# Patient Record
Sex: MALE | Race: WHITE | NOT HISPANIC OR LATINO | Employment: OTHER | URBAN - METROPOLITAN AREA
[De-identification: names, ages, dates, MRNs, and addresses within clinical notes are randomized per-mention and may not be internally consistent; named-entity substitution may affect disease eponyms.]

---

## 2017-04-11 ENCOUNTER — OFFICE VISIT (OUTPATIENT)
Dept: OTOLARYNGOLOGY | Facility: CLINIC | Age: 67
End: 2017-04-11

## 2017-04-11 VITALS — HEIGHT: 66 IN | WEIGHT: 200 LBS | BODY MASS INDEX: 32.14 KG/M2

## 2017-04-11 DIAGNOSIS — C09.9 MALIGNANT NEOPLASM OF TONSIL (H): ICD-10-CM

## 2017-04-11 DIAGNOSIS — K11.7 DISTURBANCE OF SALIVARY SECRETION: Primary | ICD-10-CM

## 2017-04-11 RX ORDER — PILOCARPINE HYDROCHLORIDE 7.5 MG/1
7.5 TABLET, FILM COATED ORAL 2 TIMES DAILY
Qty: 180 TABLET | Refills: 3 | Status: SHIPPED | OUTPATIENT
Start: 2017-04-11 | End: 2018-04-11

## 2017-04-11 ASSESSMENT — PAIN SCALES - GENERAL: PAINLEVEL: NO PAIN (0)

## 2017-04-11 NOTE — MR AVS SNAPSHOT
After Visit Summary   4/11/2017    Reese Oakley    MRN: [id number]           Patient Information     Date Of Birth          1950        Visit Information        Provider Department      4/11/2017 10:30 AM Jose Antonio Mckenna MD [facility]ose [facility] Th[facility]        Today's Diagnoses     Disturbance of salivary secretion    -  1    Malignant neoplasm of tonsil (H)           Follow-ups after your visit        Follow-up notes from your care team     Return in about 6 months (around 10/11/2017).      Your next 10 appointments already scheduled     Apr 19, 2017  1:20 PM CDT   (Arrive by 1:05 PM)   CT SOFT TISSUE NECK W/O & W CONTRAST with UCCT1   [facility] [place] [facility] ([facility])    [address]  1st Floor  [facility] [address] [phone]           Please bring any scans or X-rays taken at other hospitals, if similar tests were done. Also bring a list of your medicines, including vitamins, minerals and over-the-counter drugs. It is safest to leave personal items at home.  Be sure to tell your doctor:   If you have any allergies.   If there s any chance you are pregnant.   If you are breastfeeding.   If you have any special needs.  You will have contrast for this exam. To prepare:   Do not eat or drink for 2 hours before your exam. If you need to take medicine, you may take it with small sips of water. (We may ask you to take liquid medicine as well.)   The day before your exam, drink extra fluids at least six 8-ounce glasses (unless your doctor tells you to restrict your fluids).  Patients over 70 or patients with diabetes or kidney problems:   If you haven t had a blood test (creatinine test) within the last 30 days, go to your clinic or Diagnostic Imaging Department for this test.  If you have diabetes:   If your kidney function is normal, continue taking your metformin (Avandamet, Glucophage, Glucovance, Metaglip) on the day of your exam.   If  your kidney function is abnormal, wait 48 hours before restarting this medicine.  Please wear loose clothing, such as a sweat suit or jogging clothes. Avoid snaps, zippers and other metal. We may ask you to undress and put on a hospital gown.  If you have any questions, please call the Imaging Department where you will have your exam.            Oct 17, 2017 10:45 AM CDT   (Arrive by 10:30 AM)   RETURN TUMOR VISIT with Jose Antonio Mckenna MD   [facility] ([facility] [place])    [address] [place] [address] [phone]              Future tests that were ordered for you today     Open Future Orders        Priority Expected Expires Ordered    CT Soft tissue neck w & w/o contrast Routine  2018            Who to contact     Please call your clinic at [phone] to:    Ask questions about your health    Make or cancel appointments    Discuss your medicines    Learn about your test results    Speak to your doctor   If you have compliments or concerns about an experience at your clinic, or if you wish to file a complaint, please contact [facility] Patient Relations at [phone] or email us at [email or web address]         Additional Information About Your Visit        [employer] Information     Attributor is an electronic gateway that provides easy, online access to your medical records. With Attributor, you can request a clinic appointment, read your test results, renew a prescription or communicate with your care team.     To sign up for Attributor visit the website at [email or web address]   You will be asked to enter the access code listed below, as well as some personal information. Please follow the directions to create your username and password.     Your access code is: [email or web address]  Expires: 2017  6:30 AM     Your access code will  in 90 days. If you need help or a new code, please  "contact your [facility] or call [phone] for assistance.        Care EveryWhere ID     This is your Care EveryWhere ID. This could be used by other organizations to access your [facility] medical records  [id number]        Your Vitals Were     Height BMI (Body Mass Index)                1.68 m (5' 6.14\") 32.14 kg/m2           Blood Pressure from Last 3 Encounters:   10/06/14 135/76    Weight from Last 3 Encounters:   04/11/17 90.7 kg (200 lb)   09/13/16 88 kg (194 lb)   04/12/16 88.9 kg (196 lb)                 Today's Medication Changes          These changes are accurate as of: 4/11/17 11:15 AM.  If you have any questions, ask your nurse or doctor.               These medicines have changed or have updated prescriptions.        Dose/Directions    * PILOCARPINE HCL PO   This may have changed:  Another medication with the same name was added. Make sure you understand how and when to take each.   Changed by:  Jose Antonio Mckenna MD        Refills:  0       * pilocarpine 5 MG tablet   Commonly known as:  SALAGEN   This may have changed:  Another medication with the same name was added. Make sure you understand how and when to take each.   Used for:  Disturbance of salivary secretion   Changed by:  Jose Antonio Mckenna MD        Dose:  5 mg   Take 1 tablet (5 mg) by mouth 2 times daily   Quantity:  60 tablet   Refills:  3       * pilocarpine 7.5 MG tablet   Commonly known as:  SALAGEN   This may have changed:  You were already taking a medication with the same name, and this prescription was added. Make sure you understand how and when to take each.   Used for:  Disturbance of salivary secretion   Changed by:  Jose Antonio Mckenna MD        Dose:  7.5 mg   Take 1 tablet (7.5 mg) by mouth 2 times daily   Quantity:  180 tablet   Refills:  3       * Notice:  This list has 3 medication(s) that are the same as other medications prescribed for you. Read the directions carefully, and ask " your doctor or other care provider to review them with you.         Where to get your medicines      Some of these will need a paper prescription and others can be bought over the counter.  Ask your nurse if you have questions.     Bring a paper prescription for each of these medications     pilocarpine 7.5 MG tablet                Primary Care Provider Office Phone # Fax #    Chilango Carrillo [phone]       [address] [address]        Thank you!     Thank you for choosing [facility]  for your care. Our goal is always to provide you with excellent care. Hearing back from our patients is one way we can continue to improve our services. Please take a few minutes to complete the written survey that you may receive in the mail after your visit with us. Thank you!             Your Updated Medication List - Protect others around you: Learn how to safely use, store and throw away your medicines at www.disposemymeds.org.          This list is accurate as of: 4/11/17 11:15 AM.  Always use your most recent med list.                   Brand Name Dispense Instructions for use    ASPIR-81 PO          glipiZIDE 10 MG tablet    GLUCOTROL     Take 10 mg by mouth 2 times daily (before meals) 12.5 & 2.5       GLUCOPHAGE 1000 MG tablet   Generic drug:  metFORMIN      Take 1,000 mg by mouth 2 times daily (with meals)       insulin glargine 100 UNIT/ML injection    LANTUS     Inject 4 Units Subcutaneous At Bedtime       lisinopril 10 MG tablet    PRINIVIL/ZESTRIL     Take 10 mg by mouth daily       omeprazole 10 MG CR capsule    priLOSEC     Take by mouth daily       * PILOCARPINE HCL PO          * pilocarpine 5 MG tablet    SALAGEN    60 tablet    Take 1 tablet (5 mg) by mouth 2 times daily       * pilocarpine 7.5 MG tablet    SALAGEN    180 tablet    Take 1 tablet (7.5 mg) by mouth 2 times daily       simvastatin 40 MG tablet    ZOCOR     Take by mouth At  Bedtime       zinc sulfate 220 MG capsule    ZINCATE     Take 220 mg by mouth daily       * Notice:  This list has 3 medication(s) that are the same as other medications prescribed for you. Read the directions carefully, and ask your doctor or other care provider to review them with you.

## 2017-04-11 NOTE — LETTER
4/11/2017       RE: Reese Oakley  [address] [address]     Dear Colleague,    Thank you for referring your patient, Reese Oakley, to the [facility] EAR NOSE [facility] TH[facility]AT at [facility]. Please see a copy of my visit note below.    HISTORY OF PRESENT ILLNESS:  Reese Oakley is 67 years of age.  He is here for follow-up today.  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  He has no dysphagia, but food does not taste right to him.  He says there are only about 10 go to foods that taste normal to him, unfortunately, and he is already 2-1/2 years out from his treatment.   No other current complaints at the present time today.      PHYSICAL EXAMINATION:  The patient is alert, oriented x3 and pleasant.  Skin of the face, lips, and neck on him is quite normal.  No masses or lesions are palpable in his oral cavity or the posterior pharynx, etc.  Ear canals, tympanic membranes are normal as well.  He had some cerumen in his right ear that I removed with a cerumen spoon.  It took a couple of minutes today to get the cerumen out of the ear under magnification with a cerumen spoon.  Neck exam shows no masses, adenopathy or thyromegaly.      PROCEDURE:  For tumor surveillance today, I did a flexible direct scope exam on him after topical anesthesia.  Nasal cavity, nasopharynx, oral cavity, oropharynx, hypopharynx are all clear throughout.  No masses or lesions or ulcers.  There is a little bit of blunting of the palatoglossal fold but this is occurring on both sides.  It is a radiation side effect only.      ASSESSMENT AND PLAN:  Patient with history of oropharyngeal carcinoma, doing well.  I will see him again in about 6 months.  He will see Dr. Carrillo in about 3 months.  He will get imaging when he comes back to see us again with a CAT scan of the neck with contrast.         Jose Antonio Mckenna MD         cc: Chilango Carrillo MD     [address]   [address]

## 2017-04-11 NOTE — PROGRESS NOTES
HISTORY OF PRESENT ILLNESS:  Reese Oakley is 67 years of age.  He is here for follow-up today.  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  He has no dysphagia, but food does not taste right to him.  He says there are only about 10 go to foods that taste normal to him, unfortunately, and he is already 2-1/2 years out from his treatment.   No other current complaints at the present time today.      PHYSICAL EXAMINATION:  The patient is alert, oriented x3 and pleasant.  Skin of the face, lips, and neck on him is quite normal.  No masses or lesions are palpable in his oral cavity or the posterior pharynx, etc.  Ear canals, tympanic membranes are normal as well.  He had some cerumen in his right ear that I removed with a cerumen spoon.  It took a couple of minutes today to get the cerumen out of the ear under magnification with a cerumen spoon.  Neck exam shows no masses, adenopathy or thyromegaly.      PROCEDURE:  For tumor surveillance today, I did a flexible direct scope exam on him after topical anesthesia.  Nasal cavity, nasopharynx, oral cavity, oropharynx, hypopharynx are all clear throughout.  No masses or lesions or ulcers.  There is a little bit of blunting of the palatoglossal fold but this is occurring on both sides.  It is a radiation side effect only.      ASSESSMENT AND PLAN:  Patient with history of oropharyngeal carcinoma, doing well.  I will see him again in about 6 months.  He will see Dr. Carrillo in about 3 months.  He will get imaging when he comes back to see us again with a CAT scan of the neck with contrast.      cc: Chilango Carrillo MD    [address]   [address]

## 2017-10-17 ENCOUNTER — OFFICE VISIT (OUTPATIENT)
Dept: OTOLARYNGOLOGY | Facility: CLINIC | Age: 67
End: 2017-10-17

## 2017-10-17 VITALS — BODY MASS INDEX: 23.3 KG/M2 | WEIGHT: 145 LBS

## 2017-10-17 DIAGNOSIS — B37.0 THRUSH: Primary | ICD-10-CM

## 2017-10-17 RX ORDER — CLOTRIMAZOLE 10 MG/1
LOZENGE ORAL
Qty: 30 TROCHE | Refills: 0 | Status: SHIPPED | OUTPATIENT
Start: 2017-10-17 | End: 2017-10-31

## 2017-10-17 ASSESSMENT — PAIN SCALES - GENERAL: PAINLEVEL: NO PAIN (0)

## 2017-10-17 NOTE — LETTER
10/17/2017       RE: Reese Oakley  [address] [facility] [address]     Dear Colleague,    Thank you for referring your patient, Reese Oakley, to the [facility] EAR NOSE [facility] TH[facility]AT at [facility]. Please see a copy of my visit note below.    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 Here for f/u today no new compalints.     Last 2 Scores for Patient-Answered VHI Questionnaire  No flowsheet data found.    Last 2 Scores for Patient-Answered CSI Questionnaire  No flowsheet data found.      Last 2 Scores for Patient-Answered EAT Questionnaire  No flowsheet data found.        PAST MEDICAL HISTORY:   Past Medical History:   Diagnosis Date     Adenocarcinoma (H)     large instestine      Cancer of appendix (H)      Diabetes (H)      Gastro-oesophageal reflux disease      History of radiation therapy      Hoarseness      Hypertension        PAST SURGICAL HISTORY:   Past Surgical History:   Procedure Laterality Date     APPENDECTOMY       COLECTOMY      Right     LASER CO2 LARYNGOSCOPY N/A 10/6/2014    Procedure: LASER CO2 LARYNGOSCOPY;  Surgeon: Jose Antonio Mckenna MD;  Location:  OR       FAMILY HISTORY:   Family History   Problem Relation Age of Onset     HEART DISEASE Father      DIABETES Mother      DIABETES Brother        SOCIAL HISTORY:   Social History   Substance Use Topics     Smoking status: Never Smoker     Smokeless tobacco: Never Used     Alcohol use No       REVIEW OF SYSTEMS: Ten point review of systems was performed and is negative except for:    ENT ROS 10/17/2017   Ears, Nose, Throat -   Endocrine Thirst        ALLERGIES: Review of patient's allergies indicates no known allergies.    MEDICATIONS:   Current Outpatient Prescriptions   Medication Sig Dispense Refill     clotrimazole 10 MG marce Allow 1 marce to dissolve slowly in mouth 3  times daily for 10 days 30 Umer 0     pilocarpine (SALAGEN) 7.5 MG tablet Take 1 tablet (7.5 mg) by mouth 2 times daily 180 tablet 3     pilocarpine (SALAGEN) 5 MG tablet Take 1 tablet (5 mg) by mouth 2 times daily 60 tablet 3     PILOCARPINE HCL PO        zinc sulfate (ZINCATE) 220 MG capsule Take 220 mg by mouth daily       Aspirin (ASPIR-81 PO)        glipiZIDE (GLUCOTROL) 10 MG tablet Take 10 mg by mouth 2 times daily (before meals) 12.5 & 2.5       metFORMIN (GLUCOPHAGE) 1000 MG tablet Take 1,000 mg by mouth 2 times daily (with meals)       insulin glargine (LANTUS VIAL) 100 UNIT/ML soln Inject 4 Units Subcutaneous At Bedtime        lisinopril (PRINIVIL,ZESTRIL) 10 MG tablet Take 10 mg by mouth daily       omeprazole (PRILOSEC) 10 MG capsule Take by mouth daily       simvastatin (ZOCOR) 40 MG tablet Take by mouth At Bedtime           PHYSICAL EXAMINATION:  He  is awake, alert and in no apparent distress.    His tympanic membranes are clear and intact bilaterally. External auditory canals are clear.  Nasal exam shows a mild septal deviation without obstruction.  Examination of the oral cavity shows no suspicious lesions.  There is symmetric movement of the tongue and soft palate.    The oropharynx is clear.  His neck is supple without significant adenopathy.  Pulse is regular.  Upper airway is clear.  Cranial nerves II-XII are grossly intact.       PROCEDURE: A flexible laryngoscopy  was performed.  Informed consent was obtained and a time out was performed. 3% lidocaine and 0.25% phenylephrine was sprayed into the nasal cavity and allowed 3 to 5 minutes for effect. The scope was passed through the right sided nostril. Examination showed the vocal folds to be mobile and meet in the midline.  No nodules, polyps or ulcerations are seen.  There is mild inflammation or erythema of the supraglottic or glottic larynx.  The hypopharynx is otherwise clear as is the subglottis.     IMPRESSION:  S/p chemorads for  orop[ahrunx cancr doing well will folow up in 2m months.     Jose Antonio Mckenna      PLAN:

## 2017-10-17 NOTE — PROGRESS NOTES
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 Here for f/u today no new compalints.     Last 2 Scores for Patient-Answered VHI Questionnaire  No flowsheet data found.    Last 2 Scores for Patient-Answered CSI Questionnaire  No flowsheet data found.      Last 2 Scores for Patient-Answered EAT Questionnaire  No flowsheet data found.        PAST MEDICAL HISTORY:   Past Medical History:   Diagnosis Date     Adenocarcinoma (H)     large instestine      Cancer of appendix (H)      Diabetes (H)      Gastro-oesophageal reflux disease      History of radiation therapy      Hoarseness      Hypertension        PAST SURGICAL HISTORY:   Past Surgical History:   Procedure Laterality Date     APPENDECTOMY       COLECTOMY      Right     LASER CO2 LARYNGOSCOPY N/A 10/6/2014    Procedure: LASER CO2 LARYNGOSCOPY;  Surgeon: Jose Antonio Mckenna MD;  Location:  OR       FAMILY HISTORY:   Family History   Problem Relation Age of Onset     HEART DISEASE Father      DIABETES Mother      DIABETES Brother        SOCIAL HISTORY:   Social History   Substance Use Topics     Smoking status: Never Smoker     Smokeless tobacco: Never Used     Alcohol use No       REVIEW OF SYSTEMS: Ten point review of systems was performed and is negative except for:   [facility] ENT ROS 10/17/2017   Ears, Nose, Throat -   Endocrine Thirst        ALLERGIES: Review of patient's allergies indicates no known allergies.    MEDICATIONS:   Current Outpatient Prescriptions   Medication Sig Dispense Refill     clotrimazole 10 MG umer Allow 1 umer to dissolve slowly in mouth 3 times daily for 10 days 30 Umer 0     pilocarpine (SALAGEN) 7.5 MG tablet Take 1 tablet (7.5 mg) by mouth 2 times daily 180 tablet 3     pilocarpine (SALAGEN) 5 MG tablet Take 1 tablet (5 mg) by mouth 2 times daily 60 tablet 3     PILOCARPINE HCL PO        zinc sulfate (ZINCATE) 220 MG capsule Take 220  mg by mouth daily       Aspirin (ASPIR-81 PO)        glipiZIDE (GLUCOTROL) 10 MG tablet Take 10 mg by mouth 2 times daily (before meals) 12.5 & 2.5       metFORMIN (GLUCOPHAGE) 1000 MG tablet Take 1,000 mg by mouth 2 times daily (with meals)       insulin glargine (LANTUS VIAL) 100 UNIT/ML soln Inject 4 Units Subcutaneous At Bedtime        lisinopril (PRINIVIL,ZESTRIL) 10 MG tablet Take 10 mg by mouth daily       omeprazole (PRILOSEC) 10 MG capsule Take by mouth daily       simvastatin (ZOCOR) 40 MG tablet Take by mouth At Bedtime           PHYSICAL EXAMINATION:  He  is awake, alert and in no apparent distress.    His tympanic membranes are clear and intact bilaterally. External auditory canals are clear.  Nasal exam shows a mild septal deviation without obstruction.  Examination of the oral cavity shows no suspicious lesions.  There is symmetric movement of the tongue and soft palate.    The oropharynx is clear.  His neck is supple without significant adenopathy.  Pulse is regular.  Upper airway is clear.  Cranial nerves II-XII are grossly intact.       PROCEDURE: A flexible laryngoscopy  was performed.  Informed consent was obtained and a time out was performed. 3% lidocaine and 0.25% phenylephrine was sprayed into the nasal cavity and allowed 3 to 5 minutes for effect. The scope was passed through the right sided nostril. Examination showed the vocal folds to be mobile and meet in the midline.  No nodules, polyps or ulcerations are seen.  There is mild inflammation or erythema of the supraglottic or glottic larynx.  The hypopharynx is otherwise clear as is the subglottis.     IMPRESSION:  S/p chemorads for orop[ahrunx cancr doing well will folow up in 2m months.     Jose Antonio Mckenna      PLAN:

## 2017-10-17 NOTE — MR AVS SNAPSHOT
After Visit Summary   10/17/2017    Reese Oakley    MRN: [id number]           Patient Information     Date Of Birth          1950        Visit Information        Provider Department      10/17/2017 10:45 AM Jose Antonio Mckenna MD [facility] Ear Nose and Throat        Today's Diagnoses     Thrush    -  1       Follow-ups after your visit        Follow-up notes from your care team     Return in about 7 months (around 2018).      Who to contact     Please call your clinic at [phone] to:    Ask questions about your health    Make or cancel appointments    Discuss your medicines    Learn about your test results    Speak to your doctor   If you have compliments or concerns about an experience at your clinic, or if you wish to file a complaint, please contact [facility] Patient Relations at [phone] or email us at [email or web address]         Additional Information About Your Visit        MyChart Information     [email or web address] is an electronic gateway that provides easy, online access to your medical records. With Money Forward, you can request a clinic appointment, read your test results, renew a prescription or communicate with your care team.     To sign up for [email or web address] visit the website at [email or web address]   You will be asked to enter the access code listed below, as well as some personal information. Please follow the directions to create your username and password.     Your access code is: [email or web address]  Expires: 2018  6:31 AM     Your access code will  in 90 days. If you need help or a new code, please contact your [facility] or call [phone] for assistance.        Care EveryWhere ID     This is your Care EveryWhere ID. This could be used by other organizations to access your [place] medical records  [id number]        Your Vitals Were     BMI (Body Mass Index)                   23.3 kg/m2             Blood Pressure from Last 3 Encounters:   10/06/14 135/76    Weight from Last 3 Encounters:   10/17/17 65.8 kg (145 lb)   04/11/17 90.7 kg (200 lb)   09/13/16 88 kg (194 lb)              Today, you had the following     No orders found for display         Today's Medication Changes          These changes are accurate as of: 10/17/17 11:21 AM.  If you have any questions, ask your nurse or doctor.               Start taking these medicines.        Dose/Directions    clotrimazole 10 MG marce   Used for:  Thrush   Started by:  Jose Antonio Mckenna MD        Allow 1 marce to dissolve slowly in mouth 3 times daily for 10 days   Quantity:  30 Marce   Refills:  0            Where to get your medicines      These medications were sent to [place] [address] [place] [address] [address]     Phone:  [phone]     clotrimazole 10 MG marce                Primary Care Provider Office Phone # Fax #    Chilango Charissa Carrillo [phone]       [facility] [address] [facility] [address]        Equal Access to Services     [facility] AH: Hadii aad ku hadasho Soomaali, waaxda luqadaha, qaybta kaalmada adeegyada, louie allen hayvikasn akil costello. So [facility] [phone].    ATENCIÓN: Si habla español, tiene a bauer disposición servicios gratuitos de asistencia lingüística. Llame al [phone].    We comply with applicable federal civil rights laws and [place] laws. We do not discriminate on the basis of race, color, national origin, age, disability, sex, sexual orientation, or gender identity.            Thank you!     Thank you for choosing [facility] EAR NOSE AND THROAT  for your care. Our goal is always to provide you with excellent care. Hearing back from our patients is one way we can continue to improve our services. Please take a few minutes to complete the written survey that you may receive in the mail after your visit with us.  Thank you!             Your Updated Medication List - Protect others around you: Learn how to safely use, store and throw away your medicines at www.disposemymeds.org.          This list is accurate as of: 10/17/17 11:21 AM.  Always use your most recent med list.                   Brand Name Dispense Instructions for use Diagnosis    ASPIR-81 PO           clotrimazole 10 MG umer     30 Umer    Allow 1 umer to dissolve slowly in mouth 3 times daily for 10 days    Thrush       glipiZIDE 10 MG tablet    GLUCOTROL     Take 10 mg by mouth 2 times daily (before meals) 12.5 & 2.5        GLUCOPHAGE 1000 MG tablet   Generic drug:  metFORMIN      Take 1,000 mg by mouth 2 times daily (with meals)        insulin glargine 100 UNIT/ML injection    LANTUS     Inject 4 Units Subcutaneous At Bedtime        lisinopril 10 MG tablet    PRINIVIL/ZESTRIL     Take 10 mg by mouth daily        omeprazole 10 MG CR capsule    priLOSEC     Take by mouth daily        * PILOCARPINE HCL PO           * pilocarpine 5 MG tablet    SALAGEN    60 tablet    Take 1 tablet (5 mg) by mouth 2 times daily    Disturbance of salivary secretion       * pilocarpine 7.5 MG tablet    SALAGEN    180 tablet    Take 1 tablet (7.5 mg) by mouth 2 times daily    Disturbance of salivary secretion       simvastatin 40 MG tablet    ZOCOR     Take by mouth At Bedtime        zinc sulfate 220 (50 ZN) MG capsule    ZINCATE     Take 220 mg by mouth daily        * Notice:  This list has 3 medication(s) that are the same as other medications prescribed for you. Read the directions carefully, and ask your doctor or other care provider to review them with you.

## 2017-10-17 NOTE — NURSING NOTE
Chief Complaint   Patient presents with     RECHECK     6 month follow up     Clayton Nicholson LPN

## 2017-10-17 NOTE — PATIENT INSTRUCTIONS
1.  You were seen in the ENT Clinic today by Dr. Mckenna.  If you have questions or concerns after your appointment please call, [phone].  Press option #1 for scheduling related needs.  Press option #3 for Nurse advice.  2.  Plan is to return to clinic in 7 months for another assessment.  3. RN Care Coordinator is Noah, [phone]

## 2018-04-11 ENCOUNTER — TELEPHONE (OUTPATIENT)
Dept: OTOLARYNGOLOGY | Facility: CLINIC | Age: 68
End: 2018-04-11

## 2018-04-11 DIAGNOSIS — K11.7 DISTURBANCE OF SALIVARY SECRETION: ICD-10-CM

## 2018-04-11 RX ORDER — PILOCARPINE HYDROCHLORIDE 7.5 MG/1
7.5 TABLET, FILM COATED ORAL 2 TIMES DAILY
Qty: 180 TABLET | Refills: 3 | Status: SHIPPED | OUTPATIENT
Start: 2018-04-11 | End: 2019-04-13

## 2018-04-11 NOTE — TELEPHONE ENCOUNTER
[facility] Call Center    Phone Message    May a detailed message be left on voicemail: yes    Reason for Call: Medication Refill Request    Has the patient contacted the pharmacy for the refill? Yes   Name of medication being requested: Philocarpine (Salagen) 7.5 MG Tab 2 Times Daily  Provider who prescribed the medication: Dr. Jose Antonio Mckenna  Pharmacy: N/A  Date medication is needed: Before 04/13/2018      Action Taken: Message routed to:  [facility]

## 2018-05-22 ENCOUNTER — OFFICE VISIT (OUTPATIENT)
Dept: OTOLARYNGOLOGY | Facility: CLINIC | Age: 68
End: 2018-05-22
Payer: COMMERCIAL

## 2018-05-22 VITALS
SYSTOLIC BLOOD PRESSURE: 153 MMHG | WEIGHT: 195.6 LBS | DIASTOLIC BLOOD PRESSURE: 77 MMHG | HEIGHT: 67 IN | BODY MASS INDEX: 30.7 KG/M2

## 2018-05-22 DIAGNOSIS — C09.9 TONSIL CANCER (H): Primary | ICD-10-CM

## 2018-05-22 ASSESSMENT — PAIN SCALES - GENERAL: PAINLEVEL: NO PAIN (0)

## 2018-05-22 NOTE — PROGRESS NOTES
HISTORY OF PRESENT ILLNESS:  Kenrick is 68 years of age.  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  He had to undergo actually chemotherapy, radiation therapy afterwards.  He has no new complaints at the present time today.  He is eating, drinking, breathing and swallowing quite well.  In fact, his swallowing is a little bit better than the last time I saw him.  He is almost four years out now.  He has no other current complaints.  He is otherwise getting by quite well.  He is on pilocarpine additionally.      PHYSICAL EXAMINATION:  The patient is alert, oriented x3 and is very pleasant to interact with, as always.  Skin of the face, lips, and neck on him is quite normal throughout.  His oral cavity and oropharynx is clear.  His floor of mouth and base of tongue are soft.  Ear canals and tympanic membranes are normal.  His neck exam shows no masses, adenopathy or thyromegaly.      PROCEDURE:  Flexible direct laryngoscopy was done on him today for tumor surveillance.  After topical anesthesia through the right naris, we examined the nasal cavity, nasopharynx, oral cavity, oropharynx, hypopharynx, and everything looks excellent with good true vocal mobility throughout.  No masses or lesions are seen.      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 He is going to see Dr. Carrillo in four months and me in eight months.        cc: Chilango Carrillo MD    [facility]    [address] [place]  [address]      FO/ms

## 2018-05-22 NOTE — LETTER
5/22/2018       RE: Reese Oakley  [address] [facility] [address]     Dear Colleague,    Thank you for referring your patient, Reese Oakley, to the [facility] EAR NOSE AND THROAT at [facility]. Please see a copy of my visit note below.    HISTORY OF PRESENT ILLNESS:  Kenrick is 68 years of age.  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  He had to undergo actually chemotherapy, radiation therapy afterwards.  He has no new complaints at the present time today.  He is eating, drinking, breathing and swallowing quite well.  In fact, his swallowing is a little bit better than the last time I saw him.  He is almost four years out now.  He has no other current complaints.  He is otherwise getting by quite well.  He is on pilocarpine additionally.      PHYSICAL EXAMINATION:  The patient is alert, oriented x3 and is very pleasant to interact with, as always.  Skin of the face, lips, and neck on him is quite normal throughout.  His oral cavity and oropharynx is clear.  His floor of mouth and base of tongue are soft.  Ear canals and tympanic membranes are normal.  His neck exam shows no masses, adenopathy or thyromegaly.      PROCEDURE:  Flexible direct laryngoscopy was done on him today for tumor surveillance.  After topical anesthesia through the right naris, we examined the nasal cavity, nasopharynx, oral cavity, oropharynx, hypopharynx, and everything looks excellent with good true vocal mobility throughout.  No masses or lesions are seen.      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 He is going to see Dr. Carrillo in four months and me in eight months.          FO/ms     Again, thank you for allowing me to participate in the care of your patient.      Sincerely,    Jose Antonio Mckenna MD    cc: Chilango Carrillo MD    [facility]    [address] [place]  [address]

## 2018-05-22 NOTE — MR AVS SNAPSHOT
After Visit Summary   2018    Reese Oakley    MRN: [id number]           Patient Information     Date Of Birth          1950        Visit Information        Provider Department      2018 10:30 AM Jose Antonio Mckenna MD [facility] Ear Nose and Throat        Care Instructions    Please follow up to see Dr Mckenna in about 8 months.  Jass Leonard ,RN  [phone]              Follow-ups after your visit        Follow-up notes from your care team     Return in about 8 months (around 2019).      Your next 10 appointments already scheduled     2019 10:15 AM CST   (Arrive by 10:00 AM)   RETURN TUMOR VISIT with MD LEENA Lopez [facility] Nose [facility] Throat ([facility])    [address] [place] [address] [phone]              Who to contact     Please call your clinic at [phone] to:    Ask questions about your health    Make or cancel appointments    Discuss your medicines    Learn about your test results    Speak to your doctor            Additional Information About Your Visit        MyChart Information     [employer] is an electronic gateway that provides easy, online access to your medical records. With [employer] you can request a clinic appointment, read your test results, renew a prescription or communicate with your care team.     To sign up for Fit Stepst visit the website at [email or web address]   You will be asked to enter the access code listed below, as well as some personal information. Please follow the directions to create your username and password.     Your access code is: [email or web address]  Expires: 2018  6:30 AM     Your access code will  in 90 days. If you need help or a new code, please contact your [facility] or call [phone] for assistance.        Care EveryWhere ID     This is your Care EveryWhere ID. This could be used by other  "organizations to access your [place] medical records  [id number]        Your Vitals Were     Height BMI (Body Mass Index)                1.702 m (5' 7\") 30.64 kg/m2           Blood Pressure from Last 3 Encounters:   05/22/18 153/77   10/06/14 135/76    Weight from Last 3 Encounters:   05/22/18 88.7 kg (195 lb 9.6 oz)   10/17/17 65.8 kg (145 lb)   04/11/17 90.7 kg (200 lb)              Today, you had the following     No orders found for display       Primary Care Provider Office Phone # Fax #    Chilango Carrillo [phone]       [facility] [address]  [facility] [address]        Equal Access to Services     TIRSO NOYOLA : Hadii orquidea salmono Soomaali, waaxda luqadaha, qaybta kaalmada adeegyada, louie tapia . So [facility] [phone].    ATENCIÓN: Si habla español, tiene a bauer disposición servicios gratuitos de asistencia lingüística. Ejame al [phone].    We comply with applicable federal civil rights laws and [place] laws. We do not discriminate on the basis of race, color, national origin, age, disability, sex, sexual orientation, or gender identity.            Thank you!     Thank you for choosing [facility] EAR NOSE AND THROAT  for your care. Our goal is always to provide you with excellent care. Hearing back from our patients is one way we can continue to improve our services. Please take a few minutes to complete the written survey that you may receive in the mail after your visit with us. Thank you!             Your Updated Medication List - Protect others around you: Learn how to safely use, store and throw away your medicines at www.disposemymeds.org.          This list is accurate as of 5/22/18 10:55 AM.  Always use your most recent med list.                   Brand Name Dispense Instructions for use Diagnosis    ASPIR-81 PO           glipiZIDE 10 MG tablet    GLUCOTROL     Take 10 mg by mouth 2 times daily (before meals) 12.5 & 2.5        " GLUCOPHAGE 1000 MG tablet   Generic drug:  metFORMIN      Take 1,000 mg by mouth 2 times daily (with meals)        insulin glargine 100 UNIT/ML injection    LANTUS     Inject 4 Units Subcutaneous At Bedtime        lisinopril 10 MG tablet    PRINIVIL/ZESTRIL     Take 10 mg by mouth daily        omeprazole 10 MG CR capsule    priLOSEC     Take by mouth daily        * PILOCARPINE HCL PO           * pilocarpine 5 MG tablet    SALAGEN    60 tablet    Take 1 tablet (5 mg) by mouth 2 times daily    Disturbance of salivary secretion       * pilocarpine 7.5 MG tablet    SALAGEN    180 tablet    Take 1 tablet (7.5 mg) by mouth 2 times daily    Disturbance of salivary secretion       simvastatin 40 MG tablet    ZOCOR     Take by mouth At Bedtime        zinc sulfate 220 (50 Zn) MG capsule    ZINCATE     Take 220 mg by mouth daily        * Notice:  This list has 3 medication(s) that are the same as other medications prescribed for you. Read the directions carefully, and ask your doctor or other care provider to review them with you.

## 2019-01-09 ENCOUNTER — PATIENT OUTREACH (OUTPATIENT)
Dept: CARE COORDINATION | Facility: CLINIC | Age: 69
End: 2019-01-09

## 2019-01-22 ENCOUNTER — OFFICE VISIT (OUTPATIENT)
Dept: OTOLARYNGOLOGY | Facility: CLINIC | Age: 69
End: 2019-01-22
Payer: COMMERCIAL

## 2019-01-22 VITALS — WEIGHT: 195 LBS | BODY MASS INDEX: 30.61 KG/M2 | HEIGHT: 67 IN

## 2019-01-22 DIAGNOSIS — C09.9 TONSIL CANCER (H): Primary | ICD-10-CM

## 2019-01-22 ASSESSMENT — MIFFLIN-ST. JEOR: SCORE: 1608.14

## 2019-01-22 ASSESSMENT — PAIN SCALES - GENERAL: PAINLEVEL: NO PAIN (0)

## 2019-01-22 NOTE — PATIENT INSTRUCTIONS
Thank you for choosing  Physicians.  Please follow up with Dr. Mckenna in approximately 1 year.    [phone] appointment scheduling option 1 and nurse advice option 3.

## 2019-01-22 NOTE — LETTER
1/22/2019     RE: Reese Oakley  [address] [facility] [address]     Dear Colleague,    Thank you for referring your patient, Reese Oakley, to the [facility] EAR NOSE AND THROAT at [facility]. Please see a copy of my visit note below.    HISTORY OF PRESENT ILLNESS:  Kenrick is 68 years of age.  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  He had to undergo actually chemotherapy, radiation therapy afterwards.  He has no new complaints at the present time today.  He is eating, drinking, breathing and swallowing quite well.  In fact, his swallowing is a little bit better than the last time I saw him.  He is almost four years out now.  He has no other current complaints.  He is otherwise getting by quite well.  He is on pilocarpine additionally.      PHYSICAL EXAMINATION:  The patient is alert, oriented x3 and is very pleasant to interact with, as always.  Skin of the face, lips, and neck on him is quite normal throughout.  His oral cavity and oropharynx is clear.  His floor of mouth and base of tongue are soft.  Ear canals and tympanic membranes are normal.  His neck exam shows no masses, adenopathy or thyromegaly.      PROCEDURE:  Flexible direct laryngoscopy was done on him today for tumor surveillance.  After topical anesthesia through the right naris, we examined the nasal cavity, nasopharynx, oral cavity, oropharynx, hypopharynx, and everything looks excellent with good true vocal mobility throughout.  No masses or lesions are seen.      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 He is going to see Dr. Carrillo in six months and me in twelve months.       Jose Antonio Mckenna MD    cc: Chilango Carrillo MD    ENT [facility]    [address] [place]  [address]

## 2019-01-22 NOTE — PROGRESS NOTES
HISTORY OF PRESENT ILLNESS:  Kenrick is 68 years of age.  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  He had to undergo actually chemotherapy, radiation therapy afterwards.  He has no new complaints at the present time today.  He is eating, drinking, breathing and swallowing quite well.  In fact, his swallowing is a little bit better than the last time I saw him.  He is almost four years out now.  He has no other current complaints.  He is otherwise getting by quite well.  He is on pilocarpine additionally.      PHYSICAL EXAMINATION:  The patient is alert, oriented x3 and is very pleasant to interact with, as always.  Skin of the face, lips, and neck on him is quite normal throughout.  His oral cavity and oropharynx is clear.  His floor of mouth and base of tongue are soft.  Ear canals and tympanic membranes are normal.  His neck exam shows no masses, adenopathy or thyromegaly.      PROCEDURE:  Flexible direct laryngoscopy was done on him today for tumor surveillance.  After topical anesthesia through the right naris, we examined the nasal cavity, nasopharynx, oral cavity, oropharynx, hypopharynx, and everything looks excellent with good true vocal mobility throughout.  No masses or lesions are seen.      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 He is going to see Dr. Carrillo in six months and me in twelve months.        cc: Chilango Carrillo MD    ENT [facility]    [address] [place]  [address]      FO/ms

## 2019-04-12 ENCOUNTER — TELEPHONE (OUTPATIENT)
Dept: OTOLARYNGOLOGY | Facility: CLINIC | Age: 69
End: 2019-04-12

## 2019-04-12 DIAGNOSIS — K11.7 DISTURBANCE OF SALIVARY SECRETION: ICD-10-CM

## 2019-04-12 NOTE — TELEPHONE ENCOUNTER
[facility] Call Center    Phone Message    May a detailed message be left on voicemail: yes    Reason for Call: Medication Refill Request    Has the patient contacted the pharmacy for the refill? Yes   Name of medication being requested: pilocarpine (SALAGEN) 7.5 MG tablet  Provider who prescribed the medication: Bala  [facility] [place] Phone: [phone]  Date medication is needed: Has one day left needs 90 day RX with 3 refills         Action Taken: Message routed to:  [facility] ([facility] ENT

## 2019-04-13 RX ORDER — PILOCARPINE HYDROCHLORIDE 7.5 MG/1
7.5 TABLET, FILM COATED ORAL 2 TIMES DAILY
Qty: 180 TABLET | Refills: 3 | Status: SHIPPED | OUTPATIENT
Start: 2019-04-13 | End: 2020-05-19

## 2019-12-09 ENCOUNTER — DOCUMENTATION ONLY (OUTPATIENT)
Dept: CARE COORDINATION | Facility: CLINIC | Age: 69
End: 2019-12-09

## 2020-01-01 ENCOUNTER — OFFICE VISIT (OUTPATIENT)
Dept: OTOLARYNGOLOGY | Facility: CLINIC | Age: 70
End: 2020-01-01
Payer: MEDICARE

## 2020-01-01 ENCOUNTER — APPOINTMENT (OUTPATIENT)
Dept: OCCUPATIONAL THERAPY | Facility: CLINIC | Age: 70
DRG: 822 | End: 2020-01-01
Attending: OTOLARYNGOLOGY
Payer: MEDICARE

## 2020-01-01 ENCOUNTER — PATIENT OUTREACH (OUTPATIENT)
Dept: CARE COORDINATION | Facility: CLINIC | Age: 70
End: 2020-01-01

## 2020-01-01 ENCOUNTER — PATIENT OUTREACH (OUTPATIENT)
Dept: OTOLARYNGOLOGY | Facility: CLINIC | Age: 70
End: 2020-01-01

## 2020-01-01 ENCOUNTER — TELEPHONE (OUTPATIENT)
Dept: OTOLARYNGOLOGY | Facility: CLINIC | Age: 70
End: 2020-01-01

## 2020-01-01 ENCOUNTER — TUMOR CONFERENCE (OUTPATIENT)
Dept: ONCOLOGY | Facility: CLINIC | Age: 70
End: 2020-01-01
Payer: MEDICARE

## 2020-01-01 ENCOUNTER — PREP FOR PROCEDURE (OUTPATIENT)
Dept: OTOLARYNGOLOGY | Facility: CLINIC | Age: 70
End: 2020-01-01

## 2020-01-01 ENCOUNTER — ANESTHESIA (OUTPATIENT)
Dept: SURGERY | Facility: CLINIC | Age: 70
DRG: 822 | End: 2020-01-01
Payer: MEDICARE

## 2020-01-01 ENCOUNTER — HOSPITAL ENCOUNTER (OUTPATIENT)
Dept: PET IMAGING | Facility: CLINIC | Age: 70
Discharge: HOME | End: 2020-11-24
Attending: OTOLARYNGOLOGY | Admitting: OTOLARYNGOLOGY
Payer: MEDICARE

## 2020-01-01 ENCOUNTER — HOSPITAL ENCOUNTER (INPATIENT)
Facility: CLINIC | Age: 70
LOS: 3 days | Discharge: HOME | DRG: 822 | End: 2020-12-12
Attending: OTOLARYNGOLOGY | Admitting: OTOLARYNGOLOGY
Payer: MEDICARE

## 2020-01-01 ENCOUNTER — TELEPHONE (OUTPATIENT)
Dept: ONCOLOGY | Facility: CLINIC | Age: 70
End: 2020-01-01

## 2020-01-01 ENCOUNTER — ANESTHESIA EVENT (OUTPATIENT)
Dept: SURGERY | Facility: CLINIC | Age: 70
DRG: 822 | End: 2020-01-01
Payer: MEDICARE

## 2020-01-01 VITALS
TEMPERATURE: 98 F | HEIGHT: 67 IN | HEART RATE: 56 BPM | BODY MASS INDEX: 29.19 KG/M2 | OXYGEN SATURATION: 98 % | WEIGHT: 186 LBS

## 2020-01-01 VITALS
BODY MASS INDEX: 29.03 KG/M2 | HEART RATE: 57 BPM | WEIGHT: 185 LBS | OXYGEN SATURATION: 99 % | TEMPERATURE: 96.9 F | HEIGHT: 67 IN

## 2020-01-01 VITALS
BODY MASS INDEX: 29.98 KG/M2 | TEMPERATURE: 96 F | HEART RATE: 66 BPM | WEIGHT: 191 LBS | OXYGEN SATURATION: 99 % | HEIGHT: 67 IN

## 2020-01-01 VITALS
OXYGEN SATURATION: 100 % | WEIGHT: 185.3 LBS | RESPIRATION RATE: 16 BRPM | DIASTOLIC BLOOD PRESSURE: 58 MMHG | HEART RATE: 55 BPM | BODY MASS INDEX: 29.08 KG/M2 | HEIGHT: 67 IN | SYSTOLIC BLOOD PRESSURE: 146 MMHG | TEMPERATURE: 96.3 F

## 2020-01-01 DIAGNOSIS — C02.1 SQUAMOUS CELL CARCINOMA OF LATERAL TONGUE (H): Primary | ICD-10-CM

## 2020-01-01 DIAGNOSIS — Z11.59 ENCOUNTER FOR SCREENING FOR OTHER VIRAL DISEASES: ICD-10-CM

## 2020-01-01 DIAGNOSIS — C02.1 SQUAMOUS CELL CARCINOMA OF LATERAL TONGUE (H): ICD-10-CM

## 2020-01-01 DIAGNOSIS — C44.42 SQUAMOUS CELL CARCINOMA OF NECK: Primary | ICD-10-CM

## 2020-01-01 DIAGNOSIS — C44.42 SQUAMOUS CELL CARCINOMA OF NECK: ICD-10-CM

## 2020-01-01 DIAGNOSIS — Z11.59 ENCOUNTER FOR SCREENING FOR OTHER VIRAL DISEASES: Primary | ICD-10-CM

## 2020-01-01 DIAGNOSIS — G89.18 ACUTE POST-OPERATIVE PAIN: Primary | ICD-10-CM

## 2020-01-01 LAB
ANION GAP SERPL CALCULATED.3IONS-SCNC: 7 MMOL/L (ref 3–14)
BUN SERPL-MCNC: 19 MG/DL (ref 7–30)
CALCIUM SERPL-MCNC: 8.8 MG/DL (ref 8.5–10.1)
CHLORIDE SERPL-SCNC: 106 MMOL/L (ref 94–109)
CO2 SERPL-SCNC: 23 MMOL/L (ref 20–32)
COPATH REPORT: NORMAL
CREAT BLD-MCNC: 1.6 MG/DL (ref 0.66–1.25)
CREAT SERPL-MCNC: 1.29 MG/DL (ref 0.66–1.25)
CREAT SERPL-MCNC: 1.3 MG/DL (ref 0.66–1.25)
GFR SERPL CREATININE-BSD FRML MDRD: 43 ML/MIN/{1.73_M2}
GFR SERPL CREATININE-BSD FRML MDRD: 55 ML/MIN/{1.73_M2}
GFR SERPL CREATININE-BSD FRML MDRD: 55 ML/MIN/{1.73_M2}
GLUCOSE BLDC GLUCOMTR-MCNC: 114 MG/DL (ref 70–99)
GLUCOSE BLDC GLUCOMTR-MCNC: 139 MG/DL (ref 70–99)
GLUCOSE BLDC GLUCOMTR-MCNC: 157 MG/DL (ref 70–99)
GLUCOSE BLDC GLUCOMTR-MCNC: 167 MG/DL (ref 70–99)
GLUCOSE BLDC GLUCOMTR-MCNC: 224 MG/DL (ref 70–99)
GLUCOSE BLDC GLUCOMTR-MCNC: 234 MG/DL (ref 70–99)
GLUCOSE BLDC GLUCOMTR-MCNC: 244 MG/DL (ref 70–99)
GLUCOSE BLDC GLUCOMTR-MCNC: 249 MG/DL (ref 70–99)
GLUCOSE BLDC GLUCOMTR-MCNC: 256 MG/DL (ref 70–99)
GLUCOSE BLDC GLUCOMTR-MCNC: 269 MG/DL (ref 70–99)
GLUCOSE BLDC GLUCOMTR-MCNC: 284 MG/DL (ref 70–99)
GLUCOSE BLDC GLUCOMTR-MCNC: 285 MG/DL (ref 70–99)
GLUCOSE BLDC GLUCOMTR-MCNC: 294 MG/DL (ref 70–99)
GLUCOSE BLDC GLUCOMTR-MCNC: 302 MG/DL (ref 70–99)
GLUCOSE BLDC GLUCOMTR-MCNC: 304 MG/DL (ref 70–99)
GLUCOSE BLDC GLUCOMTR-MCNC: 307 MG/DL (ref 70–99)
GLUCOSE BLDC GLUCOMTR-MCNC: 310 MG/DL (ref 70–99)
GLUCOSE BLDC GLUCOMTR-MCNC: 318 MG/DL (ref 70–99)
GLUCOSE BLDC GLUCOMTR-MCNC: 347 MG/DL (ref 70–99)
GLUCOSE BLDC GLUCOMTR-MCNC: 369 MG/DL (ref 70–99)
GLUCOSE BLDC GLUCOMTR-MCNC: 373 MG/DL (ref 70–99)
GLUCOSE BLDC GLUCOMTR-MCNC: 84 MG/DL (ref 70–99)
GLUCOSE BLDC GLUCOMTR-MCNC: 88 MG/DL (ref 70–99)
GLUCOSE BLDC GLUCOMTR-MCNC: 92 MG/DL (ref 70–99)
GLUCOSE SERPL-MCNC: 152 MG/DL (ref 70–99)
HBA1C MFR BLD: 7.9 % (ref 0–5.6)
HGB BLD-MCNC: 13.5 G/DL (ref 13.3–17.7)
PLATELET # BLD AUTO: 261 10E9/L (ref 150–450)
POTASSIUM SERPL-SCNC: 4.2 MMOL/L (ref 3.4–5.3)
POTASSIUM SERPL-SCNC: 4.3 MMOL/L (ref 3.4–5.3)
SARS-COV-2 RNA SPEC QL NAA+PROBE: NOT DETECTED
SODIUM SERPL-SCNC: 136 MMOL/L (ref 133–144)
SPECIMEN SOURCE: NORMAL

## 2020-01-01 PROCEDURE — 250N000013 HC RX MED GY IP 250 OP 250 PS 637: Performed by: ANESTHESIOLOGY

## 2020-01-01 PROCEDURE — 120N000002 HC R&B MED SURG/OB UMMC

## 2020-01-01 PROCEDURE — 88305 TISSUE EXAM BY PATHOLOGIST: CPT | Mod: TC | Performed by: OTOLARYNGOLOGY

## 2020-01-01 PROCEDURE — 85018 HEMOGLOBIN: CPT | Performed by: ANESTHESIOLOGY

## 2020-01-01 PROCEDURE — 88342 IMHCHEM/IMCYTCHM 1ST ANTB: CPT | Mod: 26 | Performed by: PATHOLOGY

## 2020-01-01 PROCEDURE — 250N000011 HC RX IP 250 OP 636: Performed by: STUDENT IN AN ORGANIZED HEALTH CARE EDUCATION/TRAINING PROGRAM

## 2020-01-01 PROCEDURE — 250N000012 HC RX MED GY IP 250 OP 636 PS 637: Performed by: STUDENT IN AN ORGANIZED HEALTH CARE EDUCATION/TRAINING PROGRAM

## 2020-01-01 PROCEDURE — 250N000011 HC RX IP 250 OP 636: Performed by: OTOLARYNGOLOGY

## 2020-01-01 PROCEDURE — 999N000139 HC STATISTIC PRE-PROCEDURE ASSESSMENT II: Performed by: OTOLARYNGOLOGY

## 2020-01-01 PROCEDURE — 07T10ZZ RESECTION OF RIGHT NECK LYMPHATIC, OPEN APPROACH: ICD-10-PCS | Performed by: OTOLARYNGOLOGY

## 2020-01-01 PROCEDURE — 250N000003 HC SEVOFLURANE, EA 15 MIN: Performed by: OTOLARYNGOLOGY

## 2020-01-01 PROCEDURE — 258N000003 HC RX IP 258 OP 636: Performed by: ANESTHESIOLOGY

## 2020-01-01 PROCEDURE — 272N000001 HC OR GENERAL SUPPLY STERILE: Performed by: OTOLARYNGOLOGY

## 2020-01-01 PROCEDURE — 360N000029 HC SURGERY LEVEL 4 EA 15 ADDTL MIN - UMMC: Performed by: OTOLARYNGOLOGY

## 2020-01-01 PROCEDURE — 761N000003 HC RECOVERY PHASE 1 LEVEL 2 FIRST HR: Performed by: OTOLARYNGOLOGY

## 2020-01-01 PROCEDURE — 97110 THERAPEUTIC EXERCISES: CPT | Mod: GO

## 2020-01-01 PROCEDURE — 360N000028 HC SURGERY LEVEL 4 1ST 30 MIN - UMMC: Performed by: OTOLARYNGOLOGY

## 2020-01-01 PROCEDURE — A9552 F18 FDG: HCPCS | Performed by: OTOLARYNGOLOGY

## 2020-01-01 PROCEDURE — 78813 PET IMAGE FULL BODY: CPT | Mod: 26

## 2020-01-01 PROCEDURE — 761N000004 HC RECOVERY PHASE 1 LEVEL 2 EA ADDTL HR: Performed by: OTOLARYNGOLOGY

## 2020-01-01 PROCEDURE — 999N001017 HC STATISTIC GLUCOSE BY METER IP

## 2020-01-01 PROCEDURE — 99213 OFFICE O/P EST LOW 20 MIN: CPT | Performed by: OTOLARYNGOLOGY

## 2020-01-01 PROCEDURE — 250N000013 HC RX MED GY IP 250 OP 250 PS 637: Performed by: STUDENT IN AN ORGANIZED HEALTH CARE EDUCATION/TRAINING PROGRAM

## 2020-01-01 PROCEDURE — 88341 IMHCHEM/IMCYTCHM EA ADD ANTB: CPT | Mod: TC | Performed by: OTOLARYNGOLOGY

## 2020-01-01 PROCEDURE — 343N000001 HC RX 343: Performed by: OTOLARYNGOLOGY

## 2020-01-01 PROCEDURE — 71260 CT THORAX DX C+: CPT | Mod: 26

## 2020-01-01 PROCEDURE — 82565 ASSAY OF CREATININE: CPT | Performed by: ANESTHESIOLOGY

## 2020-01-01 PROCEDURE — 38724 REMOVAL OF LYMPH NODES NECK: CPT | Mod: RT | Performed by: OTOLARYNGOLOGY

## 2020-01-01 PROCEDURE — 74177 CT ABD & PELVIS W/CONTRAST: CPT | Mod: 26

## 2020-01-01 PROCEDURE — 97165 OT EVAL LOW COMPLEX 30 MIN: CPT | Mod: GO

## 2020-01-01 PROCEDURE — 250N000009 HC RX 250: Performed by: NURSE PRACTITIONER

## 2020-01-01 PROCEDURE — 88360 TUMOR IMMUNOHISTOCHEM/MANUAL: CPT | Mod: 26 | Performed by: PATHOLOGY

## 2020-01-01 PROCEDURE — 88360 TUMOR IMMUNOHISTOCHEM/MANUAL: CPT | Mod: TC | Performed by: OTOLARYNGOLOGY

## 2020-01-01 PROCEDURE — 250N000011 HC RX IP 250 OP 636: Performed by: NURSE ANESTHETIST, CERTIFIED REGISTERED

## 2020-01-01 PROCEDURE — 83036 HEMOGLOBIN GLYCOSYLATED A1C: CPT | Performed by: STUDENT IN AN ORGANIZED HEALTH CARE EDUCATION/TRAINING PROGRAM

## 2020-01-01 PROCEDURE — 97535 SELF CARE MNGMENT TRAINING: CPT | Mod: GO

## 2020-01-01 PROCEDURE — 82565 ASSAY OF CREATININE: CPT | Performed by: OTOLARYNGOLOGY

## 2020-01-01 PROCEDURE — 36415 COLL VENOUS BLD VENIPUNCTURE: CPT | Performed by: OTOLARYNGOLOGY

## 2020-01-01 PROCEDURE — 84132 ASSAY OF SERUM POTASSIUM: CPT | Performed by: ANESTHESIOLOGY

## 2020-01-01 PROCEDURE — 88342 IMHCHEM/IMCYTCHM 1ST ANTB: CPT | Mod: TC | Performed by: OTOLARYNGOLOGY

## 2020-01-01 PROCEDURE — 99223 1ST HOSP IP/OBS HIGH 75: CPT | Performed by: NURSE PRACTITIONER

## 2020-01-01 PROCEDURE — 250N000009 HC RX 250: Performed by: NURSE ANESTHETIST, CERTIFIED REGISTERED

## 2020-01-01 PROCEDURE — 250N000011 HC RX IP 250 OP 636: Performed by: ANESTHESIOLOGY

## 2020-01-01 PROCEDURE — 88341 IMHCHEM/IMCYTCHM EA ADD ANTB: CPT | Mod: 26 | Performed by: PATHOLOGY

## 2020-01-01 PROCEDURE — 85049 AUTOMATED PLATELET COUNT: CPT | Performed by: OTOLARYNGOLOGY

## 2020-01-01 PROCEDURE — 99231 SBSQ HOSP IP/OBS SF/LOW 25: CPT | Mod: 95 | Performed by: INTERNAL MEDICINE

## 2020-01-01 PROCEDURE — 258N000003 HC RX IP 258 OP 636: Performed by: NURSE ANESTHETIST, CERTIFIED REGISTERED

## 2020-01-01 PROCEDURE — 88307 TISSUE EXAM BY PATHOLOGIST: CPT | Mod: 26 | Performed by: PATHOLOGY

## 2020-01-01 PROCEDURE — 250N000009 HC RX 250: Performed by: STUDENT IN AN ORGANIZED HEALTH CARE EDUCATION/TRAINING PROGRAM

## 2020-01-01 PROCEDURE — 74177 CT ABD & PELVIS W/CONTRAST: CPT

## 2020-01-01 PROCEDURE — 250N000009 HC RX 250: Performed by: OTOLARYNGOLOGY

## 2020-01-01 PROCEDURE — 80048 BASIC METABOLIC PNL TOTAL CA: CPT | Performed by: OTOLARYNGOLOGY

## 2020-01-01 PROCEDURE — 370N000002 HC ANESTHESIA TECHNICAL FEE, EACH ADDTL 15 MIN: Performed by: OTOLARYNGOLOGY

## 2020-01-01 PROCEDURE — 88305 TISSUE EXAM BY PATHOLOGIST: CPT | Mod: 26 | Performed by: PATHOLOGY

## 2020-01-01 PROCEDURE — 88307 TISSUE EXAM BY PATHOLOGIST: CPT | Mod: TC | Performed by: OTOLARYNGOLOGY

## 2020-01-01 PROCEDURE — 370N000001 HC ANESTHESIA TECHNICAL FEE, 1ST 30 MIN: Performed by: OTOLARYNGOLOGY

## 2020-01-01 PROCEDURE — 99024 POSTOP FOLLOW-UP VISIT: CPT | Performed by: OTOLARYNGOLOGY

## 2020-01-01 PROCEDURE — 258N000003 HC RX IP 258 OP 636: Performed by: NURSE PRACTITIONER

## 2020-01-01 PROCEDURE — U0003 INFECTIOUS AGENT DETECTION BY NUCLEIC ACID (DNA OR RNA); SEVERE ACUTE RESPIRATORY SYNDROME CORONAVIRUS 2 (SARS-COV-2) (CORONAVIRUS DISEASE [COVID-19]), AMPLIFIED PROBE TECHNIQUE, MAKING USE OF HIGH THROUGHPUT TECHNOLOGIES AS DESCRIBED BY CMS-2020-01-R: HCPCS | Performed by: OTOLARYNGOLOGY

## 2020-01-01 RX ORDER — MINERAL OIL/HYDROPHIL PETROLAT
OINTMENT (GRAM) TOPICAL EVERY 8 HOURS
Status: DISCONTINUED | OUTPATIENT
Start: 2020-01-01 | End: 2020-01-01 | Stop reason: HOSPADM

## 2020-01-01 RX ORDER — ONDANSETRON 4 MG/1
4 TABLET, ORALLY DISINTEGRATING ORAL EVERY 6 HOURS PRN
Status: DISCONTINUED | OUTPATIENT
Start: 2020-01-01 | End: 2020-01-01 | Stop reason: HOSPADM

## 2020-01-01 RX ORDER — HYDROMORPHONE HYDROCHLORIDE 2 MG/1
2 TABLET ORAL EVERY 4 HOURS PRN
Status: DISCONTINUED | OUTPATIENT
Start: 2020-01-01 | End: 2020-01-01

## 2020-01-01 RX ORDER — SODIUM CHLORIDE, SODIUM LACTATE, POTASSIUM CHLORIDE, CALCIUM CHLORIDE 600; 310; 30; 20 MG/100ML; MG/100ML; MG/100ML; MG/100ML
INJECTION, SOLUTION INTRAVENOUS CONTINUOUS PRN
Status: DISCONTINUED | OUTPATIENT
Start: 2020-01-01 | End: 2020-01-01

## 2020-01-01 RX ORDER — ONDANSETRON 4 MG/1
4 TABLET, ORALLY DISINTEGRATING ORAL EVERY 30 MIN PRN
Status: DISCONTINUED | OUTPATIENT
Start: 2020-01-01 | End: 2020-01-01 | Stop reason: HOSPADM

## 2020-01-01 RX ORDER — CEFAZOLIN SODIUM 1 G/3ML
1 INJECTION, POWDER, FOR SOLUTION INTRAMUSCULAR; INTRAVENOUS SEE ADMIN INSTRUCTIONS
Status: DISCONTINUED | OUTPATIENT
Start: 2020-01-01 | End: 2020-01-01 | Stop reason: HOSPADM

## 2020-01-01 RX ORDER — ASPIRIN 81 MG/1
1 TABLET, CHEWABLE ORAL EVERY 24 HOURS
COMMUNITY
End: 2021-01-01

## 2020-01-01 RX ORDER — POLYETHYLENE GLYCOL 3350 17 G/17G
17 POWDER, FOR SOLUTION ORAL DAILY
Status: DISCONTINUED | OUTPATIENT
Start: 2020-01-01 | End: 2020-01-01 | Stop reason: HOSPADM

## 2020-01-01 RX ORDER — LIDOCAINE HYDROCHLORIDE AND EPINEPHRINE 10; 10 MG/ML; UG/ML
INJECTION, SOLUTION INFILTRATION; PERINEURAL PRN
Status: DISCONTINUED | OUTPATIENT
Start: 2020-01-01 | End: 2020-01-01 | Stop reason: HOSPADM

## 2020-01-01 RX ORDER — ONDANSETRON 2 MG/ML
4 INJECTION INTRAMUSCULAR; INTRAVENOUS EVERY 6 HOURS PRN
Status: DISCONTINUED | OUTPATIENT
Start: 2020-01-01 | End: 2020-01-01 | Stop reason: HOSPADM

## 2020-01-01 RX ORDER — HEPARIN SODIUM,PORCINE 10 UNIT/ML
2-5 VIAL (ML) INTRAVENOUS
Status: DISCONTINUED | OUTPATIENT
Start: 2020-01-01 | End: 2020-01-01 | Stop reason: HOSPADM

## 2020-01-01 RX ORDER — CEFAZOLIN SODIUM 2 G/50ML
2 SOLUTION INTRAVENOUS
Status: CANCELLED | OUTPATIENT
Start: 2020-01-01

## 2020-01-01 RX ORDER — FENTANYL CITRATE 50 UG/ML
25-50 INJECTION, SOLUTION INTRAMUSCULAR; INTRAVENOUS EVERY 5 MIN PRN
Status: DISCONTINUED | OUTPATIENT
Start: 2020-01-01 | End: 2020-01-01 | Stop reason: HOSPADM

## 2020-01-01 RX ORDER — ACETAMINOPHEN 325 MG/1
650 TABLET ORAL EVERY 4 HOURS PRN
Status: DISCONTINUED | OUTPATIENT
Start: 2020-01-01 | End: 2020-01-01 | Stop reason: HOSPADM

## 2020-01-01 RX ORDER — OXYCODONE HYDROCHLORIDE 5 MG/1
5-10 TABLET ORAL EVERY 4 HOURS PRN
Status: DISCONTINUED | OUTPATIENT
Start: 2020-01-01 | End: 2020-01-01

## 2020-01-01 RX ORDER — NALOXONE HYDROCHLORIDE 0.4 MG/ML
0.4 INJECTION, SOLUTION INTRAMUSCULAR; INTRAVENOUS; SUBCUTANEOUS
Status: DISCONTINUED | OUTPATIENT
Start: 2020-01-01 | End: 2020-01-01 | Stop reason: HOSPADM

## 2020-01-01 RX ORDER — BISACODYL 10 MG
10 SUPPOSITORY, RECTAL RECTAL DAILY PRN
Status: DISCONTINUED | OUTPATIENT
Start: 2020-01-01 | End: 2020-01-01 | Stop reason: HOSPADM

## 2020-01-01 RX ORDER — KETAMINE HYDROCHLORIDE 10 MG/ML
INJECTION INTRAMUSCULAR; INTRAVENOUS PRN
Status: DISCONTINUED | OUTPATIENT
Start: 2020-01-01 | End: 2020-01-01

## 2020-01-01 RX ORDER — NICOTINE POLACRILEX 4 MG
15-30 LOZENGE BUCCAL
Status: DISCONTINUED | OUTPATIENT
Start: 2020-01-01 | End: 2020-01-01 | Stop reason: HOSPADM

## 2020-01-01 RX ORDER — DEXAMETHASONE SODIUM PHOSPHATE 4 MG/ML
10 INJECTION, SOLUTION INTRA-ARTICULAR; INTRALESIONAL; INTRAMUSCULAR; INTRAVENOUS; SOFT TISSUE ONCE
Status: CANCELLED | OUTPATIENT
Start: 2020-01-01 | End: 2020-01-01

## 2020-01-01 RX ORDER — CEFAZOLIN SODIUM 2 G/100ML
2 INJECTION, SOLUTION INTRAVENOUS
Status: COMPLETED | OUTPATIENT
Start: 2020-01-01 | End: 2020-01-01

## 2020-01-01 RX ORDER — GINSENG 100 MG
CAPSULE ORAL EVERY 8 HOURS
Status: COMPLETED | OUTPATIENT
Start: 2020-01-01 | End: 2020-01-01

## 2020-01-01 RX ORDER — FENTANYL CITRATE 50 UG/ML
INJECTION, SOLUTION INTRAMUSCULAR; INTRAVENOUS PRN
Status: DISCONTINUED | OUTPATIENT
Start: 2020-01-01 | End: 2020-01-01

## 2020-01-01 RX ORDER — AMOXICILLIN 250 MG
1 CAPSULE ORAL 2 TIMES DAILY
Status: DISCONTINUED | OUTPATIENT
Start: 2020-01-01 | End: 2020-01-01 | Stop reason: HOSPADM

## 2020-01-01 RX ORDER — NALOXONE HYDROCHLORIDE 0.4 MG/ML
0.2 INJECTION, SOLUTION INTRAMUSCULAR; INTRAVENOUS; SUBCUTANEOUS
Status: DISCONTINUED | OUTPATIENT
Start: 2020-01-01 | End: 2020-01-01 | Stop reason: HOSPADM

## 2020-01-01 RX ORDER — AMOXICILLIN 250 MG
1 CAPSULE ORAL 2 TIMES DAILY PRN
Qty: 20 TABLET | Refills: 0 | Status: ON HOLD | OUTPATIENT
Start: 2020-01-01 | End: 2021-01-01

## 2020-01-01 RX ORDER — SODIUM CHLORIDE 9 MG/ML
INJECTION, SOLUTION INTRAVENOUS CONTINUOUS
Status: DISCONTINUED | OUTPATIENT
Start: 2020-01-01 | End: 2020-01-01 | Stop reason: HOSPADM

## 2020-01-01 RX ORDER — CEFAZOLIN SODIUM 1 G/50ML
1 INJECTION, SOLUTION INTRAVENOUS SEE ADMIN INSTRUCTIONS
Status: CANCELLED | OUTPATIENT
Start: 2020-01-01

## 2020-01-01 RX ORDER — PROCHLORPERAZINE MALEATE 5 MG
5 TABLET ORAL EVERY 6 HOURS PRN
Status: DISCONTINUED | OUTPATIENT
Start: 2020-01-01 | End: 2020-01-01 | Stop reason: HOSPADM

## 2020-01-01 RX ORDER — POLYETHYLENE GLYCOL 3350 17 G/17G
17 POWDER, FOR SOLUTION ORAL DAILY
Qty: 510 G | Refills: 0 | Status: SHIPPED | OUTPATIENT
Start: 2020-01-01 | End: 2021-01-01

## 2020-01-01 RX ORDER — IOPAMIDOL 755 MG/ML
5-140 INJECTION, SOLUTION INTRAVASCULAR ONCE
Status: COMPLETED | OUTPATIENT
Start: 2020-01-01 | End: 2020-01-01

## 2020-01-01 RX ORDER — DEXTROSE MONOHYDRATE 25 G/50ML
25-50 INJECTION, SOLUTION INTRAVENOUS
Status: DISCONTINUED | OUTPATIENT
Start: 2020-01-01 | End: 2020-01-01 | Stop reason: HOSPADM

## 2020-01-01 RX ORDER — PROPOFOL 10 MG/ML
INJECTION, EMULSION INTRAVENOUS PRN
Status: DISCONTINUED | OUTPATIENT
Start: 2020-01-01 | End: 2020-01-01

## 2020-01-01 RX ORDER — SIMVASTATIN 40 MG
40 TABLET ORAL AT BEDTIME
Status: DISCONTINUED | OUTPATIENT
Start: 2020-01-01 | End: 2020-01-01 | Stop reason: HOSPADM

## 2020-01-01 RX ORDER — AMOXICILLIN 250 MG
2 CAPSULE ORAL 2 TIMES DAILY
Status: DISCONTINUED | OUTPATIENT
Start: 2020-01-01 | End: 2020-01-01 | Stop reason: HOSPADM

## 2020-01-01 RX ORDER — EPHEDRINE SULFATE 50 MG/ML
INJECTION, SOLUTION INTRAMUSCULAR; INTRAVENOUS; SUBCUTANEOUS PRN
Status: DISCONTINUED | OUTPATIENT
Start: 2020-01-01 | End: 2020-01-01

## 2020-01-01 RX ORDER — PROPOFOL 10 MG/ML
INJECTION, EMULSION INTRAVENOUS CONTINUOUS PRN
Status: DISCONTINUED | OUTPATIENT
Start: 2020-01-01 | End: 2020-01-01

## 2020-01-01 RX ORDER — DEXAMETHASONE SODIUM PHOSPHATE 10 MG/ML
10 INJECTION, SOLUTION INTRAMUSCULAR; INTRAVENOUS ONCE
Status: COMPLETED | OUTPATIENT
Start: 2020-01-01 | End: 2020-01-01

## 2020-01-01 RX ORDER — MEPERIDINE HYDROCHLORIDE 25 MG/ML
12.5 INJECTION INTRAMUSCULAR; INTRAVENOUS; SUBCUTANEOUS
Status: DISCONTINUED | OUTPATIENT
Start: 2020-01-01 | End: 2020-01-01 | Stop reason: HOSPADM

## 2020-01-01 RX ORDER — ONDANSETRON 2 MG/ML
INJECTION INTRAMUSCULAR; INTRAVENOUS PRN
Status: DISCONTINUED | OUTPATIENT
Start: 2020-01-01 | End: 2020-01-01

## 2020-01-01 RX ORDER — ACETAMINOPHEN 325 MG/1
975 TABLET ORAL EVERY 8 HOURS
Status: DISCONTINUED | OUTPATIENT
Start: 2020-01-01 | End: 2020-01-01 | Stop reason: HOSPADM

## 2020-01-01 RX ORDER — CEPHALEXIN 500 MG/1
500 CAPSULE ORAL 3 TIMES DAILY
Qty: 42 CAPSULE | Refills: 0 | Status: SHIPPED | OUTPATIENT
Start: 2020-01-01 | End: 2020-01-01

## 2020-01-01 RX ORDER — PILOCARPINE HYDROCHLORIDE 7.5 MG/1
7.5 TABLET, FILM COATED ORAL 2 TIMES DAILY
Status: DISCONTINUED | OUTPATIENT
Start: 2020-01-01 | End: 2020-01-01 | Stop reason: HOSPADM

## 2020-01-01 RX ORDER — METOCLOPRAMIDE HYDROCHLORIDE 5 MG/ML
5 INJECTION INTRAMUSCULAR; INTRAVENOUS EVERY 6 HOURS PRN
Status: DISCONTINUED | OUTPATIENT
Start: 2020-01-01 | End: 2020-01-01 | Stop reason: HOSPADM

## 2020-01-01 RX ORDER — LIDOCAINE HYDROCHLORIDE 20 MG/ML
INJECTION, SOLUTION INFILTRATION; PERINEURAL PRN
Status: DISCONTINUED | OUTPATIENT
Start: 2020-01-01 | End: 2020-01-01

## 2020-01-01 RX ORDER — DEXAMETHASONE SODIUM PHOSPHATE 4 MG/ML
INJECTION, SOLUTION INTRA-ARTICULAR; INTRALESIONAL; INTRAMUSCULAR; INTRAVENOUS; SOFT TISSUE PRN
Status: DISCONTINUED | OUTPATIENT
Start: 2020-01-01 | End: 2020-01-01

## 2020-01-01 RX ORDER — HYDROMORPHONE HYDROCHLORIDE 1 MG/ML
.3-.5 INJECTION, SOLUTION INTRAMUSCULAR; INTRAVENOUS; SUBCUTANEOUS
Status: DISCONTINUED | OUTPATIENT
Start: 2020-01-01 | End: 2020-01-01

## 2020-01-01 RX ORDER — ONDANSETRON 2 MG/ML
4 INJECTION INTRAMUSCULAR; INTRAVENOUS EVERY 30 MIN PRN
Status: DISCONTINUED | OUTPATIENT
Start: 2020-01-01 | End: 2020-01-01 | Stop reason: HOSPADM

## 2020-01-01 RX ORDER — SODIUM CHLORIDE, SODIUM LACTATE, POTASSIUM CHLORIDE, CALCIUM CHLORIDE 600; 310; 30; 20 MG/100ML; MG/100ML; MG/100ML; MG/100ML
INJECTION, SOLUTION INTRAVENOUS CONTINUOUS
Status: DISCONTINUED | OUTPATIENT
Start: 2020-01-01 | End: 2020-01-01 | Stop reason: HOSPADM

## 2020-01-01 RX ORDER — HYDROMORPHONE HYDROCHLORIDE 2 MG/1
2 TABLET ORAL EVERY 4 HOURS PRN
Qty: 30 TABLET | Refills: 0 | Status: SHIPPED | OUTPATIENT
Start: 2020-01-01 | End: 2021-01-01

## 2020-01-01 RX ORDER — MINERAL OIL/HYDROPHIL PETROLAT
OINTMENT (GRAM) TOPICAL EVERY 8 HOURS
Qty: 420 G | Refills: 0 | Status: SHIPPED | OUTPATIENT
Start: 2020-01-01 | End: 2021-01-01

## 2020-01-01 RX ORDER — HYDROXYZINE HYDROCHLORIDE 10 MG/1
10 TABLET, FILM COATED ORAL EVERY 6 HOURS PRN
Status: DISCONTINUED | OUTPATIENT
Start: 2020-01-01 | End: 2020-01-01 | Stop reason: HOSPADM

## 2020-01-01 RX ORDER — LIDOCAINE 40 MG/G
CREAM TOPICAL
Status: DISCONTINUED | OUTPATIENT
Start: 2020-01-01 | End: 2020-01-01

## 2020-01-01 RX ORDER — ACETAMINOPHEN 325 MG/1
650 TABLET ORAL EVERY 4 HOURS PRN
Qty: 60 TABLET | Refills: 0 | Status: ON HOLD | OUTPATIENT
Start: 2020-01-01 | End: 2021-01-01

## 2020-01-01 RX ORDER — LISINOPRIL 10 MG/1
10 TABLET ORAL DAILY
Status: DISCONTINUED | OUTPATIENT
Start: 2020-01-01 | End: 2020-01-01 | Stop reason: HOSPADM

## 2020-01-01 RX ORDER — LIDOCAINE 40 MG/G
CREAM TOPICAL
Status: DISCONTINUED | OUTPATIENT
Start: 2020-01-01 | End: 2020-01-01 | Stop reason: HOSPADM

## 2020-01-01 RX ORDER — OMEPRAZOLE 10 MG/1
10 CAPSULE, DELAYED RELEASE ORAL DAILY
Status: DISCONTINUED | OUTPATIENT
Start: 2020-01-01 | End: 2020-01-01 | Stop reason: HOSPADM

## 2020-01-01 RX ORDER — HYDROMORPHONE HYDROCHLORIDE 1 MG/ML
.3-.5 INJECTION, SOLUTION INTRAMUSCULAR; INTRAVENOUS; SUBCUTANEOUS EVERY 10 MIN PRN
Status: DISCONTINUED | OUTPATIENT
Start: 2020-01-01 | End: 2020-01-01 | Stop reason: HOSPADM

## 2020-01-01 RX ORDER — ONDANSETRON 4 MG/1
4 TABLET, ORALLY DISINTEGRATING ORAL EVERY 6 HOURS PRN
Qty: 20 TABLET | Refills: 0 | Status: SHIPPED | OUTPATIENT
Start: 2020-01-01 | End: 2021-01-01

## 2020-01-01 RX ADMIN — WHITE PETROLATUM: 1.75 OINTMENT TOPICAL at 22:29

## 2020-01-01 RX ADMIN — SIMVASTATIN 40 MG: 40 TABLET, FILM COATED ORAL at 21:51

## 2020-01-01 RX ADMIN — FENTANYL CITRATE 50 MCG: 50 INJECTION, SOLUTION INTRAMUSCULAR; INTRAVENOUS at 08:21

## 2020-01-01 RX ADMIN — PROCHLORPERAZINE EDISYLATE 5 MG: 5 INJECTION INTRAMUSCULAR; INTRAVENOUS at 13:09

## 2020-01-01 RX ADMIN — PILOCARPINE HYDROCHLORIDE 7.5 MG: 7.5 TABLET, FILM COATED ORAL at 09:22

## 2020-01-01 RX ADMIN — DOCUSATE SODIUM AND SENNOSIDES 2 TABLET: 8.6; 5 TABLET ORAL at 08:59

## 2020-01-01 RX ADMIN — PHENYLEPHRINE HYDROCHLORIDE 100 MCG: 10 INJECTION INTRAVENOUS at 12:25

## 2020-01-01 RX ADMIN — WHITE PETROLATUM: 1.75 OINTMENT TOPICAL at 04:26

## 2020-01-01 RX ADMIN — Medication 30 MG: at 07:47

## 2020-01-01 RX ADMIN — FENTANYL CITRATE 50 MCG: 50 INJECTION, SOLUTION INTRAMUSCULAR; INTRAVENOUS at 07:58

## 2020-01-01 RX ADMIN — Medication 2 G: at 08:00

## 2020-01-01 RX ADMIN — DOCUSATE SODIUM 50 MG AND SENNOSIDES 8.6 MG 1 TABLET: 8.6; 5 TABLET, FILM COATED ORAL at 19:54

## 2020-01-01 RX ADMIN — FENTANYL CITRATE 50 MCG: 50 INJECTION, SOLUTION INTRAMUSCULAR; INTRAVENOUS at 08:33

## 2020-01-01 RX ADMIN — ACETAMINOPHEN 975 MG: 325 TABLET, FILM COATED ORAL at 07:44

## 2020-01-01 RX ADMIN — INSULIN GLARGINE 7 UNITS: 100 INJECTION, SOLUTION SUBCUTANEOUS at 09:26

## 2020-01-01 RX ADMIN — PHENYLEPHRINE HYDROCHLORIDE 100 MCG: 10 INJECTION INTRAVENOUS at 12:16

## 2020-01-01 RX ADMIN — PILOCARPINE HYDROCHLORIDE 7.5 MG: 7.5 TABLET, FILM COATED ORAL at 19:20

## 2020-01-01 RX ADMIN — WHITE PETROLATUM: 1.75 OINTMENT TOPICAL at 05:34

## 2020-01-01 RX ADMIN — INSULIN ASPART 3 UNITS: 100 INJECTION, SOLUTION INTRAVENOUS; SUBCUTANEOUS at 16:27

## 2020-01-01 RX ADMIN — IOPAMIDOL 114 ML: 755 INJECTION, SOLUTION INTRAVENOUS at 08:48

## 2020-01-01 RX ADMIN — PROPOFOL 20 MG: 10 INJECTION, EMULSION INTRAVENOUS at 07:56

## 2020-01-01 RX ADMIN — ACETAMINOPHEN 975 MG: 325 TABLET, FILM COATED ORAL at 19:20

## 2020-01-01 RX ADMIN — CEFAZOLIN 1 G: 1 INJECTION, POWDER, FOR SOLUTION INTRAMUSCULAR; INTRAVENOUS at 10:00

## 2020-01-01 RX ADMIN — SODIUM CHLORIDE, POTASSIUM CHLORIDE, SODIUM LACTATE AND CALCIUM CHLORIDE: 600; 310; 30; 20 INJECTION, SOLUTION INTRAVENOUS at 13:45

## 2020-01-01 RX ADMIN — Medication 10 MG: at 10:50

## 2020-01-01 RX ADMIN — SODIUM CHLORIDE: 9 INJECTION, SOLUTION INTRAVENOUS at 17:00

## 2020-01-01 RX ADMIN — LISINOPRIL 10 MG: 10 TABLET ORAL at 09:00

## 2020-01-01 RX ADMIN — SIMVASTATIN 40 MG: 40 TABLET, FILM COATED ORAL at 21:18

## 2020-01-01 RX ADMIN — LISINOPRIL 10 MG: 10 TABLET ORAL at 09:23

## 2020-01-01 RX ADMIN — LIDOCAINE HYDROCHLORIDE 100 MG: 20 INJECTION, SOLUTION INFILTRATION; PERINEURAL at 07:45

## 2020-01-01 RX ADMIN — WHITE PETROLATUM: 1.75 OINTMENT TOPICAL at 13:11

## 2020-01-01 RX ADMIN — Medication 10 MG: at 10:09

## 2020-01-01 RX ADMIN — ACETAMINOPHEN 650 MG: 325 TABLET, FILM COATED ORAL at 04:25

## 2020-01-01 RX ADMIN — INSULIN ASPART 5 UNITS: 100 INJECTION, SOLUTION INTRAVENOUS; SUBCUTANEOUS at 12:04

## 2020-01-01 RX ADMIN — HYDROMORPHONE HYDROCHLORIDE 2 MG: 2 TABLET ORAL at 05:22

## 2020-01-01 RX ADMIN — FENTANYL CITRATE 50 MCG: 50 INJECTION, SOLUTION INTRAMUSCULAR; INTRAVENOUS at 11:58

## 2020-01-01 RX ADMIN — INSULIN ASPART 4 UNITS: 100 INJECTION, SOLUTION INTRAVENOUS; SUBCUTANEOUS at 12:13

## 2020-01-01 RX ADMIN — INSULIN ASPART 4 UNITS: 100 INJECTION, SOLUTION INTRAVENOUS; SUBCUTANEOUS at 17:16

## 2020-01-01 RX ADMIN — SODIUM CHLORIDE, POTASSIUM CHLORIDE, SODIUM LACTATE AND CALCIUM CHLORIDE: 600; 310; 30; 20 INJECTION, SOLUTION INTRAVENOUS at 08:00

## 2020-01-01 RX ADMIN — ONDANSETRON 4 MG: 2 INJECTION INTRAMUSCULAR; INTRAVENOUS at 08:15

## 2020-01-01 RX ADMIN — HYDROMORPHONE HYDROCHLORIDE 2 MG: 2 TABLET ORAL at 21:51

## 2020-01-01 RX ADMIN — SIMVASTATIN 40 MG: 40 TABLET, FILM COATED ORAL at 19:38

## 2020-01-01 RX ADMIN — INSULIN ASPART 4 UNITS: 100 INJECTION, SOLUTION INTRAVENOUS; SUBCUTANEOUS at 07:50

## 2020-01-01 RX ADMIN — HYDROMORPHONE HYDROCHLORIDE 0.3 MG: 1 INJECTION, SOLUTION INTRAMUSCULAR; INTRAVENOUS; SUBCUTANEOUS at 14:01

## 2020-01-01 RX ADMIN — HUMAN INSULIN 10 UNITS: 100 INJECTION, SOLUTION SUBCUTANEOUS at 13:44

## 2020-01-01 RX ADMIN — BACITRACIN ZINC: 500 OINTMENT TOPICAL at 16:27

## 2020-01-01 RX ADMIN — WHITE PETROLATUM: 1.75 OINTMENT TOPICAL at 13:50

## 2020-01-01 RX ADMIN — PILOCARPINE HYDROCHLORIDE 7.5 MG: 7.5 TABLET, FILM COATED ORAL at 19:38

## 2020-01-01 RX ADMIN — CEFAZOLIN 1 G: 1 INJECTION, POWDER, FOR SOLUTION INTRAMUSCULAR; INTRAVENOUS at 11:59

## 2020-01-01 RX ADMIN — ACETAMINOPHEN 975 MG: 325 TABLET, FILM COATED ORAL at 23:50

## 2020-01-01 RX ADMIN — ONDANSETRON 4 MG: 2 INJECTION INTRAMUSCULAR; INTRAVENOUS at 16:33

## 2020-01-01 RX ADMIN — METFORMIN HYDROCHLORIDE 500 MG: 500 TABLET ORAL at 09:46

## 2020-01-01 RX ADMIN — DOCUSATE SODIUM AND SENNOSIDES 2 TABLET: 8.6; 5 TABLET ORAL at 19:38

## 2020-01-01 RX ADMIN — ONDANSETRON 4 MG: 2 INJECTION INTRAMUSCULAR; INTRAVENOUS at 12:14

## 2020-01-01 RX ADMIN — PHENYLEPHRINE HYDROCHLORIDE 100 MCG: 10 INJECTION INTRAVENOUS at 09:37

## 2020-01-01 RX ADMIN — ENOXAPARIN SODIUM 40 MG: 40 INJECTION SUBCUTANEOUS at 19:38

## 2020-01-01 RX ADMIN — BACITRACIN ZINC: 500 OINTMENT TOPICAL at 07:49

## 2020-01-01 RX ADMIN — PROPOFOL 20 MG: 10 INJECTION, EMULSION INTRAVENOUS at 10:08

## 2020-01-01 RX ADMIN — INSULIN ASPART 5 UNITS: 100 INJECTION, SOLUTION INTRAVENOUS; SUBCUTANEOUS at 09:23

## 2020-01-01 RX ADMIN — INSULIN GLARGINE 7 UNITS: 100 INJECTION, SOLUTION SUBCUTANEOUS at 07:49

## 2020-01-01 RX ADMIN — SUGAMMADEX 100 MG: 100 INJECTION, SOLUTION INTRAVENOUS at 12:40

## 2020-01-01 RX ADMIN — OMEPRAZOLE 10 MG: 10 CAPSULE, DELAYED RELEASE ORAL at 08:59

## 2020-01-01 RX ADMIN — BACITRACIN ZINC: 500 OINTMENT TOPICAL at 16:31

## 2020-01-01 RX ADMIN — PILOCARPINE HYDROCHLORIDE 7.5 MG: 7.5 TABLET, FILM COATED ORAL at 19:54

## 2020-01-01 RX ADMIN — PILOCARPINE HYDROCHLORIDE 7.5 MG: 7.5 TABLET, FILM COATED ORAL at 07:49

## 2020-01-01 RX ADMIN — ACETAMINOPHEN 975 MG: 325 TABLET, FILM COATED ORAL at 04:24

## 2020-01-01 RX ADMIN — POLYETHYLENE GLYCOL 3350 17 G: 17 POWDER, FOR SOLUTION ORAL at 09:01

## 2020-01-01 RX ADMIN — POLYETHYLENE GLYCOL 3350 17 G: 17 POWDER, FOR SOLUTION ORAL at 09:20

## 2020-01-01 RX ADMIN — DOCUSATE SODIUM AND SENNOSIDES 2 TABLET: 8.6; 5 TABLET ORAL at 07:44

## 2020-01-01 RX ADMIN — DEXAMETHASONE SODIUM PHOSPHATE 10 MG: 10 INJECTION, SOLUTION INTRAMUSCULAR; INTRAVENOUS at 08:18

## 2020-01-01 RX ADMIN — ACETAMINOPHEN 975 MG: 325 TABLET, FILM COATED ORAL at 16:27

## 2020-01-01 RX ADMIN — LISINOPRIL 10 MG: 10 TABLET ORAL at 07:45

## 2020-01-01 RX ADMIN — HUMAN INSULIN 3 UNITS/HR: 100 INJECTION, SOLUTION SUBCUTANEOUS at 16:58

## 2020-01-01 RX ADMIN — MIDAZOLAM 1 MG: 1 INJECTION INTRAMUSCULAR; INTRAVENOUS at 07:45

## 2020-01-01 RX ADMIN — OMEPRAZOLE 10 MG: 10 CAPSULE, DELAYED RELEASE ORAL at 07:49

## 2020-01-01 RX ADMIN — WHITE PETROLATUM: 1.75 OINTMENT TOPICAL at 21:18

## 2020-01-01 RX ADMIN — PROPOFOL 80 MG: 10 INJECTION, EMULSION INTRAVENOUS at 07:45

## 2020-01-01 RX ADMIN — DOCUSATE SODIUM AND SENNOSIDES 2 TABLET: 8.6; 5 TABLET ORAL at 09:22

## 2020-01-01 RX ADMIN — OMEPRAZOLE 10 MG: 10 CAPSULE, DELAYED RELEASE ORAL at 09:21

## 2020-01-01 RX ADMIN — PHENYLEPHRINE HYDROCHLORIDE 100 MCG: 10 INJECTION INTRAVENOUS at 09:25

## 2020-01-01 RX ADMIN — PROPOFOL 30 MCG/KG/MIN: 10 INJECTION, EMULSION INTRAVENOUS at 08:23

## 2020-01-01 RX ADMIN — SODIUM CHLORIDE, POTASSIUM CHLORIDE, SODIUM LACTATE AND CALCIUM CHLORIDE: 600; 310; 30; 20 INJECTION, SOLUTION INTRAVENOUS at 07:33

## 2020-01-01 RX ADMIN — FLUDEOXYGLUCOSE F-18 11.01 MCI.: 500 INJECTION, SOLUTION INTRAVENOUS at 07:26

## 2020-01-01 RX ADMIN — FENTANYL CITRATE 50 MCG: 50 INJECTION, SOLUTION INTRAMUSCULAR; INTRAVENOUS at 10:42

## 2020-01-01 RX ADMIN — ROCURONIUM BROMIDE 30 MG: 10 INJECTION INTRAVENOUS at 07:45

## 2020-01-01 RX ADMIN — ENOXAPARIN SODIUM 40 MG: 40 INJECTION SUBCUTANEOUS at 19:20

## 2020-01-01 RX ADMIN — PHENYLEPHRINE HYDROCHLORIDE 100 MCG: 10 INJECTION INTRAVENOUS at 09:10

## 2020-01-01 RX ADMIN — Medication 5 MG: at 09:00

## 2020-01-01 RX ADMIN — BACITRACIN ZINC: 500 OINTMENT TOPICAL at 23:50

## 2020-01-01 RX ADMIN — PILOCARPINE HYDROCHLORIDE 7.5 MG: 7.5 TABLET, FILM COATED ORAL at 09:00

## 2020-01-01 RX ADMIN — ACETAMINOPHEN 650 MG: 325 TABLET, FILM COATED ORAL at 23:08

## 2020-01-01 ASSESSMENT — MIFFLIN-ST. JEOR
SCORE: 1592.75
SCORE: 1552.78
SCORE: 1580
SCORE: 1554.27
SCORE: 1557.32
SCORE: 1571.05

## 2020-01-01 ASSESSMENT — ACTIVITIES OF DAILY LIVING (ADL)
ADLS_ACUITY_SCORE: 17
ADLS_ACUITY_SCORE: 15
ADLS_ACUITY_SCORE: 17
ADLS_ACUITY_SCORE: 15
ADLS_ACUITY_SCORE: 17
ADLS_ACUITY_SCORE: 15
ADLS_ACUITY_SCORE: 17
ADLS_ACUITY_SCORE: 15

## 2020-01-01 ASSESSMENT — PAIN SCALES - GENERAL
PAINLEVEL: NO PAIN (0)

## 2020-01-07 ENCOUNTER — OFFICE VISIT (OUTPATIENT)
Dept: OTOLARYNGOLOGY | Facility: CLINIC | Age: 70
End: 2020-01-07
Payer: MEDICARE

## 2020-01-07 VITALS — BODY MASS INDEX: 30.22 KG/M2 | WEIGHT: 192.96 LBS

## 2020-01-07 DIAGNOSIS — C09.9 TONSIL CANCER (H): ICD-10-CM

## 2020-01-07 DIAGNOSIS — K13.70 ORAL LESION: Primary | ICD-10-CM

## 2020-01-07 RX ORDER — FLUCONAZOLE 100 MG/1
100 TABLET ORAL DAILY
Qty: 7 TABLET | Refills: 0 | Status: SHIPPED | OUTPATIENT
Start: 2020-01-07 | End: 2020-02-18

## 2020-01-07 ASSESSMENT — PAIN SCALES - GENERAL: PAINLEVEL: NO PAIN (0)

## 2020-01-07 NOTE — NURSING NOTE
Chief Complaint   Patient presents with     Follow Up     surveallance tumor 5 years out     Wt 87.5 kg (192 lb 15.4 oz)   BMI 30.22 kg/m      Julianna Sutton LPN

## 2020-01-07 NOTE — PATIENT INSTRUCTIONS
1. You were seen in the ENT Clinic today by Dr. Mckenna.  If you have any questions or concerns after your appointment, please call   - Option 1: ENT Clinic: [phone]  - Option 2: Cassie (Dr. Mckenna's Nurse): [phone]    2.   Plan to return to clinic in Feb. 18th at 10am     3. 2 prescriptions have been sent to your local pharmacy; please take as prescribed     Natice Schwab, RN  [facility] Ot[facility]  [phone]

## 2020-01-07 NOTE — LETTER
2020       RE: Reese Oakley  [address] [facility] [facility] [address] [address]     Dear Colleague,    Thank you for referring your patient, Reese Oakley, to the [facility] EAR NOSE AND THROAT at [facility]. Please see a copy of my visit note below.      Otolaryngology Clinic      Name: Reese Oakley  MRN: [id number]  Age: 69 year old  : 2020      Chief Complaint:   Follow Up       History of Present Illness:   Reese Oakley is a 69 year old male with a history of pharyngeal malignancy s/p resection, chemotherapy and radiation therapy who presents for follow up. He was doing well during our last visit on 19 with no complaints. Today he continues to do well. He notes a canker sore on his right posterior tongue that is painful when he eats and has been present for two months. He has tried to medications on it with no improvement. He is using omeprazole to help his swallowing.      Review of Systems:   Pertinent items are noted in HPI or as in patient entered ROS below, remainder of complete ROS is negative.    ENT ROS 2019   Ears, Nose, Throat Trouble swallowing   Endocrine Thirst        Physical Exam:   Wt 87.5 kg (192 lb 15.4 oz)   BMI 30.22 kg/m        PHYSICAL EXAMINATION:    Constitutional:  The patient was unaccompanied, well-groomed, and in no acute distress.    Skin:  Warm and pink.    Neurologic:  Alert and oriented x 3.  CN's III-XII within normal limits.  Voice normal.   Psychiatric:  The patient's affect was calm, cooperative, and appropriate.    Respiratory:  Breathing comfortably without stridor or exertion of accessory muscles.    Eyes: Extraocular movement intact.    Head:  Normocephalic and atraumatic.  No lesions or scars.    Ears:  Pinnae and tragus non-tender.  EAC's and TM's were clear.     Nose:  Sinuses were non-tender.  Anterior rhinoscopy revealed midline septum and absence of purulence or polyps.     OC/OP:  A small ulcer in the area of previous ulcer. 9mm in size and tender. Does not bleed, small amount of induration. Otherwise normal tongue, floor of mouth, buccal mucosa, and palate.  No abnormal lymph tissue in the oropharynx.  The pterygoid region is non-tender.    HP/LX:  Mirror exam of the larynx reveals a sharp epiglottis and mobile arytenoids.  No pooling seen.  The cords themselves appear clear and mobile.   Neck:  Supple with normal laryngeal and tracheal landmarks.  The parotid beds were without masses.  No palpable thyroid.  Lymphatic:  There is no palpable lymphadenopathy in the neck.     Fiberoptic Endoscopy:  Consent for fiberoptic laryngoscopy was obtained, and we confirmed correctness of procedure and identity of patient.  Fiberoptic laryngoscopy was indicated due to malignancy reevaluation.  The nose was topically decongested and anesthetized.  The fiberoptic laryngoscope was passed under endoscopic vision.  The turbinates were normal.  The inferior and middle meati were clear bilaterally without purulence, masses, or polyps.  The nasopharynx was clear.  The Eustachian tubes were clear.  The soft palate appeared normal with good mobility.  The epiglottis was sharp and the visualized portion of the vallecula was clear.  The larynx was clear with mobile cords.  The arytenoids were clear and there was no pooling in the hypopharynx.           Assessment and Plan:  Oral lesion  - fluconazole (DIFLUCAN) 100 MG tablet  Dispense: 7 tablet; Refill: 0  - gentian violet 1 % external solution  Dispense: 30 mL; Refill: 1     Patient with a history of pharyngeal malignancy s/p resection, chemo and radiation. The mass on his tongue appears to be a ulcer versus fungal infection. I applied Gentian violet to his painful tongue lesion and recommended he use it at home 3x a day. I will also prescribe Diflucan to use. Plan will be for reevaluation of this area in February when he is back home. A biopsy may be  needed at that time.       Scribe Disclosure:  I, Anival Cope, am serving as a scribe to document services personally performed by Jose Antonio Mckenna MD at this visit, based upon the provider's statements to me. All documentation has been reviewed by the aforementioned provider prior to being entered into the official medical record.         Again, thank you for allowing me to participate in the care of your patient.      Sincerely,    Jose Antonio Mckenna MD

## 2020-01-07 NOTE — PROGRESS NOTES
Otolaryngology Clinic      Name: Reese Oakley  MRN: [id number]  Age: 69 year old  : 2020      Chief Complaint:   Follow Up       History of Present Illness:   Reese Oakley is a 69 year old male with a history of pharyngeal malignancy s/p resection, chemotherapy and radiation therapy who presents for follow up. He was doing well during our last visit on 19 with no complaints. Today he continues to do well. He notes a canker sore on his right posterior tongue that is painful when he eats and has been present for two months. He has tried to medications on it with no improvement. He is using omeprazole to help his swallowing.      Review of Systems:   Pertinent items are noted in HPI or as in patient entered ROS below, remainder of complete ROS is negative.    ENT ROS 2019   Ears, Nose, Throat Trouble swallowing   Endocrine Thirst        Physical Exam:   Wt 87.5 kg (192 lb 15.4 oz)   BMI 30.22 kg/m       PHYSICAL EXAMINATION:    Constitutional:  The patient was unaccompanied, well-groomed, and in no acute distress.    Skin:  Warm and pink.    Neurologic:  Alert and oriented x 3.  CN's III-XII within normal limits.  Voice normal.   Psychiatric:  The patient's affect was calm, cooperative, and appropriate.    Respiratory:  Breathing comfortably without stridor or exertion of accessory muscles.    Eyes: Extraocular movement intact.    Head:  Normocephalic and atraumatic.  No lesions or scars.    Ears:  Pinnae and tragus non-tender.  EAC's and TM's were clear.     Nose:  Sinuses were non-tender.  Anterior rhinoscopy revealed midline septum and absence of purulence or polyps.    OC/OP:  A small ulcer in the area of previous ulcer. 9mm in size and tender. Does not bleed, small amount of induration. Otherwise normal tongue, floor of mouth, buccal mucosa, and palate.  No abnormal lymph tissue in the oropharynx.  The pterygoid region is non-tender.    HP/LX:  Mirror exam of the  larynx reveals a sharp epiglottis and mobile arytenoids.  No pooling seen.  The cords themselves appear clear and mobile.   Neck:  Supple with normal laryngeal and tracheal landmarks.  The parotid beds were without masses.  No palpable thyroid.  Lymphatic:  There is no palpable lymphadenopathy in the neck.     Fiberoptic Endoscopy:  Consent for fiberoptic laryngoscopy was obtained, and we confirmed correctness of procedure and identity of patient.  Fiberoptic laryngoscopy was indicated due to malignancy reevaluation.  The nose was topically decongested and anesthetized.  The fiberoptic laryngoscope was passed under endoscopic vision.  The turbinates were normal.  The inferior and middle meati were clear bilaterally without purulence, masses, or polyps.  The nasopharynx was clear.  The Eustachian tubes were clear.  The soft palate appeared normal with good mobility.  The epiglottis was sharp and the visualized portion of the vallecula was clear.  The larynx was clear with mobile cords.  The arytenoids were clear and there was no pooling in the hypopharynx.           Assessment and Plan:  Oral lesion  - fluconazole (DIFLUCAN) 100 MG tablet  Dispense: 7 tablet; Refill: 0  - gentian violet 1 % external solution  Dispense: 30 mL; Refill: 1     Patient with a history of pharyngeal malignancy s/p resection, chemo and radiation. The mass on his tongue appears to be a ulcer versus fungal infection. I applied Gentian violet to his painful tongue lesion and recommended he use it at home 3x a day. I will also prescribe Diflucan to use. Plan will be for reevaluation of this area in February when he is back home. A biopsy may be needed at that time.       Scribe Disclosure:  I, Anival Cope, am serving as a scribe to document services personally performed by Jose Antonio Mckenna MD at this visit, based upon the provider's statements to me. All documentation has been reviewed by the aforementioned provider prior to being entered  into the official medical record.

## 2020-01-21 ENCOUNTER — TELEPHONE (OUTPATIENT)
Dept: OTOLARYNGOLOGY | Facility: CLINIC | Age: 70
End: 2020-01-21

## 2020-01-21 ENCOUNTER — OFFICE VISIT (OUTPATIENT)
Dept: OTOLARYNGOLOGY | Facility: CLINIC | Age: 70
End: 2020-01-21
Payer: MEDICARE

## 2020-01-21 VITALS
DIASTOLIC BLOOD PRESSURE: 84 MMHG | HEART RATE: 72 BPM | BODY MASS INDEX: 30.49 KG/M2 | SYSTOLIC BLOOD PRESSURE: 183 MMHG | WEIGHT: 194.7 LBS

## 2020-01-21 DIAGNOSIS — K14.0 TONGUE ULCER: ICD-10-CM

## 2020-01-21 DIAGNOSIS — K13.70 ORAL LESION: Primary | ICD-10-CM

## 2020-01-21 PROCEDURE — 88305 TISSUE EXAM BY PATHOLOGIST: CPT | Performed by: OTOLARYNGOLOGY

## 2020-01-21 ASSESSMENT — PAIN SCALES - GENERAL: PAINLEVEL: NO PAIN (0)

## 2020-01-21 NOTE — LETTER
2020       RE: Reese Oakley  [address] [facility] [address]     Dear Colleague,    Thank you for referring your patient, Reese Oakley, to the [facility] EAR NOSE AND THROAT at [facility]. Please see a copy of my visit note below.      Otolaryngology Clinic      Name: Reese Oakley  MRN: [id number]  Age: 69 year old  : 2020      Chief Complaint:   Procedure       History of Present Illness:   Reese Oakley is a 69 year old male with a history of pharyngeal malignancy s/p resection, chemotherapy and radation who presents for follow up. I last saw the patient on 19 at which time I prescribed Gentian violet and diflucan for him to use on his tongue lesion. However, this has not been helping and he continues to have pain.      Review of Systems:   Pertinent items are noted in HPI or as in patient entered ROS below, remainder of complete ROS is negative.    ENT ROS 2019   Ears, Nose, Throat Trouble swallowing   Endocrine Thirst        Physical Exam:   BP (!) 183/84   Pulse 72   Wt 88.3 kg (194 lb 11.2 oz)   BMI 30.49 kg/m        PHYSICAL EXAMINATION:    Constitutional:  The patient was unaccompanied, well-groomed, and in no acute distress.    Skin:  Warm and pink.    Neurologic:  Alert and oriented x 3.  CN's III-XII within normal limits.  Voice normal.   Psychiatric:  The patient's affect was calm, cooperative, and appropriate.    Respiratory:  Breathing comfortably without stridor or exertion of accessory muscles.    Eyes: Extraocular movement intact.    Head:  Normocephalic and atraumatic.  No lesions or scars.    Ears:  Pinnae and tragus non-tender.  EAC's and TM's were clear.     Nose:  Sinuses were non-tender.  Anterior rhinoscopy revealed midline septum and absence of purulence or polyps.    OC/OP:  12x5mm chronic appearing ulcer. Minimal bleeding with biopsy. Otherwise normal tongue, floor of mouth,  buccal mucosa, and palate.  No lesions or masses on inspection or palpation.  No abnormal lymph tissue in the oropharynx.  The pterygoid region is non-tender.    Neck:  Supple with normal laryngeal and tracheal landmarks.  The parotid beds were without masses.  No palpable thyroid.  Lymphatic:  There is no palpable lymphadenopathy in the neck.     Tongue lesion Biopsy:  The lesion was injected with 1% lidocaine with epinephrine. Vivian forceps used to remove tissue from right posterior tongue.      Assessment and Plan:   The patient presents today with a continued oral lesion. We discussed that this could be due to his history of radiation. We completed a biopsy of this today, see details above. We will contact him with the results of this and the next steps depending on the results. He will otherwise follow up with his current appointment 2/18/20. He can continue to use his Gentian violet.     Scribe Disclosure:  I, Anival Cope, am serving as a scribe to document services personally performed by Jose Antonio Mckenna MD at this visit, based upon the provider's statements to me. All documentation has been reviewed by the aforementioned provider prior to being entered into the official medical record.         Again, thank you for allowing me to participate in the care of your patient.      Sincerely,    Jose Antonio Mckenna MD

## 2020-01-21 NOTE — NURSING NOTE
Chief Complaint   Patient presents with     Procedure     biopsy of tongue     BP (!) 183/84   Pulse 72   Wt 88.3 kg (194 lb 11.2 oz)   BMI 30.49 kg/m    Julianna Sutton LPN

## 2020-01-21 NOTE — PROGRESS NOTES
Otolaryngology Clinic      Name: Reese Oakley  MRN: [id number]  Age: 69 year old  : 2021      Chief Complaint:   Procedure       History of Present Illness:   Reese Oakley is a 69 year old male with a history of pharyngeal malignancy s/p resection, chemotherapy and radation who presents for follow up. I last saw the patient on 19 at which time I prescribed Gentian violet and diflucan for him to use on his tongue lesion. However, this has not been helping and he continues to have pain.      Review of Systems:   Pertinent items are noted in HPI or as in patient entered ROS below, remainder of complete ROS is negative.    ENT ROS 2019   Ears, Nose, Throat Trouble swallowing   Endocrine Thirst        Physical Exam:   BP (!) 183/84   Pulse 72   Wt 88.3 kg (194 lb 11.2 oz)   BMI 30.49 kg/m       PHYSICAL EXAMINATION:    Constitutional:  The patient was unaccompanied, well-groomed, and in no acute distress.    Skin:  Warm and pink.    Neurologic:  Alert and oriented x 3.  CN's III-XII within normal limits.  Voice normal.   Psychiatric:  The patient's affect was calm, cooperative, and appropriate.    Respiratory:  Breathing comfortably without stridor or exertion of accessory muscles.    Eyes: Extraocular movement intact.    Head:  Normocephalic and atraumatic.  No lesions or scars.    Ears:  Pinnae and tragus non-tender.  EAC's and TM's were clear.     Nose:  Sinuses were non-tender.  Anterior rhinoscopy revealed midline septum and absence of purulence or polyps.    OC/OP:  12x5mm chronic appearing ulcer. Minimal bleeding with biopsy. Otherwise normal tongue, floor of mouth, buccal mucosa, and palate.  No lesions or masses on inspection or palpation.  No abnormal lymph tissue in the oropharynx.  The pterygoid region is non-tender.    Neck:  Supple with normal laryngeal and tracheal landmarks.  The parotid beds were without masses.  No palpable thyroid.  Lymphatic:  There is no  palpable lymphadenopathy in the neck.     Tongue lesion Biopsy:  The lesion was injected with 1% lidocaine with epinephrine. Vivian forceps used to remove tissue from right posterior tongue.      Assessment and Plan:   The patient presents today with a continued oral lesion. We discussed that this could be due to his history of radiation. We completed a biopsy of this today, see details above. We will contact him with the results of this and the next steps depending on the results. He will otherwise follow up with his current appointment 2/18/20. He can continue to use his Gentian violet.     Scribe Disclosure:  I, Anival Cope, am serving as a scribe to document services personally performed by Jose Antonio Mckenna MD at this visit, based upon the provider's statements to me. All documentation has been reviewed by the aforementioned provider prior to being entered into the official medical record.

## 2020-01-21 NOTE — TELEPHONE ENCOUNTER
Wife Sharon called regarding Reese's tongue lesion. Explains that it has not improved with prescribed medications.     After consulting with Dr. Mckenna, I have schedule Reese for an appointment today at 1pm with Dr. Mckenna for a tongue biopsy. Appointment date/time/location provided to wife Meghan    Direct line given for additional questions/concerns.     Natice Schwab, RN BSN

## 2020-01-22 LAB — COPATH REPORT: NORMAL

## 2020-01-23 ENCOUNTER — TELEPHONE (OUTPATIENT)
Dept: OTOLARYNGOLOGY | Facility: CLINIC | Age: 70
End: 2020-01-23

## 2020-01-23 DIAGNOSIS — C02.1 SQUAMOUS CELL CARCINOMA OF LATERAL TONGUE (H): Primary | ICD-10-CM

## 2020-01-23 NOTE — TELEPHONE ENCOUNTER
Pt called with recent biopsy results     FINAL DIAGNOSIS:   RIGHT TONGUE BIOPSY:   - Invasion, moderately differentiated, keratinizing squamous cell   carcinoma   - Invasion into the skeletal muscle identified   - The depth invasion cannot be assessed     Explained that the specimen came back positive for cancer. The next step is going to be to get a PET scan so that we can assure that the cancer found in his tongue has not spread elsewhere in his body.     This has been scheduled 1/27 at 1pm at the [facility]. All prep reviewed.     Once the PET scan is obtained, we will review and schedule surgery as necessary. Pt and wife Meghan are in agreement with this plan.     Direct line given for additional questions/concerns.     Natice Schwab, RN BSN

## 2020-01-27 ENCOUNTER — HOSPITAL ENCOUNTER (OUTPATIENT)
Dept: PET IMAGING | Facility: CLINIC | Age: 70
Discharge: HOME | End: 2020-01-27
Attending: OTOLARYNGOLOGY | Admitting: OTOLARYNGOLOGY
Payer: MEDICARE

## 2020-01-27 ENCOUNTER — HOSPITAL ENCOUNTER (OUTPATIENT)
Dept: PET IMAGING | Facility: CLINIC | Age: 70
End: 2020-01-27
Attending: OTOLARYNGOLOGY
Payer: MEDICARE

## 2020-01-27 DIAGNOSIS — C02.1 SQUAMOUS CELL CARCINOMA OF LATERAL TONGUE (H): ICD-10-CM

## 2020-01-27 LAB
CREAT BLD-MCNC: 1.3 MG/DL (ref 0.66–1.25)
GFR SERPL CREATININE-BSD FRML MDRD: 55 ML/MIN/{1.73_M2}

## 2020-01-27 PROCEDURE — 82565 ASSAY OF CREATININE: CPT

## 2020-01-27 PROCEDURE — 25000128 H RX IP 250 OP 636: Performed by: OTOLARYNGOLOGY

## 2020-01-27 PROCEDURE — 78816 PET IMAGE W/CT FULL BODY: CPT | Mod: PS

## 2020-01-27 PROCEDURE — A9552 F18 FDG: HCPCS | Performed by: OTOLARYNGOLOGY

## 2020-01-27 PROCEDURE — 34300033 ZZH RX 343: Performed by: OTOLARYNGOLOGY

## 2020-01-27 PROCEDURE — 70491 CT SOFT TISSUE NECK W/DYE: CPT

## 2020-01-27 RX ORDER — IOPAMIDOL 755 MG/ML
20-135 INJECTION, SOLUTION INTRAVASCULAR ONCE
Status: COMPLETED | OUTPATIENT
Start: 2020-01-27 | End: 2020-01-27

## 2020-01-27 RX ADMIN — IOPAMIDOL 115 ML: 755 INJECTION, SOLUTION INTRAVENOUS at 12:54

## 2020-01-27 RX ADMIN — FLUDEOXYGLUCOSE F-18 13.06 MCI.: 500 INJECTION, SOLUTION INTRAVENOUS at 13:28

## 2020-01-28 ENCOUNTER — TELEPHONE (OUTPATIENT)
Dept: OTOLARYNGOLOGY | Facility: CLINIC | Age: 70
End: 2020-01-28

## 2020-01-28 NOTE — TELEPHONE ENCOUNTER
Pt called to review PET scan results. No answer as pt is on a flight to [place]. Direct line given for call-back to discuss upon landing.     Natice Schwab, RN BSN

## 2020-01-29 ENCOUNTER — TELEPHONE (OUTPATIENT)
Dept: OTOLARYNGOLOGY | Facility: CLINIC | Age: 70
End: 2020-01-29

## 2020-01-29 NOTE — TELEPHONE ENCOUNTER
Pt called with PET scan results    PET  IMPRESSION:  1. Tiny lymph node in the region of the left parotid gland does  demonstrate some focal activity with a maximum SUV of 2.1. Given its  small size this activity is at least somewhat suspicious for local  irvin metastasis.  2. Bilateral indeterminate pulmonary nodules  3. Low-level abnormal activity in bilateral axillary lymph nodes which  are nonspecific. Question right hilar lymph node of similar activity  and of uncertain clinical significance.   4. Fractures of the posterolateral left eighth through 10th ribs.    CT NECK  IMPRESSION:     1. No discrete mass is identified within the oral cavity or  oropharynx.  2. No enlarged or necrotic-appearing cervical lymph nodes are seen.  3. No change in the small, 1 cm nodule in the anterior aspect of the  right lobe of the thyroid.    Small area of concern noted in the left partoid gland with bilateral pulmonary nodules. I explained that we are going to review his imaging at our tumor conference on Friday and contact him that afternoon with official recommendations. Pt in agreement with this plan.     Direct line given for additional questions/concerns.     Natice Schwab, RN BSN

## 2020-01-30 NOTE — PROGRESS NOTES
Head & Neck Tumor Conference Note   Tumor Conference:  ENT  Specialties Present:  Medical oncology, Pathology, Radiology, Radiation oncology, Surgery, Speech pathology  Patient Status:  A new patient  Pathology:  Discussed (see comment)  Treatment to Date:  None  Clinical Trial Eligibility:  Not discussed  Recommended Plan:  Surgery  Did the review exceed 30 minutes?:  did not         Status: New  Staff: Dr. Mckenna    Tumor Site: Right oral tongue; Right oropharynx  Tumor Pathology: SCCa; SCCa  Tumor Stage: cT1 N0; pT1 N0 Mx  Tumor Treatment: None to date; CO2 laser excision (10/2014); chemoradiation    Reason for Review: Review imaging, pathology, and POC    Brief History: Reese Oakley is a 69 year old male with a history of pharyngeal malignancy s/p resection, chemotherapy and radiation therapy. He was last seen in clinic on 1/7/2020, a which time he reported a painful, ulcerative lesion on the right posterior tongue. This was measured at approximately 9mm with a small amount of induration.    Pertinent PMH:   Past Medical History:   Diagnosis Date     Adenocarcinoma (H)     large instestine      Cancer of appendix (H)      Diabetes (H)      Gastro-oesophageal reflux disease      History of radiation therapy      Hoarseness      Hypertension       Smoking Hx:   Social History     Tobacco Use     Smoking status: Never Smoker     Smokeless tobacco: Never Used   Substance Use Topics     Alcohol use: No     Alcohol/week: 0.0 standard drinks     Drug use: No     Imaging: PET-CT (1/27/2020)-  No discrete mass is identified within the oral cavity or oropharynx. No enlarged or necrotic-appearing cervical lymph nodes are seen. No change in the small, 1 cm nodule in the anterior aspect of the right lobe of the thyroid.    Bilateral indeterminate pulmonary nodules. Low-level abnormal activity in bilateral axillary lymph nodes which are nonspecific. Question right hilar lymph node of similar activity and of uncertain  clinical significance. Fractures of the posterolateral left eighth through 10th ribs.    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 Imaging was reviewed- there is superficial right posterolateral tongue FDG uptake with subtle mucosal enhancement on CT. The lesion measures ~1.7mm. The left parotid gland is read as normal today.    Plan: Partial glossectomy.       Hank Reyes MD  Otolaryngology- Head and Neck Surgery  Please contact ENT with questions by dialing [phone] and entering job code [phone] when prompted.      Documentation / Disclaimer Cancer Tumor Board Note  Cancer tumor board recommendations do not override what is determined to be reasonable care and treatment, which is dependent on the circumstances of a patient's case; the patient's medical, social, and personal concerns; and the clinical judgment of the oncologist [physician].

## 2020-01-31 ENCOUNTER — PREP FOR PROCEDURE (OUTPATIENT)
Dept: OTOLARYNGOLOGY | Facility: CLINIC | Age: 70
End: 2020-01-31

## 2020-01-31 ENCOUNTER — TUMOR CONFERENCE (OUTPATIENT)
Dept: ONCOLOGY | Facility: CLINIC | Age: 70
End: 2020-01-31
Payer: MEDICARE

## 2020-01-31 DIAGNOSIS — C02.9 SQUAMOUS CELL CANCER OF TONGUE (H): Primary | ICD-10-CM

## 2020-01-31 DIAGNOSIS — R91.8 PULMONARY NODULES: Primary | ICD-10-CM

## 2020-01-31 NOTE — PROGRESS NOTES
Kenrick and Sharon called with Tumor Board Recommendations.     After further review of recent scans and pathology, the board is recommending proceeding with a wide local excision of left tongue lesion.     Surgery has been scheduled 2/19. Kenrick states he would like to have his pre-op physical done with his PCP; surgery packet emailed to provided email.     Kenrick does not think it is necessary for him to meet with Dr. Mckenna prior to the procedure. He will be given his post-op appointment at discharge.     I also relayed lung nodule conference's recommendations; bilateral pulmonary nodules of which we are recommending a repeat CT scan in 3 months. This will be scheduled at his post-op visit with SILVESTRE Shay on 2/26.     Direct line given for additional questions/concerns.     Natice Schwab, RN BSN

## 2020-02-12 ENCOUNTER — TELEPHONE (OUTPATIENT)
Dept: OTOLARYNGOLOGY | Facility: CLINIC | Age: 70
End: 2020-02-12

## 2020-02-12 NOTE — TELEPHONE ENCOUNTER
Wife Sharon called regarding surgical questions.     Pre-op instructions reviewed. Date/time of procedure reviewed. F/o appointment reviewed.     Direct line given for additional questions/concerns.     Natice Schwab, RN BSN

## 2020-02-17 NOTE — TELEPHONE ENCOUNTER
Patient had questions regarding prep for surgery and post op appointment.   Information passed along from RAISA Moscoso regarding prep. Patient should shower the night before and the morning of to prevent infection even though it is early in the morning. Ok to shave with an electric razor per RAISA Moscoso. Meghan understood and will plan.     Post op appointment was rescheduled to later time, as they are unable to make it that early in the morning.

## 2020-02-18 ENCOUNTER — ANESTHESIA EVENT (OUTPATIENT)
Dept: SURGERY | Facility: CLINIC | Age: 70
End: 2020-02-18
Payer: MEDICARE

## 2020-02-19 ENCOUNTER — HOSPITAL ENCOUNTER (OUTPATIENT)
Facility: CLINIC | Age: 70
Discharge: HOME | End: 2020-02-19
Attending: OTOLARYNGOLOGY | Admitting: OTOLARYNGOLOGY
Payer: MEDICARE

## 2020-02-19 ENCOUNTER — ANESTHESIA (OUTPATIENT)
Dept: SURGERY | Facility: CLINIC | Age: 70
End: 2020-02-19
Payer: MEDICARE

## 2020-02-19 VITALS
HEIGHT: 67 IN | HEART RATE: 58 BPM | SYSTOLIC BLOOD PRESSURE: 141 MMHG | DIASTOLIC BLOOD PRESSURE: 73 MMHG | WEIGHT: 190.26 LBS | TEMPERATURE: 98.2 F | OXYGEN SATURATION: 94 % | BODY MASS INDEX: 29.86 KG/M2 | RESPIRATION RATE: 16 BRPM

## 2020-02-19 DIAGNOSIS — C02.9 SQUAMOUS CELL CANCER OF TONGUE (H): ICD-10-CM

## 2020-02-19 LAB
BASE EXCESS BLDV CALC-SCNC: 1.4 MMOL/L
GLUCOSE BLDC GLUCOMTR-MCNC: 106 MG/DL (ref 70–99)
GLUCOSE BLDC GLUCOMTR-MCNC: 126 MG/DL (ref 70–99)
HCO3 BLDV-SCNC: 26 MMOL/L (ref 21–28)
O2/TOTAL GAS SETTING VFR VENT: 21 %
PCO2 BLDV: 42 MM HG (ref 40–50)
PH BLDV: 7.41 PH (ref 7.32–7.43)
PO2 BLDV: 54 MM HG (ref 25–47)
POTASSIUM BLD-SCNC: 3.8 MMOL/L (ref 3.4–5.3)

## 2020-02-19 PROCEDURE — 88309 TISSUE EXAM BY PATHOLOGIST: CPT | Performed by: OTOLARYNGOLOGY

## 2020-02-19 PROCEDURE — 25000125 ZZHC RX 250: Performed by: NURSE ANESTHETIST, CERTIFIED REGISTERED

## 2020-02-19 PROCEDURE — 40000170 ZZH STATISTIC PRE-PROCEDURE ASSESSMENT II: Performed by: OTOLARYNGOLOGY

## 2020-02-19 PROCEDURE — 84132 ASSAY OF SERUM POTASSIUM: CPT | Performed by: ANESTHESIOLOGY

## 2020-02-19 PROCEDURE — 36000062 ZZH SURGERY LEVEL 4 1ST 30 MIN - UMMC: Performed by: OTOLARYNGOLOGY

## 2020-02-19 PROCEDURE — 27210794 ZZH OR GENERAL SUPPLY STERILE: Performed by: OTOLARYNGOLOGY

## 2020-02-19 PROCEDURE — 71000027 ZZH RECOVERY PHASE 2 EACH 15 MINS: Performed by: OTOLARYNGOLOGY

## 2020-02-19 PROCEDURE — 71000014 ZZH RECOVERY PHASE 1 LEVEL 2 FIRST HR: Performed by: OTOLARYNGOLOGY

## 2020-02-19 PROCEDURE — 25000128 H RX IP 250 OP 636: Performed by: NURSE ANESTHETIST, CERTIFIED REGISTERED

## 2020-02-19 PROCEDURE — 88305 TISSUE EXAM BY PATHOLOGIST: CPT | Performed by: OTOLARYNGOLOGY

## 2020-02-19 PROCEDURE — 25000125 ZZHC RX 250: Performed by: OTOLARYNGOLOGY

## 2020-02-19 PROCEDURE — 88342 IMHCHEM/IMCYTCHM 1ST ANTB: CPT | Performed by: OTOLARYNGOLOGY

## 2020-02-19 PROCEDURE — 25000565 ZZH ISOFLURANE, EA 15 MIN: Performed by: OTOLARYNGOLOGY

## 2020-02-19 PROCEDURE — 88341 IMHCHEM/IMCYTCHM EA ADD ANTB: CPT | Performed by: OTOLARYNGOLOGY

## 2020-02-19 PROCEDURE — 37000009 ZZH ANESTHESIA TECHNICAL FEE, EACH ADDTL 15 MIN: Performed by: OTOLARYNGOLOGY

## 2020-02-19 PROCEDURE — 37000008 ZZH ANESTHESIA TECHNICAL FEE, 1ST 30 MIN: Performed by: OTOLARYNGOLOGY

## 2020-02-19 PROCEDURE — 82962 GLUCOSE BLOOD TEST: CPT

## 2020-02-19 PROCEDURE — 25800030 ZZH RX IP 258 OP 636: Performed by: NURSE ANESTHETIST, CERTIFIED REGISTERED

## 2020-02-19 PROCEDURE — 36000064 ZZH SURGERY LEVEL 4 EA 15 ADDTL MIN - UMMC: Performed by: OTOLARYNGOLOGY

## 2020-02-19 PROCEDURE — 82803 BLOOD GASES ANY COMBINATION: CPT | Performed by: ANESTHESIOLOGY

## 2020-02-19 RX ORDER — ONDANSETRON 4 MG/1
4 TABLET, ORALLY DISINTEGRATING ORAL EVERY 30 MIN PRN
Status: DISCONTINUED | OUTPATIENT
Start: 2020-02-19 | End: 2020-02-19 | Stop reason: HOSPADM

## 2020-02-19 RX ORDER — DEXAMETHASONE SODIUM PHOSPHATE 4 MG/ML
INJECTION, SOLUTION INTRA-ARTICULAR; INTRALESIONAL; INTRAMUSCULAR; INTRAVENOUS; SOFT TISSUE PRN
Status: DISCONTINUED | OUTPATIENT
Start: 2020-02-19 | End: 2020-02-19

## 2020-02-19 RX ORDER — OXYCODONE HYDROCHLORIDE 5 MG/1
5 TABLET ORAL EVERY 6 HOURS PRN
Qty: 30 TABLET | Refills: 0 | Status: SHIPPED | OUTPATIENT
Start: 2020-02-19 | End: 2020-02-19

## 2020-02-19 RX ORDER — ONDANSETRON 2 MG/ML
4 INJECTION INTRAMUSCULAR; INTRAVENOUS EVERY 30 MIN PRN
Status: DISCONTINUED | OUTPATIENT
Start: 2020-02-19 | End: 2020-02-19 | Stop reason: HOSPADM

## 2020-02-19 RX ORDER — CEFAZOLIN SODIUM 1 G/3ML
1 INJECTION, POWDER, FOR SOLUTION INTRAMUSCULAR; INTRAVENOUS SEE ADMIN INSTRUCTIONS
Status: DISCONTINUED | OUTPATIENT
Start: 2020-02-19 | End: 2020-02-19 | Stop reason: HOSPADM

## 2020-02-19 RX ORDER — CEFAZOLIN SODIUM 2 G/100ML
INJECTION, SOLUTION INTRAVENOUS PRN
Status: DISCONTINUED | OUTPATIENT
Start: 2020-02-19 | End: 2020-02-19

## 2020-02-19 RX ORDER — SENNA AND DOCUSATE SODIUM 50; 8.6 MG/1; MG/1
1 TABLET, FILM COATED ORAL DAILY
Qty: 14 TABLET | Refills: 0 | Status: SHIPPED | OUTPATIENT
Start: 2020-02-19 | End: 2020-05-19

## 2020-02-19 RX ORDER — ONDANSETRON 2 MG/ML
INJECTION INTRAMUSCULAR; INTRAVENOUS PRN
Status: DISCONTINUED | OUTPATIENT
Start: 2020-02-19 | End: 2020-02-19

## 2020-02-19 RX ORDER — ESMOLOL HYDROCHLORIDE 10 MG/ML
INJECTION INTRAVENOUS PRN
Status: DISCONTINUED | OUTPATIENT
Start: 2020-02-19 | End: 2020-02-19

## 2020-02-19 RX ORDER — LIDOCAINE HYDROCHLORIDE AND EPINEPHRINE 10; 10 MG/ML; UG/ML
INJECTION, SOLUTION INFILTRATION; PERINEURAL PRN
Status: DISCONTINUED | OUTPATIENT
Start: 2020-02-19 | End: 2020-02-19 | Stop reason: HOSPADM

## 2020-02-19 RX ORDER — ACETAMINOPHEN 500 MG
1000 TABLET ORAL EVERY 8 HOURS
Qty: 30 TABLET | Refills: 0 | Status: SHIPPED | OUTPATIENT
Start: 2020-02-19 | End: 2020-05-19

## 2020-02-19 RX ORDER — CHLORHEXIDINE GLUCONATE ORAL RINSE 1.2 MG/ML
15 SOLUTION DENTAL 2 TIMES DAILY
Qty: 300 ML | Refills: 0 | Status: SHIPPED | OUTPATIENT
Start: 2020-02-19 | End: 2020-05-19

## 2020-02-19 RX ORDER — HYDROMORPHONE HYDROCHLORIDE 4 MG/1
2 TABLET ORAL EVERY 6 HOURS PRN
Qty: 30 TABLET | Refills: 0 | Status: SHIPPED | OUTPATIENT
Start: 2020-02-19 | End: 2020-05-19

## 2020-02-19 RX ORDER — HYDROMORPHONE HYDROCHLORIDE 1 MG/ML
.3-.5 INJECTION, SOLUTION INTRAMUSCULAR; INTRAVENOUS; SUBCUTANEOUS EVERY 5 MIN PRN
Status: DISCONTINUED | OUTPATIENT
Start: 2020-02-19 | End: 2020-02-19 | Stop reason: HOSPADM

## 2020-02-19 RX ORDER — FENTANYL CITRATE 50 UG/ML
INJECTION, SOLUTION INTRAMUSCULAR; INTRAVENOUS PRN
Status: DISCONTINUED | OUTPATIENT
Start: 2020-02-19 | End: 2020-02-19

## 2020-02-19 RX ORDER — PROPOFOL 10 MG/ML
INJECTION, EMULSION INTRAVENOUS PRN
Status: DISCONTINUED | OUTPATIENT
Start: 2020-02-19 | End: 2020-02-19

## 2020-02-19 RX ORDER — SODIUM CHLORIDE, SODIUM LACTATE, POTASSIUM CHLORIDE, CALCIUM CHLORIDE 600; 310; 30; 20 MG/100ML; MG/100ML; MG/100ML; MG/100ML
INJECTION, SOLUTION INTRAVENOUS CONTINUOUS PRN
Status: DISCONTINUED | OUTPATIENT
Start: 2020-02-19 | End: 2020-02-19

## 2020-02-19 RX ORDER — LIDOCAINE HYDROCHLORIDE 20 MG/ML
INJECTION, SOLUTION INFILTRATION; PERINEURAL PRN
Status: DISCONTINUED | OUTPATIENT
Start: 2020-02-19 | End: 2020-02-19

## 2020-02-19 RX ORDER — NALOXONE HYDROCHLORIDE 0.4 MG/ML
.1-.4 INJECTION, SOLUTION INTRAMUSCULAR; INTRAVENOUS; SUBCUTANEOUS
Status: CANCELLED | OUTPATIENT
Start: 2020-02-19 | End: 2020-02-20

## 2020-02-19 RX ORDER — FENTANYL CITRATE 50 UG/ML
25-50 INJECTION, SOLUTION INTRAMUSCULAR; INTRAVENOUS
Status: DISCONTINUED | OUTPATIENT
Start: 2020-02-19 | End: 2020-02-19 | Stop reason: HOSPADM

## 2020-02-19 RX ORDER — CEFAZOLIN SODIUM 2 G/100ML
2 INJECTION, SOLUTION INTRAVENOUS
Status: DISCONTINUED | OUTPATIENT
Start: 2020-02-19 | End: 2020-02-19 | Stop reason: HOSPADM

## 2020-02-19 RX ADMIN — FENTANYL CITRATE 50 MCG: 50 INJECTION, SOLUTION INTRAMUSCULAR; INTRAVENOUS at 08:28

## 2020-02-19 RX ADMIN — PROPOFOL 200 MG: 10 INJECTION, EMULSION INTRAVENOUS at 08:10

## 2020-02-19 RX ADMIN — ONDANSETRON 4 MG: 2 INJECTION INTRAMUSCULAR; INTRAVENOUS at 09:03

## 2020-02-19 RX ADMIN — SODIUM CHLORIDE, POTASSIUM CHLORIDE, SODIUM LACTATE AND CALCIUM CHLORIDE: 600; 310; 30; 20 INJECTION, SOLUTION INTRAVENOUS at 07:58

## 2020-02-19 RX ADMIN — ESMOLOL HYDROCHLORIDE 10 MG: 10 INJECTION, SOLUTION INTRAVENOUS at 08:34

## 2020-02-19 RX ADMIN — CEFAZOLIN 2 G: 10 INJECTION, POWDER, FOR SOLUTION INTRAVENOUS at 08:18

## 2020-02-19 RX ADMIN — DEXAMETHASONE SODIUM PHOSPHATE 8 MG: 4 INJECTION, SOLUTION INTRA-ARTICULAR; INTRALESIONAL; INTRAMUSCULAR; INTRAVENOUS; SOFT TISSUE at 08:22

## 2020-02-19 RX ADMIN — FENTANYL CITRATE 50 MCG: 50 INJECTION, SOLUTION INTRAMUSCULAR; INTRAVENOUS at 08:52

## 2020-02-19 RX ADMIN — MIDAZOLAM 1 MG: 1 INJECTION INTRAMUSCULAR; INTRAVENOUS at 07:58

## 2020-02-19 RX ADMIN — FENTANYL CITRATE 50 MCG: 50 INJECTION, SOLUTION INTRAMUSCULAR; INTRAVENOUS at 08:26

## 2020-02-19 RX ADMIN — PROPOFOL 30 MG: 10 INJECTION, EMULSION INTRAVENOUS at 08:52

## 2020-02-19 RX ADMIN — SUGAMMADEX 200 MG: 100 INJECTION, SOLUTION INTRAVENOUS at 09:08

## 2020-02-19 RX ADMIN — ROCURONIUM BROMIDE 30 MG: 10 INJECTION INTRAVENOUS at 08:10

## 2020-02-19 RX ADMIN — LIDOCAINE HYDROCHLORIDE 100 MG: 20 INJECTION, SOLUTION INFILTRATION; PERINEURAL at 08:10

## 2020-02-19 ASSESSMENT — MIFFLIN-ST. JEOR: SCORE: 1581.63

## 2020-02-19 NOTE — OP NOTE
Procedure Date: 02/19/2020      PREOPERATIVE DIAGNOSIS:  Right-sided tongue cancer.      POSTOPERATIVE DIAGNOSIS:  Right-sided tongue cancer.      PROCEDURE:  Right partial glossectomy        excision of right sublingual gland, reduced services      SURGEONS:   STAFF SURGEON:  Jose Antonio Mckenna MD   RESIDENT SURGEON:  Hank Reyes MD      BLOOD LOSS:  10 mL.      COMPLICATIONS:  None.      OPERATIVE FINDINGS:  A tumorous area of approximately 7-10 mm in the right tongue with an ulcer, not exophytic.      INDICATIONS FOR SURGERY:  Mr. 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  He has a small lesion on his right tongue that we felt that we should go ahead and do a biopsy on in clinic.  He was found to have some moderately differentiated cancer and he comes now for partial glossectomy.      DESCRIPTION OF PROCEDURE:  After informed consent was obtained from the patient, he was brought to the operating room and general endotracheal anesthesia was established.  Peridex was used inside the oral cavity.  We found this area that was about 8-9 mm.  There was a little bit of surrounding denudation of the mucosa, so we outlined this area of approximately 2.5 x 2 cm, whereby the lesion was in the exact center of this.  We first went ahead and we performed a partial glossectomy.  We did a through-and-through resection anteriorly of his tongue and worked our way anteriorly to posteriorly, making sure we were amply deep to where this lesion was inside the oral cavity.  We worked our way and found some of the sublingual gland on the right side, and this was resected with the specimen as well.  We worked in an anterior-to-posterior fashion with needle tip cautery at about 20 shepherd.  We worked our way posteriorly and removed this portion of the tongue in its entirety, leaving approximately a 4 cm x 2.5 cm defect.  We irrigated out this area.  We did indeed find that there was a small branch of the lingual artery in  this, so we went ahead and stick tied that with 3-0 Vicryl.  We then went ahead and irrigated out the mouth again.  There was some bleeding along the resected edge of the sublingual gland, so this was taken care of with bipolar cautery.  We then used a series of horizontal mattress sutures in an anterior-to-posterior fashion to close the area of the tongue.  There was no additional bleeding with Valsalva maneuver.  The blood pressure was about 160 or so.  There was no other bleeding from this.  Also from the patient, inferior to superior margins were taken in their entirety from the whole lesion and the deep margin was taken as well that included muscle.  It was approximately 1 x 2 cm below the actual lesion itself and it was a muscle thickness of about 4-5 mm of thickness that we took out with this.  This was sent for routine pathology as well for permanent section.         PAPO JONES MD             D: 2020   T: 2020   MT: LJ      Name:     KAREN CHAN   MRN:      [id number]        Account:        [id number]   :      1950           Procedure Date: 2020      Document: [id number]

## 2020-02-19 NOTE — ANESTHESIA CARE TRANSFER NOTE
Patient: Reees Oakley    Procedure(s):  Wide Local Excision of Right Tongue Lesion    Diagnosis: Squamous cell cancer of tongue (H) [C02.9]  Diagnosis Additional Information: No value filed.    Anesthesia Type:   No value filed.     Note:  Airway :Face Mask  Patient transferred to:PACU  Comments: Awake, alert, responding appropriately. Stable, report to RN. Handoff Report: Identifed the Patient, Identified the Reponsible Provider, Reviewed the pertinent medical history, Discussed the surgical course, Reviewed Intra-OP anesthesia mangement and issues during anesthesia, Set expectations for post-procedure period and Allowed opportunity for questions and acknowledgement of understanding      Vitals: (Last set prior to Anesthesia Care Transfer)    CRNA VITALS  2/19/2020 0846 - 2/19/2020 0923      2/19/2020             Resp Rate (set):  10                Electronically Signed By: KARINA Alaniz CRNA  February 19, 2020  9:23 AM

## 2020-02-19 NOTE — DISCHARGE INSTRUCTIONS
[facility]   Adult Discharge Orders & Instructions     For 24 hours after surgery    1. Get plenty of rest.  A responsible adult must stay with you for at least 24 hours after you leave the hospital.   2. Do not drive or use heavy equipment.  If you have weakness or tingling, don't drive or use heavy equipment until this feeling goes away.  3. Do not drink alcohol.  4. Avoid strenuous or risky activities.  Ask for help when climbing stairs.   5. You may feel lightheaded.  IF so, sit for a few minutes before standing.  Have someone help you get up.   6. If you have nausea (feel sick to your stomach): Drink only clear liquids such as apple juice, ginger ale, broth or 7-Up.  Rest may also help.  Be sure to drink enough fluids.  Move to a regular diet as you feel able.  7. You may have a slight fever. Call the doctor if your fever is over 100 F (37.7 C) (taken under the tongue) or lasts longer than 24 hours.  8. You may have a dry mouth, a sore throat, muscle aches or trouble sleeping.  These should go away after 24 hours.  9. Do not make important or legal decisions.   Call your doctor for any of the followin.  Signs of infection (fever, growing tenderness at the surgery site, a large amount of drainage or bleeding, severe pain, foul-smelling drainage, redness, swelling).    2. It has been over 8 to 10 hours since surgery and you are still not able to urinate (pass water).    3.  Headache for over 24 hours.    4.  Numbness, tingling or weakness the day after surgery (if you had spinal anesthesia).  To contact a doctor, call Dr Mckenna's office at [phone]

## 2020-02-19 NOTE — BRIEF OP NOTE
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]    Procedure: Procedure(s):  Wide Local Excision of Right Tongue Lesion  Surgeon: Surgeon(s) and Role:     * Jose Antonio Mckenna MD - Primary     * Hank Reyes MD - Resident - Assisting  Anesthesia: General   Estimated blood loss: ~10cc  Drains: None  Specimens:   ID Type Source Tests Collected by Time Destination   A : deep margin Tissue Tongue SURGICAL PATHOLOGY EXAM Jose Antonio Mckenna MD 2/19/2020  8:43 AM    B : superior margin Tissue Tongue SURGICAL PATHOLOGY EXAM Jose Antonio Mckenna MD 2/19/2020  8:50 AM    C : inferior margin Tissue Tongue SURGICAL PATHOLOGY EXAM Jose Antonio Mckenna MD 2/19/2020  8:51 AM    D : Right partial glossectomy Tissue Tongue SURGICAL PATHOLOGY EXAM Jose Antonio Mckenna MD 2/19/2020  9:03 AM      Findings:   Right oral tongue lesion, ~1cm in greatest dimension. Margins grossly free of tumor involvement.  Complications: None.  Implants: * No implants in log *

## 2020-02-19 NOTE — OR NURSING
1020 patient arrived to phase 2 via cart with NST patient settled into chair , VSS assessment completed  , no c/o pain  Numbness to tongue  present , will continue to monitor. VARUN

## 2020-02-19 NOTE — ANESTHESIA PREPROCEDURE EVALUATION
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]   Procedure(s):  Wide Local Excision of Right Tongue Lesion     LABS:  CBC:   Lab Results   Component Value Date    HGB 12.1 (L) 10/06/2014     BMP:   Lab Results   Component Value Date    POTASSIUM 3.8 2020    POTASSIUM 4.4 10/06/2014    CR 1.18 10/06/2014     (H) 10/06/2014     COAGS: No results found for: PTT, INR, FIBR  POC:   Lab Results   Component Value Date     (H) 2020     OTHER: No results found for: PH, LACT, A1C, ELEUTERIO, PHOS, MAG, ALBUMIN, PROTTOTAL, ALT, AST, GGT, ALKPHOS, BILITOTAL, BILIDIRECT, LIPASE, AMYLASE, ZOEY, TSH, T4, T3, CRP, SED     Preop Vitals    BP Readings from Last 3 Encounters:   20 (!) 157/82   20 (!) 183/84   18 153/77    Pulse Readings from Last 3 Encounters:   20 61   20 72      Resp Readings from Last 3 Encounters:   20 15   10/06/14 20    SpO2 Readings from Last 3 Encounters:   20 97%   10/06/14 98%      Temp Readings from Last 1 Encounters:   20 36.5  C (97.7  F) (Oral)    Ht Readings from Last 1 Encounters:   20 1.702 m (5' 7\")      Wt Readings from Last 1 Encounters:   20 86.3 kg (190 lb 4.1 oz)    Estimated body mass index is 29.8 kg/m  as calculated from the following:    Height as of this encounter: 1.702 m (5' 7\").    Weight as of this encounter: 86.3 kg (190 lb 4.1 oz).     LDA:  Peripheral IV 20 Left (Active)   Number of days: 0       ETT 7.5 (Active)   Number of days: 0        Past Medical History:   Diagnosis Date     Adenocarcinoma (H)     large instestine      Cancer of appendix (H)      Diabetes (H)      Gastro-oesophageal reflux disease      History of radiation therapy      Hoarseness      Hypertension       Past Surgical History:   Procedure Laterality Date "     APPENDECTOMY       COLECTOMY      Right     LASER CO2 LARYNGOSCOPY N/A 10/6/2014    Procedure: LASER CO2 LARYNGOSCOPY;  Surgeon: Jose Antonio Mckenna MD;  Location: [facility] OR      No Known Allergies     Anesthesia Evaluation     . Pt has had prior anesthetic.            ROS/MED HX    ENT/Pulmonary: Comment: Vocal cord mass, hoarseness      Neurologic:  - neg neurologic ROS     Cardiovascular:     (+) Dyslipidemia, hypertension----. : . . . :. .       METS/Exercise Tolerance:     Hematologic:  - neg hematologic  ROS       Musculoskeletal:  - neg musculoskeletal ROS       GI/Hepatic:     (+) GERD       Renal/Genitourinary:  - ROS Renal section negative       Endo:     (+) type II DM .      Psychiatric:  - neg psychiatric ROS       Infectious Disease:         Malignancy:   (+) Malignancy History of GI          Other:                         PHYSICAL EXAM:   Mental Status/Neuro: A/A/O   Airway: Facies: Feasible  Mallampati: II  Mouth/Opening: Full  TM distance: > 6 cm  Neck ROM: Full   Respiratory:    CV:    Comments:                      Assessment:   ASA SCORE: 2    H&P: History and physical reviewed and following examination; no interval change.   Smoking Status:  Non-Smoker/Unknown   NPO Status: NPO Appropriate     Plan:   Anes. Type:  General   Pre-Medication: None   Induction:  IV (Standard)   Airway: ETT; Oral   Access/Monitoring: PIV   Maintenance: Balanced     Postop Plan:   Postop Pain: Opioids  Postop Sedation/Airway: Not planned     PONV Management:   Adult Risk Factors:, Non-Smoker, Postop Opioids   Prevention: Ondansetron, Dexamethasone     CONSENT: Direct conversation   Plan and risks discussed with: Patient   Blood Products: Consent Deferred (Minimal Blood Loss)                   Helena Edwards MD

## 2020-02-19 NOTE — ANESTHESIA POSTPROCEDURE EVALUATION
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     Anesthesia Type: No value filed.       Disposition: Outpatient   Postop Pain Control: Uneventful            Sign Out: Well controlled pain   PONV: No   Neuro/Psych: Uneventful            Sign Out: Acceptable/Baseline neuro status   Airway/Respiratory: Uneventful            Sign Out: Acceptable/Baseline resp. status   CV/Hemodynamics: Uneventful            Sign Out: Acceptable CV status   Other NRE: NONE   DID A NON-ROUTINE EVENT OCCUR? No         Last Anesthesia Record Vitals:  CRNA VITALS  2020 0846 - 2020 0942      2020             Resp Rate (set):  10          Last PACU Vitals:  Vitals Value Taken Time   /83 2020  9:30 AM   Temp     Pulse 62 2020  9:30 AM   Resp     SpO2 94 % 2020  9:41 AM   Temp src     NIBP     Pulse     SpO2     Resp     Temp     Ht Rate     Temp 2     Vitals shown include unvalidated device data.      Electronically Signed By: Ryan Mann MD, 2020, 9:42 AM

## 2020-02-25 LAB — COPATH REPORT: NORMAL

## 2020-02-26 ENCOUNTER — THERAPY VISIT (OUTPATIENT)
Dept: SPEECH THERAPY | Facility: CLINIC | Age: 70
End: 2020-02-26
Payer: MEDICARE

## 2020-02-26 ENCOUNTER — OFFICE VISIT (OUTPATIENT)
Dept: OTOLARYNGOLOGY | Facility: CLINIC | Age: 70
End: 2020-02-26
Payer: MEDICARE

## 2020-02-26 VITALS
HEIGHT: 67 IN | WEIGHT: 180 LBS | SYSTOLIC BLOOD PRESSURE: 122 MMHG | DIASTOLIC BLOOD PRESSURE: 60 MMHG | HEART RATE: 66 BPM | BODY MASS INDEX: 28.25 KG/M2

## 2020-02-26 DIAGNOSIS — R13.11 ORAL PHASE DYSPHAGIA: ICD-10-CM

## 2020-02-26 DIAGNOSIS — C02.9 SQUAMOUS CELL CANCER OF TONGUE (H): Primary | ICD-10-CM

## 2020-02-26 DIAGNOSIS — R91.8 PULMONARY NODULES: ICD-10-CM

## 2020-02-26 DIAGNOSIS — K13.79 ACUTE PAIN OF MOUTH: ICD-10-CM

## 2020-02-26 RX ORDER — LIDOCAINE HYDROCHLORIDE 20 MG/ML
15 SOLUTION OROPHARYNGEAL EVERY 4 HOURS PRN
Qty: 400 ML | Refills: 0 | Status: SHIPPED | OUTPATIENT
Start: 2020-02-26 | End: 2020-05-19

## 2020-02-26 ASSESSMENT — MIFFLIN-ST. JEOR: SCORE: 1535.1

## 2020-02-26 ASSESSMENT — PAIN SCALES - GENERAL: PAINLEVEL: NO PAIN (1)

## 2020-02-26 NOTE — PATIENT INSTRUCTIONS
Reese Oakley,    It was a pleasure to see you today.    1. You were seen in the ENT Clinic today by Donna Gallagher PA-C.  If you have any questions or concerns after your appointment, please call   - Option 1: ENT Clinic: [phone]  - Option 2: Cassie (Dr. Mckenna's Nurse): [phone]    2. We will be reviewing your final pathology at our Tumor Board on Friday, 2/28/2020.      Cassie will call you after the Tumor Board with any further recommendations.    3. For follow up, please schedule with Dr. Mckenna in mid-April, with CT chest prior.    4. One hour before meals, take your pain medication (either the hydromorphone or Tylenol).      5. About 5 minutes before a meal, use the lidocaine 15 mL in the mouth, over the surgical site.    6. Continue the Chlorhexidine twice a day and then gargle with salt/soda gargles 3-4 times a day.    Use 1/4 tsp of table salt and 1/4 tsp of baking soda in 8 ounces of water.        Thank you,  Donna Gallagher PA-C  Otolaryngology  Head & Neck Surgery  [phone]

## 2020-02-26 NOTE — NURSING NOTE
"Chief Complaint   Patient presents with     RECHECK     1 week follow up     Blood pressure 122/60, pulse 66, height 1.702 m (5' 7\"), weight 81.6 kg (180 lb).    Pam Vigil, EMT  "

## 2020-02-26 NOTE — PROGRESS NOTES
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  Previously treated with a DL with Omni CO2 laser excision by Dr. Mckenna on 10/6/2014.  This was followed by Radiation therapy at [facility].  Patient has also been followed by Dr. Chilango Carrillo for this.    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 Mckenna on 2/19/2020    HPI:  Mr. Oakley is here today for 1 week postop visit after he had a right sided partial glossectomy by Dr. Mckenna on 2/19/2019.    Kenrick reports that his biggest complaint right now is that he is having pain.  He states he took the hydromorphone 3 times a day for the first 2 days and now is only taking at bedtime.  He mainly has pain with eating.  He states that he has been eating soft food foods and only is eating eggs, pancakes and Yi toast.  The Yi toast was very difficult to eat because it was hard.  It was very painful.  He is not taking any additional Tylenol or taking pain meds before eating.  He is not taking the meds because he does not sure they are really helpful.  He wants to know what he should do about the pain because he wants to eat.  He is tired of drinking the supplements instead of eating the 1 meal per day.  He only eats the 1 meal a day because he states that is about all he can handle.    He is continuing to do the chlorhexidine rinses twice a day.  He has not had any swelling of his neck or lumps in his neck.  No fevers or chills.  No poor taste or drainage in his mouth.  He is not coughing or choking when he eats food.  No issues with drinking liquids either.  He states that when he does chew food, it tends to slip to the right side of his tongue and pockets.  He is hoping  "there are some techniques we can help him with in regards to that.        Physical Exam:  /60 (BP Location: Right arm, Patient Position: Chair, Cuff Size: Adult Regular)   Pulse 66   Ht 1.702 m (5' 7\")   Wt 81.6 kg (180 lb)   BMI 28.19 kg/m      Constitutional: The patient is unaccompanied, well-groomed, and in no acute distress.    Head: Normocephalic and atraumatic.    Oral Cavity: Healing incision on the right lateral mobile tongue with small area (approximately 2 mm) of dehiscence in the middle of the incision as well as a 1 mm area of dehiscence in the very posterior aspect of the incision, no drainage or evidence of infection, no underlying mass, area is tender to palpation which is consistent with being 1 week postop.  normal floor of mouth, buccal mucosa, and palate.  No lesions or masses on inspection or palpation.    Oropharynx: Normal mucosa, palate symmetric with normal elevation. No abnormal lymph tissue in the oropharynx.  Pterygoid region non-tender.   Neck: Supple with normal laryngeal and tracheal landmarks.  The parotid beds were without masses.  No palpable thyroid.  Normal range of motion  Lymphatic: There is no palpable lymphadenopathy in the neck.   Respiratory: Breathing comfortably without stridor or exertion of accessory muscles.   Skin: Normal:  warm and pink without rash  Neurologic: Alert and oriented x 3.  CN's III-XII within normal limits.  Voice normal.   Psychiatric: The patient's affect was calm, cooperative, and appropriate.          IMPRESSION AND PLAN:  1.  Squamous cell carcinoma of the right lateral tongue.  Status post partial glossectomy on 2/19/2020.  Wound is healing well but there is slight dehiscence in the middle and posterior aspect of the wound.  No need for further suturing at this point.  Pain secondary to the healing wound.    In regards to the wound dehiscence, we will have the patient continue the chlorhexidine rinses twice a day and then I did ask him to " rinse with salt and soda gargles at least 3-4 times a day to prevent any food from getting stuck in the dehiscence area and wound infection.    For the pain, I did recommend he take the Dilaudid 1 hour prior to eating.  If he does not want to do this, he could take a dose of Tylenol.  I will also provide him with Magic mouthwash that he can use up to 6 times a day.  I did recommend he use it at least 5 minutes before each meal.  He should continue to use the soft foods and supplements for nutrition and advance as tolerated.    For follow-up, I would like him to follow-up in 3 to 4 weeks with Dr. Mckenna, however he is leaving to go to [place] for the next 4 weeks and will not return until April 3.  He will follow-up when he gets home at that time.  He will call us with any questions or concerns.    2.  Acute mouth pain.  Please see #1 above.    3.  Pulmonary nodule seen on CT.  Will get a follow-up CT at his appointment with Dr. Nielsen in mid April.  We are monitoring his pulmonary nodules to make sure he is not developed metastatic disease from his current or previous cancers.    4.  Oropharyngeal dysphagia.  Secondary to his recent surgery.  I had Tamiko Leonard MS, CCC-SLP, coming with the patient and give him some techniques on swallowing exercises as well as strengthening exercises to improve his swallow.  We will follow-up on this when he returns.       Donna Gallagher PA-C  Otolaryngology  Head & Neck Surgery  [phone]       CC:  Jose Antonio Mckenna MD  [facility] [address]

## 2020-02-26 NOTE — LETTER
2/26/2020       RE: Reese Oakley  [address] [address]     Dear Colleague,    Thank you for referring your patient, Reese Oakley, to the [facility] EAR [facility]. Please see a copy of my visit note below.    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  Previously treated with a DL with Omni CO2 laser excision by Dr. Mckenna on 10/6/2014.  This was followed by Radiation therapy at [facility].  Patient has also been followed by Dr. Chilango Carrillo for this.    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 Mckenna on 2/19/2020    HPI:  Mr. Oakley is here today for 1 week postop visit after he had a right sided partial glossectomy by Dr. Mckenna on 2/19/2019.    Kenrikc reports that his biggest complaint right now is that he is having pain.  He states he took the hydromorphone 3 times a day for the first 2 days and now is only taking at bedtime.  He mainly has pain with eating.  He states that he has been eating soft food foods and only is eating eggs, pancakes and Serbian toast.  The Serbian toast was very difficult to eat because it was hard.  It was very painful.  He is not taking any additional Tylenol or taking pain meds before eating.  He is not taking the meds because he does not sure they are really helpful.  He wants to know what he should do about the pain because he wants to eat.  He is tired of drinking the supplements instead of eating the 1 meal per day.  He only eats the 1 meal a day because he states that is about all he can handle.    He is continuing to do the chlorhexidine rinses twice a day.  He has not had any swelling of  "his neck or lumps in his neck.  No fevers or chills.  No poor taste or drainage in his mouth.  He is not coughing or choking when he eats food.  No issues with drinking liquids either.  He states that when he does chew food, it tends to slip to the right side of his tongue and pockets.  He is hoping there are some techniques we can help him with in regards to that.        Physical Exam:  /60 (BP Location: Right arm, Patient Position: Chair, Cuff Size: Adult Regular)   Pulse 66   Ht 1.702 m (5' 7\")   Wt 81.6 kg (180 lb)   BMI 28.19 kg/m       Constitutional: The patient is unaccompanied, well-groomed, and in no acute distress.    Head: Normocephalic and atraumatic.    Oral Cavity: Healing incision on the right lateral mobile tongue with small area (approximately 2 mm) of dehiscence in the middle of the incision as well as a 1 mm area of dehiscence in the very posterior aspect of the incision, no drainage or evidence of infection, no underlying mass, area is tender to palpation which is consistent with being 1 week postop.  normal floor of mouth, buccal mucosa, and palate.  No lesions or masses on inspection or palpation.    Oropharynx: Normal mucosa, palate symmetric with normal elevation. No abnormal lymph tissue in the oropharynx.  Pterygoid region non-tender.   Neck: Supple with normal laryngeal and tracheal landmarks.  The parotid beds were without masses.  No palpable thyroid.  Normal range of motion  Lymphatic: There is no palpable lymphadenopathy in the neck.   Respiratory: Breathing comfortably without stridor or exertion of accessory muscles.   Skin: Normal:  warm and pink without rash  Neurologic: Alert and oriented x 3.  CN's III-XII within normal limits.  Voice normal.   Psychiatric: The patient's affect was calm, cooperative, and appropriate.          IMPRESSION AND PLAN:  1.  Squamous cell carcinoma of the right lateral tongue.  Status post partial glossectomy on 2/19/2020.  Wound is healing " well but there is slight dehiscence in the middle and posterior aspect of the wound.  No need for further suturing at this point.  Pain secondary to the healing wound.    In regards to the wound dehiscence, we will have the patient continue the chlorhexidine rinses twice a day and then I did ask him to rinse with salt and soda gargles at least 3-4 times a day to prevent any food from getting stuck in the dehiscence area and wound infection.    For the pain, I did recommend he take the Dilaudid 1 hour prior to eating.  If he does not want to do this, he could take a dose of Tylenol.  I will also provide him with Magic mouthwash that he can use up to 6 times a day.  I did recommend he use it at least 5 minutes before each meal.  He should continue to use the soft foods and supplements for nutrition and advance as tolerated.    For follow-up, I would like him to follow-up in 3 to 4 weeks with Dr. Mckenna, however he is leaving to go to [place] for the next 4 weeks and will not return until April 3.  He will follow-up when he gets home at that time.  He will call us with any questions or concerns.    2.  Acute mouth pain.  Please see #1 above.    3.  Pulmonary nodule seen on CT.  Will get a follow-up CT at his appointment with Dr. Nielsen in mid April.  We are monitoring his pulmonary nodules to make sure he is not developed metastatic disease from his current or previous cancers.    4.  Oropharyngeal dysphagia.  Secondary to his recent surgery.  I had Tamiko Leonard MS, CCC-SLP, coming with the patient and give him some techniques on swallowing exercises as well as strengthening exercises to improve his swallow.  We will follow-up on this when he returns.       Donna Gallagher PA-C  Otolaryngology  Head & Neck Surgery  [phone]       CC:  Jose Antonio Mckenna MD  [facility] 737

## 2020-02-27 NOTE — PROGRESS NOTES
Head & Neck Tumor Conference Note          Status: Return  Staff: Dr. 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  Previously treated with a DL with Omni CO2 laser excision by Dr. Mckenna on 10/6/2014.  This was followed by Radiation therapy at [facility].  Patient has also been followed by Dr. Chilango Carrillo for this.     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 Mckenna on 2/19/2020. We return to tumor conference to review new path.     Pertinent PMH:   Past Medical History:   Diagnosis Date     Adenocarcinoma (H)     large instestine      Cancer of appendix (H)      Diabetes (H)      Gastro-oesophageal reflux disease      History of radiation therapy      Hoarseness      Hypertension         Smoking Hx:   Social History     Tobacco Use     Smoking status: Never Smoker     Smokeless tobacco: Never Used   Substance Use Topics     Alcohol use: Not Currently     Alcohol/week: 0.0 standard drinks     Comment: none for 20 years     Drug use: No         Imaging:   No new imaging    Pathology:   Surgical Pathology 2/19/2020:  FINAL DIAGNOSIS:   A. Deep margin:   - No malignancy identified     B. Superior margin:   - No dysplasia or malignancy identified     C. Inferior margin:   - No dysplasia or malignancy identified     D. Right partial glossectomy:   - Invasive, moderately differentiated, keratinizing squamous cell   carcinoma   - Tumor size: 1.5 cm   - The depth of invasion: 0.7 cm   - Perineural invasion: Identified, multifocal   -  Angiolymphatic invasion: Not identified   - Resection margins free of tumor     Tumor Board Recommendation:   Discussion: Mr. Oakley is a 69 y.o. male with a history of oropharynx and oral cavity SCC s/p the above named treatments. Pathology from recent partial glossectomy shows DOI of 7mm indicating ipsilateral neck dissection.    Plan: Will schedule for neck dissection. Referral to Rad/Onc to discuss post-op RT given PNI in primary specimen.       Melissa Zarate MD  Otolaryngology- Head and Neck Surgery PGY5  Please contact ENT with questions by dialing [phone] and entering job code [phone] when prompted.      Documentation / Disclaimer Cancer Tumor Board Note  Cancer tumor board recommendations do not override what is determined to be reasonable care and treatment, which is dependent on the circumstances of a patient's case; the patient's medical, social, and personal concerns; and the clinical judgment of the oncologist [physician].

## 2020-02-28 ENCOUNTER — TUMOR CONFERENCE (OUTPATIENT)
Dept: ONCOLOGY | Facility: CLINIC | Age: 70
End: 2020-02-28
Payer: MEDICARE

## 2020-02-28 ENCOUNTER — DOCUMENTATION ONLY (OUTPATIENT)
Dept: OTHER | Facility: CLINIC | Age: 70
End: 2020-02-28

## 2020-02-28 DIAGNOSIS — C02.9 SQUAMOUS CELL CANCER OF TONGUE (H): Primary | ICD-10-CM

## 2020-02-28 NOTE — PROGRESS NOTES
Pt and wife called regarding Tumor Board Recommendations and recent pathology     FINAL DIAGNOSIS:   A. Deep margin:   - No malignancy identified     B. Superior margin:   - No dysplasia or malignancy identified     C. Inferior margin:   - No dysplasia or malignancy identified     D. Right partial glossectomy:   - Invasive, moderately differentiated, keratinizing squamous cell   carcinoma   - Tumor size: 1.5 cm   - The depth of invasion: 0.7 cm   - Perineural invasion: Identified, multifocal   - Angiolymphatic invasion: Not identified   - Resection margins free of tumor     I explained that d/t the depth of invasion and perineural invasion, the board is recommending a right sided neck dissection with potential radiation therapy to follow.    Meghan explains that they are in [place] until April 3rd and is wondering if they need to come back asap for this procedure. After consulting with Dr. Mckenna, it has been decided that they will come back for f/o with Dr. Mckenna the first week of April with a neck ultrasound prior to their visit, at which time we will discuss a neck dissection.     Message sent to schedulers to f/o with Joshua next week.     Direct line given for additional questions/concerns.     Natice Schwab, RN BSN

## 2020-03-06 ENCOUNTER — TELEPHONE (OUTPATIENT)
Dept: OTOLARYNGOLOGY | Facility: CLINIC | Age: 70
End: 2020-03-06

## 2020-03-06 NOTE — TELEPHONE ENCOUNTER
Voicemail received from wife Meghan requesting a phone call back to discuss some questions. Phone call returned. No answer. Voicemail left with direct line for call-back.     Natice Schwab, RN BSN

## 2020-03-10 NOTE — PROGRESS NOTES
02/26/20 1130   General Information   Type Of Visit Initial   Start Of Care Date 02/26/20   Referring Physician Donna Gallagher PA-C   Orders Evaluate And Treat   Orders Comment Clinical Swallow Eval   Medical Diagnosis SCCa rigth lingual tongue, Oral dysphagia   Onset Of Illness/injury Or Date Of Surgery 02/26/20   Hearing Adequate in 1:1 in quiet setting   Pertinent History of Current Problem/OT: Additional Occupational Profile Info Kenrick Oakley is seen in conjunction with ENT clinic visit today per Donna Gallagher PA-C. He is having some difficulty with his tongue movement post his most recent partial glossectomy on the right with Dr. Mckenna. He reports having seen SLP for swallowing while he was inpatient. He was given some exercises and has only occasionally been doing them. Clinical swallow eval completed today.    Respiratory Status Room air   Prior Level Of Function Swallowing   Abuse Screen (yes response referral indicated)   Feels Unsafe at Home or Work/School no   Feels Threatened by Someone no   Does Anyone Try to Keep You From Having Contact with Others or Doing Things Outside Your Home? no   Physical Signs of Abuse Present no   Clinical Swallow Evaluation   Oral Musculature generally intact   Structural Abnormalities none present   Dentition present and adequate   Mucosal Quality sticky   Mandibular Strength and Mobility impaired   Oral Labial Strength and Mobility WFL   Lingual Strength and Mobility impaired protrusion;impaired right lateral movement   Velar Elevation intact   Buccal Strength and Mobility intact   Laryngeal Function Cough;Throat clear;Voicing initiated   Clinical Swallow Eval: Thin Liquid Texture Trial   Mode of Presentation, Thin Liquids cup;self-fed   Volume of Liquid or Food Presented 6 oz water   Oral Phase of Swallow WFL   Pharyngeal Phase of Swallow intact   Diagnostic Statement No overt clinical s/sx of aspiration/penetration noted on thin liquid trials.    Clinical  "Swallow Eval: Solid Food Texture Trial   Mode of Presentation, Solid self-fed   Volume of Solid Food Presented Katey Merrill cookies x 4   Oral Phase, Solid WFL   Pharyngeal Phase, Solid intact   Diagnostic Statement No overt clinical s/sx of aspiration/penetration noted on cookie consistency. Pt requires sips of liquid to aid in swallowing \"dry\" foods like cookies. Pt also required cues to not talk while chewing on cookies.    Swallow Compensations   Swallow Compensations Alternate viscosity of consistencies;Reduce amounts  (No talking during po intake.)   Educational Assessment   Barriers to Learning No barriers   Esophageal Phase of Swallow   Patient reports or presents with symptoms of esophageal dysphagia No   Swallow Eval: Clinical Impressions   Skilled Criteria for Therapy Intervention Skilled criteria met.  Treatment indicated.   Dysphagia Outcome Severity Scale (JASMIN) Level 5 - JASMIN   Treatment Diagnosis Mild oropharyngeal dysphagia   Diet texture recommendations Regular diet;Thin liquids   Recommended Feeding/Eating Techniques alternate between small bites and sips of food/liquid;hard swallow w/ each bite or sip;maintain upright posture during/after eating for 30 mins;small sips/bites   Rehab Potential good, to achieve stated therapy goals   Therapy Frequency other (see comments)   Predicted Duration of Therapy Intervention (days/wks) Follow up during ENT clinic visits   Risks and Benefits of Treatment have been explained. Yes   Patient, family and/or staff in agreement with Plan of Care Yes   Clinical Impression Comments Kenrick Oakley demonstrates mild oropharyngeal dysphagia as characterized by trismus and decreased ROM of his tongue toward the right. He demonstrated no overt clinical s/sx of aspiration/penetration on thin liquid trials. He is agreeable to initiate the 7-7-7 protocol to increase his jaw ROM. Recommend he continue with regular solids and thin liquids. Sit upright for all po intake. " Encouraged small bites/sips and slow rate. Pt would benefit from ongoing swallowing therapy to maxmize swallow function and minimize trismus.    Swallow Goal 1   Goal Identifier Eat   Goal Description 1. Pt will tolerate regular solids and thin liquids without overt clinical s/sx of aspiration/penetration noted on 100% of the time independently.   Target Date 05/26/20   Swallow Goal 2   Goal Identifier Exercises   Goal Description 2. Pt will improve ROM and strength of pharynx, BOT, and lingual tongue by completing 10 repetitions of 6/6 exercises 3 times daily with minimal written or verbal cues.    Target Date 05/26/20   Swallow Goal 3   Goal Identifier Jaw ROM   Goal Description 3. Pt will improve jaw ROM to 40mm by completing 7-7-7 protocol.    Target Date 05/26/20   Total Session Time   SLP Eval: oral/pharyngeal swallow function, clinical minutes (86252) 18   Total Evaluation Time 18     Thank you for the referral of Reese Oakley. If you have any questions about this report, please contact me using the information below.      Tamiko Leonard MS, CCC-SLP  Speech-Language Pathology  [facility]  Departement of Otolaryngology/D&T - 4th floor  Pager: [phone]  Phone: [phone]  Email: [email or web address]

## 2020-04-07 ENCOUNTER — OFFICE VISIT (OUTPATIENT)
Dept: OTOLARYNGOLOGY | Facility: CLINIC | Age: 70
End: 2020-04-07
Payer: MEDICARE

## 2020-04-07 ENCOUNTER — ANCILLARY PROCEDURE (OUTPATIENT)
Dept: ULTRASOUND IMAGING | Facility: CLINIC | Age: 70
End: 2020-04-07
Attending: OTOLARYNGOLOGY
Payer: MEDICARE

## 2020-04-07 ENCOUNTER — THERAPY VISIT (OUTPATIENT)
Dept: SPEECH THERAPY | Facility: CLINIC | Age: 70
End: 2020-04-07
Payer: MEDICARE

## 2020-04-07 VITALS
HEART RATE: 67 BPM | BODY MASS INDEX: 29.35 KG/M2 | SYSTOLIC BLOOD PRESSURE: 172 MMHG | WEIGHT: 187.4 LBS | DIASTOLIC BLOOD PRESSURE: 84 MMHG

## 2020-04-07 DIAGNOSIS — C02.9 SQUAMOUS CELL CANCER OF TONGUE (H): ICD-10-CM

## 2020-04-07 DIAGNOSIS — C02.1 SQUAMOUS CELL CARCINOMA OF LATERAL TONGUE (H): Primary | ICD-10-CM

## 2020-04-07 DIAGNOSIS — C02.9 SQUAMOUS CELL CANCER OF TONGUE (H): Primary | ICD-10-CM

## 2020-04-07 DIAGNOSIS — R13.11 ORAL PHASE DYSPHAGIA: ICD-10-CM

## 2020-04-07 ASSESSMENT — PAIN SCALES - GENERAL: PAINLEVEL: NO PAIN (0)

## 2020-04-07 NOTE — PATIENT INSTRUCTIONS
1. You were seen in the ENT Clinic today by Dr. Mckenna.  If you have any questions or concerns after your appointment, please call   - Option 1: ENT Clinic: [phone]  - Option 2: Cassie (Dr. Mckenna's Nurse): [phone]    2.   Plan to return to clinic to see Dr. Mckenna in Mid-May with PET prior to your visit     Natice Schwab, RN   [facility]- Otolaryngology  [phone]

## 2020-04-07 NOTE — NURSING NOTE
Chief Complaint   Patient presents with     Follow Up     Ultra sound     Blood pressure (!) 172/84, pulse 67, weight 85 kg (187 lb 6.4 oz).    Ghazala Cruz RN

## 2020-04-07 NOTE — LETTER
2020       RE: Reese Oakley  [address] [facility] [address]     Dear Colleague,    Thank you for referring your patient, Reese Oakley, to the [facility] EAR NOSE AND THROAT at [facility]. Please see a copy of my visit note below.      Otolaryngology Clinic      Name: Reese Oakley  MRN: [id number]  Age: 69 year old  : 2020      Chief Complaint:   Follow Up       History of Present Illness:   Reese Oakley is a 69 year old male with a history of pharyngeal malignancy s/p resection, chemotherapy and radation who presents for follow up. 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 The pathology showed a 7mm DOI for a 1.5 CM total tumor size. The neck is radiated on that side. I got a (pending) ultrasound on the right neck for post op surveillance he recently came back from [place]. He is feeling fine now, no new complaints.  .      Review of Systems:   Pertinent items are noted in HPI or as in patient entered ROS below, remainder of complete ROS is negative.    ENT ROS 2019   Ears, Nose, Throat Trouble swallowing   Endocrine Thirst        Physical Exam:   BP (!) 172/84 (BP Location: Right leg, Patient Position: Sitting, Cuff Size: Adult Regular)   Pulse 67   Wt 85 kg (187 lb 6.4 oz)   BMI 29.35 kg/m       PHYSICAL EXAMINATION:    Constitutional:  The patient was unaccompanied, well-groomed, and in no acute distress.    Skin:  Warm and pink.    Neurologic:  Alert and oriented x 3.  CN's III-XII within normal limits.  Voice normal.   Psychiatric:  The patient's affect was calm, cooperative, and appropriate.    Respiratory:  Breathing comfortably without stridor or exertion of accessory muscles.    Eyes: Extraocular movement intact.    Head:  Normocephalic and atraumatic.  No lesions or scars.    Ears:  Pinnae and tragus non-tender.  EAC's and TM's were clear.     Nose:        OC/OP:  Small scar without tongue tie. Minimally tender, small palpable scar. Seems really healed.  Otherwise normal tongue, floor of mouth, buccal mucosa, and palate.  No lesions or masses on inspection or palpation.  No abnormal lymph tissue in the oropharynx.  The pterygoid region is non-tender.    Neck:  Supple with normal laryngeal and tracheal landmarks.  The parotid beds were without masses.  No palpable thyroid.  Lymphatic:  There is no palpable lymphadenopathy in the neck.       Assessment and Plan:   The patient presents today for post operative f/U. 7 mm doi, but treatet neck with XRT.     1) Check the U/S today.  2) Use any abnormalities on the U/S or a PET in about 6 weeks to govern follow up for a ND or not.   3) Knows the lesion   with a continued oral lesion. We discussed that this could be due to his history of radiation. We completed a biopsy of this today, see details above. We will contact him with the results of this  Scan and see in about 4 weeks about eventuualioty of a ND guided by imaging. All physical exam is negative today, but concern about the DOI,.        Jose Antonio Mckenna MD

## 2020-04-07 NOTE — PROGRESS NOTES
Otolaryngology Clinic      Name: Reese Oakley  MRN: [id number]  Age: 69 year old  : 2020      Chief Complaint:   Follow Up       History of Present Illness:   Reese Oakley is a 69 year old male with a history of pharyngeal malignancy s/p resection, chemotherapy and radation who presents for follow up. 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 The pathology showed a 7mm DOI for a 1.5 CM total tumor size. The neck is radiated on that side. I got a (pending) ultrasound on the right neck for post op surveillance he recently came back from [place]. He is feeling fine now, no new complaints.  .      Review of Systems:   Pertinent items are noted in HPI or as in patient entered ROS below, remainder of complete ROS is negative.    ENT ROS 2019   Ears, Nose, Throat Trouble swallowing   Endocrine Thirst        Physical Exam:   BP (!) 172/84 (BP Location: Right leg, Patient Position: Sitting, Cuff Size: Adult Regular)   Pulse 67   Wt 85 kg (187 lb 6.4 oz)   BMI 29.35 kg/m       PHYSICAL EXAMINATION:    Constitutional:  The patient was unaccompanied, well-groomed, and in no acute distress.    Skin:  Warm and pink.    Neurologic:  Alert and oriented x 3.  CN's III-XII within normal limits.  Voice normal.   Psychiatric:  The patient's affect was calm, cooperative, and appropriate.    Respiratory:  Breathing comfortably without stridor or exertion of accessory muscles.    Eyes: Extraocular movement intact.    Head:  Normocephalic and atraumatic.  No lesions or scars.    Ears:  Pinnae and tragus non-tender.  EAC's and TM's were clear.     Nose:       OC/OP:  Small scar without tongue tie. Minimally tender, small palpable scar. Seems really healed.  Otherwise normal tongue, floor of mouth, buccal mucosa, and palate.  No lesions or masses on inspection or palpation.  No abnormal lymph tissue in the oropharynx.  The pterygoid region is non-tender.     Neck:  Supple with normal laryngeal and tracheal landmarks.  The parotid beds were without masses.  No palpable thyroid.  Lymphatic:  There is no palpable lymphadenopathy in the neck.       Assessment and Plan:   The patient presents today for post operative f/U. 7 mm doi, but treatet neck with XRT.     1) Check the U/S today.  2) Use any abnormalities on the U/S or a PET in about 6 weeks to govern follow up for a ND or not.   3) Knows the lesion   with a continued oral lesion. We discussed that this could be due to his history of radiation. We completed a biopsy of this today, see details above. We will contact him with the results of this  Scan and see in about 4 weeks about eventuualioty of a ND guided by imaging. All physical exam is negative today, but concern about the DOI,.        Jose Antonio Mckenna MD

## 2020-04-08 ENCOUNTER — TELEPHONE (OUTPATIENT)
Dept: OTOLARYNGOLOGY | Facility: CLINIC | Age: 70
End: 2020-04-08

## 2020-04-08 NOTE — TELEPHONE ENCOUNTER
Pt called with results of recent ultrasound.     IMPRESSION:  1.  Multiple prominent lymph nodes as detailed above including mildly  enlarged nodes in levels 3 and 4 on the right and in level 3 on the  left.  2.  Diffusely heterogeneous thyroid gland. Initially noted right lobe  nodule not assessed on this exam.     Results reviewed with Dr. Mckenna. The plan will be to proceed with PET scan 10-12 weeks following surgery. This has been scheduled 5/19 with a clinic visit with Dr. Mckenna to follow; pt in agreement with this plan of care.     Direct line given for additional questions/concerns.     Natice Schwab, RN BSN

## 2020-04-08 NOTE — PROGRESS NOTES
Outpatient Speech Language Pathology Discharge Note     Patient: Reese Oakley  : 1950    Beginning/End Dates of Reporting Period:  2020 to 2020    Referring Provider: Dr. 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 He was having trismus during his last clinic follow up due to surgery. He reports feeling very good about jaw ROM and swallowing.     Goals:  Goal Identifier Eat   Goal Description 1. Pt will tolerate regular solids and thin liquids without overt clinical s/sx of aspiration/penetration noted on 100% of the time independently.   Target Date 20   Date Met      Progress:     Goal Identifier Exercises   Goal Description 2. Pt will improve ROM and strength of pharynx, BOT, and lingual tongue by completing 10 repetitions of 6/6 exercises 3 times daily with minimal written or verbal cues.    Target Date 20   Date Met   2020   Progress:     Goal Identifier Jaw ROM   Goal Description 3. Pt will improve jaw ROM to 40mm by completing 7-7-7 protocol.    Target Date 20   Date Met   2020   Progress:       Progress Toward Goals:   Progress this reporting period: Pt demonstrates good progress toward goals and return to functional jaw ROM as well as functional eating/drinking.      Plan:  Discharge from therapy.    Discharge:    Reason for Discharge: Patient has met all goals.    Discharge Plan: Patient to continue home program. Pt will notify Dr. Mckenna if there are any changes in his swallowing function or in his jaw ROM.

## 2020-04-14 ENCOUNTER — TELEPHONE (OUTPATIENT)
Dept: OTOLARYNGOLOGY | Facility: CLINIC | Age: 70
End: 2020-04-14

## 2020-04-14 NOTE — TELEPHONE ENCOUNTER
Voicemail received from wife Meghan asking for a call-back to discuss questions.     Phone call returned. Meghan explains that Kenrick's [employer] benefits needs all the records from Dr. Mckenna since this new tongue cancer develops (1/7/202).     HIM direct line and fax number provided.     Encouraged to call direct line with additional questions/concerns.     Natice Schwab, RN BSN

## 2020-05-19 ENCOUNTER — HOSPITAL ENCOUNTER (OUTPATIENT)
Dept: PET IMAGING | Facility: CLINIC | Age: 70
End: 2020-05-19
Attending: OTOLARYNGOLOGY
Payer: MEDICARE

## 2020-05-19 ENCOUNTER — OFFICE VISIT (OUTPATIENT)
Dept: OTOLARYNGOLOGY | Facility: CLINIC | Age: 70
End: 2020-05-19
Payer: MEDICARE

## 2020-05-19 ENCOUNTER — TELEPHONE (OUTPATIENT)
Dept: OTOLARYNGOLOGY | Facility: CLINIC | Age: 70
End: 2020-05-19

## 2020-05-19 VITALS
RESPIRATION RATE: 19 BRPM | HEIGHT: 67 IN | DIASTOLIC BLOOD PRESSURE: 63 MMHG | SYSTOLIC BLOOD PRESSURE: 136 MMHG | BODY MASS INDEX: 30.13 KG/M2 | TEMPERATURE: 97.4 F | HEART RATE: 65 BPM | WEIGHT: 192 LBS

## 2020-05-19 DIAGNOSIS — C02.1 SQUAMOUS CELL CARCINOMA OF LATERAL TONGUE (H): ICD-10-CM

## 2020-05-19 DIAGNOSIS — C02.1 SQUAMOUS CELL CARCINOMA OF LATERAL TONGUE (H): Primary | ICD-10-CM

## 2020-05-19 DIAGNOSIS — R59.1 LYMPHADENOPATHY: ICD-10-CM

## 2020-05-19 LAB
CREAT BLD-MCNC: 1.4 MG/DL (ref 0.66–1.25)
GFR SERPL CREATININE-BSD FRML MDRD: 50 ML/MIN/{1.73_M2}

## 2020-05-19 PROCEDURE — 82565 ASSAY OF CREATININE: CPT

## 2020-05-19 PROCEDURE — 74177 CT ABD & PELVIS W/CONTRAST: CPT

## 2020-05-19 PROCEDURE — 70491 CT SOFT TISSUE NECK W/DYE: CPT

## 2020-05-19 PROCEDURE — 34300033 ZZH RX 343: Performed by: OTOLARYNGOLOGY

## 2020-05-19 PROCEDURE — 25000128 H RX IP 250 OP 636: Performed by: OTOLARYNGOLOGY

## 2020-05-19 PROCEDURE — A9552 F18 FDG: HCPCS | Performed by: OTOLARYNGOLOGY

## 2020-05-19 RX ORDER — IOPAMIDOL 755 MG/ML
115 INJECTION, SOLUTION INTRAVASCULAR ONCE
Status: COMPLETED | OUTPATIENT
Start: 2020-05-19 | End: 2020-05-19

## 2020-05-19 RX ADMIN — FLUDEOXYGLUCOSE F-18 11.31 MCI.: 500 INJECTION, SOLUTION INTRAVENOUS at 09:15

## 2020-05-19 RX ADMIN — IOPAMIDOL 115 ML: 755 INJECTION, SOLUTION INTRAVENOUS at 09:59

## 2020-05-19 ASSESSMENT — PAIN SCALES - GENERAL: PAINLEVEL: MILD PAIN (2)

## 2020-05-19 ASSESSMENT — MIFFLIN-ST. JEOR: SCORE: 1584.54

## 2020-05-19 NOTE — LETTER
5/19/2020       RE: Reese Oakley  [address] [address]     Dear Colleague,    Thank you for referring your patient, Reese Oakley, to the [facility] EAR NOSE AND THROAT at [facility]. Please see a copy of my visit note below.    HISTORY OF PRESENT ILLNESS:  Mr. Oakley is a pleasant 70-year-old male.  He had an oropharyngeal malignancy that was treated many years ago that was almost to the hypopharynx and then he had a second malignancy that was in his posterior tongue on the right side.  It is a 7 mm depth of invasion, so under most circumstances, one would proceed with a neck dissection; however, because his lymphatics were already irradiated on that side,  it is a little difficult to predict where a possible lymph node might show up when.  We have tried to keep him under very close surveillance and that we have gotten both the PET scan beforehand, and ultrasound afterwards and now a second PET scan after the resection.  We got it a little bit early on him too just because. He has no other current complaints presently.  He has healed up.  He is otherwise getting by well.      IMAGING:  The PET scan on my view today looks actually pretty good.  I do not see that there seems to be any positivity in it, but we have to await the final reading of this.        PHYSICAL EXAMINATION:  The patient is alert and oriented x3 and pleasant.  Skin of the face, lips, and neck on him is otherwise normal with some radiation changes to his neck.  His floor of mouth and base of tongue are soft and the entire oral cavity is soft including the area of the scar where I did the resection on him. Neck exam shows no masses, adenopathy or thyromegaly throughout and ear canals are otherwise clear additionally.  Nasal cavity is clear as well.  Nasal pyramid is midline.      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        PLAN:  We have  to keep him under extremely close surveillance because there is a higher likelihood that he will get metastatic disease from this and because of its 7 mm thickness.  However, we want to make sure that we would be doing the right kind of neck dissection on him based on the metastatic spread so we will keep him under very close surveillance exactly for that reason and he is aware of this as well.      FO/ms         Again, thank you for allowing me to participate in the care of your patient.      Sincerely,    Jose Antonio Mckenna MD

## 2020-05-19 NOTE — TELEPHONE ENCOUNTER
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 No residual uptake to suggest recurrent or residual tumor.   2. No change in 5-6 cm right and left level 4 FDG avid nodes. Given  stability these are likely benign reactive nodes.   3. Please refer to the whole body PET CT performed as a separate  report, for the findings of the remainder of the body.     PET  IMPRESSION: In this patient with history of squamous cell carcinoma of the right lateral tongue:  1. No suspicious FDG uptake in the chest, abdomen or pelvis to suggest  metastatic disease.  2. Several sub-4 mm solid nodules in both lungs are stable in size  compared to 1/27/2020. No new or enlarging pulmonary nodules.  3. Fractures of the posterolateral left 8th [id number] ribs, no  underlying bone lesions or associated FDG metabolism.  4. Please see the dedicated neuroradiology report for results high  resolution PET/CT of the neck.    No change in the 5-6cm right and left lvl 4 nodes; these are likely to be benign reactive nodes. Dr. Mckenna would like to follow with another ultrasound in 6 wks prior to his scheduled f/o appointment. Pt in agreement with this plan.     Direct line given for additional questions/concerns.     Natice Schwab, RN BSN

## 2020-05-19 NOTE — NURSING NOTE
"Chief Complaint   Patient presents with     RECHECK     follow up      Blood pressure 136/63, pulse 65, temperature 97.4  F (36.3  C), resp. rate 19, height 1.702 m (5' 7\"), weight 87.1 kg (192 lb).    Clayton Nicholson LPN    "

## 2020-05-19 NOTE — PATIENT INSTRUCTIONS
1. You were seen in the ENT Clinic today by Dr. Mckenna.  If you have any questions or concerns after your appointment, please call   - Option 1: ENT Clinic: [phone]  - Option 2: Cassie (Dr. Mckenna's Nurse): [phone]    2.   Plan to return to clinic in 6 weeks; 6/30 at 1045    3. Cassie will call you once your PET scan results have been finalized     Natice Schwab, RN  [facility] Otolaryngology  [phone]

## 2020-05-19 NOTE — PROGRESS NOTES
HISTORY OF PRESENT ILLNESS:  Mr. Oakley is a pleasant 70-year-old male.  He had an oropharyngeal malignancy that was treated many years ago that was almost to the hypopharynx and then he had a second malignancy that was in his posterior tongue on the right side.  It is a 7 mm depth of invasion, so under most circumstances, one would proceed with a neck dissection; however, because his lymphatics were already irradiated on that side,  it is a little difficult to predict where a possible lymph node might show up when.  We have tried to keep him under very close surveillance and that we have gotten both the PET scan beforehand, and ultrasound afterwards and now a second PET scan after the resection.  We got it a little bit early on him too just because. He has no other current complaints presently.  He has healed up.  He is otherwise getting by well.      IMAGING:  The PET scan on my view today looks actually pretty good.  I do not see that there seems to be any positivity in it, but we have to await the final reading of this.        PHYSICAL EXAMINATION:  The patient is alert and oriented x3 and pleasant.  Skin of the face, lips, and neck on him is otherwise normal with some radiation changes to his neck.  His floor of mouth and base of tongue are soft and the entire oral cavity is soft including the area of the scar where I did the resection on him. Neck exam shows no masses, adenopathy or thyromegaly throughout and ear canals are otherwise clear additionally.  Nasal cavity is clear as well.  Nasal pyramid is midline.      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        PLAN:  We have to keep him under extremely close surveillance because there is a higher likelihood that he will get metastatic disease from this and because of its 7 mm thickness.  However, we want to make sure that we would be doing the right kind of neck dissection on him based on the metastatic spread so we  will keep him under very close surveillance exactly for that reason and he is aware of this as well.      FO/ms

## 2020-06-30 ENCOUNTER — OFFICE VISIT (OUTPATIENT)
Dept: OTOLARYNGOLOGY | Facility: CLINIC | Age: 70
End: 2020-06-30
Payer: MEDICARE

## 2020-06-30 ENCOUNTER — ANCILLARY PROCEDURE (OUTPATIENT)
Dept: ULTRASOUND IMAGING | Facility: CLINIC | Age: 70
End: 2020-06-30
Attending: OTOLARYNGOLOGY
Payer: MEDICARE

## 2020-06-30 DIAGNOSIS — R59.1 LYMPHADENOPATHY: ICD-10-CM

## 2020-06-30 DIAGNOSIS — R68.2 DRY MOUTH: ICD-10-CM

## 2020-06-30 DIAGNOSIS — C02.1 SQUAMOUS CELL CARCINOMA OF LATERAL TONGUE (H): ICD-10-CM

## 2020-06-30 DIAGNOSIS — C02.1 SQUAMOUS CELL CARCINOMA OF LATERAL TONGUE (H): Primary | ICD-10-CM

## 2020-06-30 RX ORDER — PILOCARPINE HYDROCHLORIDE 5 MG/1
5 TABLET, FILM COATED ORAL 3 TIMES DAILY
Qty: 90 TABLET | Refills: 0 | Status: SHIPPED | OUTPATIENT
Start: 2020-06-30 | End: 2020-07-28

## 2020-06-30 RX ORDER — PILOCARPINE HYDROCHLORIDE 5 MG/1
5 TABLET, FILM COATED ORAL 3 TIMES DAILY
Qty: 90 TABLET | Refills: 0 | Status: SHIPPED | OUTPATIENT
Start: 2020-06-30 | End: 2020-06-30

## 2020-06-30 ASSESSMENT — PAIN SCALES - GENERAL: PAINLEVEL: NO PAIN (0)

## 2020-06-30 NOTE — LETTER
6/30/2020       RE: Reese Oakley  [address] [address]     Dear Colleague,    Thank you for referring your patient, Reese Oakley, to the [facility] EAR NOSE AND THROAT at [facility]. Please see a copy of my visit note below.    HISTORY OF PRESENT ILLNESS:  Reese Oakley is 70 years of age.  He had a second cancer.  He had a tongue cancer that was noted and removed.  It had 7 mm depth of invasion.  He has a negative PET scan on this for the most part.  There were 5 mm lymph node that had some enhancement to them about 10 weeks out after his treatment.  He has no other new complaints at the present time today.  Eating, drinking, breathing and swallowing on him is quite fine.  No other masses or lesions are noted on him whatsoever that he notes in his oral cavity.  He is getting out and golfing about every day, at least three to four times a week.      PHYSICAL EXAMINATION:  The patient is alert and oriented x3 and pleasant.  Skin of the face, lips, and neck on him are quite normal at the present time.  There are some radiation changes.  Ear canals, tympanic membranes are normal.  Floor of mouth and base of tongue on both sides is clear.  There is no tenderness in his mouth and the mucosal surfaces are normal in appearance.  Neck exam shows no masses, adenopathy or thyromegaly.      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       PLAN:   I am monitoring him right now for a follow up with an ultrasound.  We will see what the lymph nodes in his neck show.   We will see how he is doing in about two months or so.         Again, thank you for allowing me to participate in the care of your patient.      Sincerely,    Jose Antonio Mckenna MD

## 2020-06-30 NOTE — PATIENT INSTRUCTIONS
1. You were seen in the ENT Clinic today by Dr. Mckenna.  If you have any questions or concerns after your appointment, please call   -  ENT Clinic: [phone]      2.   Please take pilocarpine as directed    3. Please return to clinic in 6-8 weeks    Tashia Jennings LPN  [facility] Otolaryngology  [phone]

## 2020-06-30 NOTE — PROGRESS NOTES
HISTORY OF PRESENT ILLNESS:  Reese Oakley is 70 years of age.  He had a second cancer.  He had a tongue cancer that was noted and removed.  It had 7 mm depth of invasion.  He has a negative PET scan on this for the most part.  There were 5 mm lymph node that had some enhancement to them about 10 weeks out after his treatment.  He has no other new complaints at the present time today.  Eating, drinking, breathing and swallowing on him is quite fine.  No other masses or lesions are noted on him whatsoever that he notes in his oral cavity.  He is getting out and golfing about every day, at least three to four times a week.      PHYSICAL EXAMINATION:  The patient is alert and oriented x3 and pleasant.  Skin of the face, lips, and neck on him are quite normal at the present time.  There are some radiation changes.  Ear canals, tympanic membranes are normal.  Floor of mouth and base of tongue on both sides is clear.  There is no tenderness in his mouth and the mucosal surfaces are normal in appearance.  Neck exam shows no masses, adenopathy or thyromegaly.      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       PLAN:   I am monitoring him right now for a follow up with an ultrasound.  We will see what the lymph nodes in his neck show.   We will see how he is doing in about two months or so.

## 2020-07-02 ENCOUNTER — TELEPHONE (OUTPATIENT)
Dept: OTOLARYNGOLOGY | Facility: CLINIC | Age: 70
End: 2020-07-02

## 2020-07-02 NOTE — TELEPHONE ENCOUNTER
Pt called with recent ultrasound results    IMPRESSION:  1.  No enlarged or highly suspicious cervical lymph nodes.   2.  Stable heterogeneous thyroid parenchyma, which may represent sequelae of previous thyroiditis.    Ultrasound looks good; no abnormality noted. I would like Dr. Mckenna to recommend further imaging and timeline for that imaging. Unfortunately he is out of town. This will be reviewed with him on Tuesday and pt will be contacted with further recommendations.     Direct line given for additional questions/concerns.     Natice Schwab, RN BSN

## 2020-07-07 ENCOUNTER — TELEPHONE (OUTPATIENT)
Dept: OTOLARYNGOLOGY | Facility: CLINIC | Age: 70
End: 2020-07-07

## 2020-07-07 NOTE — TELEPHONE ENCOUNTER
-Patient called to discuss results of U/S head and neck performed on 6-30-20.    -Dr. Mckenna reviewed these results and they are listed as follows:    IMPRESSION:  1.  No enlarged or highly suspicious cervical lymph nodes.   2.  Stable heterogeneous thyroid parenchyma, which may represent  sequelae of previous thyroiditis.     -Patient's next appointment is scheduled for 8-11-20 at 10:30 am.    -Patient voiced an understanding and will proceed as directed.    Nelli Lora RN  7/7/2020 10:08 AM

## 2020-07-23 DIAGNOSIS — C02.1 SQUAMOUS CELL CARCINOMA OF LATERAL TONGUE (H): ICD-10-CM

## 2020-07-28 RX ORDER — PILOCARPINE HYDROCHLORIDE 5 MG/1
5 TABLET, FILM COATED ORAL 3 TIMES DAILY
Qty: 90 TABLET | Refills: 3 | Status: SHIPPED | OUTPATIENT
Start: 2020-07-28 | End: 2020-08-11

## 2020-07-28 NOTE — TELEPHONE ENCOUNTER
pilocarpine (SALAGEN) 5 MG tablet      Last Written Prescription Date:  6/30/2020  Last Fill Quantity: 90,   # refills: 0  Last Office Visit : 6/30/2020  Future Office visit:  8/11/2020    Routing refill request to provider for review/approval because:  Medication not on the ENT refill protocol.

## 2020-08-11 ENCOUNTER — OFFICE VISIT (OUTPATIENT)
Dept: OTOLARYNGOLOGY | Facility: CLINIC | Age: 70
End: 2020-08-11
Payer: MEDICARE

## 2020-08-11 VITALS
OXYGEN SATURATION: 100 % | DIASTOLIC BLOOD PRESSURE: 76 MMHG | TEMPERATURE: 97.4 F | SYSTOLIC BLOOD PRESSURE: 136 MMHG | HEART RATE: 57 BPM | BODY MASS INDEX: 29.51 KG/M2 | HEIGHT: 67 IN | WEIGHT: 188 LBS

## 2020-08-11 DIAGNOSIS — C02.1 SQUAMOUS CELL CARCINOMA OF LATERAL TONGUE (H): ICD-10-CM

## 2020-08-11 RX ORDER — PILOCARPINE HYDROCHLORIDE 7.5 MG/1
7.5 TABLET, FILM COATED ORAL 2 TIMES DAILY
Qty: 90 TABLET | Refills: 3 | Status: SHIPPED | OUTPATIENT
Start: 2020-08-11 | End: 2021-01-01

## 2020-08-11 ASSESSMENT — PAIN SCALES - GENERAL: PAINLEVEL: NO PAIN (0)

## 2020-08-11 ASSESSMENT — MIFFLIN-ST. JEOR: SCORE: 1566.39

## 2020-08-11 NOTE — PATIENT INSTRUCTIONS
1. You were seen in the ENT Clinic today by Dr. Mckenna.  If you have any questions or concerns after your appointment, please call   -  ENT Clinic: [phone]      2.   Plan to return to clinic in 6 weeks    Tashia Jennings LPN  [facility] Otolaryngology  [phone]

## 2020-08-11 NOTE — NURSING NOTE
"Chief Complaint   Patient presents with     RECHECK     follow up      Blood pressure 136/76, pulse 57, temperature 97.4  F (36.3  C), height 1.702 m (5' 7\"), weight 85.3 kg (188 lb), SpO2 100 %.  lCayton Nicholson LPN      "

## 2020-08-11 NOTE — PROGRESS NOTES
HISTORY OF PRESENT ILLNESS:  Reese Oakley is 70 years of age.  He had a second cancer.  He had a tongue cancer that was noted and removed.  It had 7 mm depth of invasion.  He has a negative PET scan on this for the most part.  There were 5 mm lymph node that had some enhancement to them about 10 weeks out after his treatment.  He has no other new complaints at the present time today.  Eating, drinking, breathing and swallowing on him is quite fine.  No other masses or lesions are noted on him whatsoever that he notes in his oral cavity.  He is getting out and golfing about every day, at least three to four times a week.      PHYSICAL EXAMINATION:  The patient is alert and oriented x3 and pleasant.  Skin of the face, lips, and neck on him are quite normal at the present time.  There are some radiation changes.  Ear canals, tympanic membranes are normal.  Floor of mouth and base of tongue on both sides is clear.  There is no tenderness in his mouth and the mucosal surfaces are normal in appearance.  Neck exam shows no masses, adenopathy or thyromegaly. Less right oral scar.      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       PLAN:   I am monitoring him right now for a follow up with an ultrasound.  We will see what the lymph nodes in his neck show.   We will see how he is doing in about two months or so.     Jose Antonio Mckenna MD

## 2020-08-11 NOTE — LETTER
8/11/2020       RE: Reese Oakley  [address] [address]     Dear Colleague,    Thank you for referring your patient, Reese Oakley, to the [facility] EAR NOSE AND THROAT at [facility]. Please see a copy of my visit note below.    HISTORY OF PRESENT ILLNESS:  Reese Oakley is 70 years of age.  He had a second cancer.  He had a tongue cancer that was noted and removed.  It had 7 mm depth of invasion.  He has a negative PET scan on this for the most part.  There were 5 mm lymph node that had some enhancement to them about 10 weeks out after his treatment.  He has no other new complaints at the present time today.  Eating, drinking, breathing and swallowing on him is quite fine.  No other masses or lesions are noted on him whatsoever that he notes in his oral cavity.  He is getting out and golfing about every day, at least three to four times a week.      PHYSICAL EXAMINATION:  The patient is alert and oriented x3 and pleasant.  Skin of the face, lips, and neck on him are quite normal at the present time.  There are some radiation changes.  Ear canals, tympanic membranes are normal.  Floor of mouth and base of tongue on both sides is clear.  There is no tenderness in his mouth and the mucosal surfaces are normal in appearance.  Neck exam shows no masses, adenopathy or thyromegaly. Less right oral scar.      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       PLAN:   I am monitoring him right now for a follow up with an ultrasound.  We will see what the lymph nodes in his neck show.   We will see how he is doing in about two months or so.     Jose Antonio Mckenna MD

## 2020-09-29 ENCOUNTER — OFFICE VISIT (OUTPATIENT)
Dept: OTOLARYNGOLOGY | Facility: CLINIC | Age: 70
End: 2020-09-29
Payer: MEDICARE

## 2020-09-29 VITALS
HEART RATE: 55 BPM | WEIGHT: 180 LBS | OXYGEN SATURATION: 100 % | BODY MASS INDEX: 28.25 KG/M2 | TEMPERATURE: 96.9 F | HEIGHT: 67 IN

## 2020-09-29 DIAGNOSIS — C02.1 SQUAMOUS CELL CARCINOMA OF LATERAL TONGUE (H): Primary | ICD-10-CM

## 2020-09-29 ASSESSMENT — MIFFLIN-ST. JEOR: SCORE: 1530.1

## 2020-09-29 ASSESSMENT — PAIN SCALES - GENERAL: PAINLEVEL: NO PAIN (0)

## 2020-09-29 NOTE — PROGRESS NOTES
HISTORY OF PRESENT ILLNESS:  Reese Oakley is 70 years of age.  He had a second cancer.  He had a tongue cancer that was noted and removed.  It had 7 mm depth of invasion.  He has a negative PET scan on this for the most part.  There were 5 mm lymph node that had some enhancement to them about 10 weeks out after his treatment.  He has no other new complaints at the present time today.  Eating, drinking, breathing and swallowing on him is quite fine.  No other masses or lesions are noted on him whatsoever that he notes in his oral cavity.  He is getting out and golfing about every day, at least three to four times a week.      PHYSICAL EXAMINATION:  The patient is alert and oriented x3 and pleasant.  Skin of the face, lips, and neck on him are quite normal at the present time.  There are some radiation changes.  Ear canals, tympanic membranes are normal.  Floor of mouth and base of tongue on both sides is clear.  There is no tenderness in his mouth and the mucosal surfaces are normal in appearance.  Neck exam shows no masses, adenopathy or thyromegaly. Less right oral scar.      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       PLAN:    Doing well presently .   We will see how he is doing in about two months or so.     Jose Antonio Mckenna MD

## 2020-09-29 NOTE — LETTER
9/29/2020       RE: Reese Oakley  [address] [address]     Dear Colleague,    Thank you for referring your patient, Reese Oakley, to the [facility] EAR NOSE AND THROAT at [facility]. Please see a copy of my visit note below.    HISTORY OF PRESENT ILLNESS:  Reese Oakley is 70 years of age.  He had a second cancer.  He had a tongue cancer that was noted and removed.  It had 7 mm depth of invasion.  He has a negative PET scan on this for the most part.  There were 5 mm lymph node that had some enhancement to them about 10 weeks out after his treatment.  He has no other new complaints at the present time today.  Eating, drinking, breathing and swallowing on him is quite fine.  No other masses or lesions are noted on him whatsoever that he notes in his oral cavity.  He is getting out and golfing about every day, at least three to four times a week.      PHYSICAL EXAMINATION:  The patient is alert and oriented x3 and pleasant.  Skin of the face, lips, and neck on him are quite normal at the present time.  There are some radiation changes.  Ear canals, tympanic membranes are normal.  Floor of mouth and base of tongue on both sides is clear.  There is no tenderness in his mouth and the mucosal surfaces are normal in appearance.  Neck exam shows no masses, adenopathy or thyromegaly. Less right oral scar.      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       PLAN:    Doing well presently .   We will see how he is doing in about two months or so.     Jose Antonio Mckenna MD

## 2020-09-29 NOTE — PATIENT INSTRUCTIONS
1. You were seen in the ENT Clinic today by Dr. Mckenna.  If you have any questions or concerns after your appointment, please call   -  ENT Clinic: [phone]      2.   Plan to return to clinic 6-7weeks    Tashia Jennings LPN  [facility]- Otolaryngology  [phone]    Or    Louise Bedoya RN  [facility]- Otolaryngology   [phone]

## 2020-11-02 NOTE — TELEPHONE ENCOUNTER
Patients wife called writer in regards to change in condition. She states that patient has new onset lump on chin to right side of neck. He states he does not have pain to this area. He also states that he noticed this about 3 days ago. He denies redness and tenderness to area.       Writer scheduled patient for 11/3/2020 at 0910 with Dr. Mckenna.     Patient and his wife agreed to this time.    Tashia Jennings LPN

## 2020-11-03 NOTE — PROGRESS NOTES
HISTORY OF PRESENT ILLNESS:  Reese Oakley is 70 years of age.  He had a second cancer.  He had a tongue cancer that was noted and removed.  It had 7 mm depth of invasion.  He has a negative PET scan on this for the most part.  There were 5 mm lymph node that had some enhancement to them about 10 weeks out after his treatment.  He has no other new complaints at the present time today.  Eating, drinking, breathing and swallowing on him is quite fine.  He note fullnes on the right side and endorses submandibular gland sort of infections.    PHYSICAL EXAMINATION:  The patient is alert and oriented x3 and pleasant.  Skin of the face, lips, and neck on him are quite normal at the present time.  There are some radiation changes.  Ear canals, tympanic membranes are normal.  Floor of mouth and base of tongue on both sides is clear.  There is no tenderness in his mouth and the mucosal surfaces are normal in appearance.  Neck exam shows  Right fullness at submax gland area. , adenopathy or thyromegaly. Less right oral scar.      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       PLAN:     kefles and follow up imaging in 3 weeks.     MD Jose Antonio Lopez MD

## 2020-11-03 NOTE — TELEPHONE ENCOUNTER
Writer called patient in regards to scheduling PET/CT. Both patient and wife agreed with times for 11/24/2020 at 0730 and in person visit with Dr. Mckenna.      Tashia Jeninngs LPN

## 2020-11-03 NOTE — LETTER
11/3/2020       RE: Reese Oakley  [address] [facility] [address]     Dear Colleague,    Thank you for referring your patient, Reese Oakley, to the [facility] EAR NOSE AND THROAT CLINIC [place] at [facility]. Please see a copy of my visit note below.    HISTORY OF PRESENT ILLNESS:  Reese Oakley is 70 years of age.  He had a second cancer.  He had a tongue cancer that was noted and removed.  It had 7 mm depth of invasion.  He has a negative PET scan on this for the most part.  There were 5 mm lymph node that had some enhancement to them about 10 weeks out after his treatment.  He has no other new complaints at the present time today.  Eating, drinking, breathing and swallowing on him is quite fine.  He note fullnes on the right side and endorses submandibular gland sort of infections.    PHYSICAL EXAMINATION:  The patient is alert and oriented x3 and pleasant.  Skin of the face, lips, and neck on him are quite normal at the present time.  There are some radiation changes.  Ear canals, tympanic membranes are normal.  Floor of mouth and base of tongue on both sides is clear.  There is no tenderness in his mouth and the mucosal surfaces are normal in appearance.  Neck exam shows  Right fullness at submax gland area. , adenopathy or thyromegaly. Less right oral scar.      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       PLAN:     kefles and follow up imaging in 3 weeks.     Jose Antonio Mckenna MD

## 2020-11-03 NOTE — NURSING NOTE
"Chief Complaint   Patient presents with     RECHECK     lemp to right side of chin       Pulse 57, temperature 96.9  F (36.1  C), temperature source Temporal, height 1.702 m (5' 7\"), weight 83.9 kg (185 lb), SpO2 99 %.    Mary Pascal, EMT    "
"Chief Complaint   Patient presents with     RECHECK     lump to right side of chin       Pulse 57, temperature 96.9  F (36.1  C), temperature source Temporal, height 1.702 m (5' 7\"), weight 83.9 kg (185 lb), SpO2 99 %.    Mary Pascal, EMT    "
English

## 2020-11-03 NOTE — PATIENT INSTRUCTIONS
1. You were seen in the ENT Clinic today by Dr. Mcknena.  If you have any questions or concerns after your appointment, please call   -  ENT Clinic: [phone]      2.   Plan to return to clinic in 3 weeks. We will call you to schedule your PET scan. Plan to have PET and follow up appointment same day.    Tashia Jennings LPN  [facility]- Otolaryngology  [phone]    Or    Louise Bedoya RN  [facility]- Otolaryngology   [phone]

## 2020-11-24 NOTE — PROGRESS NOTES
HISTORY OF PRESENT ILLNESS:  Reese Oakley is 70 years of age.  He is here for followup today.  He has a recent PET scan that shows some PET positivity in the right zone I lymph node in his neck.  He is a nonsmoker and nondrinker.  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  He had negative PET scan afterwards.  We did not know exactly where he might have metastatic sites if he were to get it because of his previous radiation.  Therefore, we will follow him with watchful waiting and the PET scan here today shows some fullness in his neck in addition to fullness in the neck it shows something else to be potentially a carcinomatous metastatic lymph node on the right side.      PHYSICAL EXAMINATION:  The patient is alert, oriented x3 and pleasant.  Skin of the face, lips, and neck on him is otherwise without significant change.  Oral cavity and oropharynx is clear.  Floor of mouth and base of tongue is soft and the right side of the neck. When palpated bimanually, it does reveal this mass on the right side that correlates to a PET positivity on the scan.  There are no other abnormalities otherwise noted on him.      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        PLAN:  We plan to perform a right-sided neck dissection on him.  We will do that over the next few weeks or so.  He will have to stay in the hospital for a couple of days.  He understands the benefits, risks and agrees.

## 2020-11-24 NOTE — PATIENT INSTRUCTIONS
1. You were seen in the ENT Clinic today by Dr. Mckenna.  If you have any questions or concerns after your appointment, please call   -  ENT Clinic: [phone]      2.   Plan for surgery    Tashia Jennings LPN  [facility]- Otolaryngology  [phone]    Or    Louise Bedoya RN  [facility]- Otolaryngology   [phone]

## 2020-11-24 NOTE — LETTER
11/24/2020       RE: Reese Oakley  [address] [facility] [address] [facility] [address]     Dear Colleague,    Thank you for referring your patient, Reese Oakley, to the [facility] EAR NOSE AND THROAT CLINIC [place] at [facility]. Please see a copy of my visit note below.    HISTORY OF PRESENT ILLNESS:  Reese Oakley is 70 years of age.  He is here for followup today.  He has a recent PET scan that shows some PET positivity in the right zone I lymph node in his neck.  He is a nonsmoker and nondrinker.  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  He had negative PET scan afterwards.  We did not know exactly where he might have metastatic sites if he were to get it because of his previous radiation.  Therefore, we will follow him with watchful waiting and the PET scan here today shows some fullness in his neck in addition to fullness in the neck it shows something else to be potentially a carcinomatous metastatic lymph node on the right side.      PHYSICAL EXAMINATION:  The patient is alert, oriented x3 and pleasant.  Skin of the face, lips, and neck on him is otherwise without significant change.  Oral cavity and oropharynx is clear.  Floor of mouth and base of tongue is soft and the right side of the neck. When palpated bimanually, it does reveal this mass on the right side that correlates to a PET positivity on the scan.  There are no other abnormalities otherwise noted on him.      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        PLAN:  We plan to perform a right-sided neck dissection on him.  We will do that over the next few weeks or so.  He will have to stay in the hospital for a couple of days.  He understands the benefits, risks and agrees.       Sincerely,    Jose Antonio Mckenna MD

## 2020-11-30 NOTE — TELEPHONE ENCOUNTER
Patients wife called writer in regards to scheduling surgery. Writer stated that orders need to be signed by Dr. Mckenna in order to proceed with scheduling. Patients wife also wanting to know results of recent PET CT. Writer stated that Dr. Mckenna will review results and they would get a call. Writer reassured them that Dr. Mckenna will be in clinic tomorrow and he would be able to call. They where agreeable to plan.      Tashia Jennings LPN

## 2020-12-07 NOTE — TELEPHONE ENCOUNTER
Called patient to schedule surgery with Dr. Mckenna    Date of Surgery: 12/9/2020    Location: [facility] OR     [facility] or PCP:  HOANG Zarate. Meghan will make appointment. Fax number provided to patients wife for PAN    Post op: 1 week     Imaging: No     Surgery packet given: faxed hotel list to home fax number     Sent to Prior Authorization Team: 12/4/2020    Additional comments:   Discussed visitor restrictions, scheduled patient for covid-19 testing.   No further questions or concerns.       Yamilka Bravo   Perioperative Coordinator   Department of Otolaryngology  P: [phone]

## 2020-12-09 NOTE — BRIEF OP NOTE
[facility]     Brief Operative Note    Pre-operative diagnosis: Squamous cell carcinoma of lateral tongue (H) [C02.1]  Squamous cell carcinoma of neck [C44.42]  Post-operative diagnosis Same as pre-operative diagnosis    Procedure: Procedure(s):  Modified neck dissection  Surgeon: Surgeon(s) and Role:     * Jose Antonio Mckenna MD - Primary     * Jennifer Freitas MD - Resident - Assisting  Anesthesia: General   Estimated blood loss: 25 mL  Drains:  JOEL drains x2  Specimens:   ID Type Source Tests Collected by Time Destination   A : Right level 1A, 1B, 2 Tissue Other SURGICAL PATHOLOGY EXAM Jose Antonio Mckenna MD 12/9/2020 11:22 AM    B : Level 1A Tissue Other SURGICAL PATHOLOGY EXAM Jose Antonio Mckenna MD 12/9/2020 11:32 AM    C : 2A and 3  Tissue Other SURGICAL PATHOLOGY EXAM Jose Antonio Mckenna MD 12/9/2020 11:57 AM    D : Right level 3 Tissue Other SURGICAL PATHOLOGY EXAM Jose Antonio Mckenna MD 12/9/2020 11:59 AM    E : Right level 2A Tissue Other SURGICAL PATHOLOGY EXAM Jose Antonio Mckenna MD 12/9/2020 12:00 PM      Findings: Large right level 1B node.  Complications: None.  Implants: None.

## 2020-12-09 NOTE — ANESTHESIA PROCEDURE NOTES
Airway   Date/Time: 12/9/2020 7:52 AM   Patient location during procedure: OR    Staff -   Anesthesiologist:  Luba Pendleton MD  CRNA: Mckayla Rodriguez APRN CRNA  Performed By: CRNA    Consent for Airway   Urgency: elective    Indications and Patient Condition  Indications for airway management: chuck-procedural  Induction type:intravenousMask difficulty assessment: 1 - vent by mask    Final Airway Details  Final airway type: endotracheal airway  Successful airway:ETT - single  Endotracheal Airway Details   ETT size (mm): 7.5  Cuffed: yes  Successful intubation technique: video laryngoscopy  Grade View of Cords: 1  Adjucts: stylet  Measured from: lips  Secured at (cm): 23  Secured with: pink tape  Bite block used: None    Post intubation assessment   Placement verified by: capnometry, equal breath sounds and chest rise   Number of attempts at approach: 1  Secured with:pink tape  Ease of procedure: easy  Dentition: Intact and Unchanged

## 2020-12-09 NOTE — ANESTHESIA CARE TRANSFER NOTE
Patient: Reese Oakley    Procedure(s):  Modified neck dissection    Diagnosis: Squamous cell carcinoma of lateral tongue (H) [C02.1]  Squamous cell carcinoma of neck [C44.42]  Diagnosis Additional Information: No value filed.    Anesthesia Type:   General     Note:  Airway :Face Mask  Patient transferred to:PACU  Comments:   -on 6L O2 via FM, Pt Spont. breathing, awake & alert, monitors placed, VSS, RN at bedside.       Vitals: (Last set prior to Anesthesia Care Transfer)    CRNA VITALS  12/9/2020 1216 - 12/9/2020 1255      12/9/2020             Resp Rate (observed):  (!) 0                Electronically Signed By: KARINA Hurd CRNA  December 9, 2020  12:55 PM

## 2020-12-09 NOTE — PROVIDER NOTIFICATION
Temp:  [98.4  F (36.9  C)-98.7  F (37.1  C)] 98.4  F (36.9  C)  Pulse:  [67-71] 67  Resp:  [15-16] 15  BP: (131-171)/(65-84) 131/68  SpO2:  [97 %-99 %] 98 %    Time of notification: 1335  Provider notified: Dr. Penldeton with anesthesia  Patient status:   Orders received: 10u regular insulin, and recheck BG in 30min.    Will continue to monitor.      Recheck at BG at 1415 was 269, Dr. Keerthi carmen with transfer to 6A. There is a sliding scale insulin order in place for use on 6A.

## 2020-12-09 NOTE — ANESTHESIA POSTPROCEDURE EVALUATION
Anesthesia POST Procedure Evaluation    Patient: Reese Oakley   MRN:     [id number] Gender:   male   Age:    70 year old :      1950        Preoperative Diagnosis: Squamous cell carcinoma of lateral tongue (H) [C02.1]  Squamous cell carcinoma of neck [C44.42]   Procedure(s):  Modified neck dissection   Postop Comments: No value filed.     Anesthesia Type: General       Disposition: Admission   Postop Pain Control: Uneventful            Sign Out: Well controlled pain   PONV: No   Neuro/Psych: Uneventful            Sign Out: Acceptable/Baseline neuro status   Airway/Respiratory: Uneventful            Sign Out: Acceptable/Baseline resp. status   CV/Hemodynamics: Uneventful            Sign Out: Acceptable CV status   Other NRE: NONE   DID A NON-ROUTINE EVENT OCCUR? No         Last Anesthesia Record Vitals:  CRNA VITALS  2020 1216 - 2020 1308      2020             Resp Rate (observed):  (!) 0          Last PACU Vitals:  Vitals Value Taken Time   /65 20 1300   Temp 37  C (98.6  F) 20 1307   Pulse 80 20 1307   Resp     SpO2 98 % 20 1307   Temp src     NIBP     Pulse     SpO2     Resp     Temp     Ht Rate     Temp 2     Vitals shown include unvalidated device data.      Electronically Signed By: Luba Pendleton MD, 2020, 1:08 PM

## 2020-12-09 NOTE — ANESTHESIA PREPROCEDURE EVALUATION
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]   Procedure(s):  Wide Local Excision of Right Tongue Lesion     LABS:  CBC:   Lab Results   Component Value Date    HGB 12.1 (L) 10/06/2014     BMP:   Lab Results   Component Value Date    POTASSIUM 3.8 2020    POTASSIUM 4.4 10/06/2014    CR 1.18 10/06/2014     (H) 10/06/2014     COAGS: No results found for: PTT, INR, FIBR  POC:   Lab Results   Component Value Date     (H) 2020     OTHER: No results found for: PH, LACT, A1C, ELEUTERIO, PHOS, MAG, ALBUMIN, PROTTOTAL, ALT, AST, GGT, ALKPHOS, BILITOTAL, BILIDIRECT, LIPASE, AMYLASE, ZOEY, TSH, T4, T3, CRP, SED     Preop Vitals    BP Readings from Last 3 Encounters:   20 (!) 171/84   20 136/76   20 136/63    Pulse Readings from Last 3 Encounters:   20 66   20 57   20 55      Resp Readings from Last 3 Encounters:   20 19   20 16   10/06/14 20    SpO2 Readings from Last 3 Encounters:   20 99%   20 99%   20 99%      Temp Readings from Last 1 Encounters:   20 37.1  C (98.7  F) (Oral)    Ht Readings from Last 1 Encounters:   20 1.702 m (5' 7\")      Wt Readings from Last 1 Encounters:   20 86.6 kg (191 lb)    Estimated body mass index is 29.91 kg/m  as calculated from the following:    Height as of 20: 1.702 m (5' 7\").    Weight as of 20: 86.6 kg (191 lb).     LDA:  Peripheral IV 20 Left (Active)   Number of days: 204        Past Medical History:   Diagnosis Date     Adenocarcinoma (H)     large instestine      Cancer of appendix (H)      Diabetes (H)      Gastro-oesophageal reflux disease      History of radiation therapy      Hoarseness      Hypertension       Past Surgical History:   Procedure Laterality Date     APPENDECTOMY       " COLECTOMY      Right     EXCISE LESION INTRAORAL Right 2/19/2020    Procedure: Wide Local Excision of Right Tongue Lesion;  Surgeon: Jose Antonio Mckenna MD;  Location: [facility] OR     LASER CO2 LARYNGOSCOPY N/A 10/6/2014    Procedure: LASER CO2 LARYNGOSCOPY;  Surgeon: Jose Antonio Mckenna MD;  Location: [facility] OR      Allergies   Allergen Reactions     Percocet [Oxycodone-Acetaminophen] Nausea and Vomiting        Anesthesia Evaluation     . Pt has had prior anesthetic.            ROS/MED HX    ENT/Pulmonary: Comment: Vocal cord mass, hoarseness      Neurologic:  - neg neurologic ROS     Cardiovascular:     (+) Dyslipidemia, hypertension----. : . . . :. .       METS/Exercise Tolerance:     Hematologic:  - neg hematologic  ROS       Musculoskeletal:  - neg musculoskeletal ROS       GI/Hepatic:     (+) GERD       Renal/Genitourinary:  - ROS Renal section negative       Endo:     (+) type II DM .   (-) Type I DM   Psychiatric:  - neg psychiatric ROS       Infectious Disease:         Malignancy:   (+) Malignancy History of GI          Other:                         PHYSICAL EXAM:   Mental Status/Neuro: A/A/O   Airway: Facies: Feasible  Mallampati: II  Mouth/Opening: Full  TM distance: > 6 cm  Neck ROM: Full   Respiratory: Auscultation: CTAB     Resp. Rate: Normal     Resp. Effort: Normal      CV: Rhythm: Regular  Rate: Age appropriate  Heart: Normal Sounds  Edema: None   Comments:                      Assessment:   ASA SCORE: 3    H&P: History and physical reviewed and following examination; no interval change.   Smoking Status:  Non-Smoker/Unknown   NPO Status: NPO Appropriate     Plan:   Anes. Type:  General   Pre-Medication: None   Induction:  IV (Standard)   Airway: ETT; Oral; CMAC/VL   Access/Monitoring: PIV; 2nd PIV; A-Line   Maintenance: Balanced     Postop Plan:   Postop Pain: Opioids  Postop Sedation/Airway: Not planned  Disposition: Inpatient/Admit     PONV Management:   Adult Risk Factors:, Non-Smoker, Postop Opioids    Prevention: Ondansetron, Dexamethasone     CONSENT: Direct conversation   Plan and risks discussed with: Patient   Blood Products: Consented (ALL Blood Products)                       Luba Pendleton MD

## 2020-12-10 NOTE — PROGRESS NOTES
"   12/10/20 0859   Quick Adds   Type of Visit Initial Occupational Therapy Evaluation       Present no   Living Environment   People in home spouse   Current Living Arrangements house   Home Accessibility stairs to enter home   Number of Stairs, Main Entrance 2   Stair Railings, Main Entrance railings on both sides of stairs   Transportation Anticipated family or friend will provide;car, drives self   Living Environment Comments Patient lives with his wife in a house. 2 stairs to enter with all needs met on main floor.    Self-Care   Usual Activity Tolerance excellent   Current Activity Tolerance good   Regular Exercise Yes   Activity/Exercise Type other (see comments)  (Walking, Golfing)   Equipment Currently Used at Home none   Activity/Exercise/Self-Care Comment Patient tries to stay active at baseline. Enjoys walking/golfing and walks the golf course (4 miles). Used to be a marathon runner.    Disability/Function   Hearing Difficulty or Deaf no   Wear Glasses or Blind yes   Vision Management Reading glasses   Concentrating, Remembering or Making Decisions Difficulty no   Difficulty Communicating no   Walking or Climbing Stairs Difficulty no   Dressing/Bathing Difficulty no   Toileting issues no   Doing Errands Independently Difficulty (such as shopping) no   Fall history within last six months no   Change in Functional Status Since Onset of Current Illness/Injury no   General Information   Onset of Illness/Injury or Date of Surgery 12/09/20   Referring Physician Jennifer Freitas MD   Patient/Family Therapy Goal Statement (OT) To return home.    Additional Occupational Profile Info/Pertinent History of Current Problem Per chart: \"POD #0 s/p R neck dissection for removal of squamous cell carcinoma.\"   Performance Patterns (Routines, Roles, Habits) Patient enjoys staying active and golfing. Retired from the [employer] and [employer].    Existing Precautions/Restrictions other (see " comments)  (Post-op neck dissection)   Left Upper Extremity (Weight-bearing Status) other (see comments)  (Post-op lifting precautions)   Right Upper Extremity (Weight-bearing Status) other (see comments)  (Post-op lifting precautions)   Left Lower Extremity (Weight-bearing Status) full weight-bearing (FWB)   Right Lower Extremity (Weight-bearing Status) full weight-bearing (FWB)   General Observations and Info Activity orders: Ambulate with assist.    Cognitive Status Examination   Orientation Status orientation to person, place and time   Cognitive Status Comments Patient's cognition appears intact.    Visual Perception   Impact of Vision Impairment on Function (Vision) Reading glasses. Denies acute changes.    Sensory   Sensory Comments Denies numbness/tingling.    Pain Assessment   Patient Currently in Pain Yes, see Vital Sign flowsheet  (Incisional pain)   Integumentary/Edema   Integumentary/Edema Comments Neck incision with 2 JOEL drains   Range of Motion Comprehensive   Comment, General Range of Motion UE WFL. Neck rotation limited to the L d/t pain at incision site.    Strength Comprehensive (MMT)   Comment, General Manual Muscle Testing (MMT) Assessment Did not formally test d/t post-op precautions.    Transfers   Transfer Comments Independent   Balance   Balance Comments Patient steady on feet with balance challenges. Independent with ambulation.    Instrumental Activities of Daily Living (IADL)   IADL Comments Patient previously independent with IADL. Patient's wife will be able to assist post-op.   Clinical Impression   Criteria for Skilled Therapeutic Interventions Met (OT) yes   OT Diagnosis New post-op neck dissection/precautions impacting ADL/IADL ease.    OT Problem List-Impairments impacting ADL problems related to;range of motion (ROM);post-surgical precautions   Assessment of Occupational Performance 1-3 Performance Deficits   Identified Performance Deficits ROM for driving, UB dressing, Home  Management/Leisure   Planned Therapy Interventions (OT) ROM;ADL retraining;progressive activity/exercise;home program guidelines   Clinical Decision Making Complexity (OT) low complexity   Therapy Frequency (OT) 1x eval and treat   Predicted Duration of Therapy 1 day   Anticipated Equipment Needs Upon Discharge (OT) other (see comments)  (None)   Risks and Benefits of Treatment have been explained. Yes   Patient, Family & other staff in agreement with plan of care Yes   Comment-Clinical Impression Patient would benefit from 1x OT eval and treat to educate on post-op neck dissection precautions and adaptations for return to daily activities, and instruct in neck/shoulder HEP to reduce scar tissue formation/risk of impaired ROM for return to ADL/IADL.    OT Discharge Planning    OT Discharge Recommendation (DC Rec) Home with assist   OT Rationale for DC Rec Patient independent with basic ADL tasks and mobility. Will have his wife to assist with heavy home management tasks/driving given decreased neck ROM/lifting restrictions. Daily neck/shoulder HEP to promote ROM for return to ADL/IADL and reduce risk of scar tissue formation.    OT Brief overview of current status  Ambulating independently; steady on feet. Issued neck/shoulder ROM HEP.   Total Evaluation Time (Minutes)   Total Evaluation Time (Minutes) 5

## 2020-12-10 NOTE — PROGRESS NOTES
Arrived from:  PACU around 1500  Belongings/meds:  Cell phone, clothing, shaving supplies, personal items, shoes, wallet, jacket  2 RN Skin Assessment Completed by:  RAISA Merchant and RAISA Milton  Non-intact findings documented (yes/no/NA): R neck incision and JOEL x2, otherwise intact, preventative mepilex on coccyx

## 2020-12-10 NOTE — PROGRESS NOTES
Status: Neck dissection for removal of squamous cell carcinoma, pt on 6A POD #1  Vitals: VSS on RA  Neuros: A&O x 4. Right droop upon smiling (not new noticed last night). Denies N/T. Strengths 5/5.  IV: PIV SL x2  Resp/trach: LS clear anterior and posterior. Denies SOB.   Diet: Regular, experienced some nausea after morning medications. Emesis x2 between 0800 and 0900. Administered Zofran. Pt reports nausea subsiding.   Bowel status: Last bowel movement 12/8 before surgery. Stool softeners given. Denies discomfort.  : Voids spontaneously w/o difficulty.  Skin: R. Neck incision LILO with staples. JOEL x2 to R neck with bulb suction.   Pain: Denies pain, taking scheduled tylenol as ordered  Activity: SBA w/ gait belt, steady  Plan: Continue to monitor and follow plan of care.    Eleanor Mcconnell  Nursing Student    I agree with the student's assessment and charting.     Aida Mcdermott, RN

## 2020-12-10 NOTE — PROGRESS NOTES
"Otolaryngology Progress Note  12/10/20      SUBJECTIVE: No acute events overnight. Initially had brief intermittent desaturations when sleeping which improved. Now weaned to room air. Afebrile and vitally stable. Patient reports pain is well controlled. Had one emesis yesterday after walking, denies any nausea since.     OBJECTIVE:   /57 (BP Location: Left arm)   Pulse 63   Temp 99.3  F (37.4  C) (Oral)   Resp 17   Ht 1.702 m (5' 7.01\")   Wt 87.9 kg (193 lb 12.6 oz)   SpO2 95%   BMI 30.34 kg/m                   Gen: No acute distress, resting comfortably in bed.                 HEENT: Right neck hemiapron incision is clean/dry/intact, closed with annabelle. JOEL drains x2 in place with appropriate serosanguinous output. No fluctuance or induration. Right lower lip weakness in marginal mandibular nerve distribution.Tongue fully mobile.                 Resp: Non-labored breathing on room air, no stridor or stertor.     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     Neuro:  - Pain control: scheduled Tylenol, PRN Tylenol, PRN oral Dilaudid  - PRN Atarax     HEENT:  - Clean incisions with 0.9% sodium chloride and apply bacitracin Q8H x 24h, then transition to Aquaphor  - Monitor and record JOEL drain output Qshift, if output is zero, please chart \"0\". Please strip JOEL drains q3-4hours     Respiratory:  - Supplemental O2 as needed to keep SpO2 >92%     CV/Heme:  - Hemodynamically stable  - PTA " lisinopril, simvastatin     FEN/GI:  - Regular diet  - Bowel regimen: BID senna-docusate  - PTA omeprazole, pilocarpine  - PRA Zofran, compazine     :  - Voiding spontaneously     Endo:  - Hx T2DM  - Medium dose sliding scale insulin, will add insulin glargine 7 units QAM     ID:  - No antibiotics indicated, periop Ancef completed     PPx:  - SCDs  - Lovenox to start today     Consults:  - PT/OT     Dispo: Pending adequate PO intake, removal of drains.     -- Patient and above plan discussed with Dr. Mckenna.     Jennifer Freitas MD PGY-3  Otolaryngology-Head & Neck Surgery  Please contact ENT by dialing * [phone] and entering job code [phone].

## 2020-12-11 NOTE — CONSULTS
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 Mckenna.    Information was obtained from review of medical records and interview with patient.   Mr. Oakley reports being dx with type 2 diabetes 1989. Family hisotry of diabetes with mother and brother. [facility]. PTA medications as listed below. He tests blood sugars twice daily, fasting and will alternate between before meals and at bedtime. Fasting blood sugars 130's, before meals 140's to 150's  and before bed < 180.  On (12/8) took morning lantus 7 units, metformin 500 mg and morning glipizide. Was advised to stop evening metformin and glipizide.  On day of surgery took lantus 3 units.    On presentation, glucose 224. Was given dexamethasone 10 mg during surgery, glucose continues to be in the 200's to 300's on sliding scale insulin.  On (12/10) PTA lantus of 7 units was resumed, am glucose continued to be > 300.   Glucose of 347 this am was tested after eating, prandial had not been ordered in time to receive.  glucose continue to be > 350    Currently denies fever, chills, chest pain, shortness of breath, abdominal pain, nausea and or vomiting, bowel or bladder issues. On a regular diet        Recent Labs   Lab 12/11/20  0918 12/10/20  2156 12/10/20  1706 12/10/20  1204 12/10/20  0638 12/10/20  0215   * 369* 310* 318* 302* 304*         Diabetes Type: type 2  Diabetes Duration: dx 1989  Usual Diabetes Regimen:   Metformin 500 mg bid  lantus 7 units am  Glipizide 10 mg am and 12 mg " pm    Ability to [place] Prescribed Regimen: yes  Diabetes Control:   Lab Results   Component Value Date    A1C 7.9 12/09/2020     Diabetes Complications: patients denies, per chart review polyneuropathy    Able to Detect Hypoglycemia: has not experienced in a long time, but  Has had a glcuose of 47 and was unaware  Usual Diabetes Care Provider: [facility] for [facility]  Factors Impacting Glucose Control: surgery, sterodis      Review of Systems  10 point ROS completed with pertinent positives and negatives noted in the HPI    Past medical, family and social histories are reviewed and updated.    Past Medical History  Past Medical History:   Diagnosis Date     Adenocarcinoma (H)     large instestine      Cancer of appendix (H)      Diabetes (H)      Gastro-oesophageal reflux disease      History of radiation therapy      Hoarseness      Hypertension        Family History  Family History   Problem Relation Age of Onset     Heart Disease Father      Diabetes Mother      Diabetes Brother        Social History  Social History     Socioeconomic History     Marital status:      Spouse name: None     Number of children: None     Years of education: None     Highest education level: None   Occupational History     None   Social Needs     Financial resource strain: None     Food insecurity     Worry: None     Inability: None     Transportation needs     Medical: None     Non-medical: None   Tobacco Use     Smoking status: Never Smoker     Smokeless tobacco: Never Used   Substance and Sexual Activity     Alcohol use: Not Currently     Alcohol/week: 0.0 standard drinks     Comment: none for 20 years     Drug use: No     Sexual activity: Never   Lifestyle     Physical activity     Days per week: None     Minutes per session: None     Stress: None   Relationships     Social connections     Talks on phone: None     Gets together: None     Attends Zoroastrianism service: None     Active member of club or organization: None      "Attends meetings of clubs or organizations: None     Relationship status: None     Intimate partner violence     Fear of current or ex partner: None     Emotionally abused: None     Physically abused: None     Forced sexual activity: None   Other Topics Concern     None   Social History Narrative     None         Physical Exam  /60 (BP Location: Left arm)   Pulse 57   Temp 97.6  F (36.4  C) (Oral)   Resp 18   Ht 1.702 m (5' 7.01\")   Wt 84.1 kg (185 lb 4.8 oz)   SpO2 95%   BMI 29.02 kg/m      General:  pleasant  resting in bed, in no distress.   HEENT: NC/AT, PER and anicteric, non-injected, oral mucous membranes moist. 2 drains on right side of neck  Lungs:  Non-labored  ABD: soft  Skin: warm and dry   MSK:  moving all extremities, has restless legs  Mental status:  alert, oriented x3, communicating clearly  Psych:  calm, even mood    Laboratory  Recent Labs   Lab Test 12/09/20  0720 02/19/20  0710 10/06/14  0942   POTASSIUM 4.2 3.8 4.4   GLC  --   --  165*   CR 1.29*  --  1.18     CBC RESULTS:   Recent Labs   Lab Test 12/10/20  1152 12/09/20  0720   HGB  --  13.5     --        Liver Function Studies - No results for input(s): PROTTOTAL, ALBUMIN, BILITOTAL, ALKPHOS, AST, ALT, BILIDIRECT in the last 48157 hours.    Active Medications  Current Facility-Administered Medications   Medication     [START ON 12/12/2020] acetaminophen (TYLENOL) tablet 650 mg     acetaminophen (TYLENOL) tablet 975 mg     bisacodyl (DULCOLAX) Suppository 10 mg     glucose gel 15-30 g    Or     dextrose 50 % injection 25-50 mL    Or     glucagon injection 1 mg     enoxaparin ANTICOAGULANT (LOVENOX) injection 40 mg     HYDROmorphone (DILAUDID) tablet 2 mg     hydrOXYzine (ATARAX) tablet 10 mg     insulin aspart (NovoLOG) injection (RAPID ACTING)     insulin aspart (NovoLOG) injection (RAPID ACTING)     insulin glargine (LANTUS PEN) injection 7 Units     lidocaine (LMX4) cream     lidocaine 1 % 0.1-1 mL     lisinopril " "(ZESTRIL) tablet 10 mg     metoclopramide (REGLAN) injection 5 mg     mineral oil-hydrophilic petrolatum (AQUAPHOR)     naloxone (NARCAN) injection 0.2 mg    Or     naloxone (NARCAN) injection 0.4 mg    Or     naloxone (NARCAN) injection 0.2 mg    Or     naloxone (NARCAN) injection 0.4 mg     omeprazole (priLOSEC) CR capsule 10 mg     ondansetron (ZOFRAN-ODT) ODT tab 4 mg    Or     ondansetron (ZOFRAN) injection 4 mg     pilocarpine (SALAGEN) tablet 7.5 mg     polyethylene glycol (MIRALAX) Packet 17 g     prochlorperazine (COMPAZINE) injection 5 mg    Or     prochlorperazine (COMPAZINE) tablet 5 mg     senna-docusate (SENOKOT-S/PERICOLACE) 8.6-50 MG per tablet 1 tablet    Or     senna-docusate (SENOKOT-S/PERICOLACE) 8.6-50 MG per tablet 2 tablet     simvastatin (ZOCOR) tablet 40 mg     sodium chloride (PF) 0.9% PF flush 3 mL     sodium chloride (PF) 0.9% PF flush 3 mL     Current Outpatient Medications   Medication Sig Dispense Refill     HYDROmorphone (DILAUDID) 2 MG tablet Take 1 tablet (2 mg) by mouth every 4 hours as needed for moderate to severe pain 30 tablet 0       Current Diet  Orders Placed This Encounter      Regular Diet Adult        Assessment:  Reese \"Kenrick\" Adin is a 70 year old male with history of  Type 2 diabetes, hypertension, CKD, hyperlipidemia, history of 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 Mckenna.    Type 2 diabetic: PTA on lantus 7 units am, metformin 500 mg twice daily, glipizide bid  -blood sugars are in the 300's  And is not coming down.  -most likely glucose toxic at this time, therefore discussed with Kinjal RIVERS from ENT, will start IV insulin to bring down his blood sugars   - will need to be tranitioned off IV insulin in the morning ( discharging on Saturday)  - asked to recheck basic metabolic to see GFR  -[facility] " and [facility]  - dexamethasone 10 mg from 12/9, should be wearing off  Plan  - IV insulin ordered Stat for glucose of 373  - increase lantus to 20 units daily am, discontinued  -add meal insulin 1 unit per 10 grams of CHO  -novolog medium intensity sliding scale, changed to high correciton ( discontinued once IV insulin started)  - hypoglycemia protocol  -will continue to follow      To contact Endocrine Diabetes service:   From 8AM-4PM: page inpatient diabetes provider that is following the patient  For questions or updates from 4PM-8AM: page the diabetes job code for on call fellow: [phone]    I spent a total of 80 minutes bedside and on the inpatient unit managing the glycemic care of Reese Oakley. Over 50% of my time on the unit was spent counseling the patient  and/or coordinating care regarding .  See note for details.  Lois Choudhury -[phone]  Diabetes Management job code [phone]

## 2020-12-11 NOTE — PROGRESS NOTES
"Otolaryngology Progress Note  12/11/20      SUBJECTIVE: No acute events overnight. Intermittent nausea yesterday with multiple episodes of emesis in the morning, minimal relief with zofran and metoclopramide. Afebrile, Tmax 98.2, -140s, saturating well on room air.     OBJECTIVE:   BP (!) 146/55 (BP Location: Left arm)   Pulse 65   Temp 98.2  F (36.8  C) (Oral)   Resp 18   Ht 1.702 m (5' 7.01\")   Wt 85.7 kg (189 lb)   SpO2 93%   BMI 29.59 kg/m                   Gen: No acute distress, resting comfortably in bed.                 HEENT: Right neck hemiapron incision is clean/dry/intact, closed with annabelle. JOEL drains x2 in place with appropriate serosanguinous output. No fluctuance or induration. Right lower lip weakness in marginal mandibular nerve distribution. Tongue fully mobile.                 Resp: Non-labored breathing on room air, no stridor or stertor.     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     Neuro:  - Pain control: scheduled Tylenol, PRN Tylenol, PRN oral Dilaudid  - PRN Atarax     HEENT:  - Clean incisions with 0.9% sodium chloride and apply Aquaphor Q8H  - Monitor and record JOEL drain output Qshift, if output is zero, please chart \"0\". Please strip JOEL drains q3-4hours  - RN: please work on JOEL and incision cares with patient.     Respiratory:  - Supplemental O2 as needed to keep SpO2 >92%     CV/Heme:  - Hemodynamically stable  - PTA lisinopril, simvastatin     FEN/GI:  - Regular diet  - Bowel regimen: BID senna-docusate, added daily Miralax and PRN suppository  - PTA omeprazole, " pilocarpine  - PRA Zofran, compazine for nausea     :  - Voiding spontaneously     Endo:  - Hx T2DM, last A1C 7.9 (12/9/20)  - Medium dose sliding scale insulin, insulin glargine 7 units QAM  - Endocrine consult: appreciate assistance in blood-glucose management     ID:  - No antibiotics indicated, periop Ancef completed     PPx:  - SCDs  - Lovenox     Consults:  - PT/OT - cleared for home     Dispo: Pending adequate PO intake, improvement of nausea, adequate blood glucose control     -- Patient and above plan discussed with Dr. Mckenna.     Tad Hartley MD   Otolaryngology-Head & Neck Surgery, PGY-1  Please contact ENT by dialing * [phone] and entering job code [phone].

## 2020-12-12 NOTE — PROGRESS NOTES
"Otolaryngology Progress Note  12/12/20      SUBJECTIVE: No acute events overnight. Insulin drip discontinued at 2230. Last BM yesterday. Afebrile, vitally stable on room air.     OBJECTIVE:   /63 (BP Location: Left arm)   Pulse 64   Temp 96.7  F (35.9  C) (Oral)   Resp 16   Ht 1.702 m (5' 7.01\")   Wt 84.1 kg (185 lb 4.8 oz)   SpO2 98%   BMI 29.02 kg/m                   Gen: No acute distress, resting comfortably in bed.                 HEENT: Right neck hemiapron incision is clean/dry/intact, closed with annabelle. JOEL drains x2 in place with appropriate serosanguinous output. No fluctuance or induration. Right lower lip weakness in marginal mandibular nerve distribution. Tongue fully mobile.                 Resp: Non-labored breathing on room air, no stridor or stertor.     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     Neuro:  - Pain control: scheduled Tylenol, PRN Tylenol, PRN oral Dilaudid; will discontinue PRN oral Dilaudid as patient is not requiring any  - PRN Atarax     HEENT:  - Clean incisions with 0.9% sodium chloride and apply Aquaphor Q8H  - Monitor and record JOEL drain output Qshift, if output is zero, please chart \"0\". Please strip JOEL drains q3-4hours  - RN: please work on JOEL and incision cares with patient.     Respiratory:  - Supplemental O2 as needed to keep SpO2 >92%     CV/Heme:  - Hemodynamically stable  - PTA lisinopril, simvastatin     FEN/GI:  - Regular diet  - Bowel regimen: BID senna-docusate, daily Miralax and PRN suppository  - PTA omeprazole, pilocarpine  - PRA Zofran, " compazine for nausea     :  - Voiding spontaneously     Endo:  - Hx T2DM, last A1C 7.9 (12/9/20)  - Medium dose sliding scale insulin, insulin glargine 7 units QAM  - Endocrine consult: insulin drip ordered and now discontinued, on high dose correction sliding scale insulin, meal insulin 1 unit per 10 g CHO, Lantus increased to 20 units QAM     ID:  - No antibiotics indicated, periop Ancef completed     PPx:  - SCDs  - Lovenox     Consults:  - PT/OT - cleared for home     Dispo: Home today.     -- Patient and above plan discussed with Dr. Mckenna.     Tad Hartley MD   Otolaryngology-Head & Neck Surgery, PGY-1  Please contact ENT by dialing * [phone] and entering job code [phone].

## 2020-12-12 NOTE — DISCHARGE SUMMARY
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  It was recommended that he undergo operative intervention and the patient consented to the above procedure after detailed explanation of the risks and benefits of said procedure.    Hospital Course: The patient was admitted to the hospital and underwent the above mentioned procedure. He tolerated the procedure without any intra- or chuck-operative complications. Please see the operative report for full details of the procedure. The patient was admitted for post-operative monitoring. He had some increased nausea prompting emesis on POD1, received zofran and compazine with decreased nausea that resolved throughout the day POD2. His postoperative course was complicated by elevated blood glucose levels in the 300s-400s attributed to perioperative steroids. Endocrinology was consulted and placed patient on an insulin drip with updated recommendations for his discharge regimen. Patient typically has very good blood glucose control at home and Mr Oakley emphasizes he is very diligent with his cares and is sure it is strictly due to his steroids, we are in agreement. One JOEL was removed prior to discharge due to decreased output. Patient felt comfortable discharging home with one JOEL and felt comfortable taking care of his drain and incision. He already has a follow-up appointment scheduled with Dr. Mckenna on 12/15.  At discharge, the patient's pain was well controlled, the patient  was voiding on his own, and was ambulating and tolerating a regular diet.     Discharge Exam:  Vitals:    12/11/20 0900 12/11/20 1512 12/11/20 2253 12/12/20 0825   BP:  128/61 133/63 (!) 146/58   BP Location:  Left arm Left arm Left arm   Pulse:  71 64 55   Resp:  16 16 16   Temp:  98.1  F (36.7  C) 96.7  F (35.9  C) 96.3  F (35.7  C)   TempSrc:  Oral Oral Oral   SpO2:  98% 98% 100%   Weight: 84.1 kg (185 lb 4.8 oz)      Height:             Gen: No acute distress, resting comfortably in bed.                 HEENT: Right neck hemiapron incision is clean/dry/intact, closed with annabelle. JOEL drains x1 in place with appropriate serosanguinous output. No fluctuance or induration. Right lower lip weakness in marginal mandibular nerve distribution. Tongue fully mobile.                 Resp: Non-labored breathing on room air, no stridor or stertor    Discharge Medications:   Kenrick Oakley   Home Medication Instructions JERRY:[id number]    Printed on:12/12/20 0855   Medication Information                      acetaminophen (TYLENOL) 325 MG tablet  Take 2 tablets (650 mg) by mouth every 4 hours as needed for other (multimodal surgical pain management along with NSAIDS and opioid medication as indicated based on pain control and physical function.)             aspirin (ASA) 81 MG chewable tablet  Take 1 tablet by mouth every 24 hours             blood glucose (NO BRAND SPECIFIED) test strip  USE 1 STRIP FOR TESTING AS DIRECTED TWICE A DAY TO CHECK BLOOD SUGARS             glipiZIDE (GLUCOTROL) 10 MG tablet  Take 10 mg by mouth 2 times daily (before meals) 10mg in am and 12.5mg at supper             HYDROmorphone (DILAUDID) 2 MG tablet  Take 1 tablet (2 mg) by mouth every 4 hours as needed for moderate to severe pain             insulin glargine (LANTUS PEN) 100 UNIT/ML pen  Inject 7 Units Subcutaneous every morning              lisinopril (PRINIVIL,ZESTRIL) 10 MG tablet  Take 10 mg by mouth daily             metFORMIN  (GLUCOPHAGE) 1000 MG tablet  Take 500 mg by mouth 2 times daily (with meals)              mineral oil-hydrophilic petrolatum (AQUAPHOR) external ointment  Apply topically every 8 hours             omeprazole (PRILOSEC) 10 MG capsule  Take 10 mg by mouth daily              ondansetron (ZOFRAN-ODT) 4 MG ODT tab  Take 1 tablet (4 mg) by mouth every 6 hours as needed for nausea or vomiting             pilocarpine (SALAGEN) 7.5 MG tablet  Take 1 tablet (7.5 mg) by mouth 2 times daily             polyethylene glycol (MIRALAX) 17 GM/Dose powder  Take 17 g by mouth daily             senna-docusate (SENOKOT-S/PERICOLACE) 8.6-50 MG tablet  Take 1 tablet by mouth 2 times daily as needed for constipation             simvastatin (ZOCOR) 40 MG tablet  Take by mouth At Bedtime                 Discharge Procedure Orders   Reason for your hospital stay   Order Comments: Postop recovery     Activity   Order Comments: No heavy lifting greater than 10 lbs and no strenuous exercise for 2 weeks or until follow up appointment. No driving while taking narcotic pain medications.     Order Specific Question Answer Comments   Is discharge order? Yes      Monitor and record   Order Comments: Follow these instructions to care for your tube:   1. Strip the drain 3 times per day   2. After stripping the drain, take the bulb off suction and dump the contents into the measuring cup. Write this down.   3. Put the bulb back on suction (squeeze it and close the top).   Once the drainage is less than 30mL in 24 hours, call our ENT clinic for drain removal. Call [phone] to schedule an appointment to have drain removed.     When to contact your care team   Order Comments: Please notify your doctor if you experience wound breakdown, sustained bleeding from the wound site, or increasing redness, swelling, and/or purulent malorodorous discharge from the wound site which may indicate infection. If you feel it is acute, or experience sudden changes in  "breathing, chest pain, or excessive sleepiness/somnolence please return to the emergency department or call 911. If you have questions or concerns during the day please call ENT clinic and [phone]. If at night you can call [facility] at [phone] and ask for the \"ENT resident on call\".     Wound care and dressings   Order Comments: Keep incisions clean and dry. Apply Aquaphor ointment to incisions three times daily to keep moist. You may shower, do not soak, scrub, or submerge incisions under water. If you have a surgical drain, do not get the drain site wet until 24 hours after the drain has been removed.     Adult [facility] Follow-up and recommended labs and tests   Order Comments: Follow up in ENT clinic with Dr. Mckenna on 12/15 at 2:15p. Please call the clinic with questions/concerns: [phone].    Otolaryngology/ENT Clinic:  [facility]  [address] [place] [address]    You saw our inpatient Endocrine team while you were here. You had elevated blood glucose likely due to both the surgery and the steroids you received. Please ensure that you are checking your blood sugar at least three times a day with meals and prior to bedtime. If you have any concerns please call your endocrinologist or primary care physician and schedule a follow-up visit. The information recommended from our team here is included for you below:  - Lantus 7u daily with breakfast  - Metformin 500mg po twice a day  - Glipizide 10mg for am and 12.5mg  Pm, please make sure to follow-up with your [facility] for newer oral DM medication rather than glipizide     Call our clinic for any urgent questions  [facility]  Address: [address] [place] [address], [place] [address]  Phone: [phone]     Diet   Order Comments: Resume pre-procedure diet     Order Specific Question Answer Comments   Is discharge order? Yes        Dispo: " To home in good condition. All of the patient's questions/concerns have been addressed at this time. They are comfortable and independent in all cares.     Tad Hartley MD  Otolaryngology-Head & Neck Surgery, PGY-1  Please contact ENT by dialing * [phone] and entering job code [phone].

## 2020-12-12 NOTE — PROGRESS NOTES
"Diabetes Consult Daily Progress Note/Phone visit  Visit/Communication Style   PHONE communication was used to talk with Reese due to the COVID pandemic.  I did not personally see this patient.  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 Mckenna.     Type 2 diabetes:   Steroid on Dec 10 2020 - 10mg X 1 dose    PTA - A1c 7.9%     BG is uncontrolled yesterday and started on IV insulin drip for ~5 hours and then stopped. BG  is controlled since then and target is less than 180mg during hospitalization.    Plan:   - Insulin drip is discontinued last night, received 18u with the drip and lantus 20 unit(s) yesterday  - Will resume PTA dose of Lantus 7u daily, first dose from today (I ordered it)  - Resume PTA Metformin 500mg po BID (I ordered it, GFR 55ml, Crt 1.3 today)    Discharge recs:    - PTA Lantus 7u daily with breakfast  - PTA Metformin 500mg po bid  - PTA Glipizide 10mg for am and 12.5mg  (I asked him to talk to [facility] for newer oral DM medication rather than glipizide, he said he will discuss that)    Call our clinic for any urgent questions  [facility]    Address: [address] [place] [address] [address] [facility], [place] [address]  Phone: [phone]    If this is an emergency overnight or on weekends, please call [phone]. This will get you the Personal Medicine . Please ask for adult endocrinology \"on call\" and they will connect you to one of my colleagues who will always be available.      Discussed with Dr.Chow Guru Sarah Beth MD  PGY 5 - Endocrinology Fellow  Pager: " "[phone]      I have seen and talk with the patient and agree with the fellow's plan of care as noted.  December 12, 2020    Dr. Christine Orozco [phone]          Interval History:   CC:  s/p left SND levels I-III (12/9/2020)    Subjective:    Insulin drip stopped last night ~11.30pm. Denies N/V/D/abdominal pain, fever, chills. Denies low sugar Sx.   Eating well, appetite is good. Ordered breakfast and planning to go home early this am.    Current Diabetes Medications:  IV insulin drip stopped last night ~11.30pm    Other Pertinent Medications: None      Orders Placed This Encounter      Regular Diet Adult      Diet    PTA on lantus 7 units am, metformin 500 mg twice daily, glipizide bid     ROS:    Review of pertinent systems was negative except as noted above.          Exam:      Blood pressure 133/63, pulse 64, temperature 96.7  F (35.9  C), temperature source Oral, resp. rate 16, height 1.702 m (5' 7.01\"), weight 84.1 kg (185 lb 4.8 oz), SpO2 98 %.    General and Neuro: Alert and oriented, communicating clearly.          Data:     Lab Results   Component Value Date    A1C 7.9 12/09/2020       Recent Labs   Lab 12/12/20  0620 12/12/20  0431 12/12/20  0029 12/11/20  2230 12/11/20  2158 12/11/20  2112 12/11/20 2011   *  --   --   --   --   --   --    BGM  --  139* 114* 88 92 84 157*     Results for TEOFILO CHAN (MRN [id number]) as of 12/12/2020 08:30   Ref. Range 12/12/2020 06:20   Sodium Latest Ref Range: 133 - 144 mmol/L 136   Potassium Latest Ref Range: 3.4 - 5.3 mmol/L 4.3   Chloride Latest Ref Range: 94 - 109 mmol/L 106   Carbon Dioxide Latest Ref Range: 20 - 32 mmol/L 23   Urea Nitrogen Latest Ref Range: 7 - 30 mg/dL 19   Creatinine Latest Ref Range: 0.66 - 1.25 mg/dL 1.30 (H)   GFR Estimate Latest Ref Range: >60 mL/min/1.73_m2 55 (L)   GFR Estimate If Black Latest Ref Range: >60 mL/min/1.73_m2 63   Calcium Latest Ref Range: 8.5 - 10.1 mg/dL 8.8   Anion Gap Latest Ref Range: 3 - 14 mmol/L 7 "   Glucose Latest Ref Range: 70 - 99 mg/dL 152 (H)

## 2020-12-15 NOTE — PROGRESS NOTES
Attempted to contact the patient x3 for post-hospital call without answer. Will close encounter at this time.    Yoli Salinas CMA, [facility]  Post Hospital Discharge Team       no

## 2020-12-15 NOTE — PROGRESS NOTES
I had a 15-minute visit with Reese Oakley today.  He is 70 years of age.  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  We took out zone IA and 1B and up to zone III of his neck last week.  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  For this, he probably needs to have postoperative radiation, but there may be a role for chemotherapy as well because of the extracapsular nature of the disease.  His tumor should not have been in contact with anything that we resected in the past.  It was a tumor over the past seven years.      SOCIAL HISTORY:  He is a nonsmoker, nondrinker.        ASSESSMENT AND PLAN:  We will let him heal up.  We will see how he is doing again over the course of the next two or two weeks in clinic.  We will try to get a radiation referral for him as well once we discuss him at Tumor Conference.

## 2020-12-15 NOTE — PATIENT INSTRUCTIONS
1. You were seen in the ENT Clinic today by Dr. Mckenna.  If you have any questions or concerns after your appointment, please call   -  ENT Clinic: [phone]      2.   Plan to return to clinic, you may remove staples next week locally    3. We will call you to schedule follow up with Dr. Mckenna when we have your radiation consult scheduled.     Tashia Jennings LPN  [facility]- Otolaryngology  [phone]    Or    Louise Bedoya RN  [facility] Otolaryngology   [phone]

## 2020-12-15 NOTE — LETTER
12/15/2020       RE: Reese Oakley  [address] [id number]     Dear Colleague,    Thank you for referring your patient, Reese Oakley, to the [facility] [place] [facility]. Please see a copy of my visit note below.    I had a 15-minute visit with Reese Oakley today.  He is 70 years of age.  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  We took out zone IA and 1B and up to zone III of his neck last week.  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  For this, he probably needs to have postoperative radiation, but there may be a role for chemotherapy as well because of the extracapsular nature of the disease.  His tumor should not have been in contact with anything that we resected in the past.  It was a tumor over the past seven years.      SOCIAL HISTORY:  He is a nonsmoker, nondrinker.        ASSESSMENT AND PLAN:  We will let him heal up.  We will see how he is doing again over the course of the next two or two weeks in clinic.  We will try to get a radiation referral for him as well once we discuss him at Tumor Conference.           Again, thank you for allowing me to participate in the care of your patient.      Sincerely,    Jose Antonio Mckenna MD

## 2020-12-18 NOTE — TUMOR CONFERENCE
Head & Neck Tumor Conference Note      Status: Established (last presented 2/28/2020)  Staff: Dr. 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 He has previously been treated with a DL with Omni CO2 laser excision by Dr. Mckenna on 10/6/2014. This was followed by radiation therapy at [facility]. 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 Mckenna on 2/19/2020. He was subsequently found to have recurrence near the right submandibular gland and is now status post right supraomohyoid neck dissection on 12/9/2020.    Pertinent PMH:   Past Medical History:   Diagnosis Date     Adenocarcinoma (H)     large instestine      Cancer of appendix (H)      Diabetes (H)      Gastro-oesophageal reflux disease      History of radiation therapy      Hoarseness      Hypertension       Smoking Hx:   Social History     Tobacco Use     Smoking status: Never Smoker     Smokeless tobacco: Never Used   Substance Use Topics     Alcohol use: Not Currently     Alcohol/week: 0.0 standard drinks     Comment: none for 20 years     Drug use: No     Imaging:   PET/CT (11/24/2020): In this patient with history of squamous cell carcinoma of the right oropharynx and right lateral tongue:  1. There is a new heterogeneously enhancing, irregular, hypermetabolic  mass in the right submandibular region. This is considered recurrent  disease until proven otherwise.   2. Solitary focus of mild FDG avidity within the mid  transverse colon,  this could represent normal GI excretion versus polyp versus colitis.  Recommend direct visualization.  3. No suspicious FDG uptake within the chest, abdomen, or pelvis to  suggest distant metastatic disease.  4. Several stable sub-6 mm solid pulmonary nodules in both lungs which  are stable in size since at least 1/27/2020. There are no new or  enlarging pulmonary nodules.    Pathology:   FINAL DIAGNOSIS (12/9/2020):   A. Right level 1A, 1B and 2:   - Invasive moderately to poorly differentiated squamous cell carcinoma involving fibroadipose tissue and salivary gland tissue   - Maximal tumor dimension: 2.5 cm   - Surgical resection margins free of tumor   - No malignancy identified in three lymph nodes (0/3)     B. Level 1A:   - Metastatic squamous cell carcinoma in one of one lymph node (1/1)   - Tumor size: 0.5 cm   - Extranodal extension: Not identified     C. 2A and 3:   - No malignancy identified in eight lymph nodes (0/8)     D. Right level 3:   - No malignancy identified in three lymph nodes (0/3)     E. 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 Mckenna on 10/6/2014. This was followed by radiation therapy at [facility]. 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 Mckenna on 2/19/2020. He was subsequently found to have recurrence near the right submandibular gland and is now status post right supraomohyoid neck dissection on 12/9/2020. Final pathology is discussed, with concern for extranodal extension of the node anterior to submandibular gland.    Plan: Consultations to be placed for Medical Oncology and Radiation Oncology for discussion of adjuvant radiotherapy or  systemic therapy.    Jennifer Freitas MD PGY-3  Otolaryngology- Head and Neck Surgery  Please contact ENT with questions by dialing [phone] and entering job code [phone] when prompted.    Documentation / Disclaimer Cancer Tumor Board Note  Cancer tumor board recommendations do not override what is determined to be reasonable care and treatment, which is dependent on the circumstances of a patient's case; the patient's medical, social, and personal concerns; and the clinical judgment of the oncologist [physician].

## 2020-12-20 NOTE — OP NOTE
DATE OF SURGERY: 12/9/2020      ATTENDING SURGEON:  Jose Antonio Mckenna MD      RESIDENT SURGEON:  Jennifer Freitas MD     PREOPERATIVE DIAGNOSIS:  Metastatic squamous cell carcinoma.      POSTOPERATIVE DIAGNOSIS:  Metastatic squamous cell carcinoma.      PROCEDURES PERFORMED:  Right modified neck dissection, levels I-III.     ANESTHESIA:  General.      ESTIMATED BLOOD LOSS:  25 mL.     DRAINS:  JOEL drains x2.      SPECIMENS:  Right levels IA, IB, II, and III.     FINDINGS:  Large right level IB node which was adherent to the surrounding soft tissues.     COMPLICATIONS:  None.      INDICATIONS:  Reese Oakley is a 70-year-old gentleman with a history of T1 right oropharyngeal squamous cell carcinoma status post excision /XRT  and T2 right oral tongue squamous cell carcinoma status post partial glossectomy who was found to have a right neck   level IB on surveillance imaging. Right  neck dissection was discussed with the patient, and after a discussion of the risks, benefits, and alternatives he wished to proceed with surgery.     DESCRIPTION OF PROCEDURE:  The patient was met in the preoperative area and informed consent was verified.  The patient was then brought back to the operating room and placed supine on the operating room table.  General anesthesia was induced and the patient was orotracheally intubated without difficulty.  A hemiapron incision was marked on the right neck and injected with 1:100,000 epinephrine.  The patient was then prepped and draped in the usual sterile fashion.  An institutional timeout was performed and everyone was in agreement about the patient identification, the procedure to be performed, and the site.     Incision was made through the skin and subcutaneous tissues.  The platysma was identified, and subplatysmal flaps were elevated superiorly to the level of the mandible and inferiorly to the level of the clavicle.  Starting over the level IB mass, the fascia was divided over the  submandibular gland.  The facial vein was identified and ligated.  This mass was attached to the submandibular gland posteriorly and appeared extracapsular,. Therefore, we performed a wide excision of at least 1-2 cm around this mass to isolate it from the surrounding soft tissues. We continued excising about this mass and identified the submandibular gland, nerve 12, the lingual nerve, and the digastric muscle in dissecting this region from the surrounding soft tissues.  This allowed raising of the fascia away from the tumor and protection of the marginal mandibular nerve.  The fascia overlying the sternocleidomastoid muscle was then divided along its length.  The fascia was elevated along off the anterior border of the SCM down to the floor of the neck.  The omohyoid muscle was identified and traced superomedially towards the hyoid bone.  This muscle marked the inferior extent of our dissection.  Once the hyoid was dissected from the overlying fibrofatty tissue, the anterior belly of the digastric and the mylohyoid muscle were identified.  A level IA dissection was completed with bipolar cautery and skeletonization of the mylohyoid muscle and bilateral digastric muscles.  Moving posteriorly, the harmonic scalpel was used to dissect the fibrofatty tissue in a medial to lateral direction.  The hypoglossal nerve was identified and preserved.  This dissection continued along the posterior belly of the digastric until meeting the partially raised fibrofatty packet posteriorly.    The spinal accessory nerve was then identified and skeletonized from the surrounding soft tissue, tracing it up to the level of the digastric.  The nerve was identified passing deep to the internal jugular vein.  The floor of the dissection in the deep neck was identified, and the fascia and fat posterior to the internal jugular vein were divided.  The specimen was retracted medially and anteriorly.  The specimen was divided off the internal  jugular vein after dissecting the superior limit of the dissection free from the 11th cranial nerve superiorly.  The specimen was rolled medially, dissected off of the jugular vein, and sent for permanent pathology.  Our attention was then turned to level 2B.  Cranial nerve XI was retracted superiorly, and the fibrofatty contents in level 2B were dissected off the floor with bipolar cautery.    The wound bed was irrigated and thorough hemostasis was achieved.  Two JOEL drains were placed, one in the gutter and one extending to the medial and superior aspects of the dissection.  The platysma and deep dermis were reapproximated with Vicryl sutures.  The skin was closed with staples.  Drain stitches were applied, and both drains were holding suction.  This concluded the procedure.  The patient was then turned over to the Anesthesia service for wake-up and extubation.  Dr. Mckenna was present and scrubbed for the entirety of the procedure.     Jennifer Freitas MD PGY-3  Otolaryngology - Head & Neck Surgery

## 2020-12-21 NOTE — TELEPHONE ENCOUNTER
ONCOLOGY INTAKE: Records Information      APPT INFORMATION:  Referring provider:  Jose Antonio Mckenna MD  Referring provider s clinic:  [facility]  Reason for visit/diagnosis: Squamous cell carcinoma of neck  Has patient been notified of appointment date and time?: Yes    RECORDS INFORMATION:  Were the records received with the referral (via [employer])? No    Has patient been seen for any external appt for this diagnosis? No    If yes, where? N/a    Has patient had any imaging or procedures outside of [place] for this condition? No      If Yes, where? N/a    ADDITIONAL INFORMATION:  None

## 2020-12-21 NOTE — PROGRESS NOTES
Informed patient that his case was discussed at tumor conference. Writer informed patient that radiation oncology consult and medical oncology consult is recommended. Patient is scheduled for radiation oncology on 1/5. Writer has placed referral for medical oncology consult. Patient is not yet scheduled. Writer further discussed need for dental clearance prior radiation should this be the recommendation. Patient verbalized understanding and will make an appointment with his local dentist. Patient will also be scheduled on 1/5 to see Dr. Mckenna-pending medical oncology appointment as patient does travel from a distance. Patient is in agreement with this plan and denies any further questions or concerns at this time.     Louise Bedoya RN

## 2020-12-22 NOTE — TELEPHONE ENCOUNTER
RECORDS STATUS - ALL OTHER DIAGNOSIS      RECORDS RECEIVED FROM: [facility]   DATE RECEIVED: 1/5/2021   NOTES STATUS DETAILS   OFFICE NOTE from referring provider Complete Internal - ENT  Jose Antonio Mckenna MD   OFFICE NOTE from medical oncologist Complete [facility]-     Tumor Conference 12/18/2020,     Tumor Conference 2/28/2020 Squamous Cell Carcinoma of the tongue    Tumor Conference- Pulmonary Nodules 1/31/2020   DISCHARGE SUMMARY from hospital Complete [facility] 12/9/2020 Acute Post-Op Pain  Squamous Cell Carcinoma of the neck     2/19/2020 Squamous Cell Carcinoma of the tongue     DISCHARGE REPORT from the ER     OPERATIVE REPORT Complete See Biopsy Report Below 1/21/2020 12/2/2020 Case Request- Modified Neck Dissection    MEDICATION LIST Complete [facility]   CLINICAL TRIAL TREATMENTS TO DATE     LABS     PATHOLOGY REPORTS Complete- Internal  Internal Biopsy 1/21/2020   RIGHT TONGUE BIOPSY:   - Invasion, moderately differentiated, keratinizing squamous cell   carcinoma   - Invasion into the skeletal muscle identified   - The depth invasion cannot be assessed    ANYTHING RELATED TO DIAGNOSIS Complete Labs last updated on 12/12/2020    GENONOMIC TESTING     TYPE:     IMAGING (NEED IMAGES & REPORT)     CT SCANS Complete CT Soft Tissue Neck 5/19/2020, 1/27/2020, 4/19/2017   MRI     MAMMO     ULTRASOUND Complete US Head Neck Soft Tissue 6/30/2020    PET Complete PET Oncology Whole Body 11/24/2020, 5/19/2020 , 1/27/2020

## 2021-01-01 ENCOUNTER — INFUSION THERAPY VISIT (OUTPATIENT)
Dept: ONCOLOGY | Facility: CLINIC | Age: 71
End: 2021-01-01
Attending: INTERNAL MEDICINE
Payer: MEDICARE

## 2021-01-01 ENCOUNTER — PATIENT OUTREACH (OUTPATIENT)
Dept: CARE COORDINATION | Facility: CLINIC | Age: 71
End: 2021-01-01

## 2021-01-01 ENCOUNTER — APPOINTMENT (OUTPATIENT)
Dept: LAB | Facility: CLINIC | Age: 71
End: 2021-01-01
Attending: INTERNAL MEDICINE
Payer: MEDICARE

## 2021-01-01 ENCOUNTER — THERAPY VISIT (OUTPATIENT)
Dept: SPEECH THERAPY | Facility: CLINIC | Age: 71
End: 2021-01-01
Payer: MEDICARE

## 2021-01-01 ENCOUNTER — ALLIED HEALTH/NURSE VISIT (OUTPATIENT)
Dept: RADIATION ONCOLOGY | Facility: CLINIC | Age: 71
End: 2021-01-01
Attending: RADIOLOGY
Payer: MEDICARE

## 2021-01-01 ENCOUNTER — TELEPHONE (OUTPATIENT)
Dept: OTOLARYNGOLOGY | Facility: CLINIC | Age: 71
End: 2021-01-01

## 2021-01-01 ENCOUNTER — HOSPITAL ENCOUNTER (OUTPATIENT)
Facility: CLINIC | Age: 71
Discharge: HOME | End: 2021-09-15
Attending: INTERNAL MEDICINE | Admitting: INTERNAL MEDICINE
Payer: MEDICARE

## 2021-01-01 ENCOUNTER — ANESTHESIA (OUTPATIENT)
Dept: SURGERY | Facility: AMBULATORY SURGERY CENTER | Age: 71
End: 2021-01-01

## 2021-01-01 ENCOUNTER — HOME INFUSION (PRE-WILLOW HOME INFUSION) (OUTPATIENT)
Dept: PHARMACY | Facility: CLINIC | Age: 71
End: 2021-01-01

## 2021-01-01 ENCOUNTER — APPOINTMENT (OUTPATIENT)
Dept: GENERAL RADIOLOGY | Facility: CLINIC | Age: 71
DRG: 180 | End: 2021-01-01
Attending: EMERGENCY MEDICINE
Payer: MEDICARE

## 2021-01-01 ENCOUNTER — ONCOLOGY VISIT (OUTPATIENT)
Dept: RADIATION ONCOLOGY | Facility: CLINIC | Age: 71
End: 2021-01-01

## 2021-01-01 ENCOUNTER — OFFICE VISIT (OUTPATIENT)
Dept: OTOLARYNGOLOGY | Facility: CLINIC | Age: 71
End: 2021-01-01
Payer: MEDICARE

## 2021-01-01 ENCOUNTER — TELEPHONE (OUTPATIENT)
Dept: PULMONOLOGY | Facility: CLINIC | Age: 71
End: 2021-01-01

## 2021-01-01 ENCOUNTER — TELEPHONE (OUTPATIENT)
Dept: ENDOCRINOLOGY | Facility: CLINIC | Age: 71
End: 2021-01-01

## 2021-01-01 ENCOUNTER — HOSPITAL ENCOUNTER (OUTPATIENT)
Facility: CLINIC | Age: 71
Discharge: HOME | End: 2021-07-05
Attending: OTOLARYNGOLOGY | Admitting: OTOLARYNGOLOGY
Payer: MEDICARE

## 2021-01-01 ENCOUNTER — ANESTHESIA EVENT (OUTPATIENT)
Dept: SURGERY | Facility: CLINIC | Age: 71
End: 2021-01-01
Payer: MEDICARE

## 2021-01-01 ENCOUNTER — PREP FOR PROCEDURE (OUTPATIENT)
Dept: PULMONOLOGY | Facility: CLINIC | Age: 71
End: 2021-01-01

## 2021-01-01 ENCOUNTER — APPOINTMENT (OUTPATIENT)
Dept: GENERAL RADIOLOGY | Facility: CLINIC | Age: 71
DRG: 180 | End: 2021-01-01
Attending: HOSPITALIST
Payer: MEDICARE

## 2021-01-01 ENCOUNTER — VIRTUAL VISIT (OUTPATIENT)
Dept: ONCOLOGY | Facility: CLINIC | Age: 71
End: 2021-01-01
Attending: PHYSICIAN ASSISTANT
Payer: MEDICARE

## 2021-01-01 ENCOUNTER — HOSPITAL ENCOUNTER (INPATIENT)
Facility: CLINIC | Age: 71
LOS: 4 days | Discharge: HOME | DRG: 189 | End: 2021-10-15
Attending: INTERNAL MEDICINE | Admitting: INTERNAL MEDICINE
Payer: MEDICARE

## 2021-01-01 ENCOUNTER — TELEPHONE (OUTPATIENT)
Dept: SURGERY | Facility: CLINIC | Age: 71
End: 2021-01-01

## 2021-01-01 ENCOUNTER — PATIENT OUTREACH (OUTPATIENT)
Dept: OTOLARYNGOLOGY | Facility: CLINIC | Age: 71
End: 2021-01-01

## 2021-01-01 ENCOUNTER — HEALTH MAINTENANCE LETTER (OUTPATIENT)
Age: 71
End: 2021-01-01

## 2021-01-01 ENCOUNTER — PATIENT OUTREACH (OUTPATIENT)
Dept: ONCOLOGY | Facility: CLINIC | Age: 71
End: 2021-01-01

## 2021-01-01 ENCOUNTER — HOSPITAL ENCOUNTER (INPATIENT)
Facility: CLINIC | Age: 71
LOS: 1 days | Discharge: HOME | DRG: 180 | End: 2021-09-25
Attending: EMERGENCY MEDICINE | Admitting: STUDENT IN AN ORGANIZED HEALTH CARE EDUCATION/TRAINING PROGRAM
Payer: MEDICARE

## 2021-01-01 ENCOUNTER — OFFICE VISIT (OUTPATIENT)
Dept: RADIATION ONCOLOGY | Facility: CLINIC | Age: 71
End: 2021-01-01
Attending: OTOLARYNGOLOGY
Payer: MEDICARE

## 2021-01-01 ENCOUNTER — VIRTUAL VISIT (OUTPATIENT)
Dept: OTOLARYNGOLOGY | Facility: CLINIC | Age: 71
End: 2021-01-01
Payer: MEDICARE

## 2021-01-01 ENCOUNTER — ANCILLARY PROCEDURE (OUTPATIENT)
Dept: GENERAL RADIOLOGY | Facility: CLINIC | Age: 71
End: 2021-01-01
Attending: NURSE PRACTITIONER
Payer: MEDICARE

## 2021-01-01 ENCOUNTER — ONCOLOGY VISIT (OUTPATIENT)
Dept: ONCOLOGY | Facility: CLINIC | Age: 71
End: 2021-01-01
Attending: NURSE PRACTITIONER
Payer: MEDICARE

## 2021-01-01 ENCOUNTER — HOSPITAL ENCOUNTER (OUTPATIENT)
Facility: AMBULATORY SURGERY CENTER | Age: 71
Discharge: HOME | End: 2021-01-15
Attending: RADIOLOGY | Admitting: RADIOLOGY
Payer: MEDICARE

## 2021-01-01 ENCOUNTER — LAB (OUTPATIENT)
Dept: LAB | Facility: CLINIC | Age: 71
DRG: 189 | End: 2021-01-01
Attending: INTERNAL MEDICINE
Payer: MEDICARE

## 2021-01-01 ENCOUNTER — VIRTUAL VISIT (OUTPATIENT)
Dept: SURGERY | Facility: CLINIC | Age: 71
End: 2021-01-01
Attending: CLINICAL NURSE SPECIALIST
Payer: MEDICARE

## 2021-01-01 ENCOUNTER — ONCOLOGY VISIT (OUTPATIENT)
Dept: ONCOLOGY | Facility: CLINIC | Age: 71
DRG: 189 | End: 2021-01-01
Attending: NURSE PRACTITIONER
Payer: MEDICARE

## 2021-01-01 ENCOUNTER — ANESTHESIA EVENT (OUTPATIENT)
Dept: SURGERY | Facility: CLINIC | Age: 71
End: 2021-01-01

## 2021-01-01 ENCOUNTER — VIRTUAL VISIT (OUTPATIENT)
Dept: SURGERY | Facility: CLINIC | Age: 71
End: 2021-01-01
Payer: MEDICARE

## 2021-01-01 ENCOUNTER — DOCUMENTATION ONLY (OUTPATIENT)
Dept: CARE COORDINATION | Facility: CLINIC | Age: 71
End: 2021-01-01

## 2021-01-01 ENCOUNTER — HOSPITAL ENCOUNTER (OUTPATIENT)
Dept: PET IMAGING | Facility: CLINIC | Age: 71
Discharge: HOME | End: 2021-06-21
Attending: INTERNAL MEDICINE | Admitting: INTERNAL MEDICINE
Payer: MEDICARE

## 2021-01-01 ENCOUNTER — ANCILLARY PROCEDURE (OUTPATIENT)
Dept: RADIOLOGY | Facility: AMBULATORY SURGERY CENTER | Age: 71
End: 2021-01-01
Attending: INTERNAL MEDICINE
Payer: MEDICARE

## 2021-01-01 ENCOUNTER — ANESTHESIA (OUTPATIENT)
Dept: SURGERY | Facility: CLINIC | Age: 71
End: 2021-01-01
Payer: MEDICARE

## 2021-01-01 ENCOUNTER — HOSPITAL ENCOUNTER (EMERGENCY)
Facility: CLINIC | Age: 71
Discharge: HOME | End: 2021-06-27
Attending: EMERGENCY MEDICINE | Admitting: EMERGENCY MEDICINE
Payer: MEDICARE

## 2021-01-01 ENCOUNTER — APPOINTMENT (OUTPATIENT)
Dept: CT IMAGING | Facility: CLINIC | Age: 71
DRG: 180 | End: 2021-01-01
Payer: MEDICARE

## 2021-01-01 ENCOUNTER — INFUSION THERAPY VISIT (OUTPATIENT)
Dept: ONCOLOGY | Facility: CLINIC | Age: 71
DRG: 189 | End: 2021-01-01
Attending: INTERNAL MEDICINE
Payer: MEDICARE

## 2021-01-01 ENCOUNTER — TELEPHONE (OUTPATIENT)
Dept: ONCOLOGY | Facility: CLINIC | Age: 71
End: 2021-01-01

## 2021-01-01 ENCOUNTER — APPOINTMENT (OUTPATIENT)
Dept: GENERAL RADIOLOGY | Facility: CLINIC | Age: 71
End: 2021-01-01
Attending: INTERNAL MEDICINE
Payer: MEDICARE

## 2021-01-01 ENCOUNTER — PRE VISIT (OUTPATIENT)
Dept: ONCOLOGY | Facility: CLINIC | Age: 71
End: 2021-01-01

## 2021-01-01 ENCOUNTER — ANESTHESIA EVENT (OUTPATIENT)
Dept: SURGERY | Facility: AMBULATORY SURGERY CENTER | Age: 71
End: 2021-01-01

## 2021-01-01 ENCOUNTER — ONCOLOGY VISIT (OUTPATIENT)
Dept: ONCOLOGY | Facility: CLINIC | Age: 71
End: 2021-01-01
Attending: OTOLARYNGOLOGY
Payer: MEDICARE

## 2021-01-01 ENCOUNTER — VIRTUAL VISIT (OUTPATIENT)
Dept: ENDOCRINOLOGY | Facility: CLINIC | Age: 71
End: 2021-01-01
Attending: PHYSICIAN ASSISTANT
Payer: MEDICARE

## 2021-01-01 ENCOUNTER — PRE VISIT (OUTPATIENT)
Dept: ENDOCRINOLOGY | Facility: CLINIC | Age: 71
End: 2021-01-01

## 2021-01-01 ENCOUNTER — ANESTHESIA (OUTPATIENT)
Dept: SURGERY | Facility: CLINIC | Age: 71
End: 2021-01-01

## 2021-01-01 ENCOUNTER — APPOINTMENT (OUTPATIENT)
Dept: GENERAL RADIOLOGY | Facility: CLINIC | Age: 71
DRG: 189 | End: 2021-01-01
Attending: HOSPITALIST
Payer: MEDICARE

## 2021-01-01 ENCOUNTER — ANCILLARY PROCEDURE (OUTPATIENT)
Dept: GENERAL RADIOLOGY | Facility: CLINIC | Age: 71
End: 2021-01-01
Attending: OTOLARYNGOLOGY
Payer: MEDICARE

## 2021-01-01 ENCOUNTER — ALLIED HEALTH/NURSE VISIT (OUTPATIENT)
Dept: SPEECH THERAPY | Facility: CLINIC | Age: 71
End: 2021-01-01

## 2021-01-01 ENCOUNTER — HOSPITAL ENCOUNTER (OUTPATIENT)
Facility: CLINIC | Age: 71
Discharge: HOME | End: 2021-05-24
Attending: OTOLARYNGOLOGY | Admitting: OTOLARYNGOLOGY
Payer: MEDICARE

## 2021-01-01 ENCOUNTER — APPOINTMENT (OUTPATIENT)
Dept: CT IMAGING | Facility: CLINIC | Age: 71
DRG: 180 | End: 2021-01-01
Attending: EMERGENCY MEDICINE
Payer: MEDICARE

## 2021-01-01 ENCOUNTER — HOSPITAL ENCOUNTER (OUTPATIENT)
Facility: CLINIC | Age: 71
End: 2021-01-01
Attending: INTERNAL MEDICINE | Admitting: INTERNAL MEDICINE
Payer: MEDICARE

## 2021-01-01 ENCOUNTER — PREP FOR PROCEDURE (OUTPATIENT)
Dept: OTOLARYNGOLOGY | Facility: CLINIC | Age: 71
End: 2021-01-01

## 2021-01-01 ENCOUNTER — ANCILLARY PROCEDURE (OUTPATIENT)
Dept: ULTRASOUND IMAGING | Facility: CLINIC | Age: 71
End: 2021-01-01
Payer: MEDICARE

## 2021-01-01 ENCOUNTER — APPOINTMENT (OUTPATIENT)
Dept: SPEECH THERAPY | Facility: CLINIC | Age: 71
End: 2021-01-01
Attending: INTERNAL MEDICINE
Payer: MEDICARE

## 2021-01-01 ENCOUNTER — VIRTUAL VISIT (OUTPATIENT)
Dept: RADIATION ONCOLOGY | Facility: CLINIC | Age: 71
End: 2021-01-01
Attending: NURSE PRACTITIONER
Payer: MEDICARE

## 2021-01-01 VITALS
HEIGHT: 67 IN | WEIGHT: 158.73 LBS | SYSTOLIC BLOOD PRESSURE: 137 MMHG | DIASTOLIC BLOOD PRESSURE: 56 MMHG | OXYGEN SATURATION: 96 % | TEMPERATURE: 98.5 F | HEART RATE: 64 BPM | RESPIRATION RATE: 17 BRPM | BODY MASS INDEX: 24.91 KG/M2

## 2021-01-01 VITALS
RESPIRATION RATE: 18 BRPM | OXYGEN SATURATION: 98 % | TEMPERATURE: 98.2 F | HEIGHT: 67 IN | SYSTOLIC BLOOD PRESSURE: 129 MMHG | BODY MASS INDEX: 24.33 KG/M2 | HEART RATE: 59 BPM | DIASTOLIC BLOOD PRESSURE: 69 MMHG | WEIGHT: 155 LBS

## 2021-01-01 VITALS
HEIGHT: 67 IN | RESPIRATION RATE: 20 BRPM | TEMPERATURE: 98.2 F | BODY MASS INDEX: 24.71 KG/M2 | OXYGEN SATURATION: 92 % | WEIGHT: 157.41 LBS | HEART RATE: 56 BPM | DIASTOLIC BLOOD PRESSURE: 70 MMHG | SYSTOLIC BLOOD PRESSURE: 133 MMHG

## 2021-01-01 VITALS
DIASTOLIC BLOOD PRESSURE: 48 MMHG | BODY MASS INDEX: 26.15 KG/M2 | WEIGHT: 179 LBS | WEIGHT: 162.7 LBS | TEMPERATURE: 98.1 F | OXYGEN SATURATION: 98 % | HEIGHT: 66 IN | OXYGEN SATURATION: 92 % | HEIGHT: 67 IN | TEMPERATURE: 97.1 F | SYSTOLIC BLOOD PRESSURE: 92 MMHG | HEART RATE: 59 BPM | RESPIRATION RATE: 16 BRPM | RESPIRATION RATE: 16 BRPM | SYSTOLIC BLOOD PRESSURE: 135 MMHG | HEART RATE: 77 BPM | BODY MASS INDEX: 28.09 KG/M2 | DIASTOLIC BLOOD PRESSURE: 59 MMHG

## 2021-01-01 VITALS — BODY MASS INDEX: 28.35 KG/M2 | WEIGHT: 181 LBS

## 2021-01-01 VITALS
WEIGHT: 157.19 LBS | TEMPERATURE: 96.8 F | HEIGHT: 67 IN | HEART RATE: 70 BPM | BODY MASS INDEX: 24.67 KG/M2 | OXYGEN SATURATION: 99 %

## 2021-01-01 VITALS
TEMPERATURE: 96.9 F | BODY MASS INDEX: 29.6 KG/M2 | RESPIRATION RATE: 16 BRPM | OXYGEN SATURATION: 99 % | SYSTOLIC BLOOD PRESSURE: 175 MMHG | HEIGHT: 67 IN | BODY MASS INDEX: 24.01 KG/M2 | WEIGHT: 153 LBS | DIASTOLIC BLOOD PRESSURE: 84 MMHG | HEART RATE: 61 BPM | WEIGHT: 189 LBS

## 2021-01-01 VITALS
BODY MASS INDEX: 25.11 KG/M2 | WEIGHT: 160 LBS | HEART RATE: 69 BPM | HEIGHT: 67 IN | TEMPERATURE: 96.9 F | OXYGEN SATURATION: 98 %

## 2021-01-01 VITALS
SYSTOLIC BLOOD PRESSURE: 139 MMHG | RESPIRATION RATE: 16 BRPM | DIASTOLIC BLOOD PRESSURE: 52 MMHG | WEIGHT: 167.1 LBS | OXYGEN SATURATION: 96 % | TEMPERATURE: 95.9 F | HEIGHT: 67 IN | HEART RATE: 70 BPM | BODY MASS INDEX: 26.23 KG/M2

## 2021-01-01 VITALS
BODY MASS INDEX: 26.71 KG/M2 | TEMPERATURE: 97.3 F | OXYGEN SATURATION: 99 % | WEIGHT: 170.19 LBS | HEART RATE: 89 BPM | HEIGHT: 67 IN

## 2021-01-01 VITALS — HEART RATE: 66 BPM | WEIGHT: 187 LBS | OXYGEN SATURATION: 98 % | BODY MASS INDEX: 29.29 KG/M2 | TEMPERATURE: 97.8 F

## 2021-01-01 VITALS — DIASTOLIC BLOOD PRESSURE: 64 MMHG | SYSTOLIC BLOOD PRESSURE: 111 MMHG | HEART RATE: 88 BPM | OXYGEN SATURATION: 98 %

## 2021-01-01 VITALS — DIASTOLIC BLOOD PRESSURE: 72 MMHG | BODY MASS INDEX: 28.82 KG/M2 | WEIGHT: 184 LBS | SYSTOLIC BLOOD PRESSURE: 187 MMHG

## 2021-01-01 VITALS
BODY MASS INDEX: 24.12 KG/M2 | DIASTOLIC BLOOD PRESSURE: 67 MMHG | HEIGHT: 67 IN | HEART RATE: 53 BPM | TEMPERATURE: 98 F | SYSTOLIC BLOOD PRESSURE: 132 MMHG | WEIGHT: 153.66 LBS | RESPIRATION RATE: 16 BRPM | OXYGEN SATURATION: 98 %

## 2021-01-01 VITALS
TEMPERATURE: 99.1 F | DIASTOLIC BLOOD PRESSURE: 69 MMHG | OXYGEN SATURATION: 99 % | HEART RATE: 89 BPM | RESPIRATION RATE: 18 BRPM | SYSTOLIC BLOOD PRESSURE: 120 MMHG

## 2021-01-01 VITALS — HEIGHT: 67 IN | WEIGHT: 165 LBS | BODY MASS INDEX: 25.9 KG/M2

## 2021-01-01 VITALS
SYSTOLIC BLOOD PRESSURE: 136 MMHG | BODY MASS INDEX: 29.29 KG/M2 | WEIGHT: 187 LBS | DIASTOLIC BLOOD PRESSURE: 78 MMHG | HEART RATE: 94 BPM

## 2021-01-01 VITALS — SYSTOLIC BLOOD PRESSURE: 158 MMHG | DIASTOLIC BLOOD PRESSURE: 80 MMHG

## 2021-01-01 VITALS
TEMPERATURE: 97.5 F | TEMPERATURE: 98.5 F | SYSTOLIC BLOOD PRESSURE: 125 MMHG | SYSTOLIC BLOOD PRESSURE: 128 MMHG | DIASTOLIC BLOOD PRESSURE: 62 MMHG | OXYGEN SATURATION: 100 % | RESPIRATION RATE: 12 BRPM | OXYGEN SATURATION: 100 % | BODY MASS INDEX: 28.44 KG/M2 | HEART RATE: 78 BPM | DIASTOLIC BLOOD PRESSURE: 67 MMHG | RESPIRATION RATE: 16 BRPM | WEIGHT: 181.6 LBS | HEART RATE: 78 BPM

## 2021-01-01 VITALS — BODY MASS INDEX: 28.19 KG/M2 | WEIGHT: 180 LBS

## 2021-01-01 VITALS
HEART RATE: 59 BPM | RESPIRATION RATE: 16 BRPM | SYSTOLIC BLOOD PRESSURE: 166 MMHG | DIASTOLIC BLOOD PRESSURE: 66 MMHG | WEIGHT: 167.7 LBS | BODY MASS INDEX: 26.27 KG/M2 | TEMPERATURE: 97.4 F

## 2021-01-01 VITALS
TEMPERATURE: 97 F | HEART RATE: 56 BPM | WEIGHT: 184 LBS | HEIGHT: 67 IN | SYSTOLIC BLOOD PRESSURE: 122 MMHG | RESPIRATION RATE: 18 BRPM | DIASTOLIC BLOOD PRESSURE: 69 MMHG | OXYGEN SATURATION: 96 % | BODY MASS INDEX: 28.88 KG/M2

## 2021-01-01 VITALS — WEIGHT: 160.94 LBS | BODY MASS INDEX: 25.26 KG/M2 | HEIGHT: 67 IN

## 2021-01-01 VITALS — WEIGHT: 181 LBS | BODY MASS INDEX: 28.35 KG/M2

## 2021-01-01 VITALS
HEART RATE: 71 BPM | BODY MASS INDEX: 25.58 KG/M2 | OXYGEN SATURATION: 97 % | HEIGHT: 67 IN | TEMPERATURE: 97.5 F | WEIGHT: 163 LBS

## 2021-01-01 VITALS
BODY MASS INDEX: 24.91 KG/M2 | OXYGEN SATURATION: 99 % | HEART RATE: 84 BPM | BODY MASS INDEX: 29.91 KG/M2 | TEMPERATURE: 97.3 F | SYSTOLIC BLOOD PRESSURE: 148 MMHG | WEIGHT: 191 LBS | WEIGHT: 158.73 LBS | HEIGHT: 67 IN | HEART RATE: 69 BPM | RESPIRATION RATE: 18 BRPM | DIASTOLIC BLOOD PRESSURE: 82 MMHG

## 2021-01-01 VITALS
OXYGEN SATURATION: 98 % | TEMPERATURE: 98.1 F | WEIGHT: 187.1 LBS | RESPIRATION RATE: 16 BRPM | HEART RATE: 54 BPM | DIASTOLIC BLOOD PRESSURE: 69 MMHG | SYSTOLIC BLOOD PRESSURE: 155 MMHG | BODY MASS INDEX: 29.3 KG/M2

## 2021-01-01 VITALS — WEIGHT: 157 LBS | HEART RATE: 62 BPM | BODY MASS INDEX: 24.59 KG/M2 | TEMPERATURE: 97.1 F | OXYGEN SATURATION: 98 %

## 2021-01-01 VITALS — HEART RATE: 76 BPM | BODY MASS INDEX: 28.31 KG/M2 | TEMPERATURE: 96.1 F | OXYGEN SATURATION: 96 % | WEIGHT: 180.78 LBS

## 2021-01-01 VITALS
HEART RATE: 66 BPM | RESPIRATION RATE: 16 BRPM | TEMPERATURE: 97.9 F | SYSTOLIC BLOOD PRESSURE: 147 MMHG | DIASTOLIC BLOOD PRESSURE: 73 MMHG | OXYGEN SATURATION: 99 % | WEIGHT: 169.7 LBS | BODY MASS INDEX: 26.63 KG/M2 | HEIGHT: 67 IN

## 2021-01-01 VITALS — BODY MASS INDEX: 24.01 KG/M2 | WEIGHT: 153 LBS | HEIGHT: 67 IN

## 2021-01-01 VITALS — SYSTOLIC BLOOD PRESSURE: 142 MMHG | WEIGHT: 165 LBS | DIASTOLIC BLOOD PRESSURE: 76 MMHG | BODY MASS INDEX: 25.84 KG/M2

## 2021-01-01 VITALS
WEIGHT: 180.34 LBS | SYSTOLIC BLOOD PRESSURE: 136 MMHG | DIASTOLIC BLOOD PRESSURE: 72 MMHG | HEART RATE: 66 BPM | BODY MASS INDEX: 28.24 KG/M2 | OXYGEN SATURATION: 97 % | RESPIRATION RATE: 16 BRPM | TEMPERATURE: 98.2 F

## 2021-01-01 VITALS — BODY MASS INDEX: 28.03 KG/M2 | WEIGHT: 179 LBS

## 2021-01-01 VITALS — OXYGEN SATURATION: 93 %

## 2021-01-01 DIAGNOSIS — K13.79 ORAL PAIN: ICD-10-CM

## 2021-01-01 DIAGNOSIS — A42.9 ACTINOMYCOSIS: ICD-10-CM

## 2021-01-01 DIAGNOSIS — K14.0 TONGUE ULCER: ICD-10-CM

## 2021-01-01 DIAGNOSIS — C44.42 SQUAMOUS CELL CARCINOMA OF NECK: ICD-10-CM

## 2021-01-01 DIAGNOSIS — K13.79 ORAL PAIN: Primary | ICD-10-CM

## 2021-01-01 DIAGNOSIS — C09.9 TONSIL CANCER (H): ICD-10-CM

## 2021-01-01 DIAGNOSIS — Z11.59 ENCOUNTER FOR SCREENING FOR OTHER VIRAL DISEASES: ICD-10-CM

## 2021-01-01 DIAGNOSIS — R13.12 OROPHARYNGEAL DYSPHAGIA: ICD-10-CM

## 2021-01-01 DIAGNOSIS — R91.8 HILAR MASS: Primary | ICD-10-CM

## 2021-01-01 DIAGNOSIS — M87.30 OSTEORADIONECROSIS (H): ICD-10-CM

## 2021-01-01 DIAGNOSIS — Z71.89 OTHER SPECIFIED COUNSELING: ICD-10-CM

## 2021-01-01 DIAGNOSIS — J90 RECURRENT LEFT PLEURAL EFFUSION: Primary | ICD-10-CM

## 2021-01-01 DIAGNOSIS — C09.9 TONSIL CANCER (H): Primary | ICD-10-CM

## 2021-01-01 DIAGNOSIS — C44.42 SQUAMOUS CELL CARCINOMA OF NECK: Primary | ICD-10-CM

## 2021-01-01 DIAGNOSIS — J90 PLEURAL EFFUSION: Primary | ICD-10-CM

## 2021-01-01 DIAGNOSIS — C02.9 SQUAMOUS CELL CANCER OF TONGUE (H): Primary | ICD-10-CM

## 2021-01-01 DIAGNOSIS — R13.10 DYSPHAGIA: Primary | ICD-10-CM

## 2021-01-01 DIAGNOSIS — E11.65 TYPE 2 DIABETES MELLITUS WITH HYPERGLYCEMIA, WITHOUT LONG-TERM CURRENT USE OF INSULIN (H): ICD-10-CM

## 2021-01-01 DIAGNOSIS — R91.8 HILAR MASS: ICD-10-CM

## 2021-01-01 DIAGNOSIS — G89.3 CANCER RELATED PAIN: ICD-10-CM

## 2021-01-01 DIAGNOSIS — R13.12 OROPHARYNGEAL DYSPHAGIA: Primary | ICD-10-CM

## 2021-01-01 DIAGNOSIS — C02.9 SQUAMOUS CELL CANCER OF TONGUE (H): ICD-10-CM

## 2021-01-01 DIAGNOSIS — C02.1 SQUAMOUS CELL CARCINOMA OF LATERAL TONGUE (H): ICD-10-CM

## 2021-01-01 DIAGNOSIS — G89.3 CANCER RELATED PAIN: Primary | ICD-10-CM

## 2021-01-01 DIAGNOSIS — A42.9 ACTINOMYCOSIS: Primary | ICD-10-CM

## 2021-01-01 DIAGNOSIS — E03.2 HYPOTHYROIDISM DUE TO MEDICATION: ICD-10-CM

## 2021-01-01 DIAGNOSIS — Z53.9 ERRONEOUS ENCOUNTER--DISREGARD: ICD-10-CM

## 2021-01-01 DIAGNOSIS — C02.1 SQUAMOUS CELL CARCINOMA OF LATERAL TONGUE (H): Primary | ICD-10-CM

## 2021-01-01 DIAGNOSIS — K14.0 TONGUE ULCER: Primary | ICD-10-CM

## 2021-01-01 DIAGNOSIS — E83.52 HYPERCALCEMIA: ICD-10-CM

## 2021-01-01 DIAGNOSIS — R13.10 DYSPHAGIA: ICD-10-CM

## 2021-01-01 DIAGNOSIS — E11.65 TYPE 2 DIABETES MELLITUS WITH HYPERGLYCEMIA, WITHOUT LONG-TERM CURRENT USE OF INSULIN (H): Primary | ICD-10-CM

## 2021-01-01 DIAGNOSIS — R13.10 DYSPHAGIA, UNSPECIFIED TYPE: ICD-10-CM

## 2021-01-01 DIAGNOSIS — R13.10 DYSPHAGIA, UNSPECIFIED TYPE: Primary | ICD-10-CM

## 2021-01-01 DIAGNOSIS — Z01.818 PRE-OP EXAMINATION: Primary | ICD-10-CM

## 2021-01-01 DIAGNOSIS — J15.9 COMMUNITY ACQUIRED BACTERIAL PNEUMONIA: Primary | ICD-10-CM

## 2021-01-01 DIAGNOSIS — J91.0 PLEURAL EFFUSION, MALIGNANT (H): ICD-10-CM

## 2021-01-01 DIAGNOSIS — N17.9 ACUTE KIDNEY INJURY (H): ICD-10-CM

## 2021-01-01 DIAGNOSIS — Z20.822 COVID-19 RULED OUT BY LABORATORY TESTING: ICD-10-CM

## 2021-01-01 DIAGNOSIS — K13.79 ACUTE PAIN OF MOUTH: Primary | ICD-10-CM

## 2021-01-01 DIAGNOSIS — C79.89: Primary | ICD-10-CM

## 2021-01-01 DIAGNOSIS — R06.00 DYSPNEA, UNSPECIFIED TYPE: ICD-10-CM
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
## 2021-01-01 DIAGNOSIS — K13.79 ACUTE PAIN OF MOUTH: ICD-10-CM

## 2021-01-01 DIAGNOSIS — C76.0 MALIGNANT NEOPLASM OF HEAD, FACE AND NECK (H): ICD-10-CM

## 2021-01-01 DIAGNOSIS — G89.18 ACUTE POST-OPERATIVE PAIN: ICD-10-CM
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
## 2021-01-01 DIAGNOSIS — C80.1: Primary | ICD-10-CM

## 2021-01-01 DIAGNOSIS — M54.2 NECK PAIN: ICD-10-CM

## 2021-01-01 DIAGNOSIS — N28.9 RENAL INSUFFICIENCY: ICD-10-CM

## 2021-01-01 DIAGNOSIS — K13.70 ORAL LESION: Primary | ICD-10-CM

## 2021-01-01 DIAGNOSIS — Z11.59 ENCOUNTER FOR SCREENING FOR OTHER VIRAL DISEASES: Primary | ICD-10-CM

## 2021-01-01 DIAGNOSIS — L30.9 DERMATITIS: Primary | ICD-10-CM

## 2021-01-01 DIAGNOSIS — M87.30 OSTEORADIONECROSIS (H): Primary | ICD-10-CM

## 2021-01-01 DIAGNOSIS — N17.9 ACUTE RENAL FAILURE, UNSPECIFIED ACUTE RENAL FAILURE TYPE (H): ICD-10-CM

## 2021-01-01 DIAGNOSIS — C76.0 MALIGNANT NEOPLASM OF HEAD, FACE AND NECK (H): Primary | ICD-10-CM

## 2021-01-01 DIAGNOSIS — R09.02 HYPOXIA: ICD-10-CM

## 2021-01-01 DIAGNOSIS — I26.99 THROMBUS OF PULMONARY VEIN (H): ICD-10-CM

## 2021-01-01 DIAGNOSIS — C79.51 BONE METASTASIS: ICD-10-CM

## 2021-01-01 DIAGNOSIS — E11.9 TYPE 2 DIABETES MELLITUS WITHOUT COMPLICATION, WITHOUT LONG-TERM CURRENT USE OF INSULIN (H): ICD-10-CM

## 2021-01-01 LAB
ALBUMIN SERPL-MCNC: 1.9 G/DL (ref 3.4–5)
ALBUMIN SERPL-MCNC: 2 G/DL (ref 3.4–5)
ALBUMIN SERPL-MCNC: 2.1 G/DL (ref 3.4–5)
ALBUMIN SERPL-MCNC: 2.2 G/DL (ref 3.4–5)
ALBUMIN SERPL-MCNC: 2.4 G/DL (ref 3.4–5)
ALBUMIN SERPL-MCNC: 3.2 G/DL (ref 3.4–5)
ALBUMIN SERPL-MCNC: 3.3 G/DL (ref 3.4–5)
ALBUMIN SERPL-MCNC: 3.4 G/DL (ref 3.4–5)
ALBUMIN SERPL-MCNC: 3.6 G/DL (ref 3.4–5)
ALBUMIN SERPL-MCNC: 3.6 G/DL (ref 3.4–5)
ALBUMIN SERPL-MCNC: 3.8 G/DL (ref 3.4–5)
ALBUMIN UR-MCNC: NEGATIVE MG/DL
ALP SERPL-CCNC: 142 U/L (ref 40–150)
ALP SERPL-CCNC: 166 U/L (ref 40–150)
ALP SERPL-CCNC: 175 U/L (ref 40–150)
ALP SERPL-CCNC: 182 U/L (ref 40–150)
ALP SERPL-CCNC: 193 U/L (ref 40–150)
ALP SERPL-CCNC: 202 U/L (ref 40–150)
ALP SERPL-CCNC: 246 U/L (ref 40–150)
ALP SERPL-CCNC: 80 U/L (ref 40–150)
ALP SERPL-CCNC: 85 U/L (ref 40–150)
ALP SERPL-CCNC: 86 U/L (ref 40–150)
ALP SERPL-CCNC: 89 U/L (ref 40–150)
ALP SERPL-CCNC: 90 U/L (ref 40–150)
ALP SERPL-CCNC: 95 U/L (ref 40–150)
ALT SERPL W P-5'-P-CCNC: 17 U/L (ref 0–70)
ALT SERPL W P-5'-P-CCNC: 20 U/L (ref 0–70)
ALT SERPL W P-5'-P-CCNC: 20 U/L (ref 0–70)
ALT SERPL W P-5'-P-CCNC: 23 U/L (ref 0–70)
ALT SERPL W P-5'-P-CCNC: 24 U/L (ref 0–70)
ALT SERPL W P-5'-P-CCNC: 24 U/L (ref 0–70)
ALT SERPL W P-5'-P-CCNC: 26 U/L (ref 0–70)
ALT SERPL W P-5'-P-CCNC: 28 U/L (ref 0–70)
ALT SERPL W P-5'-P-CCNC: 29 U/L (ref 0–70)
ALT SERPL W P-5'-P-CCNC: 29 U/L (ref 0–70)
ALT SERPL W P-5'-P-CCNC: 39 U/L (ref 0–70)
ANION GAP SERPL CALCULATED.3IONS-SCNC: 2 MMOL/L (ref 3–14)
ANION GAP SERPL CALCULATED.3IONS-SCNC: 3 MMOL/L (ref 3–14)
ANION GAP SERPL CALCULATED.3IONS-SCNC: 4 MMOL/L (ref 3–14)
ANION GAP SERPL CALCULATED.3IONS-SCNC: 5 MMOL/L (ref 3–14)
ANION GAP SERPL CALCULATED.3IONS-SCNC: 6 MMOL/L (ref 3–14)
ANION GAP SERPL CALCULATED.3IONS-SCNC: 7 MMOL/L (ref 3–14)
ANION GAP SERPL CALCULATED.3IONS-SCNC: 8 MMOL/L (ref 3–14)
ANION GAP SERPL CALCULATED.3IONS-SCNC: 9 MMOL/L (ref 3–14)
ANION GAP SERPL CALCULATED.3IONS-SCNC: <1 MMOL/L (ref 3–14)
APPEARANCE FLD: ABNORMAL
APPEARANCE FLD: ABNORMAL
APPEARANCE UR: CLEAR
AST SERPL W P-5'-P-CCNC: 13 U/L (ref 0–45)
AST SERPL W P-5'-P-CCNC: 14 U/L (ref 0–45)
AST SERPL W P-5'-P-CCNC: 14 U/L (ref 0–45)
AST SERPL W P-5'-P-CCNC: 16 U/L (ref 0–45)
AST SERPL W P-5'-P-CCNC: 18 U/L (ref 0–45)
AST SERPL W P-5'-P-CCNC: 18 U/L (ref 0–45)
AST SERPL W P-5'-P-CCNC: 19 U/L (ref 0–45)
AST SERPL W P-5'-P-CCNC: 19 U/L (ref 0–45)
AST SERPL W P-5'-P-CCNC: 22 U/L (ref 0–45)
AST SERPL W P-5'-P-CCNC: 22 U/L (ref 0–45)
AST SERPL W P-5'-P-CCNC: 24 U/L (ref 0–45)
AST SERPL W P-5'-P-CCNC: 26 U/L (ref 0–45)
AST SERPL W P-5'-P-CCNC: 32 U/L (ref 0–45)
ATRIAL RATE - MUSE: 74 BPM
BACTERIA BRONCH: NO GROWTH
BACTERIA BRONCH: NO GROWTH
BACTERIA FLD CULT: NO GROWTH
BACTERIA PLR CULT: NO GROWTH
BACTERIA PLR CULT: NO GROWTH
BACTERIA PLR CULT: NORMAL
BACTERIA TISS BX CULT: NO GROWTH
BACTERIA TISS BX CULT: NORMAL
BASOPHILS # BLD AUTO: 0 10E3/UL (ref 0–0.2)
BASOPHILS # BLD AUTO: 0 10E9/L (ref 0–0.2)
BASOPHILS # BLD AUTO: 0.1 10E3/UL (ref 0–0.2)
BASOPHILS # BLD AUTO: 0.1 10E9/L (ref 0–0.2)
BASOPHILS # BLD AUTO: 0.1 10E9/L (ref 0–0.2)
BASOPHILS NFR BLD AUTO: 0 %
BASOPHILS NFR BLD AUTO: 0.2 %
BASOPHILS NFR BLD AUTO: 0.3 %
BASOPHILS NFR BLD AUTO: 0.5 %
BASOPHILS NFR BLD AUTO: 0.5 %
BASOPHILS NFR BLD AUTO: 0.6 %
BILIRUB SERPL-MCNC: 0.2 MG/DL (ref 0.2–1.3)
BILIRUB SERPL-MCNC: 0.3 MG/DL (ref 0.2–1.3)
BILIRUB SERPL-MCNC: 0.3 MG/DL (ref 0.2–1.3)
BILIRUB SERPL-MCNC: 0.4 MG/DL (ref 0.2–1.3)
BILIRUB SERPL-MCNC: 0.5 MG/DL (ref 0.2–1.3)
BILIRUB UR QL STRIP: NEGATIVE
BUN SERPL-MCNC: 14 MG/DL (ref 7–30)
BUN SERPL-MCNC: 15 MG/DL (ref 7–30)
BUN SERPL-MCNC: 18 MG/DL (ref 7–30)
BUN SERPL-MCNC: 18 MG/DL (ref 7–30)
BUN SERPL-MCNC: 19 MG/DL (ref 7–30)
BUN SERPL-MCNC: 22 MG/DL (ref 7–30)
BUN SERPL-MCNC: 23 MG/DL (ref 7–30)
BUN SERPL-MCNC: 24 MG/DL (ref 7–30)
BUN SERPL-MCNC: 26 MG/DL (ref 7–30)
BUN SERPL-MCNC: 30 MG/DL (ref 7–30)
BUN SERPL-MCNC: 40 MG/DL (ref 7–30)
BUN SERPL-MCNC: 44 MG/DL (ref 7–30)
BUN SERPL-MCNC: 52 MG/DL (ref 7–30)
BUN SERPL-MCNC: 52 MG/DL (ref 7–30)
BUN SERPL-MCNC: 55 MG/DL (ref 7–30)
CA-I BLD-MCNC: 5.6 MG/DL (ref 4.4–5.2)
CA-I BLD-MCNC: 5.7 MG/DL (ref 4.4–5.2)
CALCIUM SERPL-MCNC: 10.7 MG/DL (ref 8.5–10.1)
CALCIUM SERPL-MCNC: 11 MG/DL (ref 8.5–10.1)
CALCIUM SERPL-MCNC: 11.4 MG/DL (ref 8.5–10.1)
CALCIUM SERPL-MCNC: 13 MG/DL (ref 8.5–10.1)
CALCIUM SERPL-MCNC: 14 MG/DL (ref 8.5–10.1)
CALCIUM SERPL-MCNC: 7.6 MG/DL (ref 8.5–10.1)
CALCIUM SERPL-MCNC: 8.9 MG/DL (ref 8.5–10.1)
CALCIUM SERPL-MCNC: 9 MG/DL (ref 8.5–10.1)
CALCIUM SERPL-MCNC: 9.1 MG/DL (ref 8.5–10.1)
CALCIUM SERPL-MCNC: 9.1 MG/DL (ref 8.5–10.1)
CALCIUM SERPL-MCNC: 9.2 MG/DL (ref 8.5–10.1)
CALCIUM SERPL-MCNC: 9.4 MG/DL (ref 8.5–10.1)
CALCIUM SERPL-MCNC: 9.4 MG/DL (ref 8.5–10.1)
CALCIUM SERPL-MCNC: 9.5 MG/DL (ref 8.5–10.1)
CALCIUM SERPL-MCNC: 9.5 MG/DL (ref 8.5–10.1)
CALCIUM SERPL-MCNC: 9.6 MG/DL (ref 8.5–10.1)
CHLORIDE BLD-SCNC: 102 MMOL/L (ref 94–109)
CHLORIDE BLD-SCNC: 108 MMOL/L (ref 94–109)
CHLORIDE BLD-SCNC: 110 MMOL/L (ref 94–109)
CHLORIDE BLD-SCNC: 112 MMOL/L (ref 94–109)
CHLORIDE BLD-SCNC: 93 MMOL/L (ref 94–109)
CHLORIDE BLD-SCNC: 96 MMOL/L (ref 94–109)
CHLORIDE BLD-SCNC: 97 MMOL/L (ref 94–109)
CHLORIDE BLD-SCNC: 98 MMOL/L (ref 94–109)
CHLORIDE SERPL-SCNC: 100 MMOL/L (ref 94–109)
CHLORIDE SERPL-SCNC: 102 MMOL/L (ref 94–109)
CHLORIDE SERPL-SCNC: 103 MMOL/L (ref 94–109)
CHLORIDE SERPL-SCNC: 104 MMOL/L (ref 94–109)
CHLORIDE SERPL-SCNC: 104 MMOL/L (ref 94–109)
CHLORIDE SERPL-SCNC: 105 MMOL/L (ref 94–109)
CHLORIDE SERPL-SCNC: 108 MMOL/L (ref 94–109)
CHLORIDE SERPL-SCNC: 99 MMOL/L (ref 94–109)
CHLORIDE SERPL-SCNC: 99 MMOL/L (ref 94–109)
CO2 SERPL-SCNC: 24 MMOL/L (ref 20–32)
CO2 SERPL-SCNC: 27 MMOL/L (ref 20–32)
CO2 SERPL-SCNC: 27 MMOL/L (ref 20–32)
CO2 SERPL-SCNC: 28 MMOL/L (ref 20–32)
CO2 SERPL-SCNC: 29 MMOL/L (ref 20–32)
CO2 SERPL-SCNC: 30 MMOL/L (ref 20–32)
CO2 SERPL-SCNC: 31 MMOL/L (ref 20–32)
CO2 SERPL-SCNC: 32 MMOL/L (ref 20–32)
CO2 SERPL-SCNC: 32 MMOL/L (ref 20–32)
COLOR FLD: COLORLESS
COLOR FLD: YELLOW
COLOR UR AUTO: ABNORMAL
COPATH REPORT: NORMAL
CREAT BLD-MCNC: 1.2 MG/DL (ref 0.66–1.25)
CREAT SERPL-MCNC: 0.86 MG/DL (ref 0.66–1.25)
CREAT SERPL-MCNC: 0.9 MG/DL (ref 0.66–1.25)
CREAT SERPL-MCNC: 0.94 MG/DL (ref 0.66–1.25)
CREAT SERPL-MCNC: 1.03 MG/DL (ref 0.66–1.25)
CREAT SERPL-MCNC: 1.07 MG/DL (ref 0.66–1.25)
CREAT SERPL-MCNC: 1.16 MG/DL (ref 0.66–1.25)
CREAT SERPL-MCNC: 1.21 MG/DL (ref 0.66–1.25)
CREAT SERPL-MCNC: 1.23 MG/DL (ref 0.66–1.25)
CREAT SERPL-MCNC: 1.24 MG/DL (ref 0.66–1.25)
CREAT SERPL-MCNC: 1.27 MG/DL (ref 0.66–1.25)
CREAT SERPL-MCNC: 1.34 MG/DL (ref 0.66–1.25)
CREAT SERPL-MCNC: 1.35 MG/DL (ref 0.66–1.25)
CREAT SERPL-MCNC: 1.39 MG/DL (ref 0.66–1.25)
CREAT SERPL-MCNC: 1.41 MG/DL (ref 0.66–1.25)
CREAT SERPL-MCNC: 1.41 MG/DL (ref 0.66–1.25)
CREAT SERPL-MCNC: 1.47 MG/DL (ref 0.66–1.25)
CREAT SERPL-MCNC: 1.5 MG/DL (ref 0.66–1.25)
CREAT SERPL-MCNC: 1.68 MG/DL (ref 0.66–1.25)
CREAT SERPL-MCNC: 1.82 MG/DL (ref 0.66–1.25)
CREAT SERPL-MCNC: 1.91 MG/DL (ref 0.66–1.25)
CREAT SERPL-MCNC: 2.03 MG/DL (ref 0.66–1.25)
DIASTOLIC BLOOD PRESSURE - MUSE: NORMAL MMHG
DIFFERENTIAL METHOD BLD: ABNORMAL
DIFFERENTIAL METHOD BLD: NORMAL
EOSINOPHIL # BLD AUTO: 0 10E3/UL (ref 0–0.7)
EOSINOPHIL # BLD AUTO: 0 10E9/L (ref 0–0.7)
EOSINOPHIL # BLD AUTO: 0.1 10E3/UL (ref 0–0.7)
EOSINOPHIL # BLD AUTO: 0.1 10E9/L (ref 0–0.7)
EOSINOPHIL # BLD AUTO: 0.1 10E9/L (ref 0–0.7)
EOSINOPHIL # BLD AUTO: 0.2 10E9/L (ref 0–0.7)
EOSINOPHIL # BLD AUTO: 0.4 10E9/L (ref 0–0.7)
EOSINOPHIL NFR BLD AUTO: 0 %
EOSINOPHIL NFR BLD AUTO: 0.2 %
EOSINOPHIL NFR BLD AUTO: 0.3 %
EOSINOPHIL NFR BLD AUTO: 0.8 %
EOSINOPHIL NFR BLD AUTO: 1 %
EOSINOPHIL NFR BLD AUTO: 1 %
EOSINOPHIL NFR BLD AUTO: 1.2 %
EOSINOPHIL NFR BLD AUTO: 2.5 %
EOSINOPHIL NFR BLD AUTO: 4.7 %
ERYTHROCYTE [DISTWIDTH] IN BLOOD BY AUTOMATED COUNT: 11.9 % (ref 10–15)
ERYTHROCYTE [DISTWIDTH] IN BLOOD BY AUTOMATED COUNT: 12 % (ref 10–15)
ERYTHROCYTE [DISTWIDTH] IN BLOOD BY AUTOMATED COUNT: 12.1 % (ref 10–15)
ERYTHROCYTE [DISTWIDTH] IN BLOOD BY AUTOMATED COUNT: 12.2 % (ref 10–15)
ERYTHROCYTE [DISTWIDTH] IN BLOOD BY AUTOMATED COUNT: 12.2 % (ref 10–15)
ERYTHROCYTE [DISTWIDTH] IN BLOOD BY AUTOMATED COUNT: 12.8 % (ref 10–15)
ERYTHROCYTE [DISTWIDTH] IN BLOOD BY AUTOMATED COUNT: 12.9 % (ref 10–15)
ERYTHROCYTE [DISTWIDTH] IN BLOOD BY AUTOMATED COUNT: 13.1 % (ref 10–15)
ERYTHROCYTE [DISTWIDTH] IN BLOOD BY AUTOMATED COUNT: 13.2 % (ref 10–15)
ERYTHROCYTE [DISTWIDTH] IN BLOOD BY AUTOMATED COUNT: 13.2 % (ref 10–15)
ERYTHROCYTE [DISTWIDTH] IN BLOOD BY AUTOMATED COUNT: 13.3 % (ref 10–15)
ERYTHROCYTE [DISTWIDTH] IN BLOOD BY AUTOMATED COUNT: 13.5 % (ref 10–15)
GFR SERPL CREATININE-BSD FRML MDRD: 32 ML/MIN/1.73M2
GFR SERPL CREATININE-BSD FRML MDRD: 34 ML/MIN/1.73M2
GFR SERPL CREATININE-BSD FRML MDRD: 36 ML/MIN/{1.73_M2}
GFR SERPL CREATININE-BSD FRML MDRD: 40 ML/MIN/1.73M2
GFR SERPL CREATININE-BSD FRML MDRD: 46 ML/MIN/{1.73_M2}
GFR SERPL CREATININE-BSD FRML MDRD: 47 ML/MIN/{1.73_M2}
GFR SERPL CREATININE-BSD FRML MDRD: 49 ML/MIN/1.73M2
GFR SERPL CREATININE-BSD FRML MDRD: 49 ML/MIN/{1.73_M2}
GFR SERPL CREATININE-BSD FRML MDRD: 50 ML/MIN/{1.73_M2}
GFR SERPL CREATININE-BSD FRML MDRD: 52 ML/MIN/1.73M2
GFR SERPL CREATININE-BSD FRML MDRD: 53 ML/MIN/{1.73_M2}
GFR SERPL CREATININE-BSD FRML MDRD: 56 ML/MIN/{1.73_M2}
GFR SERPL CREATININE-BSD FRML MDRD: 58 ML/MIN/{1.73_M2}
GFR SERPL CREATININE-BSD FRML MDRD: 58 ML/MIN/{1.73_M2}
GFR SERPL CREATININE-BSD FRML MDRD: 60 ML/MIN/{1.73_M2}
GFR SERPL CREATININE-BSD FRML MDRD: 60 ML/MIN/{1.73_M2}
GFR SERPL CREATININE-BSD FRML MDRD: 62 ML/MIN/{1.73_M2}
GFR SERPL CREATININE-BSD FRML MDRD: 69 ML/MIN/1.73M2
GFR SERPL CREATININE-BSD FRML MDRD: 72 ML/MIN/1.73M2
GFR SERPL CREATININE-BSD FRML MDRD: 81 ML/MIN/1.73M2
GFR SERPL CREATININE-BSD FRML MDRD: 85 ML/MIN/1.73M2
GFR SERPL CREATININE-BSD FRML MDRD: 87 ML/MIN/1.73M2
GLUCOSE BLD-MCNC: 116 MG/DL (ref 70–99)
GLUCOSE BLD-MCNC: 124 MG/DL (ref 70–99)
GLUCOSE BLD-MCNC: 194 MG/DL (ref 70–99)
GLUCOSE BLD-MCNC: 240 MG/DL (ref 70–99)
GLUCOSE BLD-MCNC: 243 MG/DL (ref 70–99)
GLUCOSE BLD-MCNC: 340 MG/DL (ref 70–99)
GLUCOSE BLD-MCNC: 369 MG/DL (ref 70–99)
GLUCOSE BLD-MCNC: 68 MG/DL (ref 70–99)
GLUCOSE BLDC GLUCOMTR-MCNC: 105 MG/DL (ref 70–99)
GLUCOSE BLDC GLUCOMTR-MCNC: 115 MG/DL (ref 70–99)
GLUCOSE BLDC GLUCOMTR-MCNC: 136 MG/DL (ref 70–99)
GLUCOSE BLDC GLUCOMTR-MCNC: 141 MG/DL (ref 70–99)
GLUCOSE BLDC GLUCOMTR-MCNC: 146 MG/DL (ref 70–99)
GLUCOSE BLDC GLUCOMTR-MCNC: 151 MG/DL (ref 70–99)
GLUCOSE BLDC GLUCOMTR-MCNC: 169 MG/DL (ref 70–99)
GLUCOSE BLDC GLUCOMTR-MCNC: 176 MG/DL (ref 70–99)
GLUCOSE BLDC GLUCOMTR-MCNC: 178 MG/DL (ref 70–99)
GLUCOSE BLDC GLUCOMTR-MCNC: 183 MG/DL (ref 70–99)
GLUCOSE BLDC GLUCOMTR-MCNC: 183 MG/DL (ref 70–99)
GLUCOSE BLDC GLUCOMTR-MCNC: 184 MG/DL (ref 70–99)
GLUCOSE BLDC GLUCOMTR-MCNC: 205 MG/DL (ref 70–99)
GLUCOSE BLDC GLUCOMTR-MCNC: 205 MG/DL (ref 70–99)
GLUCOSE BLDC GLUCOMTR-MCNC: 206 MG/DL (ref 70–99)
GLUCOSE BLDC GLUCOMTR-MCNC: 207 MG/DL (ref 70–99)
GLUCOSE BLDC GLUCOMTR-MCNC: 207 MG/DL (ref 70–99)
GLUCOSE BLDC GLUCOMTR-MCNC: 209 MG/DL (ref 70–99)
GLUCOSE BLDC GLUCOMTR-MCNC: 213 MG/DL (ref 70–99)
GLUCOSE BLDC GLUCOMTR-MCNC: 232 MG/DL (ref 70–99)
GLUCOSE BLDC GLUCOMTR-MCNC: 245 MG/DL (ref 70–99)
GLUCOSE BLDC GLUCOMTR-MCNC: 261 MG/DL (ref 70–99)
GLUCOSE BLDC GLUCOMTR-MCNC: 281 MG/DL (ref 70–99)
GLUCOSE BLDC GLUCOMTR-MCNC: 281 MG/DL (ref 70–99)
GLUCOSE BLDC GLUCOMTR-MCNC: 285 MG/DL (ref 70–99)
GLUCOSE BLDC GLUCOMTR-MCNC: 288 MG/DL (ref 70–99)
GLUCOSE BLDC GLUCOMTR-MCNC: 292 MG/DL (ref 70–99)
GLUCOSE BLDC GLUCOMTR-MCNC: 297 MG/DL (ref 70–99)
GLUCOSE BLDC GLUCOMTR-MCNC: 306 MG/DL (ref 70–99)
GLUCOSE BLDC GLUCOMTR-MCNC: 360 MG/DL (ref 70–99)
GLUCOSE BLDC GLUCOMTR-MCNC: 397 MG/DL (ref 70–99)
GLUCOSE BLDC GLUCOMTR-MCNC: 54 MG/DL (ref 70–99)
GLUCOSE BLDC GLUCOMTR-MCNC: 67 MG/DL (ref 70–99)
GLUCOSE BLDC GLUCOMTR-MCNC: 68 MG/DL (ref 70–99)
GLUCOSE BLDC GLUCOMTR-MCNC: 71 MG/DL (ref 70–99)
GLUCOSE BLDC GLUCOMTR-MCNC: 72 MG/DL (ref 70–99)
GLUCOSE BLDC GLUCOMTR-MCNC: 75 MG/DL (ref 70–99)
GLUCOSE BLDC GLUCOMTR-MCNC: 81 MG/DL (ref 70–99)
GLUCOSE BLDC GLUCOMTR-MCNC: 86 MG/DL (ref 70–99)
GLUCOSE BLDC GLUCOMTR-MCNC: 86 MG/DL (ref 70–99)
GLUCOSE BLDC GLUCOMTR-MCNC: 92 MG/DL (ref 70–99)
GLUCOSE BLDC GLUCOMTR-MCNC: 99 MG/DL (ref 70–99)
GLUCOSE SERPL-MCNC: 102 MG/DL (ref 70–99)
GLUCOSE SERPL-MCNC: 158 MG/DL (ref 70–99)
GLUCOSE SERPL-MCNC: 163 MG/DL (ref 70–99)
GLUCOSE SERPL-MCNC: 166 MG/DL (ref 70–99)
GLUCOSE SERPL-MCNC: 168 MG/DL (ref 70–99)
GLUCOSE SERPL-MCNC: 185 MG/DL (ref 70–99)
GLUCOSE SERPL-MCNC: 208 MG/DL (ref 70–99)
GLUCOSE SERPL-MCNC: 240 MG/DL (ref 70–99)
GLUCOSE SERPL-MCNC: 252 MG/DL (ref 70–99)
GLUCOSE SERPL-MCNC: 80 MG/DL (ref 70–99)
GLUCOSE UR STRIP-MCNC: 1000 MG/DL
GRAM STAIN RESULT: NORMAL
HBA1C MFR BLD: 7.8 % (ref 0–5.6)
HCT VFR BLD AUTO: 30.9 % (ref 40–53)
HCT VFR BLD AUTO: 31.1 % (ref 40–53)
HCT VFR BLD AUTO: 32.3 % (ref 40–53)
HCT VFR BLD AUTO: 32.8 % (ref 40–53)
HCT VFR BLD AUTO: 33.5 % (ref 40–53)
HCT VFR BLD AUTO: 34.2 % (ref 40–53)
HCT VFR BLD AUTO: 34.3 % (ref 40–53)
HCT VFR BLD AUTO: 34.3 % (ref 40–53)
HCT VFR BLD AUTO: 35.4 % (ref 40–53)
HCT VFR BLD AUTO: 35.5 % (ref 40–53)
HCT VFR BLD AUTO: 35.8 % (ref 40–53)
HCT VFR BLD AUTO: 36.6 % (ref 40–53)
HCT VFR BLD AUTO: 36.8 % (ref 40–53)
HCT VFR BLD AUTO: 37.8 % (ref 40–53)
HCT VFR BLD AUTO: 38.9 % (ref 40–53)
HCT VFR BLD AUTO: 39.9 % (ref 40–53)
HCT VFR BLD AUTO: 39.9 % (ref 40–53)
HCT VFR BLD AUTO: 41.5 % (ref 40–53)
HGB BLD-MCNC: 10 G/DL (ref 13.3–17.7)
HGB BLD-MCNC: 10.1 G/DL (ref 13.3–17.7)
HGB BLD-MCNC: 10.2 G/DL (ref 13.3–17.7)
HGB BLD-MCNC: 10.8 G/DL (ref 13.3–17.7)
HGB BLD-MCNC: 11 G/DL (ref 13.3–17.7)
HGB BLD-MCNC: 11.1 G/DL (ref 13.3–17.7)
HGB BLD-MCNC: 11.2 G/DL (ref 13.3–17.7)
HGB BLD-MCNC: 11.3 G/DL (ref 13.3–17.7)
HGB BLD-MCNC: 11.4 G/DL (ref 13.3–17.7)
HGB BLD-MCNC: 11.5 G/DL (ref 13.3–17.7)
HGB BLD-MCNC: 11.9 G/DL (ref 13.3–17.7)
HGB BLD-MCNC: 11.9 G/DL (ref 13.3–17.7)
HGB BLD-MCNC: 12.2 G/DL (ref 13.3–17.7)
HGB BLD-MCNC: 12.6 G/DL (ref 13.3–17.7)
HGB BLD-MCNC: 12.9 G/DL (ref 13.3–17.7)
HGB BLD-MCNC: 13.3 G/DL (ref 13.3–17.7)
HGB BLD-MCNC: 13.5 G/DL (ref 13.3–17.7)
HGB BLD-MCNC: 13.9 G/DL (ref 13.3–17.7)
HGB BLD-MCNC: 9.4 G/DL (ref 13.3–17.7)
HGB UR QL STRIP: NEGATIVE
HOLD SPECIMEN: NORMAL
HOLD SPECIMEN: NORMAL
IMM GRANULOCYTES # BLD: 0 10E9/L (ref 0–0.4)
IMM GRANULOCYTES # BLD: 0.1 10E3/UL
IMM GRANULOCYTES # BLD: 0.1 10E3/UL
IMM GRANULOCYTES # BLD: 0.1 10E9/L (ref 0–0.4)
IMM GRANULOCYTES # BLD: 0.1 10E9/L (ref 0–0.4)
IMM GRANULOCYTES # BLD: 0.2 10E3/UL
IMM GRANULOCYTES # BLD: 0.2 10E3/UL
IMM GRANULOCYTES NFR BLD: 0 %
IMM GRANULOCYTES NFR BLD: 0.2 %
IMM GRANULOCYTES NFR BLD: 0.2 %
IMM GRANULOCYTES NFR BLD: 0.3 %
IMM GRANULOCYTES NFR BLD: 0.5 %
IMM GRANULOCYTES NFR BLD: 0.5 %
IMM GRANULOCYTES NFR BLD: 0.6 %
IMM GRANULOCYTES NFR BLD: 0.7 %
IMM GRANULOCYTES NFR BLD: 1 %
INR PPP: 1.08 (ref 0.86–1.14)
INR PPP: 1.48 (ref 0.85–1.15)
INTERPRETATION ECG - MUSE: NORMAL
INTERPRETATION ECG - MUSE: NORMAL
KETONES UR STRIP-MCNC: NEGATIVE MG/DL
KOH PREPARATION: ABNORMAL
KOH PREPARATION: ABNORMAL
KOH PREPARATION: NORMAL
KOH PREPARATION: NORMAL
LABORATORY COMMENT REPORT: NORMAL
LACTATE SERPL-SCNC: 1.9 MMOL/L (ref 0.7–2)
LDH FLD L TO P-CCNC: 97 U/L
LDH SERPL L TO P-CCNC: 119 U/L (ref 85–227)
LEUKOCYTE ESTERASE UR QL STRIP: NEGATIVE
LYMPHOCYTES # BLD AUTO: 0.8 10E3/UL (ref 0.8–5.3)
LYMPHOCYTES # BLD AUTO: 0.8 10E3/UL (ref 0.8–5.3)
LYMPHOCYTES # BLD AUTO: 0.9 10E9/L (ref 0.8–5.3)
LYMPHOCYTES # BLD AUTO: 1 10E9/L (ref 0.8–5.3)
LYMPHOCYTES # BLD AUTO: 1.3 10E3/UL (ref 0.8–5.3)
LYMPHOCYTES # BLD AUTO: 1.3 10E9/L (ref 0.8–5.3)
LYMPHOCYTES # BLD AUTO: 1.4 10E9/L (ref 0.8–5.3)
LYMPHOCYTES # BLD AUTO: 1.4 10E9/L (ref 0.8–5.3)
LYMPHOCYTES # BLD AUTO: 1.5 10E3/UL (ref 0.8–5.3)
LYMPHOCYTES # BLD AUTO: 2.1 10E9/L (ref 0.8–5.3)
LYMPHOCYTES # BLD AUTO: 2.8 10E9/L (ref 0.8–5.3)
LYMPHOCYTES NFR BLD AUTO: 10 %
LYMPHOCYTES NFR BLD AUTO: 11 %
LYMPHOCYTES NFR BLD AUTO: 23 %
LYMPHOCYTES NFR BLD AUTO: 25.3 %
LYMPHOCYTES NFR BLD AUTO: 25.3 %
LYMPHOCYTES NFR BLD AUTO: 26.3 %
LYMPHOCYTES NFR BLD AUTO: 34.8 %
LYMPHOCYTES NFR BLD AUTO: 35.4 %
LYMPHOCYTES NFR BLD AUTO: 4 %
LYMPHOCYTES NFR BLD AUTO: 5 %
LYMPHOCYTES NFR BLD AUTO: 9.7 %
LYMPHOCYTES NFR FLD MANUAL: 10 %
LYMPHOCYTES NFR FLD MANUAL: 33 %
MAGNESIUM SERPL-MCNC: 1.6 MG/DL (ref 1.6–2.3)
MAGNESIUM SERPL-MCNC: 1.7 MG/DL (ref 1.6–2.3)
MAGNESIUM SERPL-MCNC: 1.8 MG/DL (ref 1.6–2.3)
MAGNESIUM SERPL-MCNC: 2.1 MG/DL (ref 1.6–2.3)
MAGNESIUM SERPL-MCNC: 2.1 MG/DL (ref 1.6–2.3)
MCH RBC QN AUTO: 28.7 PG (ref 26.5–33)
MCH RBC QN AUTO: 28.7 PG (ref 26.5–33)
MCH RBC QN AUTO: 29.2 PG (ref 26.5–33)
MCH RBC QN AUTO: 29.5 PG (ref 26.5–33)
MCH RBC QN AUTO: 29.7 PG (ref 26.5–33)
MCH RBC QN AUTO: 29.9 PG (ref 26.5–33)
MCH RBC QN AUTO: 30.1 PG (ref 26.5–33)
MCH RBC QN AUTO: 30.3 PG (ref 26.5–33)
MCH RBC QN AUTO: 30.3 PG (ref 26.5–33)
MCH RBC QN AUTO: 30.4 PG (ref 26.5–33)
MCH RBC QN AUTO: 30.4 PG (ref 26.5–33)
MCH RBC QN AUTO: 30.5 PG (ref 26.5–33)
MCH RBC QN AUTO: 30.5 PG (ref 26.5–33)
MCH RBC QN AUTO: 30.7 PG (ref 26.5–33)
MCHC RBC AUTO-ENTMCNC: 30.4 G/DL (ref 31.5–36.5)
MCHC RBC AUTO-ENTMCNC: 30.4 G/DL (ref 31.5–36.5)
MCHC RBC AUTO-ENTMCNC: 30.8 G/DL (ref 31.5–36.5)
MCHC RBC AUTO-ENTMCNC: 31.5 G/DL (ref 31.5–36.5)
MCHC RBC AUTO-ENTMCNC: 32.1 G/DL (ref 31.5–36.5)
MCHC RBC AUTO-ENTMCNC: 32.2 G/DL (ref 31.5–36.5)
MCHC RBC AUTO-ENTMCNC: 32.3 G/DL (ref 31.5–36.5)
MCHC RBC AUTO-ENTMCNC: 32.3 G/DL (ref 31.5–36.5)
MCHC RBC AUTO-ENTMCNC: 32.7 G/DL (ref 31.5–36.5)
MCHC RBC AUTO-ENTMCNC: 33.2 G/DL (ref 31.5–36.5)
MCHC RBC AUTO-ENTMCNC: 33.3 G/DL (ref 31.5–36.5)
MCHC RBC AUTO-ENTMCNC: 33.5 G/DL (ref 31.5–36.5)
MCHC RBC AUTO-ENTMCNC: 33.6 G/DL (ref 31.5–36.5)
MCHC RBC AUTO-ENTMCNC: 33.8 G/DL (ref 31.5–36.5)
MCHC RBC AUTO-ENTMCNC: 34.4 G/DL (ref 31.5–36.5)
MCHC RBC AUTO-ENTMCNC: 34.4 G/DL (ref 31.5–36.5)
MCV RBC AUTO: 89 FL (ref 78–100)
MCV RBC AUTO: 89 FL (ref 78–100)
MCV RBC AUTO: 90 FL (ref 78–100)
MCV RBC AUTO: 90 FL (ref 78–100)
MCV RBC AUTO: 91 FL (ref 78–100)
MCV RBC AUTO: 92 FL (ref 78–100)
MCV RBC AUTO: 93 FL (ref 78–100)
MCV RBC AUTO: 94 FL (ref 78–100)
MCV RBC AUTO: 94 FL (ref 78–100)
MCV RBC AUTO: 95 FL (ref 78–100)
MCV RBC AUTO: 95 FL (ref 78–100)
MCV RBC AUTO: 97 FL (ref 78–100)
MCV RBC AUTO: 98 FL (ref 78–100)
MONOCYTES # BLD AUTO: 0.3 10E9/L (ref 0–1.3)
MONOCYTES # BLD AUTO: 0.4 10E9/L (ref 0–1.3)
MONOCYTES # BLD AUTO: 0.4 10E9/L (ref 0–1.3)
MONOCYTES # BLD AUTO: 0.5 10E9/L (ref 0–1.3)
MONOCYTES # BLD AUTO: 0.6 10E9/L (ref 0–1.3)
MONOCYTES # BLD AUTO: 0.8 10E9/L (ref 0–1.3)
MONOCYTES # BLD AUTO: 1 10E3/UL (ref 0–1.3)
MONOCYTES # BLD AUTO: 1 10E9/L (ref 0–1.3)
MONOCYTES # BLD AUTO: 1.4 10E3/UL (ref 0–1.3)
MONOCYTES # BLD AUTO: 1.7 10E3/UL (ref 0–1.3)
MONOCYTES # BLD AUTO: 2 10E3/UL (ref 0–1.3)
MONOCYTES NFR BLD AUTO: 10.2 %
MONOCYTES NFR BLD AUTO: 10.3 %
MONOCYTES NFR BLD AUTO: 11 %
MONOCYTES NFR BLD AUTO: 11 %
MONOCYTES NFR BLD AUTO: 11.2 %
MONOCYTES NFR BLD AUTO: 12 %
MONOCYTES NFR BLD AUTO: 12 %
MONOCYTES NFR BLD AUTO: 13.7 %
MONOCYTES NFR BLD AUTO: 2.5 %
MONOCYTES NFR BLD AUTO: 5 %
MONOCYTES NFR BLD AUTO: 8.7 %
MONOS+MACROS NFR FLD MANUAL: NORMAL %
MONOS+MACROS NFR FLD MANUAL: NORMAL %
MRSA DNA SPEC QL NAA+PROBE: NEGATIVE
MUCOUS THREADS #/AREA URNS LPF: PRESENT /LPF
NEUTROPHILS # BLD AUTO: 11.4 10E9/L (ref 1.6–8.3)
NEUTROPHILS # BLD AUTO: 12 10E3/UL (ref 1.6–8.3)
NEUTROPHILS # BLD AUTO: 14.8 10E3/UL (ref 1.6–8.3)
NEUTROPHILS # BLD AUTO: 19.4 10E3/UL (ref 1.6–8.3)
NEUTROPHILS # BLD AUTO: 2 10E9/L (ref 1.6–8.3)
NEUTROPHILS # BLD AUTO: 2.1 10E9/L (ref 1.6–8.3)
NEUTROPHILS # BLD AUTO: 2.6 10E9/L (ref 1.6–8.3)
NEUTROPHILS # BLD AUTO: 4 10E9/L (ref 1.6–8.3)
NEUTROPHILS # BLD AUTO: 4.1 10E9/L (ref 1.6–8.3)
NEUTROPHILS # BLD AUTO: 4.6 10E9/L (ref 1.6–8.3)
NEUTROPHILS # BLD AUTO: 8.7 10E3/UL (ref 1.6–8.3)
NEUTROPHILS NFR BLD AUTO: 49 %
NEUTROPHILS NFR BLD AUTO: 49.5 %
NEUTROPHILS NFR BLD AUTO: 58 %
NEUTROPHILS NFR BLD AUTO: 62.3 %
NEUTROPHILS NFR BLD AUTO: 63.4 %
NEUTROPHILS NFR BLD AUTO: 66.5 %
NEUTROPHILS NFR BLD AUTO: 76 %
NEUTROPHILS NFR BLD AUTO: 77 %
NEUTROPHILS NFR BLD AUTO: 83 %
NEUTROPHILS NFR BLD AUTO: 86.9 %
NEUTROPHILS NFR BLD AUTO: 90 %
NEUTS BAND NFR FLD MANUAL: 15 %
NEUTS BAND NFR FLD MANUAL: 15 %
NITRATE UR QL: NEGATIVE
NRBC # BLD AUTO: 0 10*3/UL
NRBC # BLD AUTO: 0 10E3/UL
NRBC BLD AUTO-RTO: 0 /100
OTHER CELLS FLD MANUAL: 52 %
OTHER CELLS FLD MANUAL: 75 %
P AXIS - MUSE: 65 DEGREES
PATH REPORT.ADDENDUM SPEC: ABNORMAL
PATH REPORT.COMMENTS IMP SPEC: ABNORMAL
PATH REPORT.COMMENTS IMP SPEC: NORMAL
PATH REPORT.COMMENTS IMP SPEC: YES
PATH REPORT.FINAL DX SPEC: ABNORMAL
PATH REPORT.FINAL DX SPEC: ABNORMAL
PATH REPORT.FINAL DX SPEC: NORMAL
PATH REPORT.GROSS SPEC: ABNORMAL
PATH REPORT.GROSS SPEC: ABNORMAL
PATH REPORT.GROSS SPEC: NORMAL
PATH REPORT.MICROSCOPIC SPEC OTHER STN: NORMAL
PATH REPORT.MICROSCOPIC SPEC OTHER STN: NORMAL
PATH REPORT.RELEVANT HX SPEC: ABNORMAL
PATH REPORT.RELEVANT HX SPEC: ABNORMAL
PATH REPORT.RELEVANT HX SPEC: NORMAL
PH FLD: 7.5 PH
PH UR STRIP: 6.5 [PH] (ref 5–7)
PHOSPHATE SERPL-MCNC: 1.8 MG/DL (ref 2.5–4.5)
PHOSPHATE SERPL-MCNC: 2.8 MG/DL (ref 2.5–4.5)
PHOSPHATE SERPL-MCNC: 4.2 MG/DL (ref 2.5–4.5)
PLATELET # BLD AUTO: 168 10E9/L (ref 150–450)
PLATELET # BLD AUTO: 189 10E9/L (ref 150–450)
PLATELET # BLD AUTO: 243 10E9/L (ref 150–450)
PLATELET # BLD AUTO: 267 10E9/L (ref 150–450)
PLATELET # BLD AUTO: 280 10E9/L (ref 150–450)
PLATELET # BLD AUTO: 286 10E9/L (ref 150–450)
PLATELET # BLD AUTO: 286 10E9/L (ref 150–450)
PLATELET # BLD AUTO: 298 10E3/UL (ref 150–450)
PLATELET # BLD AUTO: 320 10E3/UL (ref 150–450)
PLATELET # BLD AUTO: 326 10E3/UL (ref 150–450)
PLATELET # BLD AUTO: 344 10E3/UL (ref 150–450)
PLATELET # BLD AUTO: 356 10E3/UL (ref 150–450)
PLATELET # BLD AUTO: 357 10E3/UL (ref 150–450)
PLATELET # BLD AUTO: 379 10E9/L (ref 150–450)
PLATELET # BLD AUTO: 389 10E9/L (ref 150–450)
PLATELET # BLD AUTO: 391 10E3/UL (ref 150–450)
PLATELET # BLD AUTO: 394 10E3/UL (ref 150–450)
PLATELET # BLD AUTO: 421 10E3/UL (ref 150–450)
POTASSIUM BLD-SCNC: 3.9 MMOL/L (ref 3.4–5.3)
POTASSIUM BLD-SCNC: 4 MMOL/L (ref 3.4–5.3)
POTASSIUM BLD-SCNC: 4.1 MMOL/L (ref 3.4–5.3)
POTASSIUM BLD-SCNC: 4.5 MMOL/L (ref 3.4–5.3)
POTASSIUM BLD-SCNC: 4.5 MMOL/L (ref 3.4–5.3)
POTASSIUM BLD-SCNC: 4.7 MMOL/L (ref 3.4–5.3)
POTASSIUM BLD-SCNC: 4.9 MMOL/L (ref 3.4–5.3)
POTASSIUM BLD-SCNC: 5 MMOL/L (ref 3.4–5.3)
POTASSIUM BLD-SCNC: 5 MMOL/L (ref 3.4–5.3)
POTASSIUM SERPL-SCNC: 4 MMOL/L (ref 3.4–5.3)
POTASSIUM SERPL-SCNC: 4.2 MMOL/L (ref 3.4–5.3)
POTASSIUM SERPL-SCNC: 4.2 MMOL/L (ref 3.4–5.3)
POTASSIUM SERPL-SCNC: 4.3 MMOL/L (ref 3.4–5.3)
POTASSIUM SERPL-SCNC: 4.4 MMOL/L (ref 3.4–5.3)
POTASSIUM SERPL-SCNC: 4.5 MMOL/L (ref 3.4–5.3)
POTASSIUM SERPL-SCNC: 4.5 MMOL/L (ref 3.4–5.3)
POTASSIUM SERPL-SCNC: 4.7 MMOL/L (ref 3.4–5.3)
POTASSIUM SERPL-SCNC: 4.8 MMOL/L (ref 3.4–5.3)
POTASSIUM SERPL-SCNC: 4.9 MMOL/L (ref 3.4–5.3)
PR INTERVAL - MUSE: 154 MS
PROCALCITONIN SERPL-MCNC: 0.12 NG/ML
PROCALCITONIN SERPL-MCNC: 0.23 NG/ML
PROT FLD-MCNC: 5.2 G/DL
PROT SERPL-MCNC: 6.7 G/DL (ref 6.8–8.8)
PROT SERPL-MCNC: 6.7 G/DL (ref 6.8–8.8)
PROT SERPL-MCNC: 6.8 G/DL (ref 6.8–8.8)
PROT SERPL-MCNC: 6.8 G/DL (ref 6.8–8.8)
PROT SERPL-MCNC: 6.9 G/DL (ref 6.8–8.8)
PROT SERPL-MCNC: 7 G/DL (ref 6.8–8.8)
PROT SERPL-MCNC: 7.3 G/DL (ref 6.8–8.8)
PROT SERPL-MCNC: 7.3 G/DL (ref 6.8–8.8)
PROT SERPL-MCNC: 7.4 G/DL (ref 6.8–8.8)
PROT SERPL-MCNC: 7.4 G/DL (ref 6.8–8.8)
PROT SERPL-MCNC: 7.5 G/DL (ref 6.8–8.8)
PROT SERPL-MCNC: 7.7 G/DL (ref 6.8–8.8)
PTH RELATED PROT SERPL-SCNC: 16.2 PMOL/L
PTH-INTACT SERPL-MCNC: 12 PG/ML (ref 18–80)
QRS DURATION - MUSE: 84 MS
QT - MUSE: 362 MS
QTC - MUSE: 401 MS
R AXIS - MUSE: 44 DEGREES
RBC # BLD AUTO: 3.27 10E6/UL (ref 4.4–5.9)
RBC # BLD AUTO: 3.4 10E6/UL (ref 4.4–5.9)
RBC # BLD AUTO: 3.43 10E6/UL (ref 4.4–5.9)
RBC # BLD AUTO: 3.48 10E6/UL (ref 4.4–5.9)
RBC # BLD AUTO: 3.61 10E12/L (ref 4.4–5.9)
RBC # BLD AUTO: 3.64 10E6/UL (ref 4.4–5.9)
RBC # BLD AUTO: 3.72 10E6/UL (ref 4.4–5.9)
RBC # BLD AUTO: 3.77 10E6/UL (ref 4.4–5.9)
RBC # BLD AUTO: 3.8 10E6/UL (ref 4.4–5.9)
RBC # BLD AUTO: 3.86 10E12/L (ref 4.4–5.9)
RBC # BLD AUTO: 3.88 10E6/UL (ref 4.4–5.9)
RBC # BLD AUTO: 3.91 10E12/L (ref 4.4–5.9)
RBC # BLD AUTO: 4.08 10E12/L (ref 4.4–5.9)
RBC # BLD AUTO: 4.11 10E12/L (ref 4.4–5.9)
RBC # BLD AUTO: 4.24 10E12/L (ref 4.4–5.9)
RBC # BLD AUTO: 4.39 10E12/L (ref 4.4–5.9)
RBC # BLD AUTO: 4.42 10E12/L (ref 4.4–5.9)
RBC # BLD AUTO: 4.56 10E12/L (ref 4.4–5.9)
RBC URINE: 1 /HPF
SA TARGET DNA: NEGATIVE
SARS-COV-2 RNA RESP QL NAA+PROBE: NEGATIVE
SARS-COV-2 RNA RESP QL NAA+PROBE: NORMAL
SARS-COV-2 RNA RESP QL NAA+PROBE: NORMAL
SODIUM SERPL-SCNC: 131 MMOL/L (ref 133–144)
SODIUM SERPL-SCNC: 131 MMOL/L (ref 133–144)
SODIUM SERPL-SCNC: 132 MMOL/L (ref 133–144)
SODIUM SERPL-SCNC: 132 MMOL/L (ref 133–144)
SODIUM SERPL-SCNC: 133 MMOL/L (ref 133–144)
SODIUM SERPL-SCNC: 134 MMOL/L (ref 133–144)
SODIUM SERPL-SCNC: 134 MMOL/L (ref 133–144)
SODIUM SERPL-SCNC: 136 MMOL/L (ref 133–144)
SODIUM SERPL-SCNC: 137 MMOL/L (ref 133–144)
SODIUM SERPL-SCNC: 138 MMOL/L (ref 133–144)
SODIUM SERPL-SCNC: 139 MMOL/L (ref 133–144)
SODIUM SERPL-SCNC: 142 MMOL/L (ref 133–144)
SODIUM SERPL-SCNC: 144 MMOL/L (ref 133–144)
SP GR UR STRIP: 1.01 (ref 1–1.03)
SPECIMEN SOURCE: NORMAL
SYSTOLIC BLOOD PRESSURE - MUSE: NORMAL MMHG
T AXIS - MUSE: 4 DEGREES
T4 FREE SERPL-MCNC: 0.63 NG/DL (ref 0.76–1.46)
T4 FREE SERPL-MCNC: 0.64 NG/DL (ref 0.76–1.46)
TSH SERPL DL<=0.005 MIU/L-ACNC: 21.72 MU/L (ref 0.4–4)
TSH SERPL DL<=0.005 MIU/L-ACNC: 23.34 MU/L (ref 0.4–4)
UFH PPP CHRO-ACNC: 0.33 IU/ML
UFH PPP CHRO-ACNC: 0.56 IU/ML
UFH PPP CHRO-ACNC: >1.1 IU/ML
UROBILINOGEN UR STRIP-MCNC: NORMAL MG/DL
VENTRICULAR RATE- MUSE: 74 BPM
WBC # BLD AUTO: 11.6 10E3/UL (ref 4–11)
WBC # BLD AUTO: 12.9 10E3/UL (ref 4–11)
WBC # BLD AUTO: 12.9 10E9/L (ref 4–11)
WBC # BLD AUTO: 13.1 10E9/L (ref 4–11)
WBC # BLD AUTO: 14.2 10E3/UL (ref 4–11)
WBC # BLD AUTO: 15.5 10E3/UL (ref 4–11)
WBC # BLD AUTO: 17.9 10E3/UL (ref 4–11)
WBC # BLD AUTO: 17.9 10E3/UL (ref 4–11)
WBC # BLD AUTO: 21.4 10E3/UL (ref 4–11)
WBC # BLD AUTO: 23 10E3/UL (ref 4–11)
WBC # BLD AUTO: 28.5 10E3/UL (ref 4–11)
WBC # BLD AUTO: 3.4 10E9/L (ref 4–11)
WBC # BLD AUTO: 4 10E9/L (ref 4–11)
WBC # BLD AUTO: 4.1 10E9/L (ref 4–11)
WBC # BLD AUTO: 6.1 10E9/L (ref 4–11)
WBC # BLD AUTO: 8 10E9/L (ref 4–11)
WBC # FLD AUTO: 17 /UL
WBC # FLD AUTO: 718 /UL
WBC URINE: 3 /HPF

## 2021-01-01 PROCEDURE — 250N000011 HC RX IP 250 OP 636: Performed by: INTERNAL MEDICINE

## 2021-01-01 PROCEDURE — 250N000012 HC RX MED GY IP 250 OP 636 PS 637: Performed by: NURSE PRACTITIONER

## 2021-01-01 PROCEDURE — 96413 CHEMO IV INFUSION 1 HR: CPT

## 2021-01-01 PROCEDURE — 250N000013 HC RX MED GY IP 250 OP 250 PS 637: Mod: GY | Performed by: INTERNAL MEDICINE

## 2021-01-01 PROCEDURE — 99215 OFFICE O/P EST HI 40 MIN: CPT | Performed by: NURSE PRACTITIONER

## 2021-01-01 PROCEDURE — 36591 DRAW BLOOD OFF VENOUS DEVICE: CPT | Performed by: HOSPITALIST

## 2021-01-01 PROCEDURE — 77386 HC IMRT TREATMENT DELIVERY, COMPLEX: CPT | Performed by: RADIOLOGY

## 2021-01-01 PROCEDURE — C9803 HOPD COVID-19 SPEC COLLECT: HCPCS | Performed by: EMERGENCY MEDICINE

## 2021-01-01 PROCEDURE — 85025 COMPLETE CBC W/AUTO DIFF WBC: CPT | Performed by: INTERNAL MEDICINE

## 2021-01-01 PROCEDURE — 999N000054 HC STATISTIC EKG NON-CHARGEABLE

## 2021-01-01 PROCEDURE — 99213 OFFICE O/P EST LOW 20 MIN: CPT | Performed by: OTOLARYNGOLOGY

## 2021-01-01 PROCEDURE — 250N000011 HC RX IP 250 OP 636: Performed by: NURSE ANESTHETIST, CERTIFIED REGISTERED

## 2021-01-01 PROCEDURE — 96417 CHEMO IV INFUS EACH ADDL SEQ: CPT

## 2021-01-01 PROCEDURE — 82565 ASSAY OF CREATININE: CPT | Mod: 91 | Performed by: INTERNAL MEDICINE

## 2021-01-01 PROCEDURE — 99441 PR PHYSICIAN TELEPHONE EVALUATION 5-10 MIN: CPT | Mod: 95 | Performed by: PHYSICIAN ASSISTANT

## 2021-01-01 PROCEDURE — 77014 PR CT GUIDE FOR PLACEMENT RADIATION THERAPY FIELDS: CPT | Mod: 26 | Performed by: RADIOLOGY

## 2021-01-01 PROCEDURE — 76937 US GUIDE VASCULAR ACCESS: CPT | Mod: 26 | Performed by: RADIOLOGY

## 2021-01-01 PROCEDURE — 96376 TX/PRO/DX INJ SAME DRUG ADON: CPT | Performed by: EMERGENCY MEDICINE

## 2021-01-01 PROCEDURE — 999N001017 HC STATISTIC GLUCOSE BY METER IP

## 2021-01-01 PROCEDURE — 85027 COMPLETE CBC AUTOMATED: CPT | Performed by: HOSPITALIST

## 2021-01-01 PROCEDURE — 85520 HEPARIN ASSAY: CPT | Performed by: STUDENT IN AN ORGANIZED HEALTH CARE EDUCATION/TRAINING PROGRAM

## 2021-01-01 PROCEDURE — 258N000003 HC RX IP 258 OP 636: Performed by: HOSPITALIST

## 2021-01-01 PROCEDURE — 88173 CYTOPATH EVAL FNA REPORT: CPT | Mod: 26 | Performed by: PATHOLOGY

## 2021-01-01 PROCEDURE — 99214 OFFICE O/P EST MOD 30 MIN: CPT | Mod: 95 | Performed by: INTERNAL MEDICINE

## 2021-01-01 PROCEDURE — 258N000003 HC RX IP 258 OP 636: Performed by: INTERNAL MEDICINE

## 2021-01-01 PROCEDURE — 250N000011 HC RX IP 250 OP 636: Performed by: HOSPITALIST

## 2021-01-01 PROCEDURE — 36593 DECLOT VASCULAR DEVICE: CPT

## 2021-01-01 PROCEDURE — 88312 SPECIAL STAINS GROUP 1: CPT | Mod: 26 | Performed by: PATHOLOGY

## 2021-01-01 PROCEDURE — 250N000011 HC RX IP 250 OP 636: Performed by: STUDENT IN AN ORGANIZED HEALTH CARE EDUCATION/TRAINING PROGRAM

## 2021-01-01 PROCEDURE — 31652 BRONCH EBUS SAMPLNG 1/2 NODE: CPT | Performed by: THORACIC SURGERY (CARDIOTHORACIC VASCULAR SURGERY)

## 2021-01-01 PROCEDURE — 272N000001 HC OR GENERAL SUPPLY STERILE: Performed by: INTERNAL MEDICINE

## 2021-01-01 PROCEDURE — 71260 CT THORAX DX C+: CPT

## 2021-01-01 PROCEDURE — 88342 IMHCHEM/IMCYTCHM 1ST ANTB: CPT | Mod: 26 | Performed by: PATHOLOGY

## 2021-01-01 PROCEDURE — 82565 ASSAY OF CREATININE: CPT | Performed by: STUDENT IN AN ORGANIZED HEALTH CARE EDUCATION/TRAINING PROGRAM

## 2021-01-01 PROCEDURE — 83605 ASSAY OF LACTIC ACID: CPT | Performed by: STUDENT IN AN ORGANIZED HEALTH CARE EDUCATION/TRAINING PROGRAM

## 2021-01-01 PROCEDURE — U0005 INFEC AGEN DETEC AMPLI PROBE: HCPCS | Performed by: OTOLARYNGOLOGY

## 2021-01-01 PROCEDURE — 31535 LARYNGOSCOPY W/BIOPSY: CPT | Mod: 58 | Performed by: OTOLARYNGOLOGY

## 2021-01-01 PROCEDURE — 96375 TX/PRO/DX INJ NEW DRUG ADDON: CPT

## 2021-01-01 PROCEDURE — 250N000013 HC RX MED GY IP 250 OP 250 PS 637: Performed by: HOSPITALIST

## 2021-01-01 PROCEDURE — 250N000012 HC RX MED GY IP 250 OP 636 PS 637: Performed by: STUDENT IN AN ORGANIZED HEALTH CARE EDUCATION/TRAINING PROGRAM

## 2021-01-01 PROCEDURE — 92611 MOTION FLUOROSCOPY/SWALLOW: CPT | Mod: GN | Performed by: SPEECH-LANGUAGE PATHOLOGIST

## 2021-01-01 PROCEDURE — 250N000009 HC RX 250: Performed by: NURSE ANESTHETIST, CERTIFIED REGISTERED

## 2021-01-01 PROCEDURE — 99233 SBSQ HOSP IP/OBS HIGH 50: CPT | Mod: GC | Performed by: INTERNAL MEDICINE

## 2021-01-01 PROCEDURE — 250N000013 HC RX MED GY IP 250 OP 250 PS 637: Performed by: ANESTHESIOLOGY

## 2021-01-01 PROCEDURE — 74230 X-RAY XM SWLNG FUNCJ C+: CPT | Mod: GC | Performed by: RADIOLOGY

## 2021-01-01 PROCEDURE — 250N000011 HC RX IP 250 OP 636: Performed by: PHYSICIAN ASSISTANT

## 2021-01-01 PROCEDURE — G1004 CDSM NDSC: HCPCS

## 2021-01-01 PROCEDURE — 31575 DIAGNOSTIC LARYNGOSCOPY: CPT | Mod: 58 | Performed by: OTOLARYNGOLOGY

## 2021-01-01 PROCEDURE — 343N000001 HC RX 343: Performed by: INTERNAL MEDICINE

## 2021-01-01 PROCEDURE — 999N000141 HC STATISTIC PRE-PROCEDURE NURSING ASSESSMENT: Performed by: OTOLARYNGOLOGY

## 2021-01-01 PROCEDURE — 85018 HEMOGLOBIN: CPT | Performed by: STUDENT IN AN ORGANIZED HEALTH CARE EDUCATION/TRAINING PROGRAM

## 2021-01-01 PROCEDURE — 250N000009 HC RX 250: Performed by: NURSE PRACTITIONER

## 2021-01-01 PROCEDURE — 71045 X-RAY EXAM CHEST 1 VIEW: CPT | Mod: 26 | Performed by: RADIOLOGY

## 2021-01-01 PROCEDURE — 0W9B3ZZ DRAINAGE OF LEFT PLEURAL CAVITY, PERCUTANEOUS APPROACH: ICD-10-PCS | Performed by: INTERNAL MEDICINE

## 2021-01-01 PROCEDURE — 82040 ASSAY OF SERUM ALBUMIN: CPT | Performed by: NURSE PRACTITIONER

## 2021-01-01 PROCEDURE — 71046 X-RAY EXAM CHEST 2 VIEWS: CPT

## 2021-01-01 PROCEDURE — 80053 COMPREHEN METABOLIC PANEL: CPT | Performed by: PHYSICIAN ASSISTANT

## 2021-01-01 PROCEDURE — 360N000083 HC SURGERY LEVEL 3 W/ FLUORO, PER MIN: Performed by: INTERNAL MEDICINE

## 2021-01-01 PROCEDURE — 258N000003 HC RX IP 258 OP 636: Performed by: NURSE PRACTITIONER

## 2021-01-01 PROCEDURE — 99233 SBSQ HOSP IP/OBS HIGH 50: CPT | Performed by: NURSE PRACTITIONER

## 2021-01-01 PROCEDURE — 85025 COMPLETE CBC W/AUTO DIFF WBC: CPT | Performed by: EMERGENCY MEDICINE

## 2021-01-01 PROCEDURE — 96367 TX/PROPH/DG ADDL SEQ IV INF: CPT

## 2021-01-01 PROCEDURE — G1004 CDSM NDSC: HCPCS | Mod: GC | Performed by: RADIOLOGY

## 2021-01-01 PROCEDURE — 999N000141 HC STATISTIC PRE-PROCEDURE NURSING ASSESSMENT: Performed by: INTERNAL MEDICINE

## 2021-01-01 PROCEDURE — 83735 ASSAY OF MAGNESIUM: CPT | Performed by: PHYSICIAN ASSISTANT

## 2021-01-01 PROCEDURE — 84157 ASSAY OF PROTEIN OTHER: CPT | Performed by: STUDENT IN AN ORGANIZED HEALTH CARE EDUCATION/TRAINING PROGRAM

## 2021-01-01 PROCEDURE — 99417 PROLNG OP E/M EACH 15 MIN: CPT | Performed by: NURSE PRACTITIONER

## 2021-01-01 PROCEDURE — 99223 1ST HOSP IP/OBS HIGH 75: CPT | Mod: GC | Performed by: INTERNAL MEDICINE

## 2021-01-01 PROCEDURE — 87205 SMEAR GRAM STAIN: CPT | Performed by: INTERNAL MEDICINE

## 2021-01-01 PROCEDURE — 99233 SBSQ HOSP IP/OBS HIGH 50: CPT | Performed by: HOSPITALIST

## 2021-01-01 PROCEDURE — 88312 SPECIAL STAINS GROUP 1: CPT | Mod: TC | Performed by: OTOLARYNGOLOGY

## 2021-01-01 PROCEDURE — 82565 ASSAY OF CREATININE: CPT

## 2021-01-01 PROCEDURE — 99232 SBSQ HOSP IP/OBS MODERATE 35: CPT | Performed by: HOSPITALIST

## 2021-01-01 PROCEDURE — G0463 HOSPITAL OUTPT CLINIC VISIT: HCPCS

## 2021-01-01 PROCEDURE — 36591 DRAW BLOOD OFF VENOUS DEVICE: CPT | Performed by: STUDENT IN AN ORGANIZED HEALTH CARE EDUCATION/TRAINING PROGRAM

## 2021-01-01 PROCEDURE — 31624 DX BRONCHOSCOPE/LAVAGE: CPT | Performed by: INTERNAL MEDICINE

## 2021-01-01 PROCEDURE — U0005 INFEC AGEN DETEC AMPLI PROBE: HCPCS | Performed by: HOSPITALIST

## 2021-01-01 PROCEDURE — 36415 COLL VENOUS BLD VENIPUNCTURE: CPT | Performed by: HOSPITALIST

## 2021-01-01 PROCEDURE — 84132 ASSAY OF SERUM POTASSIUM: CPT | Performed by: STUDENT IN AN ORGANIZED HEALTH CARE EDUCATION/TRAINING PROGRAM

## 2021-01-01 PROCEDURE — 36415 COLL VENOUS BLD VENIPUNCTURE: CPT | Performed by: ANESTHESIOLOGY

## 2021-01-01 PROCEDURE — 80048 BASIC METABOLIC PNL TOTAL CA: CPT | Performed by: EMERGENCY MEDICINE

## 2021-01-01 PROCEDURE — 88185 FLOWCYTOMETRY/TC ADD-ON: CPT | Performed by: INTERNAL MEDICINE

## 2021-01-01 PROCEDURE — 36591 DRAW BLOOD OFF VENOUS DEVICE: CPT | Performed by: NURSE PRACTITIONER

## 2021-01-01 PROCEDURE — 250N000013 HC RX MED GY IP 250 OP 250 PS 637: Performed by: NURSE PRACTITIONER

## 2021-01-01 PROCEDURE — 96360 HYDRATION IV INFUSION INIT: CPT

## 2021-01-01 PROCEDURE — 82962 GLUCOSE BLOOD TEST: CPT

## 2021-01-01 PROCEDURE — 92526 ORAL FUNCTION THERAPY: CPT | Mod: GN | Performed by: SPEECH-LANGUAGE PATHOLOGIST

## 2021-01-01 PROCEDURE — 88305 TISSUE EXAM BY PATHOLOGIST: CPT | Mod: TC | Performed by: INTERNAL MEDICINE

## 2021-01-01 PROCEDURE — 250N000011 HC RX IP 250 OP 636: Performed by: ANESTHESIOLOGY

## 2021-01-01 PROCEDURE — 80048 BASIC METABOLIC PNL TOTAL CA: CPT | Performed by: INTERNAL MEDICINE

## 2021-01-01 PROCEDURE — 99214 OFFICE O/P EST MOD 30 MIN: CPT | Performed by: PHYSICIAN ASSISTANT

## 2021-01-01 PROCEDURE — 360N000077 HC SURGERY LEVEL 4, PER MIN: Performed by: OTOLARYNGOLOGY

## 2021-01-01 PROCEDURE — 710N000010 HC RECOVERY PHASE 1, LEVEL 2, PER MIN: Performed by: OTOLARYNGOLOGY

## 2021-01-01 PROCEDURE — 89051 BODY FLUID CELL COUNT: CPT | Performed by: STUDENT IN AN ORGANIZED HEALTH CARE EDUCATION/TRAINING PROGRAM

## 2021-01-01 PROCEDURE — 77427 RADIATION TX MANAGEMENT X5: CPT | Performed by: RADIOLOGY

## 2021-01-01 PROCEDURE — 250N000011 HC RX IP 250 OP 636: Performed by: NURSE PRACTITIONER

## 2021-01-01 PROCEDURE — 250N000009 HC RX 250: Performed by: INTERNAL MEDICINE

## 2021-01-01 PROCEDURE — 96372 THER/PROPH/DIAG INJ SC/IM: CPT | Performed by: INTERNAL MEDICINE

## 2021-01-01 PROCEDURE — G1004 CDSM NDSC: HCPCS | Performed by: RADIOLOGY

## 2021-01-01 PROCEDURE — 77332 RADIATION TREATMENT AID(S): CPT | Mod: XU | Performed by: RADIOLOGY

## 2021-01-01 PROCEDURE — 77334 RADIATION TREATMENT AID(S): CPT | Performed by: RADIOLOGY

## 2021-01-01 PROCEDURE — 250N000013 HC RX MED GY IP 250 OP 250 PS 637: Performed by: INTERNAL MEDICINE

## 2021-01-01 PROCEDURE — 99222 1ST HOSP IP/OBS MODERATE 55: CPT | Mod: AI | Performed by: INTERNAL MEDICINE

## 2021-01-01 PROCEDURE — 71045 X-RAY EXAM CHEST 1 VIEW: CPT

## 2021-01-01 PROCEDURE — 250N000009 HC RX 250: Performed by: HOSPITALIST

## 2021-01-01 PROCEDURE — 36415 COLL VENOUS BLD VENIPUNCTURE: CPT | Performed by: INTERNAL MEDICINE

## 2021-01-01 PROCEDURE — 36592 COLLECT BLOOD FROM PICC: CPT | Performed by: STUDENT IN AN ORGANIZED HEALTH CARE EDUCATION/TRAINING PROGRAM

## 2021-01-01 PROCEDURE — 88305 TISSUE EXAM BY PATHOLOGIST: CPT | Mod: 26 | Performed by: PATHOLOGY

## 2021-01-01 PROCEDURE — 87075 CULTR BACTERIA EXCEPT BLOOD: CPT | Performed by: INTERNAL MEDICINE

## 2021-01-01 PROCEDURE — 96360 HYDRATION IV INFUSION INIT: CPT | Performed by: EMERGENCY MEDICINE

## 2021-01-01 PROCEDURE — 258N000003 HC RX IP 258 OP 636: Performed by: PHYSICIAN ASSISTANT

## 2021-01-01 PROCEDURE — 88173 CYTOPATH EVAL FNA REPORT: CPT | Mod: TC | Performed by: THORACIC SURGERY (CARDIOTHORACIC VASCULAR SURGERY)

## 2021-01-01 PROCEDURE — 99443 PR PHYSICIAN TELEPHONE EVALUATION 21-30 MIN: CPT | Mod: 95 | Performed by: CLINICAL NURSE SPECIALIST

## 2021-01-01 PROCEDURE — 82542 COL CHROMOTOGRAPHY QUAL/QUAN: CPT | Performed by: STUDENT IN AN ORGANIZED HEALTH CARE EDUCATION/TRAINING PROGRAM

## 2021-01-01 PROCEDURE — 99024 POSTOP FOLLOW-UP VISIT: CPT | Performed by: OTOLARYNGOLOGY

## 2021-01-01 PROCEDURE — 71275 CT ANGIOGRAPHY CHEST: CPT | Mod: 26 | Performed by: RADIOLOGY

## 2021-01-01 PROCEDURE — 80048 BASIC METABOLIC PNL TOTAL CA: CPT | Performed by: RADIOLOGY

## 2021-01-01 PROCEDURE — 99204 OFFICE O/P NEW MOD 45 MIN: CPT | Mod: 95 | Performed by: PHYSICIAN ASSISTANT

## 2021-01-01 PROCEDURE — 99221 1ST HOSP IP/OBS SF/LOW 40: CPT | Mod: GC | Performed by: INTERNAL MEDICINE

## 2021-01-01 PROCEDURE — 88189 FLOWCYTOMETRY/READ 16 & >: CPT | Performed by: PATHOLOGY

## 2021-01-01 PROCEDURE — 87116 MYCOBACTERIA CULTURE: CPT | Performed by: INTERNAL MEDICINE

## 2021-01-01 PROCEDURE — U0005 INFEC AGEN DETEC AMPLI PROBE: HCPCS | Performed by: RADIOLOGY

## 2021-01-01 PROCEDURE — 250N000013 HC RX MED GY IP 250 OP 250 PS 637: Performed by: STUDENT IN AN ORGANIZED HEALTH CARE EDUCATION/TRAINING PROGRAM

## 2021-01-01 PROCEDURE — 87641 MR-STAPH DNA AMP PROBE: CPT | Performed by: HOSPITALIST

## 2021-01-01 PROCEDURE — 99239 HOSP IP/OBS DSCHRG MGMT >30: CPT | Performed by: HOSPITALIST

## 2021-01-01 PROCEDURE — 272N000001 HC OR GENERAL SUPPLY STERILE: Performed by: OTOLARYNGOLOGY

## 2021-01-01 PROCEDURE — 77336 RADIATION PHYSICS CONSULT: CPT | Performed by: RADIOLOGY

## 2021-01-01 PROCEDURE — 120N000002 HC R&B MED SURG/OB UMMC

## 2021-01-01 PROCEDURE — 258N000003 HC RX IP 258 OP 636: Performed by: NURSE ANESTHETIST, CERTIFIED REGISTERED

## 2021-01-01 PROCEDURE — 93010 ELECTROCARDIOGRAM REPORT: CPT | Performed by: INTERNAL MEDICINE

## 2021-01-01 PROCEDURE — 999N001193 HC VIDEO/TELEPHONE VISIT; NO CHARGE

## 2021-01-01 PROCEDURE — 88305 TISSUE EXAM BY PATHOLOGIST: CPT | Mod: TC | Performed by: OTOLARYNGOLOGY

## 2021-01-01 PROCEDURE — 85520 HEPARIN ASSAY: CPT | Performed by: INTERNAL MEDICINE

## 2021-01-01 PROCEDURE — 250N000011 HC RX IP 250 OP 636: Performed by: EMERGENCY MEDICINE

## 2021-01-01 PROCEDURE — 99207 PR APP CREDIT; MD BILLING SHARED VISIT: CPT | Performed by: NURSE PRACTITIONER

## 2021-01-01 PROCEDURE — 87102 FUNGUS ISOLATION CULTURE: CPT | Performed by: STUDENT IN AN ORGANIZED HEALTH CARE EDUCATION/TRAINING PROGRAM

## 2021-01-01 PROCEDURE — 999N000065 XR CHEST PORT 1 VIEW

## 2021-01-01 PROCEDURE — 250N000012 HC RX MED GY IP 250 OP 636 PS 637: Performed by: HOSPITALIST

## 2021-01-01 PROCEDURE — 96366 THER/PROPH/DIAG IV INF ADDON: CPT | Performed by: EMERGENCY MEDICINE

## 2021-01-01 PROCEDURE — 99442 PR PHYSICIAN TELEPHONE EVALUATION 11-20 MIN: CPT | Mod: 95 | Performed by: OTOLARYNGOLOGY

## 2021-01-01 PROCEDURE — 99285 EMERGENCY DEPT VISIT HI MDM: CPT | Performed by: EMERGENCY MEDICINE

## 2021-01-01 PROCEDURE — 250N000012 HC RX MED GY IP 250 OP 636 PS 637: Performed by: INTERNAL MEDICINE

## 2021-01-01 PROCEDURE — 81001 URINALYSIS AUTO W/SCOPE: CPT | Performed by: NURSE PRACTITIONER

## 2021-01-01 PROCEDURE — 74177 CT ABD & PELVIS W/CONTRAST: CPT | Mod: MG

## 2021-01-01 PROCEDURE — 80053 COMPREHEN METABOLIC PANEL: CPT | Performed by: HOSPITALIST

## 2021-01-01 PROCEDURE — 85027 COMPLETE CBC AUTOMATED: CPT | Performed by: INTERNAL MEDICINE

## 2021-01-01 PROCEDURE — 88172 CYTP DX EVAL FNA 1ST EA SITE: CPT | Mod: 26 | Performed by: PATHOLOGY

## 2021-01-01 PROCEDURE — 83615 LACTATE (LD) (LDH) ENZYME: CPT | Performed by: STUDENT IN AN ORGANIZED HEALTH CARE EDUCATION/TRAINING PROGRAM

## 2021-01-01 PROCEDURE — 99213 OFFICE O/P EST LOW 20 MIN: CPT | Mod: 25 | Performed by: OTOLARYNGOLOGY

## 2021-01-01 PROCEDURE — 99442 PR PHYSICIAN TELEPHONE EVALUATION 11-20 MIN: CPT | Mod: 95 | Performed by: PHYSICIAN ASSISTANT

## 2021-01-01 PROCEDURE — 250N000011 HC RX IP 250 OP 636: Mod: GT | Performed by: PHYSICIAN ASSISTANT

## 2021-01-01 PROCEDURE — 36415 COLL VENOUS BLD VENIPUNCTURE: CPT | Performed by: EMERGENCY MEDICINE

## 2021-01-01 PROCEDURE — 250N000011 HC RX IP 250 OP 636: Performed by: OTOLARYNGOLOGY

## 2021-01-01 PROCEDURE — 77332 RADIATION TREATMENT AID(S): CPT | Mod: 26 | Performed by: RADIOLOGY

## 2021-01-01 PROCEDURE — 87102 FUNGUS ISOLATION CULTURE: CPT | Performed by: INTERNAL MEDICINE

## 2021-01-01 PROCEDURE — 32554 ASPIRATE PLEURA W/O IMAGING: CPT | Performed by: INTERNAL MEDICINE

## 2021-01-01 PROCEDURE — 84155 ASSAY OF PROTEIN SERUM: CPT | Performed by: STUDENT IN AN ORGANIZED HEALTH CARE EDUCATION/TRAINING PROGRAM

## 2021-01-01 PROCEDURE — 250N000009 HC RX 250: Performed by: EMERGENCY MEDICINE

## 2021-01-01 PROCEDURE — 99223 1ST HOSP IP/OBS HIGH 75: CPT | Mod: AI | Performed by: INTERNAL MEDICINE

## 2021-01-01 PROCEDURE — 84132 ASSAY OF SERUM POTASSIUM: CPT | Performed by: ANESTHESIOLOGY

## 2021-01-01 PROCEDURE — 99232 SBSQ HOSP IP/OBS MODERATE 35: CPT | Mod: GC | Performed by: INTERNAL MEDICINE

## 2021-01-01 PROCEDURE — 88108 CYTOPATH CONCENTRATE TECH: CPT | Mod: TC | Performed by: INTERNAL MEDICINE

## 2021-01-01 PROCEDURE — 999N001018 HC STATISTIC H-CELL BLOCK W/STAIN: Performed by: THORACIC SURGERY (CARDIOTHORACIC VASCULAR SURGERY)

## 2021-01-01 PROCEDURE — 84100 ASSAY OF PHOSPHORUS: CPT | Performed by: HOSPITALIST

## 2021-01-01 PROCEDURE — 84100 ASSAY OF PHOSPHORUS: CPT | Performed by: NURSE PRACTITIONER

## 2021-01-01 PROCEDURE — 83735 ASSAY OF MAGNESIUM: CPT | Performed by: HOSPITALIST

## 2021-01-01 PROCEDURE — 99202 OFFICE O/P NEW SF 15 MIN: CPT | Mod: 95 | Performed by: INTERNAL MEDICINE

## 2021-01-01 PROCEDURE — 999N000109 HC STATISTIC OP CR VISIT

## 2021-01-01 PROCEDURE — 99285 EMERGENCY DEPT VISIT HI MDM: CPT | Mod: 25 | Performed by: EMERGENCY MEDICINE

## 2021-01-01 PROCEDURE — 92610 EVALUATE SWALLOWING FUNCTION: CPT | Mod: GN | Performed by: SPEECH-LANGUAGE PATHOLOGIST

## 2021-01-01 PROCEDURE — 250N000024 HC ISOFLURANE, PER MIN: Performed by: OTOLARYNGOLOGY

## 2021-01-01 PROCEDURE — 31575 DIAGNOSTIC LARYNGOSCOPY: CPT | Performed by: EMERGENCY MEDICINE

## 2021-01-01 PROCEDURE — 43752 NASAL/OROGASTRIC W/TUBE PLMT: CPT | Mod: GC | Performed by: RADIOLOGY

## 2021-01-01 PROCEDURE — 32555 ASPIRATE PLEURA W/ IMAGING: CPT | Performed by: INTERNAL MEDICINE

## 2021-01-01 PROCEDURE — 87116 MYCOBACTERIA CULTURE: CPT | Performed by: STUDENT IN AN ORGANIZED HEALTH CARE EDUCATION/TRAINING PROGRAM

## 2021-01-01 PROCEDURE — 74177 CT ABD & PELVIS W/CONTRAST: CPT | Mod: 26

## 2021-01-01 PROCEDURE — 84145 PROCALCITONIN (PCT): CPT | Performed by: NURSE PRACTITIONER

## 2021-01-01 PROCEDURE — 85027 COMPLETE CBC AUTOMATED: CPT | Performed by: STUDENT IN AN ORGANIZED HEALTH CARE EDUCATION/TRAINING PROGRAM

## 2021-01-01 PROCEDURE — 96361 HYDRATE IV INFUSION ADD-ON: CPT

## 2021-01-01 PROCEDURE — 87205 SMEAR GRAM STAIN: CPT | Performed by: STUDENT IN AN ORGANIZED HEALTH CARE EDUCATION/TRAINING PROGRAM

## 2021-01-01 PROCEDURE — 82040 ASSAY OF SERUM ALBUMIN: CPT | Performed by: HOSPITALIST

## 2021-01-01 PROCEDURE — 80053 COMPREHEN METABOLIC PANEL: CPT | Performed by: INTERNAL MEDICINE

## 2021-01-01 PROCEDURE — 370N000017 HC ANESTHESIA TECHNICAL FEE, PER MIN: Performed by: INTERNAL MEDICINE

## 2021-01-01 PROCEDURE — 77470 SPECIAL RADIATION TREATMENT: CPT | Mod: 26 | Performed by: RADIOLOGY

## 2021-01-01 PROCEDURE — 93005 ELECTROCARDIOGRAM TRACING: CPT

## 2021-01-01 PROCEDURE — 83970 ASSAY OF PARATHORMONE: CPT | Performed by: STUDENT IN AN ORGANIZED HEALTH CARE EDUCATION/TRAINING PROGRAM

## 2021-01-01 PROCEDURE — 710N000012 HC RECOVERY PHASE 2, PER MINUTE: Performed by: OTOLARYNGOLOGY

## 2021-01-01 PROCEDURE — 99223 1ST HOSP IP/OBS HIGH 75: CPT | Mod: 25 | Performed by: STUDENT IN AN ORGANIZED HEALTH CARE EDUCATION/TRAINING PROGRAM

## 2021-01-01 PROCEDURE — 82962 GLUCOSE BLOOD TEST: CPT | Mod: 91

## 2021-01-01 PROCEDURE — 31653 BRONCH EBUS SAMPLNG 3/> NODE: CPT | Performed by: INTERNAL MEDICINE

## 2021-01-01 PROCEDURE — 360N000076 HC SURGERY LEVEL 3, PER MIN: Performed by: OTOLARYNGOLOGY

## 2021-01-01 PROCEDURE — 710N000010 HC RECOVERY PHASE 1, LEVEL 2, PER MIN: Performed by: INTERNAL MEDICINE

## 2021-01-01 PROCEDURE — 82947 ASSAY GLUCOSE BLOOD QUANT: CPT | Mod: 91 | Performed by: ANESTHESIOLOGY

## 2021-01-01 PROCEDURE — 88341 IMHCHEM/IMCYTCHM EA ADD ANTB: CPT | Mod: 26 | Performed by: PATHOLOGY

## 2021-01-01 PROCEDURE — 83735 ASSAY OF MAGNESIUM: CPT | Performed by: NURSE PRACTITIONER

## 2021-01-01 PROCEDURE — 99222 1ST HOSP IP/OBS MODERATE 55: CPT | Performed by: NURSE PRACTITIONER

## 2021-01-01 PROCEDURE — A9552 F18 FDG: HCPCS | Performed by: INTERNAL MEDICINE

## 2021-01-01 PROCEDURE — 370N000017 HC ANESTHESIA TECHNICAL FEE, PER MIN: Performed by: OTOLARYNGOLOGY

## 2021-01-01 PROCEDURE — 99239 HOSP IP/OBS DSCHRG MGMT >30: CPT | Performed by: INTERNAL MEDICINE

## 2021-01-01 PROCEDURE — 82040 ASSAY OF SERUM ALBUMIN: CPT | Performed by: INTERNAL MEDICINE

## 2021-01-01 PROCEDURE — 36561 INSERT TUNNELED CV CATH: CPT | Performed by: RADIOLOGY

## 2021-01-01 PROCEDURE — 250N000013 HC RX MED GY IP 250 OP 250 PS 637

## 2021-01-01 PROCEDURE — 36561 INSERT TUNNELED CV CATH: CPT | Mod: RT | Performed by: RADIOLOGY

## 2021-01-01 PROCEDURE — 80053 COMPREHEN METABOLIC PANEL: CPT | Performed by: NURSE PRACTITIONER

## 2021-01-01 PROCEDURE — 99232 SBSQ HOSP IP/OBS MODERATE 35: CPT | Performed by: NURSE PRACTITIONER

## 2021-01-01 PROCEDURE — 88172 CYTP DX EVAL FNA 1ST EA SITE: CPT | Mod: TC | Performed by: THORACIC SURGERY (CARDIOTHORACIC VASCULAR SURGERY)

## 2021-01-01 PROCEDURE — 99223 1ST HOSP IP/OBS HIGH 75: CPT | Performed by: INTERNAL MEDICINE

## 2021-01-01 PROCEDURE — 84439 ASSAY OF FREE THYROXINE: CPT | Performed by: INTERNAL MEDICINE

## 2021-01-01 PROCEDURE — 71275 CT ANGIOGRAPHY CHEST: CPT

## 2021-01-01 PROCEDURE — 88112 CYTOPATH CELL ENHANCE TECH: CPT | Mod: 26 | Performed by: PATHOLOGY

## 2021-01-01 PROCEDURE — 82330 ASSAY OF CALCIUM: CPT | Performed by: HOSPITALIST

## 2021-01-01 PROCEDURE — 96375 TX/PRO/DX INJ NEW DRUG ADDON: CPT | Performed by: EMERGENCY MEDICINE

## 2021-01-01 PROCEDURE — 83735 ASSAY OF MAGNESIUM: CPT | Performed by: INTERNAL MEDICINE

## 2021-01-01 PROCEDURE — 36415 COLL VENOUS BLD VENIPUNCTURE: CPT | Performed by: RADIOLOGY

## 2021-01-01 PROCEDURE — U0003 INFECTIOUS AGENT DETECTION BY NUCLEIC ACID (DNA OR RNA); SEVERE ACUTE RESPIRATORY SYNDROME CORONAVIRUS 2 (SARS-COV-2) (CORONAVIRUS DISEASE [COVID-19]), AMPLIFIED PROBE TECHNIQUE, MAKING USE OF HIGH THROUGHPUT TECHNOLOGIES AS DESCRIBED BY CMS-2020-01-R: HCPCS | Performed by: EMERGENCY MEDICINE

## 2021-01-01 PROCEDURE — 83986 ASSAY PH BODY FLUID NOS: CPT | Performed by: STUDENT IN AN ORGANIZED HEALTH CARE EDUCATION/TRAINING PROGRAM

## 2021-01-01 PROCEDURE — 77334 RADIATION TREATMENT AID(S): CPT | Mod: 26 | Performed by: RADIOLOGY

## 2021-01-01 PROCEDURE — 36591 DRAW BLOOD OFF VENOUS DEVICE: CPT | Performed by: INTERNAL MEDICINE

## 2021-01-01 PROCEDURE — 83036 HEMOGLOBIN GLYCOSYLATED A1C: CPT | Performed by: EMERGENCY MEDICINE

## 2021-01-01 PROCEDURE — 85610 PROTHROMBIN TIME: CPT | Performed by: HOSPITALIST

## 2021-01-01 PROCEDURE — 77470 SPECIAL RADIATION TREATMENT: CPT | Performed by: RADIOLOGY

## 2021-01-01 PROCEDURE — 99215 OFFICE O/P EST HI 40 MIN: CPT | Mod: 95 | Performed by: INTERNAL MEDICINE

## 2021-01-01 PROCEDURE — 84443 ASSAY THYROID STIM HORMONE: CPT | Performed by: NURSE PRACTITIONER

## 2021-01-01 PROCEDURE — 87070 CULTURE OTHR SPECIMN AEROBIC: CPT | Performed by: STUDENT IN AN ORGANIZED HEALTH CARE EDUCATION/TRAINING PROGRAM

## 2021-01-01 PROCEDURE — G0463 HOSPITAL OUTPT CLINIC VISIT: HCPCS | Performed by: RADIOLOGY

## 2021-01-01 PROCEDURE — 99207 PR NO BILLABLE SERVICE THIS VISIT: CPT | Performed by: OTOLARYNGOLOGY

## 2021-01-01 PROCEDURE — 88108 CYTOPATH CONCENTRATE TECH: CPT | Mod: 26 | Performed by: PATHOLOGY

## 2021-01-01 PROCEDURE — 87070 CULTURE OTHR SPECIMN AEROBIC: CPT | Performed by: INTERNAL MEDICINE

## 2021-01-01 PROCEDURE — 258N000003 HC RX IP 258 OP 636: Performed by: ANESTHESIOLOGY

## 2021-01-01 PROCEDURE — 36415 COLL VENOUS BLD VENIPUNCTURE: CPT | Performed by: NURSE PRACTITIONER

## 2021-01-01 PROCEDURE — 99443 PR PHYSICIAN TELEPHONE EVALUATION 21-30 MIN: CPT | Mod: 95 | Performed by: NURSE PRACTITIONER

## 2021-01-01 PROCEDURE — 78816 PET IMAGE W/CT FULL BODY: CPT | Mod: PS,MG

## 2021-01-01 PROCEDURE — 78816 PET IMAGE W/CT FULL BODY: CPT | Mod: 26

## 2021-01-01 PROCEDURE — 0W9B3ZZ DRAINAGE OF LEFT PLEURAL CAVITY, PERCUTANEOUS APPROACH: ICD-10-PCS | Performed by: PEDIATRICS

## 2021-01-01 PROCEDURE — 92610 EVALUATE SWALLOWING FUNCTION: CPT | Mod: GN

## 2021-01-01 PROCEDURE — 77001 FLUOROGUIDE FOR VEIN DEVICE: CPT | Mod: 26 | Performed by: RADIOLOGY

## 2021-01-01 PROCEDURE — 87210 SMEAR WET MOUNT SALINE/INK: CPT | Performed by: INTERNAL MEDICINE

## 2021-01-01 PROCEDURE — 88305 TISSUE EXAM BY PATHOLOGIST: CPT | Mod: TC | Performed by: STUDENT IN AN ORGANIZED HEALTH CARE EDUCATION/TRAINING PROGRAM

## 2021-01-01 PROCEDURE — 85025 COMPLETE CBC W/AUTO DIFF WBC: CPT | Performed by: NURSE PRACTITIONER

## 2021-01-01 PROCEDURE — 82330 ASSAY OF CALCIUM: CPT | Performed by: STUDENT IN AN ORGANIZED HEALTH CARE EDUCATION/TRAINING PROGRAM

## 2021-01-01 PROCEDURE — 84439 ASSAY OF FREE THYROXINE: CPT | Performed by: NURSE PRACTITIONER

## 2021-01-01 PROCEDURE — 250N000009 HC RX 250: Performed by: STUDENT IN AN ORGANIZED HEALTH CARE EDUCATION/TRAINING PROGRAM

## 2021-01-01 PROCEDURE — 74177 CT ABD & PELVIS W/CONTRAST: CPT | Mod: 26 | Performed by: RADIOLOGY

## 2021-01-01 PROCEDURE — 71046 X-RAY EXAM CHEST 2 VIEWS: CPT | Mod: 26 | Performed by: RADIOLOGY

## 2021-01-01 PROCEDURE — 88188 FLOWCYTOMETRY/READ 9-15: CPT | Mod: XS | Performed by: PATHOLOGY

## 2021-01-01 PROCEDURE — 88360 TUMOR IMMUNOHISTOCHEM/MANUAL: CPT | Mod: 26 | Performed by: PATHOLOGY

## 2021-01-01 PROCEDURE — U0003 INFECTIOUS AGENT DETECTION BY NUCLEIC ACID (DNA OR RNA); SEVERE ACUTE RESPIRATORY SYNDROME CORONAVIRUS 2 (SARS-COV-2) (CORONAVIRUS DISEASE [COVID-19]), AMPLIFIED PROBE TECHNIQUE, MAKING USE OF HIGH THROUGHPUT TECHNOLOGIES AS DESCRIBED BY CMS-2020-01-R: HCPCS | Performed by: OTOLARYNGOLOGY

## 2021-01-01 PROCEDURE — 43246 EGD PLACE GASTROSTOMY TUBE: CPT | Mod: 51 | Performed by: THORACIC SURGERY (CARDIOTHORACIC VASCULAR SURGERY)

## 2021-01-01 PROCEDURE — 250N000009 HC RX 250: Performed by: OTOLARYNGOLOGY

## 2021-01-01 PROCEDURE — 32555 ASPIRATE PLEURA W/ IMAGING: CPT | Mod: GC | Performed by: PEDIATRICS

## 2021-01-01 PROCEDURE — 96361 HYDRATE IV INFUSION ADD-ON: CPT | Performed by: EMERGENCY MEDICINE

## 2021-01-01 PROCEDURE — 36592 COLLECT BLOOD FROM PICC: CPT | Performed by: NURSE PRACTITIONER

## 2021-01-01 PROCEDURE — 71260 CT THORAX DX C+: CPT | Mod: 26

## 2021-01-01 PROCEDURE — 99221 1ST HOSP IP/OBS SF/LOW 40: CPT | Performed by: INTERNAL MEDICINE

## 2021-01-01 PROCEDURE — 84145 PROCALCITONIN (PCT): CPT | Performed by: STUDENT IN AN ORGANIZED HEALTH CARE EDUCATION/TRAINING PROGRAM

## 2021-01-01 PROCEDURE — 41100 BIOPSY OF TONGUE: CPT | Performed by: OTOLARYNGOLOGY

## 2021-01-01 PROCEDURE — 84443 ASSAY THYROID STIM HORMONE: CPT | Performed by: INTERNAL MEDICINE

## 2021-01-01 PROCEDURE — 71045 X-RAY EXAM CHEST 1 VIEW: CPT | Mod: 26 | Performed by: STUDENT IN AN ORGANIZED HEALTH CARE EDUCATION/TRAINING PROGRAM

## 2021-01-01 PROCEDURE — 99205 OFFICE O/P NEW HI 60 MIN: CPT | Performed by: INTERNAL MEDICINE

## 2021-01-01 PROCEDURE — U0003 INFECTIOUS AGENT DETECTION BY NUCLEIC ACID (DNA OR RNA); SEVERE ACUTE RESPIRATORY SYNDROME CORONAVIRUS 2 (SARS-COV-2) (CORONAVIRUS DISEASE [COVID-19]), AMPLIFIED PROBE TECHNIQUE, MAKING USE OF HIGH THROUGHPUT TECHNOLOGIES AS DESCRIBED BY CMS-2020-01-R: HCPCS | Performed by: RADIOLOGY

## 2021-01-01 PROCEDURE — 82310 ASSAY OF CALCIUM: CPT | Performed by: NURSE PRACTITIONER

## 2021-01-01 PROCEDURE — 99213 OFFICE O/P EST LOW 20 MIN: CPT | Performed by: REGISTERED NURSE

## 2021-01-01 PROCEDURE — U0005 INFEC AGEN DETEC AMPLI PROBE: HCPCS | Performed by: EMERGENCY MEDICINE

## 2021-01-01 PROCEDURE — 87206 SMEAR FLUORESCENT/ACID STAI: CPT | Performed by: STUDENT IN AN ORGANIZED HEALTH CARE EDUCATION/TRAINING PROGRAM

## 2021-01-01 PROCEDURE — 87075 CULTR BACTERIA EXCEPT BLOOD: CPT | Performed by: STUDENT IN AN ORGANIZED HEALTH CARE EDUCATION/TRAINING PROGRAM

## 2021-01-01 PROCEDURE — 92526 ORAL FUNCTION THERAPY: CPT | Mod: GN

## 2021-01-01 PROCEDURE — 96365 THER/PROPH/DIAG IV INF INIT: CPT | Performed by: EMERGENCY MEDICINE

## 2021-01-01 PROCEDURE — 80053 COMPREHEN METABOLIC PANEL: CPT | Performed by: STUDENT IN AN ORGANIZED HEALTH CARE EDUCATION/TRAINING PROGRAM

## 2021-01-01 PROCEDURE — 93010 ELECTROCARDIOGRAM REPORT: CPT | Mod: 59 | Performed by: INTERNAL MEDICINE

## 2021-01-01 PROCEDURE — 96372 THER/PROPH/DIAG INJ SC/IM: CPT | Performed by: HOSPITALIST

## 2021-01-01 PROCEDURE — 99213 OFFICE O/P EST LOW 20 MIN: CPT | Mod: 24 | Performed by: OTOLARYNGOLOGY

## 2021-01-01 PROCEDURE — 89051 BODY FLUID CELL COUNT: CPT | Performed by: INTERNAL MEDICINE

## 2021-01-01 PROCEDURE — 36415 COLL VENOUS BLD VENIPUNCTURE: CPT | Performed by: STUDENT IN AN ORGANIZED HEALTH CARE EDUCATION/TRAINING PROGRAM

## 2021-01-01 PROCEDURE — 89050 BODY FLUID CELL COUNT: CPT | Performed by: INTERNAL MEDICINE

## 2021-01-01 PROCEDURE — 258N000003 HC RX IP 258 OP 636: Performed by: STUDENT IN AN ORGANIZED HEALTH CARE EDUCATION/TRAINING PROGRAM

## 2021-01-01 PROCEDURE — 85025 COMPLETE CBC W/AUTO DIFF WBC: CPT | Performed by: PHYSICIAN ASSISTANT

## 2021-01-01 PROCEDURE — 70491 CT SOFT TISSUE NECK W/DYE: CPT | Mod: 26 | Performed by: RADIOLOGY

## 2021-01-01 PROCEDURE — 999N000127 HC STATISTIC PERIPHERAL IV START W US GUIDANCE

## 2021-01-01 PROCEDURE — 88305 TISSUE EXAM BY PATHOLOGIST: CPT | Mod: TC | Performed by: THORACIC SURGERY (CARDIOTHORACIC VASCULAR SURGERY)

## 2021-01-01 PROCEDURE — 87206 SMEAR FLUORESCENT/ACID STAI: CPT | Performed by: INTERNAL MEDICINE

## 2021-01-01 PROCEDURE — 710N000012 HC RECOVERY PHASE 2, PER MINUTE: Performed by: INTERNAL MEDICINE

## 2021-01-01 PROCEDURE — 71046 X-RAY EXAM CHEST 2 VIEWS: CPT | Mod: GC | Performed by: STUDENT IN AN ORGANIZED HEALTH CARE EDUCATION/TRAINING PROGRAM

## 2021-01-01 DEVICE — CATH PORT POWERPORT CLEARVUE SLIM 6FR 5616000: Type: IMPLANTABLE DEVICE | Site: CHEST | Status: FUNCTIONAL

## 2021-01-01 RX ORDER — IOPAMIDOL 755 MG/ML
96 INJECTION, SOLUTION INTRAVASCULAR ONCE
Status: COMPLETED | OUTPATIENT
Start: 2021-01-01 | End: 2021-01-01

## 2021-01-01 RX ORDER — HEPARIN SODIUM,PORCINE 10 UNIT/ML
5 VIAL (ML) INTRAVENOUS
Status: CANCELLED | OUTPATIENT
Start: 2021-01-01

## 2021-01-01 RX ORDER — INSULIN GLARGINE 100 [IU]/ML
25 INJECTION, SOLUTION SUBCUTANEOUS
COMMUNITY

## 2021-01-01 RX ORDER — OXYCODONE HCL 5 MG/5 ML
SOLUTION, ORAL ORAL
Qty: 500 ML | Refills: 0 | OUTPATIENT
Start: 2021-01-01

## 2021-01-01 RX ORDER — PROPOFOL 10 MG/ML
INJECTION, EMULSION INTRAVENOUS PRN
Status: DISCONTINUED | OUTPATIENT
Start: 2021-01-01 | End: 2021-01-01

## 2021-01-01 RX ORDER — HEPARIN SODIUM (PORCINE) LOCK FLUSH IV SOLN 100 UNIT/ML 100 UNIT/ML
5 SOLUTION INTRAVENOUS
Status: DISCONTINUED | OUTPATIENT
Start: 2021-01-01 | End: 2021-01-01 | Stop reason: HOSPADM

## 2021-01-01 RX ORDER — NALOXONE HYDROCHLORIDE 0.4 MG/ML
0.2 INJECTION, SOLUTION INTRAMUSCULAR; INTRAVENOUS; SUBCUTANEOUS
Status: DISCONTINUED | OUTPATIENT
Start: 2021-01-01 | End: 2021-01-01 | Stop reason: HOSPADM

## 2021-01-01 RX ORDER — HEPARIN SODIUM (PORCINE) LOCK FLUSH IV SOLN 100 UNIT/ML 100 UNIT/ML
5 SOLUTION INTRAVENOUS
Status: CANCELLED | OUTPATIENT
Start: 2021-11-01

## 2021-01-01 RX ORDER — OXYCODONE HCL 5 MG/5 ML
7.5 SOLUTION, ORAL ORAL EVERY 6 HOURS PRN
Status: DISCONTINUED | OUTPATIENT
Start: 2021-01-01 | End: 2021-01-01 | Stop reason: HOSPADM

## 2021-01-01 RX ORDER — OXYCODONE HCL 5 MG/5 ML
15 SOLUTION, ORAL ORAL
Status: DISCONTINUED | OUTPATIENT
Start: 2021-01-01 | End: 2021-01-01

## 2021-01-01 RX ORDER — MAGNESIUM HYDROXIDE 1200 MG/15ML
LIQUID ORAL PRN
Status: DISCONTINUED | OUTPATIENT
Start: 2021-01-01 | End: 2021-01-01 | Stop reason: HOSPADM

## 2021-01-01 RX ORDER — EPINEPHRINE 1 MG/ML
0.3 INJECTION, SOLUTION INTRAMUSCULAR; SUBCUTANEOUS EVERY 5 MIN PRN
Status: CANCELLED | OUTPATIENT
Start: 2021-01-01

## 2021-01-01 RX ORDER — ALBUTEROL SULFATE 90 UG/1
1-2 AEROSOL, METERED RESPIRATORY (INHALATION)
Status: CANCELLED
Start: 2021-01-01

## 2021-01-01 RX ORDER — NALOXONE HYDROCHLORIDE 0.4 MG/ML
.1-.4 INJECTION, SOLUTION INTRAMUSCULAR; INTRAVENOUS; SUBCUTANEOUS
Status: CANCELLED | OUTPATIENT
Start: 2021-01-01

## 2021-01-01 RX ORDER — DEXAMETHASONE 4 MG/1
8 TABLET ORAL DAILY
Qty: 6 TABLET | Refills: 5 | Status: SHIPPED | OUTPATIENT
Start: 2021-01-01

## 2021-01-01 RX ORDER — SODIUM CHLORIDE 9 MG/ML
1000 INJECTION, SOLUTION INTRAVENOUS CONTINUOUS PRN
Status: CANCELLED
Start: 2021-01-01

## 2021-01-01 RX ORDER — ONDANSETRON 8 MG/1
8 TABLET, FILM COATED ORAL EVERY 8 HOURS PRN
Qty: 10 TABLET | Refills: 1 | Status: CANCELLED | OUTPATIENT
Start: 2021-01-01

## 2021-01-01 RX ORDER — MEPERIDINE HYDROCHLORIDE 25 MG/ML
25 INJECTION INTRAMUSCULAR; INTRAVENOUS; SUBCUTANEOUS EVERY 30 MIN PRN
Status: CANCELLED | OUTPATIENT
Start: 2021-11-01

## 2021-01-01 RX ORDER — LORAZEPAM 2 MG/ML
0.5 INJECTION INTRAMUSCULAR EVERY 4 HOURS PRN
Status: CANCELLED
Start: 2021-01-01

## 2021-01-01 RX ORDER — AMPICILLIN AND SULBACTAM 2; 1 G/1; G/1
3 INJECTION, POWDER, FOR SOLUTION INTRAMUSCULAR; INTRAVENOUS ONCE
Status: COMPLETED | OUTPATIENT
Start: 2021-01-01 | End: 2021-01-01

## 2021-01-01 RX ORDER — HYDROCODONE BITARTRATE AND ACETAMINOPHEN 5; 325 MG/1; MG/1
1-2 TABLET ORAL EVERY 6 HOURS PRN
Qty: 40 TABLET | Refills: 0 | Status: SHIPPED | OUTPATIENT
Start: 2021-01-01 | End: 2021-01-01

## 2021-01-01 RX ORDER — PALONOSETRON 0.05 MG/ML
0.25 INJECTION, SOLUTION INTRAVENOUS ONCE
Status: CANCELLED
Start: 2021-01-01

## 2021-01-01 RX ORDER — PROCHLORPERAZINE MALEATE 10 MG
10 TABLET ORAL EVERY 6 HOURS PRN
Qty: 30 TABLET | Refills: 1 | Status: SHIPPED | OUTPATIENT
Start: 2021-01-01 | End: 2021-01-01

## 2021-01-01 RX ORDER — SUCRALFATE 1 G/1
1 TABLET ORAL 2 TIMES DAILY
Status: DISCONTINUED | OUTPATIENT
Start: 2021-01-01 | End: 2021-01-01 | Stop reason: HOSPADM

## 2021-01-01 RX ORDER — DEXTROSE MONOHYDRATE 100 MG/ML
INJECTION, SOLUTION INTRAVENOUS CONTINUOUS PRN
Status: DISCONTINUED | OUTPATIENT
Start: 2021-01-01 | End: 2021-01-01 | Stop reason: HOSPADM

## 2021-01-01 RX ORDER — SODIUM CHLORIDE, SODIUM LACTATE, POTASSIUM CHLORIDE, CALCIUM CHLORIDE 600; 310; 30; 20 MG/100ML; MG/100ML; MG/100ML; MG/100ML
INJECTION, SOLUTION INTRAVENOUS CONTINUOUS
Status: DISCONTINUED | OUTPATIENT
Start: 2021-01-01 | End: 2021-01-01 | Stop reason: HOSPADM

## 2021-01-01 RX ORDER — LIDOCAINE 40 MG/G
CREAM TOPICAL
Status: DISCONTINUED | OUTPATIENT
Start: 2021-01-01 | End: 2021-01-01 | Stop reason: HOSPADM

## 2021-01-01 RX ORDER — LEVOFLOXACIN 25 MG/ML
750 SOLUTION ORAL DAILY
Qty: 150 ML | Refills: 0 | Status: SHIPPED | OUTPATIENT
Start: 2021-01-01 | End: 2021-01-01

## 2021-01-01 RX ORDER — LORAZEPAM 2 MG/ML
0.5 INJECTION INTRAMUSCULAR EVERY 4 HOURS PRN
Status: CANCELLED | OUTPATIENT
Start: 2021-01-01

## 2021-01-01 RX ORDER — MEPERIDINE HYDROCHLORIDE 25 MG/ML
25 INJECTION INTRAMUSCULAR; INTRAVENOUS; SUBCUTANEOUS EVERY 30 MIN PRN
Status: CANCELLED | OUTPATIENT
Start: 2021-01-01

## 2021-01-01 RX ORDER — MULTIVIT WITH MINERALS/LUTEIN
1000 TABLET ORAL DAILY
Qty: 60 CAPSULE | Refills: 1 | Status: SHIPPED | OUTPATIENT
Start: 2021-01-01

## 2021-01-01 RX ORDER — APIXABAN 5 MG (74)
KIT ORAL
Qty: 74 EACH | Refills: 0 | Status: SHIPPED | OUTPATIENT
Start: 2021-01-01 | End: 2021-10-25

## 2021-01-01 RX ORDER — SUCRALFATE 1 G/1
1 TABLET ORAL 2 TIMES DAILY
Status: DISCONTINUED | OUTPATIENT
Start: 2021-01-01 | End: 2021-01-01

## 2021-01-01 RX ORDER — ALBUTEROL SULFATE 0.83 MG/ML
2.5 SOLUTION RESPIRATORY (INHALATION)
Status: CANCELLED | OUTPATIENT
Start: 2021-01-01

## 2021-01-01 RX ORDER — HYDROMORPHONE HYDROCHLORIDE 1 MG/ML
.3-.5 INJECTION, SOLUTION INTRAMUSCULAR; INTRAVENOUS; SUBCUTANEOUS EVERY 10 MIN PRN
Status: DISCONTINUED | OUTPATIENT
Start: 2021-01-01 | End: 2021-01-01 | Stop reason: HOSPADM

## 2021-01-01 RX ORDER — AMIODARONE HYDROCHLORIDE 200 MG/1
200 TABLET ORAL 2 TIMES DAILY
COMMUNITY

## 2021-01-01 RX ORDER — CEFEPIME HYDROCHLORIDE 1 G/1
1 INJECTION, POWDER, FOR SOLUTION INTRAMUSCULAR; INTRAVENOUS EVERY 12 HOURS
Status: DISCONTINUED | OUTPATIENT
Start: 2021-01-01 | End: 2021-01-01

## 2021-01-01 RX ORDER — HEPARIN SODIUM,PORCINE 10 UNIT/ML
5-10 VIAL (ML) INTRAVENOUS EVERY 24 HOURS
Status: DISCONTINUED | OUTPATIENT
Start: 2021-01-01 | End: 2021-01-01 | Stop reason: HOSPADM

## 2021-01-01 RX ORDER — AMOXICILLIN 250 MG
1 CAPSULE ORAL 2 TIMES DAILY PRN
Status: DISCONTINUED | OUTPATIENT
Start: 2021-01-01 | End: 2021-01-01 | Stop reason: HOSPADM

## 2021-01-01 RX ORDER — NALOXONE HYDROCHLORIDE 0.4 MG/ML
0.4 INJECTION, SOLUTION INTRAMUSCULAR; INTRAVENOUS; SUBCUTANEOUS
Status: DISCONTINUED | OUTPATIENT
Start: 2021-01-01 | End: 2021-01-01 | Stop reason: HOSPADM

## 2021-01-01 RX ORDER — HYDROMORPHONE HYDROCHLORIDE 5 MG/5ML
2 SOLUTION ORAL EVERY 4 HOURS PRN
Status: DISCONTINUED | OUTPATIENT
Start: 2021-01-01 | End: 2021-01-01

## 2021-01-01 RX ORDER — OXYCODONE HCL 5 MG/5 ML
7.5 SOLUTION, ORAL ORAL EVERY 6 HOURS PRN
Qty: 500 ML | Refills: 0 | Status: CANCELLED | OUTPATIENT
Start: 2021-01-01

## 2021-01-01 RX ORDER — ASPIRIN 81 MG/1
81 TABLET, CHEWABLE ORAL DAILY
Status: DISCONTINUED | OUTPATIENT
Start: 2021-01-01 | End: 2021-01-01 | Stop reason: HOSPADM

## 2021-01-01 RX ORDER — DIPHENHYDRAMINE HCL 25 MG
50 CAPSULE ORAL ONCE
Status: COMPLETED | OUTPATIENT
Start: 2021-01-01 | End: 2021-01-01

## 2021-01-01 RX ORDER — ACETAMINOPHEN 325 MG/10.15ML
650 LIQUID ORAL EVERY 6 HOURS PRN
Status: DISCONTINUED | OUTPATIENT
Start: 2021-01-01 | End: 2021-01-01 | Stop reason: HOSPADM

## 2021-01-01 RX ORDER — LORAZEPAM 0.5 MG/1
0.5 TABLET ORAL EVERY 4 HOURS PRN
Qty: 30 TABLET | Refills: 1 | Status: SHIPPED | OUTPATIENT
Start: 2021-01-01 | End: 2021-01-01

## 2021-01-01 RX ORDER — MULTIVIT WITH MINERALS/LUTEIN
1000 TABLET ORAL DAILY
Qty: 60 CAPSULE | Refills: 3 | Status: SHIPPED | OUTPATIENT
Start: 2021-01-01 | End: 2021-01-01

## 2021-01-01 RX ORDER — HEPARIN SODIUM (PORCINE) LOCK FLUSH IV SOLN 100 UNIT/ML 100 UNIT/ML
5 SOLUTION INTRAVENOUS
Status: CANCELLED | OUTPATIENT
Start: 2021-01-01

## 2021-01-01 RX ORDER — ONDANSETRON 2 MG/ML
8 INJECTION INTRAMUSCULAR; INTRAVENOUS ONCE
Status: COMPLETED | OUTPATIENT
Start: 2021-01-01 | End: 2021-01-01

## 2021-01-01 RX ORDER — OXYCODONE HYDROCHLORIDE 5 MG/1
5-10 TABLET ORAL EVERY 6 HOURS PRN
Qty: 60 TABLET | Refills: 0 | Status: SHIPPED | OUTPATIENT
Start: 2021-01-01 | End: 2021-01-01

## 2021-01-01 RX ORDER — ONDANSETRON 2 MG/ML
INJECTION INTRAMUSCULAR; INTRAVENOUS PRN
Status: DISCONTINUED | OUTPATIENT
Start: 2021-01-01 | End: 2021-01-01

## 2021-01-01 RX ORDER — OXYCODONE HCL 5 MG/5 ML
7.5 SOLUTION, ORAL ORAL EVERY 6 HOURS PRN
Qty: 500 ML | Refills: 0 | Status: SHIPPED | OUTPATIENT
Start: 2021-01-01 | End: 2021-01-01

## 2021-01-01 RX ORDER — FLUCONAZOLE 100 MG/1
TABLET ORAL
Qty: 15 TABLET | Refills: 0 | Status: ON HOLD | OUTPATIENT
Start: 2021-01-01 | End: 2021-01-01

## 2021-01-01 RX ORDER — CEFAZOLIN SODIUM 2 G/50ML
2 SOLUTION INTRAVENOUS SEE ADMIN INSTRUCTIONS
Status: CANCELLED | OUTPATIENT
Start: 2021-01-01

## 2021-01-01 RX ORDER — PALONOSETRON 0.05 MG/ML
0.25 INJECTION, SOLUTION INTRAVENOUS ONCE
Status: CANCELLED
Start: 2021-11-01

## 2021-01-01 RX ORDER — LIDOCAINE HYDROCHLORIDE 20 MG/ML
15 SOLUTION OROPHARYNGEAL
Qty: 100 ML | Refills: 3 | Status: SHIPPED | OUTPATIENT
Start: 2021-01-01 | End: 2021-01-01

## 2021-01-01 RX ORDER — LORAZEPAM 2 MG/ML
0.5 INJECTION INTRAMUSCULAR EVERY 4 HOURS PRN
Status: CANCELLED | OUTPATIENT
Start: 2021-11-01

## 2021-01-01 RX ORDER — OXYCODONE HYDROCHLORIDE 5 MG/1
5 CAPSULE ORAL EVERY 4 HOURS PRN
Status: ON HOLD | COMMUNITY
End: 2021-01-01

## 2021-01-01 RX ORDER — DIPHENHYDRAMINE HCL 12.5MG/5ML
25 LIQUID (ML) ORAL
Status: DISCONTINUED | OUTPATIENT
Start: 2021-01-01 | End: 2021-01-01 | Stop reason: HOSPADM

## 2021-01-01 RX ORDER — NICOTINE POLACRILEX 4 MG
15-30 LOZENGE BUCCAL
Status: DISCONTINUED | OUTPATIENT
Start: 2021-01-01 | End: 2021-01-01 | Stop reason: HOSPADM

## 2021-01-01 RX ORDER — FENTANYL CITRATE 50 UG/ML
25-50 INJECTION, SOLUTION INTRAMUSCULAR; INTRAVENOUS
Status: DISCONTINUED | OUTPATIENT
Start: 2021-01-01 | End: 2021-01-01 | Stop reason: HOSPADM

## 2021-01-01 RX ORDER — HEPARIN SODIUM (PORCINE) LOCK FLUSH IV SOLN 100 UNIT/ML 100 UNIT/ML
5 SOLUTION INTRAVENOUS ONCE
Status: COMPLETED | OUTPATIENT
Start: 2021-01-01 | End: 2021-01-01

## 2021-01-01 RX ORDER — METHYLPREDNISOLONE SODIUM SUCCINATE 125 MG/2ML
125 INJECTION, POWDER, LYOPHILIZED, FOR SOLUTION INTRAMUSCULAR; INTRAVENOUS
Status: CANCELLED
Start: 2021-01-01

## 2021-01-01 RX ORDER — DIPHENHYDRAMINE HYDROCHLORIDE 50 MG/ML
50 INJECTION INTRAMUSCULAR; INTRAVENOUS
Status: CANCELLED
Start: 2021-01-01

## 2021-01-01 RX ORDER — DOXEPIN HYDROCHLORIDE 10 MG/ML
25 SOLUTION ORAL EVERY 4 HOURS PRN
Status: DISCONTINUED | OUTPATIENT
Start: 2021-01-01 | End: 2021-01-01

## 2021-01-01 RX ORDER — DIPHENHYDRAMINE HYDROCHLORIDE AND LIDOCAINE HYDROCHLORIDE AND ALUMINUM HYDROXIDE AND MAGNESIUM HYDRO
5 KIT ONCE
Status: COMPLETED | OUTPATIENT
Start: 2021-01-01 | End: 2021-01-01

## 2021-01-01 RX ORDER — ONDANSETRON 2 MG/ML
4 INJECTION INTRAMUSCULAR; INTRAVENOUS EVERY 30 MIN PRN
Status: DISCONTINUED | OUTPATIENT
Start: 2021-01-01 | End: 2021-01-01 | Stop reason: HOSPADM

## 2021-01-01 RX ORDER — PENICILLIN V POTASSIUM 500 MG/1
500 TABLET, FILM COATED ORAL 4 TIMES DAILY
Qty: 56 TABLET | Refills: 0 | Status: SHIPPED | OUTPATIENT
Start: 2021-01-01 | End: 2021-01-01

## 2021-01-01 RX ORDER — DIPHENHYDRAMINE HYDROCHLORIDE AND LIDOCAINE HYDROCHLORIDE AND ALUMINUM HYDROXIDE AND MAGNESIUM HYDRO
10 KIT
Status: DISCONTINUED | OUTPATIENT
Start: 2021-01-01 | End: 2021-01-01 | Stop reason: HOSPADM

## 2021-01-01 RX ORDER — SODIUM CHLORIDE 9 MG/ML
INJECTION, SOLUTION INTRAVENOUS CONTINUOUS
Status: ACTIVE | OUTPATIENT
Start: 2021-01-01 | End: 2021-01-01

## 2021-01-01 RX ORDER — HEPARIN SODIUM 10000 [USP'U]/100ML
0-5000 INJECTION, SOLUTION INTRAVENOUS CONTINUOUS
Status: ACTIVE | OUTPATIENT
Start: 2021-01-01 | End: 2021-01-01

## 2021-01-01 RX ORDER — AMOXICILLIN AND CLAVULANATE POTASSIUM 400; 57 MG/5ML; MG/5ML
875 POWDER, FOR SUSPENSION ORAL 2 TIMES DAILY
Qty: 152.6 ML | Refills: 0 | Status: SHIPPED | OUTPATIENT
Start: 2021-01-01 | End: 2021-01-01

## 2021-01-01 RX ORDER — NICOTINE POLACRILEX 4 MG
15-30 LOZENGE BUCCAL
Status: DISCONTINUED | OUTPATIENT
Start: 2021-01-01 | End: 2021-01-01

## 2021-01-01 RX ORDER — LORAZEPAM 0.5 MG/1
0.5 TABLET ORAL EVERY 4 HOURS PRN
Qty: 30 TABLET | Refills: 1 | Status: CANCELLED | OUTPATIENT
Start: 2021-01-01

## 2021-01-01 RX ORDER — LIDOCAINE HYDROCHLORIDE 20 MG/ML
5 SOLUTION OROPHARYNGEAL ONCE
Status: COMPLETED | OUTPATIENT
Start: 2021-01-01 | End: 2021-01-01

## 2021-01-01 RX ORDER — HEPARIN SODIUM 10000 [USP'U]/100ML
0-5000 INJECTION, SOLUTION INTRAVENOUS CONTINUOUS
Status: DISCONTINUED | OUTPATIENT
Start: 2021-01-01 | End: 2021-01-01

## 2021-01-01 RX ORDER — LISINOPRIL 5 MG/1
5 TABLET ORAL DAILY
Status: ON HOLD | COMMUNITY
End: 2021-01-01

## 2021-01-01 RX ORDER — FENTANYL CITRATE 50 UG/ML
INJECTION, SOLUTION INTRAMUSCULAR; INTRAVENOUS PRN
Status: DISCONTINUED | OUTPATIENT
Start: 2021-01-01 | End: 2021-01-01

## 2021-01-01 RX ORDER — IOPAMIDOL 755 MG/ML
60-135 INJECTION, SOLUTION INTRAVASCULAR ONCE
Status: COMPLETED | OUTPATIENT
Start: 2021-01-01 | End: 2021-01-01

## 2021-01-01 RX ORDER — ONDANSETRON 4 MG/1
4 TABLET, ORALLY DISINTEGRATING ORAL EVERY 30 MIN PRN
Status: DISCONTINUED | OUTPATIENT
Start: 2021-01-01 | End: 2021-01-01 | Stop reason: HOSPADM

## 2021-01-01 RX ORDER — BARIUM SULFATE 400 MG/ML
50 SUSPENSION ORAL ONCE
Status: COMPLETED | OUTPATIENT
Start: 2021-01-01 | End: 2021-01-01

## 2021-01-01 RX ORDER — GABAPENTIN 250 MG/5ML
250 SOLUTION ORAL 3 TIMES DAILY
Qty: 300 ML | Refills: 1 | Status: SHIPPED | OUTPATIENT
Start: 2021-01-01 | End: 2021-01-01

## 2021-01-01 RX ORDER — CEFAZOLIN SODIUM 2 G/50ML
2 SOLUTION INTRAVENOUS
Status: CANCELLED | OUTPATIENT
Start: 2021-01-01

## 2021-01-01 RX ORDER — HEPARIN SODIUM,PORCINE 10 UNIT/ML
5-10 VIAL (ML) INTRAVENOUS
Status: DISCONTINUED | OUTPATIENT
Start: 2021-01-01 | End: 2021-01-01 | Stop reason: HOSPADM

## 2021-01-01 RX ORDER — HEPARIN SODIUM (PORCINE) LOCK FLUSH IV SOLN 100 UNIT/ML 100 UNIT/ML
5-10 SOLUTION INTRAVENOUS
Status: DISCONTINUED | OUTPATIENT
Start: 2021-01-01 | End: 2021-01-01 | Stop reason: HOSPADM

## 2021-01-01 RX ORDER — DEXTROSE MONOHYDRATE 25 G/50ML
25-50 INJECTION, SOLUTION INTRAVENOUS
Status: DISCONTINUED | OUTPATIENT
Start: 2021-01-01 | End: 2021-01-01 | Stop reason: HOSPADM

## 2021-01-01 RX ORDER — LIDOCAINE HYDROCHLORIDE 20 MG/ML
15 SOLUTION OROPHARYNGEAL
Status: DISCONTINUED | OUTPATIENT
Start: 2021-01-01 | End: 2021-01-01

## 2021-01-01 RX ORDER — LEVOFLOXACIN 25 MG/ML
500 SOLUTION ORAL DAILY
Qty: 140 ML | Refills: 0 | Status: ON HOLD | OUTPATIENT
Start: 2021-01-01 | End: 2021-01-01

## 2021-01-01 RX ORDER — CEFTRIAXONE 1 G/1
1 INJECTION, POWDER, FOR SOLUTION INTRAMUSCULAR; INTRAVENOUS EVERY 24 HOURS
Status: DISCONTINUED | OUTPATIENT
Start: 2021-01-01 | End: 2021-01-01 | Stop reason: HOSPADM

## 2021-01-01 RX ORDER — LIDOCAINE HYDROCHLORIDE 20 MG/ML
10 JELLY TOPICAL ONCE
Status: DISCONTINUED | OUTPATIENT
Start: 2021-01-01 | End: 2021-01-01 | Stop reason: CLARIF

## 2021-01-01 RX ORDER — HYDROMORPHONE HYDROCHLORIDE 1 MG/ML
0.5 INJECTION, SOLUTION INTRAMUSCULAR; INTRAVENOUS; SUBCUTANEOUS
Status: COMPLETED | OUTPATIENT
Start: 2021-01-01 | End: 2021-01-01

## 2021-01-01 RX ORDER — ASPIRIN 81 MG/1
81 TABLET, CHEWABLE ORAL DAILY
Status: ON HOLD | COMMUNITY
End: 2021-01-01

## 2021-01-01 RX ORDER — ONDANSETRON 2 MG/ML
4 INJECTION INTRAMUSCULAR; INTRAVENOUS EVERY 6 HOURS PRN
Status: DISCONTINUED | OUTPATIENT
Start: 2021-01-01 | End: 2021-01-01 | Stop reason: HOSPADM

## 2021-01-01 RX ORDER — BUPIVACAINE HYDROCHLORIDE 5 MG/ML
INJECTION, SOLUTION PERINEURAL PRN
Status: DISCONTINUED | OUTPATIENT
Start: 2021-01-01 | End: 2021-01-01 | Stop reason: HOSPADM

## 2021-01-01 RX ORDER — PILOCARPINE HYDROCHLORIDE 7.5 MG/1
7.5 TABLET, FILM COATED ORAL 2 TIMES DAILY
Status: DISCONTINUED | OUTPATIENT
Start: 2021-01-01 | End: 2021-01-01

## 2021-01-01 RX ORDER — OXYCODONE HCL 5 MG/5 ML
5 SOLUTION, ORAL ORAL EVERY 6 HOURS PRN
Qty: 500 ML | Refills: 0 | Status: SHIPPED | OUTPATIENT
Start: 2021-01-01 | End: 2021-01-01

## 2021-01-01 RX ORDER — LIDOCAINE HYDROCHLORIDE 20 MG/ML
INJECTION, SOLUTION INFILTRATION; PERINEURAL PRN
Status: DISCONTINUED | OUTPATIENT
Start: 2021-01-01 | End: 2021-01-01

## 2021-01-01 RX ORDER — LISINOPRIL 2.5 MG/1
5 TABLET ORAL DAILY
Status: DISCONTINUED | OUTPATIENT
Start: 2021-01-01 | End: 2021-01-01 | Stop reason: HOSPADM

## 2021-01-01 RX ORDER — SIMVASTATIN 40 MG
40 TABLET ORAL AT BEDTIME
Status: ON HOLD | COMMUNITY
End: 2021-01-01

## 2021-01-01 RX ORDER — CEFAZOLIN SODIUM 2 G/100ML
2 INJECTION, SOLUTION INTRAVENOUS SEE ADMIN INSTRUCTIONS
Status: DISCONTINUED | OUTPATIENT
Start: 2021-01-01 | End: 2021-01-01 | Stop reason: HOSPADM

## 2021-01-01 RX ORDER — AZITHROMYCIN 250 MG/1
250 TABLET, FILM COATED ORAL DAILY
Status: DISCONTINUED | OUTPATIENT
Start: 2021-01-01 | End: 2021-01-01 | Stop reason: HOSPADM

## 2021-01-01 RX ORDER — MULTIVIT WITH MINERALS/LUTEIN
1000 TABLET ORAL DAILY
Status: DISCONTINUED | OUTPATIENT
Start: 2021-01-01 | End: 2021-01-01 | Stop reason: HOSPADM

## 2021-01-01 RX ORDER — PILOCARPINE HYDROCHLORIDE 7.5 MG/1
7.5 TABLET, FILM COATED ORAL 2 TIMES DAILY
Status: ON HOLD | COMMUNITY
Start: 2020-01-01 | End: 2021-01-01

## 2021-01-01 RX ORDER — ACETAMINOPHEN 325 MG/1
650 TABLET ORAL EVERY 4 HOURS PRN
Status: DISCONTINUED | OUTPATIENT
Start: 2021-01-01 | End: 2021-01-01 | Stop reason: HOSPADM

## 2021-01-01 RX ORDER — HYDROCODONE BITARTRATE AND ACETAMINOPHEN 7.5; 325 MG/15ML; MG/15ML
SOLUTION ORAL
Qty: 250 ML | Refills: 0 | Status: SHIPPED | OUTPATIENT
Start: 2021-01-01 | End: 2021-01-01 | Stop reason: SINTOL

## 2021-01-01 RX ORDER — OXYCODONE HYDROCHLORIDE 5 MG/1
5 TABLET ORAL EVERY 4 HOURS PRN
Status: DISCONTINUED | OUTPATIENT
Start: 2021-01-01 | End: 2021-01-01 | Stop reason: HOSPADM

## 2021-01-01 RX ORDER — DIPHENHYDRAMINE HCL 25 MG
50 CAPSULE ORAL ONCE
Status: CANCELLED
Start: 2021-01-01

## 2021-01-01 RX ORDER — DEXTROSE MONOHYDRATE 25 G/50ML
25-50 INJECTION, SOLUTION INTRAVENOUS
Status: DISCONTINUED | OUTPATIENT
Start: 2021-01-01 | End: 2021-01-01

## 2021-01-01 RX ORDER — PROCHLORPERAZINE MALEATE 10 MG
10 TABLET ORAL EVERY 6 HOURS PRN
Qty: 30 TABLET | Refills: 1 | Status: CANCELLED | OUTPATIENT
Start: 2021-01-01

## 2021-01-01 RX ORDER — PALONOSETRON 0.05 MG/ML
0.25 INJECTION, SOLUTION INTRAVENOUS ONCE
Status: COMPLETED | OUTPATIENT
Start: 2021-01-01 | End: 2021-01-01

## 2021-01-01 RX ORDER — PENTOXIFYLLINE 400 MG/1
400 TABLET, EXTENDED RELEASE ORAL
Qty: 270 TABLET | Refills: 1 | Status: ON HOLD | OUTPATIENT
Start: 2021-01-01 | End: 2021-01-01

## 2021-01-01 RX ORDER — GABAPENTIN 250 MG/5ML
SOLUTION ORAL
Qty: 300 ML | Refills: 1 | Status: ON HOLD | OUTPATIENT
Start: 2021-01-01 | End: 2021-01-01

## 2021-01-01 RX ORDER — APIXABAN 5 MG (74)
KIT ORAL
Qty: 74 EACH | Refills: 0 | Status: SHIPPED | OUTPATIENT
Start: 2021-01-01 | End: 2021-01-01

## 2021-01-01 RX ORDER — GUAIFENESIN 600 MG/1
15 TABLET, EXTENDED RELEASE ORAL DAILY
Status: DISCONTINUED | OUTPATIENT
Start: 2021-01-01 | End: 2021-01-01 | Stop reason: HOSPADM

## 2021-01-01 RX ORDER — PROPOFOL 10 MG/ML
INJECTION, EMULSION INTRAVENOUS CONTINUOUS PRN
Status: DISCONTINUED | OUTPATIENT
Start: 2021-01-01 | End: 2021-01-01

## 2021-01-01 RX ORDER — GLYCOPYRROLATE 1 MG/1
1 TABLET ORAL 3 TIMES DAILY
Qty: 90 TABLET | Refills: 0 | Status: ON HOLD | OUTPATIENT
Start: 2021-01-01 | End: 2021-01-01

## 2021-01-01 RX ORDER — CEFAZOLIN SODIUM 1 G/3ML
INJECTION, POWDER, FOR SOLUTION INTRAMUSCULAR; INTRAVENOUS PRN
Status: DISCONTINUED | OUTPATIENT
Start: 2021-01-01 | End: 2021-01-01

## 2021-01-01 RX ORDER — HYDROMORPHONE HYDROCHLORIDE 1 MG/ML
0.5 INJECTION, SOLUTION INTRAMUSCULAR; INTRAVENOUS; SUBCUTANEOUS
Status: DISCONTINUED | OUTPATIENT
Start: 2021-01-01 | End: 2021-01-01

## 2021-01-01 RX ORDER — GABAPENTIN 250 MG/5ML
100 SOLUTION ORAL 3 TIMES DAILY
Qty: 84 ML | Refills: 0 | Status: ON HOLD | OUTPATIENT
Start: 2021-01-01 | End: 2021-01-01

## 2021-01-01 RX ORDER — PROCHLORPERAZINE MALEATE 10 MG
10 TABLET ORAL EVERY 6 HOURS PRN
Qty: 30 TABLET | Refills: 5 | Status: ON HOLD | OUTPATIENT
Start: 2021-01-01 | End: 2021-01-01

## 2021-01-01 RX ORDER — OXYCODONE HCL 5 MG/5 ML
7.5 SOLUTION, ORAL ORAL 3 TIMES DAILY PRN
Status: DISCONTINUED | OUTPATIENT
Start: 2021-01-01 | End: 2021-01-01

## 2021-01-01 RX ORDER — VITAMIN E (DL,TOCOPHERYL ACET) 22.5 MG/ML
1000 DROPS ORAL DAILY
Qty: 1200 ML | Refills: 3 | Status: SHIPPED | OUTPATIENT
Start: 2021-01-01 | End: 2021-01-01

## 2021-01-01 RX ORDER — DIPHENHYDRAMINE HCL 12.5MG/5ML
25 LIQUID (ML) ORAL
COMMUNITY

## 2021-01-01 RX ORDER — HEPARIN SODIUM,PORCINE 10 UNIT/ML
5 VIAL (ML) INTRAVENOUS
Status: DISCONTINUED | OUTPATIENT
Start: 2021-01-01 | End: 2021-01-01 | Stop reason: HOSPADM

## 2021-01-01 RX ORDER — EPINEPHRINE 1 MG/ML
0.3 INJECTION, SOLUTION INTRAMUSCULAR; SUBCUTANEOUS EVERY 5 MIN PRN
Status: CANCELLED | OUTPATIENT
Start: 2021-11-01

## 2021-01-01 RX ORDER — DEXAMETHASONE SODIUM PHOSPHATE 4 MG/ML
INJECTION, SOLUTION INTRA-ARTICULAR; INTRALESIONAL; INTRAMUSCULAR; INTRAVENOUS; SOFT TISSUE PRN
Status: DISCONTINUED | OUTPATIENT
Start: 2021-01-01 | End: 2021-01-01

## 2021-01-01 RX ORDER — OXYMETAZOLINE HYDROCHLORIDE 0.05 G/100ML
SPRAY NASAL PRN
Status: DISCONTINUED | OUTPATIENT
Start: 2021-01-01 | End: 2021-01-01 | Stop reason: HOSPADM

## 2021-01-01 RX ORDER — AMPICILLIN AND SULBACTAM 2; 1 G/1; G/1
3 INJECTION, POWDER, FOR SOLUTION INTRAMUSCULAR; INTRAVENOUS EVERY 6 HOURS
Status: DISCONTINUED | OUTPATIENT
Start: 2021-01-01 | End: 2021-01-01 | Stop reason: HOSPADM

## 2021-01-01 RX ORDER — OXYCODONE HCL 5 MG/5 ML
7.5 SOLUTION, ORAL ORAL EVERY 6 HOURS PRN
Qty: 500 ML | Refills: 0 | Status: ON HOLD | OUTPATIENT
Start: 2021-01-01 | End: 2021-01-01

## 2021-01-01 RX ORDER — DEXAMETHASONE SODIUM PHOSPHATE 10 MG/ML
8 INJECTION, SOLUTION INTRAMUSCULAR; INTRAVENOUS EVERY 8 HOURS
Status: DISCONTINUED | OUTPATIENT
Start: 2021-01-01 | End: 2021-01-01 | Stop reason: HOSPADM

## 2021-01-01 RX ORDER — CEFAZOLIN SODIUM 2 G/50ML
2 SOLUTION INTRAVENOUS
Status: COMPLETED | OUTPATIENT
Start: 2021-01-01 | End: 2021-01-01

## 2021-01-01 RX ORDER — ALBUTEROL SULFATE 90 UG/1
1-2 AEROSOL, METERED RESPIRATORY (INHALATION)
Status: CANCELLED
Start: 2021-11-01

## 2021-01-01 RX ORDER — AZITHROMYCIN 250 MG/1
250 TABLET, FILM COATED ORAL DAILY
Qty: 3 TABLET | Refills: 0 | Status: SHIPPED | OUTPATIENT
Start: 2021-01-01 | End: 2021-01-01

## 2021-01-01 RX ORDER — SILVER SULFADIAZINE 10 MG/G
CREAM TOPICAL DAILY
Qty: 20 G | Refills: 0 | Status: SHIPPED | OUTPATIENT
Start: 2021-01-01 | End: 2021-01-01

## 2021-01-01 RX ORDER — FUROSEMIDE 10 MG/ML
20 INJECTION INTRAMUSCULAR; INTRAVENOUS ONCE
Status: COMPLETED | OUTPATIENT
Start: 2021-01-01 | End: 2021-01-01

## 2021-01-01 RX ORDER — GLYCOPYRROLATE 1 MG/1
1 TABLET ORAL EVERY EVENING
Status: DISCONTINUED | OUTPATIENT
Start: 2021-01-01 | End: 2021-01-01 | Stop reason: HOSPADM

## 2021-01-01 RX ORDER — DIPHENHYDRAMINE HYDROCHLORIDE 50 MG/ML
25 INJECTION INTRAMUSCULAR; INTRAVENOUS EVERY 6 HOURS PRN
Status: DISCONTINUED | OUTPATIENT
Start: 2021-01-01 | End: 2021-01-01 | Stop reason: HOSPADM

## 2021-01-01 RX ORDER — NYSTATIN 100000/ML
SUSPENSION, ORAL (FINAL DOSE FORM) ORAL
Qty: 200 ML | Refills: 1 | Status: SHIPPED | OUTPATIENT
Start: 2021-01-01 | End: 2021-01-01

## 2021-01-01 RX ORDER — DIPHENHYDRAMINE HYDROCHLORIDE AND LIDOCAINE HYDROCHLORIDE AND ALUMINUM HYDROXIDE AND MAGNESIUM HYDRO
10 KIT EVERY 6 HOURS PRN
Status: DISCONTINUED | OUTPATIENT
Start: 2021-01-01 | End: 2021-01-01

## 2021-01-01 RX ORDER — LEVOTHYROXINE SODIUM 25 UG/1
25 TABLET ORAL DAILY
Qty: 30 TABLET | Refills: 1 | Status: SHIPPED | OUTPATIENT
Start: 2021-01-01

## 2021-01-01 RX ORDER — HYDROMORPHONE HYDROCHLORIDE 1 MG/ML
1-2 SOLUTION ORAL EVERY 4 HOURS PRN
Qty: 473 ML | Refills: 0 | Status: SHIPPED | OUTPATIENT
Start: 2021-01-01

## 2021-01-01 RX ORDER — SODIUM CHLORIDE, SODIUM LACTATE, POTASSIUM CHLORIDE, CALCIUM CHLORIDE 600; 310; 30; 20 MG/100ML; MG/100ML; MG/100ML; MG/100ML
INJECTION, SOLUTION INTRAVENOUS CONTINUOUS PRN
Status: DISCONTINUED | OUTPATIENT
Start: 2021-01-01 | End: 2021-01-01

## 2021-01-01 RX ORDER — ONDANSETRON 4 MG/1
4 TABLET, ORALLY DISINTEGRATING ORAL EVERY 6 HOURS PRN
Status: DISCONTINUED | OUTPATIENT
Start: 2021-01-01 | End: 2021-01-01 | Stop reason: HOSPADM

## 2021-01-01 RX ORDER — ZOLEDRONIC ACID 0.04 MG/ML
4 INJECTION, SOLUTION INTRAVENOUS ONCE
Status: CANCELLED
Start: 2021-01-01 | End: 2021-01-01

## 2021-01-01 RX ORDER — NALOXONE HYDROCHLORIDE 0.4 MG/ML
0.2 INJECTION, SOLUTION INTRAMUSCULAR; INTRAVENOUS; SUBCUTANEOUS
Status: CANCELLED | OUTPATIENT
Start: 2021-11-01

## 2021-01-01 RX ORDER — METOPROLOL TARTRATE 1 MG/ML
1-2 INJECTION, SOLUTION INTRAVENOUS EVERY 5 MIN PRN
Status: DISCONTINUED | OUTPATIENT
Start: 2021-01-01 | End: 2021-01-01 | Stop reason: HOSPADM

## 2021-01-01 RX ORDER — NALOXONE HYDROCHLORIDE 0.4 MG/ML
0.2 INJECTION, SOLUTION INTRAMUSCULAR; INTRAVENOUS; SUBCUTANEOUS
Status: CANCELLED | OUTPATIENT
Start: 2021-01-01

## 2021-01-01 RX ORDER — PILOCARPINE HYDROCHLORIDE 7.5 MG/1
7.5 TABLET, FILM COATED ORAL 2 TIMES DAILY
Status: DISCONTINUED | OUTPATIENT
Start: 2021-01-01 | End: 2021-01-01 | Stop reason: HOSPADM

## 2021-01-01 RX ORDER — LEVOFLOXACIN 500 MG/1
500 TABLET, FILM COATED ORAL DAILY
Qty: 7 TABLET | Refills: 0 | Status: ON HOLD | OUTPATIENT
Start: 2021-01-01 | End: 2021-01-01

## 2021-01-01 RX ORDER — AZITHROMYCIN 250 MG/1
500 TABLET, FILM COATED ORAL ONCE
Status: COMPLETED | OUTPATIENT
Start: 2021-01-01 | End: 2021-01-01

## 2021-01-01 RX ORDER — METHYLPREDNISOLONE 4 MG
TABLET, DOSE PACK ORAL
Qty: 21 TABLET | Refills: 0 | Status: ON HOLD | OUTPATIENT
Start: 2021-01-01 | End: 2021-01-01

## 2021-01-01 RX ORDER — ONDANSETRON 8 MG/1
8 TABLET, FILM COATED ORAL EVERY 8 HOURS PRN
Qty: 10 TABLET | Refills: 1 | Status: SHIPPED | OUTPATIENT
Start: 2021-01-01 | End: 2021-01-01

## 2021-01-01 RX ORDER — ASPIRIN 81 MG/1
81 TABLET, CHEWABLE ORAL DAILY
Status: DISCONTINUED | OUTPATIENT
Start: 2021-01-01 | End: 2021-01-01

## 2021-01-01 RX ORDER — IOPAMIDOL 755 MG/ML
74 INJECTION, SOLUTION INTRAVASCULAR ONCE
Status: COMPLETED | OUTPATIENT
Start: 2021-01-01 | End: 2021-01-01

## 2021-01-01 RX ORDER — ALBUTEROL SULFATE 0.83 MG/ML
2.5 SOLUTION RESPIRATORY (INHALATION)
Status: CANCELLED | OUTPATIENT
Start: 2021-11-01

## 2021-01-01 RX ORDER — SUCRALFATE 1 G/1
1 TABLET ORAL 2 TIMES DAILY
Qty: 42 TABLET | Refills: 1 | Status: ON HOLD | OUTPATIENT
Start: 2021-01-01 | End: 2021-01-01

## 2021-01-01 RX ORDER — SODIUM CHLORIDE FOR INHALATION 3 %
3 VIAL, NEBULIZER (ML) INHALATION
Status: DISCONTINUED | OUTPATIENT
Start: 2021-01-01 | End: 2021-01-01 | Stop reason: HOSPADM

## 2021-01-01 RX ORDER — GABAPENTIN 250 MG/5ML
250 SOLUTION ORAL 3 TIMES DAILY
Status: DISCONTINUED | OUTPATIENT
Start: 2021-01-01 | End: 2021-01-01

## 2021-01-01 RX ORDER — OXYCODONE HCL 5 MG/5 ML
7.5 SOLUTION, ORAL ORAL EVERY 6 HOURS PRN
Status: DISCONTINUED | OUTPATIENT
Start: 2021-01-01 | End: 2021-01-01

## 2021-01-01 RX ORDER — LISINOPRIL 5 MG/1
5 TABLET ORAL DAILY
Status: DISCONTINUED | OUTPATIENT
Start: 2021-01-01 | End: 2021-01-01 | Stop reason: HOSPADM

## 2021-01-01 RX ORDER — GLIPIZIDE 10 MG/1
10 TABLET ORAL
COMMUNITY
End: 2021-01-01

## 2021-01-01 RX ORDER — HEPARIN SODIUM,PORCINE 10 UNIT/ML
5 VIAL (ML) INTRAVENOUS
Status: CANCELLED | OUTPATIENT
Start: 2021-11-01

## 2021-01-01 RX ORDER — CALCITONIN SALMON 200 [USP'U]/ML
4 INJECTION, SOLUTION INTRAMUSCULAR; SUBCUTANEOUS EVERY 12 HOURS
Status: COMPLETED | OUTPATIENT
Start: 2021-01-01 | End: 2021-01-01

## 2021-01-01 RX ORDER — GABAPENTIN 250 MG/5ML
250 SOLUTION ORAL 3 TIMES DAILY
Qty: 300 ML | Refills: 1 | Status: ON HOLD | OUTPATIENT
Start: 2021-01-01 | End: 2021-01-01

## 2021-01-01 RX ORDER — LISINOPRIL 5 MG/1
5 TABLET ORAL DAILY
Status: DISCONTINUED | OUTPATIENT
Start: 2021-01-01 | End: 2021-01-01

## 2021-01-01 RX ORDER — LIDOCAINE 50 MG/G
1 PATCH TOPICAL EVERY 24 HOURS
Qty: 30 PATCH | Refills: 1 | Status: SHIPPED | OUTPATIENT
Start: 2021-01-01

## 2021-01-01 RX ORDER — DOXEPIN HYDROCHLORIDE 10 MG/ML
25 SOLUTION ORAL
Status: DISCONTINUED | OUTPATIENT
Start: 2021-01-01 | End: 2021-01-01 | Stop reason: HOSPADM

## 2021-01-01 RX ORDER — IOPAMIDOL 755 MG/ML
100 INJECTION, SOLUTION INTRAVASCULAR ONCE
Status: COMPLETED | OUTPATIENT
Start: 2021-01-01 | End: 2021-01-01

## 2021-01-01 RX ORDER — NYSTATIN 100000/ML
500000 SUSPENSION, ORAL (FINAL DOSE FORM) ORAL 4 TIMES DAILY
Qty: 400 ML | Refills: 0 | Status: SHIPPED | OUTPATIENT
Start: 2021-01-01 | End: 2021-01-01

## 2021-01-01 RX ORDER — GABAPENTIN 250 MG/5ML
250 SOLUTION ORAL 3 TIMES DAILY
Status: DISCONTINUED | OUTPATIENT
Start: 2021-01-01 | End: 2021-01-01 | Stop reason: HOSPADM

## 2021-01-01 RX ORDER — SIMVASTATIN 40 MG
40 TABLET ORAL AT BEDTIME
Status: DISCONTINUED | OUTPATIENT
Start: 2021-01-01 | End: 2021-01-01 | Stop reason: HOSPADM

## 2021-01-01 RX ORDER — DIPHENHYDRAMINE HYDROCHLORIDE 50 MG/ML
50 INJECTION INTRAMUSCULAR; INTRAVENOUS
Status: CANCELLED
Start: 2021-11-01

## 2021-01-01 RX ORDER — HEPARIN SODIUM (PORCINE) LOCK FLUSH IV SOLN 100 UNIT/ML 100 UNIT/ML
5 SOLUTION INTRAVENOUS
Status: CANCELLED | OUTPATIENT
Start: 2021-11-05

## 2021-01-01 RX ORDER — GABAPENTIN 250 MG/5ML
500 SOLUTION ORAL AT BEDTIME
Qty: 300 ML | Refills: 0 | Status: SHIPPED | OUTPATIENT
Start: 2021-01-01 | End: 2021-11-14

## 2021-01-01 RX ORDER — GUAIFENESIN 600 MG/1
15 TABLET, EXTENDED RELEASE ORAL DAILY
Status: DISCONTINUED | OUTPATIENT
Start: 2021-01-01 | End: 2021-01-01

## 2021-01-01 RX ORDER — ACETAMINOPHEN 160 MG/5ML
650 SUSPENSION ORAL EVERY 6 HOURS PRN
Qty: 300 ML | Refills: 3 | Status: SHIPPED | OUTPATIENT
Start: 2021-01-01

## 2021-01-01 RX ORDER — HEPARIN SODIUM,PORCINE 10 UNIT/ML
5 VIAL (ML) INTRAVENOUS
Status: CANCELLED | OUTPATIENT
Start: 2021-11-05

## 2021-01-01 RX ORDER — LIDOCAINE HYDROCHLORIDE AND EPINEPHRINE 10; 10 MG/ML; UG/ML
INJECTION, SOLUTION INFILTRATION; PERINEURAL PRN
Status: DISCONTINUED | OUTPATIENT
Start: 2021-01-01 | End: 2021-01-01 | Stop reason: HOSPADM

## 2021-01-01 RX ORDER — MEPERIDINE HYDROCHLORIDE 25 MG/ML
12.5 INJECTION INTRAMUSCULAR; INTRAVENOUS; SUBCUTANEOUS
Status: DISCONTINUED | OUTPATIENT
Start: 2021-01-01 | End: 2021-01-01 | Stop reason: HOSPADM

## 2021-01-01 RX ORDER — GABAPENTIN 250 MG/5ML
250 SOLUTION ORAL 2 TIMES DAILY
Status: DISCONTINUED | OUTPATIENT
Start: 2021-01-01 | End: 2021-01-01

## 2021-01-01 RX ORDER — ONDANSETRON 2 MG/ML
8 INJECTION INTRAMUSCULAR; INTRAVENOUS ONCE
Status: CANCELLED
Start: 2021-01-01 | End: 2021-01-01

## 2021-01-01 RX ORDER — GABAPENTIN 250 MG/5ML
500 SOLUTION ORAL AT BEDTIME
Status: DISCONTINUED | OUTPATIENT
Start: 2021-01-01 | End: 2021-01-01 | Stop reason: HOSPADM

## 2021-01-01 RX ORDER — CEFAZOLIN SODIUM 2 G/100ML
2 INJECTION, SOLUTION INTRAVENOUS
Status: COMPLETED | OUTPATIENT
Start: 2021-01-01 | End: 2021-01-01

## 2021-01-01 RX ORDER — HYDROMORPHONE HYDROCHLORIDE 5 MG/5ML
2 SOLUTION ORAL EVERY 4 HOURS
Status: DISCONTINUED | OUTPATIENT
Start: 2021-01-01 | End: 2021-01-01

## 2021-01-01 RX ORDER — LEVOFLOXACIN 750 MG/1
750 TABLET, FILM COATED ORAL DAILY
Qty: 5 TABLET | Refills: 0 | Status: SHIPPED | OUTPATIENT
Start: 2021-01-01 | End: 2021-01-01

## 2021-01-01 RX ORDER — OXYCODONE HCL 5 MG/5 ML
5 SOLUTION, ORAL ORAL EVERY 6 HOURS PRN
Qty: 473 ML | Refills: 0 | Status: SHIPPED | OUTPATIENT
Start: 2021-01-01 | End: 2021-01-01

## 2021-01-01 RX ORDER — HEPARIN SODIUM,PORCINE 10 UNIT/ML
5 VIAL (ML) INTRAVENOUS EVERY 24 HOURS
Status: DISCONTINUED | OUTPATIENT
Start: 2021-01-01 | End: 2021-01-01 | Stop reason: HOSPADM

## 2021-01-01 RX ORDER — AMIODARONE HYDROCHLORIDE 200 MG/1
200 TABLET ORAL 2 TIMES DAILY
Status: DISCONTINUED | OUTPATIENT
Start: 2021-01-01 | End: 2021-01-01 | Stop reason: ALTCHOICE

## 2021-01-01 RX ORDER — ACETAMINOPHEN 325 MG/1
975 TABLET ORAL ONCE
Status: DISCONTINUED | OUTPATIENT
Start: 2021-01-01 | End: 2021-01-01 | Stop reason: HOSPADM

## 2021-01-01 RX ORDER — DIPHENHYDRAMINE HYDROCHLORIDE AND LIDOCAINE HYDROCHLORIDE AND ALUMINUM HYDROXIDE AND MAGNESIUM HYDRO
10 KIT
Status: DISCONTINUED | OUTPATIENT
Start: 2021-01-01 | End: 2021-01-01

## 2021-01-01 RX ORDER — LISINOPRIL 10 MG/1
10 TABLET ORAL DAILY
COMMUNITY
End: 2021-01-01

## 2021-01-01 RX ORDER — ACETAMINOPHEN 160 MG/5ML
650 SUSPENSION ORAL EVERY 6 HOURS PRN
Qty: 300 ML | Refills: 3 | Status: SHIPPED | OUTPATIENT
Start: 2021-01-01 | End: 2021-01-01

## 2021-01-01 RX ORDER — OXYCODONE HCL 5 MG/5 ML
5 SOLUTION, ORAL ORAL EVERY 6 HOURS PRN
Qty: 100 ML | Refills: 0 | Status: SHIPPED | OUTPATIENT
Start: 2021-01-01 | End: 2021-01-01

## 2021-01-01 RX ORDER — GUAIFENESIN 200 MG/10ML
200 LIQUID ORAL EVERY 4 HOURS PRN
Qty: 473 ML | Refills: 5 | Status: ON HOLD | OUTPATIENT
Start: 2021-01-01 | End: 2021-01-01

## 2021-01-01 RX ORDER — HYDROMORPHONE HYDROCHLORIDE 5 MG/5ML
2 SOLUTION ORAL EVERY 4 HOURS
Status: DISCONTINUED | OUTPATIENT
Start: 2021-01-01 | End: 2021-01-01 | Stop reason: HOSPADM

## 2021-01-01 RX ORDER — OXYCODONE HCL 5 MG/5 ML
7.5 SOLUTION, ORAL ORAL ONCE
Status: COMPLETED | OUTPATIENT
Start: 2021-01-01 | End: 2021-01-01

## 2021-01-01 RX ORDER — METHYLPREDNISOLONE SODIUM SUCCINATE 125 MG/2ML
125 INJECTION, POWDER, LYOPHILIZED, FOR SOLUTION INTRAMUSCULAR; INTRAVENOUS
Status: CANCELLED
Start: 2021-11-01

## 2021-01-01 RX ORDER — LORAZEPAM 0.5 MG/1
0.5 TABLET ORAL EVERY 4 HOURS PRN
Qty: 30 TABLET | Refills: 5 | Status: SHIPPED | OUTPATIENT
Start: 2021-01-01

## 2021-01-01 RX ORDER — HEPARIN SODIUM (PORCINE) LOCK FLUSH IV SOLN 100 UNIT/ML 100 UNIT/ML
500 SOLUTION INTRAVENOUS ONCE
Status: COMPLETED | OUTPATIENT
Start: 2021-01-01 | End: 2021-01-01

## 2021-01-01 RX ORDER — SIMVASTATIN 40 MG
40 TABLET ORAL AT BEDTIME
Status: DISCONTINUED | OUTPATIENT
Start: 2021-01-01 | End: 2021-01-01

## 2021-01-01 RX ORDER — LIDOCAINE HYDROCHLORIDE 20 MG/ML
15 SOLUTION OROPHARYNGEAL
Status: DISCONTINUED | OUTPATIENT
Start: 2021-01-01 | End: 2021-01-01 | Stop reason: HOSPADM

## 2021-01-01 RX ORDER — GLYCOPYRROLATE 1 MG/1
1 TABLET ORAL 3 TIMES DAILY
Qty: 90 TABLET | Refills: 0 | Status: SHIPPED | OUTPATIENT
Start: 2021-01-01 | End: 2021-01-01

## 2021-01-01 RX ORDER — MULTIVIT WITH MINERALS/LUTEIN
1000 TABLET ORAL DAILY
Qty: 60 CAPSULE | Refills: 1 | Status: SHIPPED | OUTPATIENT
Start: 2021-01-01 | End: 2021-01-01

## 2021-01-01 RX ORDER — LIDOCAINE HYDROCHLORIDE 20 MG/ML
15 SOLUTION OROPHARYNGEAL
Qty: 100 ML | Refills: 3 | Status: SHIPPED | OUTPATIENT
Start: 2021-01-01

## 2021-01-01 RX ORDER — GLYCOPYRROLATE 1 MG/1
1 TABLET ORAL EVERY EVENING
Status: DISCONTINUED | OUTPATIENT
Start: 2021-01-01 | End: 2021-01-01

## 2021-01-01 RX ORDER — CEFTRIAXONE 1 G/1
1 INJECTION, POWDER, FOR SOLUTION INTRAMUSCULAR; INTRAVENOUS EVERY 24 HOURS
Status: CANCELLED | OUTPATIENT
Start: 2021-01-01

## 2021-01-01 RX ORDER — DIPHENHYDRAMINE HCL 25 MG
25 CAPSULE ORAL
Status: ON HOLD | COMMUNITY
End: 2021-01-01

## 2021-01-01 RX ORDER — PROCHLORPERAZINE MALEATE 10 MG
10 TABLET ORAL EVERY 6 HOURS PRN
Qty: 30 TABLET | Refills: 5 | Status: SHIPPED | OUTPATIENT
Start: 2021-01-01

## 2021-01-01 RX ADMIN — SODIUM CHLORIDE, PRESERVATIVE FREE 5 ML: 5 INJECTION INTRAVENOUS at 08:17

## 2021-01-01 RX ADMIN — Medication: at 16:47

## 2021-01-01 RX ADMIN — Medication 400 MG: at 19:05

## 2021-01-01 RX ADMIN — AMIODARONE HYDROCHLORIDE 200 MG: 200 TABLET ORAL at 21:40

## 2021-01-01 RX ADMIN — LIDOCAINE HYDROCHLORIDE 100 MG: 20 INJECTION, SOLUTION INFILTRATION; PERINEURAL at 07:56

## 2021-01-01 RX ADMIN — DIPHENHYDRAMINE HYDROCHLORIDE 50 MG: 25 CAPSULE ORAL at 10:09

## 2021-01-01 RX ADMIN — INSULIN GLARGINE 10 UNITS: 100 INJECTION, SOLUTION SUBCUTANEOUS at 09:16

## 2021-01-01 RX ADMIN — DOXEPIN HYDROCHLORIDE 25 MG: 10 SOLUTION ORAL at 05:42

## 2021-01-01 RX ADMIN — CARBOPLATIN 100 MG: 10 INJECTION, SOLUTION INTRAVENOUS at 12:52

## 2021-01-01 RX ADMIN — FAMOTIDINE 20 MG: 10 INJECTION INTRAVENOUS at 08:58

## 2021-01-01 RX ADMIN — FAMOTIDINE 20 MG: 10 INJECTION INTRAVENOUS at 10:24

## 2021-01-01 RX ADMIN — DEXAMETHASONE SODIUM PHOSPHATE 8 MG: 10 INJECTION, SOLUTION INTRAMUSCULAR; INTRAVENOUS at 20:22

## 2021-01-01 RX ADMIN — CALCITONIN SALMON 300 UNITS: 200 INJECTION, SOLUTION INTRAMUSCULAR; SUBCUTANEOUS at 22:06

## 2021-01-01 RX ADMIN — AZITHROMYCIN 500 MG: 250 TABLET, FILM COATED ORAL at 12:23

## 2021-01-01 RX ADMIN — PHENYLEPHRINE HYDROCHLORIDE 100 MCG: 10 INJECTION INTRAVENOUS at 14:35

## 2021-01-01 RX ADMIN — DEXAMETHASONE SODIUM PHOSPHATE 8 MG: 10 INJECTION, SOLUTION INTRAMUSCULAR; INTRAVENOUS at 11:56

## 2021-01-01 RX ADMIN — CEFEPIME 2 G: 2 INJECTION, POWDER, FOR SOLUTION INTRAVENOUS at 19:49

## 2021-01-01 RX ADMIN — PHENYLEPHRINE HYDROCHLORIDE 100 MCG: 10 INJECTION INTRAVENOUS at 12:00

## 2021-01-01 RX ADMIN — PALONOSETRON 0.25 MG: 0.05 INJECTION, SOLUTION INTRAVENOUS at 09:45

## 2021-01-01 RX ADMIN — LIDOCAINE HYDROCHLORIDE 15 ML: 20 SOLUTION ORAL; TOPICAL at 20:48

## 2021-01-01 RX ADMIN — CARBOPLATIN 100 MG: 10 INJECTION, SOLUTION INTRAVENOUS at 12:14

## 2021-01-01 RX ADMIN — DEXAMETHASONE SODIUM PHOSPHATE: 10 INJECTION, SOLUTION INTRAMUSCULAR; INTRAVENOUS at 09:48

## 2021-01-01 RX ADMIN — CEFEPIME 2 G: 2 INJECTION, POWDER, FOR SOLUTION INTRAVENOUS at 09:29

## 2021-01-01 RX ADMIN — DIPHENHYDRAMINE HYDROCHLORIDE 50 MG: 25 CAPSULE ORAL at 08:59

## 2021-01-01 RX ADMIN — CEFEPIME 2 G: 2 INJECTION, POWDER, FOR SOLUTION INTRAVENOUS at 07:53

## 2021-01-01 RX ADMIN — HEPARIN SODIUM 950 UNITS/HR: 10000 INJECTION, SOLUTION INTRAVENOUS at 08:32

## 2021-01-01 RX ADMIN — DIPHENHYDRAMINE HYDROCHLORIDE AND LIDOCAINE HYDROCHLORIDE AND ALUMINUM HYDROXIDE AND MAGNESIUM HYDRO 10 ML: KIT at 00:56

## 2021-01-01 RX ADMIN — SODIUM CHLORIDE 1000 ML: 9 INJECTION, SOLUTION INTRAVENOUS at 09:43

## 2021-01-01 RX ADMIN — HYDROMORPHONE HYDROCHLORIDE 2 MG: 1 SOLUTION ORAL at 14:18

## 2021-01-01 RX ADMIN — SODIUM CHLORIDE 1000 ML: 9 INJECTION, SOLUTION INTRAVENOUS at 07:23

## 2021-01-01 RX ADMIN — Medication 5 ML: at 08:14

## 2021-01-01 RX ADMIN — FENTANYL CITRATE 50 MCG: 50 INJECTION, SOLUTION INTRAMUSCULAR; INTRAVENOUS at 15:28

## 2021-01-01 RX ADMIN — DIPHENHYDRAMINE HYDROCHLORIDE 50 MG: 25 CAPSULE ORAL at 08:54

## 2021-01-01 RX ADMIN — GABAPENTIN 250 MG: 250 SUSPENSION ORAL at 14:03

## 2021-01-01 RX ADMIN — PROPOFOL 30 MG: 10 INJECTION, EMULSION INTRAVENOUS at 13:00

## 2021-01-01 RX ADMIN — ROCURONIUM BROMIDE 10 MG: 10 INJECTION INTRAVENOUS at 13:54

## 2021-01-01 RX ADMIN — CALCITONIN SALMON 300 UNITS: 200 INJECTION, SOLUTION INTRAMUSCULAR; SUBCUTANEOUS at 08:54

## 2021-01-01 RX ADMIN — ONDANSETRON 8 MG: 2 INJECTION INTRAMUSCULAR; INTRAVENOUS at 11:08

## 2021-01-01 RX ADMIN — Medication 400 MG: at 09:31

## 2021-01-01 RX ADMIN — DIPHENHYDRAMINE HYDROCHLORIDE AND LIDOCAINE HYDROCHLORIDE AND ALUMINUM HYDROXIDE AND MAGNESIUM HYDRO 10 ML: KIT at 12:17

## 2021-01-01 RX ADMIN — SODIUM CHLORIDE: 9 INJECTION, SOLUTION INTRAVENOUS at 05:53

## 2021-01-01 RX ADMIN — HYDROMORPHONE HYDROCHLORIDE 0.5 MG: 1 INJECTION, SOLUTION INTRAMUSCULAR; INTRAVENOUS; SUBCUTANEOUS at 09:16

## 2021-01-01 RX ADMIN — DIPHENHYDRAMINE HYDROCHLORIDE AND LIDOCAINE HYDROCHLORIDE AND ALUMINUM HYDROXIDE AND MAGNESIUM HYDRO 10 ML: KIT at 10:15

## 2021-01-01 RX ADMIN — SODIUM CHLORIDE, PRESERVATIVE FREE 5 ML: 5 INJECTION INTRAVENOUS at 10:40

## 2021-01-01 RX ADMIN — FLUDEOXYGLUCOSE F-18 10.18 MCI.: 500 INJECTION, SOLUTION INTRAVENOUS at 10:27

## 2021-01-01 RX ADMIN — SIMVASTATIN 40 MG: 40 TABLET, FILM COATED ORAL at 22:01

## 2021-01-01 RX ADMIN — APIXABAN 5 MG: 5 TABLET, FILM COATED ORAL at 21:40

## 2021-01-01 RX ADMIN — SODIUM CHLORIDE: 9 INJECTION, SOLUTION INTRAVENOUS at 16:27

## 2021-01-01 RX ADMIN — Medication 5 ML: at 07:23

## 2021-01-01 RX ADMIN — ONDANSETRON 4 MG: 2 INJECTION INTRAMUSCULAR; INTRAVENOUS at 12:17

## 2021-01-01 RX ADMIN — ONDANSETRON 8 MG: 2 INJECTION INTRAMUSCULAR; INTRAVENOUS at 08:55

## 2021-01-01 RX ADMIN — HYDROMORPHONE HYDROCHLORIDE 0.5 MG: 1 INJECTION, SOLUTION INTRAMUSCULAR; INTRAVENOUS; SUBCUTANEOUS at 20:22

## 2021-01-01 RX ADMIN — DIPHENHYDRAMINE HYDROCHLORIDE AND LIDOCAINE HYDROCHLORIDE AND ALUMINUM HYDROXIDE AND MAGNESIUM HYDRO 10 ML: KIT at 03:16

## 2021-01-01 RX ADMIN — FENTANYL CITRATE 50 MCG: 50 INJECTION, SOLUTION INTRAMUSCULAR; INTRAVENOUS at 13:00

## 2021-01-01 RX ADMIN — INSULIN ASPART 15 UNITS: 100 INJECTION, SOLUTION INTRAVENOUS; SUBCUTANEOUS at 09:31

## 2021-01-01 RX ADMIN — HEPARIN SODIUM (PORCINE) LOCK FLUSH IV SOLN 100 UNIT/ML 5 ML: 100 SOLUTION at 15:43

## 2021-01-01 RX ADMIN — HYDROMORPHONE HYDROCHLORIDE 2 MG: 1 SOLUTION ORAL at 14:21

## 2021-01-01 RX ADMIN — ROCURONIUM BROMIDE 50 MG: 10 INJECTION INTRAVENOUS at 11:41

## 2021-01-01 RX ADMIN — SODIUM CHLORIDE 1000 ML: 9 INJECTION, SOLUTION INTRAVENOUS at 10:50

## 2021-01-01 RX ADMIN — FAMOTIDINE 20 MG: 10 INJECTION INTRAVENOUS at 11:17

## 2021-01-01 RX ADMIN — GABAPENTIN 250 MG: 250 SUSPENSION ORAL at 13:45

## 2021-01-01 RX ADMIN — FENTANYL CITRATE 50 MCG: 50 INJECTION, SOLUTION INTRAMUSCULAR; INTRAVENOUS at 15:13

## 2021-01-01 RX ADMIN — LIDOCAINE HYDROCHLORIDE 15 ML: 20 SOLUTION OROPHARYNGEAL at 14:32

## 2021-01-01 RX ADMIN — OXYCODONE HYDROCHLORIDE 7.5 MG: 5 SOLUTION ORAL at 04:10

## 2021-01-01 RX ADMIN — SODIUM CHLORIDE, SODIUM LACTATE, POTASSIUM CHLORIDE, CALCIUM CHLORIDE: 600; 310; 30; 20 INJECTION, SOLUTION INTRAVENOUS at 08:12

## 2021-01-01 RX ADMIN — Medication 5 ML: at 12:33

## 2021-01-01 RX ADMIN — Medication 400 MG: at 09:17

## 2021-01-01 RX ADMIN — Medication 70 MG: at 12:49

## 2021-01-01 RX ADMIN — SODIUM CHLORIDE, POTASSIUM CHLORIDE, SODIUM LACTATE AND CALCIUM CHLORIDE: 600; 310; 30; 20 INJECTION, SOLUTION INTRAVENOUS at 07:35

## 2021-01-01 RX ADMIN — CARBOPLATIN 125 MG: 10 INJECTION, SOLUTION INTRAVENOUS at 12:01

## 2021-01-01 RX ADMIN — GABAPENTIN 250 MG: 250 SUSPENSION ORAL at 09:07

## 2021-01-01 RX ADMIN — IOPAMIDOL 96 ML: 755 INJECTION, SOLUTION INTRAVENOUS at 08:54

## 2021-01-01 RX ADMIN — DOXEPIN HYDROCHLORIDE 25 MG: 10 SOLUTION ORAL at 22:25

## 2021-01-01 RX ADMIN — FENTANYL CITRATE 50 MCG: 50 INJECTION, SOLUTION INTRAMUSCULAR; INTRAVENOUS at 07:54

## 2021-01-01 RX ADMIN — DIPHENHYDRAMINE HYDROCHLORIDE AND LIDOCAINE HYDROCHLORIDE AND ALUMINUM HYDROXIDE AND MAGNESIUM HYDRO 10 ML: KIT at 19:48

## 2021-01-01 RX ADMIN — AZITHROMYCIN MONOHYDRATE 250 MG: 250 TABLET ORAL at 09:07

## 2021-01-01 RX ADMIN — FENTANYL CITRATE 50 MCG: 50 INJECTION, SOLUTION INTRAMUSCULAR; INTRAVENOUS at 13:11

## 2021-01-01 RX ADMIN — OXYCODONE HYDROCHLORIDE 7.5 MG: 5 SOLUTION ORAL at 14:34

## 2021-01-01 RX ADMIN — OXYCODONE HYDROCHLORIDE 7.5 MG: 5 SOLUTION ORAL at 09:47

## 2021-01-01 RX ADMIN — Medication 400 MG: at 08:34

## 2021-01-01 RX ADMIN — AMPICILLIN SODIUM AND SULBACTAM SODIUM 3 G: 2; 1 INJECTION, POWDER, FOR SOLUTION INTRAMUSCULAR; INTRAVENOUS at 20:21

## 2021-01-01 RX ADMIN — SODIUM CHLORIDE: 9 INJECTION, SOLUTION INTRAVENOUS at 06:02

## 2021-01-01 RX ADMIN — AMPICILLIN SODIUM AND SULBACTAM SODIUM 3 G: 2; 1 INJECTION, POWDER, FOR SOLUTION INTRAMUSCULAR; INTRAVENOUS at 03:20

## 2021-01-01 RX ADMIN — VANCOMYCIN HYDROCHLORIDE 750 MG: 10 INJECTION, POWDER, LYOPHILIZED, FOR SOLUTION INTRAVENOUS at 16:54

## 2021-01-01 RX ADMIN — HYDROMORPHONE HYDROCHLORIDE 2 MG: 1 SOLUTION ORAL at 05:42

## 2021-01-01 RX ADMIN — AMPICILLIN SODIUM AND SULBACTAM SODIUM 3 G: 2; 1 INJECTION, POWDER, FOR SOLUTION INTRAMUSCULAR; INTRAVENOUS at 08:44

## 2021-01-01 RX ADMIN — Medication 5 ML: at 12:53

## 2021-01-01 RX ADMIN — PHENYLEPHRINE HYDROCHLORIDE 150 MCG: 10 INJECTION INTRAVENOUS at 11:46

## 2021-01-01 RX ADMIN — Medication 5 ML: at 10:58

## 2021-01-01 RX ADMIN — ROCURONIUM BROMIDE 50 MG: 10 INJECTION INTRAVENOUS at 12:56

## 2021-01-01 RX ADMIN — SODIUM CHLORIDE 500 ML: 9 INJECTION, SOLUTION INTRAVENOUS at 08:45

## 2021-01-01 RX ADMIN — DOXEPIN HYDROCHLORIDE 25 MG: 10 SOLUTION ORAL at 22:26

## 2021-01-01 RX ADMIN — DIPHENHYDRAMINE HYDROCHLORIDE AND LIDOCAINE HYDROCHLORIDE AND ALUMINUM HYDROXIDE AND MAGNESIUM HYDRO 10 ML: KIT at 09:17

## 2021-01-01 RX ADMIN — SUGAMMADEX 200 MG: 100 INJECTION, SOLUTION INTRAVENOUS at 14:39

## 2021-01-01 RX ADMIN — PROPOFOL 100 MG: 10 INJECTION, EMULSION INTRAVENOUS at 07:56

## 2021-01-01 RX ADMIN — HYDROMORPHONE HYDROCHLORIDE 2 MG: 1 SOLUTION ORAL at 09:17

## 2021-01-01 RX ADMIN — FENTANYL CITRATE 50 MCG: 50 INJECTION, SOLUTION INTRAMUSCULAR; INTRAVENOUS at 07:35

## 2021-01-01 RX ADMIN — CISPLATIN 80 MG: 1 INJECTION, SOLUTION INTRAVENOUS at 10:49

## 2021-01-01 RX ADMIN — Medication 40 MG: at 09:23

## 2021-01-01 RX ADMIN — SUGAMMADEX 200 MG: 100 INJECTION, SOLUTION INTRAVENOUS at 08:49

## 2021-01-01 RX ADMIN — Medication 5 ML: at 08:27

## 2021-01-01 RX ADMIN — FENTANYL CITRATE 50 MCG: 50 INJECTION, SOLUTION INTRAMUSCULAR; INTRAVENOUS at 12:49

## 2021-01-01 RX ADMIN — AMIODARONE HYDROCHLORIDE 200 MG: 200 TABLET ORAL at 09:18

## 2021-01-01 RX ADMIN — FENTANYL CITRATE 50 MCG: 50 INJECTION, SOLUTION INTRAMUSCULAR; INTRAVENOUS at 08:59

## 2021-01-01 RX ADMIN — CEFEPIME 2 G: 2 INJECTION, POWDER, FOR SOLUTION INTRAVENOUS at 19:58

## 2021-01-01 RX ADMIN — CEFAZOLIN SODIUM 2 G: 2 SOLUTION INTRAVENOUS at 08:50

## 2021-01-01 RX ADMIN — DIPHENHYDRAMINE HYDROCHLORIDE AND LIDOCAINE HYDROCHLORIDE AND ALUMINUM HYDROXIDE AND MAGNESIUM HYDRO 10 ML: KIT at 14:35

## 2021-01-01 RX ADMIN — INSULIN ASPART 15 UNITS: 100 INJECTION, SOLUTION INTRAVENOUS; SUBCUTANEOUS at 16:06

## 2021-01-01 RX ADMIN — CEFAZOLIN 2 G: 10 INJECTION, POWDER, FOR SOLUTION INTRAVENOUS at 08:10

## 2021-01-01 RX ADMIN — ASPIRIN 81 MG CHEWABLE TABLET 81 MG: 81 TABLET CHEWABLE at 09:07

## 2021-01-01 RX ADMIN — INSULIN ASPART 2 UNITS: 100 INJECTION, SOLUTION INTRAVENOUS; SUBCUTANEOUS at 12:28

## 2021-01-01 RX ADMIN — CARBOPLATIN 115 MG: 10 INJECTION, SOLUTION INTRAVENOUS at 10:31

## 2021-01-01 RX ADMIN — Medication 5 ML: at 13:44

## 2021-01-01 RX ADMIN — AMIODARONE HYDROCHLORIDE 200 MG: 200 TABLET ORAL at 09:45

## 2021-01-01 RX ADMIN — SUGAMMADEX 200 MG: 100 INJECTION, SOLUTION INTRAVENOUS at 12:27

## 2021-01-01 RX ADMIN — FENTANYL CITRATE 50 MCG: 50 INJECTION, SOLUTION INTRAMUSCULAR; INTRAVENOUS at 08:08

## 2021-01-01 RX ADMIN — HYDROMORPHONE HYDROCHLORIDE 2 MG: 1 SOLUTION ORAL at 16:50

## 2021-01-01 RX ADMIN — VANCOMYCIN HYDROCHLORIDE 1500 MG: 10 INJECTION, POWDER, LYOPHILIZED, FOR SOLUTION INTRAVENOUS at 20:54

## 2021-01-01 RX ADMIN — INSULIN ASPART 4 UNITS: 100 INJECTION, SOLUTION INTRAVENOUS; SUBCUTANEOUS at 14:04

## 2021-01-01 RX ADMIN — ONDANSETRON 8 MG: 2 INJECTION INTRAMUSCULAR; INTRAVENOUS at 09:00

## 2021-01-01 RX ADMIN — PROPOFOL 150 MCG/KG/MIN: 10 INJECTION, EMULSION INTRAVENOUS at 12:47

## 2021-01-01 RX ADMIN — SODIUM CHLORIDE 200 MG: 9 INJECTION, SOLUTION INTRAVENOUS at 11:27

## 2021-01-01 RX ADMIN — ONDANSETRON 4 MG: 2 INJECTION INTRAMUSCULAR; INTRAVENOUS at 14:35

## 2021-01-01 RX ADMIN — SODIUM CHLORIDE, PRESERVATIVE FREE 5 ML: 5 INJECTION INTRAVENOUS at 15:17

## 2021-01-01 RX ADMIN — PACLITAXEL 90 MG: 6 INJECTION, SOLUTION INTRAVENOUS at 11:47

## 2021-01-01 RX ADMIN — HYDROMORPHONE HYDROCHLORIDE 2 MG: 1 SOLUTION ORAL at 13:21

## 2021-01-01 RX ADMIN — OXYCODONE HYDROCHLORIDE 7.5 MG: 5 SOLUTION ORAL at 22:02

## 2021-01-01 RX ADMIN — PROPOFOL 50 MG: 10 INJECTION, EMULSION INTRAVENOUS at 08:19

## 2021-01-01 RX ADMIN — Medication 5 ML: at 15:00

## 2021-01-01 RX ADMIN — OXYCODONE HYDROCHLORIDE 15 MG: 5 SOLUTION ORAL at 00:22

## 2021-01-01 RX ADMIN — SODIUM CHLORIDE 250 ML: 9 INJECTION, SOLUTION INTRAVENOUS at 08:54

## 2021-01-01 RX ADMIN — CEFEPIME 2 G: 2 INJECTION, POWDER, FOR SOLUTION INTRAVENOUS at 19:05

## 2021-01-01 RX ADMIN — DEXAMETHASONE SODIUM PHOSPHATE 6 MG: 4 INJECTION, SOLUTION INTRA-ARTICULAR; INTRALESIONAL; INTRAMUSCULAR; INTRAVENOUS; SOFT TISSUE at 08:10

## 2021-01-01 RX ADMIN — DEXAMETHASONE SODIUM PHOSPHATE 10 MG: 4 INJECTION, SOLUTION INTRA-ARTICULAR; INTRALESIONAL; INTRAMUSCULAR; INTRAVENOUS; SOFT TISSUE at 12:57

## 2021-01-01 RX ADMIN — SODIUM CHLORIDE 250 ML: 9 INJECTION, SOLUTION INTRAVENOUS at 10:19

## 2021-01-01 RX ADMIN — Medication 5 ML: at 09:52

## 2021-01-01 RX ADMIN — GABAPENTIN 500 MG: 250 SOLUTION ORAL at 22:25

## 2021-01-01 RX ADMIN — INSULIN ASPART 2 UNITS: 100 INJECTION, SOLUTION INTRAVENOUS; SUBCUTANEOUS at 09:43

## 2021-01-01 RX ADMIN — DIPHENHYDRAMINE HYDROCHLORIDE AND LIDOCAINE HYDROCHLORIDE AND ALUMINUM HYDROXIDE AND MAGNESIUM HYDRO 10 ML: KIT at 00:52

## 2021-01-01 RX ADMIN — PROPOFOL 150 MCG/KG/MIN: 10 INJECTION, EMULSION INTRAVENOUS at 08:40

## 2021-01-01 RX ADMIN — DIPHENHYDRAMINE HYDROCHLORIDE AND LIDOCAINE HYDROCHLORIDE AND ALUMINUM HYDROXIDE AND MAGNESIUM HYDRO 10 ML: KIT at 09:18

## 2021-01-01 RX ADMIN — DOXEPIN HYDROCHLORIDE 25 MG: 10 SOLUTION ORAL at 18:10

## 2021-01-01 RX ADMIN — DIPHENHYDRAMINE HYDROCHLORIDE AND LIDOCAINE HYDROCHLORIDE AND ALUMINUM HYDROXIDE AND MAGNESIUM HYDRO 10 ML: KIT at 16:48

## 2021-01-01 RX ADMIN — DOXEPIN HYDROCHLORIDE 25 MG: 10 SOLUTION ORAL at 13:23

## 2021-01-01 RX ADMIN — SODIUM CHLORIDE: 9 INJECTION, SOLUTION INTRAVENOUS at 19:58

## 2021-01-01 RX ADMIN — PHENYLEPHRINE HYDROCHLORIDE 100 MCG: 10 INJECTION INTRAVENOUS at 13:21

## 2021-01-01 RX ADMIN — INSULIN ASPART 15 UNITS: 100 INJECTION, SOLUTION INTRAVENOUS; SUBCUTANEOUS at 14:35

## 2021-01-01 RX ADMIN — PALONOSETRON HYDROCHLORIDE 0.25 MG: 0.25 INJECTION, SOLUTION INTRAVENOUS at 11:17

## 2021-01-01 RX ADMIN — HYDROMORPHONE HYDROCHLORIDE 2 MG: 1 SOLUTION ORAL at 04:29

## 2021-01-01 RX ADMIN — HYDROMORPHONE HYDROCHLORIDE 2 MG: 1 SOLUTION ORAL at 01:32

## 2021-01-01 RX ADMIN — Medication 5 ML: at 12:13

## 2021-01-01 RX ADMIN — INSULIN ASPART 15 UNITS: 100 INJECTION, SOLUTION INTRAVENOUS; SUBCUTANEOUS at 20:08

## 2021-01-01 RX ADMIN — INSULIN GLARGINE 25 UNITS: 100 INJECTION, SOLUTION SUBCUTANEOUS at 08:34

## 2021-01-01 RX ADMIN — ASPIRIN 81 MG CHEWABLE TABLET 81 MG: 81 TABLET CHEWABLE at 09:23

## 2021-01-01 RX ADMIN — Medication 400 MG: at 09:45

## 2021-01-01 RX ADMIN — FAMOTIDINE 20 MG: 10 INJECTION INTRAVENOUS at 09:05

## 2021-01-01 RX ADMIN — Medication 400 MG: at 22:26

## 2021-01-01 RX ADMIN — APIXABAN 10 MG: 5 TABLET, FILM COATED ORAL at 16:51

## 2021-01-01 RX ADMIN — LIDOCAINE HYDROCHLORIDE 70 MG: 20 INJECTION, SOLUTION INFILTRATION; PERINEURAL at 12:49

## 2021-01-01 RX ADMIN — DOXEPIN HYDROCHLORIDE 25 MG: 10 SOLUTION ORAL at 14:16

## 2021-01-01 RX ADMIN — DEXAMETHASONE SODIUM PHOSPHATE: 10 INJECTION, SOLUTION INTRAMUSCULAR; INTRAVENOUS at 10:14

## 2021-01-01 RX ADMIN — DEXAMETHASONE SODIUM PHOSPHATE 6 MG: 4 INJECTION, SOLUTION INTRA-ARTICULAR; INTRALESIONAL; INTRAMUSCULAR; INTRAVENOUS; SOFT TISSUE at 11:49

## 2021-01-01 RX ADMIN — HYDROMORPHONE HYDROCHLORIDE 2 MG: 1 SOLUTION ORAL at 09:58

## 2021-01-01 RX ADMIN — DIPHENHYDRAMINE HYDROCHLORIDE AND LIDOCAINE HYDROCHLORIDE AND ALUMINUM HYDROXIDE AND MAGNESIUM HYDRO 10 ML: KIT at 02:17

## 2021-01-01 RX ADMIN — Medication 400 MG: at 23:11

## 2021-01-01 RX ADMIN — SODIUM CHLORIDE: 9 INJECTION, SOLUTION INTRAVENOUS at 06:37

## 2021-01-01 RX ADMIN — PACLITAXEL 90 MG: 6 INJECTION, SOLUTION INTRAVENOUS at 09:26

## 2021-01-01 RX ADMIN — IOPAMIDOL 100 ML: 755 INJECTION, SOLUTION INTRAVENOUS at 13:09

## 2021-01-01 RX ADMIN — PHENYLEPHRINE HYDROCHLORIDE 100 MCG: 10 INJECTION INTRAVENOUS at 14:24

## 2021-01-01 RX ADMIN — PROPOFOL: 10 INJECTION, EMULSION INTRAVENOUS at 14:08

## 2021-01-01 RX ADMIN — APIXABAN 5 MG: 5 TABLET, FILM COATED ORAL at 09:31

## 2021-01-01 RX ADMIN — PROPOFOL 100 MG: 10 INJECTION, EMULSION INTRAVENOUS at 11:40

## 2021-01-01 RX ADMIN — Medication 400 MG: at 20:59

## 2021-01-01 RX ADMIN — PROPOFOL 20 MG: 10 INJECTION, EMULSION INTRAVENOUS at 08:46

## 2021-01-01 RX ADMIN — CEFTRIAXONE SODIUM 1 G: 1 INJECTION, POWDER, FOR SOLUTION INTRAMUSCULAR; INTRAVENOUS at 14:07

## 2021-01-01 RX ADMIN — Medication 5 ML: at 12:35

## 2021-01-01 RX ADMIN — PROPOFOL 150 MCG/KG/MIN: 10 INJECTION, EMULSION INTRAVENOUS at 11:43

## 2021-01-01 RX ADMIN — SODIUM CHLORIDE, POTASSIUM CHLORIDE, SODIUM LACTATE AND CALCIUM CHLORIDE: 600; 310; 30; 20 INJECTION, SOLUTION INTRAVENOUS at 11:35

## 2021-01-01 RX ADMIN — ZOLEDRONIC ACID 3 MG: 0.8 INJECTION, SOLUTION, CONCENTRATE INTRAVENOUS at 12:12

## 2021-01-01 RX ADMIN — DOXEPIN HYDROCHLORIDE 25 MG: 10 SOLUTION ORAL at 20:10

## 2021-01-01 RX ADMIN — APIXABAN 5 MG: 5 TABLET, FILM COATED ORAL at 23:11

## 2021-01-01 RX ADMIN — SODIUM CHLORIDE, PRESERVATIVE FREE 5 ML: 5 INJECTION INTRAVENOUS at 09:48

## 2021-01-01 RX ADMIN — GABAPENTIN 250 MG: 250 SOLUTION ORAL at 08:35

## 2021-01-01 RX ADMIN — DIPHENHYDRAMINE HYDROCHLORIDE AND LIDOCAINE HYDROCHLORIDE AND ALUMINUM HYDROXIDE AND MAGNESIUM HYDRO 10 ML: KIT at 09:46

## 2021-01-01 RX ADMIN — GABAPENTIN 250 MG: 250 SOLUTION ORAL at 14:35

## 2021-01-01 RX ADMIN — PHENYLEPHRINE HYDROCHLORIDE 200 MCG: 10 INJECTION INTRAVENOUS at 12:11

## 2021-01-01 RX ADMIN — PACLITAXEL 90 MG: 6 INJECTION, SOLUTION INTRAVENOUS at 10:49

## 2021-01-01 RX ADMIN — FENTANYL CITRATE 100 MCG: 50 INJECTION, SOLUTION INTRAMUSCULAR; INTRAVENOUS at 11:40

## 2021-01-01 RX ADMIN — DIPHENHYDRAMINE HYDROCHLORIDE 25 MG: 50 INJECTION, SOLUTION INTRAMUSCULAR; INTRAVENOUS at 01:20

## 2021-01-01 RX ADMIN — DIPHENHYDRAMINE HYDROCHLORIDE AND LIDOCAINE HYDROCHLORIDE AND ALUMINUM HYDROXIDE AND MAGNESIUM HYDRO 10 ML: KIT at 04:29

## 2021-01-01 RX ADMIN — CEFTRIAXONE SODIUM 1 G: 1 INJECTION, POWDER, FOR SOLUTION INTRAMUSCULAR; INTRAVENOUS at 12:23

## 2021-01-01 RX ADMIN — INSULIN GLARGINE 25 UNITS: 100 INJECTION, SOLUTION SUBCUTANEOUS at 09:33

## 2021-01-01 RX ADMIN — INSULIN GLARGINE 10 UNITS: 100 INJECTION, SOLUTION SUBCUTANEOUS at 09:42

## 2021-01-01 RX ADMIN — ENOXAPARIN SODIUM 40 MG: 40 INJECTION SUBCUTANEOUS at 09:42

## 2021-01-01 RX ADMIN — LIDOCAINE HYDROCHLORIDE 15 ML: 20 SOLUTION ORAL; TOPICAL at 06:31

## 2021-01-01 RX ADMIN — DIPHENHYDRAMINE HYDROCHLORIDE AND LIDOCAINE HYDROCHLORIDE AND ALUMINUM HYDROXIDE AND MAGNESIUM HYDRO 10 ML: KIT at 20:09

## 2021-01-01 RX ADMIN — HYDROMORPHONE HYDROCHLORIDE 0.5 MG: 1 INJECTION, SOLUTION INTRAMUSCULAR; INTRAVENOUS; SUBCUTANEOUS at 01:11

## 2021-01-01 RX ADMIN — INSULIN ASPART 15 UNITS: 100 INJECTION, SOLUTION INTRAVENOUS; SUBCUTANEOUS at 09:33

## 2021-01-01 RX ADMIN — HYDROMORPHONE HYDROCHLORIDE 2 MG: 1 SOLUTION ORAL at 00:52

## 2021-01-01 RX ADMIN — DEXAMETHASONE SODIUM PHOSPHATE: 10 INJECTION, SOLUTION INTRAMUSCULAR; INTRAVENOUS at 10:24

## 2021-01-01 RX ADMIN — IOPAMIDOL 100 ML: 755 INJECTION, SOLUTION INTRAVENOUS at 11:26

## 2021-01-01 RX ADMIN — OMEPRAZOLE 20 MG: KIT at 09:17

## 2021-01-01 RX ADMIN — DIPHENHYDRAMINE HYDROCHLORIDE AND LIDOCAINE HYDROCHLORIDE AND ALUMINUM HYDROXIDE AND MAGNESIUM HYDRO 10 ML: KIT at 20:59

## 2021-01-01 RX ADMIN — HYDROMORPHONE HYDROCHLORIDE 2 MG: 1 SOLUTION ORAL at 18:10

## 2021-01-01 RX ADMIN — PACLITAXEL 90 MG: 6 INJECTION, SOLUTION INTRAVENOUS at 09:11

## 2021-01-01 RX ADMIN — GABAPENTIN 500 MG: 250 SOLUTION ORAL at 22:26

## 2021-01-01 RX ADMIN — INSULIN ASPART 2 UNITS: 100 INJECTION, SOLUTION INTRAVENOUS; SUBCUTANEOUS at 10:19

## 2021-01-01 RX ADMIN — OXYCODONE HYDROCHLORIDE 7.5 MG: 5 SOLUTION ORAL at 16:48

## 2021-01-01 RX ADMIN — AMIODARONE HYDROCHLORIDE 200 MG: 200 TABLET ORAL at 20:59

## 2021-01-01 RX ADMIN — DIPHENHYDRAMINE HYDROCHLORIDE 50 MG: 25 CAPSULE ORAL at 11:08

## 2021-01-01 RX ADMIN — DIPHENHYDRAMINE HYDROCHLORIDE AND LIDOCAINE HYDROCHLORIDE AND ALUMINUM HYDROXIDE AND MAGNESIUM HYDRO 5 ML: KIT at 16:48

## 2021-01-01 RX ADMIN — CEFEPIME 2 G: 2 INJECTION, POWDER, FOR SOLUTION INTRAVENOUS at 08:34

## 2021-01-01 RX ADMIN — SODIUM CHLORIDE: 9 INJECTION, SOLUTION INTRAVENOUS at 13:43

## 2021-01-01 RX ADMIN — ONDANSETRON 4 MG: 2 INJECTION INTRAMUSCULAR; INTRAVENOUS at 08:45

## 2021-01-01 RX ADMIN — Medication 500 UNITS: at 09:24

## 2021-01-01 RX ADMIN — DOXEPIN HYDROCHLORIDE 25 MG: 10 SOLUTION ORAL at 12:26

## 2021-01-01 RX ADMIN — SODIUM CHLORIDE 1000 ML: 9 INJECTION, SOLUTION INTRAVENOUS at 11:08

## 2021-01-01 RX ADMIN — APIXABAN 5 MG: 5 TABLET, FILM COATED ORAL at 08:34

## 2021-01-01 RX ADMIN — PACLITAXEL 90 MG: 6 INJECTION, SOLUTION INTRAVENOUS at 10:59

## 2021-01-01 RX ADMIN — DIPHENHYDRAMINE HYDROCHLORIDE AND LIDOCAINE HYDROCHLORIDE AND ALUMINUM HYDROXIDE AND MAGNESIUM HYDRO 10 ML: KIT at 06:43

## 2021-01-01 RX ADMIN — Medication 5 ML: at 11:55

## 2021-01-01 RX ADMIN — INSULIN GLARGINE 12 UNITS: 100 INJECTION, SOLUTION SUBCUTANEOUS at 08:49

## 2021-01-01 RX ADMIN — INSULIN ASPART 15 UNITS: 100 INJECTION, SOLUTION INTRAVENOUS; SUBCUTANEOUS at 19:46

## 2021-01-01 RX ADMIN — Medication 40 MG: at 09:08

## 2021-01-01 RX ADMIN — GLYCOPYRROLATE 1 MG: 1 TABLET ORAL at 20:45

## 2021-01-01 RX ADMIN — DOXEPIN HYDROCHLORIDE 25 MG: 10 SOLUTION ORAL at 06:31

## 2021-01-01 RX ADMIN — DIPHENHYDRAMINE HYDROCHLORIDE AND LIDOCAINE HYDROCHLORIDE AND ALUMINUM HYDROXIDE AND MAGNESIUM HYDRO 10 ML: KIT at 14:21

## 2021-01-01 RX ADMIN — PHENYLEPHRINE HYDROCHLORIDE 200 MCG: 10 INJECTION INTRAVENOUS at 12:19

## 2021-01-01 RX ADMIN — HEPARIN SODIUM 1250 UNITS/HR: 10000 INJECTION, SOLUTION INTRAVENOUS at 12:21

## 2021-01-01 RX ADMIN — CEFAZOLIN 1 G: 1 INJECTION, POWDER, FOR SOLUTION INTRAMUSCULAR; INTRAVENOUS at 12:29

## 2021-01-01 RX ADMIN — OMEPRAZOLE 20 MG: KIT at 08:34

## 2021-01-01 RX ADMIN — DEXAMETHASONE SODIUM PHOSPHATE 8 MG: 10 INJECTION, SOLUTION INTRAMUSCULAR; INTRAVENOUS at 03:20

## 2021-01-01 RX ADMIN — DIPHENHYDRAMINE HYDROCHLORIDE 25 MG: 25 SOLUTION ORAL at 02:16

## 2021-01-01 RX ADMIN — VANCOMYCIN HYDROCHLORIDE 750 MG: 10 INJECTION, POWDER, LYOPHILIZED, FOR SOLUTION INTRAVENOUS at 16:27

## 2021-01-01 RX ADMIN — LIDOCAINE HYDROCHLORIDE 40 MG: 20 INJECTION, SOLUTION INFILTRATION; PERINEURAL at 11:40

## 2021-01-01 RX ADMIN — SODIUM CHLORIDE 1000 ML: 9 INJECTION, SOLUTION INTRAVENOUS at 09:42

## 2021-01-01 RX ADMIN — Medication 5 ML: at 11:19

## 2021-01-01 RX ADMIN — SODIUM CHLORIDE, PRESERVATIVE FREE 88 ML: 5 INJECTION INTRAVENOUS at 05:33

## 2021-01-01 RX ADMIN — LIDOCAINE HYDROCHLORIDE 50 MG: 20 INJECTION, SOLUTION INFILTRATION; PERINEURAL at 08:39

## 2021-01-01 RX ADMIN — FAMOTIDINE 20 MG: 10 INJECTION INTRAVENOUS at 10:10

## 2021-01-01 RX ADMIN — SODIUM CHLORIDE 1000 ML: 9 INJECTION, SOLUTION INTRAVENOUS at 11:15

## 2021-01-01 RX ADMIN — PROPOFOL 120 MG: 10 INJECTION, EMULSION INTRAVENOUS at 12:49

## 2021-01-01 RX ADMIN — IOPAMIDOL 74 ML: 755 INJECTION, SOLUTION INTRAVENOUS at 05:32

## 2021-01-01 RX ADMIN — DEXAMETHASONE SODIUM PHOSPHATE: 10 INJECTION, SOLUTION INTRAMUSCULAR; INTRAVENOUS at 11:17

## 2021-01-01 RX ADMIN — INSULIN ASPART 3 UNITS: 100 INJECTION, SOLUTION INTRAVENOUS; SUBCUTANEOUS at 12:15

## 2021-01-01 RX ADMIN — OMEPRAZOLE 20 MG: KIT at 09:45

## 2021-01-01 RX ADMIN — DOXEPIN HYDROCHLORIDE 25 MG: 10 SOLUTION ORAL at 17:00

## 2021-01-01 RX ADMIN — AMIODARONE HYDROCHLORIDE 200 MG: 200 TABLET ORAL at 20:07

## 2021-01-01 RX ADMIN — HYDROMORPHONE HYDROCHLORIDE 2 MG: 1 SOLUTION ORAL at 22:45

## 2021-01-01 RX ADMIN — GABAPENTIN 500 MG: 250 SOLUTION ORAL at 23:11

## 2021-01-01 RX ADMIN — Medication 15 ML: at 09:07

## 2021-01-01 RX ADMIN — ALTEPLASE 2 MG: 2.2 INJECTION, POWDER, LYOPHILIZED, FOR SOLUTION INTRAVENOUS at 11:07

## 2021-01-01 RX ADMIN — BARIUM SULFATE 50 ML: 400 SUSPENSION ORAL at 13:17

## 2021-01-01 RX ADMIN — ROCURONIUM BROMIDE 50 MG: 10 INJECTION INTRAVENOUS at 07:56

## 2021-01-01 RX ADMIN — CARBOPLATIN 110 MG: 10 INJECTION, SOLUTION INTRAVENOUS at 10:22

## 2021-01-01 RX ADMIN — SODIUM CHLORIDE, POTASSIUM CHLORIDE, SODIUM LACTATE AND CALCIUM CHLORIDE: 600; 310; 30; 20 INJECTION, SOLUTION INTRAVENOUS at 12:37

## 2021-01-01 RX ADMIN — GABAPENTIN 250 MG: 250 SUSPENSION ORAL at 09:23

## 2021-01-01 RX ADMIN — AMIODARONE HYDROCHLORIDE 200 MG: 200 TABLET ORAL at 09:33

## 2021-01-01 RX ADMIN — APIXABAN 5 MG: 5 TABLET, FILM COATED ORAL at 19:05

## 2021-01-01 RX ADMIN — HYDROMORPHONE HYDROCHLORIDE 2 MG: 1 SOLUTION ORAL at 22:25

## 2021-01-01 RX ADMIN — GABAPENTIN 250 MG: 250 SUSPENSION ORAL at 20:45

## 2021-01-01 RX ADMIN — HYDROMORPHONE HYDROCHLORIDE 0.5 MG: 1 INJECTION, SOLUTION INTRAMUSCULAR; INTRAVENOUS; SUBCUTANEOUS at 08:17

## 2021-01-01 RX ADMIN — DIPHENHYDRAMINE HYDROCHLORIDE AND LIDOCAINE HYDROCHLORIDE AND ALUMINUM HYDROXIDE AND MAGNESIUM HYDRO 10 ML: KIT at 04:10

## 2021-01-01 RX ADMIN — LISINOPRIL 5 MG: 5 TABLET ORAL at 09:23

## 2021-01-01 RX ADMIN — SODIUM CHLORIDE 250 ML: 9 INJECTION, SOLUTION INTRAVENOUS at 11:01

## 2021-01-01 RX ADMIN — SODIUM CHLORIDE, POTASSIUM CHLORIDE, SODIUM LACTATE AND CALCIUM CHLORIDE 1000 ML: 600; 310; 30; 20 INJECTION, SOLUTION INTRAVENOUS at 06:26

## 2021-01-01 RX ADMIN — OXYCODONE HYDROCHLORIDE 5 MG: 5 TABLET ORAL at 06:57

## 2021-01-01 RX ADMIN — Medication 5 ML: at 16:33

## 2021-01-01 RX ADMIN — FUROSEMIDE 20 MG: 10 INJECTION, SOLUTION INTRAVENOUS at 09:21

## 2021-01-01 RX ADMIN — DIPHENHYDRAMINE HYDROCHLORIDE 50 MG: 25 CAPSULE ORAL at 10:23

## 2021-01-01 RX ADMIN — GABAPENTIN 500 MG: 250 SOLUTION ORAL at 02:16

## 2021-01-01 RX ADMIN — PROPOFOL 30 MG: 10 INJECTION, EMULSION INTRAVENOUS at 13:40

## 2021-01-01 RX ADMIN — DIPHENHYDRAMINE HYDROCHLORIDE AND LIDOCAINE HYDROCHLORIDE AND ALUMINUM HYDROXIDE AND MAGNESIUM HYDRO 10 ML: KIT at 09:57

## 2021-01-01 RX ADMIN — OMEPRAZOLE 20 MG: KIT at 09:33

## 2021-01-01 RX ADMIN — PROPOFOL 40 MG: 10 INJECTION, EMULSION INTRAVENOUS at 09:00

## 2021-01-01 RX ADMIN — HYDROMORPHONE HYDROCHLORIDE 2 MG: 1 SOLUTION ORAL at 21:40

## 2021-01-01 RX ADMIN — INSULIN ASPART 1 UNITS: 100 INJECTION, SOLUTION INTRAVENOUS; SUBCUTANEOUS at 18:21

## 2021-01-01 ASSESSMENT — ENCOUNTER SYMPTOMS
WEAKNESS: 0
NERVOUS/ANXIOUS: 0
SEIZURES: 0
SEIZURES: 0
NECK PAIN: 0
SORE THROAT: 0
MYALGIAS: 0
DYSURIA: 0
INSOMNIA: 0
DOUBLE VISION: 0
EYE REDNESS: 0
SINUS PAIN: 0
HEMOPTYSIS: 0
EYE PAIN: 0
HEADACHES: 0
STRIDOR: 0
DIZZINESS: 0
HEARTBURN: 0
NECK PAIN: 1
CHILLS: 0
BACK PAIN: 0
DIAPHORESIS: 0
POLYDIPSIA: 0
HEMATURIA: 0
TROUBLE SWALLOWING: 1
BLOOD IN STOOL: 0
FEVER: 0
ABDOMINAL PAIN: 0
FOCAL WEAKNESS: 0
NAUSEA: 0
MEMORY LOSS: 0
SHORTNESS OF BREATH: 0
BLURRED VISION: 0
VOMITING: 0
COUGH: 0
DIARRHEA: 0
DYSRHYTHMIAS: 1
SEIZURES: 0
UNEXPECTED WEIGHT CHANGE: 1
SENSORY CHANGE: 0
WEIGHT LOSS: 0
CLAUDICATION: 0
HALLUCINATIONS: 0
FLANK PAIN: 0
PALPITATIONS: 0
TREMORS: 0
CONSTIPATION: 0
DYSRHYTHMIAS: 1
PHOTOPHOBIA: 0
DEPRESSION: 0
SPUTUM PRODUCTION: 0
EYE DISCHARGE: 0
FALLS: 0
SPEECH CHANGE: 0
TINGLING: 0
FREQUENCY: 0
ORTHOPNEA: 0
BRUISES/BLEEDS EASILY: 0
WHEEZING: 0
LOSS OF CONSCIOUSNESS: 0

## 2021-01-01 ASSESSMENT — PAIN SCALES - GENERAL
PAINLEVEL: NO PAIN (0)
PAINLEVEL: MILD PAIN (3)
PAINLEVEL: SEVERE PAIN (7)
PAINLEVEL: MODERATE PAIN (4)
PAINLEVEL: SEVERE PAIN (7)
PAINLEVEL: NO PAIN (0)
PAINLEVEL: MODERATE PAIN (5)
PAINLEVEL: NO PAIN (0)
PAINLEVEL: MILD PAIN (3)
PAINLEVEL: EXTREME PAIN (8)
PAINLEVEL: MODERATE PAIN (4)
PAINLEVEL: MILD PAIN (3)
PAINLEVEL: MODERATE PAIN (5)
PAINLEVEL: MODERATE PAIN (4)
PAINLEVEL: MODERATE PAIN (5)
PAINLEVEL: MILD PAIN (3)
PAINLEVEL: WORST PAIN (10)
PAINLEVEL: NO PAIN (0)

## 2021-01-01 ASSESSMENT — MIFFLIN-ST. JEOR
SCORE: 1450.26
SCORE: 1478.5
SCORE: 1402.63
SCORE: 1480.63
SCORE: 1402.63
SCORE: 1416.24
SCORE: 1525.69
SCORE: 1421.63
SCORE: 1434.39
SCORE: 1462.07
SCORE: 1466.59
SCORE: 1430.75
SCORE: 1405.63
SCORE: 1479.74
SCORE: 1422.63
SCORE: 1428.63
SCORE: 1548.25
SCORE: 1458.88
SCORE: 1428.63
SCORE: 1447.99
SCORE: 1411.71
SCORE: 1438.63

## 2021-01-01 ASSESSMENT — ACTIVITIES OF DAILY LIVING (ADL)
ADLS_ACUITY_SCORE: 14
ADLS_ACUITY_SCORE: 16
ADLS_ACUITY_SCORE: 7
ADLS_ACUITY_SCORE: 16
ADLS_ACUITY_SCORE: 16
ADLS_ACUITY_SCORE: 14
ADLS_ACUITY_SCORE: 16
ADLS_ACUITY_SCORE: 14
ADLS_ACUITY_SCORE: 16
ADLS_ACUITY_SCORE: 6
ADLS_ACUITY_SCORE: 16
ADLS_ACUITY_SCORE: 16
ADLS_ACUITY_SCORE: 7
ADLS_ACUITY_SCORE: 6
ADLS_ACUITY_SCORE: 16
ADLS_ACUITY_SCORE: 14
ADLS_ACUITY_SCORE: 14
ADLS_ACUITY_SCORE: 16
ADLS_ACUITY_SCORE: 14
DEPENDENT_IADLS:: SHOPPING
ADLS_ACUITY_SCORE: 7
ADLS_ACUITY_SCORE: 14
ADLS_ACUITY_SCORE: 5
ADLS_ACUITY_SCORE: 14
ADLS_ACUITY_SCORE: 6

## 2021-01-01 ASSESSMENT — LIFESTYLE VARIABLES
SUBSTANCE_ABUSE: 0
TOBACCO_USE: 0
TOBACCO_USE: 0

## 2021-01-05 NOTE — LETTER
1/5/2021         RE: Reese Oakley  [address] [address]        Dear Colleague,    Thank you for referring your patient, Reese Oakley, to the [facility] RADIATION ONCOLOGY. Please see a copy of my visit note below.      HPI  INITIAL PATIENT ASSESSMENT    Diagnosis: Cancer    Prior radiation therapy: See records    Prior chemotherapy: See records    Prior hormonal therapy:No    Pain Eval:  Denies    Psychosocial  Living arrangements: Lives with wife  Fall Risk: independent   referral needs: Not needed    Advanced Directive: Yes - Location: at home  Implantable Cardiac Device? No    Nurse face-to-face time: Level 5:  over 15 min face to face time    Review of Systems   Constitutional: Negative for chills, diaphoresis, fever, malaise/fatigue and weight loss.   HENT: Negative for congestion, ear discharge, ear pain, hearing loss, nosebleeds, sinus pain, sore throat and tinnitus.    Eyes: Negative for blurred vision, double vision, photophobia, pain, discharge and redness.   Respiratory: Negative for cough, hemoptysis, sputum production, shortness of breath, wheezing and stridor.    Cardiovascular: Negative for chest pain, palpitations, orthopnea, claudication and leg swelling.   Gastrointestinal: Negative for abdominal pain, blood in stool, constipation, diarrhea, heartburn, melena, nausea and vomiting.   Genitourinary: Negative for dysuria, flank pain, frequency, hematuria and urgency.   Musculoskeletal: Negative for back pain, falls, joint pain, myalgias and neck pain.   Skin: Negative for itching and rash.   Neurological: Negative for dizziness, tingling, tremors, sensory change, speech change, focal weakness, seizures, loss of consciousness, weakness and headaches.   Endo/Heme/Allergies: Negative for environmental allergies and polydipsia. Does not bruise/bleed easily.   Psychiatric/Behavioral: Negative for depression, hallucinations, memory loss,  substance abuse and suicidal ideas. The patient is not nervous/anxious and does not have insomnia.                  RADIATION ONCOLOGY CONSULTATION  DATE OF VISIT: 1/5/2021    Reese Oakley  MRN: [id number]    Mr. Reese Oakley was seen in consultation at the request of Dr. 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    HISTORY OF PRESENT ILLNESS:  Mr. 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  He underwent direct laryngoscopy with CO2 laser excision by Dr. Mckenna followed by adjuvant radiation therapy at [facility] by Dr. Polo completed in January 2015.  He received 70 Gy to the right oropharynx and ipsilateral neck.  More recently, he was diagnosed with a pathologic T2 (clinical N0) squamous cell carcinoma of the right lateral oral tongue.  PET/CT on 1/27/2020 showed no evidence of metastatic disease.  He underwent partial glossectomy with primary closure by Dr. Mckenna on 2/19/2020.  Pathology showed a 1.5 cm invasive, moderately differentiated, keratinizing squamous cell carcinoma, depth of invasion was 0.7 cm with multifocal perineural invasion, negative angiolymphatic invasion, and negative resection margins.  Because the right neck had been previously treated with radiation, it was decided to follow the neck with close observation imaging.    Mr. Oakley returned in October 2020 with a new right submandibular mass that did not improve with antibiotics.  PET CT scan on 11/24/2020 showed a new heterogeneously enhancing, irregular mass in the right submandibular region measuring approximately 15 x 23 mm demonstrating hypermetabolism (max SUV 11.1).  The mass appeared to arise within a mostly fat replaced right submandibular gland.  No pathologically enlarged lymph nodes were evident.  Additionally, there were  scattered sub-6 mm pulmonary nodules in both lungs there were unchanged since at least 1/27/2020.  He underwent a right modified neck dissection of levels I through III on 12/9/2020.  Pathology reported invasive moderately to poorly differentiated squamous cell carcinoma involving fibroadipose tissue and salivary gland tissue measuring 2.5 cm in maximal dimension with negative resection margins.  One lymph node in level 1A had 1 focus of metastatic squamous cell carcinoma measuring 0.5 cm without extranodal extension.  The tumor cells were positive for p40 and cytokeratin consistent with squamous cell carcinoma.  The tumor cells also show high PD-L1 expression.  The patient was presented at multidisciplinary tumor board with recommendation for chemoradiation due to extranodal extension of the node anterior to the submandibular gland which extended into fibroadipose tissue.    Overall, he is doing well following surgery.  He is eating normally and denies any pain.  He also denies any unexplained weight loss, hoarseness, shortness of breath, headaches, nausea, vomiting, fever, chills.    PAST MEDICAL HISTORY:   1.  Squamous cell carcinoma of the right oropharynx, 4/28/2015, status post transoral resection followed by radiation therapy  2.  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  Squamous cell carcinoma of the right oral tongue, 1/31/2020, status post surgical excision  4.  Diabetes mellitus, type II  5.  Gastroesophageal reflux disease  6.  Hypertension    PAST SURGICAL HISTORY:   Past Surgical History:   Procedure Laterality Date     APPENDECTOMY       COLECTOMY      Right     DISSECTION RADICAL NECK MODIFIED Right 12/9/2020    Procedure: Modified neck dissection;  Surgeon: Jose Antonio Mckenna MD;  Location: [facility] OR     EXCISE LESION INTRAORAL Right 2/19/2020    Procedure: Wide Local Excision of Right Tongue Lesion;  Surgeon: Jose Antonio Mckenna  MD [place];  Location: [facility] OR     LASER CO2 LARYNGOSCOPY N/A 10/6/2014    Procedure: LASER CO2 LARYNGOSCOPY;  Surgeon: Jose Antonio Mckenna MD;  Location: [facility] OR     CHEMOTHERAPY HISTORY: FOLFOX chemotherapy for appendiceal adenocarcinoma    PAST RADIATION THERAPY HISTORY: Head and neck radiation, 7000 cGy completed January 2015    IMPLANTABLE CARDIAC DEVICE: None    MEDICATIONS:  Medication Sig     acetaminophen (TYLENOL) 325 MG tablet Take 2 tablets (650 mg) by mouth every 4 hours as needed for other (multimodal surgical pain management along with NSAIDS and opioid medication as indicated based on pain control and physical function.)     aspirin (ASA) 81 MG chewable tablet Take 81 mg by mouth daily     glipiZIDE (GLUCOTROL) 10 MG tablet Take 10 mg by mouth 2 times daily (before meals) 10 Mg in the am - 12.5MG  @@ DINNER     HYDROmorphone (DILAUDID) 2 MG tablet Take 1 tablet (2 mg) by mouth every 4 hours as needed for moderate to severe pain     insulin glargine (LANTUS VIAL) 100 UNIT/ML vial Inject 7 Units Subcutaneous daily (with breakfast)     lisinopril (ZESTRIL) 10 MG tablet Take 10 mg by mouth daily     metFORMIN (GLUCOPHAGE) 1000 MG tablet Take 500 mg by mouth 2 times daily (with meals)     mineral oil-hydrophilic petrolatum (AQUAPHOR) external ointment Apply topically every 8 hours     ondansetron (ZOFRAN-ODT) 4 MG ODT tab Take 1 tablet (4 mg) by mouth every 6 hours as needed for nausea or vomiting     pilocarpine (SALAGEN) 7.5 MG tablet Take 7.5 mg by mouth 2 times daily     polyethylene glycol (MIRALAX) 17 GM/Dose powder Take 17 g by mouth daily     senna-docusate (SENOKOT-S/PERICOLACE) 8.6-50 MG tablet Take 1 tablet by mouth 2 times daily as needed for constipation     simvastatin (ZOCOR) 40 MG tablet Take 40 mg by mouth At Bedtime        ALLERGIES:   Allergies as of 01/05/2021 - Reviewed 01/05/2021   Allergen Reaction Noted     Percocet [oxycodone-acetaminophen] Nausea and Vomiting 12/07/2020     SOCIAL  HISTORY:  Socioeconomic History     Marital status:      Spouse name: Not on file     Number of children: Not on file     Years of education: Not on file     Highest education level: Not on file   Occupational History     Not on file   Social Needs     Financial resource strain: Not on file     Food insecurity     Worry: Not on file     Inability: Not on file     Transportation needs     Medical: Not on file     Non-medical: Not on file   Tobacco Use     Smoking status: Never Smoker     Smokeless tobacco: Never Used   Substance and Sexual Activity     Alcohol use: Not Currently     Alcohol/week: 0.0 standard drinks     Comment: none for 20 years     Drug use: No     Sexual activity: Never   Lifestyle     Physical activity     Days per week: Not on file     Minutes per session: Not on file     Stress: Not on file     FAMILY HISTORY:   Family History   Problem Relation Age of Onset     Heart Disease Father      Diabetes Mother      Diabetes Brother      REVIEW OF SYMPTOMS:  A full 14-point review of systems was performed. All pertinent positives and negatives are noted in the history of present illness.    FUNCTIONAL STATUS: ECO    PHYSICAL EXAMINATION:    General: Well-developed, well-nourished, in no acute distress, mild dysarthria secondary to tongue surgery.  BP (!) 187/72   Wt 83.5 kg (184 lb)   BMI 28.82 kg/m  , pain 0/10  HEENT: Normocephalic, atraumatic, oral cavity with well-healed right lateral oral tongue excision, no mucosal lesions, tongue soft to palpation  Neck: Supple, no palpable adenopathy of the left neck, well-healed right neck surgical incision without palpable nodularity, no lymphedema  Respiratory: Breathing comfortably on room air, no wheezing  Cardiovascular: Regular rate and rhythm, no murmurs, extremities warm and well-perfused  Extremities: No deformities, no cyanosis, no edema  Neuro: Cranial nerves II through XII are grossly intact, motor strength 4/5 all 4 extremities, gait  normal  Skin: No visible rash minimal radiation fibrosis of the right neck  Lymph: No palpable cervical, supraclavicular or axillary adenopathy  Psych: Mood appropriate    LABORATORY: No new studies    IMAGING/PATH: Please refer to history of present illness for discussion of imaging and pathology.    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  Given the recurrent nature of disease, he is an appropriate candidate for chemoradiation to achieve improved local tumor control.  Mr. Oakley has previously received radiation to the right oropharynx and right neck in 2014.  Review of his previous radiation treatment fused on his current preoperative PET scan shows that areas of concern previously received approximately 50 Gy to the right neck.  Given the time interval of 5 years from completion of radiation and the current  risk of local regional relapse, he is an appropriate candidate for reirradiation to the right neck with concurrent chemotherapy for chemosensitization.  I reviewed the pathologic findings of his recent right neck surgery that are concerning for increased risk of recurrence and also discussed, in detail, the increased potential morbidity of retreatment with radiation to the right neck.  Patient asked several questions which were answered such that he verbalized understanding of the issues.  Informed consent was obtained.  Given that the planned treatment field will include the right neck only and every effort will be made to minimize the extent of mucosal radiation, I do not feel that Mr. Oakley requires a prophylactic feeding tube.  Should he begin to lose weight due to lack of oral intake, placement of the tube at that time can be considered.   He has also seen his dentist who will be providing fluoride prophylaxis.    We will plan treatment simulation for January 13 which will give him time to have dental trays completed by his dentist.  We will consider obtaining a CT with IV contrast to aid in treatment planning pending serum creatinine which was drawn during the clinic visit.    Thank you for allowing us to participate in this patient's care.  Please feel free to call with any questions or concerns. This note was dictated with voice recognition software and then edited. Please excuse any unintentional errors.  Over 60 minutes was spent with the patient during this visit with greater than 50% of times in coordination of care.    Jennifer Herrera MD  Department of Radiation Oncology  [facility]    CC  Patient Care Team:  Chilango Carrillo MD as PCP - General (Otolaryngology)  Donna Gallagher PA-C as Assigned Pediatric Specialist Provider  Jose Antonio Mckenna MD as Assigned Surgical Provider  Jose Antonio Mckenna MD as Referring Physician (Otolaryngology)  Toni Mark DO as MD (Medical Oncology)  Blanca Ferrell RN as Specialty Care Coordinator (Hematology & Oncology)      Addendum:  LABORATORY:  Lab Results   Component Value Date    CR 1.34 (H) 01/05/2021     Lab Results   Component Value Date     01/05/2021    POTASSIUM 4.2 01/05/2021    CHLORIDE 108 01/05/2021    CO2 27 01/05/2021     (H) 01/05/2021       Again, thank you for allowing me to participate in the care of your patient.        Sincerely,        Jennifer Herrera MD

## 2021-01-05 NOTE — PATIENT INSTRUCTIONS
1. You were seen in the ENT Clinic today by Dr. Mckenna.  If you have any questions or concerns after your appointment, please call   -  ENT Clinic: [phone]      2.   Plan to return to clinic in 4-5 weeks.     Tashia Jennings LPN  [facility] Otolaryngology  [phone]    Or    Louise Bedoya RN  [facility]- Otolaryngology   [phone]

## 2021-01-05 NOTE — LETTER
1/5/2021       RE: Reese Oakley  [address] [address]     Dear Colleague,    Thank you for referring your patient, Reese Oakley, to the [facility] [place] [facility]. Please see a copy of my visit note below.    January 5, 2021    Prior Oncologic History: Reese Oakley is a 70 year old male with a history of a pT1N0 SCCa of the right oropharynx in 2014 treated with a DL with CO2 laser excision by Dr. Mckenna followed by radiation therapy at [facility] by Dr. Polo completed in January 2015.    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 Mckenna 2/19/20. Depth of invasion 7 mm. Because right neck was treated with previous radiation, it was decided to follow the neck with close observation imaging.    Patient returned in Oct 2020 with new right submandibular mass that did not improve with antibiotics. PET/CT showed FDG avid node. Now s/p right neck dissection (levels I-III) by Dr. Mckenna on 12/9/2020. Pathology reported extracapsular tumor in node anterior to right submandibular gland and microscopic disease in level IA node. Patient was presented at tumor board and recommended to have consultations with both medical oncology and radiation oncology to further discuss adjuvant therapy.    Interval History:   Patient comes in today with his wife for follow up after neck dissection. He met with Dr. Herrera this morning and is planning to start radiation in the next couple weeks. He plans to meet with Dr. Mark after this appointment.     Patient states that he is doing well from a healing standpoint. His wife is concerned that the scar of his neck dissection is lumpy. He has numbness in the right neck. No difficulty with range of motion.     Denies any dysphagia, odynophagia, otalgia, bleeding from the mouth, or neck  pain.    Past Medical History:  Past Medical History:   Diagnosis Date     Adenocarcinoma (H)     large instestine      Cancer of appendix (H)      Diabetes (H)      Gastro-oesophageal reflux disease      History of radiation therapy      Hoarseness      Hypertension        Past Surgical History:  Past Surgical History:   Procedure Laterality Date     APPENDECTOMY       COLECTOMY      Right     DISSECTION RADICAL NECK MODIFIED Right 12/9/2020    Procedure: Modified neck dissection;  Surgeon: Jose Antonio Mckenna MD;  Location: [facility] OR     EXCISE LESION INTRAORAL Right 2/19/2020    Procedure: Wide Local Excision of Right Tongue Lesion;  Surgeon: Jose Antonio Mckenna MD;  Location: [facility] OR     LASER CO2 LARYNGOSCOPY N/A 10/6/2014    Procedure: LASER CO2 LARYNGOSCOPY;  Surgeon: Jose Antonio Mckenna MD;  Location: [facility] OR       Medications:  Current Outpatient Medications   Medication Sig Dispense Refill     pilocarpine (SALAGEN) 7.5 MG tablet Take 7.5 mg by mouth 2 times daily       acetaminophen (TYLENOL) 325 MG tablet Take 2 tablets (650 mg) by mouth every 4 hours as needed for other (multimodal surgical pain management along with NSAIDS and opioid medication as indicated based on pain control and physical function.) 60 tablet 0     HYDROmorphone (DILAUDID) 2 MG tablet Take 1 tablet (2 mg) by mouth every 4 hours as needed for moderate to severe pain 30 tablet 0     mineral oil-hydrophilic petrolatum (AQUAPHOR) external ointment Apply topically every 8 hours 420 g 0     ondansetron (ZOFRAN-ODT) 4 MG ODT tab Take 1 tablet (4 mg) by mouth every 6 hours as needed for nausea or vomiting 20 tablet 0     polyethylene glycol (MIRALAX) 17 GM/Dose powder Take 17 g by mouth daily 510 g 0     senna-docusate (SENOKOT-S/PERICOLACE) 8.6-50 MG tablet Take 1 tablet by mouth 2 times daily as needed for constipation 20 tablet 0       Allergies:  Allergies   Allergen Reactions     Percocet [Oxycodone-Acetaminophen] Nausea and Vomiting         Social History:  Social History     Tobacco Use     Smoking status: Never Smoker     Smokeless tobacco: Never Used   Substance Use Topics     Alcohol use: Not Currently     Alcohol/week: 0.0 standard drinks     Comment: none for 20 years     Drug use: No       ROS: 10 point ROS neg other than the symptoms noted above in the HPI.    Physical Exam:    Pulse 66   Temp 97.8  F (36.6  C) (Temporal)   Wt 84.8 kg (187 lb)   SpO2 98%   BMI 29.29 kg/m    Wt Readings from Last 3 Encounters:   01/05/21 84.8 kg (187 lb)   01/05/21 83.5 kg (184 lb)   12/15/20 84.4 kg (186 lb)        Constitutional:  The patient was accompanied by wife, well-groomed, and in no acute distress.     Skin: Normal:  warm and pink without rash    Neurologic: Alert and oriented x 3.  Grade IV weakness in right marginal nerve. Grade I of all other right facial nerve branches. No other cranial nerve deficits.    Psychiatric: The patient's affect was calm, cooperative, and appropriate.     Communication:  Normal; communicates verbally, normal voice quality.    Respiratory: Breathing comfortably without stridor or exertion of accessory muscles.    Head/Face:  Normocephalic and atraumatic.  No lesions or scars.    Oral Cavity: Normal floor of mouth, buccal mucosa, and palate.  Right lateral tongue scar present. Soft to palpation. Tenderness with palpation of right lateral base of tongue beyond palatoglossal fold. No mass palpated in this area. No bleeding elicited with palpation.    Oropharynx: Normal mucosa, palate symmetric with normal elevation. No abnormal lymph tissue in the oropharynx.    Neck: Right neck dissection scar present. Multiple scar deposits palpated along scar. Nontender to touch. No erythema.  Normal range of motion.   Lymphatic: There is no palpable lymphadenopathy in the neck. Firm lymphedema in right submental/submandibular area.     Results Reviewed:    Pathology 12/9/2020:  FINAL DIAGNOSIS:   A. Right level 1A, 1B and 2:         - Invasive moderately to poorly differentiated squamous cell   carcinoma involving fibroadipose tissue and   salivary gland tissue   - Maximal tumor dimension: 2.5 cm   - Surgical resection margins free of tumor   - No malignancy identified in three lymph nodes (0/3)     B. Level 1A:   - Metastatic squamous cell carcinoma in one of one lymph node (1/1)   - Tumor size: 0.5 cm   - Extranodal extension: Not identified     C. 2A and 3:   - No malignancy identified in eight lymph nodes (0/8)     D. Right level 3:   - No malignancy identified in three lymph nodes (0/3)     E. Right level 2A:   - No malignancy identified in one lymph node (0/1)     Assessment/Plan:  1. Squamous cell carcinoma of neck  Metastatic SCCa to the right neck s/p suprahyoid neck dissection 12/9/2020. Neck incision is well healed with scarring present along the incision. Discussed with patient that he can start to use vitamin E oil to massage into scar deposits twice daily.     Plan for adjuvant therapy given extranodal extension. Will order baseline swallow study and have patient meet with SLP to discuss swallowing therapy during radiation treatment.      Follow up with Dr. Mckenna in 5 weeks.    2. Squamous cell carcinoma of the lateral tongue  S/p partial glossectomy in Feb 2020. No evidence of recurrence at surgical site. Tenderness palpated in right base of tongue is not a new symptom per patient. No mass palpated. Upon independent review of PET/CT 11/24/2020, small focus note on right lateral tongue which seems to be more anterior to tenderness. Will consult with radiology regarding this finding.    Instructed patient to continue to monitor the area and to report any new pain, bleeding, or odynophagia.     3. Lymphedema of neck  Lymphedema of right neck. Discussed role of lymphedema therapy in the future but will hold off on ordering any therapy until completion of adjuvant treatment.    Patient will follow up in 5 weeks for continued  surveillance during treatment.    Dena Nguyen DNP, APRN, CNP  Otolaryngology  Head & Neck Surgery  [phone]        Again, thank you for allowing me to participate in the care of your patient.      Sincerely,    Jose Antonio Mckenna MD

## 2021-01-05 NOTE — LETTER
1/5/2021         RE: Reese Oakley  [address] [facility] [address]        Dear Colleague,    Thank you for referring your patient, Reese Oakley, to the [facility]. Please see a copy of my visit note below.    REASON FOR VISIT:    CANCER STAGE: Cancer Staging  No matching staging information was found for the patient.      HISTORY OF PRESENT ILLNESS:  Mr. 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  More recently he was noted on follow up scans to have enlarged R sided lymph nodes and biopsy proven recurrence in the R neck.  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  There was one additional focus of squamous cell carcinoma identified in an additional lymph node without MEL.  His case was discussed at multidisciplinary tumor board and he was recommended to have chemoradiation - due to MEL in the submandibular gland.     He is currently feeling well. He has no pain.  His surgical wound is healing nicely. He is eating and drinking well. He has not lost weight.  He is active when he can be.  He notes that last summer and even into winter that he was playing golf even up to December this year.  He has no shortness of breath and is fully active.  Ordinarily he would be bowling regularly in the winter, however, due to pandemic, he has been unable to do this.      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  He received 6 months of adjuvant FOLFOX chemotherapy at that time.  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     He has a historly of long-standing diabetes.  He  has been on insulin at some points, however, currently is taking metformin and glipizide. He notes that his PCP has decreased the metformoin dose due to increase in his creatinine recently. This has resulted in poorer glycemic control, but his creatinine has also improved. He has some neuropathy in his feet but he says this is mild.     With regards to his prior radiotherapy - he has minimal residual toxicity from this. He has no woody fibrosis in his neck. He has had dental issues, but mostly just cavities - and he regularly sees a dentist and just saw the dentist last week.  He has not had ORN.    He otherwise does not have a lot of xerostomia from prior therapy.     Past Medical History:   Diagnosis Date     Adenocarcinoma (H)     large instestine      Cancer of appendix (H)      Diabetes (H)      Gastro-oesophageal reflux disease      History of radiation therapy      Hoarseness      Hypertension      Social History     Socioeconomic History     Marital status:      Spouse name: Not on file     Number of children: Not on file     Years of education: Not on file     Highest education level: Not on file   Occupational History     Not on file   Social Needs     Financial resource strain: Not on file     Food insecurity     Worry: Not on file     Inability: Not on file     Transportation needs     Medical: Not on file     Non-medical: Not on file   Tobacco Use     Smoking status: Never Smoker     Smokeless tobacco: Never Used   Substance and Sexual Activity     Alcohol use: Not Currently     Alcohol/week: 0.0 standard drinks     Comment: none for 20 years     Drug use: No     Sexual activity: Never   Lifestyle     Physical activity     Days per week: Not on file     Minutes per session: Not on file     Stress: Not on file   Relationships     Social connections     Talks on phone: Not on file     Gets together: Not on file     Attends Mosque service: Not on file     Active member of club or organization:  Not on file     Attends meetings of clubs or organizations: Not on file     Relationship status: Not on file     Intimate partner violence     Fear of current or ex partner: Not on file     Emotionally abused: Not on file     Physically abused: Not on file     Forced sexual activity: Not on file   Other Topics Concern     Not on file   Social History Narrative     Not on file     He is  and lives with his wife (who accompanied him today).  He is a never smoker and does not drink alcohol.           Review Of Systems  10-point review of systems were negative except as noted in HPI.        EXAM:  There were no vitals taken for this visit.  GEN: alert and oriented x 3, nad  HEENT: perrla, eomi, sclera anicteric, oral mucosa moist without thrush  NECK: supple, no palpable LAD, surgical scar in R neck is well healed  HT: reg rate and rhythm, no murmurs  LUNGS: clear to auscultation bilaterally  ABD: soft, nt, nd, +bs x 4  EXT: no clubbing, cyanosis, or edema  NEURO: CN 2-12 intact, MS 5/5 b/l    Current Outpatient Medications   Medication Sig Dispense Refill     aspirin (ASA) 81 MG chewable tablet Take 81 mg by mouth daily       glipiZIDE (GLUCOTROL) 10 MG tablet Take 10 mg by mouth 2 times daily (before meals) 10 Mg in the am - 12.5MG  @@ DINNER       insulin glargine (LANTUS VIAL) 100 UNIT/ML vial Inject 7 Units Subcutaneous daily (with breakfast)       lisinopril (ZESTRIL) 10 MG tablet Take 10 mg by mouth daily       metFORMIN (GLUCOPHAGE) 1000 MG tablet Take 500 mg by mouth 2 times daily (with meals)       pilocarpine (SALAGEN) 7.5 MG tablet Take 7.5 mg by mouth 2 times daily       simvastatin (ZOCOR) 40 MG tablet Take 40 mg by mouth At Bedtime       acetaminophen (TYLENOL) 325 MG tablet Take 2 tablets (650 mg) by mouth every 4 hours as needed for other (multimodal surgical pain management along with NSAIDS and opioid medication as indicated based on pain control and physical function.) 60 tablet 0      HYDROmorphone (DILAUDID) 2 MG tablet Take 1 tablet (2 mg) by mouth every 4 hours as needed for moderate to severe pain 30 tablet 0     mineral oil-hydrophilic petrolatum (AQUAPHOR) external ointment Apply topically every 8 hours 420 g 0     ondansetron (ZOFRAN-ODT) 4 MG ODT tab Take 1 tablet (4 mg) by mouth every 6 hours as needed for nausea or vomiting 20 tablet 0     polyethylene glycol (MIRALAX) 17 GM/Dose powder Take 17 g by mouth daily 510 g 0     senna-docusate (SENOKOT-S/PERICOLACE) 8.6-50 MG tablet Take 1 tablet by mouth 2 times daily as needed for constipation 20 tablet 0           Recent Labs   Lab Test 12/10/20  1152 12/09/20  0720   HGB  --  13.5     --      @labrcent[na,potassium,chloride,co2,bun,cr@  No results for input(s): PROTTOTAL, ALBUMIN, BILITOTAL, AST, ALT, ALKPHOS in the last 31348 hours.      No results found for this or any previous visit (from the past 744 hour(s)).        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  It is somewhat unusual in a person without smoking and drinking history to have recurrence at this stage 5 years later.  We discussed the rationale for recommendation of chemoradiation.  While the risks are higher given that he has had prior radiotherapy to the neck, I have discussed with Dr. Herrera that she will plan to do a limited field radiotherapy.  We discussed the rationale for recommnedation of concurrent chemothrapy - though there is a small benefit, it is consistently better than radiotherapy alone.   I recommended to him that we try low-dose weekly cisplatin.  I do have some concerns about his ability to tolerate this - from the standpoint of his underlying renal insufficiency.  That said, it is likely our best therapy in terms of controlling his disease.  An alternative would be to use carboplatin/taxol - however, this is likely an inferior regimen from the standpoint of local control and also  has higher potential to worsen his pre-existing neuropathy.    Therefore, we discussed going forward with low-dose cisplatin with careful monitoring of his renal fxn and making a change if it proves that he cannot tolerate this.     We discussed other toxicity of cisplatin including neuropathy, ototoxicity, myelosuppresison, fatigue, and nausea.  I don't expect these to be severe given the low-dose weekly regimen, but it could be a concern.     We discussed the importance of hydration and the potential need for IVFs if we run into trouble with his kidneys or if he is unable to take oral hydration.      Nutrition - we discussed that sometimes we place a feeding tube prophylactically.  Again, I discussed with Dr. Herrera today, and since her field will be limited, we feel that he should be able to come through without a feeding tube, but we discussed that he may need an NG tube in the latter stages of radiation.  We discussed the importance omaintiaining swallow function during treatment.     Diabetes - I think it would be wise for him to come off of metformin as he is likely to have some fluctuation in his creatinine clearance during therapy and it could be dangerous.  Also, he will likely require closer monitoring of his glucose as his dietary intake is likely to flucuate considerably and may effect his blood sugars. [facility] so I have asked him to call his doctor there and I would be happy to discuss with him/her regarding diabetes management.  It may be easier for him to see one of our endocrinologists here.  It may be safer to manage him with insulin during therapy.     Port - I discussed with him that port would be optional.  He has had a port before with his prior chemotherapy and he would like one again. We will try to set this up prior to starting treatment. We discussed that if it could not be done prior to starting, that we would do the first week by peripheral IV.      Plan:  1. Start weekly cisplatin concurrent with radiation therapy tentatively for 1/18.   2. Weekly roxanna appt during radiotherapy - to start on day 1  3. PORT placement prior to 1/18 if possible.   4. Chemotherapy teaching by Blanca Ferrell, RAISA coordinator.     I spent 60 minutes with the patient.  >50% of the time was spent in counseling and coordination of care.  I spent an additional 30 minutes today in discussion with other providers and in reviewing outside records, prior therapy, and imaging.     Toni Mark   of Medicine  Division of Hematology, Oncology, and Transplantation

## 2021-01-05 NOTE — NURSING NOTE
Chief Complaint   Patient presents with     RECHECK     follow up        Pulse 66, temperature 97.8  F (36.6  C), temperature source Temporal, weight 84.8 kg (187 lb), SpO2 98 %.    Mary Pascal, EMT

## 2021-01-05 NOTE — NURSING NOTE
"Oncology Rooming Note    January 5, 2021 3:02 PM   Reese Oakley is a 70 year old male who presents for:    Chief Complaint   Patient presents with     Consult     Tonsil cancer      Initial Vitals: There were no vitals taken for this visit. Estimated body mass index is 29.29 kg/m  as calculated from the following:    Height as of 12/15/20: 1.702 m (5' 7\").    Weight as of an earlier encounter on 1/5/21: 84.8 kg (187 lb). There is no height or weight on file to calculate BSA.  Data Unavailable Comment: Data Unavailable   No LMP for male patient.  Allergies reviewed: Yes  Medications reviewed: Yes    Medications: Medication refills not needed today.  Pharmacy name entered into EPIC: [id number] [place] [address]    Clinical concerns: ERIC Ruggiero CMA              "

## 2021-01-05 NOTE — PROGRESS NOTES
HPI  INITIAL PATIENT ASSESSMENT    Diagnosis: Cancer    Prior radiation therapy: See records    Prior chemotherapy: See records    Prior hormonal therapy:No    Pain Eval:  Denies    Psychosocial  Living arrangements: Lives with wife  Fall Risk: independent   referral needs: Not needed    Advanced Directive: Yes - Location: at home  Implantable Cardiac Device? No    Nurse face-to-face time: Level 5:  over 15 min face to face time    Review of Systems   Constitutional: Negative for chills, diaphoresis, fever, malaise/fatigue and weight loss.   HENT: Negative for congestion, ear discharge, ear pain, hearing loss, nosebleeds, sinus pain, sore throat and tinnitus.    Eyes: Negative for blurred vision, double vision, photophobia, pain, discharge and redness.   Respiratory: Negative for cough, hemoptysis, sputum production, shortness of breath, wheezing and stridor.    Cardiovascular: Negative for chest pain, palpitations, orthopnea, claudication and leg swelling.   Gastrointestinal: Negative for abdominal pain, blood in stool, constipation, diarrhea, heartburn, melena, nausea and vomiting.   Genitourinary: Negative for dysuria, flank pain, frequency, hematuria and urgency.   Musculoskeletal: Negative for back pain, falls, joint pain, myalgias and neck pain.   Skin: Negative for itching and rash.   Neurological: Negative for dizziness, tingling, tremors, sensory change, speech change, focal weakness, seizures, loss of consciousness, weakness and headaches.   Endo/Heme/Allergies: Negative for environmental allergies and polydipsia. Does not bruise/bleed easily.   Psychiatric/Behavioral: Negative for depression, hallucinations, memory loss, substance abuse and suicidal ideas. The patient is not nervous/anxious and does not have insomnia.

## 2021-01-06 NOTE — PROGRESS NOTES
REASON FOR VISIT:    CANCER STAGE: Cancer Staging  No matching staging information was found for the patient.      HISTORY OF PRESENT ILLNESS:  Mr. 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  More recently he was noted on follow up scans to have enlarged R sided lymph nodes and biopsy proven recurrence in the R neck.  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  There was one additional focus of squamous cell carcinoma identified in an additional lymph node without MEL.  His case was discussed at multidisciplinary tumor board and he was recommended to have chemoradiation - due to MEL in the submandibular gland.     He is currently feeling well. He has no pain.  His surgical wound is healing nicely. He is eating and drinking well. He has not lost weight.  He is active when he can be.  He notes that last summer and even into winter that he was playing golf even up to December this year.  He has no shortness of breath and is fully active.  Ordinarily he would be bowling regularly in the winter, however, due to pandemic, he has been unable to do this.      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  He received 6 months of adjuvant FOLFOX chemotherapy at that time.  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     He has a historly of long-standing diabetes.  He has been on insulin at some points, however, currently is taking metformin and glipizide. He notes that his PCP has decreased the metformoin dose due to increase in his creatinine recently. This has resulted in poorer glycemic control, but his creatinine has also  improved. He has some neuropathy in his feet but he says this is mild.     With regards to his prior radiotherapy - he has minimal residual toxicity from this. He has no woody fibrosis in his neck. He has had dental issues, but mostly just cavities - and he regularly sees a dentist and just saw the dentist last week.  He has not had ORN.    He otherwise does not have a lot of xerostomia from prior therapy.     Past Medical History:   Diagnosis Date     Adenocarcinoma (H)     large instestine      Cancer of appendix (H)      Diabetes (H)      Gastro-oesophageal reflux disease      History of radiation therapy      Hoarseness      Hypertension      Social History     Socioeconomic History     Marital status:      Spouse name: Not on file     Number of children: Not on file     Years of education: Not on file     Highest education level: Not on file   Occupational History     Not on file   Social Needs     Financial resource strain: Not on file     Food insecurity     Worry: Not on file     Inability: Not on file     Transportation needs     Medical: Not on file     Non-medical: Not on file   Tobacco Use     Smoking status: Never Smoker     Smokeless tobacco: Never Used   Substance and Sexual Activity     Alcohol use: Not Currently     Alcohol/week: 0.0 standard drinks     Comment: none for 20 years     Drug use: No     Sexual activity: Never   Lifestyle     Physical activity     Days per week: Not on file     Minutes per session: Not on file     Stress: Not on file   Relationships     Social connections     Talks on phone: Not on file     Gets together: Not on file     Attends Muslim service: Not on file     Active member of club or organization: Not on file     Attends meetings of clubs or organizations: Not on file     Relationship status: Not on file     Intimate partner violence     Fear of current or ex partner: Not on file     Emotionally abused: Not on file     Physically abused: Not on file      Forced sexual activity: Not on file   Other Topics Concern     Not on file   Social History Narrative     Not on file     He is  and lives with his wife (who accompanied him today).  He is a never smoker and does not drink alcohol.           Review Of Systems  10-point review of systems were negative except as noted in HPI.        EXAM:  There were no vitals taken for this visit.  GEN: alert and oriented x 3, nad  HEENT: perrla, eomi, sclera anicteric, oral mucosa moist without thrush  NECK: supple, no palpable LAD, surgical scar in R neck is well healed  HT: reg rate and rhythm, no murmurs  LUNGS: clear to auscultation bilaterally  ABD: soft, nt, nd, +bs x 4  EXT: no clubbing, cyanosis, or edema  NEURO: CN 2-12 intact, MS 5/5 b/l    Current Outpatient Medications   Medication Sig Dispense Refill     aspirin (ASA) 81 MG chewable tablet Take 81 mg by mouth daily       glipiZIDE (GLUCOTROL) 10 MG tablet Take 10 mg by mouth 2 times daily (before meals) 10 Mg in the am - 12.5MG  @@ DINNER       insulin glargine (LANTUS VIAL) 100 UNIT/ML vial Inject 7 Units Subcutaneous daily (with breakfast)       lisinopril (ZESTRIL) 10 MG tablet Take 10 mg by mouth daily       metFORMIN (GLUCOPHAGE) 1000 MG tablet Take 500 mg by mouth 2 times daily (with meals)       pilocarpine (SALAGEN) 7.5 MG tablet Take 7.5 mg by mouth 2 times daily       simvastatin (ZOCOR) 40 MG tablet Take 40 mg by mouth At Bedtime       acetaminophen (TYLENOL) 325 MG tablet Take 2 tablets (650 mg) by mouth every 4 hours as needed for other (multimodal surgical pain management along with NSAIDS and opioid medication as indicated based on pain control and physical function.) 60 tablet 0     HYDROmorphone (DILAUDID) 2 MG tablet Take 1 tablet (2 mg) by mouth every 4 hours as needed for moderate to severe pain 30 tablet 0     mineral oil-hydrophilic petrolatum (AQUAPHOR) external ointment Apply topically every 8 hours 420 g 0     ondansetron (ZOFRAN-ODT) 4  MG ODT tab Take 1 tablet (4 mg) by mouth every 6 hours as needed for nausea or vomiting 20 tablet 0     polyethylene glycol (MIRALAX) 17 GM/Dose powder Take 17 g by mouth daily 510 g 0     senna-docusate (SENOKOT-S/PERICOLACE) 8.6-50 MG tablet Take 1 tablet by mouth 2 times daily as needed for constipation 20 tablet 0           Recent Labs   Lab Test 12/10/20  1152 12/09/20  0720   HGB  --  13.5     --      @labrcent[na,potassium,chloride,co2,bun,cr@  No results for input(s): PROTTOTAL, ALBUMIN, BILITOTAL, AST, ALT, ALKPHOS in the last 67647 hours.      No results found for this or any previous visit (from the past 744 hour(s)).        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  It is somewhat unusual in a person without smoking and drinking history to have recurrence at this stage 5 years later.  We discussed the rationale for recommendation of chemoradiation.  While the risks are higher given that he has had prior radiotherapy to the neck, I have discussed with Dr. Herrera that she will plan to do a limited field radiotherapy.  We discussed the rationale for recommnedation of concurrent chemothrapy - though there is a small benefit, it is consistently better than radiotherapy alone.   I recommended to him that we try low-dose weekly cisplatin.  I do have some concerns about his ability to tolerate this - from the standpoint of his underlying renal insufficiency.  That said, it is likely our best therapy in terms of controlling his disease.  An alternative would be to use carboplatin/taxol - however, this is likely an inferior regimen from the standpoint of local control and also has higher potential to worsen his pre-existing neuropathy.    Therefore, we discussed going forward with low-dose cisplatin with careful monitoring of his renal fxn and making a change if it proves that he cannot tolerate this.     We discussed other toxicity of  cisplatin including neuropathy, ototoxicity, myelosuppresison, fatigue, and nausea.  I don't expect these to be severe given the low-dose weekly regimen, but it could be a concern.     We discussed the importance of hydration and the potential need for IVFs if we run into trouble with his kidneys or if he is unable to take oral hydration.      Nutrition - we discussed that sometimes we place a feeding tube prophylactically.  Again, I discussed with Dr. Herrera today, and since her field will be limited, we feel that he should be able to come through without a feeding tube, but we discussed that he may need an NG tube in the latter stages of radiation.  We discussed the importance omaintiaining swallow function during treatment.     Diabetes - I think it would be wise for him to come off of metformin as he is likely to have some fluctuation in his creatinine clearance during therapy and it could be dangerous.  Also, he will likely require closer monitoring of his glucose as his dietary intake is likely to flucuate considerably and may effect his blood sugars. [facility] so I have asked him to call his doctor there and I would be happy to discuss with him/her regarding diabetes management.  It may be easier for him to see one of our endocrinologists here.  It may be safer to manage him with insulin during therapy.     Port - I discussed with him that port would be optional.  He has had a port before with his prior chemotherapy and he would like one again. We will try to set this up prior to starting treatment. We discussed that if it could not be done prior to starting, that we would do the first week by peripheral IV.     Plan:  1. Start weekly cisplatin concurrent with radiation therapy tentatively for 1/18.   2. Weekly roxanna appt during radiotherapy - to start on day 1  3. PORT placement prior to 1/18 if possible.   4. Chemotherapy teaching by Blanca Ferrell, RAISA coordinator.      I spent 60 minutes with the patient.  >50% of the time was spent in counseling and coordination of care.  I spent an additional 30 minutes today in discussion with other providers and in reviewing outside records, prior therapy, and imaging.     Toni Mark   of Medicine  Division of Hematology, Oncology, and Transplantation

## 2021-01-07 NOTE — PROGRESS NOTES
RADIATION ONCOLOGY CONSULTATION  DATE OF VISIT: 1/5/2021    Reese Oakley  MRN: [id number]    Mr. Reese Oakley was seen in consultation at the request of Dr. 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    HISTORY OF PRESENT ILLNESS:  Mr. 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  He underwent direct laryngoscopy with CO2 laser excision by Dr. Mckenna followed by adjuvant radiation therapy at [facility] by Dr. Polo completed in January 2015.  He received 70 Gy to the right oropharynx and ipsilateral neck.  More recently, he was diagnosed with a pathologic T2 (clinical N0) squamous cell carcinoma of the right lateral oral tongue.  PET/CT on 1/27/2020 showed no evidence of metastatic disease.  He underwent partial glossectomy with primary closure by Dr. Mckenna on 2/19/2020.  Pathology showed a 1.5 cm invasive, moderately differentiated, keratinizing squamous cell carcinoma, depth of invasion was 0.7 cm with multifocal perineural invasion, negative angiolymphatic invasion, and negative resection margins.  Because the right neck had been previously treated with radiation, it was decided to follow the neck with close observation imaging.    Mr. Oakley returned in October 2020 with a new right submandibular mass that did not improve with antibiotics.  PET CT scan on 11/24/2020 showed a new heterogeneously enhancing, irregular mass in the right submandibular region measuring approximately 15 x 23 mm demonstrating hypermetabolism (max SUV 11.1).  The mass appeared to arise within a mostly fat replaced right submandibular gland.  No pathologically enlarged lymph nodes were evident.  Additionally, there were scattered sub-6 mm pulmonary nodules in both lungs there were unchanged since at least 1/27/2020.  He underwent a  right modified neck dissection of levels I through III on 12/9/2020.  Pathology reported invasive moderately to poorly differentiated squamous cell carcinoma involving fibroadipose tissue and salivary gland tissue measuring 2.5 cm in maximal dimension with negative resection margins.  One lymph node in level 1A had 1 focus of metastatic squamous cell carcinoma measuring 0.5 cm without extranodal extension.  The tumor cells were positive for p40 and cytokeratin consistent with squamous cell carcinoma.  The tumor cells also show high PD-L1 expression.  The patient was presented at multidisciplinary tumor board with recommendation for chemoradiation due to extranodal extension of the node anterior to the submandibular gland which extended into fibroadipose tissue.    Overall, he is doing well following surgery.  He is eating normally and denies any pain.  He also denies any unexplained weight loss, hoarseness, shortness of breath, headaches, nausea, vomiting, fever, chills.    PAST MEDICAL HISTORY:   1.  Squamous cell carcinoma of the right oropharynx, 4/28/2015, status post transoral resection followed by radiation therapy  2.  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  Squamous cell carcinoma of the right oral tongue, 1/31/2020, status post surgical excision  4.  Diabetes mellitus, type II  5.  Gastroesophageal reflux disease  6.  Hypertension    PAST SURGICAL HISTORY:   Past Surgical History:   Procedure Laterality Date     APPENDECTOMY       COLECTOMY      Right     DISSECTION RADICAL NECK MODIFIED Right 12/9/2020    Procedure: Modified neck dissection;  Surgeon: Jose Antonio Mckenna MD;  Location: [facility] OR     EXCISE LESION INTRAORAL Right 2/19/2020    Procedure: Wide Local Excision of Right Tongue Lesion;  Surgeon: Jose Antonio Mckenna MD;  Location: [facility] OR     LASER CO2 LARYNGOSCOPY N/A 10/6/2014    Procedure: LASER CO2 LARYNGOSCOPY;  Surgeon:  Jose Antonio Mckenna MD;  Location: [facility] OR     CHEMOTHERAPY HISTORY: FOLFOX chemotherapy for appendiceal adenocarcinoma    PAST RADIATION THERAPY HISTORY: Head and neck radiation, 7000 cGy completed January 2015    IMPLANTABLE CARDIAC DEVICE: None    MEDICATIONS:  Medication Sig     acetaminophen (TYLENOL) 325 MG tablet Take 2 tablets (650 mg) by mouth every 4 hours as needed for other (multimodal surgical pain management along with NSAIDS and opioid medication as indicated based on pain control and physical function.)     aspirin (ASA) 81 MG chewable tablet Take 81 mg by mouth daily     glipiZIDE (GLUCOTROL) 10 MG tablet Take 10 mg by mouth 2 times daily (before meals) 10 Mg in the am - 12.5MG  @@ DINNER     HYDROmorphone (DILAUDID) 2 MG tablet Take 1 tablet (2 mg) by mouth every 4 hours as needed for moderate to severe pain     insulin glargine (LANTUS VIAL) 100 UNIT/ML vial Inject 7 Units Subcutaneous daily (with breakfast)     lisinopril (ZESTRIL) 10 MG tablet Take 10 mg by mouth daily     metFORMIN (GLUCOPHAGE) 1000 MG tablet Take 500 mg by mouth 2 times daily (with meals)     mineral oil-hydrophilic petrolatum (AQUAPHOR) external ointment Apply topically every 8 hours     ondansetron (ZOFRAN-ODT) 4 MG ODT tab Take 1 tablet (4 mg) by mouth every 6 hours as needed for nausea or vomiting     pilocarpine (SALAGEN) 7.5 MG tablet Take 7.5 mg by mouth 2 times daily     polyethylene glycol (MIRALAX) 17 GM/Dose powder Take 17 g by mouth daily     senna-docusate (SENOKOT-S/PERICOLACE) 8.6-50 MG tablet Take 1 tablet by mouth 2 times daily as needed for constipation     simvastatin (ZOCOR) 40 MG tablet Take 40 mg by mouth At Bedtime        ALLERGIES:   Allergies as of 01/05/2021 - Reviewed 01/05/2021   Allergen Reaction Noted     Percocet [oxycodone-acetaminophen] Nausea and Vomiting 12/07/2020     SOCIAL HISTORY:  Socioeconomic History     Marital status:      Spouse name: Not on file     Number of children: Not  on file     Years of education: Not on file     Highest education level: Not on file   Occupational History     Not on file   Social Needs     Financial resource strain: Not on file     Food insecurity     Worry: Not on file     Inability: Not on file     Transportation needs     Medical: Not on file     Non-medical: Not on file   Tobacco Use     Smoking status: Never Smoker     Smokeless tobacco: Never Used   Substance and Sexual Activity     Alcohol use: Not Currently     Alcohol/week: 0.0 standard drinks     Comment: none for 20 years     Drug use: No     Sexual activity: Never   Lifestyle     Physical activity     Days per week: Not on file     Minutes per session: Not on file     Stress: Not on file     FAMILY HISTORY:   Family History   Problem Relation Age of Onset     Heart Disease Father      Diabetes Mother      Diabetes Brother      REVIEW OF SYMPTOMS:  A full 14-point review of systems was performed. All pertinent positives and negatives are noted in the history of present illness.    FUNCTIONAL STATUS: ECO    PHYSICAL EXAMINATION:    General: Well-developed, well-nourished, in no acute distress, mild dysarthria secondary to tongue surgery.  BP (!) 187/72   Wt 83.5 kg (184 lb)   BMI 28.82 kg/m  , pain 0/10  HEENT: Normocephalic, atraumatic, oral cavity with well-healed right lateral oral tongue excision, no mucosal lesions, tongue soft to palpation  Neck: Supple, no palpable adenopathy of the left neck, well-healed right neck surgical incision without palpable nodularity, no lymphedema  Respiratory: Breathing comfortably on room air, no wheezing  Cardiovascular: Regular rate and rhythm, no murmurs, extremities warm and well-perfused  Extremities: No deformities, no cyanosis, no edema  Neuro: Cranial nerves II through XII are grossly intact, motor strength 4/5 all 4 extremities, gait normal  Skin: No visible rash minimal radiation fibrosis of the right neck  Lymph: No palpable cervical,  supraclavicular or axillary adenopathy  Psych: Mood appropriate    LABORATORY: No new studies    IMAGING/PATH: Please refer to history of present illness for discussion of imaging and pathology.    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  Given the recurrent nature of disease, he is an appropriate candidate for chemoradiation to achieve improved local tumor control.  Mr. Oakley has previously received radiation to the right oropharynx and right neck in 2014.  Review of his previous radiation treatment fused on his current preoperative PET scan shows that areas of concern previously received approximately 50 Gy to the right neck.  Given the time interval of 5 years from completion of radiation and the current  risk of local regional relapse, he is an appropriate candidate for reirradiation to the right neck with concurrent chemotherapy for chemosensitization.  I reviewed the pathologic findings of his recent right neck surgery that are concerning for increased risk of recurrence and also discussed, in detail, the increased potential morbidity of retreatment with radiation to the right neck.  Patient asked several questions which were answered such that he verbalized understanding of the issues.  Informed consent was obtained.  Given that the planned treatment field will include the right neck only and every effort will be made to minimize the extent of mucosal radiation, I do not feel that Mr. Oakley requires a prophylactic feeding tube.  Should he begin to lose weight due to lack of oral intake, placement of the tube at that time can be considered.  He has also seen his dentist who will be providing fluoride prophylaxis.    We will plan treatment  simulation for January 13 which will give him time to have dental trays completed by his dentist.  We will consider obtaining a CT with IV contrast to aid in treatment planning pending serum creatinine which was drawn during the clinic visit.    Thank you for allowing us to participate in this patient's care.  Please feel free to call with any questions or concerns. This note was dictated with voice recognition software and then edited. Please excuse any unintentional errors.  Over 60 minutes was spent with the patient during this visit with greater than 50% of times in coordination of care.    Jennifer Herrera MD  Department of Radiation Oncology  [facility]  Patient Care Team:  Chilango Carrillo MD as PCP - General (Otolaryngology)  Donna Gallagher PA-C as Assigned Pediatric Specialist Provider  Jose Antonio Mckenna MD as Assigned Surgical Provider  Jose Antonio Mckenna MD as Referring Physician (Otolaryngology)  Toni Mark DO as MD (Medical Oncology)  Blanca Ferrell, RN as Specialty Care Coordinator (Hematology & Oncology)

## 2021-01-09 NOTE — PROGRESS NOTES
Addendum:  LABORATORY:  Lab Results   Component Value Date    CR 1.34 (H) 01/05/2021     Lab Results   Component Value Date     01/05/2021    POTASSIUM 4.2 01/05/2021    CHLORIDE 108 01/05/2021    CO2 27 01/05/2021     (H) 01/05/2021

## 2021-01-13 NOTE — PROGRESS NOTES
Radiation Therapy Patient Education    Person involved with teaching: Patient    Patient educational needs for self management of treatment-related side effects assessment completed.  [facility] Patient Ed tab contains Patient Learning Assessment    Education Materials Given  Radiation Therapy for Head and Neck    Educational Topics Discussed  Side effects expected, Pain management, Skin care, Nutrition and weight loss and When to call MD/RN    Response To Teaching  Verbalizes understanding    GYN Only  Vaginal Dilator-given and educated: N/A    Referrals sent: Dental, Speech and Swallowing and Nutrition    Chemotherapy?  Yes: Notified medical oncology of 01/25/21 start date

## 2021-01-13 NOTE — PROGRESS NOTES
A radiation therapy treatment planning simulation was performed.  Please see the [employer] record for documentation.    Jennifer Herrera MD  Radiation Oncology

## 2021-01-15 NOTE — BRIEF OP NOTE
[facility]    Brief Operative Note    Pre-operative diagnosis: Cancer of tonsil (H) [C09.9]  Post-operative diagnosis Same as pre-operative diagnosis    Procedure: Procedure(s):  CHEST INSERTION, VASCULAR ACCESS PORT @0900  Surgeon: Surgeon(s) and Role:     * Tony Hopkins MD - Primary  Anesthesia: Monitor Anesthesia Care   Estimated blood loss: Minimal  Drains: None  Specimens: * No specimens in log *  Findings:   None.  Complications: None.  Implants:   Implant Name Type Inv. Item Serial No.  Lot No. LRB No. Used Action   CATH PORT POWERPORT CLEARVUE SLIM 6FR [id number] Port CATH PORT POWERPORT CLEARVUE SLIM [id number]  [facility] [id number] Right 1 Implanted       6 [place] 27 cm single lumen port placed via right internal jugular vein. Heparin locked and ready for immediate use.

## 2021-01-15 NOTE — H&P
Patient presents for port placement.    No interval change to H&P.    Images reviewed.    Consent obtained.

## 2021-01-15 NOTE — DISCHARGE INSTRUCTIONS
A collaboration between [facility] and [facility]  Experts in minimally invasive, targeted treatments performed using imaging guidance    Venous Access Device,  Port Catheter or Tunneled or Non-Tunneled Central Line Placement    Today you had a procedure today to install a venous access device; either a tunneled central vein catheter or a subcutaneous port catheter.    After you go home:  - Drink plenty of fluids.  Generally 6-8 (8 ounce) glasses a day is recommended.  - Resume your regular diet unless otherwise ordered by a medical provider.  - Keep any applied tape/gauze dressings clean and dry.  Change tape/gauze dressings if they get wet or soiled.  - You may shower the following day after procedure, however cover and protect from moisture any tape/gauze dressings.  You may let water hit and run over dried skin glue, but do not scrub.  Pat the area dry after showering.  - Port placement incisions are closed with absorbable suture, meaning they do not need to be removed at a later date, and a topical skin adhesive (skin glue).  This glue will wear off in 7-14 days.  Do not remove before this time.  If 14 days have passed and residual glue is present, you may gently remove it.  - Do not apply gels, lotions, or ointments to the glue site for the first 10 days as this may cause the glue to prematurely soften and fail.  - Do not perform strenuous activities or lift greater than 10 pounds for the next three days.  - If there is bleeding or oozing from the procedure site, apply firm pressure to the area for 5-10 minutes.  If the bleeding continues seek medical advice at the numbers below.  - Mild procedure site discomfort can be treated with an ice pack and over-the-counter pain relievers.        For 24 hours after any sedation used:  - Relax and take it easy.  No strenuous activities.  - Do not drive or operate machines at home or at work.  - No alcohol  consumption.  - Do not make any important or legal decisions.    Call our Interventional Radiology (IR) service if:  - If you start bleeding from the procedure site.  If you do start to bleed from the site, lie down and hold some pressure on the site.  Our radiology provider can help you decide if you need to return to the hospital.  - If you have new or worsening pain related to the procedure.  - If you have concerning swelling at the procedure site.  - If you develop persistent nausea or vomiting.  - If you develop hives or a rash or any unexplained itching.  - If you have a fever of greater than 100.5  F and chills in the first 5 days after procedure.  - Any other concerns related to your procedure.      [facility]  Interventional Radiology (IR)  [address]  [address] [facility] [place] [address]    Contact Number:  [phone]  (IR control desk)  - Monday - Friday 8:00 am - 4:30 pm    After hours for urgent concerns:  [phone]  - After 4:30 pm Monday - Friday, Weekends and Holidays.   - Ask for Interventional Radiology on-call.  Someone is available 24 hours a day.  - [facility] toll free number:  [phone]

## 2021-01-15 NOTE — ANESTHESIA CARE TRANSFER NOTE
Patient: Reese Oakley    Procedure(s):  CHEST INSERTION, VASCULAR ACCESS PORT @0900    Diagnosis: Cancer of tonsil (H) [C09.9]  Diagnosis Additional Information: No value filed.    Anesthesia Type:   MAC     Note:  Airway :Room Air  Patient transferred to:Phase II  Comments: VSS and WNL, comfortable, no PONV, report to Dl KLINEHandoff Report: Identifed the Patient, Identified the Reponsible Provider, Reviewed the pertinent medical history, Discussed the surgical course, Reviewed Intra-OP anesthesia mangement and issues during anesthesia, Set expectations for post-procedure period and Allowed opportunity for questions and acknowledgement of understanding      Vitals: (Last set prior to Anesthesia Care Transfer)    CRNA VITALS  1/15/2021 0852 - 1/15/2021 0927      1/15/2021             Resp Rate (set):  10                Electronically Signed By: KARINA Lara CRNA  January 15, 2021  9:27 AM

## 2021-01-15 NOTE — ANESTHESIA PREPROCEDURE EVALUATION
"Anesthesia Pre-Procedure Evaluation    Patient: Reese Oakley   MRN:     [id number] Gender:   male   Age:    70 year old :      1950        Preoperative Diagnosis: Cancer of tonsil (H) [C09.9]   Procedure(s):  CHEST INSERTION, VASCULAR ACCESS PORT @0900     LABS:  CBC:   Lab Results   Component Value Date    HGB 13.5 2020    HGB 12.1 (L) 10/06/2014     12/10/2020     BMP:   Lab Results   Component Value Date     2021     2020    POTASSIUM 4.2 2021    POTASSIUM 4.3 2020    CHLORIDE 108 2021    CHLORIDE 106 2020    CO2 27 2021    CO2 23 2020    BUN 15 2021    BUN 19 2020    CR 1.34 (H) 2021    CR 1.30 (H) 2020     (H) 2021     (H) 2020     COAGS: No results found for: PTT, INR, FIBR  POC:   Lab Results   Component Value Date     (H) 2020     OTHER:   Lab Results   Component Value Date    A1C 7.9 (H) 2020    ELEUTERIO 9.1 2021        Preop Vitals    BP Readings from Last 3 Encounters:   01/15/21 139/71   21 (!) 187/72   20 (!) 146/58    Pulse Readings from Last 3 Encounters:   01/15/21 60   21 66   12/15/20 56      Resp Readings from Last 3 Encounters:   01/15/21 16   20 16   20 19    SpO2 Readings from Last 3 Encounters:   01/15/21 98%   21 98%   12/15/20 98%      Temp Readings from Last 1 Encounters:   01/15/21 35.7  C (96.2  F) (Temporal)    Ht Readings from Last 1 Encounters:   01/15/21 1.702 m (5' 7\")      Wt Readings from Last 1 Encounters:   01/15/21 83.5 kg (184 lb)    Estimated body mass index is 28.82 kg/m  as calculated from the following:    Height as of this encounter: 1.702 m (5' 7\").    Weight as of this encounter: 83.5 kg (184 lb).     LDA:  Peripheral IV 20 Left Hand (Active)   Number of days: 37       Peripheral IV 20 Left Wrist (Active)   Number of days: 37       Closed/Suction Drain 2 Right;Posterior " Other (Comment) Bulb (Active)   Number of days: 37        Past Medical History:   Diagnosis Date     Adenocarcinoma (H)     large instestine      Cancer of appendix (H)      Diabetes (H)      Gastro-oesophageal reflux disease      History of radiation therapy      Hoarseness      Hypertension       Past Surgical History:   Procedure Laterality Date     APPENDECTOMY       COLECTOMY      Right     DISSECTION RADICAL NECK MODIFIED Right 12/9/2020    Procedure: Modified neck dissection;  Surgeon: Jose Antonio Mckenna MD;  Location: [facility] OR     EXCISE LESION INTRAORAL Right 2/19/2020    Procedure: Wide Local Excision of Right Tongue Lesion;  Surgeon: Jose Antonio Mckenna MD;  Location: [facility] OR     LASER CO2 LARYNGOSCOPY N/A 10/6/2014    Procedure: LASER CO2 LARYNGOSCOPY;  Surgeon: Jose Antonio Mckenna MD;  Location: [facility] OR      Allergies   Allergen Reactions     Percocet [Oxycodone-Acetaminophen] Nausea and Vomiting        Anesthesia Evaluation     . Pt has had prior anesthetic.            ROS/MED HX    ENT/Pulmonary: Comment: Vocal cord mass, hoarseness      Neurologic:  - neg neurologic ROS     Cardiovascular:     (+) Dyslipidemia, hypertension----. : . . . :. . Previous cardiac testing date:results:date: results:ECG reviewed date: results: date: results:          METS/Exercise Tolerance:  >4 METS   Hematologic:  - neg hematologic  ROS       Musculoskeletal:  - neg musculoskeletal ROS       GI/Hepatic:     (+) GERD       Renal/Genitourinary:  - ROS Renal section negative       Endo:     (+) type II DM .   (-) Type I DM   Psychiatric:  - neg psychiatric ROS       Infectious Disease:         Malignancy:   (+) Malignancy History of GI and Skin          Other:                         PHYSICAL EXAM:   Mental Status/Neuro: A/A/O   Airway: Facies: Feasible  Mallampati: I  Mouth/Opening: Full  TM distance: > 6 cm  Neck ROM: Full   Respiratory: Auscultation: CTAB     Resp. Rate: Normal     Resp. Effort: Normal      CV: Rhythm:  Regular  Rate: Age appropriate  Heart: Normal Sounds  Edema: None   Comments:      Dental: Normal Dentition                Assessment:   ASA SCORE: 2    H&P: History and physical reviewed and following examination; no interval change.   Smoking Status:  Non-Smoker/Unknown   NPO Status: NPO Appropriate     Plan:   Anes. Type:  MAC   Pre-Medication: Acetaminophen   Induction:  N/a   Airway: Native Airway   Access/Monitoring: PIV   Maintenance: N/a     Postop Plan:   Postop Pain: None  Postop Sedation/Airway: Not planned  Disposition: Outpatient     PONV Management:   Adult Risk Factors:, Non-Smoker   Prevention: Ondansetron     CONSENT: Direct conversation   Plan and risks discussed with: Patient   Blood Products: N/a                   Lm Amador DO

## 2021-01-15 NOTE — PROGRESS NOTES
Chemoteach done today.  Patient starts new regimen of     1. Introduction-roles of MD, DIMA/NP, RNCC reviewed in detail.  2. Contact information given.  3. Education done.  4. Infection prevention, neutropenia and when to call RNCC reviewed in detail.  5. Port  information given.  6. Schedule reviewed.    Questions asked and answered.  Family indicated understanding.    Blanca Ferrell MSN, RN, OCN  RN Care Coordinator  [facility]  [phone]

## 2021-01-15 NOTE — ANESTHESIA POSTPROCEDURE EVALUATION
Anesthesia POST Procedure Evaluation    Patient: Reese Oakley   MRN:     [id number] Gender:   male   Age:    70 year old :      1950        Preoperative Diagnosis: Cancer of tonsil (H) [C09.9]   Procedure(s):  CHEST INSERTION, VASCULAR ACCESS PORT @0900   Postop Comments: No value filed.     Anesthesia Type: MAC       Disposition: Outpatient   Postop Pain Control: Uneventful            Sign Out: Well controlled pain   PONV: No   Neuro/Psych: Uneventful            Sign Out: Acceptable/Baseline neuro status   Airway/Respiratory: Uneventful            Sign Out: Acceptable/Baseline resp. status   CV/Hemodynamics: Uneventful            Sign Out: Acceptable CV status   Other NRE: NONE   DID A NON-ROUTINE EVENT OCCUR? No         Last Anesthesia Record Vitals:  CRNA VITALS  1/15/2021 0852 - 1/15/2021 0952      1/15/2021             Resp Rate (set):  10          Last PACU Vitals:  Vitals Value Taken Time   BP 84/56 01/15/21 0925   Temp 36.1  C (97  F) 01/15/21 0925   Pulse 73 01/15/21 0925   Resp 16 01/15/21 0925   SpO2 96 % 01/15/21 0925   Temp src     NIBP     Pulse     SpO2     Resp     Temp     Ht Rate     Temp 2           Electronically Signed By: Lm Amador DO, January 15, 2021, 12:47 PM

## 2021-01-19 NOTE — PROGRESS NOTES
"Kenrick is a 70 year old who is being evaluated via a billable telephone visit.      What phone number would you like to be contacted at? [phone]  How would you like to obtain your AVS? 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  More recently he was noted on follow up scans to have enlarged R sided lymph nodes and biopsy proven recurrence in the R neck.  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  There was one additional focus of squamous cell carcinoma identified in an additional lymph node without MEL.  His case was discussed at multidisciplinary tumor board and he was recommended to have chemoradiation - due to MEL in the submandibular gland.      He is currently feeling well. He has no pain.  His surgical wound is healing nicely. He is eating and drinking well. He has not lost weight.  He is active when he can be.  He notes that last summer and even into winter that he was playing golf even up to December this year.  He has no shortness of breath and is fully active.  Ordinarily he would be bowling regularly in the winter, however, due to pandemic, he has been unable to do this.       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  He received 6 months of adjuvant FOLFOX chemotherapy at that time.  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      He has a historly of long-standing diabetes.  He has been on insulin at some points, however, currently is taking metformin and glipizide. He notes that his PCP has decreased the metformoin dose due to increase in his creatinine recently. This has resulted in poorer glycemic control, but his creatinine has also improved. He has some neuropathy in his feet but he says this is mild.      With regards to his prior radiotherapy - he has minimal residual toxicity from this. He has no woody fibrosis in his neck. He has had dental issues, but mostly just cavities - and he regularly sees a dentist.  He has not had ORN.    He otherwise does not have a lot of xerostomia from prior therapy.        Interval History:  Reese Oakley was met with for follow up over telephone.   He is due to start chemoradiation today.  He overall feels prepared and does not have any residual questions.  He had his port placed and states that it is not bothersome to him.  -He denies any headaches, dizziness, fevers, chills, cough, shortness of breath, chest pain, nausea, abnormal bowel habits, urinary issues, rash, swelling, hearing dysfunction.  States [facility] but they did not favor a switch to insulin.  He is open to meeting with endocrinology here through [place].  -He does not have any significant dysphagia at baseline.  Does continue to have a dry mouth but stable.     ROS: 10 point ROS neg other than the symptoms noted above in the HPI.      Current Outpatient Medications   Medication Sig Dispense Refill     acetaminophen (TYLENOL) 325 MG tablet Take 2 tablets (650 mg) by mouth every 4 hours as needed for other (multimodal surgical pain management along with NSAIDS and opioid medication as  indicated based on pain control and physical function.) 60 tablet 0     HYDROmorphone (DILAUDID) 2 MG tablet Take 1 tablet (2 mg) by mouth every 4 hours as needed for moderate to severe pain 30 tablet 0     insulin glargine (LANTUS VIAL) 100 UNIT/ML vial Inject 7 Units Subcutaneous daily (with breakfast)       lisinopril (ZESTRIL) 10 MG tablet Take 10 mg by mouth daily       mineral oil-hydrophilic petrolatum (AQUAPHOR) external ointment Apply topically every 8 hours 420 g 0     ondansetron (ZOFRAN-ODT) 4 MG ODT tab Take 1 tablet (4 mg) by mouth every 6 hours as needed for nausea or vomiting 20 tablet 0     pilocarpine (SALAGEN) 7.5 MG tablet Take 7.5 mg by mouth 2 times daily       polyethylene glycol (MIRALAX) 17 GM/Dose powder Take 17 g by mouth daily 510 g 0     senna-docusate (SENOKOT-S/PERICOLACE) 8.6-50 MG tablet Take 1 tablet by mouth 2 times daily as needed for constipation 20 tablet 0     simvastatin (ZOCOR) 40 MG tablet Take 40 mg by mouth At Bedtime       aspirin (ASA) 81 MG chewable tablet Take 81 mg by mouth daily       glipiZIDE (GLUCOTROL) 10 MG tablet Take 10 mg by mouth 2 times daily (before meals) 10 Mg in the am - 12.5MG  @@ DINNER       metFORMIN (GLUCOPHAGE) 1000 MG tablet Take 500 mg by mouth 2 times daily (with meals)         Physical Examination:  BP (!) 175/84   Pulse 61   Temp 96.9  F (36.1  C) (Tympanic)   Resp 16   Wt 85.7 kg (189 lb)   SpO2 99%   BMI 29.60 kg/m    Wt Readings from Last 10 Encounters:   01/25/21 85.7 kg (189 lb)   01/15/21 83.5 kg (184 lb)   01/05/21 84.8 kg (187 lb)   01/05/21 83.5 kg (184 lb)   12/15/20 84.4 kg (186 lb)   12/11/20 84.1 kg (185 lb 4.8 oz)   11/24/20 86.6 kg (191 lb)   11/03/20 83.9 kg (185 lb)   09/29/20 81.6 kg (180 lb)   08/11/20 85.3 kg (188 lb)     Telephone visit was done today  Voice sounded strong, able to follow thought processes, did not sound to be in acute distress      Laboratory Data:  Results for TEOFILO CHAN (MRN [id number]) as  of 1/25/2021 09:03   Ref. Range 1/25/2021 08:20   Sodium Latest Ref Range: 133 - 144 mmol/L 139   Potassium Latest Ref Range: 3.4 - 5.3 mmol/L 4.0   Chloride Latest Ref Range: 94 - 109 mmol/L 105   Carbon Dioxide Latest Ref Range: 20 - 32 mmol/L 28   Urea Nitrogen Latest Ref Range: 7 - 30 mg/dL 18   Creatinine Latest Ref Range: 0.66 - 1.25 mg/dL 1.21   GFR Estimate Latest Ref Range: >60 mL/min/1.73_m2 60 (L)   GFR Estimate If Black Latest Ref Range: >60 mL/min/1.73_m2 69   Calcium Latest Ref Range: 8.5 - 10.1 mg/dL 9.4   Anion Gap Latest Ref Range: 3 - 14 mmol/L 6   Magnesium Latest Ref Range: 1.6 - 2.3 mg/dL 1.8   Albumin Latest Ref Range: 3.4 - 5.0 g/dL 3.6   Protein Total Latest Ref Range: 6.8 - 8.8 g/dL 7.7   Bilirubin Total Latest Ref Range: 0.2 - 1.3 mg/dL 0.5   Alkaline Phosphatase Latest Ref Range: 40 - 150 U/L 90   ALT Latest Ref Range: 0 - 70 U/L 23   AST Latest Ref Range: 0 - 45 U/L 18   Glucose Latest Ref Range: 70 - 99 mg/dL 158 (H)   WBC Latest Ref Range: 4.0 - 11.0 10e9/L 8.0   Hemoglobin Latest Ref Range: 13.3 - 17.7 g/dL 13.9   Hematocrit Latest Ref Range: 40.0 - 53.0 % 41.5   Platelet Count Latest Ref Range: 150 - 450 10e9/L 286   RBC Count Latest Ref Range: 4.4 - 5.9 10e12/L 4.56   MCV Latest Ref Range: 78 - 100 fl 91   MCH Latest Ref Range: 26.5 - 33.0 pg 30.5   MCHC Latest Ref Range: 31.5 - 36.5 g/dL 33.5   RDW Latest Ref Range: 10.0 - 15.0 % 12.1   Diff Method Unknown Automated Method   % Neutrophils Latest Units: % 49.5   % Lymphocytes Latest Units: % 34.8   % Monocytes Latest Units: % 10.2   % Eosinophils Latest Units: % 4.7   % Basophils Latest Units: % 0.6   % Immature Granulocytes Latest Units: % 0.2   Nucleated RBCs Latest Ref Range: 0 /100 0   Absolute Neutrophil Latest Ref Range: 1.6 - 8.3 10e9/L 4.0   Absolute Lymphocytes Latest Ref Range: 0.8 - 5.3 10e9/L 2.8   Absolute Monocytes Latest Ref Range: 0.0 - 1.3 10e9/L 0.8   Absolute Eosinophils Latest Ref Range: 0.0 - 0.7 10e9/L 0.4    Absolute Basophils Latest Ref Range: 0.0 - 0.2 10e9/L 0.1   Abs Immature Granulocytes Latest Ref Range: 0 - 0.4 10e9/L 0.0   Absolute Nucleated RBC Unknown 0.0         Assessment and Plan:  ONC  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  - Dr. Herrera is planning for limited field radiotherapy.Will start low-dose weekly cisplatin today 1/25/21 with close monitoring given underlying renal insufficiency.   - port placed  -Again reviewed potential adverse effects from cisplatin.  This is including but not limited to cytopenias, renal dysfunction, neuropathy, nausea, hearing loss.    GI  Nutrition - given limited radiation field, a prophylactic feeding tube was not placed. He may need an NG tube in the latter stages of radiation.      GERD- improved with omeprazole     ENDO  Diabetes - [facility]. Previously asked that he reach out with [facility] to see if he could transition to insulin to help support renal function. He reports they did not want to make any changes at this time. Will refer to endocrinology here for further discussion.     RENAL  Baseline renal insufficiency. Base Cr appears to be around 1.3. Will monitor closely on cisplatin therapy. Aware to stayed adequately hydrated.     Polly Vargas PA-C  [facility]  [address] [place] [address] [phone]

## 2021-01-25 NOTE — LETTER
"    1/25/2021         RE: Reese Oakley  [address] [facility] [address]        Dear Colleague,    Thank you for referring your patient, Reese Oakley, to the [facility]. Please see a copy of my visit note below.    Kenrick is a 70 year old who is being evaluated via a billable telephone visit.      What phone number would you like to be contacted at? [phone]  How would you like to obtain your AVS? 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  More recently he was noted on follow up scans to have enlarged R sided lymph nodes and biopsy proven recurrence in the R neck.  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  There was one additional focus of squamous cell carcinoma identified in an additional lymph node without MEL.  His case was discussed at multidisciplinary tumor board and he was recommended to have chemoradiation - due to MEL in the submandibular gland.      He is currently feeling well. He has no pain.  His surgical wound is healing nicely. He is eating and drinking well. He has not lost weight.  He is active when he can be.  He notes that last summer and even into winter that he was playing golf even up to December " this year.  He has no shortness of breath and is fully active.  Ordinarily he would be bowling regularly in the winter, however, due to pandemic, he has been unable to do this.       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  He received 6 months of adjuvant FOLFOX chemotherapy at that time.  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      He has a historly of long-standing diabetes.  He has been on insulin at some points, however, currently is taking metformin and glipizide. He notes that his PCP has decreased the metformoin dose due to increase in his creatinine recently. This has resulted in poorer glycemic control, but his creatinine has also improved. He has some neuropathy in his feet but he says this is mild.      With regards to his prior radiotherapy - he has minimal residual toxicity from this. He has no woody fibrosis in his neck. He has had dental issues, but mostly just cavities - and he regularly sees a dentist.  He has not had ORN.    He otherwise does not have a lot of xerostomia from prior therapy.        Interval History:  Reese YAMILE Oakley was met with for follow up over telephone.   He is due to start chemoradiation today.  He overall feels prepared and does not have any residual questions.  He had his port placed and states that it is not bothersome to him.  -He denies any headaches, dizziness, fevers, chills, cough, shortness of breath, chest pain, nausea, abnormal bowel habits, urinary issues, rash, swelling, hearing dysfunction.  States [facility] but they did not favor a switch to insulin.  He is open to meeting with endocrinology here through [facility].  -He does not have any significant dysphagia at baseline.  Does continue to have a dry mouth but stable.     ROS: 10 point ROS neg other than the symptoms noted above in the  HPI.      Current Outpatient Medications   Medication Sig Dispense Refill     acetaminophen (TYLENOL) 325 MG tablet Take 2 tablets (650 mg) by mouth every 4 hours as needed for other (multimodal surgical pain management along with NSAIDS and opioid medication as indicated based on pain control and physical function.) 60 tablet 0     HYDROmorphone (DILAUDID) 2 MG tablet Take 1 tablet (2 mg) by mouth every 4 hours as needed for moderate to severe pain 30 tablet 0     insulin glargine (LANTUS VIAL) 100 UNIT/ML vial Inject 7 Units Subcutaneous daily (with breakfast)       lisinopril (ZESTRIL) 10 MG tablet Take 10 mg by mouth daily       mineral oil-hydrophilic petrolatum (AQUAPHOR) external ointment Apply topically every 8 hours 420 g 0     ondansetron (ZOFRAN-ODT) 4 MG ODT tab Take 1 tablet (4 mg) by mouth every 6 hours as needed for nausea or vomiting 20 tablet 0     pilocarpine (SALAGEN) 7.5 MG tablet Take 7.5 mg by mouth 2 times daily       polyethylene glycol (MIRALAX) 17 GM/Dose powder Take 17 g by mouth daily 510 g 0     senna-docusate (SENOKOT-S/PERICOLACE) 8.6-50 MG tablet Take 1 tablet by mouth 2 times daily as needed for constipation 20 tablet 0     simvastatin (ZOCOR) 40 MG tablet Take 40 mg by mouth At Bedtime       aspirin (ASA) 81 MG chewable tablet Take 81 mg by mouth daily       glipiZIDE (GLUCOTROL) 10 MG tablet Take 10 mg by mouth 2 times daily (before meals) 10 Mg in the am - 12.5MG  @@ DINNER       metFORMIN (GLUCOPHAGE) 1000 MG tablet Take 500 mg by mouth 2 times daily (with meals)         Physical Examination:  BP (!) 175/84   Pulse 61   Temp 96.9  F (36.1  C) (Tympanic)   Resp 16   Wt 85.7 kg (189 lb)   SpO2 99%   BMI 29.60 kg/m    Wt Readings from Last 10 Encounters:   01/25/21 85.7 kg (189 lb)   01/15/21 83.5 kg (184 lb)   01/05/21 84.8 kg (187 lb)   01/05/21 83.5 kg (184 lb)   12/15/20 84.4 kg (186 lb)   12/11/20 84.1 kg (185 lb 4.8 oz)   11/24/20 86.6 kg (191 lb)   11/03/20 83.9 kg (185  lb)   09/29/20 81.6 kg (180 lb)   08/11/20 85.3 kg (188 lb)     Telephone visit was done today  Voice sounded strong, able to follow thought processes, did not sound to be in acute distress      Laboratory Data:  Results for TEOFILO CHAN (MRN [id number]) as of 1/25/2021 09:03   Ref. Range 1/25/2021 08:20   Sodium Latest Ref Range: 133 - 144 mmol/L 139   Potassium Latest Ref Range: 3.4 - 5.3 mmol/L 4.0   Chloride Latest Ref Range: 94 - 109 mmol/L 105   Carbon Dioxide Latest Ref Range: 20 - 32 mmol/L 28   Urea Nitrogen Latest Ref Range: 7 - 30 mg/dL 18   Creatinine Latest Ref Range: 0.66 - 1.25 mg/dL 1.21   GFR Estimate Latest Ref Range: >60 mL/min/1.73_m2 60 (L)   GFR Estimate If Black Latest Ref Range: >60 mL/min/1.73_m2 69   Calcium Latest Ref Range: 8.5 - 10.1 mg/dL 9.4   Anion Gap Latest Ref Range: 3 - 14 mmol/L 6   Magnesium Latest Ref Range: 1.6 - 2.3 mg/dL 1.8   Albumin Latest Ref Range: 3.4 - 5.0 g/dL 3.6   Protein Total Latest Ref Range: 6.8 - 8.8 g/dL 7.7   Bilirubin Total Latest Ref Range: 0.2 - 1.3 mg/dL 0.5   Alkaline Phosphatase Latest Ref Range: 40 - 150 U/L 90   ALT Latest Ref Range: 0 - 70 U/L 23   AST Latest Ref Range: 0 - 45 U/L 18   Glucose Latest Ref Range: 70 - 99 mg/dL 158 (H)   WBC Latest Ref Range: 4.0 - 11.0 10e9/L 8.0   Hemoglobin Latest Ref Range: 13.3 - 17.7 g/dL 13.9   Hematocrit Latest Ref Range: 40.0 - 53.0 % 41.5   Platelet Count Latest Ref Range: 150 - 450 10e9/L 286   RBC Count Latest Ref Range: 4.4 - 5.9 10e12/L 4.56   MCV Latest Ref Range: 78 - 100 fl 91   MCH Latest Ref Range: 26.5 - 33.0 pg 30.5   MCHC Latest Ref Range: 31.5 - 36.5 g/dL 33.5   RDW Latest Ref Range: 10.0 - 15.0 % 12.1   Diff Method Unknown Automated Method   % Neutrophils Latest Units: % 49.5   % Lymphocytes Latest Units: % 34.8   % Monocytes Latest Units: % 10.2   % Eosinophils Latest Units: % 4.7   % Basophils Latest Units: % 0.6   % Immature Granulocytes Latest Units: % 0.2   Nucleated RBCs Latest Ref  Range: 0 /100 0   Absolute Neutrophil Latest Ref Range: 1.6 - 8.3 10e9/L 4.0   Absolute Lymphocytes Latest Ref Range: 0.8 - 5.3 10e9/L 2.8   Absolute Monocytes Latest Ref Range: 0.0 - 1.3 10e9/L 0.8   Absolute Eosinophils Latest Ref Range: 0.0 - 0.7 10e9/L 0.4   Absolute Basophils Latest Ref Range: 0.0 - 0.2 10e9/L 0.1   Abs Immature Granulocytes Latest Ref Range: 0 - 0.4 10e9/L 0.0   Absolute Nucleated RBC Unknown 0.0         Assessment and Plan:  ONC  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  - Dr. Herrera is planning for limited field radiotherapy.Will start low-dose weekly cisplatin today 1/25/21 with close monitoring given underlying renal insufficiency.   - port placed  -Again reviewed potential adverse effects from cisplatin.  This is including but not limited to cytopenias, renal dysfunction, neuropathy, nausea, hearing loss.    GI  Nutrition - given limited radiation field, a prophylactic feeding tube was not placed. He may need an NG tube in the latter stages of radiation.      GERD- improved with omeprazole     ENDO  Diabetes - on [facility]. Previously asked that he reach out with [facility] to see if he could transition to insulin to help support renal function. He reports they did not want to make any changes at this time. Will refer to endocrinology here for further discussion.     RENAL  Baseline renal insufficiency. Base Cr appears to be around 1.3. Will monitor closely on cisplatin therapy. Aware to stayed adequately hydrated.     Polly Vargas PA-C  [facility] [address] [id number] [phone]

## 2021-01-25 NOTE — PROGRESS NOTES
Speech-Language Pathology Department   EVALUATION  [facility]  Clinical Swallow Evaluation    01/25/21 9008   General Information   Type Of Visit Initial   Start Of Care Date 01/25/21   Referring Physician Dr. 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 He has previously been treated with a DL with Omni CO2 laser excision by Dr. Mckenna on 10/6/2014. This was followed by radiation therapy at [facility]. 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 Mckenna on 2/19/2020. He was subsequently found to have recurrence near the right submandibular gland and is now status post right supraomohyoid neck dissection on 12/9/2020. His case was presented at tumor board and he was recommended to undergo post-op XRT. He began this today, 1/25/21. He presents today for clinical swallow evaluation. Reports he has to eat slow and does not eat a variety of foods d/t dysgeusia. Reports he takes Omeprazole and his swallowing gets significantly worse when he does not take this regularly.    Respiratory Status Room air   Prior Level Of Function Swallowing   Prior Level Of Function Comment Regular, moist solids and thin liquids   General Observations Pt highly pleasant and cooperative throughout evaluation.   Patient/family Goals To maintain swallow function during XRT   Clinical Swallow Evaluation   Oral Musculature generally intact   Structural  Abnormalities none present   Dentition present and adequate   Mucosal Quality dry   Mandibular Strength and Mobility impaired   Oral Labial Strength and Mobility WFL   Lingual Strength and Mobility impaired protrusion;impaired right lateral movement   Velar Elevation intact   Buccal Strength and Mobility intact   Laryngeal Function Cough;Throat clear;Voicing initiated   Additional Documentation Yes   Clinical Swallow Eval: Thin Liquid Texture Trial   Mode of Presentation, Thin Liquids cup;self-fed   Volume of Liquid or Food Presented 4oz   Oral Phase of Swallow WFL   Pharyngeal Phase of Swallow intact   Diagnostic Statement No overt clinical s/sx of aspiration/penetration noted on thin liquid trials.    Swallow Compensations   Swallow Compensations Alternate viscosity of consistencies;Pacing;Reduce amounts  (oral cares, limit talking during eating/drinking)   Educational Assessment   Barriers to Learning No barriers   General Therapy Interventions   Planned Therapy Interventions Dysphagia Treatment   Dysphagia treatment Oropharyngeal exercise training;Modified diet education;Instruction of safe swallow strategies;Compensatory strategies for swallowing   Swallow Eval: Clinical Impressions   Skilled Criteria for Therapy Intervention Skilled criteria met.  Treatment indicated.   Functional Assessment Scale (FAS) 5   Treatment Diagnosis Mild oropharyngeal dysphagia expected to worsen to moderately severe during XRT   Diet texture recommendations Regular diet;Thin liquids   Recommended Feeding/Eating Techniques alternate between small bites and sips of food/liquid;hard swallow w/ each bite or sip;maintain upright posture during/after eating for 30 mins;small sips/bites   Rehab Potential good, to achieve stated therapy goals   Predicted Duration of Therapy Intervention (days/wks) 2x/month for 3 months   Anticipated Discharge Disposition home w/ outpatient services   Risks and Benefits of Treatment have been explained. Yes    Patient, family and/or staff in agreement with Plan of Care Yes   Clinical Impression Comments Reese Oakley presents today with mild oropharyngeal dysphagia expected to worsen to moderately severe during XRT. Oral motor examination reveals mild trismus and reduced lingual range of motion; oral cavity is dry.  Patient notes xerostomia from prior radiation.  Patient assessed with 4 ounces of thin liquids with no overt signs or symptoms of penetration or aspiration.  Recommend patient continue with regular, moist solids and thin liquids with strict adherence to safe swallow strategies.  Patient will benefit from ongoing SLP services to maximize swallow function during upcoming additional course of radiation, ensure diet tolerance and train oropharyngeal and jaw strengthening and range of motion exercises to minimize radiation-induced fibrosis.   Swallow Goals   SLP Swallow Goals 1;2   Swallow Goal 1   Goal Identifier Diet   Goal Description 1. Pt will tolerate regular solids and thin liquids without overt clinical s/sx of aspiration/penetration noted on 100% of the time independently.   Target Date 04/25/21   Swallow Goal 2   Goal Identifier Exercises   Goal Description 2. Pt will improve oropharyngeal and jaw strength and ROM by completing 10 repetitions of 5/5 exercises 3 times daily with minimal written or verbal cues.    Target Date 04/25/21   Total Session Time   SLP Eval: oral/pharyngeal swallow function, clinical minutes (89371) 10   Total Evaluation Time 10   Therapy Certification   Certification date from 01/25/21   Certification date to 04/25/21   Medical Diagnosis Oropharyngeal dysphagia   Certification I certify the need for these services furnished under this plan of treatment and while under my care.  (Physician co-signature of this document indicates review and certification of the therapy plan).     Thank you for the referral of Reese Oakley. If you have any questions about this report,  please contact me using the information below.     LUIS Cerrato ([email or web address]), MA, CCC-SLP   Speech Language Pathologist  [employer] Trained Vocologist   [facility] [place]  Dept. of Otolaryngology  Department of Rehabilitation Services  [address] [place] [id number]  Email: [email or web address]   Pronouns: she/her/hers

## 2021-01-25 NOTE — PROGRESS NOTES
OUTPATIENT SWALLOW  EVALUATION  PLAN OF TREATMENT FOR OUTPATIENT REHABILITATION  (COMPLETE FOR INITIAL CLAIMS ONLY)  Patient's Last Name, First Name, M.I.  YOB: 1950  OakleyReese  YAMILE     Provider's Name   JANAY Cerrato   Medical Record No.  [id number]     Start of Care Date:  01/25/21   Onset Date:  01/25/21   Type:     ___PT   ____OT  ___X_SLP Medical Diagnosis:  Oropharyngeal dysphagia     Treatment Diagnosis:  Mild oropharyngeal dysphagia expected to worsen to moderately severe during XRT Visits from SOC:  1     _________________________________________________________________________________  Plan of Treatment/Functional Goals:  Planned Therapy Interventions: Dysphagia Treatment  Dysphagia treatment: Oropharyngeal exercise training, Modified diet education, Instruction of safe swallow strategies, Compensatory strategies for swallowing                     Goals   1. Goal Identifier: Diet       Goal Description: 1. Pt will tolerate regular solids and thin liquids without overt clinical s/sx of aspiration/penetration noted on 100% of the time independently.       Target Date: 04/25/21           2. Goal Identifier: Exercises       Goal Description: 2. Pt will improve oropharyngeal and jaw strength and ROM by completing 10 repetitions of 5/5 exercises 3 times daily with minimal written or verbal cues.        Target Date: 04/25/21                         Predicted Duration of Therapy Intervention (days/wks): 2x/month for 3 months    JANAY Cerrato       I CERTIFY THE NEED FOR THESE SERVICES FURNISHED UNDER        THIS PLAN OF TREATMENT AND WHILE UNDER MY CARE     (Physician attestation of this document indicates review and certification of the therapy plan).                  Certification date from: 01/25/21 Certification date to: 04/25/21          Referring Physician: Dr. Jose Antonio Mckenna    Initial  Assessment        See Epic Evaluation Start Of Care Date: 01/25/21

## 2021-01-25 NOTE — PATIENT INSTRUCTIONS
[facility] Triage and after hours / weekends / holidays:  [phone]    Please call the triage or after hours line if you experience a temperature greater than or equal to 100.4, shaking chills, have uncontrolled nausea, vomiting and/or diarrhea, dizziness, shortness of breath, chest pain, bleeding, unexplained bruising, or if you have any other new/concerning symptoms, questions or concerns.      If you are having any concerning symptoms or wish to speak to a provider before your next infusion visit, please call your care coordinator or triage to notify them so we can adequately serve you.     If you need a refill on a narcotic prescription or other medication, please call before your infusion appointment.            Recent Results (from the past 24 hour(s))   CBC with platelets differential    Collection Time: 01/25/21  8:20 AM   Result Value Ref Range    WBC 8.0 4.0 - 11.0 10e9/L    RBC Count 4.56 4.4 - 5.9 10e12/L    Hemoglobin 13.9 13.3 - 17.7 g/dL    Hematocrit 41.5 40.0 - 53.0 %    MCV 91 78 - 100 fl    MCH 30.5 26.5 - 33.0 pg    MCHC 33.5 31.5 - 36.5 g/dL    RDW 12.1 10.0 - 15.0 %    Platelet Count 286 150 - 450 10e9/L    Diff Method Automated Method     % Neutrophils 49.5 %    % Lymphocytes 34.8 %    % Monocytes 10.2 %    % Eosinophils 4.7 %    % Basophils 0.6 %    % Immature Granulocytes 0.2 %    Nucleated RBCs 0 0 /100    Absolute Neutrophil 4.0 1.6 - 8.3 10e9/L    Absolute Lymphocytes 2.8 0.8 - 5.3 10e9/L    Absolute Monocytes 0.8 0.0 - 1.3 10e9/L    Absolute Eosinophils 0.4 0.0 - 0.7 10e9/L    Absolute Basophils 0.1 0.0 - 0.2 10e9/L    Abs Immature Granulocytes 0.0 0 - 0.4 10e9/L    Absolute Nucleated RBC 0.0    Comprehensive metabolic panel    Collection Time: 01/25/21  8:20 AM   Result Value Ref Range    Sodium 139 133 - 144 mmol/L    Potassium 4.0 3.4 - 5.3 mmol/L    Chloride 105 94 - 109 mmol/L    Carbon Dioxide 28 20 - 32 mmol/L    Anion Gap 6 3 - 14 mmol/L    Glucose 158 (H) 70 - 99 mg/dL    Urea  Nitrogen 18 7 - 30 mg/dL    Creatinine 1.21 0.66 - 1.25 mg/dL    GFR Estimate 60 (L) >60 mL/min/[1.73_m2]    GFR Estimate If Black 69 >60 mL/min/[1.73_m2]    Calcium 9.4 8.5 - 10.1 mg/dL    Bilirubin Total 0.5 0.2 - 1.3 mg/dL    Albumin 3.6 3.4 - 5.0 g/dL    Protein Total 7.7 6.8 - 8.8 g/dL    Alkaline Phosphatase 90 40 - 150 U/L    ALT 23 0 - 70 U/L    AST 18 0 - 45 U/L   Magnesium    Collection Time: 01/25/21  8:20 AM   Result Value Ref Range    Magnesium 1.8 1.6 - 2.3 mg/dL

## 2021-01-25 NOTE — NURSING NOTE
Chief Complaint   Patient presents with     Port Draw     Labs drawn via port by RN in lab. VS taken.      Port accessed with 20g gripper needle by RN, labs collected, line flushed with saline and heparin.  Vitals taken. Pt checked in for appointment(s).    Alessia NGO RN PHN BSN  BMT/Oncology Lab

## 2021-01-25 NOTE — PROGRESS NOTES
Infusion Nursing Note:  Reese Oakley presents today for Cycle 1 Day 1 Cisplatin.    Patient seen by provider today: Yes: SILVESTRE Moncada.   present during visit today: Not Applicable.    Note: Patient is new to the infusion room today and is receiving Cisplatin for the first time.  Patient oriented to infusion room, location of bathrooms and nutrition stations, and call light.  Verified that patient recieved written chemotherapy information previously.  Verbally reviewed chemotherapy teaching, side effects, take-home medications, and follow-up schedule with patient. Patient instructed to call triage with any questions or if he experiences a temperature >100.4, shaking chills, uncontrolled nausea/vomiting/diarrhea, dizziness, shortness of breath, bleeding not relieved with pressure, or with any other concerns.     Patient did not meet criteria for an asymptomatic covid-19 PCR test in infusion today.     Intravenous Access:  Implanted Port.    Treatment Conditions:  Lab Results   Component Value Date    HGB 13.9 01/25/2021     Lab Results   Component Value Date    WBC 8.0 01/25/2021      Lab Results   Component Value Date    ANEU 4.0 01/25/2021     Lab Results   Component Value Date     01/25/2021      Lab Results   Component Value Date     01/25/2021                   Lab Results   Component Value Date    POTASSIUM 4.0 01/25/2021           Lab Results   Component Value Date    MAG 1.8 01/25/2021            Lab Results   Component Value Date    CR 1.21 01/25/2021                   Lab Results   Component Value Date    ELEUTERIO 9.4 01/25/2021                Lab Results   Component Value Date    BILITOTAL 0.5 01/25/2021           Lab Results   Component Value Date    ALBUMIN 3.6 01/25/2021                    Lab Results   Component Value Date    ALT 23 01/25/2021           Lab Results   Component Value Date    AST 18 01/25/2021     Results reviewed, labs MET treatment parameters, ok to proceed  with treatment.  Pt voided prior to the start of Cisplatin.    Emend/Dex stopped at 1018.    Post Infusion Assessment:  Patient tolerated infusion without incident.  Blood return noted pre and post infusion.  Site patent and intact, free from redness, edema or discomfort.  No evidence of extravasations.  Access discontinued per protocol.     Discharge Plan:   Prescription refills given for Ativan and Compazine.  Discharge instructions reviewed with: Patient.  Patient and/or family verbalized understanding of discharge instructions and all questions answered.  Copy of AVS reviewed with patient and/or family.  Patient will return 2/1 for next appointment.  Patient discharged in stable condition accompanied by: self.  Departure Mode: Ambulatory.  Face to Face time: 5.    Marguerite Kirkpatrick RN

## 2021-01-26 NOTE — TELEPHONE ENCOUNTER
"Triage team:    Called to schedule 1st available appt for type 2 diabetes. Due to his complex chemo/radiation schedule I was unable to find anything sooner than 2/9. Patient says he can't wait that long because his blood sugars have been super high (mid-300's) due to radiation/chemo.    Please advise if patient should be offered consult clinic/[facility] for sooner appt.    1/25 office visit note from ROSAURA Vargas says,     \"He has a history of long-standing diabetes.  He has been on insulin at some points, however, currently is taking metformin and glipizide. He notes that his PCP has decreased the metformoin dose due to increase in his creatinine recently. This has resulted in poorer glycemic control, but his creatinine has also improved. He has some neuropathy in his feet but he says this is mild. \"  "

## 2021-01-26 NOTE — PROGRESS NOTES
[facility] PHYSICIANS  SPECIALIZING IN BREAKTHROUGHS  Radiation Oncology    On Treatment Visit Note      Reese Oakley      Date: 2021   MRN: [id number]   : 1950  Diagnosis: Tongue Cancer    Treatment Summary to Date   Right Neck Current Dose: 400 cGy/6000 cGy Fractions:       Chemotherapy  Chemo concurrent with radx?: Yes  Oncologist: Dr Ferrell  Drug Name/Frequency 1: Cisplatin    Subjective: Mr. Oakley is tolerating the start of chemoradiation well. He is concerned about his blood glucose elevation following dexamethasone administration for chemotherapy and is reaching out to his endocrinologist.    Nursing ROS:   Nutrition Alteration  Diet Type: Patient's Preference  Nutrition Note: No PEG  Skin  Skin Reaction: 0 - No changes     ENT and Mouth Exam  Mucositis - Current: 0 - None      Gastrointestinal  Nausea: 0 - None     Psychosocial  Mood - Anxiety: 0 - Normal  Pain Assessment  0-10 Pain Scale: 0    Objective:   BP (!) 148/82   Pulse 84   Resp 18   Wt 86.6 kg (191 lb)   BMI 29.91 kg/m  , pain 0/10  Gen: Appears well, NAD  Skin: No erythema    Laboratory:  Lab Results   Component Value Date    WBC 8.0 2021    HGB 13.9 2021    HCT 41.5 2021    MCV 91 2021     2021     Lab Results   Component Value Date     2021    POTASSIUM 4.0 2021    CHLORIDE 105 2021    CO2 28 2021     (H) 2021     Lab Results   Component Value Date    AST 18 2021    ALT 23 2021    ALKPHOS 90 2021    BILITOTAL 0.5 2021     Assessment:    Tolerating radiation therapy well.  All questions and concerns addressed.    Treatment-related toxicities (CTCAE v5.0): None    Plan:   1. Continue current therapy.      Mosaiq chart and setup information reviewed  MVCT images reviewed    Medication Review  Med list reviewed with patient?: Yes    Educational Topic Discussed  Education Instructions: Reviewed    Jennifer  ROSAURA Herrera MD  Department of Radiation Oncology  [facility]

## 2021-01-26 NOTE — LETTER
2021         RE: Reese Oakley  [address] [address]        Dear Colleague,    Thank you for referring your patient, Reese Oakley, to the [facility] RADIATION ONCOLOGY. Please see a copy of my visit note below.    [facility] PHYSICIANS  SPECIALIZING IN BREAKTHROUGHS  Radiation Oncology    On Treatment Visit Note      Reese Oakley      Date: 2021   MRN: [id number]   : 1950  Diagnosis: Tongue Cancer    Treatment Summary to Date   Right Neck Current Dose: 400 cGy/6000 cGy Fractions:       Chemotherapy  Chemo concurrent with radx?: Yes  Oncologist: Dr Ferrell  Drug Name/Frequency 1: Cisplatin    Subjective: Mr. Oakley is tolerating the start of chemoradiation well. He is concerned about his blood glucose elevation following dexamethasone administration for chemotherapy and is reaching out to his endocrinologist.    Nursing ROS:   Nutrition Alteration  Diet Type: Patient's Preference  Nutrition Note: No PEG  Skin  Skin Reaction: 0 - No changes     ENT and Mouth Exam  Mucositis - Current: 0 - None      Gastrointestinal  Nausea: 0 - None     Psychosocial  Mood - Anxiety: 0 - Normal  Pain Assessment  0-10 Pain Scale: 0    Objective:   BP (!) 148/82   Pulse 84   Resp 18   Wt 86.6 kg (191 lb)   BMI 29.91 kg/m  , pain 0/10  Gen: Appears well, NAD  Skin: No erythema    Laboratory:  Lab Results   Component Value Date    WBC 8.0 2021    HGB 13.9 2021    HCT 41.5 2021    MCV 91 2021     2021     Lab Results   Component Value Date     2021    POTASSIUM 4.0 2021    CHLORIDE 105 2021    CO2 28 2021     (H) 2021     Lab Results   Component Value Date    AST 18 2021    ALT 23 2021    ALKPHOS 90 2021    BILITOTAL 0.5 2021     Assessment:    Tolerating radiation therapy well.  All questions and concerns addressed.    Treatment-related  toxicities (CTCAE v5.0): None    Plan:   1. Continue current therapy.      Mosaiq chart and setup information reviewed  MVCT images reviewed    Medication Review  Med list reviewed with patient?: Yes    Educational Topic Discussed  Education Instructions: Reviewed    Jennifer Herrera MD  Department of Radiation Oncology  [facility]

## 2021-01-27 NOTE — PROGRESS NOTES
OUTPATIENT SWALLOW  EVALUATION  PLAN OF TREATMENT FOR OUTPATIENT REHABILITATION  (COMPLETE FOR INITIAL CLAIMS ONLY)  Patient's Last Name, First Name, M.I.  YOB: 1950  AdinReese  YAMILE     Provider's Name   JANAY Cerrato   Medical Record No.  [id number]     Start of Care Date:  01/27/21   Onset Date:  01/27/21   Type:     ___PT   ____OT  ___X_SLP Medical Diagnosis:  Oropharyngeal dysphagia     Treatment Diagnosis:  Mild to moderate oropharyngeal dysphagia expected to worsen to moderately severe during XRT Visits from SOC:  1     _________________________________________________________________________________  Plan of Treatment/Functional Goals:  Planned Therapy Interventions: Dysphagia Treatment  Dysphagia treatment: Oropharyngeal exercise training, Modified diet education, Instruction of safe swallow strategies, Compensatory strategies for swallowing                     Goals   1. Goal Identifier: Diet       Goal Description: 1. Pt will tolerate regular solids and thin liquids without overt clinical s/sx of aspiration/penetration noted on 100% of the time independently.       Target Date: 04/27/21           2. Goal Identifier: Exercises       Goal Description: 2. Pt will improve oropharyngeal and jaw strength and ROM by completing 10 repetitions of 5/5 exercises 3 times daily with minimal written or verbal cues.        Target Date: 04/27/21               Predicted Duration of Therapy Intervention (days/wks): 2x/month for 3 months    JANAY Cerrato       I CERTIFY THE NEED FOR THESE SERVICES FURNISHED UNDER        THIS PLAN OF TREATMENT AND WHILE UNDER MY CARE     (Physician attestation of this document indicates review and certification of the therapy plan).                  Certification date from: 01/27/21 Certification date to: 04/27/21          Referring Physician: Dr. Jose Antonio Mckenna    Initial  Assessment        See Epic Evaluation Start Of Care Date: 01/27/21

## 2021-01-27 NOTE — PROGRESS NOTES
Speech-Language Pathology Department   EVALUATION  [facility]  Video Swallow Study Evaluation    01/27/21 1300   General Information   Type Of Visit Initial   Start Of Care Date 01/27/21   Referring Physician Dr. 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 He has previously been treated with a DL with Omni CO2 laser excision by Dr. Mckenna on 10/6/2014. This was followed by radiation therapy at [facility]. 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 Mckenna on 2/19/2020. He was subsequently found to have recurrence near the right submandibular gland and is now status post right supraomohyoid neck dissection on 12/9/2020. His case was presented at tumor board and he was recommended to undergo post-op XRT. He began this on 1/25/21. He presents today for baseline video swallow study evaluation.   Respiratory Status Room air   Prior Level Of Function Swallowing   Prior Level Of Function Comment Regular, moist solids and thin liquids   General Observations Pt highly pleasant and cooperative throughout evaluation.   Patient/family Goals To evaluate swallow function and determine degree of dysphagia given current chemoXRT   Clinical Swallow Evaluation   Oral Musculature generally intact   Structural Abnormalities none present   Dentition present and adequate   Mucosal Quality dry   Mandibular Strength and Mobility impaired   Oral Labial Strength and Mobility WFL   Lingual  Strength and Mobility impaired protrusion;impaired right lateral movement   Velar Elevation intact   Buccal Strength and Mobility intact   Laryngeal Function Cough;Throat clear;Voicing initiated   VFSS Evaluation   VFSS Additional Documentation Yes   VFSS Eval: Radiology   Radiologist Resident   Views Taken left lateral;A/P   Physical Location of Procedure [facility]   VFSS Eval: Thin Liquid Texture Trial   Mode of Presentation, Thin Liquid cup;self-fed   Order of Presentation Series 1,2,7,8,11,12,13,15  (13-tablet, 15-AP)   Preparatory Phase WFL   Oral Phase, Thin Liquid Premature pharyngeal entry   Pharyngeal Phase, Thin Liquid Residue in valleculae;Residue in pyriform sinus   Rosenbek's Penetration Aspiration Scale: Thin Liquid Trial Results 8 - contrast passes glottis, visible subglottic residue remains, absent patient response (aspiration)   Response to Aspiration absent response, silent aspiration   Successful Strategies Trialed During Procedure, Thin Liquid other (see comments)  (modified supraglottic (swallow, cough, swallow))   Diagnostic Statement Consistent premature spillage to piriforms. Consistent penetration with residuals remaining in laryngeal vestibule. x2 episodes of silent aspiration. Modified surpaglottic swallow (swallow-cough-swallow) clears all subglottic residuals. Barium tablet passed without difficulty. AP unremarkable.    VFSS Eval: Nectar Thick Liquid Texture Trial   Mode of Presentation, Nectar cup;self-fed   Order of Presentation Series 4   Preparatory Phase WFL   Oral Phase, Nectar WFL   Pharyngeal Phase, Nectar Residue in valleculae;Residue in pyriform sinus   Rosenbek's Penetration Aspiration Scale: Nectar-Thick Liquid Trial Results 1 - no aspiration, contrast does not enter airway   Diagnostic Statement No penetration/aspiration. Minimal vallecular/piriform residuals.    VFSS Eval: Puree Solid Texture Trial   Mode of Presentation, Puree spoon;self-fed   Order of Presentation  Series 5,6,14  (14-AP)   Preparatory Phase WFL   Oral Phase, Puree Premature pharyngeal entry   Pharyngeal Phase, Puree Residue in valleculae   Rosenbek's Penetration Aspiration Scale: Puree Food Trial Results 1 - no aspiration, contrast does not enter airway   Diagnostic Statement No penetration/aspiration. Premature spillage to valleculae. Minimal vallecular residue. AP reveals right bolus preference.   VFSS Eval: Solid Food Texture Trial   Mode of Presentation, Solid self-fed   Order of Presentation Series 9, 10   Preparatory Phase WFL   Oral Phase, Solid WFL   Pharyngeal Phase, Solid Residue in valleculae;Residue in pyriform sinus   Rosenbek's Penetration Aspiration Scale: Solid Food Trial Results 1 - no aspiration, contrast does not enter airway   Diagnostic Statement No penetration/aspiration. Mild vallecular and minimal piriform sinus residue. Cleared with a liquid wash.   Swallow Compensations   Swallow Compensations Alternate viscosity of consistencies;Pacing;Reduce amounts  (Modified supraglottic with liquids (swallow-cough-swallow))   Results No difficulties noted   Educational Assessment   Barriers to Learning No barriers   General Therapy Interventions   Planned Therapy Interventions Dysphagia Treatment   Dysphagia treatment Oropharyngeal exercise training;Modified diet education;Instruction of safe swallow strategies;Compensatory strategies for swallowing   Swallow Eval: Clinical Impressions   Skilled Criteria for Therapy Intervention Skilled criteria met.  Treatment indicated.   Dysphagia Outcome Severity Scale (JASMIN) Level 4 - JASMIN   Treatment Diagnosis Mild to moderate oropharyngeal dysphagia expected to worsen to moderately severe during XRT   Diet texture recommendations Regular diet;Thin liquids   Recommended Feeding/Eating Techniques alternate between small bites and sips of food/liquid;maintain upright posture during/after eating for 30 mins;small sips/bites;other (see comments)  (modified  supraglottic with all liquids; oral cares)   Rehab Potential fair, will monitor progress closely   Predicted Duration of Therapy Intervention (days/wks) 2x/month for 3 months   Anticipated Discharge Disposition home w/ outpatient services   Risks and Benefits of Treatment have been explained. Yes   Patient, family and/or staff in agreement with Plan of Care Yes   Clinical Impression Comments Reese Oakley presents today with mild to moderate oropharyngeal dysphagia characterized by intermittent silent aspiration with thin liquids. Oral motor examination reveals trimus and decrease ROM of the lingual tongue. Pt demonstrates consistent penetration with residuals remaining in laryngeal vestibule. x2 silent aspiration. Modified supraglottic swallow strategy (swallow-cough-swallow) clears all subglottic residuals. Minimal to mild residual in valleculae and piriforms that increase as PO viscosity texture increases. Barium tablet passed without difficulty. AP reveals right bolus preference with puree textures. Recommend pt continue with regular textures and thin liquids with use of modified supraglottic swallow (swallow-cough-swallow) with all liquids. Trained pt on anatomy/physiology of swallowing, results of today's study, diet recommendations and concern for silent aspiration. Retrained safe swallow strategy and the importance of aggressive oral cares. Pt will benefit from ongoing SLP services to ensure diet tolerance and maximize swallow function during chemoXRT to reduce risk of XRT induced fibrosis, which can lead to G-tube dependence.    Swallow Goals   SLP Swallow Goals 1;2   Swallow Goal 1   Goal Identifier Diet   Goal Description 1. Pt will tolerate regular solids and thin liquids without overt clinical s/sx of aspiration/penetration noted on 100% of the time independently.   Target Date 04/27/21   Swallow Goal 2   Goal Identifier Exercises   Goal Description 2. Pt will improve oropharyngeal and jaw strength  and ROM by completing 10 repetitions of 5/5 exercises 3 times daily with minimal written or verbal cues.    Target Date 04/27/21   Total Session Time   SLP Eval: VideoFluoroscopic Swallow function Minutes (59554) 30   Total Evaluation Time 30   Therapy Certification   Certification date from 01/27/21   Certification date to 04/27/21   Medical Diagnosis Oropharyngeal dysphagia   Certification I certify the need for these services furnished under this plan of treatment and while under my care.  (Physician co-signature of this document indicates review and certification of the therapy plan).     Thank you for the referral of Reese Oakley. If you have any questions about this report, please contact me using the information below.     LUIS Cerrato MA (music), CCC-SLP   Speech Language Pathologist  [place] Trained Vocologist   [facility] [place]  Dept. of Otolaryngology  Department of Rehabilitation Services  [address] [place] [id number]  Email: [email or web address]   Pronouns: she/her/hers

## 2021-01-28 NOTE — PROGRESS NOTES
Kenrick is a 70 year old who is being evaluated via a billable telephone visit.      What phone number would you like to be contacted at? [phone]  How would you like to obtain your AVS? Iwona     I have reviewed and updated the patient's allergies and medication list.    Concerns: No new concerns.   Refills: None needed.    Appointment was converted to telephone, patient states he does not have a computer with him and only has a flip phone, no video capabilities [place] [facility]        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  More recently he was noted on follow up scans to have enlarged R sided lymph nodes and biopsy proven recurrence in the R neck.  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  There was one additional focus of squamous cell carcinoma identified in an additional lymph node without MEL.  His case was discussed at multidisciplinary tumor board and he was recommended to have chemoradiation - due to MEL in the submandibular gland.      He is currently feeling well. He has no pain.  His surgical wound is healing nicely. He is eating and drinking well. He has not lost weight.  He is active when he can be.  He notes that last summer and even into winter that he was playing golf even up to December this year.  He has no shortness of breath and is fully active.  Ordinarily he would be bowling regularly in the winter, however, due to pandemic, he has been unable to do this.       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  He received 6 months of adjuvant FOLFOX chemotherapy at that time.  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      He has a historly of long-standing diabetes.  He has been on insulin at some points, however, currently is taking metformin and glipizide. He notes that his PCP has decreased the metformoin dose due to increase in his creatinine recently. This has resulted in poorer glycemic control, but his creatinine has also improved. He has some neuropathy in his feet but he says this is mild.      With regards to his prior radiotherapy - he has minimal residual toxicity from this. He has no woody fibrosis in his neck. He has had dental issues, but mostly just cavities - and he regularly sees a dentist.  He has not had ORN.    He otherwise does not have a lot of xerostomia from prior therapy.        Interval History:  Reese Oakley was met with for follow up over telephone.   - Energy level is slightly diminished but overall not having any issues.   - No pain, nausea, difficulty eating, diminished hearing, headaches, dizziness, fevers, chills, stomach pain, bowel issues, swelling, rashes   - Drinks a lot of fluids- 4, 16 oz bottles a day at least.   - no other new medications. Sees endocrinology tomorrow. Continues on Metformin.      ROS: 10 point ROS neg other than the symptoms noted above in the HPI.      Current Outpatient Medications   Medication Sig Dispense Refill     acetaminophen (TYLENOL) 325 MG tablet Take 2 tablets (650 mg) by mouth every 4 hours as needed for other (multimodal surgical pain management along with NSAIDS and opioid medication as indicated based on pain control and physical function.) 60 tablet 0     aspirin (ASA) 81 MG chewable tablet Take 81 mg by mouth daily       glipiZIDE (GLUCOTROL) 10 MG tablet Take 10 mg by mouth 2 times daily (before meals) 10  Mg in the am - 12.5MG  @@ DINNER       HYDROmorphone (DILAUDID) 2 MG tablet Take 1 tablet (2 mg) by mouth every 4 hours as needed for moderate to severe pain 30 tablet 0     insulin glargine (LANTUS VIAL) 100 UNIT/ML vial Inject 7 Units Subcutaneous daily (with breakfast)       lisinopril (ZESTRIL) 10 MG tablet Take 10 mg by mouth daily       metFORMIN (GLUCOPHAGE) 1000 MG tablet Take 500 mg by mouth 2 times daily (with meals)       mineral oil-hydrophilic petrolatum (AQUAPHOR) external ointment Apply topically every 8 hours 420 g 0     ondansetron (ZOFRAN-ODT) 4 MG ODT tab Take 1 tablet (4 mg) by mouth every 6 hours as needed for nausea or vomiting 20 tablet 0     pilocarpine (SALAGEN) 7.5 MG tablet Take 7.5 mg by mouth 2 times daily       polyethylene glycol (MIRALAX) 17 GM/Dose powder Take 17 g by mouth daily 510 g 0     senna-docusate (SENOKOT-S/PERICOLACE) 8.6-50 MG tablet Take 1 tablet by mouth 2 times daily as needed for constipation 20 tablet 0     simvastatin (ZOCOR) 40 MG tablet Take 40 mg by mouth At Bedtime       LORazepam (ATIVAN) 0.5 MG tablet Take 1 tablet (0.5 mg) by mouth every 4 hours as needed (Anxiety, Nausea/Vomiting or Sleep) 30 tablet 1     ondansetron (ZOFRAN) 8 MG tablet Take 1 tablet (8 mg) by mouth every 8 hours as needed for nausea (vomiting) 10 tablet 1     prochlorperazine (COMPAZINE) 10 MG tablet Take 1 tablet (10 mg) by mouth every 6 hours as needed (Nausea/Vomiting) 30 tablet 1       Physical Examination:  There were no vitals taken for this visit.   Wt Readings from Last 10 Encounters:   01/26/21 86.6 kg (191 lb)   01/25/21 85.7 kg (189 lb)   01/15/21 83.5 kg (184 lb)   01/05/21 84.8 kg (187 lb)   01/05/21 83.5 kg (184 lb)   12/15/20 84.4 kg (186 lb)   12/11/20 84.1 kg (185 lb 4.8 oz)   11/24/20 86.6 kg (191 lb)   11/03/20 83.9 kg (185 lb)   09/29/20 81.6 kg (180 lb)     Telephone visit was done today   Voice sounded strong, able to follow thought processes, did not sound to be in  acute distress      Laboratory Data:  Results for TEOFILO CHAN (MRN [id number]) as of 2/1/2021 08:57   Ref. Range 2/1/2021 08:25   Sodium Latest Ref Range: 133 - 144 mmol/L 137   Potassium Latest Ref Range: 3.4 - 5.3 mmol/L 4.2   Chloride Latest Ref Range: 94 - 109 mmol/L 102   Carbon Dioxide Latest Ref Range: 20 - 32 mmol/L 28   Urea Nitrogen Latest Ref Range: 7 - 30 mg/dL 30   Creatinine Latest Ref Range: 0.66 - 1.25 mg/dL 1.82 (H)   GFR Estimate Latest Ref Range: >60 mL/min/1.73_m2 36 (L)   GFR Estimate If Black Latest Ref Range: >60 mL/min/1.73_m2 42 (L)   Calcium Latest Ref Range: 8.5 - 10.1 mg/dL 9.4   Anion Gap Latest Ref Range: 3 - 14 mmol/L 6   Magnesium Latest Ref Range: 1.6 - 2.3 mg/dL 1.6   Albumin Latest Ref Range: 3.4 - 5.0 g/dL 3.6   Protein Total Latest Ref Range: 6.8 - 8.8 g/dL 7.3   Bilirubin Total Latest Ref Range: 0.2 - 1.3 mg/dL 0.4   Alkaline Phosphatase Latest Ref Range: 40 - 150 U/L 80   ALT Latest Ref Range: 0 - 70 U/L 23   AST Latest Ref Range: 0 - 45 U/L 16   Glucose Latest Ref Range: 70 - 99 mg/dL 166 (H)   WBC Latest Ref Range: 4.0 - 11.0 10e9/L 8.0   Hemoglobin Latest Ref Range: 13.3 - 17.7 g/dL 13.3   Hematocrit Latest Ref Range: 40.0 - 53.0 % 39.9 (L)   Platelet Count Latest Ref Range: 150 - 450 10e9/L 286   RBC Count Latest Ref Range: 4.4 - 5.9 10e12/L 4.39 (L)   MCV Latest Ref Range: 78 - 100 fl 91   MCH Latest Ref Range: 26.5 - 33.0 pg 30.3   MCHC Latest Ref Range: 31.5 - 36.5 g/dL 33.3   RDW Latest Ref Range: 10.0 - 15.0 % 11.9   Diff Method Unknown Automated Method   % Neutrophils Latest Units: % 58.0   % Lymphocytes Latest Units: % 26.3   % Monocytes Latest Units: % 12.0   % Eosinophils Latest Units: % 2.5   % Basophils Latest Units: % 0.6   % Immature Granulocytes Latest Units: % 0.6   Nucleated RBCs Latest Ref Range: 0 /100 0   Absolute Neutrophil Latest Ref Range: 1.6 - 8.3 10e9/L 4.6   Absolute Lymphocytes Latest Ref Range: 0.8 - 5.3 10e9/L 2.1   Absolute Monocytes  Latest Ref Range: 0.0 - 1.3 10e9/L 1.0   Absolute Eosinophils Latest Ref Range: 0.0 - 0.7 10e9/L 0.2   Absolute Basophils Latest Ref Range: 0.0 - 0.2 10e9/L 0.1   Abs Immature Granulocytes Latest Ref Range: 0 - 0.4 10e9/L 0.1   Absolute Nucleated RBC Unknown 0.0       Assessment and Plan:  ONC  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  - Getting limited field radiotherapy through Dr. Herrera. Given C1D1 low-dose weekly cisplatin 1/25/21. Today, despite reported adequate oral hydration, renal function has declined. Discussed with Dr. Mark- will switch to weekly carbo/taxol. Reviewed potenial AEs with Kenrick including but not limited to neuropathy, cytopenias, hair loss, N/V/D.    - port placed    GI  Nutrition - given limited radiation field, a prophylactic feeding tube was not placed. He may need an NG tube in the latter stages of radiation.      GERD- improved with omeprazole     ENDO  Diabetes - [facility]. [facility], per pt report, did not want to remove metformin. Given LUCIANA, will ask him to hold. Has follow up with endocrinology this week. Hopeful for transition to insulin on therapy.    RENAL  Baseline renal insufficiency, now worsening. Base Cr appears to be around 1.3- now increased to 1.8 after just 1 dose of weekly cis and reported adequate oral hydration. Change chemo, as above. Will give additional IVF today and hold metformin. Continue adequately hydrated.     25 minutes spent on the date of the encounter doing chart review, review of test results, interpretation of tests, documentation and discussion with other provider(s), in addition to 15 minutes spent on the phone with the patient.       Polly Vargas PA-C  [facility]  [address] [place] [address] [phone]

## 2021-01-28 NOTE — TELEPHONE ENCOUNTER
To schedulers : please schedule with consult service (or open Diabetes new/CARMITA) within the week. This could be a virtual visit.      Maya Perry MD  Endocrine triage

## 2021-02-01 NOTE — Clinical Note
2/1/2021         RE: Reese Oakley  [address] [facility] [address]        Dear Colleague,    Thank you for referring your patient, Reese Oakley, to the [facility]. Please see a copy of my visit note below.    Kenrick is a 70 year old who is being evaluated via a billable telephone visit.      What phone number would you like to be contacted at? [phone]  How would you like to obtain your AVS? Iwona     I have reviewed and updated the patient's allergies and medication list.    Concerns: No new concerns.   Refills: None needed.    Appointment was converted to telephone, patient states he does not have a computer with him and only has a flip phone, no video capabilities [place] [facility]        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  More recently he was noted on follow up scans to have enlarged R sided lymph nodes and biopsy proven recurrence in the R neck.  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  There was one additional focus of squamous cell carcinoma identified in an additional lymph node without MEL.  His case was discussed at multidisciplinary tumor board and he was recommended to have chemoradiation - due to MEL in the submandibular gland.      He is currently feeling well. He has no pain.  His surgical wound is healing nicely. He is eating and drinking well. He has not lost weight.  He is active when he can be.  He notes that last  summer and even into winter that he was playing golf even up to December this year.  He has no shortness of breath and is fully active.  Ordinarily he would be bowling regularly in the winter, however, due to pandemic, he has been unable to do this.       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  He received 6 months of adjuvant FOLFOX chemotherapy at that time.  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      He has a historly of long-standing diabetes.  He has been on insulin at some points, however, currently is taking metformin and glipizide. He notes that his PCP has decreased the metformoin dose due to increase in his creatinine recently. This has resulted in poorer glycemic control, but his creatinine has also improved. He has some neuropathy in his feet but he says this is mild.      With regards to his prior radiotherapy - he has minimal residual toxicity from this. He has no woody fibrosis in his neck. He has had dental issues, but mostly just cavities - and he regularly sees a dentist.  He has not had ORN.    He otherwise does not have a lot of xerostomia from prior therapy.        Interval History:  Reese Oakley was met with for follow up over telephone.   He is due to start chemoradiation today.  He overall feels prepared and does not have any residual questions.  He had his port placed and states that it is not bothersome to him.  -He denies any headaches, dizziness, fevers, chills, cough, shortness of breath, chest pain, nausea, abnormal bowel habits, urinary issues, rash, swelling, hearing dysfunction.  States he [facility] but they did not favor a switch to insulin.  He is open to meeting with endocrinology here through [place].  -He does not have any significant dysphagia at baseline.  Does continue to have a dry mouth but  stable.    Reese Oakley was met with for follow up over telephone.   - *** Energy level is slightly diminished.   - No pain, nausea, difficulty eating, diminished hearing, headaches, dizziness, fevers, chills, stomach pain, bowel issues, swelling   - No   - Drinks a lot of fluids- 4 16 oz fluid.   - ***     ROS: 10 point ROS neg other than the symptoms noted above in the HPI.      Current Outpatient Medications   Medication Sig Dispense Refill     acetaminophen (TYLENOL) 325 MG tablet Take 2 tablets (650 mg) by mouth every 4 hours as needed for other (multimodal surgical pain management along with NSAIDS and opioid medication as indicated based on pain control and physical function.) 60 tablet 0     aspirin (ASA) 81 MG chewable tablet Take 81 mg by mouth daily       glipiZIDE (GLUCOTROL) 10 MG tablet Take 10 mg by mouth 2 times daily (before meals) 10 Mg in the am - 12.5MG  @@ DINNER       HYDROmorphone (DILAUDID) 2 MG tablet Take 1 tablet (2 mg) by mouth every 4 hours as needed for moderate to severe pain 30 tablet 0     insulin glargine (LANTUS VIAL) 100 UNIT/ML vial Inject 7 Units Subcutaneous daily (with breakfast)       lisinopril (ZESTRIL) 10 MG tablet Take 10 mg by mouth daily       LORazepam (ATIVAN) 0.5 MG tablet Take 1 tablet (0.5 mg) by mouth every 4 hours as needed (Anxiety, Nausea/Vomiting or Sleep) 30 tablet 1     metFORMIN (GLUCOPHAGE) 1000 MG tablet Take 500 mg by mouth 2 times daily (with meals)       mineral oil-hydrophilic petrolatum (AQUAPHOR) external ointment Apply topically every 8 hours 420 g 0     ondansetron (ZOFRAN-ODT) 4 MG ODT tab Take 1 tablet (4 mg) by mouth every 6 hours as needed for nausea or vomiting 20 tablet 0     pilocarpine (SALAGEN) 7.5 MG tablet Take 7.5 mg by mouth 2 times daily       polyethylene glycol (MIRALAX) 17 GM/Dose powder Take 17 g by mouth daily 510 g 0     prochlorperazine (COMPAZINE) 10 MG tablet Take 1 tablet (10 mg) by mouth every 6 hours as needed  (Nausea/Vomiting) 30 tablet 1     senna-docusate (SENOKOT-S/PERICOLACE) 8.6-50 MG tablet Take 1 tablet by mouth 2 times daily as needed for constipation 20 tablet 0     simvastatin (ZOCOR) 40 MG tablet Take 40 mg by mouth At Bedtime         Physical Examination:  There were no vitals taken for this visit. ***  Wt Readings from Last 10 Encounters:   01/26/21 86.6 kg (191 lb)   01/25/21 85.7 kg (189 lb)   01/15/21 83.5 kg (184 lb)   01/05/21 84.8 kg (187 lb)   01/05/21 83.5 kg (184 lb)   12/15/20 84.4 kg (186 lb)   12/11/20 84.1 kg (185 lb 4.8 oz)   11/24/20 86.6 kg (191 lb)   11/03/20 83.9 kg (185 lb)   09/29/20 81.6 kg (180 lb)     Telephone visit was done today ***  Voice sounded strong, able to follow thought processes, did not sound to be in acute distress      Laboratory Data:  Results for TEFOILO CHAN (MRN [id number]) as of 2/1/2021 08:57   Ref. Range 2/1/2021 08:25   Sodium Latest Ref Range: 133 - 144 mmol/L 137   Potassium Latest Ref Range: 3.4 - 5.3 mmol/L 4.2   Chloride Latest Ref Range: 94 - 109 mmol/L 102   Carbon Dioxide Latest Ref Range: 20 - 32 mmol/L 28   Urea Nitrogen Latest Ref Range: 7 - 30 mg/dL 30   Creatinine Latest Ref Range: 0.66 - 1.25 mg/dL 1.82 (H)   GFR Estimate Latest Ref Range: >60 mL/min/1.73_m2 36 (L)   GFR Estimate If Black Latest Ref Range: >60 mL/min/1.73_m2 42 (L)   Calcium Latest Ref Range: 8.5 - 10.1 mg/dL 9.4   Anion Gap Latest Ref Range: 3 - 14 mmol/L 6   Magnesium Latest Ref Range: 1.6 - 2.3 mg/dL 1.6   Albumin Latest Ref Range: 3.4 - 5.0 g/dL 3.6   Protein Total Latest Ref Range: 6.8 - 8.8 g/dL 7.3   Bilirubin Total Latest Ref Range: 0.2 - 1.3 mg/dL 0.4   Alkaline Phosphatase Latest Ref Range: 40 - 150 U/L 80   ALT Latest Ref Range: 0 - 70 U/L 23   AST Latest Ref Range: 0 - 45 U/L 16   Glucose Latest Ref Range: 70 - 99 mg/dL 166 (H)   WBC Latest Ref Range: 4.0 - 11.0 10e9/L 8.0   Hemoglobin Latest Ref Range: 13.3 - 17.7 g/dL 13.3   Hematocrit Latest Ref Range: 40.0 -  53.0 % 39.9 (L)   Platelet Count Latest Ref Range: 150 - 450 10e9/L 286   RBC Count Latest Ref Range: 4.4 - 5.9 10e12/L 4.39 (L)   MCV Latest Ref Range: 78 - 100 fl 91   MCH Latest Ref Range: 26.5 - 33.0 pg 30.3   MCHC Latest Ref Range: 31.5 - 36.5 g/dL 33.3   RDW Latest Ref Range: 10.0 - 15.0 % 11.9   Diff Method Unknown Automated Method   % Neutrophils Latest Units: % 58.0   % Lymphocytes Latest Units: % 26.3   % Monocytes Latest Units: % 12.0   % Eosinophils Latest Units: % 2.5   % Basophils Latest Units: % 0.6   % Immature Granulocytes Latest Units: % 0.6   Nucleated RBCs Latest Ref Range: 0 /100 0   Absolute Neutrophil Latest Ref Range: 1.6 - 8.3 10e9/L 4.6   Absolute Lymphocytes Latest Ref Range: 0.8 - 5.3 10e9/L 2.1   Absolute Monocytes Latest Ref Range: 0.0 - 1.3 10e9/L 1.0   Absolute Eosinophils Latest Ref Range: 0.0 - 0.7 10e9/L 0.2   Absolute Basophils Latest Ref Range: 0.0 - 0.2 10e9/L 0.1   Abs Immature Granulocytes Latest Ref Range: 0 - 0.4 10e9/L 0.1   Absolute Nucleated RBC Unknown 0.0       Assessment and Plan:  ONC  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  - Dr. Herrera is planning for limited field radiotherapy.Will start low-dose weekly cisplatin today 1/25/21 with close monitoring given underlying renal insufficiency.   - port placed  -Again reviewed potential adverse effects from cisplatin.  This is including but not limited to cytopenias, renal dysfunction, neuropathy, nausea, hearing loss. ***;     GI  Nutrition - given limited radiation field, a prophylactic feeding tube was not placed. He may need an NG tube in the latter stages of radiation.      GERD- improved with omeprazole     ENDO  Diabetes - on [facility]. Previously asked that he reach out with [facility] to see if he could transition to insulin to help support renal function. He reports they did not want to make any changes at this time. Will refer to  endocrinology here for further discussion.     RENAL  Baseline renal insufficiency. Base Cr appears to be around 1.3. Will monitor closely on cisplatin therapy. Aware to stayed adequately hydrated.     Polly Vargas PA-C  [facility] [address] [address] [phone]      Again, thank you for allowing me to participate in the care of your patient.        Sincerely,        Polly Vargas PA-C

## 2021-02-01 NOTE — PROGRESS NOTES
Chief Complaint   Patient presents with     Port Draw     PORT accessed, Labs drawn, Heparin locked, VSS, checked into next visit     Chaim Argueta RN on 2/1/2021 at 8:29 AM

## 2021-02-01 NOTE — TELEPHONE ENCOUNTER
RECORDS RECEIVED FROM: internal   DATE RECEIVED: 2.3.21   NOTES (FOR ALL VISITS) STATUS DETAILS   OFFICE NOTES from referring provider Internal  Polly Vargas PA-C   OFFICE NOTES from other specialist ce - 12.23.20 [place]   2.13.20 [place]      ED NOTES na    OPERATIVE REPORT  (thyroid, pituitary, adrenal, parathyroid) na    MEDICATION LIST Internal     IMAGING      DEXASCAN na    MRI (BRAIN) na    XR (Chest) Internal  PET- 11.24.20, 1.27.20    CT (HEAD/NECK/CHEST/ABDOMEN) Internal  5.19.20, 1.27.20   NUCLEAR  na    ULTRASOUND (HEAD/NECK) Internal  6.30.20, 4.7.20,    LABS     DIABETES: HBGA1C, CREATININE, FASTING LIPIDS, MICROALBUMIN URINE, POTASSIUM, TSH, T4    THYROID: TSH, T4, CBC, THYRODLONULIN, TOTAL T3, FREE T4, CALCITONIN, CEA Internal  Cbc- 2.1.21   CREATININE 12.9.20   HBGA1C 12.9.20   POTASSIUM 12.9.20

## 2021-02-01 NOTE — PATIENT INSTRUCTIONS
[facility] Triage and after hours / weekends / holidays:  [phone]    Please call the triage or after hours line if you experience a temperature greater than or equal to 100.5, shaking chills, have uncontrolled nausea, vomiting and/or diarrhea, dizziness, shortness of breath, chest pain, bleeding, unexplained bruising, or if you have any other new/concerning symptoms, questions or concerns.      If you are having any concerning symptoms or wish to speak to a provider before your next infusion visit, please call your care coordinator or triage to notify them so we can adequately serve you.     If you need a refill on a narcotic prescription or other medication, please call before your infusion appointment.     Recent Results (from the past 24 hour(s))   CBC with platelets differential    Collection Time: 02/01/21  8:25 AM   Result Value Ref Range    WBC 8.0 4.0 - 11.0 10e9/L    RBC Count 4.39 (L) 4.4 - 5.9 10e12/L    Hemoglobin 13.3 13.3 - 17.7 g/dL    Hematocrit 39.9 (L) 40.0 - 53.0 %    MCV 91 78 - 100 fl    MCH 30.3 26.5 - 33.0 pg    MCHC 33.3 31.5 - 36.5 g/dL    RDW 11.9 10.0 - 15.0 %    Platelet Count 286 150 - 450 10e9/L    Diff Method Automated Method     % Neutrophils 58.0 %    % Lymphocytes 26.3 %    % Monocytes 12.0 %    % Eosinophils 2.5 %    % Basophils 0.6 %    % Immature Granulocytes 0.6 %    Nucleated RBCs 0 0 /100    Absolute Neutrophil 4.6 1.6 - 8.3 10e9/L    Absolute Lymphocytes 2.1 0.8 - 5.3 10e9/L    Absolute Monocytes 1.0 0.0 - 1.3 10e9/L    Absolute Eosinophils 0.2 0.0 - 0.7 10e9/L    Absolute Basophils 0.1 0.0 - 0.2 10e9/L    Abs Immature Granulocytes 0.1 0 - 0.4 10e9/L    Absolute Nucleated RBC 0.0    Comprehensive metabolic panel    Collection Time: 02/01/21  8:25 AM   Result Value Ref Range    Sodium 137 133 - 144 mmol/L    Potassium 4.2 3.4 - 5.3 mmol/L    Chloride 102 94 - 109 mmol/L    Carbon Dioxide 28 20 - 32 mmol/L    Anion Gap 6 3 - 14 mmol/L    Glucose 166 (H) 70 - 99 mg/dL    Urea  Nitrogen 30 7 - 30 mg/dL    Creatinine 1.82 (H) 0.66 - 1.25 mg/dL    GFR Estimate 36 (L) >60 mL/min/[1.73_m2]    GFR Estimate If Black 42 (L) >60 mL/min/[1.73_m2]    Calcium 9.4 8.5 - 10.1 mg/dL    Bilirubin Total 0.4 0.2 - 1.3 mg/dL    Albumin 3.6 3.4 - 5.0 g/dL    Protein Total 7.3 6.8 - 8.8 g/dL    Alkaline Phosphatase 80 40 - 150 U/L    ALT 23 0 - 70 U/L    AST 16 0 - 45 U/L   Magnesium    Collection Time: 02/01/21  8:25 AM   Result Value Ref Range    Magnesium 1.6 1.6 - 2.3 mg/dL          normal...

## 2021-02-01 NOTE — PROGRESS NOTES
Infusion Nursing Note:  Reese Oakley presents today for Cycle 1 Day 1 Taxol and Carboplatin.    Patient seen by provider today: Yes: SILVESTRE Moncada.   present during visit today: Not Applicable.    Note: Pt is being switched to Taxol and Carboplatin today instead of Cisplatin due to LUCIANA.  Verbally reviewed and information given about chemotherapy teaching, side effects, take-home medications, and follow-up schedule with patient. Patient instructed to call triage with any questions or if he experiences a temperature >100.4, shaking chills, uncontrolled nausea/vomiting/diarrhea, dizziness, shortness of breath, bleeding not relieved with pressure, or with any other concerns.    Taxol Infusion Rates  999ml/hr for 14ml.  10 ml/hr for 5 minutes  25 ml/hr for 5 minutes  50 ml/hr for 5 minutes  100 ml/hr for 5 minutes  290 ml/hr for the remainder of the infusion    Intravenous Access:  Implanted Port.    Treatment Conditions:  Lab Results   Component Value Date    HGB 13.3 02/01/2021     Lab Results   Component Value Date    WBC 8.0 02/01/2021      Lab Results   Component Value Date    ANEU 4.6 02/01/2021     Lab Results   Component Value Date     02/01/2021      Lab Results   Component Value Date     02/01/2021                   Lab Results   Component Value Date    POTASSIUM 4.2 02/01/2021           Lab Results   Component Value Date    MAG 1.6 02/01/2021            Lab Results   Component Value Date    CR 1.82 02/01/2021                   Lab Results   Component Value Date    ELEUTERIO 9.4 02/01/2021                Lab Results   Component Value Date    BILITOTAL 0.4 02/01/2021           Lab Results   Component Value Date    ALBUMIN 3.6 02/01/2021                    Lab Results   Component Value Date    ALT 23 02/01/2021           Lab Results   Component Value Date    AST 16 02/01/2021     Results reviewed, labs MET treatment parameters, ok to proceed with treatment.    Post Infusion  Assessment:  Patient tolerated infusion without incident.  Blood return noted pre and post infusion.  Site patent and intact, free from redness, edema or discomfort.  No evidence of extravasations.  Access discontinued per protocol.     Discharge Plan:   Prescription refills given for Zofran.  Discharge instructions reviewed with: Patient.  Patient and/or family verbalized understanding of discharge instructions and all questions answered.  Copy of AVS reviewed with patient and/or family.  Patient will return 2/8 for next appointment.  Patient discharged in stable condition accompanied by: self.  Departure Mode: Ambulatory.  Face to Face time: 5.    Marguerite Kirkpatrick RN

## 2021-02-01 NOTE — PROGRESS NOTES
Outcome for 02/01/21 1:48 PM :Left Voicemail for patient to call back   Outcome for 02/02/21 10:03 AM :Left Voicemail for patient to call back   Outcome for 02/02/21 10:07 AM :sent [employer] to send bs and call me   Outcome for 02/02/21 3:07 PM :Glucose data obtained via Phone and Located in Table Below       1/19 144 am 181 dinner   1/20 161 am  185 noon  1/21 150 am 173 dinner  1/22 181 am 192 dinner  1/23 191 am 209 noon 160 dinner  1/24 146 am 163 dinner  1/25 118 am 279 dinner  1/26 355 am 309 dinner  1.27 229 am 227 dinner  1/28 149 am 161 diner  1/29 166 am 224 dinner  1/30 330 am 189 dinner   1/31 182 am 144 dinner  2/1 157 am 368 dinner  2/1 389 am 303 noon    Reese YAMILE Salvadoron  is being evaluated via a billable video visit.      How would you like to obtain your AVS? Marija  For the video visit, send the invitation by:marija  Will anyone else be joining your video visit? No

## 2021-02-02 NOTE — LETTER
2021     RE: [facility]  [address] [facility] [address]        Dear Colleague,    Thank you for referring your patient, Reese Oakley, to the [facility] RADIATION ONCOLOGY. Please see a copy of my visit note below.    [facility] PHYSICIANS  SPECIALIZING IN BREAKTHROUGHS  Radiation Oncology    On Treatment Visit Note      Reese Oakley      Date: 2021   MRN: [id number]   : 1950  Diagnosis: Tongue Cancer    Treatment Summary to Date   Right Neck Current Dose: 1400 cGy/6000 cGy Fractions:       Chemotherapy  Chemo concurrent with radx?: Yes  Oncologist: Dr Ferrell  Drug Name/Frequency 1: Weekly cisplatin week one, change to carboplatin week 2    Subjective: Mr. Oakley is tolerating chemoradiation well. He continues to be concerned about his blood glucose elevation following dexamethasone administration for chemotherapy.  His creatinine increased after his first cycle of chemotherapy and he is now been switched to carboplatin.  In addition, Metformin has been discontinued.  He is currently taking glipizide and Lantus but is still struggling with elevated glucose.  He has an appointment with his endocrinologist tomorrow.  .  Nursing ROS:   Nutrition Alteration  Diet Type: Patient's Preference  Nutrition Note: No PEG  Skin  Skin Reaction: 0 - No changes     ENT and Mouth Exam  Mucositis - Current: 0 - None      Gastrointestinal  Nausea: 0 - None     Psychosocial  Mood - Anxiety: 0 - Normal  Pain Assessment  0-10 Pain Scale: 0    Objective:   /78   Pulse 94   Wt 84.8 kg (187 lb)   BMI 29.29 kg/m  , pain 0/10  Gen: Appears well, NAD  Skin: No erythema    Laboratory:  Lab Results   Component Value Date    WBC 8.0 2021    HGB 13.3 2021    HCT 39.9 (L) 2021    MCV 91 2021     2021     Lab Results   Component Value Date     2021    POTASSIUM 4.2 2021    CHLORIDE 102 2021     CO2 28 02/01/2021     (H) 02/01/2021     Lab Results   Component Value Date    AST 16 02/01/2021    ALT 23 02/01/2021    ALKPHOS 80 02/01/2021    BILITOTAL 0.4 02/01/2021     Assessment:    Tolerating radiation therapy well.  All questions and concerns addressed.    Treatment-related toxicities (CTCAE v5.0): None    Plan:   1. Continue current therapy.    2. Encourage patient to keep morning appointments for chemotherapy so that he can be treated in the afternoon following chemotherapy administration.    [employer] chart and setup information reviewed  MVCT images reviewed    Medication Review  Med list reviewed with patient?: Yes    Educational Topic Discussed  Education Instructions: Reviewed    Jennifer Herrera MD  Department of Radiation Oncology  [facility]

## 2021-02-02 NOTE — PROGRESS NOTES
[facility] PHYSICIANS  SPECIALIZING IN BREAKTHROUGHS  Radiation Oncology    On Treatment Visit Note      Reese Oakley      Date: 2021   MRN: [id number]   : 1950  Diagnosis: Tongue Cancer    Treatment Summary to Date   Right Neck Current Dose: 1400 cGy/6000 cGy Fractions:       Chemotherapy  Chemo concurrent with radx?: Yes  Oncologist: Dr Ferrell  Drug Name/Frequency 1: Weekly cisplatin week one, change to carboplatin week 2    Subjective: Mr. Oakley is tolerating chemoradiation well. He continues to be concerned about his blood glucose elevation following dexamethasone administration for chemotherapy.  His creatinine increased after his first cycle of chemotherapy and he is now been switched to carboplatin.  In addition, Metformin has been discontinued.  He is currently taking glipizide and Lantus but is still struggling with elevated glucose.  He has an appointment with his endocrinologist tomorrow.  .  Nursing ROS:   Nutrition Alteration  Diet Type: Patient's Preference  Nutrition Note: No PEG  Skin  Skin Reaction: 0 - No changes     ENT and Mouth Exam  Mucositis - Current: 0 - None      Gastrointestinal  Nausea: 0 - None     Psychosocial  Mood - Anxiety: 0 - Normal  Pain Assessment  0-10 Pain Scale: 0    Objective:   /78   Pulse 94   Wt 84.8 kg (187 lb)   BMI 29.29 kg/m  , pain 0/10  Gen: Appears well, NAD  Skin: No erythema    Laboratory:  Lab Results   Component Value Date    WBC 8.0 2021    HGB 13.3 2021    HCT 39.9 (L) 2021    MCV 91 2021     2021     Lab Results   Component Value Date     2021    POTASSIUM 4.2 2021    CHLORIDE 102 2021    CO2 28 2021     (H) 2021     Lab Results   Component Value Date    AST 16 2021    ALT 23 2021    ALKPHOS 80 2021    BILITOTAL 0.4 2021     Assessment:    Tolerating radiation therapy well.  All questions and concerns  addressed.    Treatment-related toxicities (CTCAE v5.0): None    Plan:   1. Continue current therapy.    2. Encourage patient to keep morning appointments for chemotherapy so that he can be treated in the afternoon following chemotherapy administration.    [employer] chart and setup information reviewed  MVCT images reviewed    Medication Review  Med list reviewed with patient?: Yes    Educational Topic Discussed  Education Instructions: Reviewed    Jennifer Herrera MD  Department of Radiation Oncology  [facility]

## 2021-02-03 NOTE — LETTER
2/3/2021       RE: Reese Oakley  [address] [facility] [address]     Dear Colleague,    Thank you for referring your patient, Reese Oakley, to the [facility] ENDOCRINOLOGY CLINIC [place] at [facility]. Please see a copy of my visit note below.    Outcome for 02/01/21 1:48 PM :Left Voicemail for patient to call back   Outcome for 02/02/21 10:03 AM :Left Voicemail for patient to call back   Outcome for 02/02/21 10:07 AM :sent [employer] to send bs and call me   Outcome for 02/02/21 3:07 PM :Glucose data obtained via Phone and Located in Table Below       1/19 144 am 181 dinner   1/20 161 am  185 noon  1/21 150 am 173 dinner  1/22 181 am 192 dinner  1/23 191 am 209 noon 160 dinner  1/24 146 am 163 dinner  1/25 118 am 279 dinner  1/26 355 am 309 dinner  1.27 229 am 227 dinner  1/28 149 am 161 diner  1/29 166 am 224 dinner  1/30 330 am 189 dinner   1/31 182 am 144 dinner  2/1 157 am 368 dinner  2/1 389 am 303 noon    Reese Oakley  is being evaluated via a billable video visit.      How would you like to obtain your AVS? [employer]  For the video visit, send the invitation by:MarkITx  Will anyone else be joining your video visit? No              Endocrinology Clinic New Consult      Reese Oakley MRN:[id number] YOB: 1950  Primary care provider: Clark Marie     Reason for Endocrine consult: type II diabetes in the context of worsening renal function and treatment of squamous cell carcinoma of neck    HPI:  Reese Oakley is a 70 year old male with PMHx of hypertension, long standing type II diabetes, prior history of larynx cancer in 2014 treated with adjuvant radio therapy, had a new tongue cancer in 2020 which was restricted. 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 He has been on metformin 1000 mg bid and glipizide 10 mg twice daily.    He is getting chemotherapy 1 day per week on Monday for 6 cycles. He received the first cycle of chemotherapy with cisplatin, received on 1/25/21. He received dexamethasone 12 mg with chemotherapy. Unfortunately the patient developed worsening of his renal function despite good hydration after the chemotherapy. His glycemic control after getting dexamethasone. Metformin was discontinued after decline in renal function and Lantus was started at 7 units daily.     For his second cycle of chemotherapy he received carboplatin and taxol on 2/1/21 with higher dose of dexamethasone 20 mg. He checked blood glucose twice a day. Reviewed BG below  1/19 144 am 181 dinner   1/20 161 am  185 noon  1/21 150 am 173 dinner  1/22 181 am 192 dinner  1/23 191 am 209 noon 160 dinner  1/24 146 am 163 dinner  1/25 118 am 279 dinner -- chemotherapy infusion +Dex 12 mg  1/26 355 am 309 dinner  1/27 229 am 227 dinner  1/28 149 am 161 diner  1/29 166 am 224 dinner  1/30 330 am 189 dinner   1/31 182 am 144 dinner  2/1 157 am 368 dinner ---- chemotherapy infusion +Dex 20 mg  2/1 389 am 303 noon    He again noted worsening of glycemic control about 2 days after dexamethasone therapy and returned back to his baseline.   He will be receiving chemotherapy on a weekly basis for another 4 weeks. Has been tolerating chemotherapy and radiation therapy well. He is also receiving radiation therapy to his neck    He has had a good appetite, which has been at his baseline. Has not had any episodes of nausea, vomiting, diarrhea or constipation. Denies any abdominal discomfort.    ROS:  All 12 systems were reviewed and negative except as mentioned in HPI    Past Medical/Surgical History:  Past Medical History:   Diagnosis Date     Adenocarcinoma (H)     large instestine      Cancer of appendix (H)      Diabetes (H)      Gastro-oesophageal reflux disease      History of  radiation therapy      Hoarseness      Hypertension      Past Surgical History:   Procedure Laterality Date     APPENDECTOMY       COLECTOMY      Right     DISSECTION RADICAL NECK MODIFIED Right 12/9/2020    Procedure: Modified neck dissection;  Surgeon: Jose Antonio Mckenna MD;  Location: [facility] OR     EXCISE LESION INTRAORAL Right 2/19/2020    Procedure: Wide Local Excision of Right Tongue Lesion;  Surgeon: Jose Antonio Mckenna MD;  Location: [facility] OR     INSERT PORT VASCULAR ACCESS Right 1/15/2021    Procedure: CHEST INSERTION, VASCULAR ACCESS PORT @0900;  Surgeon: Tony Hopkins MD;  Location: [facility] OR     IR CHEST PORT PLACEMENT > 5 YRS OF AGE  1/15/2021     LASER CO2 LARYNGOSCOPY N/A 10/6/2014    Procedure: LASER CO2 LARYNGOSCOPY;  Surgeon: Jose Antonio Mckenna MD;  Location: [facility] OR       Allergies:  Allergies   Allergen Reactions     Percocet [Oxycodone-Acetaminophen] Nausea and Vomiting       Current Medications:   Current Outpatient Medications   Medication Sig Dispense Refill     acetaminophen (TYLENOL) 325 MG tablet Take 2 tablets (650 mg) by mouth every 4 hours as needed for other (multimodal surgical pain management along with NSAIDS and opioid medication as indicated based on pain control and physical function.) 60 tablet 0     aspirin (ASA) 81 MG chewable tablet Take 81 mg by mouth daily       glipiZIDE (GLUCOTROL) 10 MG tablet Take 10 mg by mouth 2 times daily (before meals) 10 Mg in the am - 12.5MG  @@ DINNER       insulin glargine (LANTUS VIAL) 100 UNIT/ML vial Inject 7 Units Subcutaneous daily (with breakfast)       lisinopril (ZESTRIL) 10 MG tablet Take 10 mg by mouth daily       mineral oil-hydrophilic petrolatum (AQUAPHOR) external ointment Apply topically every 8 hours 420 g 0     pilocarpine (SALAGEN) 7.5 MG tablet Take 7.5 mg by mouth 2 times daily       simvastatin (ZOCOR) 40 MG tablet Take 40 mg by mouth At Bedtime       HYDROmorphone (DILAUDID) 2 MG tablet Take 1 tablet (2 mg) by mouth  every 4 hours as needed for moderate to severe pain 30 tablet 0     LORazepam (ATIVAN) 0.5 MG tablet Take 1 tablet (0.5 mg) by mouth every 4 hours as needed (Anxiety, Nausea/Vomiting or Sleep) 30 tablet 1     metFORMIN (GLUCOPHAGE) 1000 MG tablet Take 500 mg by mouth 2 times daily (with meals)       ondansetron (ZOFRAN) 8 MG tablet Take 1 tablet (8 mg) by mouth every 8 hours as needed for nausea (vomiting) 10 tablet 1     ondansetron (ZOFRAN-ODT) 4 MG ODT tab Take 1 tablet (4 mg) by mouth every 6 hours as needed for nausea or vomiting 20 tablet 0     polyethylene glycol (MIRALAX) 17 GM/Dose powder Take 17 g by mouth daily 510 g 0     prochlorperazine (COMPAZINE) 10 MG tablet Take 1 tablet (10 mg) by mouth every 6 hours as needed (Nausea/Vomiting) 30 tablet 1     senna-docusate (SENOKOT-S/PERICOLACE) 8.6-50 MG tablet Take 1 tablet by mouth 2 times daily as needed for constipation 20 tablet 0         Family History:  Family History   Problem Relation Age of Onset     Heart Disease Father      Diabetes Mother      Diabetes Brother        Social History:  Social History     Tobacco Use     Smoking status: Never Smoker     Smokeless tobacco: Never Used   Substance Use Topics     Alcohol use: Not Currently     Alcohol/week: 0.0 standard drinks     Comment: none for 20 years         Physical examination:  General:  Healthy, alert and oriented X3, NAD, answering questions appropriately.  Neurological: Alert. Mentation intact and speech normal.  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 Presents to endocrinology clinic with worsening glycemic control secondary to dexamethasone use with chemotherapy    1. Type II diabetes exacerbated by steroid. Hx of long standing diabetes with neuropathy and nephropathy. He has been on metformin 1000 mg bid and glipizide 10 mg twice daily.    Recently, he received dexamethasone with chemotherapy: cisplatin in cycle one (received dexamethasone 12 mg on 1/25/21), cisplatin therapy discontinued due to development of worsening renal function. Received carboplatin/Taxol with dexamethasone 20 mg on cycle 2 on 2/1/21  He will be receiving weekly chemotherapy for 4 more cycles  Review of his readings showed that hyperglycemia secondary to dexamethasone lasts for 48 hrs      Continue to hold metformin for now until renal function recovers    Continue glipizide 10 mg twice daily    Increase Lantus to 15 units on the day of chemotherapy and day after chemotherapy, in the morning (Monday-Tuesday)    Increase Lantus to 10 units on remaining days, in the morning    Continue glucose checks twice daily    The patient has been asked to send us his readings to us next Thursday    We will continue to monitor his glucose readings on a weekly basis until he continues with his chemotherapy    Due to the COVID 19 pandemic this visit was a video visit in order to help prevent spread of infection in this high risk patient and the general population. The patient gave verbal consent for the visit today.    Start time: 1350  Stop time: 1440  Total time: 50 minutes    Case discussed with MD Kristie Valdovinos MD  Endocrinology Fellow  Pager number [phone]    I was present with  the fellow who participated in the service and in the documentation of the note. I have verified the history and personally performed the physical exam and medical decision making. I agree with the assessment and plan of care as documented in the note.     External notes/medical records independently reviewed, labs and imaging independently reviewed, medical management and tests to be discussed/communicated to patient.    Time: I spent 25 minutes spent on the date of the encounter independently of the trainee preparing to see patient (including chart review and preparation), obtaining and or reviewing additional medical history, performing a limited physical exam and evaluation, documenting clinical information in the electronic health record, independently interpreting results, communicating results to the patient and coordinating care.    Zo Bar MD  Division of Diabetes and Endocrinology  Department of Medicine  [phone]

## 2021-02-03 NOTE — PROGRESS NOTES
Endocrinology Clinic New Consult      Reese Oakley MRN:[id number] YOB: 1950  Primary care provider: Clark Marie     Reason for Endocrine consult: type II diabetes in the context of worsening renal function and treatment of squamous cell carcinoma of neck    HPI:  Reese Oakley is a 70 year old male with PMHx of hypertension, long standing type II diabetes, prior history of larynx cancer in 2014 treated with adjuvant radio therapy, had a new tongue cancer in 2020 which was restricted. 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 He has been on metformin 1000 mg bid and glipizide 10 mg twice daily.    He is getting chemotherapy 1 day per week on Monday for 6 cycles. He received the first cycle of chemotherapy with cisplatin, received on 1/25/21. He received dexamethasone 12 mg with chemotherapy. Unfortunately the patient developed worsening of his renal function despite good hydration after the chemotherapy. His glycemic control after getting dexamethasone. Metformin was discontinued after decline in renal function and Lantus was started at 7 units daily.     For his second cycle of chemotherapy he received carboplatin and taxol on 2/1/21 with higher dose of dexamethasone 20 mg. He checked blood glucose twice a day. Reviewed BG below  1/19 144 am 181 dinner   1/20 161 am  185 noon  1/21 150 am 173 dinner  1/22 181 am 192 dinner  1/23 191 am 209 noon 160 dinner  1/24 146 am 163 dinner  1/25 118 am 279 dinner -- chemotherapy infusion +Dex 12 mg  1/26 355 am 309 dinner  1/27 229 am 227 dinner  1/28 149 am 161 diner  1/29 166 am 224 dinner  1/30 330 am 189 dinner   1/31 182 am 144 dinner  2/1 157 am 368 dinner ---- chemotherapy infusion +Dex 20 mg  2/1 389 am 303 noon    He again noted worsening of glycemic control about 2 days after  dexamethasone therapy and returned back to his baseline.   He will be receiving chemotherapy on a weekly basis for another 4 weeks. Has been tolerating chemotherapy and radiation therapy well. He is also receiving radiation therapy to his neck    He has had a good appetite, which has been at his baseline. Has not had any episodes of nausea, vomiting, diarrhea or constipation. Denies any abdominal discomfort.    ROS:  All 12 systems were reviewed and negative except as mentioned in HPI    Past Medical/Surgical History:  Past Medical History:   Diagnosis Date     Adenocarcinoma (H)     large instestine      Cancer of appendix (H)      Diabetes (H)      Gastro-oesophageal reflux disease      History of radiation therapy      Hoarseness      Hypertension      Past Surgical History:   Procedure Laterality Date     APPENDECTOMY       COLECTOMY      Right     DISSECTION RADICAL NECK MODIFIED Right 12/9/2020    Procedure: Modified neck dissection;  Surgeon: oJse Antonio Mckenna MD;  Location: [facility] OR     EXCISE LESION INTRAORAL Right 2/19/2020    Procedure: Wide Local Excision of Right Tongue Lesion;  Surgeon: Jose Antonio Mckenna MD;  Location: [facility] OR     INSERT PORT VASCULAR ACCESS Right 1/15/2021    Procedure: CHEST INSERTION, VASCULAR ACCESS PORT @0900;  Surgeon: Tony Hopkins MD;  Location: [facility] OR     IR CHEST PORT PLACEMENT > 5 YRS OF AGE  1/15/2021     LASER CO2 LARYNGOSCOPY N/A 10/6/2014    Procedure: LASER CO2 LARYNGOSCOPY;  Surgeon: Jose Antonio Mckenna MD;  Location: [facility] OR       Allergies:  Allergies   Allergen Reactions     Percocet [Oxycodone-Acetaminophen] Nausea and Vomiting       Current Medications:   Current Outpatient Medications   Medication Sig Dispense Refill     acetaminophen (TYLENOL) 325 MG tablet Take 2 tablets (650 mg) by mouth every 4 hours as needed for other (multimodal surgical pain management along with NSAIDS and opioid medication as indicated based on pain control and physical  function.) 60 tablet 0     aspirin (ASA) 81 MG chewable tablet Take 81 mg by mouth daily       glipiZIDE (GLUCOTROL) 10 MG tablet Take 10 mg by mouth 2 times daily (before meals) 10 Mg in the am - 12.5MG  @@ DINNER       insulin glargine (LANTUS VIAL) 100 UNIT/ML vial Inject 7 Units Subcutaneous daily (with breakfast)       lisinopril (ZESTRIL) 10 MG tablet Take 10 mg by mouth daily       mineral oil-hydrophilic petrolatum (AQUAPHOR) external ointment Apply topically every 8 hours 420 g 0     pilocarpine (SALAGEN) 7.5 MG tablet Take 7.5 mg by mouth 2 times daily       simvastatin (ZOCOR) 40 MG tablet Take 40 mg by mouth At Bedtime       HYDROmorphone (DILAUDID) 2 MG tablet Take 1 tablet (2 mg) by mouth every 4 hours as needed for moderate to severe pain 30 tablet 0     LORazepam (ATIVAN) 0.5 MG tablet Take 1 tablet (0.5 mg) by mouth every 4 hours as needed (Anxiety, Nausea/Vomiting or Sleep) 30 tablet 1     metFORMIN (GLUCOPHAGE) 1000 MG tablet Take 500 mg by mouth 2 times daily (with meals)       ondansetron (ZOFRAN) 8 MG tablet Take 1 tablet (8 mg) by mouth every 8 hours as needed for nausea (vomiting) 10 tablet 1     ondansetron (ZOFRAN-ODT) 4 MG ODT tab Take 1 tablet (4 mg) by mouth every 6 hours as needed for nausea or vomiting 20 tablet 0     polyethylene glycol (MIRALAX) 17 GM/Dose powder Take 17 g by mouth daily 510 g 0     prochlorperazine (COMPAZINE) 10 MG tablet Take 1 tablet (10 mg) by mouth every 6 hours as needed (Nausea/Vomiting) 30 tablet 1     senna-docusate (SENOKOT-S/PERICOLACE) 8.6-50 MG tablet Take 1 tablet by mouth 2 times daily as needed for constipation 20 tablet 0         Family History:  Family History   Problem Relation Age of Onset     Heart Disease Father      Diabetes Mother      Diabetes Brother        Social History:  Social History     Tobacco Use     Smoking status: Never Smoker     Smokeless tobacco: Never Used   Substance Use Topics     Alcohol use: Not Currently     Alcohol/week:  0.0 standard drinks     Comment: none for 20 years         Physical examination:  General:  Healthy, alert and oriented X3, NAD, answering questions appropriately.  Neurological: Alert. Mentation intact and speech normal.  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 Presents to endocrinology clinic with worsening glycemic control secondary to dexamethasone use with chemotherapy    1. Type II diabetes exacerbated by steroid. Hx of long standing diabetes with neuropathy and nephropathy. He has been on metformin 1000 mg bid and glipizide 10 mg twice daily.    Recently, he received dexamethasone with chemotherapy: cisplatin in cycle one (received dexamethasone 12 mg on 1/25/21), cisplatin therapy discontinued due to development of worsening renal function. Received carboplatin/Taxol with dexamethasone 20 mg on cycle 2 on 2/1/21  He will be receiving weekly chemotherapy for 4 more cycles  Review of his readings showed that hyperglycemia secondary to dexamethasone lasts for 48 hrs      Continue to hold metformin for now until renal function recovers    Continue glipizide 10 mg twice daily    Increase Lantus to 15 units on the day of chemotherapy and day after chemotherapy, in the morning  (Monday-Tuesday)    Increase Lantus to 10 units on remaining days, in the morning    Continue glucose checks twice daily    The patient has been asked to send us his readings to us next Thursday    We will continue to monitor his glucose readings on a weekly basis until he continues with his chemotherapy    Due to the COVID 19 pandemic this visit was a video visit in order to help prevent spread of infection in this high risk patient and the general population. The patient gave verbal consent for the visit today.    Start time: 1350  Stop time: 1440  Total time: 50 minutes    Case discussed with MD Kristie Valdovinos MD  Endocrinology Fellow  Pager number [phone]    I was present with the fellow who participated in the service and in the documentation of the note. I have verified the history and personally performed the physical exam and medical decision making. I agree with the assessment and plan of care as documented in the note.     External notes/medical records independently reviewed, labs and imaging independently reviewed, medical management and tests to be discussed/communicated to patient.    Time: I spent 25 minutes spent on the date of the encounter independently of the trainee preparing to see patient (including chart review and preparation), obtaining and or reviewing additional medical history, performing a limited physical exam and evaluation, documenting clinical information in the electronic health record, independently interpreting results, communicating results to the patient and coordinating care.    Zo Bar MD  Division of Diabetes and Endocrinology  Department of Medicine  [phone]

## 2021-02-08 NOTE — PROGRESS NOTES
Oncology/Hematology Visit Note  Feb 9, 2021    Reason for Visit: Follow up of recurrent SCC of the R neck    History of Present Illness: Reese Oakley is a 70 year old male with PMH HTN, hyperlipidemia, DM2 with recurrent SCC. 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  More recently he was noted on follow up scans to have enlarged R sided lymph nodes and biopsy proven recurrence in the R neck.  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  There was one additional focus of squamous cell carcinoma identified in an additional lymph node without MEL.  His case was discussed at multidisciplinary tumor board and he was recommended to have chemoradiation - due to MEL in the submandibular gland.     Started weekly cisplatin with limited field radiation 1/25/21. Due to worsening renal function after first treatment switched to carboplatin/taxol for week 2. He was seen today for weekly follow-up on treatment.     Interval History:  Kenrick was seen today in infusion for follow-up. He is overall doing well. He had issues with elevated BS last week with the dexamethasone and has seen endocrinology for this-plan to increase Lantus on infusion day and day after and they are going to closely follow. He is holding metformin. He has noted slight discomfort in right tongue but is able to eat and drink OK, no issues swallowing. He is doing viscous lidocaine, MMW, salt/soda swishes, hasn't needed Tylenol yet. He otherwise has no complaints.    No fevers, headaches, dizziness, chest pain, SOB, cough, nausea, vomiting, abdominal pain, bowel or bladder concerns, edema, bleeding issues, rashes. Baseline neuropathy in feet and fingertips stable.    Current Outpatient Medications    Medication Sig Dispense Refill     acetaminophen (TYLENOL) 325 MG tablet Take 2 tablets (650 mg) by mouth every 4 hours as needed for other (multimodal surgical pain management along with NSAIDS and opioid medication as indicated based on pain control and physical function.) 60 tablet 0     aspirin (ASA) 81 MG chewable tablet Take 81 mg by mouth daily       glipiZIDE (GLUCOTROL) 10 MG tablet Take 10 mg by mouth 2 times daily (before meals) 10 Mg in the am - 12.5MG  @@ DINNER       HYDROmorphone (DILAUDID) 2 MG tablet Take 1 tablet (2 mg) by mouth every 4 hours as needed for moderate to severe pain 30 tablet 0     insulin glargine (LANTUS VIAL) 100 UNIT/ML vial Inject 7 Units Subcutaneous daily (with breakfast)       lisinopril (ZESTRIL) 10 MG tablet Take 10 mg by mouth daily       LORazepam (ATIVAN) 0.5 MG tablet Take 1 tablet (0.5 mg) by mouth every 4 hours as needed (Anxiety, Nausea/Vomiting or Sleep) 30 tablet 1     metFORMIN (GLUCOPHAGE) 1000 MG tablet Take 500 mg by mouth 2 times daily (with meals)       mineral oil-hydrophilic petrolatum (AQUAPHOR) external ointment Apply topically every 8 hours 420 g 0     ondansetron (ZOFRAN) 8 MG tablet Take 1 tablet (8 mg) by mouth every 8 hours as needed for nausea (vomiting) 10 tablet 1     ondansetron (ZOFRAN-ODT) 4 MG ODT tab Take 1 tablet (4 mg) by mouth every 6 hours as needed for nausea or vomiting 20 tablet 0     pilocarpine (SALAGEN) 7.5 MG tablet Take 7.5 mg by mouth 2 times daily       polyethylene glycol (MIRALAX) 17 GM/Dose powder Take 17 g by mouth daily 510 g 0     prochlorperazine (COMPAZINE) 10 MG tablet Take 1 tablet (10 mg) by mouth every 6 hours as needed (Nausea/Vomiting) 30 tablet 1     senna-docusate (SENOKOT-S/PERICOLACE) 8.6-50 MG tablet Take 1 tablet by mouth 2 times daily as needed for constipation 20 tablet 0     simvastatin (ZOCOR) 40 MG tablet Take 40 mg by mouth At Bedtime       Physical Examination:  /71 HR 61 Temp 97.7 RR 16 Weight  184lb Pain 0/10 SpO2 100%  Wt Readings from Last 10 Encounters:   02/02/21 84.8 kg (187 lb)   02/01/21 84.9 kg (187 lb 1.6 oz)   01/26/21 86.6 kg (191 lb)   01/25/21 85.7 kg (189 lb)   01/15/21 83.5 kg (184 lb)   01/05/21 84.8 kg (187 lb)   01/05/21 83.5 kg (184 lb)   12/15/20 84.4 kg (186 lb)   12/11/20 84.1 kg (185 lb 4.8 oz)   11/24/20 86.6 kg (191 lb)     Constitutional: Well-appearing male in no acute distress.  Eyes: EOMI, PERRL. No scleral icterus.  ENT: Oral mucosa is moist without lesions or thrush. Unable to visualize posterior right tongue sore.  Lymphatic: Neck is supple without cervical or supraclavicular lymphadenopathy, firm on right.   Cardiovascular: Regular rate and rhythm. No murmurs, gallops, or rubs. No peripheral edema.  Respiratory: Clear to auscultation bilaterally. No wheezes or crackles.  Gastrointestinal: Bowel sounds present. Abdomen soft, non-tender.  Neurologic: Cranial nerves II through XII are grossly intact.  Skin: No rashes, petechiae, or bruising noted on exposed skin.    Laboratory Data:  Results for TEOFILO CHAN (MRN [id number]) as of 2/9/2021 11:09   2/9/2021 09:35 2/9/2021 09:40   Sodium  136   Potassium  4.5   Chloride  104   Carbon Dioxide  24   Urea Nitrogen  24   Creatinine 1.24 1.27 (H)   GFR Estimate 58 (L) 56 (L)   GFR Estimate If Black 67 65   Calcium  9.5   Anion Gap  8   Albumin  3.8   Protein Total  7.7   Bilirubin Total  0.5   Alkaline Phosphatase  86   ALT  28   AST  19   Glucose  252 (H)   WBC 6.1    Hemoglobin 12.9 (L)    Hematocrit 38.9 (L)    Platelet Count 280    RBC Count 4.24 (L)    MCV 92    MCH 30.4    MCHC 33.2    RDW 12.0    Diff Method Automated Method    % Neutrophils 66.5    % Lymphocytes 23.0    % Monocytes 8.7    % Eosinophils 0.8    % Basophils 0.3    % Immature Granulocytes 0.7    Nucleated RBCs 0    Absolute Neutrophil 4.1    Absolute Lymphocytes 1.4    Absolute Monocytes 0.5    Absolute Eosinophils 0.1    Absolute Basophils 0.0    Abs  Immature Granulocytes 0.0    Absolute Nucleated RBC 0.0        Assessment and Plan:  1. 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  Getting limited field radiotherapy through Dr. Herrera. Has port. Given C1D1 low-dose weekly cisplatin 1/25/21. Switched to carbo/taxol for week 2 given creat increase. Tolerated well with no side effects. Dex did cause blood sugar issues, will reduce to 12mg for week 2 given no issues.    Will continue to see weekly on treatment.     2. FEN  Nutrition, no PEG. Doing well with oral intake at this time.     Creat much improved, stopped cisplatin as above. Continue pushing fluids.    3. 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 Continue s/s rinses, MMW/viscous lidocaine and discussed taking Tylenol before eating to help with pain. Discussed avoiding NSAIDs.    4. Endo  DM, was on metformin, now on hold for renal issues. Currently on Glipizide and insulin, endo closely following. Will increase Lantus today and tomorrow and endo will review BS levels on Thursday. Baseline BS is high today-reduce dex pre-med as above.     25 minutes spent on the date of the encounter doing chart review, review of test results, interpretation of tests, patient visit and documentation     Travis Espinoza PA-C  [facility]  [address] [place] [address] [phone]

## 2021-02-08 NOTE — TELEPHONE ENCOUNTER
Patients wife called in regards to patients upcoming appointment. She states that oncology appointments where switched around and wanted to know if its ok if Kenrick comes in a little later than scheduled for his appointment with Dr. Mckenna. Writer stated that this is ok.     Tashia Jennings LPN

## 2021-02-09 NOTE — PROGRESS NOTES
HISTORY OF PRESENT ILLNESS:  Reese Oakley is getting chemorads right now for a squamous cell carcinoma that became metastatic to his neck.  He had a right-sided neck dissection done after a tongue lesion was removed.  He had negative PET scans for about six months afterwards.  He has no other new complaints at the present time today.  He is 70 years of age.      PHYSICAL EXAMINATION:  The patient is alert, oriented x3 and pleasant.  Skin of the face, lips, and neck on him is otherwise normal.  He had an area that was a little bit sore on the posterior aspect of his tongue.  This area was inspected, palpated and found to be only with potentially mucositis changes.  Oral cavity and oropharynx is otherwise clear.  Neck exam is negative.      ASSESSMENT:  Patient with a history of a right-sided tongue cancer with a right-sided neck met.  He is underwent adjuvant treatment right now.      PLAN:  We will see him again in about six weeks.

## 2021-02-09 NOTE — PROGRESS NOTES
[facility]  SPECIALIZING IN BREAKTHROUGHS  Radiation Oncology    On Treatment Visit Note      Reese Oakley      Date: 2021   MRN: [id number]   : 1950  Diagnosis: Tongue Cancer    Treatment Summary to Date   Right Neck Current Dose: 2400 cGy/6000 cGy Fractions:       Chemotherapy  Chemo concurrent with radx?: Yes  Oncologist: Dr Ferrell  Drug Name/Frequency 1: Weekly cisplatin week one, change to carboplatin week 2    Subjective: Mr. Oakley is tolerating chemoradiation well. He continues to be concerned about his blood glucose elevation following dexamethasone administration for chemotherapy and is working with endocrinology to minimize fluctuations.  His creatinine increased after his first cycle of chemotherapy and he is now been switched to carboplatin.  In addition, Metformin has been discontinued.  He has a sore on the right lateral posterior tongue which makes eating painful.  He brings with him a bottle of viscous lidocaine which he has leftover from his previous surgeries.    Nursing ROS:   Nutrition Alteration  Diet Type: Patient's Preference  Nutrition Note: No PEG  Skin  Skin Reaction: 0 - No changes     ENT and Mouth Exam  Mucositis - Current: 0 - None      Gastrointestinal  Nausea: 0 - None     Psychosocial  Mood - Anxiety: 0 - Normal  Pain Assessment  0-10 Pain Scale: 0    Objective:   Wt 81.6 kg (180 lb)   BMI 28.19 kg/m  , pain 0/10  Gen: Appears well, NAD  Skin: No erythema  Oral cavity/oropharynx: No mucositis    Laboratory:  Lab Results   Component Value Date    WBC 6.1 2021    HGB 12.9 (L) 2021    HCT 38.9 (L) 2021    MCV 92 2021     2021     Lab Results   Component Value Date     2021    POTASSIUM 4.5 2021    CHLORIDE 104 2021    CO2 24 2021     (H) 2021     Lab Results   Component Value Date    AST 19 2021    ALT 28 2021    ALKPHOS 86 2021    BILITOTAL  0.5 02/09/2021     Assessment:    Tolerating radiation therapy well.  All questions and concerns addressed.    Treatment-related toxicities (CTCAE v5.0): None    Plan:   1. Continue current therapy.    2. Prescription for Magic mouthwash to apply locally to mouth sores before eating    Mosaiq chart and setup information reviewed  MVCT images reviewed    Medication Review  Med list reviewed with patient?: Yes    Educational Topic Discussed  Education Instructions: Reviewed    Jennifer Herrera MD  Department of Radiation Oncology  [facility]

## 2021-02-09 NOTE — TELEPHONE ENCOUNTER
M to schedule patient for 7 week follow up with Dr. Mckenna (3/30/2021).     Left call center number for scheduling.

## 2021-02-09 NOTE — PATIENT INSTRUCTIONS
1. You were seen in the ENT Clinic today by Dr. Mckenna.  If you have any questions or concerns after your appointment, please call   -  ENT Clinic: [phone]      2.   Plan to return to clinic     Tashia Jennings LPN  [facility] Otolaryngology  [phone]

## 2021-02-09 NOTE — NURSING NOTE
Chief Complaint   Patient presents with     Port Draw     20 in gripper, no blood return, place order for activase     Blood Draw     20 ga PIV place by Vascular RN, blood drawn     Port accessed with 20 in gripper by RN, no blood return, alteplase order place, line flushed with saline and heparin.  Vitals taken. Pt checked in for appointment(s).  Labs drawn from PIV placed by RN, labs collected, Line flushed with saline. Vitals taken. Pt checked in for appointment(s).    Lori Salmon RN  [facility]

## 2021-02-09 NOTE — LETTER
2/9/2021         RE: Reese Oakley  [address] [address]        Dear Colleague,    Thank you for referring your patient, Reese Oakley, to the [facility]. Please see a copy of my visit note below.      This encounter was opened in error. Please disregard.      Again, thank you for allowing me to participate in the care of your patient.        Sincerely,        [facility] Draw

## 2021-02-09 NOTE — LETTER
2/9/2021       RE: Reese Oakley  [address] [address]     Dear Colleague,    Thank you for referring your patient, Reese Oakley, to the [facility] [place] [facility]. Please see a copy of my visit note below.    HISTORY OF PRESENT ILLNESS:  Reese Oakley is getting chemorads right now for a squamous cell carcinoma that became metastatic to his neck.  He had a right-sided neck dissection done after a tongue lesion was removed.  He had negative PET scans for about six months afterwards.  He has no other new complaints at the present time today.  He is 70 years of age.      PHYSICAL EXAMINATION:  The patient is alert, oriented x3 and pleasant.  Skin of the face, lips, and neck on him is otherwise normal.  He had an area that was a little bit sore on the posterior aspect of his tongue.  This area was inspected, palpated and found to be only with potentially mucositis changes.  Oral cavity and oropharynx is otherwise clear.  Neck exam is negative.      ASSESSMENT:  Patient with a history of a right-sided tongue cancer with a right-sided neck met.  He is underwent adjuvant treatment right now.      PLAN:  We will see him again in about six weeks.           Again, thank you for allowing me to participate in the care of your patient.      Sincerely,    Jose Antonio Mckenna MD

## 2021-02-09 NOTE — PROGRESS NOTES
Infusion Nursing Note:  Reese Oakley presents today for C1D8 Taxol-Carboplatin-Altepase.    Patient seen by provider today: Yes: Travis Espinoza PA-C   present during visit today: Not Applicable.    Note: Pt saw provider prior to infusion, ok for treatment.  Pt aware to tell Endocrine that blood sugar result of 252 today was prior to DEX pre med.    Intravenous Access:  Peripheral IV placed in lab.  Implanted Port.    Treatment Conditions:  Lab Results   Component Value Date    HGB 12.9 02/09/2021     Lab Results   Component Value Date    WBC 6.1 02/09/2021      Lab Results   Component Value Date    ANEU 4.1 02/09/2021     Lab Results   Component Value Date     02/09/2021      Lab Results   Component Value Date     02/09/2021                   Lab Results   Component Value Date    POTASSIUM 4.5 02/09/2021           Lab Results   Component Value Date    MAG 1.6 02/01/2021            Lab Results   Component Value Date    CR 1.27 02/09/2021                   Lab Results   Component Value Date    ELEUTERIO 9.5 02/09/2021                Lab Results   Component Value Date    BILITOTAL 0.5 02/09/2021           Lab Results   Component Value Date    ALBUMIN 3.8 02/09/2021                    Lab Results   Component Value Date    ALT 28 02/09/2021           Lab Results   Component Value Date    AST 19 02/09/2021       Results reviewed, labs MET treatment parameters, ok to proceed with treatment.      Post Infusion Assessment:  Patient tolerated infusion without incident.  Blood return noted pre and post infusion.  Site patent and intact, free from redness, edema or discomfort.  No evidence of extravasations.  Access discontinued per protocol, PIV and PORT.       Discharge Plan:   Patient declined prescription refills.  Discharge instructions reviewed with: Patient.  Patient and/or family verbalized understanding of discharge instructions and all questions answered.  Copy of AVS reviewed with patient  and/or family.  Patient will return 2/16 for next appointment.  Patient discharged in stable condition accompanied by: self.  Departure Mode: Ambulatory.    Purnima Arndt RN

## 2021-02-09 NOTE — LETTER
2/9/2021         RE: Reese Oakley  [address] [facility] [address]      Oncology/Hematology Visit Note  Feb 9, 2021    Reason for Visit: Follow up of recurrent SCC of the R neck    History of Present Illness: Reese Oakley is a 70 year old male with PMH HTN, hyperlipidemia, DM2 with recurrent SCC. 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  More recently he was noted on follow up scans to have enlarged R sided lymph nodes and biopsy proven recurrence in the R neck.  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  There was one additional focus of squamous cell carcinoma identified in an additional lymph node without MEL.  His case was discussed at multidisciplinary tumor board and he was recommended to have chemoradiation - due to MEL in the submandibular gland.     Started weekly cisplatin with limited field radiation 1/25/21. Due to worsening renal function after first treatment switched to carboplatin/taxol for week 2. He was seen today for weekly follow-up on treatment.     Interval History:  Kenrick was seen today in infusion for follow-up. He is overall doing well. He had issues with elevated BS last week with the dexamethasone and has seen endocrinology for this-plan to increase Lantus on infusion day and day after and they are going to closely follow. He is holding metformin. He has noted slight discomfort in right tongue but is able to eat and drink OK, no issues swallowing. He is doing viscous lidocaine, MMW, salt/soda swishes, hasn't needed Tylenol yet. He otherwise has no complaints.    No fevers, headaches, dizziness, chest pain, SOB, cough, nausea, vomiting, abdominal pain, bowel or bladder concerns, edema, bleeding  issues, rashes. Baseline neuropathy in feet and fingertips stable.    Current Outpatient Medications   Medication Sig Dispense Refill     acetaminophen (TYLENOL) 325 MG tablet Take 2 tablets (650 mg) by mouth every 4 hours as needed for other (multimodal surgical pain management along with NSAIDS and opioid medication as indicated based on pain control and physical function.) 60 tablet 0     aspirin (ASA) 81 MG chewable tablet Take 81 mg by mouth daily       glipiZIDE (GLUCOTROL) 10 MG tablet Take 10 mg by mouth 2 times daily (before meals) 10 Mg in the am - 12.5MG  @@ DINNER       HYDROmorphone (DILAUDID) 2 MG tablet Take 1 tablet (2 mg) by mouth every 4 hours as needed for moderate to severe pain 30 tablet 0     insulin glargine (LANTUS VIAL) 100 UNIT/ML vial Inject 7 Units Subcutaneous daily (with breakfast)       lisinopril (ZESTRIL) 10 MG tablet Take 10 mg by mouth daily       LORazepam (ATIVAN) 0.5 MG tablet Take 1 tablet (0.5 mg) by mouth every 4 hours as needed (Anxiety, Nausea/Vomiting or Sleep) 30 tablet 1     metFORMIN (GLUCOPHAGE) 1000 MG tablet Take 500 mg by mouth 2 times daily (with meals)       mineral oil-hydrophilic petrolatum (AQUAPHOR) external ointment Apply topically every 8 hours 420 g 0     ondansetron (ZOFRAN) 8 MG tablet Take 1 tablet (8 mg) by mouth every 8 hours as needed for nausea (vomiting) 10 tablet 1     ondansetron (ZOFRAN-ODT) 4 MG ODT tab Take 1 tablet (4 mg) by mouth every 6 hours as needed for nausea or vomiting 20 tablet 0     pilocarpine (SALAGEN) 7.5 MG tablet Take 7.5 mg by mouth 2 times daily       polyethylene glycol (MIRALAX) 17 GM/Dose powder Take 17 g by mouth daily 510 g 0     prochlorperazine (COMPAZINE) 10 MG tablet Take 1 tablet (10 mg) by mouth every 6 hours as needed (Nausea/Vomiting) 30 tablet 1     senna-docusate (SENOKOT-S/PERICOLACE) 8.6-50 MG tablet Take 1 tablet by mouth 2 times daily as needed for constipation 20 tablet 0     simvastatin (ZOCOR) 40 MG  tablet Take 40 mg by mouth At Bedtime       Physical Examination:  /71 HR 61 Temp 97.7 RR 16 Weight 184lb Pain 0/10 SpO2 100%  Wt Readings from Last 10 Encounters:   02/02/21 84.8 kg (187 lb)   02/01/21 84.9 kg (187 lb 1.6 oz)   01/26/21 86.6 kg (191 lb)   01/25/21 85.7 kg (189 lb)   01/15/21 83.5 kg (184 lb)   01/05/21 84.8 kg (187 lb)   01/05/21 83.5 kg (184 lb)   12/15/20 84.4 kg (186 lb)   12/11/20 84.1 kg (185 lb 4.8 oz)   11/24/20 86.6 kg (191 lb)     Constitutional: Well-appearing male in no acute distress.  Eyes: EOMI, PERRL. No scleral icterus.  ENT: Oral mucosa is moist without lesions or thrush. Unable to visualize posterior right tongue sore.  Lymphatic: Neck is supple without cervical or supraclavicular lymphadenopathy, firm on right.   Cardiovascular: Regular rate and rhythm. No murmurs, gallops, or rubs. No peripheral edema.  Respiratory: Clear to auscultation bilaterally. No wheezes or crackles.  Gastrointestinal: Bowel sounds present. Abdomen soft, non-tender.  Neurologic: Cranial nerves II through XII are grossly intact.  Skin: No rashes, petechiae, or bruising noted on exposed skin.    Laboratory Data:  Results for TEOFILO CHAN (MRN [id number]) as of 2/9/2021 11:09   2/9/2021 09:35 2/9/2021 09:40   Sodium  136   Potassium  4.5   Chloride  104   Carbon Dioxide  24   Urea Nitrogen  24   Creatinine 1.24 1.27 (H)   GFR Estimate 58 (L) 56 (L)   GFR Estimate If Black 67 65   Calcium  9.5   Anion Gap  8   Albumin  3.8   Protein Total  7.7   Bilirubin Total  0.5   Alkaline Phosphatase  86   ALT  28   AST  19   Glucose  252 (H)   WBC 6.1    Hemoglobin 12.9 (L)    Hematocrit 38.9 (L)    Platelet Count 280    RBC Count 4.24 (L)    MCV 92    MCH 30.4    MCHC 33.2    RDW 12.0    Diff Method Automated Method    % Neutrophils 66.5    % Lymphocytes 23.0    % Monocytes 8.7    % Eosinophils 0.8    % Basophils 0.3    % Immature Granulocytes 0.7    Nucleated RBCs 0    Absolute Neutrophil 4.1    Absolute  Lymphocytes 1.4    Absolute Monocytes 0.5    Absolute Eosinophils 0.1    Absolute Basophils 0.0    Abs Immature Granulocytes 0.0    Absolute Nucleated RBC 0.0        Assessment and Plan:  1. 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  Getting limited field radiotherapy through Dr. Herrera. Has port. Given C1D1 low-dose weekly cisplatin 1/25/21. Switched to carbo/taxol for week 2 given creat increase. Tolerated well with no side effects. Dex did cause blood sugar issues, will reduce to 12mg for week 2 given no issues.    Will continue to see weekly on treatment.     2. FEN  Nutrition, no PEG. Doing well with oral intake at this time.     Creat much improved, stopped cisplatin as above. Continue pushing fluids.    3. 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 Continue s/s rinses, MMW/viscous lidocaine and discussed taking Tylenol before eating to help with pain. Discussed avoiding NSAIDs.    4. Endo  DM, was on metformin, now on hold for renal issues. Currently on Glipizide and insulin, endo closely following. Will increase Lantus today and tomorrow and endo will review BS levels on Thursday. Baseline BS is high today-reduce dex pre-med as above.     25 minutes spent on the date of the encounter doing chart review, review of test results, interpretation of tests, patient visit and documentation     Travis Espinoza PA-C  [facility]  [address] [place] [id number] [phone]          SILVESTRE Santillan

## 2021-02-09 NOTE — PATIENT INSTRUCTIONS
Contact Numbers  [facility]: [phone]    After Hours:  [phone]  Triage: [phone]    Please call the [facility] Triage line if you experience a temperature greater than or equal to 100.5, shaking chills, have uncontrolled nausea, vomiting and/or diarrhea, dizziness, shortness of breath, chest pain, bleeding, unexplained bruising, or if you have any other new/concerning symptoms, questions or concerns.     If it is after hours, weekends, or holidays, please call the main hospital  at  [phone] and ask to speak to the Oncology doctor on call.     If you are having any concerning symptoms or wish to speak to a provider before your next infusion visit, please call your care coordinator or triage to notify them so we can adequately serve you.     If you need a refill on a narcotic prescription or other medication, please call triage before your infusion appointment.         February 2021 Sunday Monday Tuesday Wednesday Thursday Friday Saturday        1    LAB PERIPHERAL   8:00 AM   (15 min.)   [facility]RAW   [facility]    TELEPHONE VISIT RETURN   8:50 AM   (50 min.)   Polly Vargas PA-C   [facility] ONC INFUSION 180  10:00 AM   (180 min.)    ONCOLOGY INFUSION   [facility] EXTERNAL RADIATION TREATMT   1:30 PM   (30 min.)   [facility] RAD ONC BISI   [facility] Radiation Oncology 2    [facility] EXTERNAL RADIATION TREATMT  10:30 AM   (30 min.)   [facility] RAD ONC BISI   [facility] Radiation Oncology    [facility] ON TREATMENT VISIT  11:00 AM   (15 min.)   Jennifer Herrera MD   [facility] Radiation Oncology 3    [facility] EXTERNAL RADIATION TREATMT  10:30 AM   (30 min.)   [facility] RAD ONC BISI   [facility] Radiation Oncology    VIDEO VISIT NEW   1:45 PM   (60 min.)   Zo Bar MD   [facility] [address] EXTERNAL  RADIATION TREATMT  10:30 AM   (30 min.)   [facility] RAD ONC BISI   [facility] Radiation Oncology 5    [facility] EXTERNAL RADIATION TREATMT  10:30 AM   (30 min.)   P RAD ONC BISI   [facility] Radiation Oncology 6       7     8    SWALLOW TREATMENT   9:15 AM   (30 min.)   Nidia Pérez SLP   [facility] [place]    [facility] EXTERNAL RADIATION TREATMT  10:30 AM   (30 min.)   [facility] RAD ONC BISI   [facility] Radiation Oncology 9    LAB PERIPHERAL   8:45 AM   (15 min.)   [facility]   [facility]    [facility] RETURN   9:15 AM   (50 min.)   Travis Espinoza PA   [facility] ONC INFUSION 180   9:30 AM   (180 min.)   [facility] ONCOLOGY INFUSION   [facility] EXTERNAL RADIATION TREATMT  10:30 AM   (30 min.)   P RAD ONC BISI   [facility] Radiation Oncology    [facility] ON TREATMENT VISIT  11:00 AM   (15 min.)   Jennifer Herrera MD   [facility] Radiation Oncology    [facility] RETURN   2:05 PM   (20 min.)   Jose Antonio Mckenna MD   [facility] [place] 10    [facility] EXTERNAL RADIATION TREATMT  10:30 AM   (30 min.)   P RAD ONC BISI   [facility] Radiation Oncology 11    P EXTERNAL RADIATION TREATMT  10:30 AM   (30 min.)   P RAD ONC BISI   [facility] Radiation Oncology 12    [facility] EXTERNAL RADIATION TREATMT  10:30 AM   (30 min.)   P RAD ONC BISI   [facility] Radiation Oncology 13       14     15    [facility] EXTERNAL RADIATION TREATMT  10:30 AM   (30 min.)   P RAD ONC BISI   [facility] Radiation Oncology 16    LAB PERIPHERAL   7:15 AM   (15 min.)   [facility]   [facility]    [facility] RETURN   7:35 AM   (50 min.)   Travis Espinoza PA   [facility]   9:00 AM   (180 min.)   [facility] ONCOLOGY INFUSION   [facility]  10:30 AM   (30 min.)   [facility] RAD ONC BISI   [facility] Radiation Oncology    [facility] ON TREATMENT VISIT  11:00 AM   (15 min.)   Jennifer Herrera MD   [facility] Radiation Oncology 17    [facility] EXTERNAL RADIATION TREATMT  10:30 AM   (30 min.)   [facility] RAD ONC BISI   [facility] Radiation Oncology 18    [facility] EXTERNAL RADIATION TREATMT  10:30 AM   (30 min.)   [facility] RAD ONC BISI   [facility] Radiation Oncology 19    UMP EXTERNAL RADIATION TREATMT  10:30 AM   (30 min.)   P RAD ONC BISI   [facility] Radiation Oncology 20       21     22    VIDEO VISIT RETURN   6:45 AM   (50 min.)   Travis Espinoza PA   [facility]    SWALLOW TREATMENT   9:15 AM   (30 min.)   Nidia Pérez SLP   [facility] EXTERNAL RADIATION TREATMT  10:30 AM   (30 min.)   P RAD ONC BISI   [facility] Radiation Oncology 23    LAB PERIPHERAL   8:30 AM   (15 min.)   [facility]   [facility] ONC INFUSION 360   9:00 AM   (360 min.)   [facility] ONCOLOGY INFUSION   [facility] EXTERNAL RADIATION TREATMT  10:30 AM   (30 min.)   P RAD ONC BISI   [facility] Radiation Oncology    P ON TREATMENT VISIT  11:00 AM   (15 min.)   Jennifer Herrera MD   [facility] Radiation Oncology 24    [facility] EXTERNAL RADIATION TREATMT  10:30 AM   (30 min.)   [facility] RAD ONC BISI   [facility] Radiation Oncology 25    P EXTERNAL RADIATION TREATMT  10:30 AM   (30 min.)   P RAD ONC BISI   [facility] Radiation Oncology 26    P EXTERNAL RADIATION TREATMT  10:30 AM   (30 min.)   [facility] RAD ONC BISI   [facility] Radiation Oncology 27       28 March 2021 Sunday Monday Tuesday Wednesday Thursday Friday Saturday         1    LAB PERIPHERAL   9:45 AM   (15 min.)    [facility]   [facility] RETURN  10:05 AM   (50 min.)   Deedee De Leon CNP   [facility] EXTERNAL RADIATION TREATMT  10:30 AM   (30 min.)   [facility] RAD ONC BISI   [facility] Radiation Oncology    [facility] ONC INFUSION 360  11:30 AM   (360 min.)   [facility] ONCOLOGY INFUSION   [facility] 2    P EXTERNAL RADIATION TREATMT  10:30 AM   (30 min.)   [facility] RAD ONC BISI   [facility] Radiation Oncology    [facility] ON TREATMENT VISIT  11:00 AM   (15 min.)   Jennifer Herrera MD   [facility] Radiation Oncology 3    [facility] EXTERNAL RADIATION TREATMT  10:30 AM   (30 min.)   [facility] RAD ONC BISI   [facility] Radiation Oncology 4    P EXTERNAL RADIATION TREATMT  10:30 AM   (30 min.)   [facility] RAD ONC BISI   [facility] Radiation Oncology 5    [facility] EXTERNAL RADIATION TREATMT  10:30 AM   (30 min.)   [facility] RAD ONC BISI   [facility] Radiation Oncology 6       7     8    SWALLOW TREATMENT   9:15 AM   (30 min.)   Nidia Pérez SLP   [facility] 9    LAB PERIPHERAL   8:30 AM   (15 min.)    [facility] DRAW   [facility] RETURN   8:55 AM   (50 min.)   Deedee De Leon CNP   [facility] ONC INFUSION 360  10:00 AM   (360 min.)   [facility] ONCOLOGY INFUSION   [facility] 10     11     12     13       14     15     16    VIDEO VISIT RETURN   1:45 PM   (30 min.)   Toni Mark DO   [facility] 17     18     19     20       21     22    SWALLOW TREATMENT   9:15 AM   (30 min.)   Nidia Pérez SLP   [facility] 23     24     25     26     27       28     29     30     31                                   Recent Results (from the past 24 hour(s))   CBC  with platelets differential    Collection Time: 02/09/21  9:35 AM   Result Value Ref Range    WBC 6.1 4.0 - 11.0 10e9/L    RBC Count 4.24 (L) 4.4 - 5.9 10e12/L    Hemoglobin 12.9 (L) 13.3 - 17.7 g/dL    Hematocrit 38.9 (L) 40.0 - 53.0 %    MCV 92 78 - 100 fl    MCH 30.4 26.5 - 33.0 pg    MCHC 33.2 31.5 - 36.5 g/dL    RDW 12.0 10.0 - 15.0 %    Platelet Count 280 150 - 450 10e9/L    Diff Method Automated Method     % Neutrophils 66.5 %    % Lymphocytes 23.0 %    % Monocytes 8.7 %    % Eosinophils 0.8 %    % Basophils 0.3 %    % Immature Granulocytes 0.7 %    Nucleated RBCs 0 0 /100    Absolute Neutrophil 4.1 1.6 - 8.3 10e9/L    Absolute Lymphocytes 1.4 0.8 - 5.3 10e9/L    Absolute Monocytes 0.5 0.0 - 1.3 10e9/L    Absolute Eosinophils 0.1 0.0 - 0.7 10e9/L    Absolute Basophils 0.0 0.0 - 0.2 10e9/L    Abs Immature Granulocytes 0.0 0 - 0.4 10e9/L    Absolute Nucleated RBC 0.0    Creatinine    Collection Time: 02/09/21  9:35 AM   Result Value Ref Range    Creatinine 1.24 0.66 - 1.25 mg/dL    GFR Estimate 58 (L) >60 mL/min/[1.73_m2]    GFR Estimate If Black 67 >60 mL/min/[1.73_m2]

## 2021-02-09 NOTE — LETTER
2021         RE: Reese Oakley  [address] [facility] [address]        Dear Colleague,    Thank you for referring your patient, Reese Oakley, to the [facility] RADIATION ONCOLOGY. Please see a copy of my visit note below.    [facility] PHYSICIANS  SPECIALIZING IN BREAKTHROUGHS  Radiation Oncology    On Treatment Visit Note      Reese Oakley      Date: 2021   MRN: [id number]   : 1950  Diagnosis: Tongue Cancer    Treatment Summary to Date   Right Neck Current Dose: 2400 cGy/6000 cGy Fractions:       Chemotherapy  Chemo concurrent with radx?: Yes  Oncologist: Dr Ferrell  Drug Name/Frequency 1: Weekly cisplatin week one, change to carboplatin week 2    Subjective: Mr. Oakley is tolerating chemoradiation well. He continues to be concerned about his blood glucose elevation following dexamethasone administration for chemotherapy and is working with endocrinology to minimize fluctuations.  His creatinine increased after his first cycle of chemotherapy and he is now been switched to carboplatin.  In addition, Metformin has been discontinued.  He has a sore on the right lateral posterior tongue which makes eating painful.  He brings with him a bottle of viscous lidocaine which he has leftover from his previous surgeries.    Nursing ROS:   Nutrition Alteration  Diet Type: Patient's Preference  Nutrition Note: No PEG  Skin  Skin Reaction: 0 - No changes     ENT and Mouth Exam  Mucositis - Current: 0 - None      Gastrointestinal  Nausea: 0 - None     Psychosocial  Mood - Anxiety: 0 - Normal  Pain Assessment  0-10 Pain Scale: 0    Objective:   Wt 81.6 kg (180 lb)   BMI 28.19 kg/m  , pain 0/10  Gen: Appears well, NAD  Skin: No erythema  Oral cavity/oropharynx: No mucositis    Laboratory:  Lab Results   Component Value Date    WBC 6.1 2021    HGB 12.9 (L) 2021    HCT 38.9 (L) 2021    MCV 92 2021     2021     Lab  Results   Component Value Date     02/09/2021    POTASSIUM 4.5 02/09/2021    CHLORIDE 104 02/09/2021    CO2 24 02/09/2021     (H) 02/09/2021     Lab Results   Component Value Date    AST 19 02/09/2021    ALT 28 02/09/2021    ALKPHOS 86 02/09/2021    BILITOTAL 0.5 02/09/2021     Assessment:    Tolerating radiation therapy well.  All questions and concerns addressed.    Treatment-related toxicities (CTCAE v5.0): None    Plan:   1. Continue current therapy.    2. Prescription for Magic mouthwash to apply locally to mouth sores before eating    Mosaiq chart and setup information reviewed  MVCT images reviewed    Medication Review  Med list reviewed with patient?: Yes    Educational Topic Discussed  Education Instructions: Reviewed    Jennifer Herrera MD  Department of Radiation Oncology  [facility]

## 2021-02-10 NOTE — TELEPHONE ENCOUNTER
[facility] Call Center    Phone Message    May a detailed message be left on voicemail: yes     Reason for Call: Other: Blood sugar  readin/03 - 211 in the morning - 300 at dinner time   - 248 in the morning - 272 at noon - 274 at dinner time   - 273 in the morning - 283 at dinner time   - 258 in the morning - 264 at noon - 327 at bed time   - 257 in the morning - 310 at dinner time   - 262 in the morning - 301 at noon - 363 at dinner time   - 242 in the morning - 486 at dinner time  02/10 - 300 in the morning - 260 at noon     Action Taken: Message routed to:  [facility] Endo    Travel Screening: Not Applicable

## 2021-02-10 NOTE — TELEPHONE ENCOUNTER
Called pt due to high glucose  This week, he got chemo on Tuesday and Wednesday    Plan:  - give extra Lantus 5 units now   - give Lantus 15 units Thursday to Monday   - give Lantus 20 units on next Tuesday and Wednesday   - report glucose this Friday    Patient verbalizes understanding    Zo Bar MD  Division of Diabetes and Endocrinology  Department of Medicine

## 2021-02-12 NOTE — TELEPHONE ENCOUNTER
[facility] Call Center    Phone Message    May a detailed message be left on voicemail: yes     Reason for Call: Blood sugar readings-  2/11/2021  730 am 214   dinner 359  bedtime 437  2/12/2021  am 227   lunch 276    Please call pt wife on the home phone as the patient is having trouble talking at this time with a sore tongue.    Action Taken: Message routed to:  [facility] ([facility] endocrine    Travel Screening: Not Applicable

## 2021-02-15 NOTE — PROGRESS NOTES
Oncology/Hematology Visit Note  Feb 16, 2021    Reason for Visit: Follow up of recurrent SCC of the R neck     History of Present Illness: Reese Oakley is a 70 year old male with PMH HTN, hyperlipidemia, DM2 with recurrent SCC. 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  More recently he was noted on follow up scans to have enlarged R sided lymph nodes and biopsy proven recurrence in the R neck.  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  There was one additional focus of squamous cell carcinoma identified in an additional lymph node without MEL.  His case was discussed at multidisciplinary tumor board and he was recommended to have chemoradiation - due to MEL in the submandibular gland.      Started weekly cisplatin with limited field radiation 1/25/21. Due to worsening renal function after first treatment switched to carboplatin/taxol for week 2. He was seen today for weekly follow-up on treatment.     Interval History:  Kenrick was seen today for follow-up. He overall is feeling well, though notes he is getting more pain in his right posterior tongue. He is able to eat and drink normally though it is sore when swallowing. His weight is stable and he is drinking at least 48oz of fluid per day. He is doing viscous lidocaine and salt/soda swishes which helps. Has Tylenol but doesn't take. He does wake up overnight in pain at times.     His blood sugars continue to be very elevated despite increasing his insulin use. He is working with endocrinology on this.    He denies fevers, headaches, dizziness, chest pain, SOB, cough, nausea, vomiting, abdominal pain. Has slight constipation and wife gave him a stool softener. No urinary issues, edema, rashes,  bleeding concerns. Neuropathy in feet and fingertips stable. Continues to tolerate carbo/taxol well with minimal complaints.     Current Outpatient Medications   Medication Sig Dispense Refill     acetaminophen (TYLENOL) 325 MG tablet Take 2 tablets (650 mg) by mouth every 4 hours as needed for other (multimodal surgical pain management along with NSAIDS and opioid medication as indicated based on pain control and physical function.) 60 tablet 0     aspirin (ASA) 81 MG chewable tablet Take 81 mg by mouth daily       glipiZIDE (GLUCOTROL) 10 MG tablet Take 10 mg by mouth 2 times daily (before meals) 10 Mg in the am - 12.5MG  @@ DINNER       HYDROmorphone (DILAUDID) 2 MG tablet Take 1 tablet (2 mg) by mouth every 4 hours as needed for moderate to severe pain 30 tablet 0     insulin glargine (LANTUS VIAL) 100 UNIT/ML vial Inject 7 Units Subcutaneous daily (with breakfast)       lisinopril (ZESTRIL) 10 MG tablet Take 10 mg by mouth daily       LORazepam (ATIVAN) 0.5 MG tablet Take 1 tablet (0.5 mg) by mouth every 4 hours as needed (Anxiety, Nausea/Vomiting or Sleep) 30 tablet 1     magic mouthwash (ENTER INGREDIENTS IN COMMENTS) suspension Swish, gargle, and spit one to two teaspoonfuls every six hours as needed. May be swallowed if esophageal involvement. 500 mL 3     metFORMIN (GLUCOPHAGE) 1000 MG tablet Take 500 mg by mouth 2 times daily (with meals)       mineral oil-hydrophilic petrolatum (AQUAPHOR) external ointment Apply topically every 8 hours 420 g 0     ondansetron (ZOFRAN) 8 MG tablet Take 1 tablet (8 mg) by mouth every 8 hours as needed for nausea (vomiting) 10 tablet 1     ondansetron (ZOFRAN-ODT) 4 MG ODT tab Take 1 tablet (4 mg) by mouth every 6 hours as needed for nausea or vomiting 20 tablet 0     pilocarpine (SALAGEN) 7.5 MG tablet Take 7.5 mg by mouth 2 times daily       polyethylene glycol (MIRALAX) 17 GM/Dose powder Take 17 g by mouth daily 510 g 0     prochlorperazine (COMPAZINE) 10 MG tablet Take  1 tablet (10 mg) by mouth every 6 hours as needed (Nausea/Vomiting) 30 tablet 1     senna-docusate (SENOKOT-S/PERICOLACE) 8.6-50 MG tablet Take 1 tablet by mouth 2 times daily as needed for constipation 20 tablet 0     simvastatin (ZOCOR) 40 MG tablet Take 40 mg by mouth At Bedtime       Physical Examination:  /67   Pulse 78   Temp 97.5  F (36.4  C) (Oral)   Resp 16   Wt 82.4 kg (181 lb 9.6 oz)   SpO2 100%   BMI 28.44 kg/m    Wt Readings from Last 10 Encounters:   02/09/21 81.6 kg (180 lb)   02/09/21 82 kg (180 lb 12.4 oz)   02/09/21 (P) 83.5 kg (184 lb 1.6 oz)   02/02/21 84.8 kg (187 lb)   02/01/21 84.9 kg (187 lb 1.6 oz)   01/26/21 86.6 kg (191 lb)   01/25/21 85.7 kg (189 lb)   01/15/21 83.5 kg (184 lb)   01/05/21 84.8 kg (187 lb)   01/05/21 83.5 kg (184 lb)     Constitutional: Well-appearing male in no acute distress.  Eyes: EOMI, PERRL. No scleral icterus.  ENT: Oral mucosa is moist without lesions or thrush. Mass in right inner cheek.   Lymphatic: Neck is supple, firm throughout right side.   Cardiovascular: Regular rate and rhythm. No murmurs, gallops, or rubs. No peripheral edema.  Respiratory: Clear to auscultation bilaterally. No wheezes or crackles.  Gastrointestinal: Bowel sounds present. Abdomen soft, non-tender.   Neurologic: Cranial nerves II through XII are grossly intact.  Skin: No rashes, petechiae, or bruising noted on exposed skin.    Laboratory Data:   Results for TEOFILO CHAN (MRN [id number]) as of 2/16/2021 09:04   2/16/2021 07:32 2/16/2021 07:32   Sodium  136   Potassium  4.3   Chloride  103   Carbon Dioxide  24   Urea Nitrogen  24   Creatinine 1.39 (H) 1.47 (H)   GFR Estimate 50 (L) 47 (L)   GFR Estimate If Black 58 (L) 55 (L)   Calcium  9.1   Anion Gap  9   Albumin  3.4   Protein Total  7.5   Bilirubin Total  0.4   Alkaline Phosphatase  85   ALT  24   AST  19   Glucose  240 (H)   WBC 4.0    Hemoglobin 12.6 (L)    Hematocrit 36.6 (L)    Platelet Count 243    RBC Count 4.11  (L)    MCV 89    MCH 30.7    MCHC 34.4    RDW 12.2    Diff Method Automated Method    % Neutrophils 49.0    % Lymphocytes 35.4    % Monocytes 13.7    % Eosinophils 1.2    % Basophils 0.5    % Immature Granulocytes 0.2    Nucleated RBCs 0    Absolute Neutrophil 2.0    Absolute Lymphocytes 1.4    Absolute Monocytes 0.6    Absolute Eosinophils 0.1    Absolute Basophils 0.0    Abs Immature Granulocytes 0.0    Absolute Nucleated RBC 0.0        Assessment and Plan:  1. 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  Getting limited field radiotherapy through Dr. Herrera. Has port. Given C1D1 low-dose weekly cisplatin 1/25/21. Switched to carbo/taxol for week 2 given creat increase. Tolerating well with no side effects. Given no issues with weeks 1 and 2 Dex was removed from treatment plan which should help with blood sugars.      Will continue to see weekly on treatment.      2. FEN  Nutrition, no PEG. Doing well with oral intake at this time. Weight stable.      Creat at baseline though slightly up from last wee. Will give 500cc IVF with treatment today. Continue pushing fluids. Asked him to call if he is having troubles with hydration and can set up for IVF, declines at this time.     Discussed stool softeners/prune juice for constipation. No enemas or suppositories while receiving chemotherapy.      3. 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 Continue s/s rinses, MMW/viscous lidocaine and Tylenol. May need narcotics in future weeks but not needing at this time.      4. Endo  DM, was on metformin, now on hold for renal issues. Currently on Glipizide and insulin, endo closely following. Dex removed from plan and he will touch base with endo about insulin dosing.     25 minutes spent on the date of the encounter doing chart review, review of test results, interpretation of tests, patient visit and documentation     Travis Espinoza PA-C  [facility]  [address]  [place] [address]  [phone]

## 2021-02-15 NOTE — TELEPHONE ENCOUNTER
Called wife to adjust insulin due to high glucose  2/11/2021  730 am 214   dinner 359  bedtime 437  2/12/2021  am 227   lunch 276    He is getting chemo on Tuesday  He is able to drink water and soft/liquid diet.    Plan:  - give extra Lantus 5 units today  - give Lantus 30 units Tuesday and Wednesday   - give Lantus 25 units on the remaining day  - report glucose this Friday    Wife verbalizes understanding.    Zo Bar MD  Division of Diabetes and Endocrinology  Department of Medicine

## 2021-02-16 NOTE — LETTER
2/16/2021         RE: Reese Oakley  [address] [facility] [address]      Oncology/Hematology Visit Note  Feb 16, 2021    Reason for Visit: Follow up of recurrent SCC of the R neck     History of Present Illness: Reese Oakley is a 70 year old male with PMH HTN, hyperlipidemia, DM2 with recurrent SCC. 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  More recently he was noted on follow up scans to have enlarged R sided lymph nodes and biopsy proven recurrence in the R neck.  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  There was one additional focus of squamous cell carcinoma identified in an additional lymph node without MEL.  His case was discussed at multidisciplinary tumor board and he was recommended to have chemoradiation - due to MEL in the submandibular gland.      Started weekly cisplatin with limited field radiation 1/25/21. Due to worsening renal function after first treatment switched to carboplatin/taxol for week 2. He was seen today for weekly follow-up on treatment.     Interval History:  Kenrick was seen today for follow-up. He overall is feeling well, though notes he is getting more pain in his right posterior tongue. He is able to eat and drink normally though it is sore when swallowing. His weight is stable and he is drinking at least 48oz of fluid per day. He is doing viscous lidocaine and salt/soda swishes which helps. Has Tylenol but doesn't take. He does wake up overnight in pain at times.     His blood sugars continue to be very elevated despite increasing his insulin use. He is working with endocrinology on this.    He denies fevers, headaches, dizziness, chest pain, SOB, cough, nausea, vomiting, abdominal pain.  Has slight constipation and wife gave him a stool softener. No urinary issues, edema, rashes, bleeding concerns. Neuropathy in feet and fingertips stable. Continues to tolerate carbo/taxol well with minimal complaints.     Current Outpatient Medications   Medication Sig Dispense Refill     acetaminophen (TYLENOL) 325 MG tablet Take 2 tablets (650 mg) by mouth every 4 hours as needed for other (multimodal surgical pain management along with NSAIDS and opioid medication as indicated based on pain control and physical function.) 60 tablet 0     aspirin (ASA) 81 MG chewable tablet Take 81 mg by mouth daily       glipiZIDE (GLUCOTROL) 10 MG tablet Take 10 mg by mouth 2 times daily (before meals) 10 Mg in the am - 12.5MG  @@ DINNER       HYDROmorphone (DILAUDID) 2 MG tablet Take 1 tablet (2 mg) by mouth every 4 hours as needed for moderate to severe pain 30 tablet 0     insulin glargine (LANTUS VIAL) 100 UNIT/ML vial Inject 7 Units Subcutaneous daily (with breakfast)       lisinopril (ZESTRIL) 10 MG tablet Take 10 mg by mouth daily       LORazepam (ATIVAN) 0.5 MG tablet Take 1 tablet (0.5 mg) by mouth every 4 hours as needed (Anxiety, Nausea/Vomiting or Sleep) 30 tablet 1     magic mouthwash (ENTER INGREDIENTS IN COMMENTS) suspension Swish, gargle, and spit one to two teaspoonfuls every six hours as needed. May be swallowed if esophageal involvement. 500 mL 3     metFORMIN (GLUCOPHAGE) 1000 MG tablet Take 500 mg by mouth 2 times daily (with meals)       mineral oil-hydrophilic petrolatum (AQUAPHOR) external ointment Apply topically every 8 hours 420 g 0     ondansetron (ZOFRAN) 8 MG tablet Take 1 tablet (8 mg) by mouth every 8 hours as needed for nausea (vomiting) 10 tablet 1     ondansetron (ZOFRAN-ODT) 4 MG ODT tab Take 1 tablet (4 mg) by mouth every 6 hours as needed for nausea or vomiting 20 tablet 0     pilocarpine (SALAGEN) 7.5 MG tablet Take 7.5 mg by mouth 2 times daily       polyethylene glycol (MIRALAX) 17  GM/Dose powder Take 17 g by mouth daily 510 g 0     prochlorperazine (COMPAZINE) 10 MG tablet Take 1 tablet (10 mg) by mouth every 6 hours as needed (Nausea/Vomiting) 30 tablet 1     senna-docusate (SENOKOT-S/PERICOLACE) 8.6-50 MG tablet Take 1 tablet by mouth 2 times daily as needed for constipation 20 tablet 0     simvastatin (ZOCOR) 40 MG tablet Take 40 mg by mouth At Bedtime       Physical Examination:  /67   Pulse 78   Temp 97.5  F (36.4  C) (Oral)   Resp 16   Wt 82.4 kg (181 lb 9.6 oz)   SpO2 100%   BMI 28.44 kg/m    Wt Readings from Last 10 Encounters:   02/09/21 81.6 kg (180 lb)   02/09/21 82 kg (180 lb 12.4 oz)   02/09/21 (P) 83.5 kg (184 lb 1.6 oz)   02/02/21 84.8 kg (187 lb)   02/01/21 84.9 kg (187 lb 1.6 oz)   01/26/21 86.6 kg (191 lb)   01/25/21 85.7 kg (189 lb)   01/15/21 83.5 kg (184 lb)   01/05/21 84.8 kg (187 lb)   01/05/21 83.5 kg (184 lb)     Constitutional: Well-appearing male in no acute distress.  Eyes: EOMI, PERRL. No scleral icterus.  ENT: Oral mucosa is moist without lesions or thrush. Mass in right inner cheek.   Lymphatic: Neck is supple, firm throughout right side.   Cardiovascular: Regular rate and rhythm. No murmurs, gallops, or rubs. No peripheral edema.  Respiratory: Clear to auscultation bilaterally. No wheezes or crackles.  Gastrointestinal: Bowel sounds present. Abdomen soft, non-tender.   Neurologic: Cranial nerves II through XII are grossly intact.  Skin: No rashes, petechiae, or bruising noted on exposed skin.    Laboratory Data:   Results for TEOFILO CHAN (MRN [id number]) as of 2/16/2021 09:04   2/16/2021 07:32 2/16/2021 07:32   Sodium  136   Potassium  4.3   Chloride  103   Carbon Dioxide  24   Urea Nitrogen  24   Creatinine 1.39 (H) 1.47 (H)   GFR Estimate 50 (L) 47 (L)   GFR Estimate If Black 58 (L) 55 (L)   Calcium  9.1   Anion Gap  9   Albumin  3.4   Protein Total  7.5   Bilirubin Total  0.4   Alkaline Phosphatase  85   ALT  24   AST  19   Glucose  240 (H)    WBC 4.0    Hemoglobin 12.6 (L)    Hematocrit 36.6 (L)    Platelet Count 243    RBC Count 4.11 (L)    MCV 89    MCH 30.7    MCHC 34.4    RDW 12.2    Diff Method Automated Method    % Neutrophils 49.0    % Lymphocytes 35.4    % Monocytes 13.7    % Eosinophils 1.2    % Basophils 0.5    % Immature Granulocytes 0.2    Nucleated RBCs 0    Absolute Neutrophil 2.0    Absolute Lymphocytes 1.4    Absolute Monocytes 0.6    Absolute Eosinophils 0.1    Absolute Basophils 0.0    Abs Immature Granulocytes 0.0    Absolute Nucleated RBC 0.0        Assessment and Plan:  1. 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  Getting limited field radiotherapy through Dr. Herrera. Has port. Given C1D1 low-dose weekly cisplatin 1/25/21. Switched to carbo/taxol for week 2 given creat increase. Tolerating well with no side effects. Given no issues with weeks 1 and 2 Dex was removed from treatment plan which should help with blood sugars.      Will continue to see weekly on treatment.      2. FEN  Nutrition, no PEG. Doing well with oral intake at this time. Weight stable.      Creat at baseline though slightly up from last wee. Will give 500cc IVF with treatment today. Continue pushing fluids. Asked him to call if he is having troubles with hydration and can set up for IVF, declines at this time.     Discussed stool softeners/prune juice for constipation. No enemas or suppositories while receiving chemotherapy.      3. 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 Continue s/s rinses, MMW/viscous lidocaine and Tylenol. May need narcotics in future weeks but not needing at this time.      4. Endo  DM, was on metformin, now on hold for renal issues. Currently on Glipizide and insulin, endo closely following. Dex removed from plan and he will touch base with endo about insulin dosing.     25 minutes spent on the date of the encounter doing chart review, review of test results, interpretation of  tests, patient visit and documentation     Travis Espinoza PA-C  [facility]  [address] [place] [address] [phone]          SILVESTRE Santillan

## 2021-02-16 NOTE — PROGRESS NOTES
Infusion Nursing Note:  Reese Oakley presents today for C1D15 Taxol/Carboplatin.    Patient seen by provider today: Yes: Travis Espinoza PA-C   present during visit today: Not Applicable.    Note: Patient reported to clinic today after talking with provider with no new complaints or concerns.  Patient declined any interventions for mouth pain during infusion visit. Patient used home medications.    Intravenous Access:  Implanted Port.    Treatment Conditions:  Lab Results   Component Value Date    HGB 12.6 02/16/2021     Lab Results   Component Value Date    WBC 4.0 02/16/2021      Lab Results   Component Value Date    ANEU 2.0 02/16/2021     Lab Results   Component Value Date     02/16/2021      Lab Results   Component Value Date     02/16/2021                   Lab Results   Component Value Date    POTASSIUM 4.3 02/16/2021           Lab Results   Component Value Date    MAG 1.6 02/01/2021            Lab Results   Component Value Date    CR 1.39 02/16/2021    CR 1.47 02/16/2021                   Lab Results   Component Value Date    ELEUTERIO 9.1 02/16/2021                Lab Results   Component Value Date    BILITOTAL 0.4 02/16/2021           Lab Results   Component Value Date    ALBUMIN 3.4 02/16/2021                    Lab Results   Component Value Date    ALT 24 02/16/2021           Lab Results   Component Value Date    AST 19 02/16/2021       Results reviewed, labs MET treatment parameters, ok to proceed with treatment.      Post Infusion Assessment:  Patient tolerated infusion without incident.  Blood return noted pre and post infusion.  Site patent and intact, free from redness, edema or discomfort.  No evidence of extravasations.  Access discontinued per protocol.       Discharge Plan:   Patient declined prescription refills.  Discharge instructions reviewed with: Patient.  Patient and/or family verbalized understanding of discharge instructions and all questions answered.  AVS to  patient via [employer].  Patient will return 2/23/21 for next appointment.   Patient discharged in stable condition accompanied by: self.  Departure Mode: Ambulatory.  Face to Face time: 10 minutes.    Chaim Argueta RN

## 2021-02-16 NOTE — LETTER
2021         RE: Reese Oakley  [address] [facility] [address]        Dear Colleague,    Thank you for referring your patient, Reese Oakley, to the [facility] RADIATION ONCOLOGY. Please see a copy of my visit note below.    [facility] PHYSICIANS  SPECIALIZING IN BREAKTHROUGHS  Radiation Oncology    On Treatment Visit Note      Reese Oakley      Date: 2021   MRN: [id number]   : 1950  Diagnosis: Tongue Cancer    Treatment Summary to Date   Right Neck Current Dose: 3400 cGy/6000 cGy Fractions:       Chemotherapy  Chemo concurrent with radx?: Yes  Oncologist: Dr Mark    Drug Name/Frequency 1: Weekly cisplatin week one, change to carboplatin week 2    Subjective: Mr. Oakley is tolerating chemoradiation well. He is working with endocrinology to minimize fluctuations in blood glucose.  His creatinine increased after his first cycle of chemotherapy and he is now been switched to carboplatin.  In addition, Metformin has been discontinued.  He has a sore on the right lateral posterior tongue which makes eating painful but it has not significantly changed since last week.  He is continuing to use viscous lidocaine prior to eating.  He is still tolerating solid food.    Nursing ROS:   Nutrition Alteration  Diet Type: Patient's Preference  Nutrition Note: No PEG  Skin  Skin Reaction: 0 - No changes     ENT and Mouth Exam  Mucositis - Current: 0 - None      Gastrointestinal  Nausea: 0 - None     Psychosocial  Mood - Anxiety: 0 - Normal  Pain Assessment  0-10 Pain Scale: 0    Objective:   Wt 82.1 kg (181 lb)   BMI 28.35 kg/m  , pain 0/10  Gen: Appears well, NAD  Skin: No erythema  Oral cavity/oropharynx: No mucositis    Laboratory:  Lab Results   Component Value Date    WBC 4.0 2021    HGB 12.6 (L) 2021    HCT 36.6 (L) 2021    MCV 89 2021     2021     Lab Results   Component Value Date     2021     POTASSIUM 4.3 02/16/2021    CHLORIDE 103 02/16/2021    CO2 24 02/16/2021     (H) 02/16/2021     Lab Results   Component Value Date    AST 19 02/16/2021    ALT 24 02/16/2021    ALKPHOS 85 02/16/2021    BILITOTAL 0.4 02/16/2021     Assessment:    Tolerating radiation therapy well.  All questions and concerns addressed.    Treatment-related toxicities (CTCAE v5.0): None    Plan:   1. Continue current therapy.    2. Continue to use Magic mouthwash to apply locally to mouth sores before eating    Mosaiq chart and setup information reviewed  MVCT images reviewed    Medication Review  Med list reviewed with patient?: Yes    Educational Topic Discussed  Education Instructions: Reviewed    Jennifer Herrera MD  Department of Radiation Oncology  [facility]

## 2021-02-16 NOTE — PROGRESS NOTES
[facility] PHYSICIANS  SPECIALIZING IN BREAKTHROUGHS  Radiation Oncology    On Treatment Visit Note      Reese Oakley      Date: 2021   MRN: [id number]   : 1950  Diagnosis: Tongue Cancer    Treatment Summary to Date   Right Neck Current Dose: 3400 cGy/6000 cGy Fractions:       Chemotherapy  Chemo concurrent with radx?: Yes  Oncologist: Dr Mark    Drug Name/Frequency 1: Weekly cisplatin week one, change to carboplatin week 2    Subjective: Mr. Oakley is tolerating chemoradiation well. He is working with endocrinology to minimize fluctuations in blood glucose.  His creatinine increased after his first cycle of chemotherapy and he is now been switched to carboplatin.  In addition, Metformin has been discontinued.  He has a sore on the right lateral posterior tongue which makes eating painful but it has not significantly changed since last week.  He is continuing to use viscous lidocaine prior to eating.  He is still tolerating solid food.    Nursing ROS:   Nutrition Alteration  Diet Type: Patient's Preference  Nutrition Note: No PEG  Skin  Skin Reaction: 0 - No changes     ENT and Mouth Exam  Mucositis - Current: 0 - None      Gastrointestinal  Nausea: 0 - None     Psychosocial  Mood - Anxiety: 0 - Normal  Pain Assessment  0-10 Pain Scale: 0    Objective:   Wt 82.1 kg (181 lb)   BMI 28.35 kg/m  , pain 0/10  Gen: Appears well, NAD  Skin: No erythema  Oral cavity/oropharynx: No mucositis    Laboratory:  Lab Results   Component Value Date    WBC 4.0 2021    HGB 12.6 (L) 2021    HCT 36.6 (L) 2021    MCV 89 2021     2021     Lab Results   Component Value Date     2021    POTASSIUM 4.3 2021    CHLORIDE 103 2021    CO2 24 2021     (H) 2021     Lab Results   Component Value Date    AST 19 2021    ALT 24 2021    ALKPHOS 85 2021    BILITOTAL 0.4 2021     Assessment:    Tolerating  radiation therapy well.  All questions and concerns addressed.    Treatment-related toxicities (CTCAE v5.0): None    Plan:   1. Continue current therapy.    2. Continue to use Magic mouthwash to apply locally to mouth sores before eating    Mosaiq chart and setup information reviewed  MVCT images reviewed    Medication Review  Med list reviewed with patient?: Yes    Educational Topic Discussed  Education Instructions: Reviewed    Jennifer Herrera MD  Department of Radiation Oncology  [facility]

## 2021-02-16 NOTE — NURSING NOTE
"Oncology Rooming Note    February 16, 2021 7:43 AM   Reese Oakley is a 70 year old male who presents for:    Chief Complaint   Patient presents with     Port Draw     Labs drawn via port by RN in lab. VS taken.      Oncology Clinic Visit     RETURN; Squamous cell carcinoma of neck     Initial Vitals: /67   Pulse 78   Temp 97.5  F (36.4  C) (Oral)   Resp 16   Wt 82.4 kg (181 lb 9.6 oz)   SpO2 100%   BMI 28.44 kg/m   Estimated body mass index is 28.44 kg/m  as calculated from the following:    Height as of 1/15/21: 1.702 m (5' 7\").    Weight as of this encounter: 82.4 kg (181 lb 9.6 oz). Body surface area is 1.97 meters squared.  Data Unavailable Comment: Data Unavailable   No LMP for male patient.  Allergies reviewed: Yes  Medications reviewed: Yes    Medications: NO REFILLS NEEDED  Pharmacy name entered into EPIC: [id number] [place] [address]    Clinical concerns: PATIENT REPORTS SIDE OF TONGUE MAKING EATING DIFFICULT FROM RADIATION       Leatha Kelly CMA              "

## 2021-02-16 NOTE — PATIENT INSTRUCTIONS
[facility] Main Line: [phone]    Call triage nurse with chills and/or temperature greater than or equal to 100.4, uncontrolled nausea/vomiting, diarrhea, constipation, dizziness, shortness of breath, chest pain, bleeding, unexplained bruising, or any new/concerning symptoms, questions/concerns.   If you are having any concerning symptoms or wish to speak to a provider before your next infusion visit, please call your care coordinator or triage to notify them so we can adequately serve you.   Nurse Triage line:  [phone]    If after hours, weekends, or holidays, call main hospital  and ask for Oncology doctor on call @ [phone]      February 2021 Sunday Monday Tuesday Wednesday Thursday Friday Saturday        1    LAB PERIPHERAL   8:00 AM   (15 min.)   [facility]   [facility]    TELEPHONE VISIT RETURN   8:50 AM   (50 min.)   Polly Vargas PA-C   [facility]  10:00 AM   (180 min.)   [facility]   [facility] TREAT[facility]   1:30 PM   (30 min.)   P RAD ONC BISI   [facility] Radiation Oncology 2    P EXTERNAL RADIATION TREATMT  10:30 AM   (30 min.)   P RAD ONC BISI   [facility]    [facility] ON TREATMENT VISIT  11:00 AM   (15 min.)   Jennifer Herrera MD   [facility] Radiation Oncology 3    P EXTERNAL RADIATION TREATMT  10:30 AM   (30 min.)   P RAD ONC BISI   [facility] Radiation Oncology    VIDEO VISIT NEW   1:45 PM   (60 min.)   Zo Bar MD   [facility] [place] 4    UMP EXTERNAL RADIATION TREATMT  10:30 AM   (30 min.)   P RAD ONC BISI   [facility] Radiation Oncology 5    P EXTERNAL RADIATION TREATMT  10:30 AM   (30 min.)   P RAD ONC BISI   [facility] Radiation Oncology 6       7      8    SWALLOW TREATMENT   9:15 AM   (30 min.)   Nidia Pérez SLP   [facility] EXTERNAL RADIATION TREATMT  10:30 AM   (30 min.)   P RAD ONC BISI   [facility] Radiation Oncology 9    LAB PERIPHERAL   8:45 AM   (15 min.)   [facility]   9:15 AM   (50 min.)   Travis Espinoza PA   [facility]   9:30 AM   (180 min.)   [facility] ONCOLOGY INFUSION   [facility]    UMP RETURN   2:05 PM   (20 min.)   Jose Antonio Mckenna MD   [facility] EXTERNAL RADIATION TREATMT   2:45 PM   (30 min.)   P RAD ONC BISI   [facility] Radiation Oncology    [facility] ON TREATMENT VISIT   3:15 PM   (15 min.)   Jennifer Herrera MD   [facility] Radiation Oncology 10    P EXTERNAL RADIATION TREATMT  10:30 AM   (30 min.)   [facility] RAD ONC BISI   [facility] Radiation Oncology 11    P EXTERNAL RADIATION TREATMT  10:30 AM   (30 min.)   P RAD ONC BISI   [facility] Radiation Oncology 12    P EXTERNAL RADIATION TREATMT  10:30 AM   (30 min.)   P RAD ONC BISI   [facility] Radiation Oncology 13       14     15    [facility] EXTERNAL RADIATION TREATMT  10:30 AM   (30 min.)   P RAD ONC BISI   [facility] Radiation Oncology 16    LAB PERIPHERAL   7:15 AM   (15 min.)   [facility]   [facility]    [facility] RETURN   7:35 AM   (50 min.)   Travis Espinoza PA   [facility]   9:00 AM   (180 min.)   [facility] ONCOLOGY INFUSION   [facility] EXTERNAL RADIATION TREATMT  10:30 AM   (30 min.)   P RAD ONC BISI   [facility] Radiation Oncology    [facility] ON TREATMENT VISIT  11:00 AM   (15 min.)   Jennifer Herrera MD   [facility]  [facility] Radiation Oncology 17    [facility] EXTERNAL RADIATION TREATMT  10:30 AM   (30 min.)   [facility] RAD ONC BISI   [facility] Radiation Oncology 18    [facility] EXTERNAL RADIATION TREATMT  10:30 AM   (30 min.)   [facility] RAD ONC BISI   [facility] Radiation Oncology 19    [facility] EXTERNAL RADIATION TREATMT  10:30 AM   (30 min.)   P RAD ONC BISI   [facility] Radiation Oncology 20       21     22    SWALLOW TREATMENT   9:15 AM   (30 min.)   Nidia Pérez SLP   [facility] EXTERNAL RADIATION TREATMT  10:30 AM   (30 min.)   [facility] RAD ONC BISI   [facility] Radiation Oncology 23    UMP RETURN   6:45 AM   (50 min.)   Travis Espinoza PA   [facility]    LAB PERIPHERAL   8:30 AM   (15 min.)   [facility]   [facility] ONC INFUSION 180   9:00 AM   (180 min.)   [facility] ONCOLOGY INFUSION   [facility] EXTERNAL RADIATION TREATMT  10:30 AM   (30 min.)   [facility] RAD ONC BISI   [facility] Radiation Oncology    [facility] ON TREATMENT VISIT  11:00 AM   (15 min.)   Jennifer Herrera MD   [facility] Radiation Oncology 24    P EXTERNAL RADIATION TREATMT  10:30 AM   (30 min.)   [facility] RAD ONC BISI   [facility] Radiation Oncology 25    P EXTERNAL RADIATION TREATMT  10:30 AM   (30 min.)   P RAD ONC BISI   [facility] Radiation Oncology 26    P EXTERNAL RADIATION TREATMT  10:30 AM   (30 min.)   P RAD ONC BISI   [facility] Radiation Oncology 27       28 March 2021 Sunday Monday Tuesday Wednesday Thursday Friday Saturday        1    LAB PERIPHERAL   9:45 AM   (15 min.)   [facility]   [facility] RETURN  10:05 AM   (50 min.)   Deedee De Leon CNP   [facility] EXTERNAL RADIATION  TREATMT  10:30 AM   (30 min.)   [facility] RAD ONC BISI   [facility] Radiation Oncology    [facility] ONC INFUSION 180  11:30 AM   (180 min.)   [facility] ONCOLOGY INFUSION   [facility] 2    P EXTERNAL RADIATION TREATMT  10:30 AM   (30 min.)   P RAD ONC BISI   [facility] Radiation Oncology    [facility] ON TREATMENT VISIT  11:00 AM   (15 min.)   Jennifer Herrera MD   [facility] Radiation Oncology 3    [facility] EXTERNAL RADIATION TREATMT  10:30 AM   (30 min.)   P RAD ONC BISI   [facility] Radiation Oncology 4    P EXTERNAL RADIATION TREATMT  10:30 AM   (30 min.)   [facility] RAD ONC BISI   [facility] Radiation Oncology 5    P EXTERNAL RADIATION TREATMT  10:30 AM   (30 min.)   [facility] RAD ONC BISI   [facility] Radiation Oncology 6       7     8    SWALLOW TREATMENT   9:15 AM   (30 min.)   Nidia Pérez SLP   [facility] 9    LAB PERIPHERAL   8:30 AM   (15 min.)    [facility]   [facility] RETURN   8:55 AM   (50 min.)   Deedee De Leon CNP   [facility] ONC INFUSION 180  10:00 AM   (180 min.)   [facility] ONCOLOGY INFUSION   [facility] 10     11     12     13       14     15     16    VIDEO VISIT RETURN   1:45 PM   (30 min.)   Toni Mark DO   [facility] 17     18     19     20       21     22    SWALLOW TREATMENT   9:15 AM   (30 min.)   Nidia Pérez SLP   [facility] 23     24     25     26     27       28     29     30     31                                    Lab Results:  Recent Results (from the past 12 hour(s))   CBC with platelets differential    Collection Time: 02/16/21  7:32 AM   Result Value Ref Range    WBC 4.0 4.0 - 11.0 10e9/L    RBC Count 4.11 (L) 4.4 - 5.9 10e12/L    Hemoglobin 12.6 (L) 13.3 - 17.7 g/dL    Hematocrit 36.6 (L) 40.0  - 53.0 %    MCV 89 78 - 100 fl    MCH 30.7 26.5 - 33.0 pg    MCHC 34.4 31.5 - 36.5 g/dL    RDW 12.2 10.0 - 15.0 %    Platelet Count 243 150 - 450 10e9/L    Diff Method Automated Method     % Neutrophils 49.0 %    % Lymphocytes 35.4 %    % Monocytes 13.7 %    % Eosinophils 1.2 %    % Basophils 0.5 %    % Immature Granulocytes 0.2 %    Nucleated RBCs 0 0 /100    Absolute Neutrophil 2.0 1.6 - 8.3 10e9/L    Absolute Lymphocytes 1.4 0.8 - 5.3 10e9/L    Absolute Monocytes 0.6 0.0 - 1.3 10e9/L    Absolute Eosinophils 0.1 0.0 - 0.7 10e9/L    Absolute Basophils 0.0 0.0 - 0.2 10e9/L    Abs Immature Granulocytes 0.0 0 - 0.4 10e9/L    Absolute Nucleated RBC 0.0    Creatinine    Collection Time: 02/16/21  7:32 AM   Result Value Ref Range    Creatinine 1.39 (H) 0.66 - 1.25 mg/dL    GFR Estimate 50 (L) >60 mL/min/[1.73_m2]    GFR Estimate If Black 58 (L) >60 mL/min/[1.73_m2]   Comprehensive metabolic panel    Collection Time: 02/16/21  7:32 AM   Result Value Ref Range    Sodium 136 133 - 144 mmol/L    Potassium 4.3 3.4 - 5.3 mmol/L    Chloride 103 94 - 109 mmol/L    Carbon Dioxide 24 20 - 32 mmol/L    Anion Gap 9 3 - 14 mmol/L    Glucose 240 (H) 70 - 99 mg/dL    Urea Nitrogen 24 7 - 30 mg/dL    Creatinine 1.47 (H) 0.66 - 1.25 mg/dL    GFR Estimate 47 (L) >60 mL/min/[1.73_m2]    GFR Estimate If Black 55 (L) >60 mL/min/[1.73_m2]    Calcium 9.1 8.5 - 10.1 mg/dL    Bilirubin Total 0.4 0.2 - 1.3 mg/dL    Albumin 3.4 3.4 - 5.0 g/dL    Protein Total 7.5 6.8 - 8.8 g/dL    Alkaline Phosphatase 85 40 - 150 U/L    ALT 24 0 - 70 U/L    AST 19 0 - 45 U/L

## 2021-02-18 NOTE — TELEPHONE ENCOUNTER
Writer called pt to schedule follow up visit with Dr. Mckenna. Writer spoke to pt's wife and scheduled an appointment for 3/30/21 at 10:15am.    Mary Pascal EMT

## 2021-02-19 NOTE — TELEPHONE ENCOUNTER
Called patient and his wife re:blood glucose   He will no longer be on dexamethasone on the day of chemotherapy.  He is eating and drinking well.    BG over the past week     Feb 13 Morning 206, Dinner 342, Bedtime 311   Feb 14 Morning 174, Dinner 394  Feb 15 Morning 194 Noon: 203  Feb 16 Morning 230, Dinner 233 -- got Dexamethasone only one day  Feb 17 Morning 108, Noon 193, Bedtime 333   Feb 18 Morning 145, Dinner 294  Feb 19 Morning 149, Noon 160    Plan:  - recommend to take Lantus 25 units daily  - update glucose early next week    Wife verbalizes understanding and will repay to patient.     Zo Bar MD  Division of Diabetes and Endocrinology  Department of Medicine

## 2021-02-21 NOTE — PROGRESS NOTES
Oncology/Hematology Visit Note  Feb 23, 2021    Reason for Visit: Follow up of recurrent SCC of the R neck     History of Present Illness: Reese Oakley is a 70 year old male with PMH HTN, hyperlipidemia, DM2 with recurrent SCC. 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  More recently he was noted on follow up scans to have enlarged R sided lymph nodes and biopsy proven recurrence in the R neck.  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  There was one additional focus of squamous cell carcinoma identified in an additional lymph node without MEL.  His case was discussed at multidisciplinary tumor board and he was recommended to have chemoradiation - due to MEL in the submandibular gland.      Started weekly cisplatin with limited field radiation 1/25/21. Due to worsening renal function after first treatment switched to carboplatin/taxol for week 2. He was seen today for weekly follow-up on treatment.     Interval History:  Kenrick was seen today for follow-up. He is having more pain on the right posterior tongue. He is still eating and drinking well if he keeps things to the left side of his mouth. He is having a lot of pain at night and using Lidocaine every hour in addition to Tylenol. He is hesitant about pain medications because they make him sick. He also has thick secretions and plans to get robitussin for this.    He otherwise is doing well. Tolerating chemo with no side effects. Blood sugars still high but improving with removal of Dex last week. No fevers, headaches, dizziness, chest pain, SOB, cough, nausea, vomiting, abdominal pain. Bowels slightly looser with eating a more liquid diet, he will monitor for diarrhea. No urinary issues, edema,  rashes other than dry skin on neck, using Aquaphor. Baseline neuropathy unchanged.     Current Outpatient Medications   Medication Sig Dispense Refill     acetaminophen (TYLENOL) 325 MG tablet Take 2 tablets (650 mg) by mouth every 4 hours as needed for other (multimodal surgical pain management along with NSAIDS and opioid medication as indicated based on pain control and physical function.) 60 tablet 0     aspirin (ASA) 81 MG chewable tablet Take 81 mg by mouth daily       glipiZIDE (GLUCOTROL) 10 MG tablet Take 10 mg by mouth 2 times daily (before meals) 10 Mg in the am - 12.5MG  @@ DINNER       guaiFENesin (ROBITUSSIN) 100 MG/5ML liquid 10 mLs (200 mg) by Oral or PEG tube route every 4 hours as needed for other (mucous production and/or cough) 473 mL 5     HYDROmorphone (DILAUDID) 2 MG tablet Take 1 tablet (2 mg) by mouth every 4 hours as needed for moderate to severe pain 30 tablet 0     insulin glargine (LANTUS VIAL) 100 UNIT/ML vial Inject 7 Units Subcutaneous daily (with breakfast) PATIENT REPORTS 25 UNIT DAILY 02/16/2021       lisinopril (ZESTRIL) 10 MG tablet Take 10 mg by mouth daily       LORazepam (ATIVAN) 0.5 MG tablet Take 1 tablet (0.5 mg) by mouth every 4 hours as needed (Anxiety, Nausea/Vomiting or Sleep) 30 tablet 1     magic mouthwash (ENTER INGREDIENTS IN COMMENTS) suspension Swish, gargle, and spit one to two teaspoonfuls every six hours as needed. May be swallowed if esophageal involvement. 500 mL 3     metFORMIN (GLUCOPHAGE) 1000 MG tablet Take 500 mg by mouth 2 times daily (with meals)       mineral oil-hydrophilic petrolatum (AQUAPHOR) external ointment Apply topically every 8 hours 420 g 0     ondansetron (ZOFRAN) 8 MG tablet Take 1 tablet (8 mg) by mouth every 8 hours as needed for nausea (vomiting) 10 tablet 1     ondansetron (ZOFRAN-ODT) 4 MG ODT tab Take 1 tablet (4 mg) by mouth every 6 hours as needed for nausea or vomiting 20 tablet 0     pilocarpine (SALAGEN) 7.5 MG tablet Take 7.5  mg by mouth 2 times daily       polyethylene glycol (MIRALAX) 17 GM/Dose powder Take 17 g by mouth daily 510 g 0     prochlorperazine (COMPAZINE) 10 MG tablet Take 1 tablet (10 mg) by mouth every 6 hours as needed (Nausea/Vomiting) 30 tablet 1     senna-docusate (SENOKOT-S/PERICOLACE) 8.6-50 MG tablet Take 1 tablet by mouth 2 times daily as needed for constipation 20 tablet 0     simvastatin (ZOCOR) 40 MG tablet Take 40 mg by mouth At Bedtime       Physical Examination:  /63   Pulse 81   Temp 98.1  F (36.7  C) (Oral)   Resp 16   Wt 82.1 kg (180 lb 14.4 oz)   SpO2 98%   BMI 28.33 kg/m    Wt Readings from Last 10 Encounters:   02/16/21 82.1 kg (181 lb)   02/16/21 82.4 kg (181 lb 9.6 oz)   02/09/21 81.6 kg (180 lb)   02/09/21 82 kg (180 lb 12.4 oz)   02/09/21 (P) 83.5 kg (184 lb 1.6 oz)   02/02/21 84.8 kg (187 lb)   02/01/21 84.9 kg (187 lb 1.6 oz)   01/26/21 86.6 kg (191 lb)   01/25/21 85.7 kg (189 lb)   01/15/21 83.5 kg (184 lb)     Constitutional: Well-appearing male in no acute distress.  Eyes: EOMI, PERRL. No scleral icterus.  ENT: Oral mucosa is moist without lesions or thrush. Mass in inner right cheek. Unable to visualize posterior right tongue lesion.   Lymphatic: Neck is supple without cervical or supraclavicular lymphadenopathy, firm throughout right side.   Cardiovascular: Regular rate and rhythm. No murmurs, gallops, or rubs. No peripheral edema.  Respiratory: Clear to auscultation bilaterally. No wheezes or crackles.  Gastrointestinal: Bowel sounds present. Abdomen soft, non-tender.   Neurologic: Cranial nerves II through XII are grossly intact.  Skin: Dry skin with slight erythema to right neck. No other rashes, petechiae, or bruising noted on exposed skin.    Laboratory Data:  Results for TEOFILO CHAN (MRN [id number]) as of 2/23/2021 09:07   2/23/2021 08:25 2/23/2021 08:26   Sodium 138    Potassium 4.5    Chloride 104    Carbon Dioxide 29    Urea Nitrogen 18    Creatinine 1.41 (H)     GFR Estimate 49 (L)    GFR Estimate If Black 57 (L)    Calcium 9.6    Anion Gap 4    Albumin 3.3 (L)    Protein Total 7.4    Bilirubin Total 0.5    Alkaline Phosphatase 95    ALT 23    AST 14    Glucose 168 (H)    WBC  4.1   Hemoglobin  11.9 (L)   Hematocrit  35.4 (L)   Platelet Count  168   RBC Count  3.91 (L)   MCV  91   MCH  30.4   MCHC  33.6   RDW  12.8   Diff Method  Automated Method   % Neutrophils  63.4   % Lymphocytes  25.3   % Monocytes  10.3   % Eosinophils  0.0   % Basophils  0.5   % Immature Granulocytes  0.5   Nucleated RBCs  0   Absolute Neutrophil  2.6   Absolute Lymphocytes  1.0   Absolute Monocytes  0.4   Absolute Eosinophils  0.0   Absolute Basophils  0.0   Abs Immature Granulocytes  0.0   Absolute Nucleated RBC  0.0         Assessment and Plan:  1. 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 Getting limited field radiotherapy through Dr. Herrera. Has port. Given C1D1 low-dose weekly cisplatin 1/25/21. Switched to carbo/taxol for week 2 given creat increase. Tolerating well with no side effects. Dex removed after week 2. Continues to do well from a chemo standpoint today, having more radiation side effects as expected.      Will continue to see weekly on treatment.      2. FEN  Nutrition, no PEG. Doing well with oral intake at this time despite increased mouth pain. Weight stable.      Creat close to baseline. Continue pushing fluids.     Monitor bowels, was constipated now slightly loose.      3. 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 Continue s/s rinses, MMW/viscous lidocaine and Tylenol. Discussed narcotics but he prefers to wait and discuss with rad onc provider. Discussed Healios as well.     Agree with Robitussin for thick secretions.      4. Endo  DM, was on metformin, now on hold for renal issues. Currently on Glipizide and insulin, endo closely following. Improving now that dex removed from chemo plan.    20  minutes spent on the date of the encounter doing chart review, review of test results, interpretation of tests, patient visit and documentation     Travis Espinoza PA-C  [facility]  [address] [place] [address] [phone]

## 2021-02-23 NOTE — LETTER
2/23/2021         RE: Reese Oakley  [address] [facility] [address]      Oncology/Hematology Visit Note  Feb 23, 2021    Reason for Visit: Follow up of recurrent SCC of the R neck     History of Present Illness: Reese Oakley is a 70 year old male with PMH HTN, hyperlipidemia, DM2 with recurrent SCC. 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  More recently he was noted on follow up scans to have enlarged R sided lymph nodes and biopsy proven recurrence in the R neck.  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  There was one additional focus of squamous cell carcinoma identified in an additional lymph node without MEL.  His case was discussed at multidisciplinary tumor board and he was recommended to have chemoradiation - due to MEL in the submandibular gland.      Started weekly cisplatin with limited field radiation 1/25/21. Due to worsening renal function after first treatment switched to carboplatin/taxol for week 2. He was seen today for weekly follow-up on treatment.     Interval History:  Kenrick was seen today for follow-up. He is having more pain on the right posterior tongue. He is still eating and drinking well if he keeps things to the left side of his mouth. He is having a lot of pain at night and using Lidocaine every hour in addition to Tylenol. He is hesitant about pain medications because they make him sick. He also has thick secretions and plans to get robitussin for this.    He otherwise is doing well. Tolerating chemo with no side effects. Blood sugars still high but improving with removal of Dex last week. No fevers, headaches, dizziness, chest pain, SOB, cough, nausea, vomiting, abdominal pain. Bowels slightly  looser with eating a more liquid diet, he will monitor for diarrhea. No urinary issues, edema, rashes other than dry skin on neck, using Aquaphor. Baseline neuropathy unchanged.     Current Outpatient Medications   Medication Sig Dispense Refill     acetaminophen (TYLENOL) 325 MG tablet Take 2 tablets (650 mg) by mouth every 4 hours as needed for other (multimodal surgical pain management along with NSAIDS and opioid medication as indicated based on pain control and physical function.) 60 tablet 0     aspirin (ASA) 81 MG chewable tablet Take 81 mg by mouth daily       glipiZIDE (GLUCOTROL) 10 MG tablet Take 10 mg by mouth 2 times daily (before meals) 10 Mg in the am - 12.5MG  @@ DINNER       guaiFENesin (ROBITUSSIN) 100 MG/5ML liquid 10 mLs (200 mg) by Oral or PEG tube route every 4 hours as needed for other (mucous production and/or cough) 473 mL 5     HYDROmorphone (DILAUDID) 2 MG tablet Take 1 tablet (2 mg) by mouth every 4 hours as needed for moderate to severe pain 30 tablet 0     insulin glargine (LANTUS VIAL) 100 UNIT/ML vial Inject 7 Units Subcutaneous daily (with breakfast) PATIENT REPORTS 25 UNIT DAILY 02/16/2021       lisinopril (ZESTRIL) 10 MG tablet Take 10 mg by mouth daily       LORazepam (ATIVAN) 0.5 MG tablet Take 1 tablet (0.5 mg) by mouth every 4 hours as needed (Anxiety, Nausea/Vomiting or Sleep) 30 tablet 1     magic mouthwash (ENTER INGREDIENTS IN COMMENTS) suspension Swish, gargle, and spit one to two teaspoonfuls every six hours as needed. May be swallowed if esophageal involvement. 500 mL 3     metFORMIN (GLUCOPHAGE) 1000 MG tablet Take 500 mg by mouth 2 times daily (with meals)       mineral oil-hydrophilic petrolatum (AQUAPHOR) external ointment Apply topically every 8 hours 420 g 0     ondansetron (ZOFRAN) 8 MG tablet Take 1 tablet (8 mg) by mouth every 8 hours as needed for nausea (vomiting) 10 tablet 1     ondansetron (ZOFRAN-ODT) 4 MG ODT tab Take 1 tablet (4 mg) by mouth every 6 hours  as needed for nausea or vomiting 20 tablet 0     pilocarpine (SALAGEN) 7.5 MG tablet Take 7.5 mg by mouth 2 times daily       polyethylene glycol (MIRALAX) 17 GM/Dose powder Take 17 g by mouth daily 510 g 0     prochlorperazine (COMPAZINE) 10 MG tablet Take 1 tablet (10 mg) by mouth every 6 hours as needed (Nausea/Vomiting) 30 tablet 1     senna-docusate (SENOKOT-S/PERICOLACE) 8.6-50 MG tablet Take 1 tablet by mouth 2 times daily as needed for constipation 20 tablet 0     simvastatin (ZOCOR) 40 MG tablet Take 40 mg by mouth At Bedtime       Physical Examination:  /63   Pulse 81   Temp 98.1  F (36.7  C) (Oral)   Resp 16   Wt 82.1 kg (180 lb 14.4 oz)   SpO2 98%   BMI 28.33 kg/m    Wt Readings from Last 10 Encounters:   02/16/21 82.1 kg (181 lb)   02/16/21 82.4 kg (181 lb 9.6 oz)   02/09/21 81.6 kg (180 lb)   02/09/21 82 kg (180 lb 12.4 oz)   02/09/21 (P) 83.5 kg (184 lb 1.6 oz)   02/02/21 84.8 kg (187 lb)   02/01/21 84.9 kg (187 lb 1.6 oz)   01/26/21 86.6 kg (191 lb)   01/25/21 85.7 kg (189 lb)   01/15/21 83.5 kg (184 lb)     Constitutional: Well-appearing male in no acute distress.  Eyes: EOMI, PERRL. No scleral icterus.  ENT: Oral mucosa is moist without lesions or thrush. Mass in inner right cheek. Unable to visualize posterior right tongue lesion.   Lymphatic: Neck is supple without cervical or supraclavicular lymphadenopathy, firm throughout right side.   Cardiovascular: Regular rate and rhythm. No murmurs, gallops, or rubs. No peripheral edema.  Respiratory: Clear to auscultation bilaterally. No wheezes or crackles.  Gastrointestinal: Bowel sounds present. Abdomen soft, non-tender.   Neurologic: Cranial nerves II through XII are grossly intact.  Skin: Dry skin with slight erythema to right neck. No other rashes, petechiae, or bruising noted on exposed skin.    Laboratory Data:  Results for TEOFILO CHAN (MRN [id number]) as of 2/23/2021 09:07   2/23/2021 08:25 2/23/2021 08:26   Sodium 138     Potassium 4.5    Chloride 104    Carbon Dioxide 29    Urea Nitrogen 18    Creatinine 1.41 (H)    GFR Estimate 49 (L)    GFR Estimate If Black 57 (L)    Calcium 9.6    Anion Gap 4    Albumin 3.3 (L)    Protein Total 7.4    Bilirubin Total 0.5    Alkaline Phosphatase 95    ALT 23    AST 14    Glucose 168 (H)    WBC  4.1   Hemoglobin  11.9 (L)   Hematocrit  35.4 (L)   Platelet Count  168   RBC Count  3.91 (L)   MCV  91   MCH  30.4   MCHC  33.6   RDW  12.8   Diff Method  Automated Method   % Neutrophils  63.4   % Lymphocytes  25.3   % Monocytes  10.3   % Eosinophils  0.0   % Basophils  0.5   % Immature Granulocytes  0.5   Nucleated RBCs  0   Absolute Neutrophil  2.6   Absolute Lymphocytes  1.0   Absolute Monocytes  0.4   Absolute Eosinophils  0.0   Absolute Basophils  0.0   Abs Immature Granulocytes  0.0   Absolute Nucleated RBC  0.0         Assessment and Plan:  1. 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 Getting limited field radiotherapy through Dr. Herrera. Has port. Given C1D1 low-dose weekly cisplatin 1/25/21. Switched to carbo/taxol for week 2 given creat increase. Tolerating well with no side effects. Dex removed after week 2. Continues to do well from a chemo standpoint today, having more radiation side effects as expected.      Will continue to see weekly on treatment.      2. FEN  Nutrition, no PEG. Doing well with oral intake at this time despite increased mouth pain. Weight stable.      Creat close to baseline. Continue pushing fluids.     Monitor bowels, was constipated now slightly loose.      3. 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 Continue s/s rinses, MMW/viscous lidocaine and Tylenol. Discussed narcotics but he prefers to wait and discuss with rad onc provider. Discussed Healios as well.     Agree with Robitussin for thick secretions.      4. Endo  DM, was on metformin, now on hold for renal issues. Currently on Glipizide  and insulin, endo closely following. Improving now that dex removed from chemo plan.    20 minutes spent on the date of the encounter doing chart review, review of test results, interpretation of tests, patient visit and documentation     Travis Espinoza PA-C  [facility]  [address] [place] [id number] [phone]

## 2021-02-23 NOTE — PROGRESS NOTES
[facility] PHYSICIANS  SPECIALIZING IN BREAKTHROUGHS  Radiation Oncology    On Treatment Visit Note      Reese Oakley      Date: 2021   MRN: [id number]   : 1950  Diagnosis: Tongue Cancer    Treatment Summary to Date   Right Neck Current Dose: 4400 cGy/6000 cGy Fractions:       Chemotherapy  Chemo concurrent with radx?: Yes  Oncologist: Dr Mark    Drug Name/Frequency 1: Weekly cisplatin week one, change to carboplatin week 2    Subjective: Mr. Oakley continues to  tolerate chemoradiation well. He is working with endocrinology to minimize fluctuations in blood glucose.  His creatinine increased after his first cycle of chemotherapy and he is now been switched to carboplatin.  In addition, Metformin has been discontinued.  He has a sore on the right lateral posterior tongue which continues to make eating painful but it has not significantly changed since last week.  He is continuing to use viscous lidocaine prior to eating. He is also waking up every hour due to pain associated with the tongue. He is still tolerating solid food.    Nursing ROS:   Nutrition Alteration  Diet Type: Patient's Preference  Nutrition Note: No PEG  Skin  Skin Reaction: 1 - Faint erythema or dry desquamation  Skin Note: Aquaphor     ENT and Mouth Exam  Mucositis - Current: 1 - Generalized erythema     Gastrointestinal  Nausea: 0 - None     Psychosocial  Mood - Anxiety: 0 - Normal  Pain Assessment  0-10 Pain Scale: 4    Objective:   Wt 82.1 kg (181 lb)   BMI 28.35 kg/m  , pain 0/10  Gen: Appears well, NAD  Skin: Mild erythema  Oral cavity/oropharynx: No mucositis    Laboratory:  Lab Results   Component Value Date    WBC 4.1 2021    HGB 11.9 (L) 2021    HCT 35.4 (L) 2021    MCV 91 2021     2021     Lab Results   Component Value Date     2021    POTASSIUM 4.5 2021    CHLORIDE 104 2021    CO2 29 2021     (H) 2021     Lab Results    Component Value Date    AST 14 02/23/2021    ALT 23 02/23/2021    ALKPHOS 95 02/23/2021    BILITOTAL 0.5 02/23/2021     Assessment:    Tolerating radiation therapy well.  All questions and concerns addressed.    Treatment-related toxicities (CTCAE v5.0): Mucositis grade 1    Plan:   1. Continue current therapy.    2. Continue to use Magic mouthwash to apply locally to mouth sores before eating    Mosaiq chart and setup information reviewed  MVCT images reviewed    Medication Review  Med list reviewed with patient?: Yes    Educational Topic Discussed  Education Instructions: Reviewed    Jennifer Herrera MD  Department of Radiation Oncology  [facility]

## 2021-02-23 NOTE — NURSING NOTE
"Oncology Rooming Note    February 23, 2021 6:59 AM   Reese Oakley is a 70 year old male who presents for:    Chief Complaint   Patient presents with     Oncology Clinic Visit     SQUAMOUS CELL CARCINOMA OF NECK      Initial Vitals: /63   Pulse 81   Temp 98.1  F (36.7  C) (Oral)   Resp 16   Wt 82.1 kg (180 lb 14.4 oz)   SpO2 98%   BMI 28.33 kg/m   Estimated body mass index is 28.33 kg/m  as calculated from the following:    Height as of 1/15/21: 1.702 m (5' 7\").    Weight as of this encounter: 82.1 kg (180 lb 14.4 oz). Body surface area is 1.97 meters squared.  Mild Pain (3) Comment: Data Unavailable   No LMP for male patient.  Allergies reviewed: Yes  Medications reviewed: Yes    Medications: Medication refills not needed today.  Pharmacy name entered into EPIC: [facility] [id number] [place] [address]    Clinical concerns: No new concerns today  Travis Espinoza  was NOT notified.      Donna Berrios            "

## 2021-02-23 NOTE — LETTER
2021         RE: Reese Oakley  [address] [facility] [address]        Dear Colleague,    Thank you for referring your patient, Reese Oakley, to the [facility] RADIATION ONCOLOGY. Please see a copy of my visit note below.    [facility] PHYSICIANS  SPECIALIZING IN BREAKTHROUGHS  Radiation Oncology    On Treatment Visit Note      Reese Oakley      Date: 2021   MRN: [id number]   : 1950  Diagnosis: Tongue Cancer    Treatment Summary to Date   Right Neck Current Dose: 4400 cGy/6000 cGy Fractions:       Chemotherapy  Chemo concurrent with radx?: Yes  Oncologist: Dr Mark    Drug Name/Frequency 1: Weekly cisplatin week one, change to carboplatin week 2    Subjective: Mr. Oakley continues to  tolerate chemoradiation well. He is working with endocrinology to minimize fluctuations in blood glucose.  His creatinine increased after his first cycle of chemotherapy and he is now been switched to carboplatin.  In addition, Metformin has been discontinued.  He has a sore on the right lateral posterior tongue which continues to make eating painful but it has not significantly changed since last week.  He is continuing to use viscous lidocaine prior to eating. He is also waking up every hour due to pain associated with the tongue. He is still tolerating solid food.    Nursing ROS:   Nutrition Alteration  Diet Type: Patient's Preference  Nutrition Note: No PEG  Skin  Skin Reaction: 1 - Faint erythema or dry desquamation  Skin Note: Aquaphor     ENT and Mouth Exam  Mucositis - Current: 1 - Generalized erythema     Gastrointestinal  Nausea: 0 - None     Psychosocial  Mood - Anxiety: 0 - Normal  Pain Assessment  0-10 Pain Scale: 4    Objective:   Wt 82.1 kg (181 lb)   BMI 28.35 kg/m  , pain 0/10  Gen: Appears well, NAD  Skin: Mild erythema  Oral cavity/oropharynx: No mucositis    Laboratory:  Lab Results   Component Value Date    WBC 4.1 2021     HGB 11.9 (L) 02/23/2021    HCT 35.4 (L) 02/23/2021    MCV 91 02/23/2021     02/23/2021     Lab Results   Component Value Date     02/23/2021    POTASSIUM 4.5 02/23/2021    CHLORIDE 104 02/23/2021    CO2 29 02/23/2021     (H) 02/23/2021     Lab Results   Component Value Date    AST 14 02/23/2021    ALT 23 02/23/2021    ALKPHOS 95 02/23/2021    BILITOTAL 0.5 02/23/2021     Assessment:    Tolerating radiation therapy well.  All questions and concerns addressed.    Treatment-related toxicities (CTCAE v5.0): Mucositis grade 1    Plan:   1. Continue current therapy.    2. Continue to use Magic mouthwash to apply locally to mouth sores before eating    Mosaiq chart and setup information reviewed  MVCT images reviewed    Medication Review  Med list reviewed with patient?: Yes    Educational Topic Discussed  Education Instructions: Reviewed    Jennifer Herrera MD  Department of Radiation Oncology  [facility]      Again, thank you for allowing me to participate in the care of your patient.        Sincerely,        Jennifer Herrera MD

## 2021-02-23 NOTE — PATIENT INSTRUCTIONS
Contact Numbers    [facility] Main Line: [phone]  [facility] Triage and after hours / weekends / holidays: [phone]      Please call the triage or after hours line if you experience a temperature greater than or equal to 100.4, shaking chills, have uncontrolled nausea, vomiting and/or diarrhea, dizziness, shortness of breath, chest pain, bleeding, unexplained bruising, or if you have any other new/concerning symptoms, questions or concerns.      If you are having any concerning symptoms or wish to speak to a provider before your next infusion visit, please call your care coordinator or triage to notify them so we can adequately serve you.     If you need a refill on a narcotic prescription or other medication, please call before your infusion appointment.       February 2021 Sunday Monday Tuesday Wednesday Thursday Friday Saturday        1    LAB PERIPHERAL   8:00 AM   (15 min.)   [facility] LAB DRAW   [facility]    TELEPHONE VISIT RETURN   8:50 AM   (50 min.)   Polly Vargas PA-C   [facility] ONC INFUSION 180  10:00 AM   (180 min.)    ONCOLOGY INFUSION   [facility] EXTERNAL RADIATION TREATMT   1:30 PM   (30 min.)   P RAD ONC BISI   [facility] Radiation Oncology 2    P EXTERNAL RADIATION TREATMT  10:30 AM   (30 min.)   P RAD ONC BISI   [facility] Radiation Oncology    UM ON TREATMENT VISIT  11:00 AM   (15 min.)   Jennifer Herrera MD   [facility] Radiation Oncology 3    P EXTERNAL RADIATION TREATMT  10:30 AM   (30 min.)   P RAD ONC BISI   [facility] Radiation Oncology    VIDEO VISIT NEW   1:45 PM   (60 min.)   Zo Bar MD   [facility] [place] 4    [facility] EXTERNAL RADIATION TREATMT  10:30 AM   (30 min.)   P RAD ONC BISI   [facility] Radiation Oncology 5    P EXTERNAL RADIATION TREATMT  10:30  AM   (30 min.)   [facility] RAD ONC BISI   [facility] Radiation Oncology 6       7     8    SWALLOW TREATMENT   9:15 AM   (30 min.)   Nidia Pérez SLP   [facility] EXTERNAL RADIATION TREATMT  10:30 AM   (30 min.)   [facility] RAD ONC BISI   [facility] Radiation Oncology 9    LAB PERIPHERAL   8:45 AM   (15 min.)   [facility] LAB DRAW   [facility] RETURN   9:15 AM   (50 min.)   Travis Espinoza PA   [facility] ONC INFUSION 180   9:30 AM   (180 min.)   [facility] ONCOLOGY INFUSION   [facility] RETURN   2:05 PM   (20 min.)   Jose Antonio Mckenna MD   [facility] EXTERNAL RADIATION TREATMT   2:45 PM   (30 min.)   P RAD ONC BISI   [facility] Radiation Oncology    [facility] ON TREATMENT VISIT   3:15 PM   (15 min.)   Jennifer Herrera MD   [facility] Radiation Oncology 10    P EXTERNAL RADIATION TREATMT  10:30 AM   (30 min.)   P RAD ONC BISI   [facility] Radiation Oncology 11    P EXTERNAL RADIATION TREATMT  10:30 AM   (30 min.)   P RAD ONC BISI   [facility] Radiation Oncology 12    P EXTERNAL RADIATION TREATMT  10:30 AM   (30 min.)   P RAD ONC BISI   [facility] Radiation Oncology 13       14     15    P EXTERNAL RADIATION TREATMT  10:30 AM   (30 min.)   P RAD ONC BISI   [facility] Radiation Oncology 16    LAB PERIPHERAL   7:15 AM   (15 min.)   [facility] LAB DRAW   [facility]    UM RETURN   7:35 AM   (50 min.)   Travis Espinoza PA   [facility] ONC INFUSION 180   9:00 AM   (180 min.)   [facility] ONCOLOGY INFUSION   [facility] EXTERNAL RADIATION TREATMT  10:30 AM   (30 min.)   P RAD ONC BISI   [facility] Radiation  Oncology    [facility] ON TREATMENT VISIT  11:00 AM   (15 min.)   Jennifer Herrera MD   [facility] Radiation Oncology 17    [facility] EXTERNAL RADIATION TREATMT  10:30 AM   (30 min.)   [facility] RAD ONC BISI   [facility] Radiation Oncology 18    [facility] EXTERNAL RADIATION TREATMT  10:30 AM   (30 min.)   [facility] RAD ONC BISI   [facility] Radiation Oncology 19    [facility] EXTERNAL RADIATION TREATMT  10:30 AM   (30 min.)   [facility] RAD ONC BISI   [facility] Radiation Oncology 20       21     22    SWALLOW TREATMENT   9:15 AM   (30 min.)   Nidia Pérez SLP   [facility]    [facility] EXTERNAL RADIATION TREATMT  10:30 AM   (30 min.)   P RAD ONC BISI   [facility] Radiation Oncology 23    [facility] RETURN   6:45 AM   (50 min.)   Travis Espinoza PA   [facility]    LAB PERIPHERAL   8:30 AM   (15 min.)   [facility] LAB DRAW   [facility]    [facility] ONC INFUSION 180   9:00 AM   (180 min.)   UC ONCOLOGY INFUSION   [facility] EXTERNAL RADIATION TREATMT  10:30 AM   (30 min.)   [facility] RAD ONC BISI   [facility] Radiation Oncology    [facility] ON TREATMENT VISIT  11:00 AM   (15 min.)   Jennifer Herrera MD   [facility] Radiation Oncology 24    [facility] EXTERNAL RADIATION TREATMT  10:30 AM   (30 min.)   [facility] RAD ONC BISI   [facility] Radiation Oncology 25    [facility] EXTERNAL RADIATION TREATMT  10:30 AM   (30 min.)   [facility] RAD ONC BISI   [facility] Radiation Oncology 26    [facility] EXTERNAL RADIATION TREATMT  10:30 AM   (30 min.)   [facility] RAD ONC BISI   [facility] Radiation Oncology 27       28 March 2021 Sunday Monday Tuesday Wednesday Thursday Friday Saturday        1    LAB PERIPHERAL   9:45 AM   (15 min.)   [facility] LAB DRAW   [facility]    [facility] RETURN  10:05 AM   (50  min.)   Deedee De Leon CNP   [facility] EXTERNAL RADIATION TREATMT  10:30 AM   (30 min.)   [facility] RAD ONC BISI   [facility] Radiation Oncology    [facility] ONC INFUSION 180  11:30 AM   (180 min.)   [facility] ONCOLOGY INFUSION   [facility] 2    [facility] EXTERNAL RADIATION TREATMT  10:30 AM   (30 min.)   [facility] RAD ONC BISI   [facility] Radiation Oncology    UMP ON TREATMENT VISIT  11:00 AM   (15 min.)   Jennifer Herrera MD   [facility] Radiation Oncology 3    P EXTERNAL RADIATION TREATMT  10:30 AM   (30 min.)   [facility] RAD ONC BISI   [facility] Radiation Oncology 4    P EXTERNAL RADIATION TREATMT  10:30 AM   (30 min.)   P RAD ONC BISI   [facility] Radiation Oncology 5    [facility] EXTERNAL RADIATION TREATMT  10:30 AM   (30 min.)   P RAD ONC BISI   [facility] Radiation Oncology 6       7     8    SWALLOW TREATMENT   9:15 AM   (30 min.)   Nidia Pérez SLP   [facility] 9    LAB PERIPHERAL   8:30 AM   (15 min.)   [facility] LAB DRAW   [facility] RETURN   8:55 AM   (50 min.)   Deedee De Leon CNP   [facility] ONC INFUSION 180  10:00 AM   (180 min.)   [facility] ONCOLOGY INFUSION   [facility] 10     11     12     13       14     15     16    VIDEO VISIT RETURN   1:45 PM   (30 min.)   Toni Mark DO   [facility] 17     18     19     20       21     22    SWALLOW TREATMENT   9:15 AM   (30 min.)   Nidia Pérez SLP   [facility] 23     24     25     26     27       28     29     30    [facility] RETURN  10:00 AM   (20 min.)   Jose Antonio Mckenna MD   [facility] [place] 31                                   Recent Results (from the past 24 hour(s))   Comprehensive metabolic panel     Collection Time: 02/23/21  8:25 AM   Result Value Ref Range    Sodium 138 133 - 144 mmol/L    Potassium 4.5 3.4 - 5.3 mmol/L    Chloride 104 94 - 109 mmol/L    Carbon Dioxide 29 20 - 32 mmol/L    Anion Gap 4 3 - 14 mmol/L    Glucose 168 (H) 70 - 99 mg/dL    Urea Nitrogen 18 7 - 30 mg/dL    Creatinine 1.41 (H) 0.66 - 1.25 mg/dL    GFR Estimate 49 (L) >60 mL/min/[1.73_m2]    GFR Estimate If Black 57 (L) >60 mL/min/[1.73_m2]    Calcium 9.6 8.5 - 10.1 mg/dL    Bilirubin Total 0.5 0.2 - 1.3 mg/dL    Albumin 3.3 (L) 3.4 - 5.0 g/dL    Protein Total 7.4 6.8 - 8.8 g/dL    Alkaline Phosphatase 95 40 - 150 U/L    ALT 23 0 - 70 U/L    AST 14 0 - 45 U/L   CBC with platelets differential    Collection Time: 02/23/21  8:26 AM   Result Value Ref Range    WBC 4.1 4.0 - 11.0 10e9/L    RBC Count 3.91 (L) 4.4 - 5.9 10e12/L    Hemoglobin 11.9 (L) 13.3 - 17.7 g/dL    Hematocrit 35.4 (L) 40.0 - 53.0 %    MCV 91 78 - 100 fl    MCH 30.4 26.5 - 33.0 pg    MCHC 33.6 31.5 - 36.5 g/dL    RDW 12.8 10.0 - 15.0 %    Platelet Count 168 150 - 450 10e9/L    Diff Method Automated Method     % Neutrophils 63.4 %    % Lymphocytes 25.3 %    % Monocytes 10.3 %    % Eosinophils 0.0 %    % Basophils 0.5 %    % Immature Granulocytes 0.5 %    Nucleated RBCs 0 0 /100    Absolute Neutrophil 2.6 1.6 - 8.3 10e9/L    Absolute Lymphocytes 1.0 0.8 - 5.3 10e9/L    Absolute Monocytes 0.4 0.0 - 1.3 10e9/L    Absolute Eosinophils 0.0 0.0 - 0.7 10e9/L    Absolute Basophils 0.0 0.0 - 0.2 10e9/L    Abs Immature Granulocytes 0.0 0 - 0.4 10e9/L    Absolute Nucleated RBC 0.0

## 2021-02-23 NOTE — NURSING NOTE
"Pt with Pain in his Mouth and Throat from Radiation. Rating his Pain a \"3/10\" this morning.     Chief Complaint   Patient presents with     Oncology Clinic Visit     SQUAMOUS CELL CARCINOMA OF NECK      Port Draw     Labs drawn by RN in Lab from Right Chest Port-a-Cath. Line flushed with Saline and Heparin.      Vandana Carrillo RN    "

## 2021-02-23 NOTE — PROGRESS NOTES
"Infusion Nursing Note:  Reese Oakley presents today for Cycle 1 Day 22 Taxol/Carboplatin.    Patient seen by provider today: Yes: SILVESTRE Santillan   present during visit today: Not Applicable.    Note: N/A.      Pain: mouth pain \"3/10\"; denies need for intervention from this RN    Intravenous Access:  Implanted Port.    Treatment Conditions:  Lab Results   Component Value Date    HGB 11.9 02/23/2021     Lab Results   Component Value Date    WBC 4.1 02/23/2021      Lab Results   Component Value Date    ANEU 2.6 02/23/2021     Lab Results   Component Value Date     02/23/2021      Lab Results   Component Value Date     02/23/2021                   Lab Results   Component Value Date    POTASSIUM 4.5 02/23/2021           Lab Results                       Lab Results   Component Value Date    CR 1.41 02/23/2021                   Lab Results   Component Value Date    ELEUTERIO 9.6 02/23/2021                Lab Results   Component Value Date    BILITOTAL 0.5 02/23/2021           Lab Results   Component Value Date    ALBUMIN 3.3 02/23/2021                    Lab Results   Component Value Date    ALT 23 02/23/2021           Lab Results   Component Value Date    AST 14 02/23/2021       Results reviewed, labs MET treatment parameters, ok to proceed with treatment.      Post Infusion Assessment:  Patient tolerated infusion without incident.  Blood return noted pre and post infusion.  Site patent and intact, free from redness, edema or discomfort.  No evidence of extravasations.  Access discontinued per protocol.       Discharge Plan:   Patient declined prescription refills.  Copy of AVS reviewed with patient and/or family.  Patient will return 3/1 for next appointment.  Patient discharged in stable condition accompanied by: self.  Departure Mode: Ambulatory.    AVTAR ANDERSON RN    "

## 2021-02-23 NOTE — LETTER
2021      RE: Reese Oakley  [address] [address]       [facility] PHYSICIANS  SPECIALIZING IN BREAKTHROUGHS  Radiation Oncology    On Treatment Visit Note      Reese Oakley      Date: 2021   MRN: [id number]   : 1950  Diagnosis: Tongue Cancer    Treatment Summary to Date   Right Neck Current Dose: 4400 cGy/6000 cGy Fractions:       Chemotherapy  Chemo concurrent with radx?: Yes  Oncologist: Dr Mark    Drug Name/Frequency 1: Weekly cisplatin week one, change to carboplatin week 2    Subjective: Mr. Oakley continues to  tolerate chemoradiation well. He is working with endocrinology to minimize fluctuations in blood glucose.  His creatinine increased after his first cycle of chemotherapy and he is now been switched to carboplatin.  In addition, Metformin has been discontinued.  He has a sore on the right lateral posterior tongue which continues to make eating painful but it has not significantly changed since last week.  He is continuing to use viscous lidocaine prior to eating. He is also waking up every hour due to pain associated with the tongue. He is still tolerating solid food.    Nursing ROS:   Nutrition Alteration  Diet Type: Patient's Preference  Nutrition Note: No PEG  Skin  Skin Reaction: 1 - Faint erythema or dry desquamation  Skin Note: Aquaphor     ENT and Mouth Exam  Mucositis - Current: 1 - Generalized erythema     Gastrointestinal  Nausea: 0 - None     Psychosocial  Mood - Anxiety: 0 - Normal  Pain Assessment  0-10 Pain Scale: 4    Objective:   Wt 82.1 kg (181 lb)   BMI 28.35 kg/m  , pain 0/10  Gen: Appears well, NAD  Skin: Mild erythema  Oral cavity/oropharynx: No mucositis    Laboratory:  Lab Results   Component Value Date    WBC 4.1 2021    HGB 11.9 (L) 2021    HCT 35.4 (L) 2021    MCV 91 2021     2021     Lab Results   Component Value Date     2021    POTASSIUM 4.5 2021     CHLORIDE 104 02/23/2021    CO2 29 02/23/2021     (H) 02/23/2021     Lab Results   Component Value Date    AST 14 02/23/2021    ALT 23 02/23/2021    ALKPHOS 95 02/23/2021    BILITOTAL 0.5 02/23/2021     Assessment:    Tolerating radiation therapy well.  All questions and concerns addressed.    Treatment-related toxicities (CTCAE v5.0): Mucositis grade 1    Plan:   1. Continue current therapy.    2. Continue to use Magic mouthwash to apply locally to mouth sores before eating    Mosaiq chart and setup information reviewed  MVCT images reviewed    Medication Review  Med list reviewed with patient?: Yes    Educational Topic Discussed  Education Instructions: Reviewed    Jennifer Herrera MD  Department of Radiation Oncology  [facility]

## 2021-02-26 NOTE — PROGRESS NOTES
Oncology/Hematology Visit Note  Mar 1, 2021    Reason for Visit: Follow up of recurrent SCC of the R neck     History of Present Illness: Reese Oakley is a 70 year old male with PMH HTN, hyperlipidemia, DM2 with recurrent SCC. 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  More recently he was noted on follow up scans to have enlarged R sided lymph nodes and biopsy proven recurrence in the R neck.  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  There was one additional focus of squamous cell carcinoma identified in an additional lymph node without MEL.  His case was discussed at multidisciplinary tumor board and he was recommended to have chemoradiation - due to MEL in the submandibular gland.      Started weekly cisplatin with limited field radiation 1/25/21. Due to worsening renal function after first treatment switched to carboplatin/taxol for week 2. He was seen today for weekly follow-up on treatment.     Interval History:  Pain in R throat/back of tongue is bothersome and now preventing amount of liquid he is taking in, but he is still able to get in calories with cream of wheat + protein powder. He finds the lidocaine rinse most helpful still (instead of stronger medication) and does this often. Blood sugars have been better without the dex. Bowels have not moved in 2-3 days but he does not feel bloated, nauseas, or full. Urine output slightly decreased but he is not dizzy or lightheaded today. Mainly feels fatigued. No shortness of breath or cough. No fever or chills. Neuropathy is unchanged. No edema, rashes other than dry skin on neck, using Aquaphor.     10 point review of systems otherwise negative     Current Outpatient Medications   Medication  Sig Dispense Refill     acetaminophen (TYLENOL) 325 MG tablet Take 2 tablets (650 mg) by mouth every 4 hours as needed for other (multimodal surgical pain management along with NSAIDS and opioid medication as indicated based on pain control and physical function.) 60 tablet 0     aspirin (ASA) 81 MG chewable tablet Take 81 mg by mouth daily       glipiZIDE (GLUCOTROL) 10 MG tablet Take 10 mg by mouth 2 times daily (before meals) 10 Mg in the am - 12.5MG  @@ DINNER       guaiFENesin (ROBITUSSIN) 100 MG/5ML liquid 10 mLs (200 mg) by Oral or PEG tube route every 4 hours as needed for other (mucous production and/or cough) 473 mL 5     HYDROcodone-acetaminophen 7.5-325 MG/15ML solution 5 - 7.5 mls by mouth every 6 hours for moderate to severe pain. Not to exceed 3000 mg of acetaminophen/24 hours 250 mL 0     HYDROmorphone (DILAUDID) 2 MG tablet Take 1 tablet (2 mg) by mouth every 4 hours as needed for moderate to severe pain 30 tablet 0     insulin glargine (LANTUS VIAL) 100 UNIT/ML vial Inject 7 Units Subcutaneous daily (with breakfast) PATIENT REPORTS 25 UNIT DAILY 02/16/2021       lidocaine (XYLOCAINE) 2 % solution Swish and spit 15 mLs in mouth every 3 hours as needed for moderate pain ; Max 8 doses/24 hour period. 100 mL 3     lisinopril (ZESTRIL) 10 MG tablet Take 10 mg by mouth daily       LORazepam (ATIVAN) 0.5 MG tablet Take 1 tablet (0.5 mg) by mouth every 4 hours as needed (Anxiety, Nausea/Vomiting or Sleep) 30 tablet 1     magic mouthwash (ENTER INGREDIENTS IN COMMENTS) suspension Swish, gargle, and spit one to two teaspoonfuls every six hours as needed. May be swallowed if esophageal involvement. 500 mL 3     metFORMIN (GLUCOPHAGE) 1000 MG tablet Take 500 mg by mouth 2 times daily (with meals)       mineral oil-hydrophilic petrolatum (AQUAPHOR) external ointment Apply topically every 8 hours 420 g 0     ondansetron (ZOFRAN) 8 MG tablet Take 1 tablet (8 mg) by mouth every 8 hours as needed for nausea  "(vomiting) 10 tablet 1     ondansetron (ZOFRAN-ODT) 4 MG ODT tab Take 1 tablet (4 mg) by mouth every 6 hours as needed for nausea or vomiting 20 tablet 0     pilocarpine (SALAGEN) 7.5 MG tablet Take 7.5 mg by mouth 2 times daily       polyethylene glycol (MIRALAX) 17 GM/Dose powder Take 17 g by mouth daily 510 g 0     prochlorperazine (COMPAZINE) 10 MG tablet Take 1 tablet (10 mg) by mouth every 6 hours as needed (Nausea/Vomiting) 30 tablet 1     senna-docusate (SENOKOT-S/PERICOLACE) 8.6-50 MG tablet Take 1 tablet by mouth 2 times daily as needed for constipation 20 tablet 0     simvastatin (ZOCOR) 40 MG tablet Take 40 mg by mouth At Bedtime       Physical Examination:  BP 90/48   Pulse 77   Temp 97.1  F (36.2  C) (Oral)   Resp 16   Ht 1.702 m (5' 7.01\")   Wt 81.2 kg (179 lb)   SpO2 98%   BMI 28.03 kg/m    Wt Readings from Last 10 Encounters:   02/23/21 82.1 kg (181 lb)   02/23/21 81.8 kg (180 lb 5.4 oz)   02/16/21 82.1 kg (181 lb)   02/16/21 82.4 kg (181 lb 9.6 oz)   02/09/21 81.6 kg (180 lb)   02/09/21 82 kg (180 lb 12.4 oz)   02/09/21 (P) 83.5 kg (184 lb 1.6 oz)   02/02/21 84.8 kg (187 lb)   02/01/21 84.9 kg (187 lb 1.6 oz)   01/26/21 86.6 kg (191 lb)     Constitutional: Well-appearing male in no acute distress.  Eyes: EOMI, PERRL. No scleral icterus.  ENT: Oral mucosa is moist without lesions. Thrush coating tongue. Mass in inner right cheek. Unable to visualize posterior right tongue lesion.   Lymphatic: Neck is supple without cervical or supraclavicular lymphadenopathy, firm throughout right side.   Cardiovascular: Regular rate and rhythm. No murmurs, gallops, or rubs. No peripheral edema.  Respiratory: Clear to auscultation bilaterally. No wheezes or crackles.  Gastrointestinal: Bowel sounds present. Abdomen soft, non-tender.   Neurologic: Cranial nerves II through XII are grossly intact.  Skin: Dry skin with slight erythema to right neck. No other rashes, petechiae, or bruising noted on exposed " skin.  Laboratory Data:   3/1/2021 09:52   Sodium 134   Potassium 4.8   Chloride 99   Carbon Dioxide 27   Urea Nitrogen 24   Creatinine 1.50 (H)   GFR Estimate 46 (L)   GFR Estimate If Black 53 (L)   Calcium 9.6   Anion Gap 8   Albumin 3.2 (L)   Protein Total 7.7   Bilirubin Total 0.5   Alkaline Phosphatase 89   ALT 24   AST 18   Glucose 185 (H)   WBC 3.4 (L)   Hemoglobin 11.1 (L)   Hematocrit 32.3 (L)   Platelet Count 189   RBC Count 3.61 (L)   MCV 90   MCH 30.7   MCHC 34.4   RDW 13.1   Diff Method Automated Method   % Neutrophils 62.3   % Lymphocytes 25.3   % Monocytes 11.2   % Eosinophils 0.3   % Basophils 0.6   % Immature Granulocytes 0.3   Nucleated RBCs 0   Absolute Neutrophil 2.1   Absolute Lymphocytes 0.9   Absolute Monocytes 0.4   Absolute Eosinophils 0.0   Absolute Basophils 0.0   Abs Immature Granulocytes 0.0   Absolute Nucleated RBC 0.0         Assessment and Plan:  1. 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 Getting limited field radiotherapy through Dr. Herrera. Has port. Given C1D1 low-dose weekly cisplatin 1/25/21. Switched to carbo/taxol for week 2 given creat increase. Tolerating well with no side effects. Dex removed after week 2. Continues to do well from a chemo standpoint today, having more radiation side effects as expected.      Finishes radiation Friday, thus will cancel chemo infusion for next Tuesday. Keep follow up with me next week but change to virtual per his request. Sees Dr. Herrera tomorrow. IVF Wed and Friday this week.      2. FEN  Nutrition, no PEG. Doing well with calorie intake at this time despite increased mouth pain. Weight stable.     Hypotensive with decreased PO liquid intake. Asymptomatic. Will do extra 1L fluid today and IVF Wed and Fri this week in the clinic after radiation. Will work on setting up IVF at home to start next week.      Creat close to baseline still. Continue pushing fluids and IVF as  above     Monitor bowels, no BM in 2-3 days. Asked he take miralax or softener today or tomorrow if still no BM.      3. 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 Continue s/s rinses, MMW/viscous lidocaine and Tylenol. He is avoiding narcotics as he feels it does not help    Thrush: coating tongue, start nystatin    Did not tolerate Robitussin liquid for thick secretions so will try mucinex since he is able to swallow pills.      4. Endo  DM, was on metformin, now on hold for renal issues. Currently on Glipizide and insulin, endo closely following. Improving now that dex removed from chemo plan.    50 minutes spent on the date of the encounter doing chart review, interpretation of tests, patient visit, documentation and discussion with other provider(s)     Deedee De Leon CNP on 3/1/2021 at 11:06 AM

## 2021-03-01 NOTE — LETTER
3/1/2021     RE: Reese Oakley  [address] [facility] [address]    Dear Colleague,    Thank you for referring your patient, Reese Oakley, to the [facility]. Please see a copy of my visit note below.    Oncology/Hematology Visit Note  Mar 1, 2021    Reason for Visit: Follow up of recurrent SCC of the R neck     History of Present Illness: Reese Oakley is a 70 year old male with PMH HTN, hyperlipidemia, DM2 with recurrent SCC. 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  More recently he was noted on follow up scans to have enlarged R sided lymph nodes and biopsy proven recurrence in the R neck.  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  There was one additional focus of squamous cell carcinoma identified in an additional lymph node without MEL.  His case was discussed at multidisciplinary tumor board and he was recommended to have chemoradiation - due to MEL in the submandibular gland.      Started weekly cisplatin with limited field radiation 1/25/21. Due to worsening renal function after first treatment switched to carboplatin/taxol for week 2. He was seen today for weekly follow-up on treatment.     Interval History:  Pain in R throat/back of tongue is bothersome and now preventing amount of liquid he is taking in, but he is still able to get in calories with cream of wheat + protein powder. He finds the lidocaine rinse most helpful still (instead of stronger medication) and does this often. Blood sugars have been better without the dex. Bowels have not moved in 2-3 days but he does not feel bloated, nauseas, or full. Urine output slightly decreased but he is not dizzy or lightheaded today. Mainly  feels fatigued. No shortness of breath or cough. No fever or chills. Neuropathy is unchanged. No edema, rashes other than dry skin on neck, using Aquaphor.     10 point review of systems otherwise negative     Current Outpatient Medications   Medication Sig Dispense Refill     acetaminophen (TYLENOL) 325 MG tablet Take 2 tablets (650 mg) by mouth every 4 hours as needed for other (multimodal surgical pain management along with NSAIDS and opioid medication as indicated based on pain control and physical function.) 60 tablet 0     aspirin (ASA) 81 MG chewable tablet Take 81 mg by mouth daily       glipiZIDE (GLUCOTROL) 10 MG tablet Take 10 mg by mouth 2 times daily (before meals) 10 Mg in the am - 12.5MG  @@ DINNER       guaiFENesin (ROBITUSSIN) 100 MG/5ML liquid 10 mLs (200 mg) by Oral or PEG tube route every 4 hours as needed for other (mucous production and/or cough) 473 mL 5     HYDROcodone-acetaminophen 7.5-325 MG/15ML solution 5 - 7.5 mls by mouth every 6 hours for moderate to severe pain. Not to exceed 3000 mg of acetaminophen/24 hours 250 mL 0     HYDROmorphone (DILAUDID) 2 MG tablet Take 1 tablet (2 mg) by mouth every 4 hours as needed for moderate to severe pain 30 tablet 0     insulin glargine (LANTUS VIAL) 100 UNIT/ML vial Inject 7 Units Subcutaneous daily (with breakfast) PATIENT REPORTS 25 UNIT DAILY 02/16/2021       lidocaine (XYLOCAINE) 2 % solution Swish and spit 15 mLs in mouth every 3 hours as needed for moderate pain ; Max 8 doses/24 hour period. 100 mL 3     lisinopril (ZESTRIL) 10 MG tablet Take 10 mg by mouth daily       LORazepam (ATIVAN) 0.5 MG tablet Take 1 tablet (0.5 mg) by mouth every 4 hours as needed (Anxiety, Nausea/Vomiting or Sleep) 30 tablet 1     magic mouthwash (ENTER INGREDIENTS IN COMMENTS) suspension Swish, gargle, and spit one to two teaspoonfuls every six hours as needed. May be swallowed if esophageal involvement. 500 mL 3     metFORMIN (GLUCOPHAGE) 1000 MG tablet Take 500 mg  "by mouth 2 times daily (with meals)       mineral oil-hydrophilic petrolatum (AQUAPHOR) external ointment Apply topically every 8 hours 420 g 0     ondansetron (ZOFRAN) 8 MG tablet Take 1 tablet (8 mg) by mouth every 8 hours as needed for nausea (vomiting) 10 tablet 1     ondansetron (ZOFRAN-ODT) 4 MG ODT tab Take 1 tablet (4 mg) by mouth every 6 hours as needed for nausea or vomiting 20 tablet 0     pilocarpine (SALAGEN) 7.5 MG tablet Take 7.5 mg by mouth 2 times daily       polyethylene glycol (MIRALAX) 17 GM/Dose powder Take 17 g by mouth daily 510 g 0     prochlorperazine (COMPAZINE) 10 MG tablet Take 1 tablet (10 mg) by mouth every 6 hours as needed (Nausea/Vomiting) 30 tablet 1     senna-docusate (SENOKOT-S/PERICOLACE) 8.6-50 MG tablet Take 1 tablet by mouth 2 times daily as needed for constipation 20 tablet 0     simvastatin (ZOCOR) 40 MG tablet Take 40 mg by mouth At Bedtime       Physical Examination:  BP 90/48   Pulse 77   Temp 97.1  F (36.2  C) (Oral)   Resp 16   Ht 1.702 m (5' 7.01\")   Wt 81.2 kg (179 lb)   SpO2 98%   BMI 28.03 kg/m    Wt Readings from Last 10 Encounters:   02/23/21 82.1 kg (181 lb)   02/23/21 81.8 kg (180 lb 5.4 oz)   02/16/21 82.1 kg (181 lb)   02/16/21 82.4 kg (181 lb 9.6 oz)   02/09/21 81.6 kg (180 lb)   02/09/21 82 kg (180 lb 12.4 oz)   02/09/21 (P) 83.5 kg (184 lb 1.6 oz)   02/02/21 84.8 kg (187 lb)   02/01/21 84.9 kg (187 lb 1.6 oz)   01/26/21 86.6 kg (191 lb)     Constitutional: Well-appearing male in no acute distress.  Eyes: EOMI, PERRL. No scleral icterus.  ENT: Oral mucosa is moist without lesions. Thrush coating tongue. Mass in inner right cheek. Unable to visualize posterior right tongue lesion.   Lymphatic: Neck is supple without cervical or supraclavicular lymphadenopathy, firm throughout right side.   Cardiovascular: Regular rate and rhythm. No murmurs, gallops, or rubs. No peripheral edema.  Respiratory: Clear to auscultation bilaterally. No wheezes or " crackles.  Gastrointestinal: Bowel sounds present. Abdomen soft, non-tender.   Neurologic: Cranial nerves II through XII are grossly intact.  Skin: Dry skin with slight erythema to right neck. No other rashes, petechiae, or bruising noted on exposed skin.  Laboratory Data:   3/1/2021 09:52   Sodium 134   Potassium 4.8   Chloride 99   Carbon Dioxide 27   Urea Nitrogen 24   Creatinine 1.50 (H)   GFR Estimate 46 (L)   GFR Estimate If Black 53 (L)   Calcium 9.6   Anion Gap 8   Albumin 3.2 (L)   Protein Total 7.7   Bilirubin Total 0.5   Alkaline Phosphatase 89   ALT 24   AST 18   Glucose 185 (H)   WBC 3.4 (L)   Hemoglobin 11.1 (L)   Hematocrit 32.3 (L)   Platelet Count 189   RBC Count 3.61 (L)   MCV 90   MCH 30.7   MCHC 34.4   RDW 13.1   Diff Method Automated Method   % Neutrophils 62.3   % Lymphocytes 25.3   % Monocytes 11.2   % Eosinophils 0.3   % Basophils 0.6   % Immature Granulocytes 0.3   Nucleated RBCs 0   Absolute Neutrophil 2.1   Absolute Lymphocytes 0.9   Absolute Monocytes 0.4   Absolute Eosinophils 0.0   Absolute Basophils 0.0   Abs Immature Granulocytes 0.0   Absolute Nucleated RBC 0.0         Assessment and Plan:  1. 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 Getting limited field radiotherapy through Dr. Herrera. Has port. Given C1D1 low-dose weekly cisplatin 1/25/21. Switched to carbo/taxol for week 2 given creat increase. Tolerating well with no side effects. Dex removed after week 2. Continues to do well from a chemo standpoint today, having more radiation side effects as expected.      Finishes radiation Friday, thus will cancel chemo infusion for next Tuesday. Keep follow up with me next week but change to virtual per his request. Sees Dr. Herrera tomorrow. IVF Wed and Friday this week.      2. FEN  Nutrition, no PEG. Doing well with calorie intake at this time despite increased mouth pain. Weight stable.     Hypotensive with decreased PO liquid  intake. Asymptomatic. Will do extra 1L fluid today and IVF Wed and Fri this week in the clinic after radiation. Will work on setting up IVF at home to start next week.      Creat close to baseline still. Continue pushing fluids and IVF as above     Monitor bowels, no BM in 2-3 days. Asked he take miralax or softener today or tomorrow if still no BM.      3. 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 Continue s/s rinses, MMW/viscous lidocaine and Tylenol. He is avoiding narcotics as he feels it does not help    Thrush: coating tongue, start nystatin    Did not tolerate Robitussin liquid for thick secretions so will try mucinex since he is able to swallow pills.      4. Endo  DM, was on metformin, now on hold for renal issues. Currently on Glipizide and insulin, endo closely following. Improving now that dex removed from chemo plan.    50 minutes spent on the date of the encounter doing chart review, interpretation of tests, patient visit, documentation and discussion with other provider(s)     Deedee De Leon CNP on 3/1/2021 at 11:06 AM

## 2021-03-01 NOTE — NURSING NOTE
"Chief Complaint   Patient presents with     Port Draw     Labs drawn via port by RN. VS taken.     Port accessed with 20g 3/4\" power needle and labs drawn by rn.  Port flushed with NS and heparin.  Pt tolerated well.  VS taken.  Pt checked in for next appt.    Kevin Garg RN  "

## 2021-03-01 NOTE — PROGRESS NOTES
Infusion Nursing Note:  Reese Oakley presents today for Day 29 Cycle 1 Taxol/Carbo (last chemo).    Patient seen by provider today: Yes: Deedee De Leon CNP   present during visit today: Not Applicable.    Note: Patient presents to infusion feeling ok. Most recently, swallowing and maintaining adequate oral intake has been difficult. Patient denies acute complaints or concerns not addressed with Deedee De Leon CNP.    TORB. 3/1/2021. Deedee De Leon CNP. Purnima Arndt RN on behalf of Christos Downing RN. Today is patients last chemo. Monday's infusion appointment to be canceled. 1L NS bolus added for today. Patient to come back Wednesday and Friday this week for IVF's    Patient did meet criteria for an asymptomatic covid-19 PCR test in infusion today. Patient declined the covid-19 test.    Intravenous Access:  Implanted Port.    Treatment Conditions:  Lab Results   Component Value Date    HGB 11.1 03/01/2021     Lab Results   Component Value Date    WBC 3.4 03/01/2021      Lab Results   Component Value Date    ANEU 2.1 03/01/2021     Lab Results   Component Value Date     03/01/2021      Lab Results   Component Value Date     03/01/2021                   Lab Results   Component Value Date    POTASSIUM 4.8 03/01/2021           Lab Results   Component Value Date    MAG 1.6 02/01/2021            Lab Results   Component Value Date    CR 1.50 03/01/2021                   Lab Results   Component Value Date    ELEUTERIO 9.6 03/01/2021                Lab Results   Component Value Date    BILITOTAL 0.5 03/01/2021           Lab Results   Component Value Date    ALBUMIN 3.2 03/01/2021                    Lab Results   Component Value Date    ALT 24 03/01/2021           Lab Results   Component Value Date    AST 18 03/01/2021       Results reviewed, labs MET treatment parameters, ok to proceed with treatment.      Post Infusion Assessment:  Patient tolerated infusion without incident.  Blood return noted pre  and post infusion.  Site patent and intact, free from redness, edema or discomfort.  No evidence of extravasations.  Access discontinued per protocol.       Discharge Plan:   Prescription refills given for Nytstatin solution and Mucinex  Discharge instructions reviewed with: Patient.  Patient and/or family verbalized understanding of discharge instructions and all questions answered.  AVS to patient via [employer].  Patient will return 3/3 for next appointment for IVFs  Patient discharged in stable condition accompanied by: self.  Departure Mode: Ambulatory.  Face to Face time: 0 minutes.    Christos Downing RN

## 2021-03-01 NOTE — LETTER
3/1/2021         RE: Reese Oakley  [address] [facility] [address]        Dear Colleague,    Thank you for referring your patient, Reese Oakley, to the [facility] RADIATION ONCOLOGY. Please see a copy of my visit note below.    [facility] PHYSICIANS  SPECIALIZING IN BREAKTHROUGHS  Radiation Oncology    On Treatment Visit Note      eRese Oakley      Date: Mar 1, 2021   MRN: [id number]   : 1950  Diagnosis: Tongue Cancer    Treatment Summary to Date   Right Neck Current Dose: 5200 cGy/6000 cGy Fractions:       Chemotherapy  Chemo concurrent with radx?: Yes  Oncologist: Dr Mark    Drug Name/Frequency 1: Weekly cisplatin week one, change to carboplatin week 2    Subjective: Mr. Oakley continues to  tolerate chemoradiation well. Pain, particularly on his tongue, is unchanged compared to last week. He is taking hydrocodone-acetaminophen for pain and feels this helps somewhat but not completely. He is still tolerating solid food. His wife was concerned about his skin due to some peeling he's experienced over the last week.  He was started on nystatin by medical oncology for thrush.  He is currently applying aloe gel to the skin.    Nursing ROS:   Nutrition Alteration  Diet Type: Patient's Preference  Nutrition Note: No PEG  Skin  Skin Reaction: 2 - Moderate to brisk erythema, patchy moist desquamation, mostly confined to skin folds and creases, moderate edema  Skin Note: Aquaphor     ENT and Mouth Exam  Mucositis - Current: 2 - Isolated ulcer/white patches     Gastrointestinal  Nausea: 0 - None     Psychosocial  Mood - Anxiety: 0 - Normal  Pain Assessment  0-10 Pain Scale: 8  Pain Note: Vicodin    Objective:   Wt 81.2 kg (179 lb)   BMI 28.03 kg/m  , pain 6/10  Gen: Appears well, NAD  Skin: Mild erythema with patchy dry desquamation  Oral cavity/oropharynx: Mild mucositis in oral cavity, thick white coating on tongue  Laboratory:  Lab Results    Component Value Date    WBC 3.4 (L) 03/01/2021    HGB 11.1 (L) 03/01/2021    HCT 32.3 (L) 03/01/2021    MCV 90 03/01/2021     03/01/2021     Lab Results   Component Value Date     03/01/2021    POTASSIUM 4.8 03/01/2021    CHLORIDE 99 03/01/2021    CO2 27 03/01/2021     (H) 03/01/2021     Lab Results   Component Value Date    AST 18 03/01/2021    ALT 24 03/01/2021    ALKPHOS 89 03/01/2021    BILITOTAL 0.5 03/01/2021     Assessment:    Tolerating radiation therapy well.  All questions and concerns addressed.    Treatment-related toxicities (CTCAE v5.0):   Mucositis: Grade 2: Moderate pain; not interfering with oral intake; modified diet indicated  Dermatitis: Grade 1: Faint erythema or dry desquamation    Plan:   1. Continue current therapy.    2. Continue to use Magic mouthwash to apply locally to mouth sores before eating  3. Continue hydrocodone-acetaminophen for pain  4. Discontinue aloe gel and use only Aquaphor on the skin as aloe gel can be drying to the skin  5. EOTV on Friday    Mosaiq chart and setup information reviewed  MVCT images reviewed    Medication Review  Med list reviewed with patient?: Yes    Educational Topic Discussed  Education Instructions: Reviewed    The patient was seen and assessed with staff, Dr. Herrera.    Errol Maier MD  PGY-4 Radiation Oncology  Clinic: [phone]  Pager: [phone]    Mr. Oakley was seen and examined by me. Note above by Dr. Maier was reviewed and edited by me and reflects our mutual findings and plan of care.    Jennifer Herrera MD  Department of Radiation Oncology  [facility]

## 2021-03-01 NOTE — NURSING NOTE
"Oncology Rooming Note    March 1, 2021 10:19 AM   Reese Oakley is a 70 year old male who presents for:    Chief Complaint   Patient presents with     Port Draw     Labs drawn via port by RN. VS taken.     Oncology Clinic Visit     [facility] RETURN - SQUAMOUS CELL CARCINOMA OF NECK     Initial Vitals: BP 92/48 (BP Location: Right arm, Patient Position: Chair, Cuff Size: Adult Regular)   Pulse 77   Temp 97.1  F (36.2  C) (Oral)   Resp 16   Ht 1.702 m (5' 7.01\")   Wt 81.2 kg (179 lb)   SpO2 98%   BMI 28.03 kg/m   Estimated body mass index is 28.03 kg/m  as calculated from the following:    Height as of this encounter: 1.702 m (5' 7.01\").    Weight as of this encounter: 81.2 kg (179 lb). Body surface area is 1.96 meters squared.  Moderate Pain (5) Comment: Data Unavailable   No LMP for male patient.  Allergies reviewed: Yes  Medications reviewed: Yes    Medications: Medication refills not needed today.  Pharmacy name entered into EPIC: [id number] [place] [address]    Clinical concerns: Low blood pressure. Retook in clinic manually was 92/48. The machine was unable to find bp on either arm. Patient is feeling lethargic and a little light headed.  Deedee De Leon was notified.      Alfredo Brown LPN            "

## 2021-03-01 NOTE — PATIENT INSTRUCTIONS
[facility] Triage and after hours / weekends / holidays:  [phone]    Please call the triage or after hours line if you experience a temperature greater than or equal to 100.5, shaking chills, have uncontrolled nausea, vomiting and/or diarrhea, dizziness, shortness of breath, chest pain, bleeding, unexplained bruising, or if you have any other new/concerning symptoms, questions or concerns.      If you are having any concerning symptoms or wish to speak to a provider before your next infusion visit, please call your care coordinator or triage to notify them so we can adequately serve you.     If you need a refill on a narcotic prescription or other medication, please call before your infusion appointment.                 March 2021 Sunday Monday Tuesday Wednesday Thursday Friday Saturday        1    LAB CENTRAL   9:45 AM   (15 min.)   [facility] LAB DRAW   [facility]    [facility] RETURN  10:05 AM   (50 min.)   Deedee De Leon CNP   [facility] EXTERNAL RADIATION TREATMT  10:30 AM   (30 min.)   P RAD ONC BISI   [facility] Radiation Oncology    [facility] ONC INFUSION 180  11:30 AM   (180 min.)   [facility] ONCOLOGY INFUSION   [facility] 2    P EXTERNAL RADIATION TREATMT  10:30 AM   (30 min.)   P RAD ONC BISI   [facility] Radiation Oncology    P ON TREATMENT VISIT  11:00 AM   (15 min.)   Jennifer Herrera MD   [facility] Radiation Oncology 3    P EXTERNAL RADIATION TREATMT  10:30 AM   (30 min.)   P RAD ONC BISI   [facility] Radiation Oncology    P ONC INFUSION 120   1:00 PM   (120 min.)   [facility] ONCOLOGY INFUSION   [facility] 4    P EXTERNAL RADIATION TREATMT  10:30 AM   (30 min.)   [facility] RAD ONC BISI   [facility] Radiation Oncology 5    LAB CENTRAL   6:45 AM   (15 min.)    [facility] DRAW   [facility]  Clinic    [facility] ONC INFUSION 120   7:00 AM   (120 min.)   UC ONCOLOGY INFUSION   [facility] EXTERNAL RADIATION TREATMT  10:30 AM   (30 min.)   [facility] RAD ONC BISI   [facility] Radiation Oncology 6       7     8    SWALLOW TREATMENT   9:15 AM   (30 min.)   Nidia Pérez SLP   [facility] 9    VIDEO VISIT RETURN   8:55 AM   (50 min.)   Deedee De Leon CNP   [facility] 10     11     12     13       14     15     16    VIDEO VISIT RETURN   1:45 PM   (30 min.)   Toni Mark DO   [facility] 17     18     19     20       21     22    SWALLOW TREATMENT   9:15 AM   (30 min.)   Nidia Pérez SLP   [facility] 23     24     25     26     27       28     29     30    [facility] RETURN  10:00 AM   (20 min.)   Jose Antonio Mckenna MD   [facility] [place] 31 April 2021 Sunday Monday Tuesday Wednesday Thursday Friday Saturday                       1     2     3       4     5     6     7     8     9  Happy Birthday!     10       11     12     13     14     15     16     17       18     19     20     21     22     23     24       25     26     27     28     29     30                          Lab Results:  Recent Results (from the past 12 hour(s))   Comprehensive metabolic panel    Collection Time: 03/01/21  9:52 AM   Result Value Ref Range    Sodium 134 133 - 144 mmol/L    Potassium 4.8 3.4 - 5.3 mmol/L    Chloride 99 94 - 109 mmol/L    Carbon Dioxide 27 20 - 32 mmol/L    Anion Gap 8 3 - 14 mmol/L    Glucose 185 (H) 70 - 99 mg/dL    Urea Nitrogen 24 7 - 30 mg/dL    Creatinine 1.50 (H) 0.66 - 1.25 mg/dL    GFR Estimate 46 (L) >60 mL/min/[1.73_m2]    GFR Estimate If Black 53 (L) >60 mL/min/[1.73_m2]    Calcium 9.6 8.5 - 10.1 mg/dL    Bilirubin Total 0.5 0.2 - 1.3 mg/dL    Albumin 3.2 (L) 3.4 -  5.0 g/dL    Protein Total 7.7 6.8 - 8.8 g/dL    Alkaline Phosphatase 89 40 - 150 U/L    ALT 24 0 - 70 U/L    AST 18 0 - 45 U/L   CBC with platelets differential    Collection Time: 03/01/21  9:52 AM   Result Value Ref Range    WBC 3.4 (L) 4.0 - 11.0 10e9/L    RBC Count 3.61 (L) 4.4 - 5.9 10e12/L    Hemoglobin 11.1 (L) 13.3 - 17.7 g/dL    Hematocrit 32.3 (L) 40.0 - 53.0 %    MCV 90 78 - 100 fl    MCH 30.7 26.5 - 33.0 pg    MCHC 34.4 31.5 - 36.5 g/dL    RDW 13.1 10.0 - 15.0 %    Platelet Count 189 150 - 450 10e9/L    Diff Method Automated Method     % Neutrophils 62.3 %    % Lymphocytes 25.3 %    % Monocytes 11.2 %    % Eosinophils 0.3 %    % Basophils 0.6 %    % Immature Granulocytes 0.3 %    Nucleated RBCs 0 0 /100    Absolute Neutrophil 2.1 1.6 - 8.3 10e9/L    Absolute Lymphocytes 0.9 0.8 - 5.3 10e9/L    Absolute Monocytes 0.4 0.0 - 1.3 10e9/L    Absolute Eosinophils 0.0 0.0 - 0.7 10e9/L    Absolute Basophils 0.0 0.0 - 0.2 10e9/L    Abs Immature Granulocytes 0.0 0 - 0.4 10e9/L    Absolute Nucleated RBC 0.0

## 2021-03-02 NOTE — PROGRESS NOTES
[facility] PHYSICIANS  SPECIALIZING IN BREAKTHROUGHS  Radiation Oncology    On Treatment Visit Note      Reese Oakley      Date: Mar 1, 2021   MRN: [id number]   : 1950  Diagnosis: Tongue Cancer    Treatment Summary to Date   Right Neck Current Dose: 5200 cGy/6000 cGy Fractions:       Chemotherapy  Chemo concurrent with radx?: Yes  Oncologist: Dr Mark    Drug Name/Frequency 1: Weekly cisplatin week one, change to carboplatin week 2    Subjective: Mr. Oakley continues to  tolerate chemoradiation well. Pain, particularly on his tongue, is unchanged compared to last week. He is taking hydrocodone-acetaminophen for pain and feels this helps somewhat but not completely. He is still tolerating solid food. His wife was concerned about his skin due to some peeling he's experienced over the last week.  He was started on nystatin by medical oncology for thrush.  He is currently applying aloe gel to the skin.    Nursing ROS:   Nutrition Alteration  Diet Type: Patient's Preference  Nutrition Note: No PEG  Skin  Skin Reaction: 2 - Moderate to brisk erythema, patchy moist desquamation, mostly confined to skin folds and creases, moderate edema  Skin Note: Aquaphor     ENT and Mouth Exam  Mucositis - Current: 2 - Isolated ulcer/white patches     Gastrointestinal  Nausea: 0 - None     Psychosocial  Mood - Anxiety: 0 - Normal  Pain Assessment  0-10 Pain Scale: 8  Pain Note: Vicodin    Objective:   Wt 81.2 kg (179 lb)   BMI 28.03 kg/m  , pain 6/10  Gen: Appears well, NAD  Skin: Mild erythema with patchy dry desquamation  Oral cavity/oropharynx: Mild mucositis in oral cavity, thick white coating on tongue  Laboratory:  Lab Results   Component Value Date    WBC 3.4 (L) 2021    HGB 11.1 (L) 2021    HCT 32.3 (L) 2021    MCV 90 2021     2021     Lab Results   Component Value Date     2021    POTASSIUM 4.8 2021    CHLORIDE 99 2021    CO2 27  03/01/2021     (H) 03/01/2021     Lab Results   Component Value Date    AST 18 03/01/2021    ALT 24 03/01/2021    ALKPHOS 89 03/01/2021    BILITOTAL 0.5 03/01/2021     Assessment:    Tolerating radiation therapy well.  All questions and concerns addressed.    Treatment-related toxicities (CTCAE v5.0):   Mucositis: Grade 2: Moderate pain; not interfering with oral intake; modified diet indicated  Dermatitis: Grade 1: Faint erythema or dry desquamation    Plan:   1. Continue current therapy.    2. Continue to use Magic mouthwash to apply locally to mouth sores before eating  3. Continue hydrocodone-acetaminophen for pain  4. Discontinue aloe gel and use only Aquaphor on the skin as aloe gel can be drying to the skin  5. EOTV on Friday    Mosaiq chart and setup information reviewed  MVCT images reviewed    Medication Review  Med list reviewed with patient?: Yes    Educational Topic Discussed  Education Instructions: Reviewed    The patient was seen and assessed with staff, Dr. Herrera.    Errol Maier MD  PGY-4 Radiation Oncology  Clinic: [phone]  Pager: [phone]    Mr. Oakley was seen and examined by me. Note above by Dr. Maier was reviewed and edited by me and reflects our mutual findings and plan of care.    Jennifer Herrera MD  Department of Radiation Oncology  [facility]

## 2021-03-03 NOTE — TELEPHONE ENCOUNTER
Alejandrina will be sending a Face to Face form for the 3/1 visit. Requesting provider to fill out and return.

## 2021-03-03 NOTE — PROGRESS NOTES
This is a recent snapshot of the patient's [facility] medical record.  For current drug dose and complete information and questions, call [phone] or In Basket pool, fv home infusion ([id number])  CSN Number:  [id number]

## 2021-03-03 NOTE — PROGRESS NOTES
Infusion Nursing Note:  Reese Oakley presents today for IVF.    Patient seen by provider today: No    Note: Pt arrived with continued throat pain and associated difficulty eating/drinking. Confirms he is able to get cream of wheat mixed in with two cartons of protein drinks, which gives him a total of 1600 calories daily. Declines pain intervention in infusion today and confirms he has his oral medications he is taking. States he had a BM yesterday, but he is constipated overall. Reinforced importance of staying on top of his laxatives/stool softeners. Denies new cough/SOB (has ongoing cough from XRT related secretions) and no fevers, although temperature was slightly elevated in the 99's in infusion today. Pt will monitor for signs/symptoms of infection at home and notify if temperature > 100.4F.     Intravenous Access:  Implanted Port.    Post Infusion Assessment:  Patient tolerated infusion without incident.  Blood return noted pre and post infusion.  Site patent and intact, free from redness, edema or discomfort.  No evidence of extravasations. Access discontinued per protocol.     Discharge Plan:   Patient declined prescription refills.  AVS to patient via [employer].  Patient will return 3/5 for next appointment.   Patient discharged in stable condition accompanied by: self.  Departure Mode: Ambulatory.    Karina Noel RN

## 2021-03-05 NOTE — PROGRESS NOTES
"Kenrick is a 70 year old who is being evaluated via a billable telephone visit.      What phone number would you like to be contacted at? [phone]  How would you like to obtain your AVS? [place]     Vitals - Patient Reported  Weight (Patient Reported): 78.5 kg (173 lb)  Height (Patient Reported): 170.2 cm (5' 7.01\")  BMI (Based on Pt Reported Ht/Wt): 27.09  Pain Score: Severe Pain (6)  Pain Loc: (MOUTH)    Cecilia Bennett Counts include 234 beds at the [facility]      Oncology/Hematology Visit Note  Mar 9, 2021    Reason for Visit: Follow up of recurrent SCC of the R neck     History of Present Illness: Reese Oakley is a 70 year old male with PMH HTN, hyperlipidemia, DM2 with recurrent SCC. 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  More recently he was noted on follow up scans to have enlarged R sided lymph nodes and biopsy proven recurrence in the R neck.  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  There was one additional focus of squamous cell carcinoma identified in an additional lymph node without MEL.  His case was discussed at multidisciplinary tumor board and he was recommended to have chemoradiation - due to MEL in the submandibular gland.      Started weekly cisplatin with limited field radiation 1/25/21. Due to worsening renal function after first treatment switched to carboplatin/taxol for week 2. Finished treatment 3/5/21    Interval History:  The pain in his tongue is still very bothersome. He is still able to get his cream of wheat + protein powder in twice per day and for the most part (+/- a pound) is maintaining weight. No dizziness or LH, /50, no longer on lisinopril. The lidocaine is helpful and he was taking percocet at night but now he is having trouble " swallowing the pills as they are too big and uncomfortable. Blood sugars are stable, low 100's. No nausea or emesis. No fever or chills. Breathing is okay, no cough or trouble swallowing, just painful. Has thick secretions that are occasionally blood tinged. No nissa blood. He spits them out as needed.  Putting Aquaphor and silvadene on his neck. Fatigued but still keeping up with activity     10 point review of systems otherwise negative     Current Outpatient Medications   Medication Sig Dispense Refill     acetaminophen (TYLENOL) 325 MG tablet Take 2 tablets (650 mg) by mouth every 4 hours as needed for other (multimodal surgical pain management along with NSAIDS and opioid medication as indicated based on pain control and physical function.) 60 tablet 0     aspirin (ASA) 81 MG chewable tablet Take 81 mg by mouth daily       glipiZIDE (GLUCOTROL) 10 MG tablet Take 10 mg by mouth 2 times daily (before meals) 10 Mg in the am - 12.5MG  @@ DINNER       guaiFENesin (MUCINEX) 600 MG 12 hr tablet Take 2 tablets (1,200 mg) by mouth 2 times daily 120 tablet 1     guaiFENesin (ROBITUSSIN) 100 MG/5ML liquid 10 mLs (200 mg) by Oral or PEG tube route every 4 hours as needed for other (mucous production and/or cough) 473 mL 5     HYDROcodone-acetaminophen 7.5-325 MG/15ML solution 5 - 7.5 mls by mouth every 6 hours for moderate to severe pain. Not to exceed 3000 mg of acetaminophen/24 hours (Patient not taking: Reported on 3/1/2021) 250 mL 0     HYDROmorphone (DILAUDID) 2 MG tablet Take 1 tablet (2 mg) by mouth every 4 hours as needed for moderate to severe pain 30 tablet 0     insulin glargine (LANTUS VIAL) 100 UNIT/ML vial Inject 28 Units Subcutaneous daily (with breakfast) PATIENT REPORTS 25 UNIT DAILY 02/16/2021       lidocaine (XYLOCAINE) 2 % solution Swish and spit 15 mLs in mouth every 3 hours as needed for moderate pain ; Max 8 doses/24 hour period. 100 mL 3     lisinopril (ZESTRIL) 10 MG tablet Take 10 mg by mouth  daily       LORazepam (ATIVAN) 0.5 MG tablet Take 1 tablet (0.5 mg) by mouth every 4 hours as needed (Anxiety, Nausea/Vomiting or Sleep) 30 tablet 1     magic mouthwash (ENTER INGREDIENTS IN COMMENTS) suspension Swish, gargle, and spit one to two teaspoonfuls every six hours as needed. May be swallowed if esophageal involvement. 500 mL 3     metFORMIN (GLUCOPHAGE) 1000 MG tablet Take 500 mg by mouth 2 times daily (with meals)       mineral oil-hydrophilic petrolatum (AQUAPHOR) external ointment Apply topically every 8 hours 420 g 0     nystatin (MYCOSTATIN) 301184 UNIT/ML suspension Take 5 mLs (500,000 Units) by mouth 4 times daily 400 mL 0     ondansetron (ZOFRAN) 8 MG tablet Take 1 tablet (8 mg) by mouth every 8 hours as needed for nausea (vomiting) 10 tablet 1     ondansetron (ZOFRAN-ODT) 4 MG ODT tab Take 1 tablet (4 mg) by mouth every 6 hours as needed for nausea or vomiting 20 tablet 0     pilocarpine (SALAGEN) 7.5 MG tablet Take 7.5 mg by mouth 2 times daily       polyethylene glycol (MIRALAX) 17 GM/Dose powder Take 17 g by mouth daily 510 g 0     prochlorperazine (COMPAZINE) 10 MG tablet Take 1 tablet (10 mg) by mouth every 6 hours as needed (Nausea/Vomiting) 30 tablet 1     senna-docusate (SENOKOT-S/PERICOLACE) 8.6-50 MG tablet Take 1 tablet by mouth 2 times daily as needed for constipation 20 tablet 0     silver sulfADIAZINE (SILVADENE) 1 % external cream Apply topically daily 20 g 0     simvastatin (ZOCOR) 40 MG tablet Take 40 mg by mouth At Bedtime       Physical Examination:  There were no vitals taken for this visit.  Wt Readings from Last 10 Encounters:   03/05/21 (P) 81.6 kg (180 lb)   03/02/21 81.2 kg (179 lb)   03/01/21 81.2 kg (179 lb)   03/01/21 81.2 kg (179 lb)   02/23/21 82.1 kg (181 lb)   02/23/21 81.8 kg (180 lb 5.4 oz)   02/16/21 82.1 kg (181 lb)   02/16/21 82.4 kg (181 lb 9.6 oz)   02/09/21 81.6 kg (180 lb)   02/09/21 82 kg (180 lb 12.4 oz)     General: alert and no distress  Psych: Alert  "and oriented times; coherent speech, normal rate and volume, able to articulate logical thoughts, able to abstract reason, no tangential thoughts, no hallucinations or delusions  Patient's affect is appropriate.   Pulm: Speaking in full sentences, unlabored, no audible wheezes or cough.  The rest of a comprehensive physical examination is deferred due to PHE (public health emergency) video restrictions\"    Laboratory Data:  Most recent labs reviewed    Assessment and Plan:  1. 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 Received limited field radiotherapy + low-dose weekly cisplatin 1/25/21. Switched to carbo/taxol for week 2 given creat increase. Finished treatment 3/5/21. Has expected toxicities of painful mucositis, dermatitis, and fatigue. Discussed anticipated timeline of improvement.  --follow up with Dr. Mark next week     2. FEN  Nutrition, no PEG. Doing okay with calorie intake at this time despite increased mouth pain. Weight stable.      IVF MWF at local hospital. More frequent if needed. BP ok today per their home machine     Monitor bowels with oxy. Has prn softener which I encouraged him to use     3. 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 Continue s/s rinses, MMW/viscous lidocaine and Tylenol.   --sending oxycodone 5 mg today for him to use 5-10 mg every 6 hours. He had been taking percocet at home but they are too big and uncomfortable to swallow. He can crush the oxycodone if needed and put in a bit of his cream of wheat. Monitor for tolerance and oversedation.     Thrush: coating tongue, improving with nystatin. Continue until gone    Now tolerating robitussin liquid for secretions     4. Endo  DM, was on metformin, now on hold for renal issues. Currently on Glipizide and insulin, endo closely following. Improving now that dex removed from chemo plan. Stable today despite trouble with PO    Phone call duration: 21 " minutes    60 minutes spent on the date of the encounter doing chart review, review of test results, interpretation of tests, patient visit, documentation and discussion with family, in addition to 21 minutes spent on the phone with the patient.     Deedee De Leon CNP on 3/9/2021 at 10:00 AM

## 2021-03-05 NOTE — PROGRESS NOTES
[facility] PHYSICIANS  SPECIALIZING IN BREAKTHROUGHS  Radiation Oncology    On Treatment Visit Note      Reese Oakley      Date: Mar 5, 2021   MRN: [id number]   : 1950  Diagnosis: Tongue Cancer    Treatment Summary to Date   Right Neck Current Dose: 6000 cGy/6000 cGy Fractions: 30/30      Chemotherapy  Chemo concurrent with radx?: Yes  Oncologist: Dr Mark    Drug Name/Frequency 1: Weekly cisplatin week one, change to carboplatin week 2    Subjective: Mr. Oakley continues to  tolerate chemoradiation overall well. His worst pain in at a sore in his right upper posterior oral cavity. He is taking hydrocodone-acetaminophen twice daily and using lidocaine frequently for pain and feels this helps somewhat but not completely. He eats mostly cream of wheat fortified with protein supplements. He is using aquaphor for his skin.    Nursing ROS:   Nutrition Alteration  Diet Type: Patient's Preference  Nutrition Note: No PEG  Skin  Skin Reaction: 2 - Moderate to brisk erythema, patchy moist desquamation, mostly confined to skin folds and creases, moderate edema  Skin Note: Aquaphor     ENT and Mouth Exam  Mucositis - Current: 2 - Isolated ulcer/white patches     Gastrointestinal  Nausea: 0 - None     Psychosocial  Mood - Anxiety: 0 - Normal  Pain Assessment  0-10 Pain Scale: 8  Pain Note: Vicodin    Objective:   Gen: Appears well, NAD  Skin: Moderate erythema with significant dry desquamation and patchy moist desquamation limited to skin folds and creases of the right neck and submental region  Oral cavity/oropharynx: Mild mucositis in oral cavity    Laboratory:  Lab Results   Component Value Date    WBC 3.4 (L) 2021    HGB 11.1 (L) 2021    HCT 32.3 (L) 2021    MCV 90 2021     2021     Lab Results   Component Value Date     2021    POTASSIUM 4.8 2021    CHLORIDE 99 2021    CO2 27 2021     (H) 2021     Lab Results    Component Value Date    AST 18 03/01/2021    ALT 24 03/01/2021    ALKPHOS 89 03/01/2021    BILITOTAL 0.5 03/01/2021     Assessment:    Tolerating radiation therapy well.  All questions and concerns addressed.    Treatment-related toxicities (CTCAE v5.0):   Grade 1 mucositis  Grade 2 dermatitis    Plan:   1. Follow up with COLLEEN Cabrera in 2 weeks by phone, Dr. Herrera on 3/30 to coordinate with ENT  2. Follow up with Dr. Mark in med onc on 3/16 as scheduled and Dr. Mckenna in ENT on 3/30 as scheduled  3. Continue to use lidocaine to apply locally to mouth sores before eating  4. Continue hydrocodone-acetaminophen for pain  5. Continue aquaphor, start silvadene for ~1 week  6. Continue to push fluids and nutrition with IVF to supplement; patient has arrangements by outside physician    Mosaiq chart and setup information reviewed  MVCT images reviewed    Medication Review  Med list reviewed with patient?: Yes    Educational Topic Discussed  Education Instructions: Reviewed    The patient was seen and assessed with staff, Dr. Herrera.    Errol Maier MD  PGY-4 Radiation Oncology  Clinic: [phone]  Pager: [phone]    Mr. Oakley was seen and examined by me. Note above by Dr. Maier was reviewed and edited by me and reflects our mutual findings and plan of care.    Jennifer Herrera MD  Department of Radiation Oncology  [facility]

## 2021-03-05 NOTE — LETTER
3/5/2021         RE: Reese Oakley  [address] [address]        Dear Colleague,    Thank you for referring your patient, Reese Oakley, to the [facility] RADIATION ONCOLOGY. Please see a copy of my visit note below.    [facility] PHYSICIANS  SPECIALIZING IN BREAKTHROUGHS  Radiation Oncology    On Treatment Visit Note      Reese Oakley      Date: Mar 5, 2021   MRN: [id number]   : 1950  Diagnosis: Tongue Cancer    Treatment Summary to Date   Right Neck Current Dose: 6000 cGy/6000 cGy Fractions: 30/30      Chemotherapy  Chemo concurrent with radx?: Yes  Oncologist: Dr Mark    Drug Name/Frequency 1: Weekly cisplatin week one, change to carboplatin week 2    Subjective: Mr. Oakley continues to  tolerate chemoradiation overall well. His worst pain in at a sore in his right upper posterior oral cavity. He is taking hydrocodone-acetaminophen twice daily and using lidocaine frequently for pain and feels this helps somewhat but not completely. He eats mostly cream of wheat fortified with protein supplements. He is using aquaphor for his skin.    Nursing ROS:   Nutrition Alteration  Diet Type: Patient's Preference  Nutrition Note: No PEG  Skin  Skin Reaction: 2 - Moderate to brisk erythema, patchy moist desquamation, mostly confined to skin folds and creases, moderate edema  Skin Note: Aquaphor     ENT and Mouth Exam  Mucositis - Current: 2 - Isolated ulcer/white patches     Gastrointestinal  Nausea: 0 - None     Psychosocial  Mood - Anxiety: 0 - Normal  Pain Assessment  0-10 Pain Scale: 8  Pain Note: Vicodin    Objective:   Gen: Appears well, NAD  Skin: Moderate erythema with significant dry desquamation and patchy moist desquamation limited to skin folds and creases of the right neck and submental region  Oral cavity/oropharynx: Mild mucositis in oral cavity    Laboratory:  Lab Results   Component Value Date    WBC 3.4 (L) 2021    HGB 11.1  (L) 03/01/2021    HCT 32.3 (L) 03/01/2021    MCV 90 03/01/2021     03/01/2021     Lab Results   Component Value Date     03/01/2021    POTASSIUM 4.8 03/01/2021    CHLORIDE 99 03/01/2021    CO2 27 03/01/2021     (H) 03/01/2021     Lab Results   Component Value Date    AST 18 03/01/2021    ALT 24 03/01/2021    ALKPHOS 89 03/01/2021    BILITOTAL 0.5 03/01/2021     Assessment:    Tolerating radiation therapy well.  All questions and concerns addressed.    Treatment-related toxicities (CTCAE v5.0):   Grade 1 mucositis  Grade 2 dermatitis    Plan:   1. Follow up with COLLEEN Cabrera in 2 weeks by phone, Dr. Herrera on 3/30 to coordinate with ENT  2. Follow up with Dr. Mark in med onc on 3/16 as scheduled and Dr. Mckenna in ENT on 3/30 as scheduled  3. Continue to use lidocaine to apply locally to mouth sores before eating  4. Continue hydrocodone-acetaminophen for pain  5. Continue aquaphor, start silvadene for ~1 week  6. Continue to push fluids and nutrition with IVF to supplement; patient has arrangements by outside physician    Mosaiq chart and setup information reviewed  MVCT images reviewed    Medication Review  Med list reviewed with patient?: Yes    Educational Topic Discussed  Education Instructions: Reviewed    The patient was seen and assessed with staff, Dr. Herrera.    Errol Maier MD  PGY-4 Radiation Oncology  Clinic: [phone]  Pager: [phone]    Mr. Oakley was seen and examined by me. Note above by Dr. Maier was reviewed and edited by me and reflects our mutual findings and plan of care.    Jennifer Herrera MD  Department of Radiation Oncology  [facility]

## 2021-03-05 NOTE — PROGRESS NOTES
Infusion Nursing Note:  Reese Oakley presents today for 1 L NS.    Patient seen by provider today: No   present during visit today: Not Applicable.    Note:Patient denies any of the following: fevers, chills, vision or hearing changes, chest pain, dyspnea, abdominal pain,  diarrhea, urinary concerns, headaches, numbness, tingling, issues with sleep or mood. He also denies lumps, bumps, rashes or skin lesions, bleeding or bruising issues.        Intravenous Access:  Implanted Port.    Treatment Conditions:  Not Applicable.      Post Infusion Assessment:  Patient tolerated infusion without incident.  Blood return noted pre and post infusion.  Site patent and intact, free from redness, edema or discomfort.  No evidence of extravasations.  Access discontinued per protocol.       Discharge Plan:   Patient declined prescription refills.  Discharge instructions reviewed with: Patient.  Patient and/or family verbalized understanding of discharge instructions and all questions answered.  AVS to patient via [employer].  Patient will return 3/9 VV with provider.   Patient discharged in stable condition accompanied by: self.  Departure Mode: Ambulatory.    Purnima Arndt RN

## 2021-03-09 NOTE — LETTER
March 9, 2021       TO: Reese Oakley  [address] [address]       DearMr.Adin,    We are writing to inform you of your test results.    {[facility] results letter list:[id number]}    No results found from the In Basket message.    ***

## 2021-03-09 NOTE — LETTER
"3/9/2021       RE: Reese Oakley  [address] [facility] [address]     Dear Colleague,    Thank you for referring your patient, Reese Oakley, to the [facility] at [facility]. Please see a copy of my visit note below.    Kenrick is a 70 year old who is being evaluated via a billable telephone visit.      What phone number would you like to be contacted at? [phone]  How would you like to obtain your AVS? MyChart     Vitals - Patient Reported  Weight (Patient Reported): 78.5 kg (173 lb)  Height (Patient Reported): 170.2 cm (5' 7.01\")  BMI (Based on Pt Reported Ht/Wt): 27.09  Pain Score: Severe Pain (6)  Pain Loc: (MOUTH)    Cecilia Bennett [facility]      Oncology/Hematology Visit Note  Mar 9, 2021    Reason for Visit: Follow up of recurrent SCC of the R neck     History of Present Illness: Reese Oakley is a 70 year old male with PMH HTN, hyperlipidemia, DM2 with recurrent SCC. 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  More recently he was noted on follow up scans to have enlarged R sided lymph nodes and biopsy proven recurrence in the R neck.  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  There was one additional focus of squamous cell carcinoma identified in an additional lymph node without MEL.  His case was discussed at multidisciplinary tumor board and he was recommended to have chemoradiation - due to MEL in the submandibular gland.      Started weekly cisplatin with limited field radiation 1/25/21. Due to worsening renal function after first treatment switched to carboplatin/taxol for week 2. Finished treatment 3/5/21    Interval History:  The pain in his " tongue is still very bothersome. He is still able to get his cream of wheat + protein powder in twice per day and for the most part (+/- a pound) is maintaining weight. No dizziness or LH, /50, no longer on lisinopril. The lidocaine is helpful and he was taking percocet at night but now he is having trouble swallowing the pills as they are too big and uncomfortable. Blood sugars are stable, low 100's. No nausea or emesis. No fever or chills. Breathing is okay, no cough or trouble swallowing, just painful. Has thick secretions that are occasionally blood tinged. No nissa blood. He spits them out as needed.  Putting Aquaphor and silvadene on his neck. Fatigued but still keeping up with activity     10 point review of systems otherwise negative     Current Outpatient Medications   Medication Sig Dispense Refill     acetaminophen (TYLENOL) 325 MG tablet Take 2 tablets (650 mg) by mouth every 4 hours as needed for other (multimodal surgical pain management along with NSAIDS and opioid medication as indicated based on pain control and physical function.) 60 tablet 0     aspirin (ASA) 81 MG chewable tablet Take 81 mg by mouth daily       glipiZIDE (GLUCOTROL) 10 MG tablet Take 10 mg by mouth 2 times daily (before meals) 10 Mg in the am - 12.5MG  @@ DINNER       guaiFENesin (MUCINEX) 600 MG 12 hr tablet Take 2 tablets (1,200 mg) by mouth 2 times daily 120 tablet 1     guaiFENesin (ROBITUSSIN) 100 MG/5ML liquid 10 mLs (200 mg) by Oral or PEG tube route every 4 hours as needed for other (mucous production and/or cough) 473 mL 5     HYDROcodone-acetaminophen 7.5-325 MG/15ML solution 5 - 7.5 mls by mouth every 6 hours for moderate to severe pain. Not to exceed 3000 mg of acetaminophen/24 hours (Patient not taking: Reported on 3/1/2021) 250 mL 0     HYDROmorphone (DILAUDID) 2 MG tablet Take 1 tablet (2 mg) by mouth every 4 hours as needed for moderate to severe pain 30 tablet 0     insulin glargine (LANTUS VIAL) 100  UNIT/ML vial Inject 28 Units Subcutaneous daily (with breakfast) PATIENT REPORTS 25 UNIT DAILY 02/16/2021       lidocaine (XYLOCAINE) 2 % solution Swish and spit 15 mLs in mouth every 3 hours as needed for moderate pain ; Max 8 doses/24 hour period. 100 mL 3     lisinopril (ZESTRIL) 10 MG tablet Take 10 mg by mouth daily       LORazepam (ATIVAN) 0.5 MG tablet Take 1 tablet (0.5 mg) by mouth every 4 hours as needed (Anxiety, Nausea/Vomiting or Sleep) 30 tablet 1     magic mouthwash (ENTER INGREDIENTS IN COMMENTS) suspension Swish, gargle, and spit one to two teaspoonfuls every six hours as needed. May be swallowed if esophageal involvement. 500 mL 3     metFORMIN (GLUCOPHAGE) 1000 MG tablet Take 500 mg by mouth 2 times daily (with meals)       mineral oil-hydrophilic petrolatum (AQUAPHOR) external ointment Apply topically every 8 hours 420 g 0     nystatin (MYCOSTATIN) 304398 UNIT/ML suspension Take 5 mLs (500,000 Units) by mouth 4 times daily 400 mL 0     ondansetron (ZOFRAN) 8 MG tablet Take 1 tablet (8 mg) by mouth every 8 hours as needed for nausea (vomiting) 10 tablet 1     ondansetron (ZOFRAN-ODT) 4 MG ODT tab Take 1 tablet (4 mg) by mouth every 6 hours as needed for nausea or vomiting 20 tablet 0     pilocarpine (SALAGEN) 7.5 MG tablet Take 7.5 mg by mouth 2 times daily       polyethylene glycol (MIRALAX) 17 GM/Dose powder Take 17 g by mouth daily 510 g 0     prochlorperazine (COMPAZINE) 10 MG tablet Take 1 tablet (10 mg) by mouth every 6 hours as needed (Nausea/Vomiting) 30 tablet 1     senna-docusate (SENOKOT-S/PERICOLACE) 8.6-50 MG tablet Take 1 tablet by mouth 2 times daily as needed for constipation 20 tablet 0     silver sulfADIAZINE (SILVADENE) 1 % external cream Apply topically daily 20 g 0     simvastatin (ZOCOR) 40 MG tablet Take 40 mg by mouth At Bedtime       Physical Examination:  There were no vitals taken for this visit.  Wt Readings from Last 10 Encounters:   03/05/21 (P) 81.6 kg (180 lb)  "  03/02/21 81.2 kg (179 lb)   03/01/21 81.2 kg (179 lb)   03/01/21 81.2 kg (179 lb)   02/23/21 82.1 kg (181 lb)   02/23/21 81.8 kg (180 lb 5.4 oz)   02/16/21 82.1 kg (181 lb)   02/16/21 82.4 kg (181 lb 9.6 oz)   02/09/21 81.6 kg (180 lb)   02/09/21 82 kg (180 lb 12.4 oz)     General: alert and no distress  Psych: Alert and oriented times; coherent speech, normal rate and volume, able to articulate logical thoughts, able to abstract reason, no tangential thoughts, no hallucinations or delusions  Patient's affect is appropriate.   Pulm: Speaking in full sentences, unlabored, no audible wheezes or cough.  The rest of a comprehensive physical examination is deferred due to PHE (public health emergency) video restrictions\"    Laboratory Data:  Most recent labs reviewed    Assessment and Plan:  1. 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 Received limited field radiotherapy + low-dose weekly cisplatin 1/25/21. Switched to carbo/taxol for week 2 given creat increase. Finished treatment 3/5/21. Has expected toxicities of painful mucositis, dermatitis, and fatigue. Discussed anticipated timeline of improvement.  --follow up with Dr. Mark next week     2. FEN  Nutrition, no PEG. Doing okay with calorie intake at this time despite increased mouth pain. Weight stable.      IVF MWF at local hospital. More frequent if needed. BP ok today per their home machine     Monitor bowels with oxy. Has prn softener which I encouraged him to use     3. 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 Continue s/s rinses, MMW/viscous lidocaine and Tylenol.   --sending oxycodone 5 mg today for him to use 5-10 mg every 6 hours. He had been taking percocet at home but they are too big and uncomfortable to swallow. He can crush the oxycodone if needed and put in a bit of his cream of wheat. Monitor for tolerance and oversedation.     Thrush: coating tongue, improving with " nystatin. Continue until gone    Now tolerating robitussin liquid for secretions     4. Endo  DM, was on metformin, now on hold for renal issues. Currently on Glipizide and insulin, endo closely following. Improving now that dex removed from chemo plan. Stable today despite trouble with PO    Phone call duration: 21 minutes    60 minutes spent on the date of the encounter doing chart review, review of test results, interpretation of tests, patient visit, documentation and discussion with family, in addition to 21 minutes spent on the phone with the patient.     Deedee De Leon CNP on 3/9/2021 at 10:00 AM

## 2021-03-10 NOTE — PROCEDURES
RADIOTHERAPY TREATMENT SUMMARY          Date of Report: 2021    PATIENT: KAREN CHAN  MEDICAL RECORD NO: [id number]  : 1950    DIAGNOSIS: C02.3 Malignant neoplasm of anterior two-thirds of tongue, part unspecified  INTENT OF RADIOTHERAPY: Cure  PATHOLOGY: squamous cell carcinoma of the oropharynx                                  STAGE: [id number]  CONCURRENT SYSTEMIC THERAPY: yes, weekly cisplatin week one, change to carboplatin week 2                   TREATMENT SUMMARY:  Details of the treatments summarized below are found in records kept in the Department of Radiation Oncology at [facility].  Radiation Oncology - Course: 2:   Treatment Site Current Dose Modality From To Elapsed Days Fx.  Right Neck  6,000 cGy 06 X  1/25/2021  3/05/2021  39 30    Dose per Fraction:   Operative bed: 200 cGy  Elective left low neck: 180 cGy    Total Dose:        Operative bed: 6000 cGy  Elective left low neck: 5400 cGy    COMMENTS:                      Mr. 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 More recently, he was diagnosed with a pT2N1 squamous cell carcinoma of the right lateral oral tongue status post partial glossectomy and right neck dissection on 2020. Because the right neck had been previously treated with radiation, it was decided to follow the neck with close observation imaging. He was found to have a submandibular recurrence in late  and underwent right modified neck dissection on 2020 that showed invasive moderately to poorly differentiated squamous cell carcinoma involving fibroadipose tissue and salivary gland tissue measuring 2.5 cm in maximal dimension with negative resection margins.  One lymph node in level 1A had 1 focus of  metastatic squamous cell carcinoma measuring 0.5 cm without extranodal extension. The patient was presented at multidisciplinary tumor board with recommendation for chemoradiation due to extranodal extension of the node anterior to the submandibular gland which extended into fibroadipose tissue.    Mr. Oakley was treated using intensity modulated radiotherapy on a tomotherapy machine, 6,000 cGy in 30 fractions to the operative bed with a simultaneous integrated boost of 5,400 cGy to the undissected left level IV. A composite plan was generated with his previous treatment field to quantify overlap. He overall tolerated radiotherapy well as anticipated with mild to moderate dermatitis and mucositis managed with viscous lidocaine, magic mouthwash, hydrocodone-acetaminophen, salt and soda rinses, and topical emollients.     ED visits/hospitalizations: none    Missed treatments: none    Acute Toxicity Profile by CTC v5.0:  Grade 1 mucositis  Grade 2 dermatitis    PAIN MANAGEMENT:  taking hydrocodone-acetaminophen 7.5-325 mg two tablets twice daily with viscous lidocaine for oral application to mouth sores                           FOLLOW UP PLAN:  1. Follow up with COLLEEN Cabrera in 2 weeks by phone, Dr. Herrera on 3/30 to coordinate with ENT  2. Follow up with Dr. Mark in med onc on 3/16 as scheduled and Dr. Mckenna in ENT on 3/30 as scheduled                Resident Physician: Errol Lo M.D.   Staff Physician: Jennifer Herrera M.D.  Physicist: Francisco Guajardo, Ph.D.    CC:   DO Jose Antonio Singh MD

## 2021-03-16 NOTE — PROGRESS NOTES
Kenrick is a 70 year old who is being evaluated via a billable video visit.      How would you like to obtain your AVS? MyChart  If the video visit is dropped, the invitation should be resent by: Send to e-mail at: [email or web address]  Will anyone else be joining your video visit? No       NEHA Green      Video Start Time: 1:53 PM  Video-Visit Details    Type of service:  Video Visit    Video End Time:2:10    Originating Location (pt. Location): Home    Distant Location (provider location):  [facility]     Platform used for Video Visit: [employer]    REASON FOR VISIT:    CANCER STAGE: Cancer Staging  No matching staging information was found for the patient.      HISTORY OF PRESENT ILLNESS:  Reese Oakley is a 70 year old male with PMH HTN, hyperlipidemia, DM2 with recurrent SCC. 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  More recently he was noted on follow up scans to have enlarged R sided lymph nodes and biopsy proven recurrence in the R neck.  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  There was one additional focus of squamous cell carcinoma identified in an additional lymph node without MEL.  His case was discussed at multidisciplinary tumor board and he was recommended to have chemoradiation - due to MEL in the submandibular gland.      Started weekly cisplatin with limited field radiation 1/25/21. Due to worsening renal function after first treatment switched to carboplatin/taxol for week 2. Finished treatment 3/5/21    Kenrick is overall doing reasonably well.  He still has some pain in his throat but is managing. Swallowing is going ok.  He is eating cream of wheat and lost about 10-12 lbs of weight during  treatment.  He is managing pain in his throat - taking oxycodone 10mg tid. His taste is not good-however he states it is not really changed since before his treatment.  No nausea or vomiting.  He is havign some constipation - is taking stool softeners. He is staying active - was able to shovel the snow today. He otherwise feels reasonably well.     Review Of Systems  10-point review of systems were negative except as noted in HPI.        EXAM:  There were no vitals taken for this visit.  GEN: alert and oriented x 3, nad  HEENT: perrla, eomi,   Neck - I can see some erythema on his neck on video, does not appear to have any skin breakdown.   Otherwise exam is deferred.     Current Outpatient Medications   Medication Sig Dispense Refill     acetaminophen (TYLENOL) 325 MG tablet Take 2 tablets (650 mg) by mouth every 4 hours as needed for other (multimodal surgical pain management along with NSAIDS and opioid medication as indicated based on pain control and physical function.) 60 tablet 0     aspirin (ASA) 81 MG chewable tablet Take 81 mg by mouth daily       glipiZIDE (GLUCOTROL) 10 MG tablet Take 10 mg by mouth 2 times daily (before meals) 10 Mg in the am - 12.5MG  @@ DINNER       guaiFENesin (ROBITUSSIN) 100 MG/5ML liquid 10 mLs (200 mg) by Oral or PEG tube route every 4 hours as needed for other (mucous production and/or cough) 473 mL 5     insulin glargine (LANTUS VIAL) 100 UNIT/ML vial Inject 28 Units Subcutaneous daily (with breakfast) PATIENT REPORTS 25 UNIT DAILY 02/16/2021       lidocaine (XYLOCAINE) 2 % solution Swish and spit 15 mLs in mouth every 3 hours as needed for moderate pain ; Max 8 doses/24 hour period. 100 mL 3     LORazepam (ATIVAN) 0.5 MG tablet Take 1 tablet (0.5 mg) by mouth every 4 hours as needed (Anxiety, Nausea/Vomiting or Sleep) 30 tablet 1     magic mouthwash (ENTER INGREDIENTS IN COMMENTS) suspension Swish, gargle, and spit one to two teaspoonfuls every six hours as needed. May be  swallowed if esophageal involvement. 480 mL 3     mineral oil-hydrophilic petrolatum (AQUAPHOR) external ointment Apply topically every 8 hours 420 g 0     nystatin (MYCOSTATIN) 952345 UNIT/ML suspension Take 5 mLs (500,000 Units) by mouth 4 times daily 400 mL 0     ondansetron (ZOFRAN) 8 MG tablet Take 1 tablet (8 mg) by mouth every 8 hours as needed for nausea (vomiting) 10 tablet 1     oxyCODONE (ROXICODONE) 5 MG tablet Take 1-2 tablets (5-10 mg) by mouth every 6 hours as needed for severe pain 60 tablet 0     pilocarpine (SALAGEN) 7.5 MG tablet Take 7.5 mg by mouth 2 times daily       prochlorperazine (COMPAZINE) 10 MG tablet Take 1 tablet (10 mg) by mouth every 6 hours as needed (Nausea/Vomiting) 30 tablet 1     senna-docusate (SENOKOT-S/PERICOLACE) 8.6-50 MG tablet Take 1 tablet by mouth 2 times daily as needed for constipation 20 tablet 0     silver sulfADIAZINE (SILVADENE) 1 % external cream Apply topically daily 20 g 0     simvastatin (ZOCOR) 40 MG tablet Take 40 mg by mouth At Bedtime             Recent Labs   Lab Test 03/01/21  0952   WBC 3.4*   HGB 11.1*        @labrcent[na,potassium,chloride,co2,bun,cr@  Recent Labs   Lab Test 03/01/21  0952   PROTTOTAL 7.7   ALBUMIN 3.2*   BILITOTAL 0.5   AST 18   ALT 24   ALKPHOS 89         No results found for this or any previous visit (from the past 744 hour(s)).        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  He has had expected toxicity with chemoradiation and has managed pretty well.  He will have a PET/CT in June.     Swallow function - He is doing pretty well with this - he did not need a feeding tube throughout treatment.      Pain - I asked him to continue with oxycodone 10mg tid for now - until he is able to eat more.  He can start to back off of the pain medication in the next  week or two as he is able to tolerate more po intake. I told him to do this gradually - like do one oxycodone instead of 2 to see how he feels.     Nutrition - He is getting about 1800 calories per day. I would like him to increase his calorie intake over the next few weeks - I have asked him to slowly add more foods to his diet - like soft food - scrambled eggs, mashed potatoes, soft pasta as tolerated.         I spent 35 minutes with the patient, reviewing his chart labs, and documentation of the note.    Toni Mark   of Medicine  Division of Hematology, Oncology, and Transplantation

## 2021-03-16 NOTE — LETTER
3/16/2021         RE: Reese Oakley  [address] [facility] [address]        Dear Colleague,    Thank you for referring your patient, Reese Oakley, to the [facility]. Please see a copy of my visit note below.    Kenrick is a 70 year old who is being evaluated via a billable video visit.      How would you like to obtain your AVS? MyChart  If the video visit is dropped, the invitation should be resent by: Send to e-mail at: [email or web address]  Will anyone else be joining your video visit? No       NEHA Green      Video Start Time: 1:53 PM  Video-Visit Details    Type of service:  Video Visit    Video End Time:2:10    Originating Location (pt. Location): Home    Distant Location (provider location):  [facility]     Platform used for Video Visit: Accumetrics    REASON FOR VISIT:    CANCER STAGE: Cancer Staging  No matching staging information was found for the patient.      HISTORY OF PRESENT ILLNESS:  Reese Oakley is a 70 year old male with PMH HTN, hyperlipidemia, DM2 with recurrent SCC. 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  More recently he was noted on follow up scans to have enlarged R sided lymph nodes and biopsy proven recurrence in the R neck.  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  There was one additional focus of squamous cell carcinoma identified in an additional lymph node without MEL.  His case was discussed at multidisciplinary tumor board and he was recommended to have chemoradiation - due to MEL in the submandibular gland.      Started weekly cisplatin with limited field radiation 1/25/21. Due to worsening renal function after first  treatment switched to carboplatin/taxol for week 2. Finished treatment 3/5/21    Kenrick is overall doing reasonably well.  He still has some pain in his throat but is managing. Swallowing is going ok.  He is eating cream of wheat and lost about 10-12 lbs of weight during treatment.  He is managing pain in his throat - taking oxycodone 10mg tid. His taste is not good-however he states it is not really changed since before his treatment.  No nausea or vomiting.  He is havign some constipation - is taking stool softeners. He is staying active - was able to shovel the snow today. He otherwise feels reasonably well.     Review Of Systems  10-point review of systems were negative except as noted in HPI.        EXAM:  There were no vitals taken for this visit.  GEN: alert and oriented x 3, nad  HEENT: perrla, eomi,   Neck - I can see some erythema on his neck on video, does not appear to have any skin breakdown.   Otherwise exam is deferred.     Current Outpatient Medications   Medication Sig Dispense Refill     acetaminophen (TYLENOL) 325 MG tablet Take 2 tablets (650 mg) by mouth every 4 hours as needed for other (multimodal surgical pain management along with NSAIDS and opioid medication as indicated based on pain control and physical function.) 60 tablet 0     aspirin (ASA) 81 MG chewable tablet Take 81 mg by mouth daily       glipiZIDE (GLUCOTROL) 10 MG tablet Take 10 mg by mouth 2 times daily (before meals) 10 Mg in the am - 12.5MG  @@ DINNER       guaiFENesin (ROBITUSSIN) 100 MG/5ML liquid 10 mLs (200 mg) by Oral or PEG tube route every 4 hours as needed for other (mucous production and/or cough) 473 mL 5     insulin glargine (LANTUS VIAL) 100 UNIT/ML vial Inject 28 Units Subcutaneous daily (with breakfast) PATIENT REPORTS 25 UNIT DAILY 02/16/2021       lidocaine (XYLOCAINE) 2 % solution Swish and spit 15 mLs in mouth every 3 hours as needed for moderate pain ; Max 8 doses/24 hour period. 100 mL 3     LORazepam  (ATIVAN) 0.5 MG tablet Take 1 tablet (0.5 mg) by mouth every 4 hours as needed (Anxiety, Nausea/Vomiting or Sleep) 30 tablet 1     magic mouthwash (ENTER INGREDIENTS IN COMMENTS) suspension Swish, gargle, and spit one to two teaspoonfuls every six hours as needed. May be swallowed if esophageal involvement. 480 mL 3     mineral oil-hydrophilic petrolatum (AQUAPHOR) external ointment Apply topically every 8 hours 420 g 0     nystatin (MYCOSTATIN) 493468 UNIT/ML suspension Take 5 mLs (500,000 Units) by mouth 4 times daily 400 mL 0     ondansetron (ZOFRAN) 8 MG tablet Take 1 tablet (8 mg) by mouth every 8 hours as needed for nausea (vomiting) 10 tablet 1     oxyCODONE (ROXICODONE) 5 MG tablet Take 1-2 tablets (5-10 mg) by mouth every 6 hours as needed for severe pain 60 tablet 0     pilocarpine (SALAGEN) 7.5 MG tablet Take 7.5 mg by mouth 2 times daily       prochlorperazine (COMPAZINE) 10 MG tablet Take 1 tablet (10 mg) by mouth every 6 hours as needed (Nausea/Vomiting) 30 tablet 1     senna-docusate (SENOKOT-S/PERICOLACE) 8.6-50 MG tablet Take 1 tablet by mouth 2 times daily as needed for constipation 20 tablet 0     silver sulfADIAZINE (SILVADENE) 1 % external cream Apply topically daily 20 g 0     simvastatin (ZOCOR) 40 MG tablet Take 40 mg by mouth At Bedtime             Recent Labs   Lab Test 03/01/21  0952   WBC 3.4*   HGB 11.1*        @labrcent[na,potassium,chloride,co2,bun,cr@  Recent Labs   Lab Test 03/01/21  0952   PROTTOTAL 7.7   ALBUMIN 3.2*   BILITOTAL 0.5   AST 18   ALT 24   ALKPHOS 89         No results found for this or any previous visit (from the past 744 hour(s)).        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  He has had expected toxicity with chemoradiation and has managed pretty well.  He will have a PET/CT in June.      Swallow function - He is doing pretty well with this - he did not need a feeding tube throughout treatment.      Pain - I asked him to continue with oxycodone 10mg tid for now - until he is able to eat more.  He can start to back off of the pain medication in the next week or two as he is able to tolerate more po intake. I told him to do this gradually - like do one oxycodone instead of 2 to see how he feels.     Nutrition - He is getting about 1800 calories per day. I would like him to increase his calorie intake over the next few weeks - I have asked him to slowly add more foods to his diet - like soft food - scrambled eggs, mashed potatoes, soft pasta as tolerated.         I spent 35 minutes with the patient, reviewing his chart labs, and documentation of the note.    Toni Mark   of Medicine  Division of Hematology, Oncology, and Transplantation      Again, thank you for allowing me to participate in the care of your patient.        Sincerely,        Toni Mark, DO

## 2021-03-19 NOTE — PROGRESS NOTES
"Radiation Oncology Follow-up Visit  2021  The patient has been notified of following:     Due to COVID19 pandemic.  \"This telephone visit will be conducted via a call between you and your physician/provider. We have found that certain health care needs can be provided without the need for a physical exam.  This service lets us provide the care you need with a short phone conversation.  If a prescription is necessary we can send it directly to your pharmacy.  If lab work is needed we can place an order for that and you can then stop by our lab to have the test done at a later time.    Telephone visits are billed at different rates depending on your insurance coverage. During this emergency period, for some insurers they may be billed the same as an in-person visit.  Please reach out to your insurance provider with any questions.    If during the course of the call the physician/provider feels a telephone visit is not appropriate, you will not be charged for this service.\"    Patient has given verbal consent for Telephone visit?  Yes    How would you like to obtain your AVS? [employer]    Phone call duration: 12 minutes    Reese Oakley  MRN: [id number]   : 1950     DISEASE TREATED:   [id number] squamous cell carcinoma of the oropharynx    RADIATION THERAPY DELIVERED:   6000 cGy to right neck    SYSTEMIC TREATMENT:  Cisplatin changed to carboplatin    INTERVAL SINCE COMPLETION OF RADIATION THERAPY:   2 week    SUBJECTIVE/HPI:  Reese Oakley is a 70 year old male who is here today for routine 2 week follow up after completing radiation therapy.  Overall he is doing fair.  He continues to have pain with swallowing that he is managing with oxycodone and lidocaine.  His skin is healing and there is just one small opening area.  He is using aquaphor.  His energy level is good.  He has a lot of secretions still.  He is eating ok and using supplements and states his weight is fairly stable with maeb " losing only a 1-2 lbs.  He doesn't have any taste since his prior treatment.    ROS:  Complete review of systems is negative except for symptoms discussed in subjective above.    Current Outpatient Medications   Medication     acetaminophen (TYLENOL) 325 MG tablet     aspirin (ASA) 81 MG chewable tablet     glipiZIDE (GLUCOTROL) 10 MG tablet     guaiFENesin (ROBITUSSIN) 100 MG/5ML liquid     insulin glargine (LANTUS VIAL) 100 UNIT/ML vial     lidocaine (XYLOCAINE) 2 % solution     LORazepam (ATIVAN) 0.5 MG tablet     magic mouthwash (ENTER INGREDIENTS IN COMMENTS) suspension     mineral oil-hydrophilic petrolatum (AQUAPHOR) external ointment     nystatin (MYCOSTATIN) 813673 UNIT/ML suspension     ondansetron (ZOFRAN) 8 MG tablet     oxyCODONE (ROXICODONE) 5 MG tablet     pilocarpine (SALAGEN) 7.5 MG tablet     prochlorperazine (COMPAZINE) 10 MG tablet     senna-docusate (SENOKOT-S/PERICOLACE) 8.6-50 MG tablet     silver sulfADIAZINE (SILVADENE) 1 % external cream     simvastatin (ZOCOR) 40 MG tablet     No current facility-administered medications for this visit.         No Active Allergies    Past Medical History:   Diagnosis Date     Adenocarcinoma (H)     large instestine      Cancer of appendix (H)      Diabetes (H)      Gastro-oesophageal reflux disease      History of radiation therapy      Hoarseness      Hypertension          PHYSICAL EXAM:  There were no vitals taken for this visit.  Gen: Alert, in NAD      LABS AND IMAGING:  Reviewed.    IMPRESSION:   Mr. Oakley is a 70 year old male with a recurrent SCC of the oropharynx s/p chemoradiation now 2 weeks out since completion of treatment and doing well with slow improvement of acute side effects.    PLAN:   1.  Follow up with Dr. Herrera in 1 month.   Continue close follow up with ENT and medical oncology.  2. Continue to moisturize with aquaphor and when skin is completely healed can change to preferred moisturizer.  Discussed importance of sun avoidance  or sun protection.  3. Fatigue should continue to improve.  4. Continue oral cares with salt and soda rinses.  Routine dental follow up at least every 6 months.  Continue to use fluoride trays or fluoride treatments. Continue jaw, neck and swallowing exercises.  5. Treatment related pain should continue to diminish.  Recommended to slowly wean off pain medications when pain decreases. He could use tylenol for the pain now if needed.  6. Continue to push fluids and caloric intake to maintain weight.      Verito Cabrera NP   Radiation Oncology

## 2021-03-19 NOTE — LETTER
"    3/19/2021         RE: Reese Oakley  [address] [facility] [address]        Dear Colleague,    Thank you for referring your patient, Reese Oakley, to the [facility] ONCOLOG[facility]. Please see a copy of my visit note below.    Radiation Oncology Follow-up Visit  2021  The patient has been notified of following:     Due to COVID19 pandemic.  \"This telephone visit will be conducted via a call between you and your physician/provider. We have found that certain health care needs can be provided without the need for a physical exam.  This service lets us provide the care you need with a short phone conversation.  If a prescription is necessary we can send it directly to your pharmacy.  If lab work is needed we can place an order for that and you can then stop by our lab to have the test done at a later time.    Telephone visits are billed at different rates depending on your insurance coverage. During this emergency period, for some insurers they may be billed the same as an in-person visit.  Please reach out to your insurance provider with any questions.    If during the course of the call the physician/provider feels a telephone visit is not appropriate, you will not be charged for this service.\"    Patient has given verbal consent for Telephone visit?  Yes    How would you like to obtain your AVS? [employer]    Phone call duration: 12 minutes    Reese Oakley  MRN: [id number]   : 1950     DISEASE TREATED:   [id number] squamous cell carcinoma of the oropharynx    RADIATION THERAPY DELIVERED:   6000 cGy to right neck    SYSTEMIC TREATMENT:  Cisplatin changed to carboplatin    INTERVAL SINCE COMPLETION OF RADIATION THERAPY:   2 week    SUBJECTIVE/HPI:  Reese Oakley is a 70 year old male who is here today for routine 2 week follow up after completing radiation therapy.  Overall he is doing fair.  He continues to have pain with swallowing that he is " managing with oxycodone and lidocaine.  His skin is healing and there is just one small opening area.  He is using aquaphor.  His energy level is good.  He has a lot of secretions still.  He is eating ok and using supplements and states his weight is fairly stable with maeb losing only a 1-2 lbs.  He doesn't have any taste since his prior treatment.    ROS:  Complete review of systems is negative except for symptoms discussed in subjective above.    Current Outpatient Medications   Medication     acetaminophen (TYLENOL) 325 MG tablet     aspirin (ASA) 81 MG chewable tablet     glipiZIDE (GLUCOTROL) 10 MG tablet     guaiFENesin (ROBITUSSIN) 100 MG/5ML liquid     insulin glargine (LANTUS VIAL) 100 UNIT/ML vial     lidocaine (XYLOCAINE) 2 % solution     LORazepam (ATIVAN) 0.5 MG tablet     magic mouthwash (ENTER INGREDIENTS IN COMMENTS) suspension     mineral oil-hydrophilic petrolatum (AQUAPHOR) external ointment     nystatin (MYCOSTATIN) 373865 UNIT/ML suspension     ondansetron (ZOFRAN) 8 MG tablet     oxyCODONE (ROXICODONE) 5 MG tablet     pilocarpine (SALAGEN) 7.5 MG tablet     prochlorperazine (COMPAZINE) 10 MG tablet     senna-docusate (SENOKOT-S/PERICOLACE) 8.6-50 MG tablet     silver sulfADIAZINE (SILVADENE) 1 % external cream     simvastatin (ZOCOR) 40 MG tablet     No current facility-administered medications for this visit.         No Active Allergies    Past Medical History:   Diagnosis Date     Adenocarcinoma (H)     large instestine      Cancer of appendix (H)      Diabetes (H)      Gastro-oesophageal reflux disease      History of radiation therapy      Hoarseness      Hypertension          PHYSICAL EXAM:  There were no vitals taken for this visit.  Gen: Alert, in NAD      LABS AND IMAGING:  Reviewed.    IMPRESSION:   Mr. Oakley is a 70 year old male with a recurrent SCC of the oropharynx s/p chemoradiation now 2 weeks out since completion of treatment and doing well with slow improvement of acute  side effects.    PLAN:   1.  Follow up with Dr. Herrera in 1 month.   Continue close follow up with ENT and medical oncology.  2. Continue to moisturize with aquaphor and when skin is completely healed can change to preferred moisturizer.  Discussed importance of sun avoidance or sun protection.  3. Fatigue should continue to improve.  4. Continue oral cares with salt and soda rinses.  Routine dental follow up at least every 6 months.  Continue to use fluoride trays or fluoride treatments. Continue jaw, neck and swallowing exercises.  5. Treatment related pain should continue to diminish.  Recommended to slowly wean off pain medications when pain decreases. He could use tylenol for the pain now if needed.  6. Continue to push fluids and caloric intake to maintain weight.      Verito Cabrera NP   Radiation Oncology

## 2021-03-30 NOTE — PATIENT INSTRUCTIONS
1. You were seen in the ENT Clinic today by Dr. Mckenna.  If you have any questions or concerns after your appointment, please call   -  ENT Clinic: [phone]      2.   Plan to return to clinic 5/4/21 at 11:15. You will also see speech therapist as well. We also refilled your oxycodone. Please call us with any questions or concerns.     Tashia Jennings LPN  [facility] Otolaryngology  [phone]

## 2021-03-30 NOTE — PROGRESS NOTES
HISTORY OF PRESENT ILLNESS:  Mr. Oakley is now three weeks out from radiation.  He still has some pain in his right tongue.  He has had ulceration in this area that are finally healing.  He has no other new complaints at the present time today.  He is otherwise getting by reasonably well.      PHYSICAL EXAMINATION:  The patient is alert and oriented x3 and pleasant.  Skin of the face, lips, and neck on him is reasonably normal.  His right neck dissection was performed looks to be all normal today.  His oral cavity and oropharynx is clear.  He has tenderness along the right posterior tongue as he has had in the past during radiation, but this is now healing.  Posterior pharynx is otherwise clear as well.  I did not scope him today.      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      PLAN:  We will see how he is doing again in about six weeks to make sure his eating is back to normal.  He has had about 14 pound weight loss total.

## 2021-03-30 NOTE — LETTER
3/30/2021       RE: Reese Oakley  [address] [address]     Dear Colleague,    Thank you for referring your patient, Reese Oakley, to the [facility] [place] [facility]. Please see a copy of my visit note below.    HISTORY OF PRESENT ILLNESS:  Mr. Oakley is now three weeks out from radiation.  He still has some pain in his right tongue.  He has had ulceration in this area that are finally healing.  He has no other new complaints at the present time today.  He is otherwise getting by reasonably well.      PHYSICAL EXAMINATION:  The patient is alert and oriented x3 and pleasant.  Skin of the face, lips, and neck on him is reasonably normal.  His right neck dissection was performed looks to be all normal today.  His oral cavity and oropharynx is clear.  He has tenderness along the right posterior tongue as he has had in the past during radiation, but this is now healing.  Posterior pharynx is otherwise clear as well.  I did not scope him today.      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      PLAN:  We will see how he is doing again in about six weeks to make sure his eating is back to normal.  He has had about 14 pound weight loss total.           Again, thank you for allowing me to participate in the care of your patient.      Sincerely,    Jose Antonio Mckenna MD

## 2021-04-14 NOTE — PROGRESS NOTES
FOLLOW-UP VISIT    Patient Name: Reese Oakley      : 1950     Age: 71 year old        ______________________________________________________________________________     Chief Complaint   Patient presents with     Cancer     Pt is here for a follow up:SCC of Oropharyngeal Cancer: Right neck 6000 cGy completed 21     BP (!) 142/76   Wt 74.8 kg (165 lb)   BMI 25.84 kg/m       Pain  Current history of pain associated with this visit:   Intensity: 3/10  Current: aching  Location: Tongue  Treatment: Oxycodone 3 times a day    Labs  Other Labs: No    Imaging  None      Dental:   Most Recent Dental Visit: No  Trays: Frequency yes    Speech/Swallowing:   Most Recent evaluation or testing:   Swallowing Restrictions: soft foods    Trismus/Jaw Exercises: Yes    Nutrition:    Weight:   Wt Readings from Last 3 Encounters:   21 74.8 kg (165 lb)   21 74.8 kg (165 lb)   21 (P) 81.6 kg (180 lb)         Oral Symptoms:   Xerostomia:0- None  Dysphagia: 2- symptomatic and altered eating/swallowing  Mucositis Oral Symptoms: 0-None  Mucositis: 0- None  Esophagitis:0- None    Other Appointments:     MD Name: Dr Mckenna Appointment Date:    MD Name: Appointment Date:   MD Name: Appointment Date:   Other Appointment Notes:     Residual Radiation side effect: Dysphagia, weight loss     Additional Instructions:           /

## 2021-04-14 NOTE — LETTER
2021         RE: Reese Oakley  [address]   [place] [address]        Dear Colleague,    Thank you for referring your patient, Reese Oakley, to the [facility] RADIATION ONCOLOGY. Please see a copy of my visit note below.    FOLLOW-UP VISIT    Patient Name: Reese Oakley      : 1950     Age: 71 year old        ______________________________________________________________________________     Chief Complaint   Patient presents with     Cancer     Pt is here for a follow up:SCC of Oropharyngeal Cancer: Right neck 6000 cGy completed 21     BP (!) 142/76   Wt 74.8 kg (165 lb)   BMI 25.84 kg/m       Pain  Current history of pain associated with this visit:   Intensity: 3/10  Current: aching  Location: Tongue  Treatment: Oxycodone 3 times a day    Labs  Other Labs: No    Imaging  None      Dental:   Most Recent Dental Visit: No  Trays: Frequency yes    Speech/Swallowing:   Most Recent evaluation or testing:   Swallowing Restrictions: soft foods    Trismus/Jaw Exercises: Yes    Nutrition:    Weight:   Wt Readings from Last 3 Encounters:   21 74.8 kg (165 lb)   21 74.8 kg (165 lb)   21 (P) 81.6 kg (180 lb)         Oral Symptoms:   Xerostomia:0- None  Dysphagia: 2- symptomatic and altered eating/swallowing  Mucositis Oral Symptoms: 0-None  Mucositis: 0- None  Esophagitis:0- None    Other Appointments:     MD Name: Dr Mckenna Appointment Date:    MD Name: Appointment Date:   MD Name: Appointment Date:   Other Appointment Notes:     Residual Radiation side effect: Dysphagia, weight loss     Additional Instructions:           /    RADIATION ONCOLOGY FOLLOW-UP VISIT  Date of encounter: 2021      Reese Oakley  MRN: [id number]   : 1950     DISEASE TREATED: [id number] 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 More recently, he was diagnosed with a pT2N1 squamous cell carcinoma of the right lateral oral tongue status post partial glossectomy and right neck dissection on 2/19/2020. Because the right neck had been previously treated with radiation, it was decided to follow the neck with close observation imaging. He was found to have a submandibular recurrence in late 2020 and underwent right modified neck dissection on 12/9/2020 that showed invasive moderately to poorly differentiated squamous cell carcinoma involving fibroadipose tissue and salivary gland tissue measuring 2.5 cm in maximal dimension with negative resection margins.  One lymph node in level 1A had 1 focus of metastatic squamous cell carcinoma measuring 0.5 cm without extranodal extension. The patient was presented at multidisciplinary tumor board with recommendation for chemoradiation due to extranodal extension of the node anterior to the submandibular gland which extended into fibroadipose tissue.     Mr. Oakley was treated using intensity modulated radiotherapy on a tomotherapy machine, 6,000 cGy in 30 fractions to the operative bed with a simultaneous integrated boost of 5,400 cGy to the undissected left level IV. A composite plan was generated with his previous treatment field to quantify overlap. He overall tolerated radiotherapy well.  He did not require a PEG tube.    Since completing treatment, he has done well.  He is using Robitussin for phlegm but notes marked improvement in the amount of phlegm produced.  He does have specific sites of pain within  the oral cavity that are still bothersome to him when he eats for which he uses lidocaine.  He is still eating mostly soft foods and supplementing with nutrition shakes.  He is working with his primary care physician to achieve better control of his diabetes which has been under poor control during treatment.  He is still at a lower energy level than prior to treatment but is staying active with golfing, bowling and general activity around the house.  He denies any shortness of breath, cough, fever, chills, nausea, vomiting, headache.  He does not expect any return of taste as he had no taste after his first course of therapy for his oropharyngeal carcinoma.    REVIEW OF SYSTEMS:  Complete review of systems is negative except for symptoms discussed in subjective above.    MEDICATIONS:  Current Outpatient Medications   Medication     lidocaine (XYLOCAINE) 2 % solution     acetaminophen (TYLENOL) 325 MG tablet     aspirin (ASA) 81 MG chewable tablet     glipiZIDE (GLUCOTROL) 10 MG tablet     guaiFENesin (ROBITUSSIN) 100 MG/5ML liquid     insulin aspart (NOVOLOG PEN) 100 UNIT/ML pen     insulin glargine (LANTUS VIAL) 100 UNIT/ML vial     magic mouthwash (ENTER INGREDIENTS IN COMMENTS) suspension     mineral oil-hydrophilic petrolatum (AQUAPHOR) external ointment     oxyCODONE (ROXICODONE) 5 MG tablet     pilocarpine (SALAGEN) 7.5 MG tablet     senna-docusate (SENOKOT-S/PERICOLACE) 8.6-50 MG tablet     simvastatin (ZOCOR) 40 MG tablet     ALLERGIES: No Known Allergies    PAST MEDICAL HISTORY:   Diagnosis Date     Adenocarcinoma (H)     large intestine     Cancer of appendix (H)      Diabetes (H)      Gastro-oesophageal reflux disease      History of radiation therapy      Hoarseness      Hypertension      PHYSICAL EXAM:  BP (!) 142/76   Wt 74.8 kg (165 lb)   BMI 25.84 kg/m  , Pain 3/10 oral cavity  General: Alert, oriented, in no acute distress  HEENT: Normocephalic, atraumatic, facial movement symmetric, oral cavity  and oropharynx with moist mucous membranes, no visible lesion, thick white coat along the dorsal surface of the oral tongue  Neck: No palpable adenopathy, area along right neck which was radiated feels quite firm, there is no lymphedema  Respiratory: Breathing comfortably on room air, no wheezing  Cardiovascular: Regular rate and rhythm, extremities warm, well-perfused  Extremities: No deformity, no cyanosis or clubbing  Skin: Area of previous radiation with mild hyperpigmentation, no erythema or desquamation  Psych: Mood appropriate    LABS AND IMAGING: No new studies    IMPRESSION:   Mr. 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  He is working with his primary care physician for better control of blood glucose.    PLAN:   1. Follow up with Dr. Herrera in 6 weeks   2. Continue close follow up with ENT and medical oncology.  3. He is scheduled for PET/CT on 6/21/2021  4. Continue to moisturize with aquaphor and when skin is completely healed then can change to preferred moisturizer.  Discussed importance of sun avoidance or sun protection.  5. Patient instructed to brush the top of his tongue twice daily  6. Continue oral cares with salt and soda rinses as needed.    7. Routine dental follow up at least every 6 months.  Continue to use fluoride trays or fluoride treatments.   8. Continue jaw, neck and swallowing exercises.  9. Treatment related pain should continue to diminish.  Recommended using topical lidocaine to painful areas just before eating   10. Continue to push fluids and caloric intake to maintain weight.    11. Refill viscous lidocaine    Please do not hesitate to contact me should you have any questions regarding Mr. Oakley's visit.    45 minutes spent on the date of the encounter doing chart review, history and exam, documentation  and further activities per the note    Jennifer Herrera MD  Department of Radiation Oncology  [facility]    CC  Patient Care Team:  Clark Marie DO as PCP - General  Donna Gallagher PA-C as Assigned Pediatric Specialist Provider  Jose Antonio Mckenna MD as Assigned Surgical Provider  Toni Mark DO as MD (Medical Oncology)  Blanca Ferrell RN as Specialty Care Coordinator (Hematology & Oncology)  Zo Bar MD as Assigned Endocrinology Provider

## 2021-04-14 NOTE — PROGRESS NOTES
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 More recently, he was diagnosed with a pT2N1 squamous cell carcinoma of the right lateral oral tongue status post partial glossectomy and right neck dissection on 2020. Because the right neck had been previously treated with radiation, it was decided to follow the neck with close observation imaging. He was found to have a submandibular recurrence in late  and underwent right modified neck dissection on 2020 that showed invasive moderately to poorly differentiated squamous cell carcinoma involving fibroadipose tissue and salivary gland tissue measuring 2.5 cm in maximal dimension with negative resection margins.  One lymph node in level 1A had 1 focus of metastatic squamous cell carcinoma measuring 0.5 cm without extranodal extension. The patient was presented at multidisciplinary tumor board with recommendation for chemoradiation due to extranodal extension of the node anterior to the submandibular gland which extended into fibroadipose tissue.     Mr. Oakley was treated using intensity modulated radiotherapy on a tomotherapy machine, 6,000 cGy in 30 fractions to the operative bed with a simultaneous integrated boost of 5,400 cGy to the undissected left level IV. A composite plan was generated with his previous  treatment field to quantify overlap. He overall tolerated radiotherapy well.  He did not require a PEG tube.    Since completing treatment, he has done well.  He is using Robitussin for phlegm but notes marked improvement in the amount of phlegm produced.  He does have specific sites of pain within the oral cavity that are still bothersome to him when he eats for which he uses lidocaine.  He is still eating mostly soft foods and supplementing with nutrition shakes.  He is working with his primary care physician to achieve better control of his diabetes which has been under poor control during treatment.  He is still at a lower energy level than prior to treatment but is staying active with golfing, bowling and general activity around the house.  He denies any shortness of breath, cough, fever, chills, nausea, vomiting, headache.  He does not expect any return of taste as he had no taste after his first course of therapy for his oropharyngeal carcinoma.    REVIEW OF SYSTEMS:  Complete review of systems is negative except for symptoms discussed in subjective above.    MEDICATIONS:  Current Outpatient Medications   Medication     lidocaine (XYLOCAINE) 2 % solution     acetaminophen (TYLENOL) 325 MG tablet     aspirin (ASA) 81 MG chewable tablet     glipiZIDE (GLUCOTROL) 10 MG tablet     guaiFENesin (ROBITUSSIN) 100 MG/5ML liquid     insulin aspart (NOVOLOG PEN) 100 UNIT/ML pen     insulin glargine (LANTUS VIAL) 100 UNIT/ML vial     magic mouthwash (ENTER INGREDIENTS IN COMMENTS) suspension     mineral oil-hydrophilic petrolatum (AQUAPHOR) external ointment     oxyCODONE (ROXICODONE) 5 MG tablet     pilocarpine (SALAGEN) 7.5 MG tablet     senna-docusate (SENOKOT-S/PERICOLACE) 8.6-50 MG tablet     simvastatin (ZOCOR) 40 MG tablet     ALLERGIES: No Known Allergies    PAST MEDICAL HISTORY:   Diagnosis Date     Adenocarcinoma (H)     large intestine     Cancer of appendix (H)      Diabetes (H)      Gastro-oesophageal reflux  disease      History of radiation therapy      Hoarseness      Hypertension      PHYSICAL EXAM:  BP (!) 142/76   Wt 74.8 kg (165 lb)   BMI 25.84 kg/m  , Pain 3/10 oral cavity  General: Alert, oriented, in no acute distress  HEENT: Normocephalic, atraumatic, facial movement symmetric, oral cavity and oropharynx with moist mucous membranes, no visible lesion, thick white coat along the dorsal surface of the oral tongue  Neck: No palpable adenopathy, area along right neck which was radiated feels quite firm, there is no lymphedema  Respiratory: Breathing comfortably on room air, no wheezing  Cardiovascular: Regular rate and rhythm, extremities warm, well-perfused  Extremities: No deformity, no cyanosis or clubbing  Skin: Area of previous radiation with mild hyperpigmentation, no erythema or desquamation  Psych: Mood appropriate    LABS AND IMAGING: No new studies    IMPRESSION:   Mr. 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  He is working with his primary care physician for better control of blood glucose.    PLAN:   1. Follow up with Dr. Herrera in 6 weeks   2. Continue close follow up with ENT and medical oncology.  3. He is scheduled for PET/CT on 6/21/2021  4. Continue to moisturize with aquaphor and when skin is completely healed then can change to preferred moisturizer.  Discussed importance of sun avoidance or sun protection.  5. Patient instructed to brush the top of his tongue twice daily  6. Continue oral cares with salt and soda rinses as needed.    7. Routine dental follow up at least every 6 months.  Continue to use fluoride trays or fluoride treatments.   8. Continue jaw, neck and swallowing exercises.  9. Treatment related pain should continue to diminish.  Recommended using topical lidocaine to painful areas just before eating    10. Continue to push fluids and caloric intake to maintain weight.    11. Refill viscous lidocaine    Please do not hesitate to contact me should you have any questions regarding Mr. Oakley's visit.    45 minutes spent on the date of the encounter doing chart review, history and exam, documentation and further activities per the note    Jennifer Herrera MD  Department of Radiation Oncology  [facility]    CC  Patient Care Team:  Clark Marie DO as PCP - General  Donna Gallagher PA-C as Assigned Pediatric Specialist Provider  Jose Antonio Mckenna MD as Assigned Surgical Provider  Jose Antonio Mckenna MD as Referring Physician (Otolaryngology)  Toni Mark DO as MD (Medical Oncology)  Blanca Ferrell, RN as Specialty Care Coordinator (Hematology & Oncology)  Jennifer Herrera MD as Assigned Cancer Care Provider  Zo Bar MD as Assigned Endocrinology Provider

## 2021-05-04 NOTE — LETTER
5/4/2021       RE: Reese Oakley  [address] [address]     Dear Colleague,    Thank you for referring your patient, Reese Oakley, to the [facility] EAR NOSE AND THROAT CLINIC [place] at [facility]. Please see a copy of my visit note below.    HISTORY OF PRESENT ILLNESS:   Mr. Reese Oakley is back for a six-week followup. He says the area on the right side of his tongue hurts.  We have been unable to explain what was going on with this.  It was expected that this would hurt after radiation, but it is going on now for quite some time.  He has had negative PET scans from this, but it is still sore in the right side of his tongue.  He has no current complaints present time of day.    PHYSICAL EXAMINATION:  On physical examination, the patient is and oriented x3, and pleasant.  The skin of the face, lips and neck on him is otherwise normal.  Looking into his oral cavity, there is something that looks like a small chronic sore or something similar on the right posterolateral tongue near the palatoglossal fold.      PROCEDURE:  Today, I anesthetized that and I did a biopsy of the posterolateral tongue in clinic on the right side because if this area should not be persistent like this yet it is in the epicenter of what we took out, so he could be having a second primary lesion in this area or something strange along these lines as well.      ASSESSMENT AND PLAN:  We will get this examined.  He may benefit from having a complete excision of this area with an Omni laser.    Again, thank you for allowing me to participate in the care of your patient.      Sincerely,    Jose Antonio Mckenna MD

## 2021-05-04 NOTE — PATIENT INSTRUCTIONS
1. You were seen in the ENT Clinic today by Dr. Mckenna.  If you have any questions or concerns after your appointment, please call   -  ENT Clinic: [phone]      2.   We will call you with biopsy results.       Tashia Jennings LPN  [facility] Otolaryngology  [phone]

## 2021-05-04 NOTE — NURSING NOTE
"Chief Complaint   Patient presents with     RECHECK     6 week follow up- see SLP       Pulse 69, temperature 96.9  F (36.1  C), temperature source Temporal, height 1.702 m (5' 7\"), weight 72.6 kg (160 lb), SpO2 98 %.    Mary Pascal, EMT    "

## 2021-05-04 NOTE — PROGRESS NOTES
HISTORY OF PRESENT ILLNESS:   Mr. Reese Oakley is back for a six-week followup. He says the area on the right side of his tongue hurts.  We have been unable to explain what was going on with this.  It was expected that this would hurt after radiation, but it is going on now for quite some time.  He has had negative PET scans from this, but it is still sore in the right side of his tongue.  He has no current complaints present time of day.    PHYSICAL EXAMINATION:  On physical examination, the patient is and oriented x3, and pleasant.  The skin of the face, lips and neck on him is otherwise normal.  Looking into his oral cavity, there is something that looks like a small chronic sore or something similar on the right posterolateral tongue near the palatoglossal fold.      PROCEDURE:  Today, I anesthetized that and I did a biopsy of the posterolateral tongue in clinic on the right side because if this area should not be persistent like this yet it is in the epicenter of what we took out, so he could be having a second primary lesion in this area or something strange along these lines as well.      ASSESSMENT AND PLAN:  We will get this examined.  He may benefit from having a complete excision of this area with an Omni laser.

## 2021-05-05 NOTE — TELEPHONE ENCOUNTER
Writer reviewed surgical path with Dr. Mckenna. Per his recommendations do start patient on PCN VK 500mg PO QID x 14 days. He also stated to get update on patient in 2 weeks. Reminder sent to call patient for nurse check.    Writer called patient and reviewed this with his wife. (Patients wife noted to be involved in his cares) She acknowledged all info and requested for ABX to be sent to [facility] in [place]. Writer also reviewed recent path results:    - Necrotic and inflammatory debris with detached actinomycete colonies   - Benign squamous mucosal with acute inflammation and reactive change   - No dysplasia or malignancy identified       She acknowledged and was thankful for information/call.    Tashia Jennings LPN

## 2021-05-10 NOTE — TELEPHONE ENCOUNTER
Patients wife called again. She states patients symptoms are getting worse. She states patient is starting to have increased pain under tongue and pain to right ear. She states infection may be spreading.     She denies loss of hearing or drainage to right ear.     Writer called Dr. Mckenna and sent message regarding this. Awaiting call back.     Tashia Jennings LPN

## 2021-05-10 NOTE — PROGRESS NOTES
Writer called patient's wife back in regards to update from Dr. Mckenna.   Per Dr. Mckenna to order magic mouthwash with tetraclycine (swish and spit). He states pain to tongue is a side effect from biopsies especially since area has been radiated. Area can be slow to heal. Writer advised Dr. Mckenna that patient is complaining of swelling/pain and pain to right ear. He recommends visit next week to check up on patient. He also states to continue ABX.    Writer reviewed this patient patients wife and she was agreeable. Writer scheduled patient for 5/18/21 at 1155. They where agreeable to come in.       Writer stated to call with any other questions or concerns.       Tashia Jennings LPN

## 2021-05-10 NOTE — PROGRESS NOTES
Spoke to pharmacy regarding this medication. [facility] in [place] states they do not carry Tetracycline and do not order this. Writer advised them to cancel this medication.     Writer called patient and informed her of this and advised her that we can order this through out  compounding pharmacy. She was agreeable to this. Writer also advised her that Dr. Mckenna has approved oxycodone 5-10 mg PO c9exeal PRN for severe oral pain refill0  Quantity 60 tablets. Writer advised her that Dr. Mckenna is not in clinic today and cannot electronically prescribe. Writer suggest OTC tylenol/Ibuprophen or to check in with PCP for oxycodone. She states they are ok with waiting until tomorrow for prescription.

## 2021-05-10 NOTE — TELEPHONE ENCOUNTER
Patients wife called and left voicemail. Writer returned call. She states patient has new on set of symptoms. She states that patient has had Increased pain to tongue. She states symptoms started worsening since thursday, she also reports. Increased foam/spit to mouth, and increased shivers.    Writer suggested she send a picture via e-mail so this can be sent to Dr. Mckenna. She was agreeable.    Tashia Jennings LPN

## 2021-05-11 NOTE — TELEPHONE ENCOUNTER
Writer spoke to patients wife regarding follow up on new magic mouthwash order. Writer stated that this was sent to  compounding pharmacy and would be delivered. Meghan requesting for shipping updates. Writer provided her with pharmacy direct line. She acknowledged.        Tashia Jennings LPN

## 2021-05-18 NOTE — LETTER
5/18/2021       RE: Reese Oakley  [address] [address]     Dear Colleague,    Thank you for referring your patient, Reese Oakley, to the [facility] EAR NOSE AND THROAT CLINIC [place] at [facility]. Please see a copy of my visit note below.    HISTORY OF PRESENT ILLNESS:  Mr. Oakley is here for followup today.  He is 71 years of age.  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  He is on penicillin for this but has not totally improved yet.  In fact, he is at least as bad as he was when I biopsied it a couple of weeks ago.      PHYSICAL EXAMINATION:  On examination of the oral cavity today, I see this area and it looks pretty bland.  We did deep biopsies of it.  It did not show cancer, but it remains concerning to me.  It is probably a centimeter across or so, maybe a little bit more than that.  It is not bleeding, but it is tender.  The remainder of the oral cavity exam on him is negative.  Neck exam shows no masses, adenopathy, thyromegaly throughout.    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  I hope that this does not represent cancer, but I am worried enough about this that I am going to take him to the operating room within a couple of weeks. If it resolves, we will not go to the OR, but if he is not back to normal for sure, we are going to have to go and take more extensive biopsies of this area.  It does not look that suspicious to me, but with the patient's history, I do not have another way to go with this type of thinking.          Again, thank you for allowing me to participate in the care of your patient.      Sincerely,    Jose Antonio Mckenna MD

## 2021-05-18 NOTE — PROGRESS NOTES
HISTORY OF PRESENT ILLNESS:  Mr. Oakley is here for followup today.  He is 71 years of age.  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  He is on penicillin for this but has not totally improved yet.  In fact, he is at least as bad as he was when I biopsied it a couple of weeks ago.      PHYSICAL EXAMINATION:  On examination of the oral cavity today, I see this area and it looks pretty bland.  We did deep biopsies of it.  It did not show cancer, but it remains concerning to me.  It is probably a centimeter across or so, maybe a little bit more than that.  It is not bleeding, but it is tender.  The remainder of the oral cavity exam on him is negative.  Neck exam shows no masses, adenopathy, thyromegaly throughout.    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  I hope that this does not represent cancer, but I am worried enough about this that I am going to take him to the operating room within a couple of weeks. If it resolves, we will not go to the OR, but if he is not back to normal for sure, we are going to have to go and take more extensive biopsies of this area.  It does not look that suspicious to me, but with the patient's history, I do not have another way to go with this type of thinking.

## 2021-05-18 NOTE — NURSING NOTE
"Chief Complaint   Patient presents with     RECHECK     assess tongue       Pulse 89, temperature 97.3  F (36.3  C), temperature source Temporal, height 1.702 m (5' 7\"), weight 77.2 kg (170 lb 3.1 oz), SpO2 99 %.    Mary Pascal, EMT    "

## 2021-05-18 NOTE — PATIENT INSTRUCTIONS
1. You were seen in the ENT Clinic today by Dr. Mckenna.  If you have any questions or concerns after your appointment, please call   -  ENT Clinic: [phone]      2.   Plan for tongue ulcer removal. We will call you to schedule.      Tashia Jennings LPN  [facility] Otolaryng[facility]  [phone]

## 2021-05-19 PROBLEM — K14.0 TONGUE ULCER: Status: ACTIVE | Noted: 2021-01-01

## 2021-05-19 NOTE — PROGRESS NOTES
Writer reordering PCN per Dr. Mckenna. This was discussed in clinic 5/18/21.    Tashia Jennings LPN

## 2021-05-20 NOTE — TELEPHONE ENCOUNTER
Sharon called back and was able to get patient in for a pre-op at his PCP clinic on Friday, 5/21/2021. They will go to [facility] on Saturday for his covid-19 test. Understands that surgery would likely be early AM arrival time. Meghan has no further questions or concerns at this time.         Yamilka Bravo on 5/20/2021 at 3:42 PM

## 2021-05-20 NOTE — TELEPHONE ENCOUNTER
Patients wife called wanting to know more information regarding upcoming procedure. Writer called back and LVM.      Tashia Jennings LPN

## 2021-05-20 NOTE — TELEPHONE ENCOUNTER
Called patient to schedule surgery and spoke to patients wife.     Surgeon: Dr. Mckenna   Date of Surgery: 06/07/2021  Location of surgery: [facility] OR - due to Omniguide laser request   Pre-Op H&P: PCP clinic   Post-Op Appt Date: 1-2 weeks    Imaging needed:  No  Discussed COVID-19 testing:  Yes  Pre-cert/Authorization completed:  No  Packet sent out: No already received.    Aware pre-op RN will call 2-3 days prior to surgery. Will attempt to arrange covid testing locally.

## 2021-05-20 NOTE — TELEPHONE ENCOUNTER
Patient wife called back and stated that patient has cancelled his trip and is able to have surgery sooner. Patients wife requesting to see if there is sooner time slots. Writer forwarded call to Yamilka in scheduling.      Tashia Jennings LPN

## 2021-05-20 NOTE — TELEPHONE ENCOUNTER
Sharon called in regarding rescheduling surgery sooner if possible. States pt had a planned trip. Informed her Monday, 6/24 would be the only possibility due to the holiday, 5/31. Informed her that will double check with the OR and call back either way.       She was understanding

## 2021-05-24 NOTE — BRIEF OP NOTE
[facility]    Brief Operative Note    Pre-operative diagnosis: Tongue ulcer [K14.0]  Post-operative diagnosis Same as pre-operative diagnosis    Procedure: Procedure(s):  Excision of tongue ulcer with omniguide laser  Surgeon: Surgeon(s) and Role:     * Jose Antonio Mckenna MD - Primary     * Isak Carrera MD - Resident - Assisting  Anesthesia: General   Estimated blood loss: Less than 10 ml  Drains: None  Specimens:   ID Type Source Tests Collected by Time Destination   A : Right Tongue Ulcer- previous actinomycosis, previous radiation Tissue Tongue SURGICAL PATHOLOGY EXAM Jose Antonio Mckenna MD 5/24/2021  8:39 AM      Findings:   ulcer partially excised..  Complications: None.  Implants: * No implants in log *

## 2021-05-24 NOTE — ANESTHESIA CARE TRANSFER NOTE
Patient: Reese Oakley    Procedure(s):  Excision of tongue ulcer with omniguide laser    Diagnosis: Tongue ulcer [K14.0]  Diagnosis Additional Information: No value filed.    Anesthesia Type:   No value filed.     Note:    Oropharynx: oropharynx clear of all foreign objects  Level of Consciousness: awake  Oxygen Supplementation: face mask  Level of Supplemental Oxygen (L/min / FiO2): 8  Independent Airway: airway patency satisfactory and stable  Dentition: dentition unchanged  Vital Signs Stable: post-procedure vital signs reviewed and stable  Report to RN Given: handoff report given  Patient transferred to: PACU    Handoff Report: Identifed the Patient, Identified the Reponsible Provider, Reviewed the pertinent medical history, Discussed the surgical course, Reviewed Intra-OP anesthesia mangement and issues during anesthesia, Set expectations for post-procedure period and Allowed opportunity for questions and acknowledgement of understanding      Vitals: (Last set prior to Anesthesia Care Transfer)  CRNA VITALS  5/24/2021 0826 - 5/24/2021 0900      5/24/2021             Pulse:  74    SpO2:  99 %        Electronically Signed By: KARINA Diego CRNA  May 24, 2021  9:00 AM

## 2021-05-24 NOTE — ANESTHESIA PREPROCEDURE EVALUATION
Anesthesia Pre-Procedure Evaluation    Patient: Reese Oakley   MRN: [id number] : 1950        Preoperative Diagnosis: Tongue ulcer [K14.0]   Procedure : Procedure(s):  Excision of tongue ulcer with omniguide laser     Past Medical History:   Diagnosis Date     Adenocarcinoma (H)     large instestine      Cancer of appendix (H)      Diabetes (H)      Gastro-oesophageal reflux disease      History of radiation therapy      Hoarseness      Hypertension       Past Surgical History:   Procedure Laterality Date     APPENDECTOMY       COLECTOMY      Right     DISSECTION RADICAL NECK MODIFIED Right 2020    Procedure: Modified neck dissection;  Surgeon: Jose Antonio Mckenna MD;  Location: [facility] OR     EXCISE LESION INTRAORAL Right 2020    Procedure: Wide Local Excision of Right Tongue Lesion;  Surgeon: Jose Antonio Mckenna MD;  Location: [facility] OR     INSERT PORT VASCULAR ACCESS Right 1/15/2021    Procedure: CHEST INSERTION, VASCULAR ACCESS PORT @0900;  Surgeon: Tony Hopkins MD;  Location: [facility] OR     IR CHEST PORT PLACEMENT > 5 YRS OF AGE  1/15/2021     LASER CO2 LARYNGOSCOPY N/A 10/6/2014    Procedure: LASER CO2 LARYNGOSCOPY;  Surgeon: Jose Antonio Mckenna MD;  Location: [facility] OR      No Known Allergies   Social History     Tobacco Use     Smoking status: Never Smoker     Smokeless tobacco: Never Used   Substance Use Topics     Alcohol use: Not Currently     Alcohol/week: 0.0 standard drinks     Comment: none for 20 years      Wt Readings from Last 1 Encounters:   21 73.8 kg (162 lb 11.2 oz)        Anesthesia Evaluation            ROS/MED HX  ENT/Pulmonary:  - neg pulmonary ROS     Neurologic:  - neg neurologic ROS     Cardiovascular:     (+) hypertension-----    METS/Exercise Tolerance:     Hematologic:  - neg hematologic  ROS     Musculoskeletal:  - neg musculoskeletal ROS     GI/Hepatic:     (+) GERD,     Renal/Genitourinary:  - neg Renal ROS     Endo:     (+) type II DM,      Psychiatric/Substance Use:  - neg psychiatric ROS     Infectious Disease:  - neg infectious disease ROS     Malignancy:   (+) Malignancy, History of Other.Other CA tonsilar CA, 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    Other:  - neg other ROS          Physical Exam    Airway        Mallampati: IV   TM distance: < 3 FB   Neck ROM: full   Mouth opening: < 3 cm    Respiratory Devices and Support         Dental  no notable dental history         Cardiovascular   cardiovascular exam normal          Pulmonary   pulmonary exam normal                OUTSIDE LABS:  CBC:   Lab Results   Component Value Date    WBC 3.4 (L) 03/01/2021    WBC 4.1 02/23/2021    HGB 11.1 (L) 03/01/2021    HGB 11.9 (L) 02/23/2021    HCT 32.3 (L) 03/01/2021    HCT 35.4 (L) 02/23/2021     03/01/2021     02/23/2021     BMP:   Lab Results   Component Value Date     03/01/2021     02/23/2021    POTASSIUM 4.7 05/24/2021    POTASSIUM 4.8 03/01/2021    CHLORIDE 99 03/01/2021    CHLORIDE 104 02/23/2021    CO2 27 03/01/2021    CO2 29 02/23/2021    BUN 24 03/01/2021    BUN 18 02/23/2021    CR 1.50 (H) 03/01/2021    CR 1.41 (H) 02/23/2021     (H) 05/24/2021     (H) 03/01/2021     COAGS:   Lab Results   Component Value Date    INR 1.08 01/15/2021     POC:   Lab Results   Component Value Date     (H) 05/24/2021     HEPATIC:   Lab Results   Component Value Date    ALBUMIN 3.2 (L) 03/01/2021    PROTTOTAL 7.7 03/01/2021    ALT 24 03/01/2021    AST 18 03/01/2021    ALKPHOS 89 03/01/2021    BILITOTAL 0.5 03/01/2021     OTHER:   Lab Results   Component Value Date    A1C 7.9 (H) 12/09/2020    ELEUTERIO 9.6 03/01/2021    MAG 1.6 02/01/2021       Anesthesia Plan    ASA Status:  3   NPO Status:  NPO Appropriate    Anesthesia Type: General.     - Airway: ETT   Induction: Intravenous.   Maintenance: Balanced.   Techniques and Equipment:     - Airway: Video-Laryngoscope         Consents    Anesthesia Plan(s)  and associated risks, benefits, and realistic alternatives discussed. Questions answered and patient/representative(s) expressed understanding.     - Discussed with:  Patient      - Extended Intubation/Ventilatory Support Discussed: Yes.      - Patient is DNR/DNI Status: No    Use of blood products discussed: No .     Postoperative Care    Pain management: Multi-modal analgesia.   PONV prophylaxis: Ondansetron (or other 5HT-3), Dexamethasone or Solumedrol     Comments:                Jeremy Carrasco MD

## 2021-05-24 NOTE — ANESTHESIA POSTPROCEDURE EVALUATION
Patient: Reese Oakley    Procedure(s):  Excision of tongue ulcer with omniguide laser    Diagnosis:Tongue ulcer [K14.0]  Diagnosis Additional Information: No value filed.    Anesthesia Type:  General    Note:  Disposition: Outpatient   Postop Pain Control: Uneventful            Sign Out: Well controlled pain   PONV: No   Neuro/Psych: Uneventful            Sign Out: Acceptable/Baseline neuro status   Airway/Respiratory: Uneventful            Sign Out: Acceptable/Baseline resp. status   CV/Hemodynamics: Uneventful            Sign Out: Acceptable CV status; No obvious hypovolemia; No obvious fluid overload   Other NRE: NONE   DID A NON-ROUTINE EVENT OCCUR? No           Last vitals:  Vitals:    05/24/21 0945 05/24/21 1000 05/24/21 1015   BP: 132/66 133/61 135/59   Pulse: 71 63 59   Resp: 16 16 16   Temp: (P) 36.7  C (98  F)  36.7  C (98.1  F)   SpO2: 96% 95% 92%       Last vitals prior to Anesthesia Care Transfer:  CRNA VITALS  5/24/2021 0826 - 5/24/2021 0926      5/24/2021             Pulse:  74    SpO2:  99 %          Electronically Signed By: Jeremy Carrasco MD  May 24, 2021  11:53 AM

## 2021-05-24 NOTE — DISCHARGE INSTRUCTIONS
[facility]  Same-Day Surgery   Adult Discharge Orders & Instructions     For 24 hours after surgery    1. Get plenty of rest.  A responsible adult must stay with you for at least 24 hours after you leave the hospital.   2. Do not drive or use heavy equipment.  If you have weakness or tingling, don't drive or use heavy equipment until this feeling goes away.  3. Do not drink alcohol.  4. Avoid strenuous or risky activities.  Ask for help when climbing stairs.   5. You may feel lightheaded.  IF so, sit for a few minutes before standing.  Have someone help you get up.   6. If you have nausea (feel sick to your stomach): Drink only clear liquids such as apple juice, ginger ale, broth or 7-Up.  Rest may also help.  Be sure to drink enough fluids.  Move to a regular diet as you feel able.  7. You may have a slight fever. Call the doctor if your fever is over 100 F (37.7 C) (taken under the tongue) or lasts longer than 24 hours.  8. You may have a dry mouth, a sore throat, muscle aches or trouble sleeping.  These should go away after 24 hours.  9. Do not make important or legal decisions.   Call your doctor for any of the followin.  Signs of infection (fever, growing tenderness at the surgery site, a large amount of drainage or bleeding, severe pain, foul-smelling drainage, redness, swelling).    2. It has been over 8 to 10 hours since surgery and you are still not able to urinate (pass water).    3.  Headache for over 24 hours.    To contact a doctor, call Dr Mckenna's office at [phone] or:      [phone] and ask for the resident on call for Otolaryngology (answered 24 hours a day)      Emergency Department:  [facility]: [phone]

## 2021-05-24 NOTE — OR NURSING
PADMINI Carrasco called 0905: Patient ok to go to phase II, does not need to see. PADMINI will place sign out.

## 2021-05-25 NOTE — OP NOTE
Procedure Date: 05/24/2021    PREOPERATIVE DIAGNOSIS:  Chronic ulcer, right posterior tongue.    POSTOPERATIVE DIAGNOSIS:  Chronic ulcer, right posterior tongue.    PROCEDURE:  Resection of 1.5 cm chronic ulcer with the Omni laser.    SURGEONS:  1.  Kumar Shi MD  2.  Jose Antonio Nielsen MD.    ANESTHESIA:  General.    ESTIMATED BLOOD LOSS:  Minimal.    COMPLICATIONS:  None.    OPERATIVE FINDINGS:  A very bland-appearing 10-12 mm ulcer of the area of the retromolar trigone of the floor of the mouth on the right side posteriorly.  This was somewhat of a distance away from the previous resection.    INDICATION FOR SURGERY:  Reese Oakley is a gentleman who has been quite a bit away now from his radiation treatments.  He has had negative PET scans.  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  He was left after radiation with a very sore mouth on the right side and this required biopsy in clinic and did not heal up after several to months after the radiation was completed.  The biopsy, however, only showed actinomycosis.  We treated him for classic actinomycosis with penicillin and this did not seem to get better after a few weeks.  I felt that we should take him to the OR because of his high level of risk to remove this ulcer.    DESCRIPTION OF PROCEDURE:  After informed consent was obtained from the patient, he was brought to the operating room and general endotracheal anesthesia was established.  Laser safe tubes in similar were used all over the oral cavity.  In between a sweetheart retractor as well as a [place], we were able to gain access to the retromolar trigone on the right side.  We then went ahead and used the Omni laser at 5-8 shepherd continuously.  We were able to go around the lesion in its entirety.  We took about a 2-3 mm margin around this area and then we delaminated it from the subcutaneous tissue, seeing that there was normal-appearing muscle  underneath the area that we were resecting.  Then we went ahead and used the laser on a defocused setting to ablate the area that was closer to the mandible on the right side.  We did not want to use a cutting motion that would risk his lingual nerve on the right side as well unless we knew that we had to sacrifice the lingual nerve.  At the conclusion, the patient was awoken in the operating room.    Jose nAtonio Mckenna MD        D: 2021   T: 2021   MT: GARRISON    Name:     KAREN CHANKarmen  MRN:      [id number]        Account:        [id number]   :      1950           Procedure Date: 2021     Document: [id number]

## 2021-06-08 NOTE — LETTER
6/8/2021       RE: Reese Oakley  [address] [id number]     Dear Colleague,    Thank you for referring your patient, Reese Oakley, to the [facility] EAR NOSE AND THROAT CLINIC [place] at [facility]. Please see a copy of my visit note below.    HISTORY OF PRESENT ILLNESS:  Reese Oakley is 71 years of age.  He had a nonhealing ulcer in his oral cavity that we were worried about so we resected it with a laser resection a couple of weeks ago.  He has had no cancers or chronic ulcers.  He has had chemoradiotherapy a couple of times now for tongue cancer.  Since the time with resection, it has actually gotten worse.  He got better for a few days and then seemed to not be healing again, and this is a very sore area for him.      PHYSICAL EXAMINATION:  I examined the area.  He is also having problems with swallowing too.  This area of the right posterolateral tongue shows simply the area that we excised.  He has actinomycosis.  He has been treated with penicillin for this.      ASSESSMENT AND PLAN:  He is in a significant amount of pain today so we are going to double up his pain medicines with gabapentin.  I am also going to try to have him use topical Carafate or something to try and heal this up.  It is a very unusual thing after the completion of his radiation to have this occur.            Again, thank you for allowing me to participate in the care of your patient.      Sincerely,    Jose Antonio Mckenna MD

## 2021-06-08 NOTE — PROGRESS NOTES
HISTORY OF PRESENT ILLNESS:  Reese Oakley is 71 years of age.  He had a nonhealing ulcer in his oral cavity that we were worried about so we resected it with a laser resection a couple of weeks ago.  He has had no cancers or chronic ulcers.  He has had chemoradiotherapy a couple of times now for tongue cancer.  Since the time with resection, it has actually gotten worse.  He got better for a few days and then seemed to not be healing again, and this is a very sore area for him.      PHYSICAL EXAMINATION:  I examined the area.  He is also having problems with swallowing too.  This area of the right posterolateral tongue shows simply the area that we excised.  He has actinomycosis.  He has been treated with penicillin for this.      ASSESSMENT AND PLAN:  He is in a significant amount of pain today so we are going to double up his pain medicines with gabapentin.  I am also going to try to have him use topical Carafate or something to try and heal this up.  It is a very unusual thing after the completion of his radiation to have this occur.

## 2021-06-08 NOTE — PATIENT INSTRUCTIONS
1. Please follow-up in clinic in 2 weeks for video visit.   2. Please call the ENT clinic with any questions,concerns, new or worsening symptoms.    -Clinic number is [phone]   - Blanka's direct line (Dr. Mckenna's nurse) [phone]

## 2021-06-14 NOTE — TELEPHONE ENCOUNTER
Received a call from patient's wife indicating that patient is not having any relief from his oral and jaw pain with increase in Gabapentin or additional of nystatin from Dr. Mckenna. Patient currently taking 5 mg of oxycodone every 6 hours without a significant amount of relief.     Wife indicates that patient is not eating or drinking a lot but he is going to the local hospital every other day for fluids and is currently maintaining his weight.     Advised patient's wife that he could try increasing oxycodone 1-2 times per day to 10 mg to allow for additional relief. Also advised that he should alternate with tylenol and could add ibuprofen in as well. Wife indicates that pain is more significantly within his jaw. She questioned if he could use an ice pack to the area. Advised that this is okay to do but should use something between skin and ice.     Wife questioned if the jaw would be visualized in the PET scan. On review the PET was ordered without CT neck. New order placed for PET with CT neck along with chest/abdomen/pelvis CT to ensure we can visualize the head and neck.     Wife will try increasing oxycodone and alternate tylenol and will update writer tomorrow if there is no relief. She was encouraged to call sooner with any further questions or concerns.       Blanka Barnett, RN, BSN

## 2021-06-22 NOTE — PROGRESS NOTES
Pt seen for brief allied health visit today accompanied by his wife. Reports, the pain in his tongue has gotten worse and is quite severe. He is still able to get in the two cream of wheat/shakes and has maintained his weight. Will defer appt today d/t pain and resume speech services once pt is feeling better. Briefly discussed with pt, wife and MD possible need for alternative means of nutrition should pain persist.    LUIS Cerrato (dimitri), MA, CCC-SLP   Speech Language Pathologist  [facility] Trained Vocologist   [facility]  Dept. of Otolaryngology  Department of Rehabilitation Services  [address] [place] [id number]  Email: [email or web address]   Phone: [phone]  Pronouns: she/her/hers

## 2021-06-22 NOTE — LETTER
6/22/2021       RE: Reese Oakley  [address] [address]     Dear Colleague,    Thank you for referring your patient, Reese Oakley, to the [facility] EAR NOSE AND THROAT CLINIC [place] at [facility]. Please see a copy of my visit note below.    HISTORY OF PRESENT ILLNESS:  The patient is 71 years of age.  He is here for follow-up today.  He has no other current complaints at the present time today except for the fact that he is having persistence and worsening of the mouth pain.  This area has been damaged by radiation.  We resected it, and the ulcer was negative.  He is having pain along the side of his face, headaches, and things of this nature at the present time.  The oral cavity pain is severe as well.      PHYSICAL EXAMINATION:  The patient is alert, oriented x3, and pleasant.  Skin of face, lips and neck on him is otherwise normal.  When I look in his oral cavity, this ulcerated area seems to be healing slowly.  It is less than what it was before.  No other masses or lesions are otherwise seen.      ASSESSMENT AND PLAN:  A PET scan of this area was quite negative.  However, on the PET scan there may be a couple of lymph nodes in the mediastinum that will have to be biopsied as well.  I gave him this bad news today.  This will be further discussed when they meet with Dr. Mark in a couple of hours on a video visit as well.          Again, thank you for allowing me to participate in the care of your patient.      Sincerely,    Jose Antonio Mckenna MD

## 2021-06-22 NOTE — PATIENT INSTRUCTIONS
1. Please follow-up in clinic or with a telephone visit in 3 weeks.     2. We prescribed penicillin 500 mg to be taken 4 times daily. We also prescribed Trental 400 mg to be taken 3 times daily, continue taking this medication until the wound heals. Use Gentian Violet on wound daily.     3. Please call the ENT clinic with any questions,concerns, new or worsening symptoms.    -Clinic number is [phone]   - Malini's direct line (Dr. Mckenna's nurse) [phone]

## 2021-06-22 NOTE — PROGRESS NOTES
Kenrick is a 71 year old who is being evaluated via a billable video visit.      How would you like to obtain your AVS? [employer]     If the video visit is dropped, the invitation should be resent by: Text to cell phone: [phone]     Will anyone else be joining your video visit? Breonna         Alfredo Brown LPN    Video Start Time: 5p  Video-Visit Details    Type of service:  Video Visit    Video End Time:520    Originating Location (pt. Location): Home    Distant Location (provider location):  [facility]     Platform used for Video Visit: ShotSpotter     REASON FOR VISIT:    CANCER STAGE: Cancer Staging  No matching staging information was found for the patient.      HISTORY OF PRESENT ILLNESS:  Reese Oakley is a 70 year old male with PMH HTN, hyperlipidemia, DM2 with recurrent SCC. 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  More recently he was noted on follow up scans to have enlarged R sided lymph nodes and biopsy proven recurrence in the R neck.  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  There was one additional focus of squamous cell carcinoma identified in an additional lymph node without MEL.  His case was discussed at multidisciplinary tumor board and he was recommended to have chemoradiation - due to MEL in the submandibular gland.      Started weekly cisplatin with limited field radiation 1/25/21. Due to worsening renal function after first treatment switched to carboplatin/taxol for week 2. Finished treatment 3/5/21    I am seeing Kenrick in follow up by video visit. He continues to have bad sores in his tongue - was better for a bit, but now getting a little worse.  He is having a lot of pain especially at night and  he can't sleep He is taking oxycodone 4 times per day.  He has tried MMW with and without nystatin and also was taking gabapentin, but none of these have helped. He is eating cream of wheat with protein supplementation. His weight has decreased a little bit in the past week. He has lost 25 lbs but has not gained it back.   Taste has returned to baseline which is not normal due to his prior radiotherapy course. He continues to have a lot of mucus secretions and need to spit up mucus.        Review Of Systems  10-point review of systems were negative except as noted in HPI.        EXAM:  There were no vitals taken for this visit.  GEN: alert and oriented x 3, nad  Deferred    Current Outpatient Medications   Medication Sig Dispense Refill     acetaminophen (TYLENOL) 160 MG/5ML suspension Take 20.5 mLs (650 mg) by mouth every 6 hours as needed for fever or mild pain 300 mL 3     acetaminophen (TYLENOL) 325 MG tablet Take 2 tablets (650 mg) by mouth every 4 hours as needed for other (multimodal surgical pain management along with NSAIDS and opioid medication as indicated based on pain control and physical function.) 60 tablet 0     aspirin (ASA) 81 MG chewable tablet Take 81 mg by mouth daily       gabapentin (NEURONTIN) 250 MG/5ML solution Take 5 mLs (250 mg) by mouth 3 times daily 300 mL 1     guaiFENesin (ROBITUSSIN) 100 MG/5ML liquid 10 mLs (200 mg) by Oral or PEG tube route every 4 hours as needed for other (mucous production and/or cough) 473 mL 5     insulin aspart (NOVOLOG PEN) 100 UNIT/ML pen 15 Units        insulin glargine (LANTUS VIAL) 100 UNIT/ML vial Inject 25 Units Subcutaneous daily (with breakfast) PATIENT REPORTS 25 UNIT DAILY 02/16/2021       lidocaine (XYLOCAINE) 2 % solution Swish and spit 15 mLs in mouth every 3 hours as needed for moderate pain ; Max 8 doses/24 hour period. 100 mL 3     lisinopril (ZESTRIL) 5 MG tablet Take 5 mg by mouth daily       magic mouthwash (ENTER INGREDIENTS IN COMMENTS)  suspension Take 5 mLs by mouth every 4 hours as needed (pain) 100 mL 4     magic mouthwash (ENTER INGREDIENTS IN COMMENTS) suspension Swish and spit 5 ml every 8 hours for 14 days 1000 mL 1     magic mouthwash (ENTER INGREDIENTS IN COMMENTS) suspension Swish, gargle, and spit one to two teaspoonfuls every six hours as needed. May be swallowed if esophageal involvement. 480 mL 3     oxyCODONE (OXY-IR) 5 MG capsule Take 5 mg by mouth every 4 hours as needed for severe pain       oxyCODONE (ROXICODONE) 5 MG/5ML solution Take 5 mLs (5 mg) by mouth every 6 hours as needed for pain (oral pain) 500 mL 0     penicillin V (VEETID) 500 MG tablet Take 1 tablet (500 mg) by mouth 4 times daily 56 tablet 0     pentoxifylline ER (TRENTAL) 400 MG CR tablet Take 1 tablet (400 mg) by mouth 3 times daily (with meals) 270 tablet 1     pilocarpine (SALAGEN) 7.5 MG tablet Take 7.5 mg by mouth 2 times daily       senna-docusate (SENOKOT-S/PERICOLACE) 8.6-50 MG tablet Take 1 tablet by mouth 2 times daily as needed for constipation 20 tablet 0     simvastatin (ZOCOR) 40 MG tablet Take 40 mg by mouth At Bedtime       sucralfate (CARAFATE) 1 GM tablet Take 1 tablet (1 g) by mouth 2 times daily 42 tablet 1     gabapentin (NEURONTIN) 250 MG/5ML solution Take 2 mLs (100 mg) by mouth 3 times daily for 14 days 84 mL 0           Recent Labs   Lab Test 03/01/21  0952   WBC 3.4*   HGB 11.1*        @labrcent[na,potassium,chloride,co2,bun,cr@  Recent Labs   Lab Test 03/01/21  0952   PROTTOTAL 7.7   ALBUMIN 3.2*   BILITOTAL 0.5   AST 18   ALT 24   ALKPHOS 89         Recent Results (from the past 744 hour(s))   PET Oncology Whole Body    Narrative    Combined Report of:    PET and CT on  6/21/2021 12:06 PM :    1. PET of the neck, chest, abdomen, and pelvis.  2. PET CT Fusion for Attenuation Correction and Anatomical  Localization:    3. Diagnostic CT scan of the chest, abdomen, and pelvis with  intravenous contrast for interpretation.  3. CT of  the chest, abdomen and pelvis obtained for diagnostic  interpretation.  4. 3D MIP and PET-CT fused images were processed on an independent  workstation and archived to PACS and reviewed by a radiologist.    Technique:    1. PET: The patient received 10.18 mCi of F-18-FDG; the serum glucose  was 169 prior to administration, body weight was 73.9 kg. Images were  evaluated in the axial, sagittal, and coronal planes as well as the  rotational whole body MIP. Images were acquired from the Vertex to the  Feet.    UPTAKE WAS MEASURED AT 68 MINUTES.     BACKGROUND:  Liver SUV max= 4.0,   Aorta Blood SUV Max: 3.4.     2. CT: Volumetric acquisition for clinical interpretation of the  chest, abdomen, and pelvis acquired at 3 mm sections after the  uneventful administration of intravenous contrast. The chest, abdomen,  and pelvis were evaluated at 5 mm sections in bone, soft tissue, and  lung windows.      The patient received 100 cc of Isovue 370 intravenously for the  examination.      3. 3D MIP and PET-CT fused images were processed on an independent  workstation and archived to PACS and reviewed by a radiologist.    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 Most recently status post  resection of an oral ulcer in the right right retromolar trigone on  5/24/2021.    COMPARISON: PET-CT 11/24/2020, PET-CT 5/19/2020, PET-CT 1/27/2020    FINDINGS:     HEAD/NECK:  Postsurgical changes of multiple resections 2 the oral cavity and  oropharynx. In the right oral floor, right base of tongue, and right  lateral recess, there is asymmetric hypermetabolic tissue thickening  without focal enhancing masslike lesion, with SUV max 5.4, 5.2, and  5.6 respectively (series 5, images 107, 113, and 114). There  is debris  within the right lateral recess within the vicinity of the uptake.  There is generalized soft tissue and thickening in the anterior neck  greater on the right that has increased since prior PET-CT 11/24/2020,  most consistent with combination of postsurgical and evolving  postradiation changes.    Small mildly prominent left level 3 and 4 lymph nodes. For example 0.7  x 0.5 cm left level 3 lymph node with SUV max 3.6 (series 5, image  138).    The paranasal sinuses are clear. The mastoid air cells are clear. The  major vascular structures within the neck are patent. There is  narrowing of the right internal jugular vein in the mid neck secondary  to postradiation/postsurgical tissue thickening (series 5, image 113).  No focal occlusion of the right internal jugular vein. The thyroid is  unremarkable.    CHEST:  New hypermetabolic lymph nodes in the left hilum and left mediastinum,  which are new since 11/24/2020. For example left hilar lymph node  measuring 1.7 x 1.0 cm with SUV max 10.4 (series 5, image 203), and  new hypermetabolic aortopulmonary window lymph node measuring 1.1 x  0.7 cm with SUV max 7.7 (series 5, image 189).    New hypermetabolic perivascular nodular tissue thickening in the right  upper lobe along the right minor fissure with SUV max 4.3 (series 5,  image 204). Linear tissue thickening in the lingula adjacent to the  left fissure with low levels of uptake is mildly increased since  11/24/2020, favor chronic fibroatelectasis. Mild groundglass opacities  in the anterior right upper lobe and lingula have also increased,  nonspecific, possibly chronic postradiation changes. No pleural  effusion or pneumothorax.    Heart size is within normal limits. No pericardial effusion. Moderate  coronary artery calcifications. Right chest wall Port-A-Cath catheter  tip terminates at the superior cavoatrial junction. Normal caliber of  the thoracic aorta and main pulmonary artery. No central  pulmonary  embolism. Generalized circumferential wall thickening of the mid  to-distal esophagus is similar to prior, with no focal abnormal FDG  uptake.    ABDOMEN AND PELVIS:  There is no suspicious FDG uptake in the abdomen or pelvis.    The liver, gallbladder, pancreas, spleen, and adrenal glands are  unremarkable. Symmetric nephrographic enhancement of the kidneys.  Small renal cortical hypodensities, too small to fully characterize,  suggestive of tiny cysts. No hydronephrosis. Circumferential  prominence of the bladder wall. This may be exaggerated due to degree  of nondistention. There is mural lipomatosis in the bladder wall  suggesting prior inflammation. Prostate is unremarkable. No abnormally  distended loops of small or large bowel. Colonic anastomosis in the  right colon which appears widely patent. No free air, free fluid, or  fluid collection. Normal caliber of the abdominal aorta. No enlarged  hypermetabolic lymph nodes in the abdomen or pelvis.    LOWER EXTREMITIES:   No abnormal masses or hypermetabolic lesions.    BONES:   There is no abnormal FDG uptake in the skeleton. Multilevel  degenerative changes in the spine. No acute osseous abnormality.      Impression    IMPRESSION: In this patient with a history of squamous cell carcinoma  of the oropharynx and tongue status post resection and radiotherapy:    1. Primary: NI-RADS 2. Multifocal uptake to the right oral floor,  right base of tongue, and right lateral recess associated with tissue  thickening without focal masslike enhancement, favor combination of  postsurgical/postradiation tissue thickening and the chronic  inflammation on exam. Consider continued follow-up with imaging to  exclude developing lesion.    2. Neck: NI-RADS 2. Small borderline avid left level 3 and 4 lymph  nodes, favor reactive given small size. These can also be followed  with imaging.    3. PET-CT of the remainder of the body demonstrates new hypermetabolic  lesions in  the chest suspicious for metastases:  3a. New hypermetabolic left hilar and left mediastinal lymph nodes  suspicious for irvin metastases. The left hilar lymph nodes would be  amenable to endobronchial sampling.  3b. New hypermetabolic perivascular nodule in the right upper lobe  suspicious for metastatic nodule.    4. Likely evolving postradiation changes versus other nonspecific  pneumonitis in the anterior lungs, particularly lingula.    ------  NI-RADS Surveillance Legend:    Primary  1: No evidence of recurrence: routine surveillance  2: Low suspicion    a) Superficial abnormality (skin, mucosal surface): direct visual  inspection    b) Ill-defined deep abnormality: short interval follow-up* or PET  3: High suspicion (new or enlarging discrete nodule/mass): biopsy  4: Definitive recurrence (path proven or clinical progression): no  biopsy needed    Nodes  1: No evidence of recurrence: routine surveillance  2: Low suspicion (ill-defined): short interval follow-up or PET  3: HIgh suspicion (new or enlarging lymph node): biopsy if clinically  needed  4: Definitive recurrence (path proven or clinical progression): no  biopsy needed    *short interval follow-up: 3 months at our institution    I have personally reviewed the examination and initial interpretation  and I agree with the findings.    KATHERINE MALAVE MD   CT Chest/Abdomen/Pelvis w Contrast    Narrative    Combined Report of:    PET and CT on  6/21/2021 12:06 PM :    1. PET of the neck, chest, abdomen, and pelvis.  2. PET CT Fusion for Attenuation Correction and Anatomical  Localization:    3. Diagnostic CT scan of the chest, abdomen, and pelvis with  intravenous contrast for interpretation.  3. CT of the chest, abdomen and pelvis obtained for diagnostic  interpretation.  4. 3D MIP and PET-CT fused images were processed on an independent  workstation and archived to PACS and reviewed by a radiologist.    Technique:    1. PET: The patient received 10.18 mCi  of F-18-FDG; the serum glucose  was 169 prior to administration, body weight was 73.9 kg. Images were  evaluated in the axial, sagittal, and coronal planes as well as the  rotational whole body MIP. Images were acquired from the Vertex to the  Feet.    UPTAKE WAS MEASURED AT 68 MINUTES.     BACKGROUND:  Liver SUV max= 4.0,   Aorta Blood SUV Max: 3.4.     2. CT: Volumetric acquisition for clinical interpretation of the  chest, abdomen, and pelvis acquired at 3 mm sections after the  uneventful administration of intravenous contrast. The chest, abdomen,  and pelvis were evaluated at 5 mm sections in bone, soft tissue, and  lung windows.      The patient received 100 cc of Isovue 370 intravenously for the  examination.      3. 3D MIP and PET-CT fused images were processed on an independent  workstation and archived to PACS and reviewed by a radiologist.    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 Most recently status post  resection of an oral ulcer in the right right retromolar trigone on  5/24/2021.    COMPARISON: PET-CT 11/24/2020, PET-CT 5/19/2020, PET-CT 1/27/2020    FINDINGS:     HEAD/NECK:  Postsurgical changes of multiple resections 2 the oral cavity and  oropharynx. In the right oral floor, right base of tongue, and right  lateral recess, there is asymmetric hypermetabolic tissue thickening  without focal enhancing masslike lesion, with SUV max 5.4, 5.2, and  5.6 respectively (series 5, images 107, 113, and 114). There is debris  within the right lateral recess within the vicinity of the uptake.  There is generalized soft tissue and thickening in the anterior neck  greater on the right that has increased since prior PET-CT 11/24/2020,  most consistent with combination of  postsurgical and evolving  postradiation changes.    Small mildly prominent left level 3 and 4 lymph nodes. For example 0.7  x 0.5 cm left level 3 lymph node with SUV max 3.6 (series 5, image  138).    The paranasal sinuses are clear. The mastoid air cells are clear. The  major vascular structures within the neck are patent. There is  narrowing of the right internal jugular vein in the mid neck secondary  to postradiation/postsurgical tissue thickening (series 5, image 113).  No focal occlusion of the right internal jugular vein. The thyroid is  unremarkable.    CHEST:  New hypermetabolic lymph nodes in the left hilum and left mediastinum,  which are new since 11/24/2020. For example left hilar lymph node  measuring 1.7 x 1.0 cm with SUV max 10.4 (series 5, image 203), and  new hypermetabolic aortopulmonary window lymph node measuring 1.1 x  0.7 cm with SUV max 7.7 (series 5, image 189).    New hypermetabolic perivascular nodular tissue thickening in the right  upper lobe along the right minor fissure with SUV max 4.3 (series 5,  image 204). Linear tissue thickening in the lingula adjacent to the  left fissure with low levels of uptake is mildly increased since  11/24/2020, favor chronic fibroatelectasis. Mild groundglass opacities  in the anterior right upper lobe and lingula have also increased,  nonspecific, possibly chronic postradiation changes. No pleural  effusion or pneumothorax.    Heart size is within normal limits. No pericardial effusion. Moderate  coronary artery calcifications. Right chest wall Port-A-Cath catheter  tip terminates at the superior cavoatrial junction. Normal caliber of  the thoracic aorta and main pulmonary artery. No central pulmonary  embolism. Generalized circumferential wall thickening of the mid  to-distal esophagus is similar to prior, with no focal abnormal FDG  uptake.    ABDOMEN AND PELVIS:  There is no suspicious FDG uptake in the abdomen or pelvis.    The liver, gallbladder,  pancreas, spleen, and adrenal glands are  unremarkable. Symmetric nephrographic enhancement of the kidneys.  Small renal cortical hypodensities, too small to fully characterize,  suggestive of tiny cysts. No hydronephrosis. Circumferential  prominence of the bladder wall. This may be exaggerated due to degree  of nondistention. There is mural lipomatosis in the bladder wall  suggesting prior inflammation. Prostate is unremarkable. No abnormally  distended loops of small or large bowel. Colonic anastomosis in the  right colon which appears widely patent. No free air, free fluid, or  fluid collection. Normal caliber of the abdominal aorta. No enlarged  hypermetabolic lymph nodes in the abdomen or pelvis.    LOWER EXTREMITIES:   No abnormal masses or hypermetabolic lesions.    BONES:   There is no abnormal FDG uptake in the skeleton. Multilevel  degenerative changes in the spine. No acute osseous abnormality.      Impression    IMPRESSION: In this patient with a history of squamous cell carcinoma  of the oropharynx and tongue status post resection and radiotherapy:    1. Primary: NI-RADS 2. Multifocal uptake to the right oral floor,  right base of tongue, and right lateral recess associated with tissue  thickening without focal masslike enhancement, favor combination of  postsurgical/postradiation tissue thickening and the chronic  inflammation on exam. Consider continued follow-up with imaging to  exclude developing lesion.    2. Neck: NI-RADS 2. Small borderline avid left level 3 and 4 lymph  nodes, favor reactive given small size. These can also be followed  with imaging.    3. PET-CT of the remainder of the body demonstrates new hypermetabolic  lesions in the chest suspicious for metastases:  3a. New hypermetabolic left hilar and left mediastinal lymph nodes  suspicious for irvin metastases. The left hilar lymph nodes would be  amenable to endobronchial sampling.  3b. New hypermetabolic perivascular nodule in the  right upper lobe  suspicious for metastatic nodule.    4. Likely evolving postradiation changes versus other nonspecific  pneumonitis in the anterior lungs, particularly lingula.    ------  NI-RADS Surveillance Legend:    Primary  1: No evidence of recurrence: routine surveillance  2: Low suspicion    a) Superficial abnormality (skin, mucosal surface): direct visual  inspection    b) Ill-defined deep abnormality: short interval follow-up* or PET  3: High suspicion (new or enlarging discrete nodule/mass): biopsy  4: Definitive recurrence (path proven or clinical progression): no  biopsy needed    Nodes  1: No evidence of recurrence: routine surveillance  2: Low suspicion (ill-defined): short interval follow-up or PET  3: HIgh suspicion (new or enlarging lymph node): biopsy if clinically  needed  4: Definitive recurrence (path proven or clinical progression): no  biopsy needed    *short interval follow-up: 3 months at our institution    I have personally reviewed the examination and initial interpretation  and I agree with the findings.    KATHERINE MALAVE MD           Assessment/Plan  Recurrent head and neck larynx cancer - He is now 3 months post reirradiation. His PET scan is concerning for some new L hilar and mediastinal adenopathy that are hypermetabolic.  His neck looks mostly ok - though there are some hypermetabolic areas in the tongue which are likely post-treatment changes.      I discussed with him that while it is possible that other things could cause his adenopathy in the chest, I am most concerned that this could represent recurrent cancer.  Thus, I think we should aim for biopsy - I think the L hilum could be accesible by [facility], but will ask my colleagues in interventional pulmonary.  If they feel it is accessible, I would favor biopsy now.     Pain - I refilled his oxycodone. He has some still, but will likely run out in coming week or so.  He is now taking 7.5ml 4x per day.  Not really noticing that  much benefit.  Dr. Mckenna just prescribed Trental and PenVee.  If pain is not improving in the coming weeks, then I suggest we involve palliative care if they have other ideas on how best to manage his pain as escalating narcotics, gabapentin, MMW, and salt/soda rinses don't seem to be helping much.     Nutrition - he is losing a little bit of weight.  He will need to push the oral intake as much as possible. Good pain control will be important.     I spent 30 minutes with the patient.    Toni Mark   of Medicine  Division of Hematology, Oncology, and Transplantation

## 2021-06-22 NOTE — LETTER
6/22/2021         RE: Reese Oakley  [address] [facility] [address]        Dear Colleague,    Thank you for referring your patient, Reese Oakley, to the [facility]. Please see a copy of my visit note below.    Kenrick is a 71 year old who is being evaluated via a billable video visit.      How would you like to obtain your AVS? MyChart     If the video visit is dropped, the invitation should be resent by: Text to cell phone: [phone]     Will anyone else be joining your video visit? No         Alfredo Brown LPN    Video Start Time: 5p  Video-Visit Details    Type of service:  Video Visit    Video End Time:520    Originating Location (pt. Location): Home    Distant Location (provider location):  [facility]     Platform used for Video Visit: Kanmu     REASON FOR VISIT:    CANCER STAGE: Cancer Staging  No matching staging information was found for the patient.      HISTORY OF PRESENT ILLNESS:  Reese Oakley is a 70 year old male with PMH HTN, hyperlipidemia, DM2 with recurrent SCC. 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  More recently he was noted on follow up scans to have enlarged R sided lymph nodes and biopsy proven recurrence in the R neck.  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  There was one additional focus of squamous cell carcinoma identified in an additional lymph node without MEL.  His case was discussed at multidisciplinary tumor board and he was recommended to have chemoradiation - due to MEL in the submandibular gland.      Started weekly cisplatin with limited field radiation 1/25/21. Due to worsening renal function after first treatment  switched to carboplatin/taxol for week 2. Finished treatment 3/5/21    I am seeing Kenrick in follow up by video visit. He continues to have bad sores in his tongue - was better for a bit, but now getting a little worse.  He is having a lot of pain especially at night and he can't sleep He is taking oxycodone 4 times per day.  He has tried MMW with and without nystatin and also was taking gabapentin, but none of these have helped. He is eating cream of wheat with protein supplementation. His weight has decreased a little bit in the past week. He has lost 25 lbs but has not gained it back.   Taste has returned to baseline which is not normal due to his prior radiotherapy course. He continues to have a lot of mucus secretions and need to spit up mucus.        Review Of Systems  10-point review of systems were negative except as noted in HPI.        EXAM:  There were no vitals taken for this visit.  GEN: alert and oriented x 3, nad  Deferred    Current Outpatient Medications   Medication Sig Dispense Refill     acetaminophen (TYLENOL) 160 MG/5ML suspension Take 20.5 mLs (650 mg) by mouth every 6 hours as needed for fever or mild pain 300 mL 3     acetaminophen (TYLENOL) 325 MG tablet Take 2 tablets (650 mg) by mouth every 4 hours as needed for other (multimodal surgical pain management along with NSAIDS and opioid medication as indicated based on pain control and physical function.) 60 tablet 0     aspirin (ASA) 81 MG chewable tablet Take 81 mg by mouth daily       gabapentin (NEURONTIN) 250 MG/5ML solution Take 5 mLs (250 mg) by mouth 3 times daily 300 mL 1     guaiFENesin (ROBITUSSIN) 100 MG/5ML liquid 10 mLs (200 mg) by Oral or PEG tube route every 4 hours as needed for other (mucous production and/or cough) 473 mL 5     insulin aspart (NOVOLOG PEN) 100 UNIT/ML pen 15 Units        insulin glargine (LANTUS VIAL) 100 UNIT/ML vial Inject 25 Units Subcutaneous daily (with breakfast) PATIENT REPORTS 25 UNIT DAILY  02/16/2021       lidocaine (XYLOCAINE) 2 % solution Swish and spit 15 mLs in mouth every 3 hours as needed for moderate pain ; Max 8 doses/24 hour period. 100 mL 3     lisinopril (ZESTRIL) 5 MG tablet Take 5 mg by mouth daily       magic mouthwash (ENTER INGREDIENTS IN COMMENTS) suspension Take 5 mLs by mouth every 4 hours as needed (pain) 100 mL 4     magic mouthwash (ENTER INGREDIENTS IN COMMENTS) suspension Swish and spit 5 ml every 8 hours for 14 days 1000 mL 1     magic mouthwash (ENTER INGREDIENTS IN COMMENTS) suspension Swish, gargle, and spit one to two teaspoonfuls every six hours as needed. May be swallowed if esophageal involvement. 480 mL 3     oxyCODONE (OXY-IR) 5 MG capsule Take 5 mg by mouth every 4 hours as needed for severe pain       oxyCODONE (ROXICODONE) 5 MG/5ML solution Take 5 mLs (5 mg) by mouth every 6 hours as needed for pain (oral pain) 500 mL 0     penicillin V (VEETID) 500 MG tablet Take 1 tablet (500 mg) by mouth 4 times daily 56 tablet 0     pentoxifylline ER (TRENTAL) 400 MG CR tablet Take 1 tablet (400 mg) by mouth 3 times daily (with meals) 270 tablet 1     pilocarpine (SALAGEN) 7.5 MG tablet Take 7.5 mg by mouth 2 times daily       senna-docusate (SENOKOT-S/PERICOLACE) 8.6-50 MG tablet Take 1 tablet by mouth 2 times daily as needed for constipation 20 tablet 0     simvastatin (ZOCOR) 40 MG tablet Take 40 mg by mouth At Bedtime       sucralfate (CARAFATE) 1 GM tablet Take 1 tablet (1 g) by mouth 2 times daily 42 tablet 1     gabapentin (NEURONTIN) 250 MG/5ML solution Take 2 mLs (100 mg) by mouth 3 times daily for 14 days 84 mL 0           Recent Labs   Lab Test 03/01/21  0952   WBC 3.4*   HGB 11.1*        @labrcent[na,potassium,chloride,co2,bun,cr@  Recent Labs   Lab Test 03/01/21  0952   PROTTOTAL 7.7   ALBUMIN 3.2*   BILITOTAL 0.5   AST 18   ALT 24   ALKPHOS 89         Recent Results (from the past 744 hour(s))   PET Oncology Whole Body    Narrative    Combined Report of:     PET and CT on  6/21/2021 12:06 PM :    1. PET of the neck, chest, abdomen, and pelvis.  2. PET CT Fusion for Attenuation Correction and Anatomical  Localization:    3. Diagnostic CT scan of the chest, abdomen, and pelvis with  intravenous contrast for interpretation.  3. CT of the chest, abdomen and pelvis obtained for diagnostic  interpretation.  4. 3D MIP and PET-CT fused images were processed on an independent  workstation and archived to PACS and reviewed by a radiologist.    Technique:    1. PET: The patient received 10.18 mCi of F-18-FDG; the serum glucose  was 169 prior to administration, body weight was 73.9 kg. Images were  evaluated in the axial, sagittal, and coronal planes as well as the  rotational whole body MIP. Images were acquired from the Vertex to the  Feet.    UPTAKE WAS MEASURED AT 68 MINUTES.     BACKGROUND:  Liver SUV max= 4.0,   Aorta Blood SUV Max: 3.4.     2. CT: Volumetric acquisition for clinical interpretation of the  chest, abdomen, and pelvis acquired at 3 mm sections after the  uneventful administration of intravenous contrast. The chest, abdomen,  and pelvis were evaluated at 5 mm sections in bone, soft tissue, and  lung windows.      The patient received 100 cc of Isovue 370 intravenously for the  examination.      3. 3D MIP and PET-CT fused images were processed on an independent  workstation and archived to PACS and reviewed by a radiologist.    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 Most recently status post  resection of an oral ulcer in the right right retromolar trigone on  5/24/2021.    COMPARISON: PET-CT 11/24/2020, PET-CT 5/19/2020, PET-CT 1/27/2020    FINDINGS:     HEAD/NECK:  Postsurgical changes of multiple resections 2 the oral  cavity and  oropharynx. In the right oral floor, right base of tongue, and right  lateral recess, there is asymmetric hypermetabolic tissue thickening  without focal enhancing masslike lesion, with SUV max 5.4, 5.2, and  5.6 respectively (series 5, images 107, 113, and 114). There is debris  within the right lateral recess within the vicinity of the uptake.  There is generalized soft tissue and thickening in the anterior neck  greater on the right that has increased since prior PET-CT 11/24/2020,  most consistent with combination of postsurgical and evolving  postradiation changes.    Small mildly prominent left level 3 and 4 lymph nodes. For example 0.7  x 0.5 cm left level 3 lymph node with SUV max 3.6 (series 5, image  138).    The paranasal sinuses are clear. The mastoid air cells are clear. The  major vascular structures within the neck are patent. There is  narrowing of the right internal jugular vein in the mid neck secondary  to postradiation/postsurgical tissue thickening (series 5, image 113).  No focal occlusion of the right internal jugular vein. The thyroid is  unremarkable.    CHEST:  New hypermetabolic lymph nodes in the left hilum and left mediastinum,  which are new since 11/24/2020. For example left hilar lymph node  measuring 1.7 x 1.0 cm with SUV max 10.4 (series 5, image 203), and  new hypermetabolic aortopulmonary window lymph node measuring 1.1 x  0.7 cm with SUV max 7.7 (series 5, image 189).    New hypermetabolic perivascular nodular tissue thickening in the right  upper lobe along the right minor fissure with SUV max 4.3 (series 5,  image 204). Linear tissue thickening in the lingula adjacent to the  left fissure with low levels of uptake is mildly increased since  11/24/2020, favor chronic fibroatelectasis. Mild groundglass opacities  in the anterior right upper lobe and lingula have also increased,  nonspecific, possibly chronic postradiation changes. No pleural  effusion or  pneumothorax.    Heart size is within normal limits. No pericardial effusion. Moderate  coronary artery calcifications. Right chest wall Port-A-Cath catheter  tip terminates at the superior cavoatrial junction. Normal caliber of  the thoracic aorta and main pulmonary artery. No central pulmonary  embolism. Generalized circumferential wall thickening of the mid  to-distal esophagus is similar to prior, with no focal abnormal FDG  uptake.    ABDOMEN AND PELVIS:  There is no suspicious FDG uptake in the abdomen or pelvis.    The liver, gallbladder, pancreas, spleen, and adrenal glands are  unremarkable. Symmetric nephrographic enhancement of the kidneys.  Small renal cortical hypodensities, too small to fully characterize,  suggestive of tiny cysts. No hydronephrosis. Circumferential  prominence of the bladder wall. This may be exaggerated due to degree  of nondistention. There is mural lipomatosis in the bladder wall  suggesting prior inflammation. Prostate is unremarkable. No abnormally  distended loops of small or large bowel. Colonic anastomosis in the  right colon which appears widely patent. No free air, free fluid, or  fluid collection. Normal caliber of the abdominal aorta. No enlarged  hypermetabolic lymph nodes in the abdomen or pelvis.    LOWER EXTREMITIES:   No abnormal masses or hypermetabolic lesions.    BONES:   There is no abnormal FDG uptake in the skeleton. Multilevel  degenerative changes in the spine. No acute osseous abnormality.      Impression    IMPRESSION: In this patient with a history of squamous cell carcinoma  of the oropharynx and tongue status post resection and radiotherapy:    1. Primary: NI-RADS 2. Multifocal uptake to the right oral floor,  right base of tongue, and right lateral recess associated with tissue  thickening without focal masslike enhancement, favor combination of  postsurgical/postradiation tissue thickening and the chronic  inflammation on exam. Consider continued  follow-up with imaging to  exclude developing lesion.    2. Neck: NI-RADS 2. Small borderline avid left level 3 and 4 lymph  nodes, favor reactive given small size. These can also be followed  with imaging.    3. PET-CT of the remainder of the body demonstrates new hypermetabolic  lesions in the chest suspicious for metastases:  3a. New hypermetabolic left hilar and left mediastinal lymph nodes  suspicious for irvin metastases. The left hilar lymph nodes would be  amenable to endobronchial sampling.  3b. New hypermetabolic perivascular nodule in the right upper lobe  suspicious for metastatic nodule.    4. Likely evolving postradiation changes versus other nonspecific  pneumonitis in the anterior lungs, particularly lingula.    ------  NI-RADS Surveillance Legend:    Primary  1: No evidence of recurrence: routine surveillance  2: Low suspicion    a) Superficial abnormality (skin, mucosal surface): direct visual  inspection    b) Ill-defined deep abnormality: short interval follow-up* or PET  3: High suspicion (new or enlarging discrete nodule/mass): biopsy  4: Definitive recurrence (path proven or clinical progression): no  biopsy needed    Nodes  1: No evidence of recurrence: routine surveillance  2: Low suspicion (ill-defined): short interval follow-up or PET  3: HIgh suspicion (new or enlarging lymph node): biopsy if clinically  needed  4: Definitive recurrence (path proven or clinical progression): no  biopsy needed    *short interval follow-up: 3 months at our institution    I have personally reviewed the examination and initial interpretation  and I agree with the findings.    KATHERINE MALAVE MD   CT Chest/Abdomen/Pelvis w Contrast    Narrative    Combined Report of:    PET and CT on  6/21/2021 12:06 PM :    1. PET of the neck, chest, abdomen, and pelvis.  2. PET CT Fusion for Attenuation Correction and Anatomical  Localization:    3. Diagnostic CT scan of the chest, abdomen, and pelvis with  intravenous  contrast for interpretation.  3. CT of the chest, abdomen and pelvis obtained for diagnostic  interpretation.  4. 3D MIP and PET-CT fused images were processed on an independent  workstation and archived to PACS and reviewed by a radiologist.    Technique:    1. PET: The patient received 10.18 mCi of F-18-FDG; the serum glucose  was 169 prior to administration, body weight was 73.9 kg. Images were  evaluated in the axial, sagittal, and coronal planes as well as the  rotational whole body MIP. Images were acquired from the Vertex to the  Feet.    UPTAKE WAS MEASURED AT 68 MINUTES.     BACKGROUND:  Liver SUV max= 4.0,   Aorta Blood SUV Max: 3.4.     2. CT: Volumetric acquisition for clinical interpretation of the  chest, abdomen, and pelvis acquired at 3 mm sections after the  uneventful administration of intravenous contrast. The chest, abdomen,  and pelvis were evaluated at 5 mm sections in bone, soft tissue, and  lung windows.      The patient received 100 cc of Isovue 370 intravenously for the  examination.      3. 3D MIP and PET-CT fused images were processed on an independent  workstation and archived to PACS and reviewed by a radiologist.    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 Most recently status post  resection of an oral ulcer in the right right retromolar trigone on  5/24/2021.    COMPARISON: PET-CT 11/24/2020, PET-CT 5/19/2020, PET-CT 1/27/2020    FINDINGS:     HEAD/NECK:  Postsurgical changes of multiple resections 2 the oral cavity and  oropharynx. In the right oral floor, right base of tongue, and right  lateral recess, there is asymmetric hypermetabolic tissue thickening  without focal enhancing masslike lesion, with SUV max 5.4, 5.2, and  5.6 respectively  (series 5, images 107, 113, and 114). There is debris  within the right lateral recess within the vicinity of the uptake.  There is generalized soft tissue and thickening in the anterior neck  greater on the right that has increased since prior PET-CT 11/24/2020,  most consistent with combination of postsurgical and evolving  postradiation changes.    Small mildly prominent left level 3 and 4 lymph nodes. For example 0.7  x 0.5 cm left level 3 lymph node with SUV max 3.6 (series 5, image  138).    The paranasal sinuses are clear. The mastoid air cells are clear. The  major vascular structures within the neck are patent. There is  narrowing of the right internal jugular vein in the mid neck secondary  to postradiation/postsurgical tissue thickening (series 5, image 113).  No focal occlusion of the right internal jugular vein. The thyroid is  unremarkable.    CHEST:  New hypermetabolic lymph nodes in the left hilum and left mediastinum,  which are new since 11/24/2020. For example left hilar lymph node  measuring 1.7 x 1.0 cm with SUV max 10.4 (series 5, image 203), and  new hypermetabolic aortopulmonary window lymph node measuring 1.1 x  0.7 cm with SUV max 7.7 (series 5, image 189).    New hypermetabolic perivascular nodular tissue thickening in the right  upper lobe along the right minor fissure with SUV max 4.3 (series 5,  image 204). Linear tissue thickening in the lingula adjacent to the  left fissure with low levels of uptake is mildly increased since  11/24/2020, favor chronic fibroatelectasis. Mild groundglass opacities  in the anterior right upper lobe and lingula have also increased,  nonspecific, possibly chronic postradiation changes. No pleural  effusion or pneumothorax.    Heart size is within normal limits. No pericardial effusion. Moderate  coronary artery calcifications. Right chest wall Port-A-Cath catheter  tip terminates at the superior cavoatrial junction. Normal caliber of  the thoracic aorta  and main pulmonary artery. No central pulmonary  embolism. Generalized circumferential wall thickening of the mid  to-distal esophagus is similar to prior, with no focal abnormal FDG  uptake.    ABDOMEN AND PELVIS:  There is no suspicious FDG uptake in the abdomen or pelvis.    The liver, gallbladder, pancreas, spleen, and adrenal glands are  unremarkable. Symmetric nephrographic enhancement of the kidneys.  Small renal cortical hypodensities, too small to fully characterize,  suggestive of tiny cysts. No hydronephrosis. Circumferential  prominence of the bladder wall. This may be exaggerated due to degree  of nondistention. There is mural lipomatosis in the bladder wall  suggesting prior inflammation. Prostate is unremarkable. No abnormally  distended loops of small or large bowel. Colonic anastomosis in the  right colon which appears widely patent. No free air, free fluid, or  fluid collection. Normal caliber of the abdominal aorta. No enlarged  hypermetabolic lymph nodes in the abdomen or pelvis.    LOWER EXTREMITIES:   No abnormal masses or hypermetabolic lesions.    BONES:   There is no abnormal FDG uptake in the skeleton. Multilevel  degenerative changes in the spine. No acute osseous abnormality.      Impression    IMPRESSION: In this patient with a history of squamous cell carcinoma  of the oropharynx and tongue status post resection and radiotherapy:    1. Primary: NI-RADS 2. Multifocal uptake to the right oral floor,  right base of tongue, and right lateral recess associated with tissue  thickening without focal masslike enhancement, favor combination of  postsurgical/postradiation tissue thickening and the chronic  inflammation on exam. Consider continued follow-up with imaging to  exclude developing lesion.    2. Neck: NI-RADS 2. Small borderline avid left level 3 and 4 lymph  nodes, favor reactive given small size. These can also be followed  with imaging.    3. PET-CT of the remainder of the body  demonstrates new hypermetabolic  lesions in the chest suspicious for metastases:  3a. New hypermetabolic left hilar and left mediastinal lymph nodes  suspicious for irvin metastases. The left hilar lymph nodes would be  amenable to endobronchial sampling.  3b. New hypermetabolic perivascular nodule in the right upper lobe  suspicious for metastatic nodule.    4. Likely evolving postradiation changes versus other nonspecific  pneumonitis in the anterior lungs, particularly lingula.    ------  NI-RADS Surveillance Legend:    Primary  1: No evidence of recurrence: routine surveillance  2: Low suspicion    a) Superficial abnormality (skin, mucosal surface): direct visual  inspection    b) Ill-defined deep abnormality: short interval follow-up* or PET  3: High suspicion (new or enlarging discrete nodule/mass): biopsy  4: Definitive recurrence (path proven or clinical progression): no  biopsy needed    Nodes  1: No evidence of recurrence: routine surveillance  2: Low suspicion (ill-defined): short interval follow-up or PET  3: HIgh suspicion (new or enlarging lymph node): biopsy if clinically  needed  4: Definitive recurrence (path proven or clinical progression): no  biopsy needed    *short interval follow-up: 3 months at our institution    I have personally reviewed the examination and initial interpretation  and I agree with the findings.    KATHERINE MALAVE MD           Assessment/Plan  Recurrent head and neck larynx cancer - He is now 3 months post reirradiation. His PET scan is concerning for some new L hilar and mediastinal adenopathy that are hypermetabolic.  His neck looks mostly ok - though there are some hypermetabolic areas in the tongue which are likely post-treatment changes.      I discussed with him that while it is possible that other things could cause his adenopathy in the chest, I am most concerned that this could represent recurrent cancer.  Thus, I think we should aim for biopsy - I think the L hilum  could be accesible by EBUS, but will ask my colleagues in interventional pulmonary.  If they feel it is accessible, I would favor biopsy now.     Pain - I refilled his oxycodone. He has some still, but will likely run out in coming week or so.  He is now taking 7.5ml 4x per day.  Not really noticing that much benefit.  Dr. Mckenna just prescribed Trental and PenVee.  If pain is not improving in the coming weeks, then I suggest we involve palliative care if they have other ideas on how best to manage his pain as escalating narcotics, gabapentin, MMW, and salt/soda rinses don't seem to be helping much.     Nutrition - he is losing a little bit of weight.  He will need to push the oral intake as much as possible. Good pain control will be important.     I spent 30 minutes with the patient.    Toni Mark   of Medicine  Division of Hematology, Oncology, and Transplantation      Again, thank you for allowing me to participate in the care of your patient.        Sincerely,        Toni Mark, DO

## 2021-06-22 NOTE — PROGRESS NOTES
HISTORY OF PRESENT ILLNESS:  The patient is 71 years of age.  He is here for follow-up today.  He has no other current complaints at the present time today except for the fact that he is having persistence and worsening of the mouth pain.  This area has been damaged by radiation.  We resected it, and the ulcer was negative.  He is having pain along the side of his face, headaches, and things of this nature at the present time.  The oral cavity pain is severe as well.      PHYSICAL EXAMINATION:  The patient is alert, oriented x3, and pleasant.  Skin of face, lips and neck on him is otherwise normal.  When I look in his oral cavity, this ulcerated area seems to be healing slowly.  It is less than what it was before.  No other masses or lesions are otherwise seen.      ASSESSMENT AND PLAN:  A PET scan of this area was quite negative.  However, on the PET scan there may be a couple of lymph nodes in the mediastinum that will have to be biopsied as well.  I gave him this bad news today.  This will be further discussed when they meet with Dr. Mark in a couple of hours on a video visit as well.

## 2021-06-24 NOTE — PROGRESS NOTES
Writer spoke with wife. They requested the referral be faxed to Dr. Emery Bell in [place].     Fax number [phone]    Malini Moody RN on 6/24/2021 at 9:27 AM

## 2021-06-25 NOTE — PROGRESS NOTES
Received a call from patient's wife indicating that since patient started on the pentoxifylline he has had a horrible taste in his mouth. She indicates that even water is tasting bad. They are requesting something different. Advised patient that he can stop this medication and writer will review with Dr. Mckenna if there is something additional he can take.     Patient also requesting refill on viscous lidocaine. Refill sent to pharmacy.    Wife verbalized understanding and was encouraged to call with further questions or concerns.       Blanka Barnett, RN, BSN

## 2021-06-26 PROBLEM — R13.10 DYSPHAGIA, UNSPECIFIED TYPE: Status: ACTIVE | Noted: 2021-01-01

## 2021-06-26 PROBLEM — M54.2 NECK PAIN: Status: ACTIVE | Noted: 2021-01-01

## 2021-06-26 NOTE — ED TRIAGE NOTES
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  "

## 2021-06-26 NOTE — ED NOTES
Bed: ED05  Expected date: 6/26/21  Expected time: 8:56 PM  Means of arrival: Ambulance  Comments:  Reese Oakley 71M Tongue cancer s/p chemo/rad with circumferential edema in glotic area with pooling oral secretions. No airway issues. ENT wanted him eval'd in ED. Recently started on Trental. Also with increased uptake on PET in this area on Monday.     Joana Mayorga MD  06/26/21 [id number]

## 2021-06-26 NOTE — ED PROVIDER NOTES
[facility] EMERGENCY DEPARTMENT ([facility])  6/26/21  History     Chief Complaint   Patient presents with     Oral Swelling     The history is provided by the patient and medical records.     Reese Oakley is a 71 year old male who has a history of tongue cancer s/p surgery, radiation, and chemotherapy which was all completed in March of 2021. He is coming in with oropharyngeal swelling, difficulty swallowing, and neck pain. Transfer from [facility] through [facility] ED. During his visit today he had a CT of the neck which shows oropharyngeal soft tissue swelling that involves the entire hypopharynx including epiglottic fold, vallecula, base of the tongue, and epiglottis as well as the soft palate, there is an area of possible necrosis with air and fluid collection in the base of the right side of the tongue. ENT is aware of his transfer here. On arrival to the ED he is speaking full sentences, he does spit up secretions occasionally. He states over the last 24 hours he has noticed increased discomfort and difficulty swallowing cream of wheat and pills as well as liquids. In addition he thinks that due to his difficulty swallowing at baseline has lost 20 to 30 lbs. since January 2021. He has been using Magic mouthwash and oral lidocaine for pain in the mouth without relief, he also uses oral oxycodone but does have increased pain despite that.    This part of the medical record was transcribed by Beatriz Ingram Medical Scribe, from a dictation done by Sejal Mckeon MD.     I have reviewed the Medications, Allergies, Past Medical and Surgical History, and Social History in the [employer] system.  PAST MEDICAL HISTORY:   Past Medical History:   Diagnosis Date     Adenocarcinoma (H)     large instestine      Cancer of appendix (H)      Diabetes (H)      Gastro-oesophageal reflux disease      History of radiation therapy      Hoarseness      Hypertension        PAST SURGICAL HISTORY:   Past  Surgical History:   Procedure Laterality Date     APPENDECTOMY       COLECTOMY      Right     DISSECTION RADICAL NECK MODIFIED Right 12/9/2020    Procedure: Modified neck dissection;  Surgeon: Jose Antonio Mckenna MD;  Location: [facility] OR     EXCISE LESION INTRAORAL Right 2/19/2020    Procedure: Wide Local Excision of Right Tongue Lesion;  Surgeon: Jose Antonio Mckenna MD;  Location: [facility] OR     INSERT PORT VASCULAR ACCESS Right 1/15/2021    Procedure: CHEST INSERTION, VASCULAR ACCESS PORT @0900;  Surgeon: Tony Hopkins MD;  Location: [facility] OR     IR CHEST PORT PLACEMENT > 5 YRS OF AGE  1/15/2021     LASER CO2 LARYNGOSCOPY N/A 10/6/2014    Procedure: LASER CO2 LARYNGOSCOPY;  Surgeon: Jose Antonio Mckenna MD;  Location: [facility] OR     LASER CO2 LESION ORAL N/A 5/24/2021    Procedure: Excision of tongue ulcer with omniguide laser;  Surgeon: Jose Antonio Mckenna MD;  Location: [facility] OR       Past medical history, past surgical history, medications, and allergies were reviewed with the patient. Additional pertinent items: None    FAMILY HISTORY:   Family History   Problem Relation Age of Onset     Heart Disease Father      Diabetes Mother      Diabetes Brother        SOCIAL HISTORY:   Social History     Tobacco Use     Smoking status: Never Smoker     Smokeless tobacco: Never Used   Substance Use Topics     Alcohol use: Not Currently     Alcohol/week: 0.0 standard drinks     Comment: none for 20 years     Social history was reviewed with the patient. Additional pertinent items: None      Patient's Medications   New Prescriptions    No medications on file   Previous Medications    ACETAMINOPHEN (TYLENOL) 160 MG/5ML SUSPENSION    Take 20.5 mLs (650 mg) by mouth every 6 hours as needed for fever or mild pain    ACETAMINOPHEN (TYLENOL) 325 MG TABLET    Take 2 tablets (650 mg) by mouth every 4 hours as needed for other (multimodal surgical pain management along with NSAIDS and opioid medication as indicated based on pain control  and physical function.)    ASPIRIN (ASA) 81 MG CHEWABLE TABLET    Take 81 mg by mouth daily    GABAPENTIN (NEURONTIN) 250 MG/5ML SOLUTION    Take 2 mLs (100 mg) by mouth 3 times daily for 14 days    GABAPENTIN (NEURONTIN) 250 MG/5ML SOLUTION    Take 5 mLs (250 mg) by mouth 3 times daily    GUAIFENESIN (ROBITUSSIN) 100 MG/5ML LIQUID    10 mLs (200 mg) by Oral or PEG tube route every 4 hours as needed for other (mucous production and/or cough)    INSULIN ASPART (NOVOLOG PEN) 100 UNIT/ML PEN    15 Units     INSULIN GLARGINE (LANTUS VIAL) 100 UNIT/ML VIAL    Inject 25 Units Subcutaneous daily (with breakfast) PATIENT REPORTS 25 UNIT DAILY 02/16/2021    LIDOCAINE (XYLOCAINE) 2 % SOLUTION    Swish and spit 15 mLs in mouth every 3 hours as needed for moderate pain ; Max 8 doses/24 hour period.    LISINOPRIL (ZESTRIL) 5 MG TABLET    Take 5 mg by mouth daily    MAGIC MOUTHWASH (ENTER INGREDIENTS IN COMMENTS) SUSPENSION    Swish, gargle, and spit one to two teaspoonfuls every six hours as needed. May be swallowed if esophageal involvement.    MAGIC MOUTHWASH (ENTER INGREDIENTS IN COMMENTS) SUSPENSION    Swish and spit 5 ml every 8 hours for 14 days    MAGIC MOUTHWASH (ENTER INGREDIENTS IN COMMENTS) SUSPENSION    Take 5 mLs by mouth every 4 hours as needed (pain)    OXYCODONE (OXY-IR) 5 MG CAPSULE    Take 5 mg by mouth every 4 hours as needed for severe pain    OXYCODONE (ROXICODONE) 5 MG/5ML SOLUTION    Take 7.5 mLs (7.5 mg) by mouth every 6 hours as needed for pain (oral pain)    PENICILLIN V (VEETID) 500 MG TABLET    Take 1 tablet (500 mg) by mouth 4 times daily    PENTOXIFYLLINE ER (TRENTAL) 400 MG CR TABLET    Take 1 tablet (400 mg) by mouth 3 times daily (with meals)    PILOCARPINE (SALAGEN) 7.5 MG TABLET    Take 7.5 mg by mouth 2 times daily    SENNA-DOCUSATE (SENOKOT-S/PERICOLACE) 8.6-50 MG TABLET    Take 1 tablet by mouth 2 times daily as needed for constipation    SIMVASTATIN (ZOCOR) 40 MG TABLET    Take 40 mg by  "mouth At Bedtime    SUCRALFATE (CARAFATE) 1 GM TABLET    Take 1 tablet (1 g) by mouth 2 times daily   Modified Medications    No medications on file   Discontinued Medications    No medications on file        No Known Allergies     Review of Systems   Constitutional: Positive for unexpected weight change.   HENT: Positive for trouble swallowing.    Musculoskeletal: Positive for neck pain.   All other systems reviewed and are negative.    A complete review of systems was performed with pertinent positives and negatives noted in the HPI, and all other systems negative.    Physical Exam   BP: (!) 142/74  Pulse: 57  Temp: 98.2  F (36.8  C)  Resp: 18  Height: 170.2 cm (5' 7\")  Weight: 70.3 kg (155 lb)  SpO2: 98 %      Physical Exam  Gen:A&Ox3, no acute distress  HEENT:PERRL, no facial tenderness or wounds, head atraumatic. Edema to right buccal mucosa and soft palate, partial glossectomy, soft floor of the mouth  Neck: radiation changes to the right neck  Back: no CVA tenderness, no midline bony tenderness  CV:RRR without murmurs  PULM:Clear to auscultation bilaterally  Abd:soft, nontender, nondistended. Bowel sounds present and normal  UE:No traumatic injuries, skin normal  LE:no traumatic injuries, skin normal, no LE edema  Neuro:CN II-XII intact, strength 5/5 throughout  Skin: no rashes or ecchymoses    ED Course        Procedures        Results for orders placed or performed during the hospital encounter of 06/26/21 (from the past 24 hour(s))   CBC with platelets differential   Result Value Ref Range    WBC 13.1 (H) 4.0 - 11.0 10e9/L    RBC Count 3.86 (L) 4.4 - 5.9 10e12/L    Hemoglobin 11.4 (L) 13.3 - 17.7 g/dL    Hematocrit 35.5 (L) 40.0 - 53.0 %    MCV 92 78 - 100 fl    MCH 29.5 26.5 - 33.0 pg    MCHC 32.1 31.5 - 36.5 g/dL    RDW 13.5 10.0 - 15.0 %    Platelet Count 379 150 - 450 10e9/L    Diff Method Automated Method     % Neutrophils 86.9 %    % Lymphocytes 9.7 %    % Monocytes 2.5 %    % Eosinophils 0.2 %    % " Basophils 0.2 %    % Immature Granulocytes 0.5 %    Nucleated RBCs 0 0 /100    Absolute Neutrophil 11.4 (H) 1.6 - 8.3 10e9/L    Absolute Lymphocytes 1.3 0.8 - 5.3 10e9/L    Absolute Monocytes 0.3 0.0 - 1.3 10e9/L    Absolute Eosinophils 0.0 0.0 - 0.7 10e9/L    Absolute Basophils 0.0 0.0 - 0.2 10e9/L    Abs Immature Granulocytes 0.1 0 - 0.4 10e9/L    Absolute Nucleated RBC 0.0    Basic metabolic panel   Result Value Ref Range    Sodium 133 133 - 144 mmol/L    Potassium 4.4 3.4 - 5.3 mmol/L    Chloride 100 94 - 109 mmol/L    Carbon Dioxide 28 20 - 32 mmol/L    Anion Gap 5 3 - 14 mmol/L    Glucose 80 70 - 99 mg/dL    Urea Nitrogen 14 7 - 30 mg/dL    Creatinine 1.23 0.66 - 1.25 mg/dL    GFR Estimate 58 (L) >60 mL/min/[1.73_m2]    GFR Estimate If Black 67 >60 mL/min/[1.73_m2]    Calcium 9.2 8.5 - 10.1 mg/dL   Glucose by meter   Result Value Ref Range    Glucose 86 70 - 99 mg/dL   Asymptomatic SARS-CoV-2 COVID-19 Virus (Coronavirus) by PCR    Specimen: Nasopharyngeal   Result Value Ref Range    SARS-CoV-2 Virus Specimen Source Nasopharyngeal     SARS-CoV-2 PCR Result NEGATIVE     SARS-CoV-2 PCR Comment       Testing was performed using the Xpert Xpress SARS-CoV-2 Assay on the Cepheid Gene-Xpert   Instrument Systems. Additional information about this Emergency Use Authorization (EUA)   assay can be found via the Lab Guide.       Medications   dexamethasone PF (DECADRON) injection 8 mg (8 mg Intravenous Given 6/26/21 2022)   HYDROmorphone (PF) (DILAUDID) injection 0.5 mg (0.5 mg Intravenous Given 6/26/21 2022)   racEPINEPHrine neb solution 0.5 mL (has no administration in time range)   sodium chloride (NEBUSAL) 3 % neb solution 3 mL (has no administration in time range)   ampicillin-sulbactam (UNASYN) 3 g vial to attach to  mL bag (has no administration in time range)   ampicillin-sulbactam (UNASYN) 3 g vial to attach to  mL bag (0 g Intravenous Stopped 6/26/21 2219)             Assessments & Plan (with Medical  Decision Making)   70 yo M with a hx of tongue cancer s/p partial glossectomy, XRT, chemotherapy. Presenting with increased difficulty swallowing x2 days.   Has soft tissue swelling of soft palate and hypopharynx. CT also with area of possible necrosis.   CT read and images as well as labs done prior to transfer were reviewed.   He was treated with IVF, dilaudid and decadron prior to transfer.  On arrival he is speaking in full sentences without stridor. Has increased oral secretions that he is spitting.   IV access obtained.   Vitals stable, including O2 sat on RA.   ENT consulted and performed bedside nasopharyngoscopy. Epiglottis not enlarged. See their note for details.  Recommended for decadron 8mg IV q8 hours, airway monitoring, pain management.  Discussed with ENT and IM triage. He will be admitted to IM service.       I have reviewed the nursing notes.    I have reviewed the findings, diagnosis, plan and need for follow up with the patient.    New Prescriptions    No medications on file       Final diagnoses:   Neck pain   Dysphagia, unspecified type       Sejal Mckeon MD FACEP  6/26/2021   [facility] EMERGENCY DEPARTMENT     Sejal Mckeon MD  06/27/21 0042

## 2021-06-27 NOTE — H&P
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 Mckenna finished his last round of chemo on March 1st 2021 and Last round of radiationon March 15, 2021.He currently presented with difficulty swallowing solids and admitted for concern of airway compromise     # Oropharyngeal carcinoma (post surgery, chemo and radiation therapy)  #Difficulty swallowing  Patients transferred from [facility] ED for concern of airway obstruction after head CT swelling of the cervical soft tissue with circumferential swelling of the base of the tongue.  No active concern for airway compromise patient evaluated in the ED by ENT.  Has received a dose of Unasyn and dexamethasone    -Plan for usual airway escalation protocol per ENT  -Dexamethasone 8 mg every 8 hours  -We will continue Unasyn 3 g every 6 hours every 6 hours /recommend stopping this in the morning/  - usual airway escalation protocol  - Racemic epinephrine PRN  - Nebulized hypertonic saline for thick secretions  - SLP evaluation in the morning   - PO prior to discharge  - Unasyn 3gm Q 6hrs   - Rescope in the Am        Chronic medical condition   # T2 DM   Plan contune PTA insulin 25 units glargin   -[facility]  Regular glucose monitoring    #. HTN; PTA Lisinopril  meds     #. 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 Mckenna finished his last round of chemo on March 1st 2021 and Last round of radiationon March 15, 2021.He currently presented with difficulty swallowing, patient finished his last chemotherapy on March 1 and his last radiation on March 5, he reports that he was eating dinner last night when he started having difficulty swallowing which prompted his ED visit to [facility].  Where he had a CT scan concerning for inflammation and swollen airway sent to the [facility] for further management and care    ED course: ENT evaluated patient recommended observation for usual airway escalation protocol, at this point no concern for airway compromise, has received a dose of dexamethasone 6 mg and a dose of Unasyn             Past Medical History:     Past Medical History:   Diagnosis Date     Adenocarcinoma (H)     large instestine      Cancer of appendix (H)      Diabetes (H)      Gastro-oesophageal reflux disease      History of radiation therapy      Hoarseness      Hypertension               Past Surgical History:     Past Surgical History:   Procedure Laterality Date     APPENDECTOMY       COLECTOMY      Right     DISSECTION RADICAL NECK MODIFIED Right 12/9/2020    Procedure: Modified neck dissection;  Surgeon: Jose Antonio Mckenna MD;  Location: [facility] OR     EXCISE LESION INTRAORAL Right 2/19/2020    Procedure: Wide Local Excision of Right Tongue Lesion;  Surgeon: Jose Antonio Mckenna,  MD;  Location: [facility] OR     INSERT PORT VASCULAR ACCESS Right 1/15/2021    Procedure: CHEST INSERTION, VASCULAR ACCESS PORT @0900;  Surgeon: Tony Hopkins MD;  Location: [facility] OR     IR CHEST PORT PLACEMENT > 5 YRS OF AGE  1/15/2021     LASER CO2 LARYNGOSCOPY N/A 10/6/2014    Procedure: LASER CO2 LARYNGOSCOPY;  Surgeon: Jose Antonio Mckenna MD;  Location: [facility] OR     LASER CO2 LESION ORAL N/A 5/24/2021    Procedure: Excision of tongue ulcer with omniguide laser;  Surgeon: Jose Antonio Mckenna MD;  Location: [facility] OR              Social History:     Social History     Socioeconomic History     Marital status:      Spouse name: Not on file     Number of children: Not on file     Years of education: Not on file     Highest education level: Not on file   Occupational History     Not on file   Social Needs     Financial resource strain: Not on file     Food insecurity     Worry: Not on file     Inability: Not on file     Transportation needs     Medical: Not on file     Non-medical: Not on file   Tobacco Use     Smoking status: Never Smoker     Smokeless tobacco: Never Used   Substance and Sexual Activity     Alcohol use: Not Currently     Alcohol/week: 0.0 standard drinks     Comment: none for 20 years     Drug use: No     Sexual activity: Never   Lifestyle     Physical activity     Days per week: Not on file     Minutes per session: Not on file     Stress: Not on file   Relationships     Social connections     Talks on phone: Not on file     Gets together: Not on file     Attends [employer] service: Not on file     Active member of club or organization: Not on file     Attends meetings of clubs or organizations: Not on file     Relationship status: Not on file     Intimate partner violence     Fear of current or ex partner: Not on file     Emotionally abused: Not on file     Physically abused: Not on file     Forced sexual activity: Not on file   Other Topics Concern     Not on file   Social History Narrative      Not on file            Family History:     Family History   Problem Relation Age of Onset     Heart Disease Father      Diabetes Mother      Diabetes Brother               Immunizations:     Immunization History   Administered Date(s) Administered     Influenza (IIV3) PF 11/21/2005     Influenza Vaccine IM > 6 months Valent IIV4 01/01/2015              Allergies:    No Known Allergies         Medications:     No current facility-administered medications on file prior to encounter.   acetaminophen (TYLENOL) 160 MG/5ML suspension, Take 20.5 mLs (650 mg) by mouth every 6 hours as needed for fever or mild pain  acetaminophen (TYLENOL) 325 MG tablet, Take 2 tablets (650 mg) by mouth every 4 hours as needed for other (multimodal surgical pain management along with NSAIDS and opioid medication as indicated based on pain control and physical function.)  aspirin (ASA) 81 MG chewable tablet, Take 81 mg by mouth daily  gabapentin (NEURONTIN) 250 MG/5ML solution, Take 5 mLs (250 mg) by mouth 3 times daily  gabapentin (NEURONTIN) 250 MG/5ML solution, Take 2 mLs (100 mg) by mouth 3 times daily for 14 days  guaiFENesin (ROBITUSSIN) 100 MG/5ML liquid, 10 mLs (200 mg) by Oral or PEG tube route every 4 hours as needed for other (mucous production and/or cough)  insulin aspart (NOVOLOG PEN) 100 UNIT/ML pen, 15 Units   insulin glargine (LANTUS VIAL) 100 UNIT/ML vial, Inject 25 Units Subcutaneous daily (with breakfast) PATIENT REPORTS 25 UNIT DAILY 02/16/2021  lidocaine (XYLOCAINE) 2 % solution, Swish and spit 15 mLs in mouth every 3 hours as needed for moderate pain ; Max 8 doses/24 hour period.  lisinopril (ZESTRIL) 5 MG tablet, Take 5 mg by mouth daily  magic mouthwash (ENTER INGREDIENTS IN COMMENTS) suspension, Take 5 mLs by mouth every 4 hours as needed (pain)  magic mouthwash (ENTER INGREDIENTS IN COMMENTS) suspension, Swish and spit 5 ml every 8 hours for 14 days  magic mouthwash (ENTER INGREDIENTS IN COMMENTS) suspension, Swish,  "gargle, and spit one to two teaspoonfuls every six hours as needed. May be swallowed if esophageal involvement.  oxyCODONE (OXY-IR) 5 MG capsule, Take 5 mg by mouth every 4 hours as needed for severe pain  oxyCODONE (ROXICODONE) 5 MG/5ML solution, Take 7.5 mLs (7.5 mg) by mouth every 6 hours as needed for pain (oral pain)  penicillin V (VEETID) 500 MG tablet, Take 1 tablet (500 mg) by mouth 4 times daily  pentoxifylline ER (TRENTAL) 400 MG CR tablet, Take 1 tablet (400 mg) by mouth 3 times daily (with meals)  pilocarpine (SALAGEN) 7.5 MG tablet, Take 7.5 mg by mouth 2 times daily  senna-docusate (SENOKOT-S/PERICOLACE) 8.6-50 MG tablet, Take 1 tablet by mouth 2 times daily as needed for constipation  simvastatin (ZOCOR) 40 MG tablet, Take 40 mg by mouth At Bedtime  sucralfate (CARAFATE) 1 GM tablet, Take 1 tablet (1 g) by mouth 2 times daily             Review of Systems:     A complete ROS was performed and is negative other than what is stated in the HPI.          Physical Exam:   Blood pressure 113/66, pulse 59, temperature 98.2  F (36.8  C), temperature source Oral, resp. rate 18, height 1.702 m (5' 7\"), weight 70.3 kg (155 lb), SpO2 95 %.  General Appearance: Pleasant not in distress   HEENT: No jaundice, moist BM, Patent oropharynx   Respiratory: CTAB  Cardiovascular: RRR, s1, s2 no m/g   GI: Soft,   Genitourinary: No CVAT   Extremities : No Edema   Neurologic: A&Ox3, moves all extremities equally             Data:   Reviewed in [facility]     CT neck from [facility] 6/26/2021  \"Extensive postoperative and posttreatment changes of the cervical soft tissues with marked circumferential edema predominantly within the base of tongue, right greater than left pharyngeal walls with involvement of the soft palate, epiglottis, aryepiglottic folds, and glottis with overall moderate narrowing of the airway.    There is developing loculated air and fluid collection within the inferior right base of tongue along the inferior " "faucial pillar measuring 1.3 x 1.0 centimeters in greatest dimension likely representing evolving posttreatment necrosis.     There are small likely pathologic left level 5 lymph nodes appreciated. Extensive posttreatment surgical changes of the right neck soft tissues demonstrate no obvious pathologic lymph node.\"        "

## 2021-06-27 NOTE — PROGRESS NOTES
ENT Progress Note  June 27, 2021    Subjective/Interval: Patient seen this morning, was started on Unasyn and Decadron, did not require racemic epinephrine. He has already been seen by SLP, cleared for dysphagia 1 diet. He does feel that his swallowing has improved, now it is his chronic tongue pain that is most bothersome. No respiratory symptoms.    Procedure:  FIBEROPTIC ENDOSCOPY:  Due to airway swelling, fiberoptic laryngoscopy was indicated. After obtaining verbal consent, the fiberoptic laryngoscope was passed under endoscopic vision through the right nasal passage. The turbinates were normal. The inferior and middle meati were clear without purulence, masses, or polyps. The nasopharynx was clear. The epiglottis was sharp. Debris remains in the right vallecula which appears consistent with food.  No blood or concerning features.  The remainder of the vallecula was clear.  There is fullness of the right pharyngeal wall and swelling of the right AE fold. This has improved since yesterday, with decreased swelling of the false cords as the true cords are not completely obstructed by the mass. Limited visualization of the piriform sinus on the right due to swelling, the left piriform sinus is clear.    Labs are reviewed. WBC stable.    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  His PET CT scan from 5 days ago had low concern for recurrent tumor in the head and neck.  Bacterial infection is less likely given the acuity of his symptoms but stable imaging.  Remains stable from a respiratory standpoint and cleared by SLP for diet. Okay to  discharge after PO intake.     Recommendations:  - Medrol dosepack at discharge  - Augmentin  - Close follow-up with Dr. Mckenna (we will arrange).     This patient was discussed with Dr. Cynthia Meyer.    Ekaterina Jovel MD   ENT Resident

## 2021-06-27 NOTE — DISCHARGE SUMMARY
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        Appointments on [place] and/or [facility] (with [facility] or [facility]   provider or service). Call [phone] if you haven't heard regarding   these appointments within 7 days of discharge.          close follow up with Dr. Mckenna as outpatient will be arranged by ENT    Unresulted Labs Ordered in the Past 30 Days of this Admission     No orders found for last 31 day(s).      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 Mckenna finished his last round of chemo on March 1st 2021 and Last round of radiationon March 15, 2021.He was transferred from [facility] ED to [facility] ED after he presented with difficulty swallowing solids and CT scan was concerning for inflammation and swollen airway with concern for airway  compromise. He was started on Unasyn and dexamethasone and observed overnight with usual airway escalation protocol in place. He was evaluated by ENT and no concern for airway compromise found and is being discharged in stable condition the next day on medrol dose pack and oral augmentin after he was evaluated by speech and tolerated oral intake. Speech recommended dysphagia level 1 diet (pureed) with thin liquids and outpatient speech therapy. Also recommend completing swallow-cough-swallow strategy with all PO meds. The dysphagia is likely 2/2 post radiation changes and less likely 2/2 acute bacterial infection. Has ongoing tongue pain which is a chronic problem. Short term ENT follow up set up prior to discharge.        Consultations This Hospital Stay   ENT IP CONSULT  SWALLOW EVAL SPEECH PATH AT BEDSIDE IP CONSULT    Code Status   Full Code    Time Spent on this Encounter   I, Yoselin Conroy MD, personally saw the patient today and spent greater than 30 minutes discharging this patient.       Yoselin Conroy MD  [facility] EMERGENCY DEPARTMENT  [address] [address]  Phone: [phone]  ______________________________________________________________________    Physical Exam   Vital Signs: Temp: 98.2  F (36.8  C) Temp src: Oral BP: (!) 140/68 Pulse: 62   Resp: 18 SpO2: 100 % O2 Device: None (Room air)    Weight: 155 lbs 0 oz  General Appearance: Well built and nourished, not in any acute cardio pulm distress  Respiratory: CTA b/l  Cardiovascular: S1S2 normal  GI: soft NT  Skin: NAD  Other: Moving all 4 ext spont. No obvious focal deficit, aaox3        Primary Care Physician   Clark Marie    Discharge Orders      Reason for your hospital stay    Difficulty swallowing     Adult [facility] Follow-up and recommended labs and tests    Follow up with primary care provider, Clark Marie, within 7 days for hospital follow- up.        Appointments on [place] and/or [facility]  (with [facility] or [facility] provider or service). Call [phone] if you haven't heard regarding these appointments within 7 days of discharge.     Activity    Your activity upon discharge: activity as tolerated     Diet    Follow this diet upon discharge: Orders Placed This Encounter      Dysphagia Diet Level 1 Pureed Thin Liquids (water, ice chips, juice, milk gelatin, ice cream, etc)       Significant Results and Procedures   Most Recent 3 CBC's:  Recent Labs   Lab Test 06/27/21  0531 06/26/21  1720 03/01/21  0952   WBC 12.9* 13.1* 3.4*   HGB 12.2* 11.4* 11.1*   MCV 93 92 90    379 189     Most Recent 3 BMP's:  Recent Labs   Lab Test 06/27/21  0531 06/26/21  1720 05/24/21  0625 03/01/21  0952   * 133  --  134   POTASSIUM 4.9 4.4 4.7 4.8   CHLORIDE 99 100  --  99   CO2 24 28  --  27   BUN 23 14  --  24   CR 1.16 1.23  --  1.50*   ANIONGAP 8 5  --  8   ELEUTERIO 9.5 9.2  --  9.6   * 80 102* 185*   ,   Results for orders placed or performed during the hospital encounter of 06/21/21   PET Oncology Whole Body    Narrative    Combined Report of:    PET and CT on  6/21/2021 12:06 PM :    1. PET of the neck, chest, abdomen, and pelvis.  2. PET CT Fusion for Attenuation Correction and Anatomical  Localization:    3. Diagnostic CT scan of the chest, abdomen, and pelvis with  intravenous contrast for interpretation.  3. CT of the chest, abdomen and pelvis obtained for diagnostic  interpretation.  4. 3D MIP and PET-CT fused images were processed on an independent  workstation and archived to PACS and reviewed by a radiologist.    Technique:    1. PET: The patient received 10.18 mCi of F-18-FDG; the serum glucose  was 169 prior to administration, body weight was 73.9 kg. Images were  evaluated in the axial, sagittal, and coronal planes as well as the  rotational whole body MIP. Images were acquired from the Vertex to the  Feet.    UPTAKE WAS MEASURED AT 68 MINUTES.     BACKGROUND:  Liver SUV max= 4.0,   Aorta Blood SUV  Max: 3.4.     2. CT: Volumetric acquisition for clinical interpretation of the  chest, abdomen, and pelvis acquired at 3 mm sections after the  uneventful administration of intravenous contrast. The chest, abdomen,  and pelvis were evaluated at 5 mm sections in bone, soft tissue, and  lung windows.      The patient received 100 cc of Isovue 370 intravenously for the  examination.      3. 3D MIP and PET-CT fused images were processed on an independent  workstation and archived to PACS and reviewed by a radiologist.    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 Most recently status post  resection of an oral ulcer in the right right retromolar trigone on  5/24/2021.    COMPARISON: PET-CT 11/24/2020, PET-CT 5/19/2020, PET-CT 1/27/2020    FINDINGS:     HEAD/NECK:  Postsurgical changes of multiple resections 2 the oral cavity and  oropharynx. In the right oral floor, right base of tongue, and right  lateral recess, there is asymmetric hypermetabolic tissue thickening  without focal enhancing masslike lesion, with SUV max 5.4, 5.2, and  5.6 respectively (series 5, images 107, 113, and 114). There is debris  within the right lateral recess within the vicinity of the uptake.  There is generalized soft tissue and thickening in the anterior neck  greater on the right that has increased since prior PET-CT 11/24/2020,  most consistent with combination of postsurgical and evolving  postradiation changes.    Small mildly prominent left level 3 and 4 lymph nodes. For example 0.7  x 0.5 cm left level 3 lymph node with SUV max 3.6 (series 5, image  138).    The paranasal sinuses are clear. The mastoid air cells are clear. The  major vascular structures within the neck are patent. There is  narrowing  of the right internal jugular vein in the mid neck secondary  to postradiation/postsurgical tissue thickening (series 5, image 113).  No focal occlusion of the right internal jugular vein. The thyroid is  unremarkable.    CHEST:  New hypermetabolic lymph nodes in the left hilum and left mediastinum,  which are new since 11/24/2020. For example left hilar lymph node  measuring 1.7 x 1.0 cm with SUV max 10.4 (series 5, image 203), and  new hypermetabolic aortopulmonary window lymph node measuring 1.1 x  0.7 cm with SUV max 7.7 (series 5, image 189).    New hypermetabolic perivascular nodular tissue thickening in the right  upper lobe along the right minor fissure with SUV max 4.3 (series 5,  image 204). Linear tissue thickening in the lingula adjacent to the  left fissure with low levels of uptake is mildly increased since  11/24/2020, favor chronic fibroatelectasis. Mild groundglass opacities  in the anterior right upper lobe and lingula have also increased,  nonspecific, possibly chronic postradiation changes. No pleural  effusion or pneumothorax.    Heart size is within normal limits. No pericardial effusion. Moderate  coronary artery calcifications. Right chest wall Port-A-Cath catheter  tip terminates at the superior cavoatrial junction. Normal caliber of  the thoracic aorta and main pulmonary artery. No central pulmonary  embolism. Generalized circumferential wall thickening of the mid  to-distal esophagus is similar to prior, with no focal abnormal FDG  uptake.    ABDOMEN AND PELVIS:  There is no suspicious FDG uptake in the abdomen or pelvis.    The liver, gallbladder, pancreas, spleen, and adrenal glands are  unremarkable. Symmetric nephrographic enhancement of the kidneys.  Small renal cortical hypodensities, too small to fully characterize,  suggestive of tiny cysts. No hydronephrosis. Circumferential  prominence of the bladder wall. This may be exaggerated due to degree  of nondistention. There is mural  lipomatosis in the bladder wall  suggesting prior inflammation. Prostate is unremarkable. No abnormally  distended loops of small or large bowel. Colonic anastomosis in the  right colon which appears widely patent. No free air, free fluid, or  fluid collection. Normal caliber of the abdominal aorta. No enlarged  hypermetabolic lymph nodes in the abdomen or pelvis.    LOWER EXTREMITIES:   No abnormal masses or hypermetabolic lesions.    BONES:   There is no abnormal FDG uptake in the skeleton. Multilevel  degenerative changes in the spine. No acute osseous abnormality.      Impression    IMPRESSION: In this patient with a history of squamous cell carcinoma  of the oropharynx and tongue status post resection and radiotherapy:    1. Primary: NI-RADS 2. Multifocal uptake to the right oral floor,  right base of tongue, and right lateral recess associated with tissue  thickening without focal masslike enhancement, favor combination of  postsurgical/postradiation tissue thickening and the chronic  inflammation on exam. Consider continued follow-up with imaging to  exclude developing lesion.    2. Neck: NI-RADS 2. Small borderline avid left level 3 and 4 lymph  nodes, favor reactive given small size. These can also be followed  with imaging.    3. PET-CT of the remainder of the body demonstrates new hypermetabolic  lesions in the chest suspicious for metastases:  3a. New hypermetabolic left hilar and left mediastinal lymph nodes  suspicious for irvin metastases. The left hilar lymph nodes would be  amenable to endobronchial sampling.  3b. New hypermetabolic perivascular nodule in the right upper lobe  suspicious for metastatic nodule.    4. Likely evolving postradiation changes versus other nonspecific  pneumonitis in the anterior lungs, particularly lingula.    ------  NI-RADS Surveillance Legend:    Primary  1: No evidence of recurrence: routine surveillance  2: Low suspicion    a) Superficial abnormality (skin, mucosal  [place] website --- [email or web address] for Tobacco Control website --- [email or web address]): direct visual  inspection    b) Ill-defined deep abnormality: short interval follow-up* or PET  3: High suspicion (new or enlarging discrete nodule/mass): biopsy  4: Definitive recurrence (path proven or clinical progression): no  biopsy needed    Nodes  1: No evidence of recurrence: routine surveillance  2: Low suspicion (ill-defined): short interval follow-up or PET  3: HIgh suspicion (new or enlarging lymph node): biopsy if clinically  needed  4: Definitive recurrence (path proven or clinical progression): no  biopsy needed    *short interval follow-up: 3 months at our institution    I have personally reviewed the examination and initial interpretation  and I agree with the findings.    KATHERINE MALAVE MD   CT Chest/Abdomen/Pelvis w Contrast    Narrative    Combined Report of:    PET and CT on  6/21/2021 12:06 PM :    1. PET of the neck, chest, abdomen, and pelvis.  2. PET CT Fusion for Attenuation Correction and Anatomical  Localization:    3. Diagnostic CT scan of the chest, abdomen, and pelvis with  intravenous contrast for interpretation.  3. CT of the chest, abdomen and pelvis obtained for diagnostic  interpretation.  4. 3D MIP and PET-CT fused images were processed on an independent  workstation and archived to PACS and reviewed by a radiologist.    Technique:    1. PET: The patient received 10.18 mCi of F-18-FDG; the serum glucose  was 169 prior to administration, body weight was 73.9 kg. Images were  evaluated in the axial, sagittal, and coronal planes as well as the  rotational whole body MIP. Images were acquired from the Vertex to the  Feet.    UPTAKE WAS MEASURED AT 68 MINUTES.     BACKGROUND:  Liver SUV max= 4.0,   Aorta Blood SUV Max: 3.4.     2. CT: Volumetric acquisition for clinical interpretation of the  chest, abdomen, and pelvis acquired at 3 mm sections after the  uneventful administration of intravenous contrast. The chest, abdomen,  and pelvis were evaluated at 5 mm sections in  bone, soft tissue, and  lung windows.      The patient received 100 cc of Isovue 370 intravenously for the  examination.      3. 3D MIP and PET-CT fused images were processed on an independent  workstation and archived to PACS and reviewed by a radiologist.    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 Most recently status post  resection of an oral ulcer in the right right retromolar trigone on  5/24/2021.    COMPARISON: PET-CT 11/24/2020, PET-CT 5/19/2020, PET-CT 1/27/2020    FINDINGS:     HEAD/NECK:  Postsurgical changes of multiple resections 2 the oral cavity and  oropharynx. In the right oral floor, right base of tongue, and right  lateral recess, there is asymmetric hypermetabolic tissue thickening  without focal enhancing masslike lesion, with SUV max 5.4, 5.2, and  5.6 respectively (series 5, images 107, 113, and 114). There is debris  within the right lateral recess within the vicinity of the uptake.  There is generalized soft tissue and thickening in the anterior neck  greater on the right that has increased since prior PET-CT 11/24/2020,  most consistent with combination of postsurgical and evolving  postradiation changes.    Small mildly prominent left level 3 and 4 lymph nodes. For example 0.7  x 0.5 cm left level 3 lymph node with SUV max 3.6 (series 5, image  138).    The paranasal sinuses are clear. The mastoid air cells are clear. The  major vascular structures within the neck are patent. There is  narrowing of the right internal jugular vein in the mid neck secondary  to postradiation/postsurgical tissue thickening (series 5, image 113).  No focal occlusion of the right internal jugular vein. The thyroid is  unremarkable.    CHEST:  New hypermetabolic lymph nodes  in the left hilum and left mediastinum,  which are new since 11/24/2020. For example left hilar lymph node  measuring 1.7 x 1.0 cm with SUV max 10.4 (series 5, image 203), and  new hypermetabolic aortopulmonary window lymph node measuring 1.1 x  0.7 cm with SUV max 7.7 (series 5, image 189).    New hypermetabolic perivascular nodular tissue thickening in the right  upper lobe along the right minor fissure with SUV max 4.3 (series 5,  image 204). Linear tissue thickening in the lingula adjacent to the  left fissure with low levels of uptake is mildly increased since  11/24/2020, favor chronic fibroatelectasis. Mild groundglass opacities  in the anterior right upper lobe and lingula have also increased,  nonspecific, possibly chronic postradiation changes. No pleural  effusion or pneumothorax.    Heart size is within normal limits. No pericardial effusion. Moderate  coronary artery calcifications. Right chest wall Port-A-Cath catheter  tip terminates at the superior cavoatrial junction. Normal caliber of  the thoracic aorta and main pulmonary artery. No central pulmonary  embolism. Generalized circumferential wall thickening of the mid  to-distal esophagus is similar to prior, with no focal abnormal FDG  uptake.    ABDOMEN AND PELVIS:  There is no suspicious FDG uptake in the abdomen or pelvis.    The liver, gallbladder, pancreas, spleen, and adrenal glands are  unremarkable. Symmetric nephrographic enhancement of the kidneys.  Small renal cortical hypodensities, too small to fully characterize,  suggestive of tiny cysts. No hydronephrosis. Circumferential  prominence of the bladder wall. This may be exaggerated due to degree  of nondistention. There is mural lipomatosis in the bladder wall  suggesting prior inflammation. Prostate is unremarkable. No abnormally  distended loops of small or large bowel. Colonic anastomosis in the  right colon which appears widely patent. No free air, free fluid, or  fluid collection.  Normal caliber of the abdominal aorta. No enlarged  hypermetabolic lymph nodes in the abdomen or pelvis.    LOWER EXTREMITIES:   No abnormal masses or hypermetabolic lesions.    BONES:   There is no abnormal FDG uptake in the skeleton. Multilevel  degenerative changes in the spine. No acute osseous abnormality.      Impression    IMPRESSION: In this patient with a history of squamous cell carcinoma  of the oropharynx and tongue status post resection and radiotherapy:    1. Primary: NI-RADS 2. Multifocal uptake to the right oral floor,  right base of tongue, and right lateral recess associated with tissue  thickening without focal masslike enhancement, favor combination of  postsurgical/postradiation tissue thickening and the chronic  inflammation on exam. Consider continued follow-up with imaging to  exclude developing lesion.    2. Neck: NI-RADS 2. Small borderline avid left level 3 and 4 lymph  nodes, favor reactive given small size. These can also be followed  with imaging.    3. PET-CT of the remainder of the body demonstrates new hypermetabolic  lesions in the chest suspicious for metastases:  3a. New hypermetabolic left hilar and left mediastinal lymph nodes  suspicious for irvin metastases. The left hilar lymph nodes would be  amenable to endobronchial sampling.  3b. New hypermetabolic perivascular nodule in the right upper lobe  suspicious for metastatic nodule.    4. Likely evolving postradiation changes versus other nonspecific  pneumonitis in the anterior lungs, particularly lingula.    ------  NI-RADS Surveillance Legend:    Primary  1: No evidence of recurrence: routine surveillance  2: Low suspicion    a) Superficial abnormality (skin, mucosal surface): direct visual  inspection    b) Ill-defined deep abnormality: short interval follow-up* or PET  3: High suspicion (new or enlarging discrete nodule/mass): biopsy  4: Definitive recurrence (path proven or clinical progression): no  biopsy  needed    Nodes  1: No evidence of recurrence: routine surveillance  2: Low suspicion (ill-defined): short interval follow-up or PET  3: HIgh suspicion (new or enlarging lymph node): biopsy if clinically  needed  4: Definitive recurrence (path proven or clinical progression): no  biopsy needed    *short interval follow-up: 3 months at our institution    I have personally reviewed the examination and initial interpretation  and I agree with the findings.    KATHERINE MALAVE MD       Discharge Medications   Current Discharge Medication List      START taking these medications    Details   amoxicillin-clavulanate (AUGMENTIN) 400-57 MG/5ML suspension Take 10.9 mLs (875 mg) by mouth 2 times daily for 7 days  Qty: 152.6 mL, Refills: 0    Associated Diagnoses: Dysphagia, unspecified type      methylPREDNISolone (MEDROL DOSEPAK) 4 MG tablet therapy pack Follow Package Directions  Qty: 21 tablet, Refills: 0    Associated Diagnoses: Dysphagia, unspecified type         CONTINUE these medications which have NOT CHANGED    Details   acetaminophen (TYLENOL) 160 MG/5ML suspension Take 20.5 mLs (650 mg) by mouth every 6 hours as needed for fever or mild pain  Qty: 300 mL, Refills: 3    Associated Diagnoses: Tongue ulcer      acetaminophen (TYLENOL) 325 MG tablet Take 2 tablets (650 mg) by mouth every 4 hours as needed for other (multimodal surgical pain management along with NSAIDS and opioid medication as indicated based on pain control and physical function.)  Qty: 60 tablet, Refills: 0    Associated Diagnoses: Squamous cell carcinoma of neck; Acute post-operative pain      aspirin (ASA) 81 MG chewable tablet Take 81 mg by mouth daily      gabapentin (NEURONTIN) 250 MG/5ML solution Take 5 mLs (250 mg) by mouth 3 times daily  Qty: 300 mL, Refills: 1    Associated Diagnoses: Actinomycosis      guaiFENesin (ROBITUSSIN) 100 MG/5ML liquid 10 mLs (200 mg) by Oral or PEG tube route every 4 hours as needed for other (mucous production  and/or cough)  Qty: 473 mL, Refills: 5    Associated Diagnoses: Squamous cell cancer of tongue (H)      insulin aspart (NOVOLOG PEN) 100 UNIT/ML pen 15 Units       insulin glargine (LANTUS VIAL) 100 UNIT/ML vial Inject 25 Units Subcutaneous daily (with breakfast) PATIENT REPORTS 25 UNIT DAILY 02/16/2021      lidocaine (XYLOCAINE) 2 % solution Swish and spit 15 mLs in mouth every 3 hours as needed for moderate pain ; Max 8 doses/24 hour period.  Qty: 100 mL, Refills: 3    Associated Diagnoses: Tonsil cancer (H); Cancer related pain      lisinopril (ZESTRIL) 5 MG tablet Take 5 mg by mouth daily      !! magic mouthwash (ENTER INGREDIENTS IN COMMENTS) suspension Take 5 mLs by mouth every 4 hours as needed (pain)  Qty: 100 mL, Refills: 4    Associated Diagnoses: Tongue ulcer      !! magic mouthwash (ENTER INGREDIENTS IN COMMENTS) suspension Swish and spit 5 ml every 8 hours for 14 days  Qty: 1000 mL, Refills: 1    Comments: 20 ml 2% Benadryl 50mg/ml, 490 ml 2 % Lidocaine Viscous, 490 ml TC suspension Maalox and Tetracycline. Swish and spit Please mail to patient  Associated Diagnoses: Actinomycosis      !! magic mouthwash (ENTER INGREDIENTS IN COMMENTS) suspension Swish, gargle, and spit one to two teaspoonfuls every six hours as needed. May be swallowed if esophageal involvement.  Qty: 480 mL, Refills: 3    Comments: 80 ml viscous lidocaine 2%, 80 ml Mylanta, 80 ml diphenhydramine 12.5 mg per 5 ml elixir, 80 ml nystatin 100,000U suspension, 80 ml prednisolone 15mg per 5ml solution, 80 ml distilled water  Associated Diagnoses: Squamous cell cancer of tongue (H); Cancer related pain      oxyCODONE (OXY-IR) 5 MG capsule Take 5 mg by mouth every 4 hours as needed for severe pain      oxyCODONE (ROXICODONE) 5 MG/5ML solution Take 7.5 mLs (7.5 mg) by mouth every 6 hours as needed for pain (oral pain)  Qty: 500 mL, Refills: 0    Associated Diagnoses: Oral pain      pentoxifylline ER (TRENTAL) 400 MG CR tablet Take 1 tablet  (400 mg) by mouth 3 times daily (with meals)  Qty: 270 tablet, Refills: 1    Associated Diagnoses: Actinomycosis      pilocarpine (SALAGEN) 7.5 MG tablet Take 7.5 mg by mouth 2 times daily      senna-docusate (SENOKOT-S/PERICOLACE) 8.6-50 MG tablet Take 1 tablet by mouth 2 times daily as needed for constipation  Qty: 20 tablet, Refills: 0    Associated Diagnoses: Squamous cell carcinoma of neck; Acute post-operative pain      simvastatin (ZOCOR) 40 MG tablet Take 40 mg by mouth At Bedtime      sucralfate (CARAFATE) 1 GM tablet Take 1 tablet (1 g) by mouth 2 times daily  Qty: 42 tablet, Refills: 1    Associated Diagnoses: Actinomycosis       !! - Potential duplicate medications found. Please discuss with provider.      STOP taking these medications       penicillin V (VEETID) 500 MG tablet Comments:   Reason for Stopping:             Allergies   No Known Allergies

## 2021-06-27 NOTE — DISCHARGE INSTRUCTIONS
STOP taking     penicillin V 500 MG tablet  Commonly known as: VEETID      Summary of Visit      Reason for your hospital stay         Difficulty swallowing         After Care Activity         Your activity upon discharge: activity as tolerated        Diet        Follow this diet upon discharge: Orders Placed This Encounter       Dysphagia Diet Level 1 Pureed with Thin Liquids (water, ice chips, juice, milk gelatin, ice cream, etc)         Follow-Up Appointment Instructions       Adult [facility] Follow-up and recommended labs and tests         Follow up with primary care provider, Clark Marie, within 7 days for hospital follow- up.         Appointments on [place] and/or [facility] (with [facility] or [facility] provider or service). Call [phone] if you haven't heard regarding these appointments within 7 days of discharge.

## 2021-06-27 NOTE — CONSULTS
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    The patient reports a longstanding history of dysphagia, most prominently after completing chemoradiation in March of this year.  This is slowly progressed and he is now only able to tolerate a diet of creamed week.  Yesterday evening while eating dinner this got stuck in his throat.  He then went to the emergency department in [place] the following morning for evaluation.  A CT scan of the neck with contrast was obtained which demonstrated the above findings and the patient was transferred to the [facility] emergency department for further evaluation.  In the emergency department the patient has been afebrile, vital stable, and breathing well on room air.  He reports that his dysphagia appears to have improved, although he has not eaten anything today.  He has chronic pain of the tongue that worsened after recent procedure by Dr. Nielsen.  This has been stable.  He denies hemoptysis.  He denies any stridor or increased work of breathing.  He has been able to maintain regular activity including mowing the lawn.  He was last seen by [facility] in January earlier this year with a swallow study demonstrating silent aspiration and at that time was cleared for regular diet with thin liquids.    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 He has previously been treated with a DL with Omni CO2 laser excision by Dr. Mckenna on 10/6/2014. This was  followed by radiation therapy at [facility]. He recently subsequently diagnosed with a pT2 Nx SCC of the right lateral tongue, treated with a partial glossectomy by Dr. Mckenna on 2/19/2020. He subsequently had recurrence near the right submandibular gland, s/p right supraomohyoid neck dissection on 12/9/2020. His case was discussed at multidisciplinary tumor board with the recommendation for chemoradiation due to MEL in the submandibular gland. He completed chemotherapy with limited field radiation in March 2021.  Since then he has had persistent right tongue pain.  A biopsy of this was taken in clinic at the beginning of May which grew actinomyces and he was treated with penicillin.  He also had a persistent ulcer of the right posterior tongue that was resected with the Omni laser on 5/24/2021.  He has recently been seen in clinic by Dr. Nielsen and Dr. Mark.  A surveillance PET/CT was obtained 6/21/2021.  This revealed concerning lymph nodes in the chest, with low concern for recurrence in the head and neck.  The CT obtained in the emergency department today is relatively stable compared to the one obtained 5 days ago.    PMHx:   Past Medical History:   Diagnosis Date     Adenocarcinoma (H)     large instestine      Cancer of appendix (H)      Diabetes (H)      Gastro-oesophageal reflux disease      History of radiation therapy      Hoarseness      Hypertension        PSHx:   Past Surgical History:   Procedure Laterality Date     APPENDECTOMY       COLECTOMY      Right     DISSECTION RADICAL NECK MODIFIED Right 12/9/2020    Procedure: Modified neck dissection;  Surgeon: Jose Antonio Mckenna MD;  Location: [facility] OR     EXCISE LESION INTRAORAL Right 2/19/2020    Procedure: Wide Local Excision of Right Tongue Lesion;  Surgeon: Jose Antonio Mckenna MD;  Location: [facility] OR     INSERT PORT VASCULAR ACCESS Right 1/15/2021    Procedure: CHEST INSERTION, VASCULAR ACCESS PORT @0900;  Surgeon: Tony Hopkins  MD [place];  Location: [facility] OR     IR CHEST PORT PLACEMENT > 5 YRS OF AGE  1/15/2021     LASER CO2 LARYNGOSCOPY N/A 10/6/2014    Procedure: LASER CO2 LARYNGOSCOPY;  Surgeon: Jose Antonio Mckenna MD;  Location: [facility] OR     LASER CO2 LESION ORAL N/A 5/24/2021    Procedure: Excision of tongue ulcer with omniguide laser;  Surgeon: Jose Antonio Mckenna MD;  Location: [facility] OR       Medications:  (Not in a hospital admission)      Allergies:    No Known Allergies    Social Hx:  Social History     Socioeconomic History     Marital status:      Spouse name: Not on file     Number of children: Not on file     Years of education: Not on file     Highest education level: Not on file   Occupational History     Not on file   Social Needs     Financial resource strain: Not on file     Food insecurity     Worry: Not on file     Inability: Not on file     Transportation needs     Medical: Not on file     Non-medical: Not on file   Tobacco Use     Smoking status: Never Smoker     Smokeless tobacco: Never Used   Substance and Sexual Activity     Alcohol use: Not Currently     Alcohol/week: 0.0 standard drinks     Comment: none for 20 years     Drug use: No     Sexual activity: Never   Lifestyle     Physical activity     Days per week: Not on file     Minutes per session: Not on file     Stress: Not on file   Relationships     Social connections     Talks on phone: Not on file     Gets together: Not on file     Attends [employer] service: Not on file     Active member of club or organization: Not on file     Attends meetings of clubs or organizations: Not on file     Relationship status: Not on file     Intimate partner violence     Fear of current or ex partner: Not on file     Emotionally abused: Not on file     Physically abused: Not on file     Forced sexual activity: Not on file   Other Topics Concern     Not on file   Social History Narrative     Not on file       Family Hx:   Family History   Problem Relation Age of Onset  "    Heart Disease Father      Diabetes Mother      Diabetes Brother        ROS: 12 point review of systems is negative unless noted in HPI.    PHYSICAL EXAM:  /81   Pulse 59   Temp 98.2  F (36.8  C) (Oral)   Resp 18   Ht 1.702 m (5' 7\")   Wt 70.3 kg (155 lb)   SpO2 90%   BMI 24.28 kg/m      General: laying in bed, no acute distress  HEAD: normocephalic, atraumatic  Face: symmetrical, CN VII intact bilaterally (HB 1), no swelling, edema, or erythema.  Eyes: EOMI, PERRL, clear sclera  Ears: no tragal tenderness, external ear canal open and clear bilaterally, TMs clear bilaterally  Nose: no anterior drainage, intact and midline septum without perforation or hematoma   Mouth: moist, no ulcers.  He is well-healed from his partial glossectomy.  There is some fullness of the right posterior buccal mucosa, however this is soft and without overlying mucosal changes.  There is some fullness of the soft palate  Oropharynx: uvula midline, no oropharyngeal erythema  Neck: There is expected woodiness consistent with radiation changes, as well as some mild submental lymphedema.  His right cervical incision is well-healed and the right neck has hyperpigmentation consistent with radiation.  There is no cervical lymphadenopathy.  Neuro: cranial nerves 2-12 grossly intact  Respiratory: breathing non-labored on RA, no stridor, O2 saturations in the high 90s.  The patient was laid supine and maintained his O2 saturations.  Breath sounds clear on auscultation of posterior lung fields  Psych: pleasant affect, intermittently tearful    FIBEROPTIC ENDOSCOPY:  Due to airway swelling, fiberoptic laryngoscopy was indicated. After obtaining verbal consent, the fiberoptic laryngoscope was passed under endoscopic vision through the right nasal passage. The turbinates were normal. The inferior and middle meati were clear without purulence, masses, or polyps. The nasopharynx was clear. The epiglottis was sharp.  There is debris in the " right vallecula which appears consistent with food debris.  No blood or concerning features.  Removal was attempted with a Bengali suction catheter and Yankauer, however the patient was unable to tolerate.  The remainder of the vallecula was clear.  There is fullness of the right pharyngeal wall as well as significant swelling of the right AE fold.  However this is easily bypassed with the scope and the vocal cords have good abduction and abduction.  Limited visualization of the piriform sinus on the right due to swelling, the left piriform sinus is clear.    ROUTINE IP LABS (Last four results)  BMP  Recent Labs   Lab 06/26/21  1720      POTASSIUM 4.4   CHLORIDE 100   CO2 28   BUN 14   CR 1.23   GLC 80     Recent Labs   Lab 06/26/21  1720   ELEUTERIO 9.2       CBC  Recent Labs   Lab 06/26/21  1720   WBC 13.1*   RBC 3.86*   HGB 11.4*   HCT 35.5*   MCV 92   MCH 29.5   MCHC 32.1   RDW 13.5          Imaging:  The CT neck with contrast obtained today from the outside hospital is reviewed.  There does appear to be some fullness of the upper airway with some contrast uptake but this is stable compared to his PET CT.  The air in the base of tongue is also stable from his most recent PET/CT and is consistent with postsurgical changes.    Results for orders placed or performed during the hospital encounter of 06/21/21   PET Oncology Whole Body    Narrative    Combined Report of:    PET and CT on  6/21/2021 12:06 PM :    1. PET of the neck, chest, abdomen, and pelvis.  2. PET CT Fusion for Attenuation Correction and Anatomical  Localization:    3. Diagnostic CT scan of the chest, abdomen, and pelvis with  intravenous contrast for interpretation.  3. CT of the chest, abdomen and pelvis obtained for diagnostic  interpretation.  4. 3D MIP and PET-CT fused images were processed on an independent  workstation and archived to PACS and reviewed by a radiologist.    Technique:    1. PET: The patient received 10.18 mCi of  F-18-FDG; the serum glucose  was 169 prior to administration, body weight was 73.9 kg. Images were  evaluated in the axial, sagittal, and coronal planes as well as the  rotational whole body MIP. Images were acquired from the Vertex to the  Feet.    UPTAKE WAS MEASURED AT 68 MINUTES.     BACKGROUND:  Liver SUV max= 4.0,   Aorta Blood SUV Max: 3.4.     2. CT: Volumetric acquisition for clinical interpretation of the  chest, abdomen, and pelvis acquired at 3 mm sections after the  uneventful administration of intravenous contrast. The chest, abdomen,  and pelvis were evaluated at 5 mm sections in bone, soft tissue, and  lung windows.      The patient received 100 cc of Isovue 370 intravenously for the  examination.      3. 3D MIP and PET-CT fused images were processed on an independent  workstation and archived to PACS and reviewed by a radiologist.    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 Most recently status post  resection of an oral ulcer in the right right retromolar trigone on  5/24/2021.    COMPARISON: PET-CT 11/24/2020, PET-CT 5/19/2020, PET-CT 1/27/2020    FINDINGS:     HEAD/NECK:  Postsurgical changes of multiple resections 2 the oral cavity and  oropharynx. In the right oral floor, right base of tongue, and right  lateral recess, there is asymmetric hypermetabolic tissue thickening  without focal enhancing masslike lesion, with SUV max 5.4, 5.2, and  5.6 respectively (series 5, images 107, 113, and 114). There is debris  within the right lateral recess within the vicinity of the uptake.  There is generalized soft tissue and thickening in the anterior neck  greater on the right that has increased since prior PET-CT 11/24/2020,  most consistent with combination of  postsurgical and evolving  postradiation changes.    Small mildly prominent left level 3 and 4 lymph nodes. For example 0.7  x 0.5 cm left level 3 lymph node with SUV max 3.6 (series 5, image  138).    The paranasal sinuses are clear. The mastoid air cells are clear. The  major vascular structures within the neck are patent. There is  narrowing of the right internal jugular vein in the mid neck secondary  to postradiation/postsurgical tissue thickening (series 5, image 113).  No focal occlusion of the right internal jugular vein. The thyroid is  unremarkable.    CHEST:  New hypermetabolic lymph nodes in the left hilum and left mediastinum,  which are new since 11/24/2020. For example left hilar lymph node  measuring 1.7 x 1.0 cm with SUV max 10.4 (series 5, image 203), and  new hypermetabolic aortopulmonary window lymph node measuring 1.1 x  0.7 cm with SUV max 7.7 (series 5, image 189).    New hypermetabolic perivascular nodular tissue thickening in the right  upper lobe along the right minor fissure with SUV max 4.3 (series 5,  image 204). Linear tissue thickening in the lingula adjacent to the  left fissure with low levels of uptake is mildly increased since  11/24/2020, favor chronic fibroatelectasis. Mild groundglass opacities  in the anterior right upper lobe and lingula have also increased,  nonspecific, possibly chronic postradiation changes. No pleural  effusion or pneumothorax.    Heart size is within normal limits. No pericardial effusion. Moderate  coronary artery calcifications. Right chest wall Port-A-Cath catheter  tip terminates at the superior cavoatrial junction. Normal caliber of  the thoracic aorta and main pulmonary artery. No central pulmonary  embolism. Generalized circumferential wall thickening of the mid  to-distal esophagus is similar to prior, with no focal abnormal FDG  uptake.    ABDOMEN AND PELVIS:  There is no suspicious FDG uptake in the abdomen or pelvis.    The liver, gallbladder,  pancreas, spleen, and adrenal glands are  unremarkable. Symmetric nephrographic enhancement of the kidneys.  Small renal cortical hypodensities, too small to fully characterize,  suggestive of tiny cysts. No hydronephrosis. Circumferential  prominence of the bladder wall. This may be exaggerated due to degree  of nondistention. There is mural lipomatosis in the bladder wall  suggesting prior inflammation. Prostate is unremarkable. No abnormally  distended loops of small or large bowel. Colonic anastomosis in the  right colon which appears widely patent. No free air, free fluid, or  fluid collection. Normal caliber of the abdominal aorta. No enlarged  hypermetabolic lymph nodes in the abdomen or pelvis.    LOWER EXTREMITIES:   No abnormal masses or hypermetabolic lesions.    BONES:   There is no abnormal FDG uptake in the skeleton. Multilevel  degenerative changes in the spine. No acute osseous abnormality.      Impression    IMPRESSION: In this patient with a history of squamous cell carcinoma  of the oropharynx and tongue status post resection and radiotherapy:    1. Primary: NI-RADS 2. Multifocal uptake to the right oral floor,  right base of tongue, and right lateral recess associated with tissue  thickening without focal masslike enhancement, favor combination of  postsurgical/postradiation tissue thickening and the chronic  inflammation on exam. Consider continued follow-up with imaging to  exclude developing lesion.    2. Neck: NI-RADS 2. Small borderline avid left level 3 and 4 lymph  nodes, favor reactive given small size. These can also be followed  with imaging.    3. PET-CT of the remainder of the body demonstrates new hypermetabolic  lesions in the chest suspicious for metastases:  3a. New hypermetabolic left hilar and left mediastinal lymph nodes  suspicious for irvin metastases. The left hilar lymph nodes would be  amenable to endobronchial sampling.  3b. New hypermetabolic perivascular nodule in the  right upper lobe  suspicious for metastatic nodule.    4. Likely evolving postradiation changes versus other nonspecific  pneumonitis in the anterior lungs, particularly lingula.    ------  NI-RADS Surveillance Legend:    Primary  1: No evidence of recurrence: routine surveillance  2: Low suspicion    a) Superficial abnormality (skin, mucosal surface): direct visual  inspection    b) Ill-defined deep abnormality: short interval follow-up* or PET  3: High suspicion (new or enlarging discrete nodule/mass): biopsy  4: Definitive recurrence (path proven or clinical progression): no  biopsy needed    Nodes  1: No evidence of recurrence: routine surveillance  2: Low suspicion (ill-defined): short interval follow-up or PET  3: HIgh suspicion (new or enlarging lymph node): biopsy if clinically  needed  4: Definitive recurrence (path proven or clinical progression): no  biopsy needed    *short interval follow-up: 3 months at our institution    I have personally reviewed the examination and initial interpretation  and I agree with the findings.    KATHERINE MALAVE MD   CT Chest/Abdomen/Pelvis w Contrast    Narrative    Combined Report of:    PET and CT on  6/21/2021 12:06 PM :    1. PET of the neck, chest, abdomen, and pelvis.  2. PET CT Fusion for Attenuation Correction and Anatomical  Localization:    3. Diagnostic CT scan of the chest, abdomen, and pelvis with  intravenous contrast for interpretation.  3. CT of the chest, abdomen and pelvis obtained for diagnostic  interpretation.  4. 3D MIP and PET-CT fused images were processed on an independent  workstation and archived to PACS and reviewed by a radiologist.    Technique:    1. PET: The patient received 10.18 mCi of F-18-FDG; the serum glucose  was 169 prior to administration, body weight was 73.9 kg. Images were  evaluated in the axial, sagittal, and coronal planes as well as the  rotational whole body MIP. Images were acquired from the Vertex to the  Feet.    UPTAKE  WAS MEASURED AT 68 MINUTES.     BACKGROUND:  Liver SUV max= 4.0,   Aorta Blood SUV Max: 3.4.     2. CT: Volumetric acquisition for clinical interpretation of the  chest, abdomen, and pelvis acquired at 3 mm sections after the  uneventful administration of intravenous contrast. The chest, abdomen,  and pelvis were evaluated at 5 mm sections in bone, soft tissue, and  lung windows.      The patient received 100 cc of Isovue 370 intravenously for the  examination.      3. 3D MIP and PET-CT fused images were processed on an independent  workstation and archived to PACS and reviewed by a radiologist.    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 Most recently status post  resection of an oral ulcer in the right right retromolar trigone on  5/24/2021.    COMPARISON: PET-CT 11/24/2020, PET-CT 5/19/2020, PET-CT 1/27/2020    FINDINGS:     HEAD/NECK:  Postsurgical changes of multiple resections 2 the oral cavity and  oropharynx. In the right oral floor, right base of tongue, and right  lateral recess, there is asymmetric hypermetabolic tissue thickening  without focal enhancing masslike lesion, with SUV max 5.4, 5.2, and  5.6 respectively (series 5, images 107, 113, and 114). There is debris  within the right lateral recess within the vicinity of the uptake.  There is generalized soft tissue and thickening in the anterior neck  greater on the right that has increased since prior PET-CT 11/24/2020,  most consistent with combination of postsurgical and evolving  postradiation changes.    Small mildly prominent left level 3 and 4 lymph nodes. For example 0.7  x 0.5 cm left level 3 lymph node with SUV max 3.6 (series 5, image  138).    The paranasal sinuses are clear. The mastoid air cells are  clear. The  major vascular structures within the neck are patent. There is  narrowing of the right internal jugular vein in the mid neck secondary  to postradiation/postsurgical tissue thickening (series 5, image 113).  No focal occlusion of the right internal jugular vein. The thyroid is  unremarkable.    CHEST:  New hypermetabolic lymph nodes in the left hilum and left mediastinum,  which are new since 11/24/2020. For example left hilar lymph node  measuring 1.7 x 1.0 cm with SUV max 10.4 (series 5, image 203), and  new hypermetabolic aortopulmonary window lymph node measuring 1.1 x  0.7 cm with SUV max 7.7 (series 5, image 189).    New hypermetabolic perivascular nodular tissue thickening in the right  upper lobe along the right minor fissure with SUV max 4.3 (series 5,  image 204). Linear tissue thickening in the lingula adjacent to the  left fissure with low levels of uptake is mildly increased since  11/24/2020, favor chronic fibroatelectasis. Mild groundglass opacities  in the anterior right upper lobe and lingula have also increased,  nonspecific, possibly chronic postradiation changes. No pleural  effusion or pneumothorax.    Heart size is within normal limits. No pericardial effusion. Moderate  coronary artery calcifications. Right chest wall Port-A-Cath catheter  tip terminates at the superior cavoatrial junction. Normal caliber of  the thoracic aorta and main pulmonary artery. No central pulmonary  embolism. Generalized circumferential wall thickening of the mid  to-distal esophagus is similar to prior, with no focal abnormal FDG  uptake.    ABDOMEN AND PELVIS:  There is no suspicious FDG uptake in the abdomen or pelvis.    The liver, gallbladder, pancreas, spleen, and adrenal glands are  unremarkable. Symmetric nephrographic enhancement of the kidneys.  Small renal cortical hypodensities, too small to fully characterize,  suggestive of tiny cysts. No hydronephrosis. Circumferential  prominence of the  bladder wall. This may be exaggerated due to degree  of nondistention. There is mural lipomatosis in the bladder wall  suggesting prior inflammation. Prostate is unremarkable. No abnormally  distended loops of small or large bowel. Colonic anastomosis in the  right colon which appears widely patent. No free air, free fluid, or  fluid collection. Normal caliber of the abdominal aorta. No enlarged  hypermetabolic lymph nodes in the abdomen or pelvis.    LOWER EXTREMITIES:   No abnormal masses or hypermetabolic lesions.    BONES:   There is no abnormal FDG uptake in the skeleton. Multilevel  degenerative changes in the spine. No acute osseous abnormality.      Impression    IMPRESSION: In this patient with a history of squamous cell carcinoma  of the oropharynx and tongue status post resection and radiotherapy:    1. Primary: NI-RADS 2. Multifocal uptake to the right oral floor,  right base of tongue, and right lateral recess associated with tissue  thickening without focal masslike enhancement, favor combination of  postsurgical/postradiation tissue thickening and the chronic  inflammation on exam. Consider continued follow-up with imaging to  exclude developing lesion.    2. Neck: NI-RADS 2. Small borderline avid left level 3 and 4 lymph  nodes, favor reactive given small size. These can also be followed  with imaging.    3. PET-CT of the remainder of the body demonstrates new hypermetabolic  lesions in the chest suspicious for metastases:  3a. New hypermetabolic left hilar and left mediastinal lymph nodes  suspicious for irvin metastases. The left hilar lymph nodes would be  amenable to endobronchial sampling.  3b. New hypermetabolic perivascular nodule in the right upper lobe  suspicious for metastatic nodule.    4. Likely evolving postradiation changes versus other nonspecific  pneumonitis in the anterior lungs, particularly lingula.    ------  NI-RADS Surveillance Legend:    Primary  1: No evidence of recurrence:  routine surveillance  2: Low suspicion    a) Superficial abnormality (skin, mucosal surface): direct visual  inspection    b) Ill-defined deep abnormality: short interval follow-up* or PET  3: High suspicion (new or enlarging discrete nodule/mass): biopsy  4: Definitive recurrence (path proven or clinical progression): no  biopsy needed    Nodes  1: No evidence of recurrence: routine surveillance  2: Low suspicion (ill-defined): short interval follow-up or PET  3: HIgh suspicion (new or enlarging lymph node): biopsy if clinically  needed  4: Definitive recurrence (path proven or clinical progression): no  biopsy needed    *short interval follow-up: 3 months at our institution    I have personally reviewed the examination and initial interpretation  and I agree with the findings.    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  His PET CT scan from 5 days ago had low concern for recurrent tumor in the head and neck.  Bacterial infection is less likely given the acuity of his symptoms but stable imaging.  Would recommend overnight admission and observation to ensure stability of his airway and to assess p.o. intake prior to discharge.    Recommendations:  -No ENT intervention at this time  -Admission to medicine, progressive care level for airway observation  -Decadron 8 mg every 8 hours for 3 doses  - usual airway escalation protocol  - Racemic epinephrine PRN for shortness of breath  - Nebulized hypertonic saline for thick secretions  - SLP evaluation in the morning and must be able to demonstrate ability to take p.o.  prior to discharge  - Unasyn   - Rescope in the AM    This patient was discussed with chief resident Wally Esparza and staff Dr. Cynthia Meyer.    Ekaterina Jovel MD  Otolaryngology - Head & Neck Surgery PGY3  Please page the on-call resident with questions. If after hours, contact ENT with questions by dialing [phone] and entering job code [phone] when prompted.

## 2021-06-27 NOTE — ED NOTES
Patient discharged at 12:29 PM to Discharged to home  IV was discontinued. Pain at time of discharge was 2/10. Belongings returned to patient.  Discharge instructions and medications reviewed with patient.  Patient verbalized understanding and all questions were answered.  Prescriptions set to [facility], pt to .  At time of discharge, patient condition was stable and left the unit escorted by self.  Pt's wife waiting in her car for pt at  area.

## 2021-06-27 NOTE — PROGRESS NOTES
06/27/21 0800   General Information   Onset of Illness/Injury or Date of Surgery 06/26/21   Referring Physician Justin Butt MD   Patient/Family Therapy Goal Statement (SLP) to go home   Pertinent History of Current Problem Pt is a 71 year old male with Type 2 diabetes, hypertension, CKD, hyperlipidemia, history of 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 Mckenna finished his last round of chemo on March 1st 2021 and Last round of radiationon March 15, 2021.He currently presented with difficulty swallowing solids and admitted for concern of airway compromise. Pt has a history of dysphagia and at home current is on a DD1/thin liquid diet, currently only have to eat cream of wheat and water. See dysphagia history section for further details.   General Observations Note that ENT completed fiberoptic endoscopy where there was what appeared to e food debris in right vallecula. Removal of debris was attempted with a Estonian suction catheter and Yankauer, however the patient was unable to tolerate.  The remainder of the vallecula was clear.  There is fullness of the right pharyngeal wall as well as significant swelling of the right AE fold.  However this is easily bypassed with the scope and the vocal cords have good abduction and abduction.  Limited visualization of the piriform sinus on the right due to swelling, the left piriform sinus is clear.   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 Pts most recent VFSS was completed in January 2021 where he was noted to have x2 instances of silent aspiration with thin liquid due to premature loss of bolus, no  penetration or aspiration noted with nectar thick liquids or puree solids. From that evaluation a regular/thin liquid diet with modified supra glottic swallow strategy was recommended. However note that since VFSS pt has undergone chemo and radiation in March 2021 and pt reports a procedure in May 2021. Pt reports that since his procedure in May 2021 his swallow function has gradually declined to currently where he is only able to have cream of wheat and water. Discussed pts outpatient speech therapy and he reports he meets with an SLP whenever he has an appointment with his doctors. Pt was most recently schedule to see speech therapy as outpatient 6/22/21 however appointment was canceled due to d/t pain. Pt has established POC with SLP as outpatient and per EMR review SLP has discussed with pt, wife and MD possible need for alternative means of nutrition should pain persist. Note that pt reports he is supposed to have esophageal dilation next in hopes to help with sticking sensation with food.    Pain Assessment   Patient Currently in Pain No   Type of Evaluation   Type of Evaluation Swallow Evaluation   Oral Motor   Oral Musculature anomalies present   Structural Abnormalities present   Mucosal Quality good   Dentition (Oral Motor)   Dentition (Oral Motor) adequate dentition;natural dentition   Facial Symmetry (Oral Motor)   Facial Symmetry (Oral Motor) WNL   Lip Function (Oral Motor)   Lip Range of Motion (Oral Motor) WNL   Tongue Function (Oral Motor)   Tongue ROM (Oral Motor) protrusion is impaired   Protrusion, Tongue ROM Impairment (Oral Motor) moderate impairment;left side   Comment, Tongue Function (Oral Motor) Anomalies present from previous glossectomy however adequate lateralization and ROM.    Cough/Swallow/Gag Reflex (Oral Motor)   Volitional Throat Clear/Cough (Oral Motor) WNL   Volitional Swallow (Oral Motor) WNL   Vocal Quality/Secretion Management (Oral Motor)   Vocal Quality (Oral Motor) WNL    Secretion Management (Oral Motor) WNL   General Swallowing Observations   Current Diet/Method of Nutritional Intake (General Swallowing Observations, NIS) NPO   Respiratory Support (General Swallowing Observations) none   Comment, Secretions/Suctioning Pt frequently having to spit out secretions.   Swallowing Evaluation Clinical swallow evaluation   Clinical Swallow Evaluation   Feeding Assistance no assistance needed   Additional evaluation(s) completed today No   Clinical Swallow Evaluation Textures Trialed Thin Liquids;Solid Foods   Clinical Swallow Eval: Thin Liquid Texture Trial   Mode of Presentation, Thin Liquids cup   Volume of Liquid or Food Presented 2 oz   Oral Phase of Swallow WFL   Pharyngeal Phase of Swallow impaired;throat clearing   Diagnostic Statement Note that pt has throat clearing with thin liquids however difficult to assess as pt independently completing modified supra glottic swallow strategy (swallow-cough-swallow) which he has been previously instructed by SLP to do following VFSS. Pt reports he independently and consistently follows strategy at baseline. Also note that pt does have history of silent aspiration, therefore difficult to truly assess pharyngeal swallow function and tolerance at bedside.    Clinical Swallow Evaluation: Solid Food Texture Trial   Mode of Presentation, Solid spoon;self-fed   Volume of Solid Food Presented 4 tsp   Oral Phase, Solid WFL   Pharyngeal Phase, Solid impaired;feeling of something stuck in throat   Diagnostic Statement Pt demonstrates adequate oral phase, however consistent feeling of partial bolus sticking in throat. Pt requiring cough and liquid wash to help clear bolus.    Esophageal Phase of Swallow   Patient reports or presents with symptoms of esophageal dysphagia Yes   Esophageal comments Globus sensation with puree solids   Swallowing Recommendations   Diet Consistency Recommendations dysphagia level 1 (pureed);thin liquids   Supervision Level  for Intake patient independent   Mode of Delivery Recommendations bolus size, small   Swallowing Maneuver Recommendations alternate food and liquid intake   Monitoring/Assistance Required (Eating/Swallowing) stop eating activities when fatigue is present   Medication Administration Recommendations, Swallowing (SLP) whole with thin liquids or puree solids, per pt preference   Instrumental Assessment Recommendations instrumental evaluation not recommended at this time   SLP Therapy Assessment/Plan   Criteria for Skilled Therapeutic Interventions Met (SLP Eval) yes;treatment indicated   SLP Diagnosis moderate oropharyngeal dysphagia    Rehab Potential (SLP Eval) good, to achieve stated therapy goals   Therapy Frequency (SLP Eval) 5 times/wk   Predicted Duration of Therapy Intervention (SLP Eval) 2 weeks   Comment, Therapy Assessment/Plan (SLP) Bedside swallow evaluation completed per MD orders. Oral mechanism remarkable for structural anomolies with decreased lingual protrusion. Pt presents with moderate oropharyngeal dysphagia and DD1/thin liquid diet is recommended with medications to be administered whole in puree or with thin liquid, per pt preference. Pt should complete modified supra glottic swallow strategy (swallow- cough- swallow) with all PO.  Pt was assessed with thin liquids and puree solids (pts current baseline diet). Pt demonstrated functional oral phase with puree solids and thin liquids. With puree solids, pt has consistent feeling of partial bolus sticking in throat. Pt requiring cough and liquid wash to help clear bolus and with thin liquids pt has throat clearing second swallow. However note that thin liquids are difficult to assess tolerance as pt independently completing modified supra glottic swallow strategy (swallow-cough-swallow) which he has been previously instructed by SLP to do following VFSS. Also note that pt does have history of silent aspiration, therefore difficult to truly assess  pharyngeal swallow function and tolerance at bedside. Recommend pt initiate DD1/thin liquid diet at this time. Pt should be fully awake and alert with all PO and complete modified supra glottic swallow strategy with all PO. If pt remains acutely admitted, he may benefit from video swallow study. Otherwise pt reports he has established POC with outpatient speech therapy and MD to have esophageal dilation and pending improvement in swallow function further discussions regarding alternative means of nutrition to be had following dilation. Speech therapy will continue to follow while pt is acutely admitted.    Therapy Plan Review/Discharge Plan (SLP)   Therapy Plan Review (SLP) evaluation/treatment results reviewed;care plan/treatment goals reviewed;risks/benefits reviewed;current/potential barriers reviewed;participants voiced agreement with care plan;participants included;patient   SLP Discharge Planning    SLP Discharge Recommendation (DC Rec) home with outpatient speech therapy   SLP Rationale for DC Rec Below baseline swallow function with ongoing speech therapy warranted as outpatient   SLP Brief overview of current status  Recommend DD1/thin liquid diet. Pt should complete swallow-cough-swallow strategy with all PO. Meds okay to be given whole with thin liquid or puree solids per pt preference. Pt independently completes swallow compensatory strategies at baseline. Speech therapy will continue to follow while pt is acutely admitted    Total Evaluation Time   Total Evaluation Time (Minutes) 18

## 2021-06-28 NOTE — PROGRESS NOTES
Patient advised of blood work results     [facility]: Post-Discharge Note  SITUATION                                                      Admission:    Admission Date: 06/26/21   Reason for Admission: (Oral Swelling)  Discharge:   Discharge Date: 06/27/21  Discharge Diagnosis: (Neck pain)  Discharge Plan: (Follow up with primary care provider, Clark Marie, )    BACKGROUND                                                      Reese Oakley is a 71 year old male who has a history of tongue cancer s/p surgery, radiation, and chemotherapy which was all completed in March of 2021. He is coming in with oropharyngeal swelling, difficulty swallowing, and neck pain. Transfer from [facility] through [facility] ED. During his visit today he had a CT of the neck which shows oropharyngeal soft tissue swelling that involves the entire hypopharynx including epiglottic fold, vallecula, base of the tongue, and epiglottis as well as the soft palate, there is an area of possible necrosis with air and fluid collection in the base of the right side of the tongue. ENT is aware of his transfer here. On arrival to the ED he is speaking full sentences, he does spit up secretions occasionally. He states over the last 24 hours he has noticed increased discomfort and difficulty swallowing cream of wheat and pills as well as liquids. In addition he thinks that due to his difficulty swallowing at baseline has lost 20 to 30 lbs. since January 2021. He has been using Magic mouthwash and oral lidocaine for pain in the mouth without relief, he also uses oral oxycodone but does have increased pain despite that.    ASSESSMENT      Discharge Assessment  Patient reports symptoms are: Unchanged  Does the patient have all of their medications?: Yes  Does patient know what their new medications are for?: Yes  Does patient have a follow-up appointment scheduled?: No(Wife is trying to get him in today to be seen. )  Does patient have any other questions or concerns?:  "Yes(\" Very Painful dont want to talk talk to wife \")    Post-op  Did the patient have surgery or a procedure: No  Fever: No  Chills: No  Eating & Drinking: unable to tolerate solid foods(to painful to swallow)  PO Intake: full liquids  Urinary Status: voiding without complaint/concerns        Outpatient Plan: Follow up with primary care provider, Clark Marie, within 7 days for hospital follow- up. Your activity upon discharge: activity as tolerated  Diet  Follow this diet upon discharge: Orders Placed This Encounter  Dysphagia Diet Level 1 Pureed with Thin Liquids (water, ice chips, juice, milk gelatin, ice cream, etc)  No future appointments.        Ghazala Lewis MA                  "

## 2021-06-29 NOTE — PROGRESS NOTES
HISTORY OF PRESENT ILLNESS:  Reese Oakley is 71 years of age.  Over the weekend, he had some kind of issue where he felt that he was either tight or having some problems breathing.  He was taken to the Emergency Room in [place] and was admitted to the hospital.  It was noted that he had some diffuse hypopharyngeal swelling compared to a normal examination, but again this is a gentleman who just finished radiation therapy over the past couple of months.  He remains with oral cavity pain that we cannot figure out the etiology for this.  There are a couple areas that were positive on PET scan a few weeks after the surgery that we did.  One of these is in his chest and he probably needs to have a biopsy of this, but the area is close to the aorta.  He is not having almost anything by mouth at the present time.  He is generally a very cheerful gentleman, and this is well beyond almost anything that we were able to imagine after this past couple of months or so.  He had an oral cavity ulcer that we resected twice.  This ulcer was found to be without any premalignant changes.    PHYSICAL EXAMINATION:  The patient is alert, oriented x3, and pleasant.  Skin of face, lips and neck on him is otherwise reasonably normal.  We palpated the hyoid bone on the right side, and there may be some point tenderness in his hyoid bone that is quite severe.  In his oral cavity and oropharynx, he still has an ulcer in the oral cavity that is not healed yet because he has been radiated.  He has diabetes.  His floor of mouth, base of tongue, and root of tongue all palpate as fairly normal.    PROCEDURE NOTE:  I did a flexible direct scope on him transnasally as well after topical anesthesia.  The nasopharynx, oropharynx, hypopharynx all look excellent.  He has this area of mucus that is pooled in the vallecula on the right side.  I tried to have him cough this out.  The entire surrounding mucosa in all of these areas is pink.  There are  no signs of fungal infection or anything else of this nature.  It looks quite well.  Neck exam is as described.    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  I think at the present time that we have to try and get him a feeding tube placed, and I have to take him to the operating room to really examine him with direct laryngoscopy and directed biopsies of things that do not appear absolutely normal.  The patient understands and agrees as well as his wife.  We are going to try to get him scheduled for the near future.

## 2021-06-29 NOTE — LETTER
6/29/2021       RE: Reese Oakley  [address] [id number]     Dear Colleague,    Thank you for referring your patient, Reese Oakley, to the [facility] [place] [facility]. Please see a copy of my visit note below.    HISTORY OF PRESENT ILLNESS:  Reese Oakley is 71 years of age.  Over the weekend, he had some kind of issue where he felt that he was either tight or having some problems breathing.  He was taken to the Emergency Room in [place] and was admitted to the hospital.  It was noted that he had some diffuse hypopharyngeal swelling compared to a normal examination, but again this is a gentleman who just finished radiation therapy over the past couple of months.  He remains with oral cavity pain that we cannot figure out the etiology for this.  There are a couple areas that were positive on PET scan a few weeks after the surgery that we did.  One of these is in his chest and he probably needs to have a biopsy of this, but the area is close to the aorta.  He is not having almost anything by mouth at the present time.  He is generally a very cheerful gentleman, and this is well beyond almost anything that we were able to imagine after this past couple of months or so.  He had an oral cavity ulcer that we resected twice.  This ulcer was found to be without any premalignant changes.    PHYSICAL EXAMINATION:  The patient is alert, oriented x3, and pleasant.  Skin of face, lips and neck on him is otherwise reasonably normal.  We palpated the hyoid bone on the right side, and there may be some point tenderness in his hyoid bone that is quite severe.  In his oral cavity and oropharynx, he still has an ulcer in the oral cavity that is not healed yet because he has been radiated.  He has diabetes.  His floor of mouth, base of tongue, and root of tongue all palpate as fairly normal.    PROCEDURE  NOTE:  I did a flexible direct scope on him transnasally as well after topical anesthesia.  The nasopharynx, oropharynx, hypopharynx all look excellent.  He has this area of mucus that is pooled in the vallecula on the right side.  I tried to have him cough this out.  The entire surrounding mucosa in all of these areas is pink.  There are no signs of fungal infection or anything else of this nature.  It looks quite well.  Neck exam is as described.    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  I think at the present time that we have to try and get him a feeding tube placed, and I have to take him to the operating room to really examine him with direct laryngoscopy and directed biopsies of things that do not appear absolutely normal.  The patient understands and agrees as well as his wife.  We are going to try to get him scheduled for the near future.      Again, thank you for allowing me to participate in the care of your patient.      Sincerely,    Jose Antonio Mckenna MD

## 2021-06-29 NOTE — NURSING NOTE
Called and spoke with [facility] to arrange enteral tube feeding supplies. They will review benefits and call back with updates and to arrange shipment.     Provided enfit syringe and advised wife on put in water and ensure until they receive supplies and tube feeding. Wife verbalized understanding and was encouraged to call with further questions or concerns.     Advised patient and wife they will need updated pre-op physical prior to procedure on Monday. They verbalized understanding and will contact PCP     Blanka Barnett, RN, BSN

## 2021-06-30 NOTE — PROGRESS NOTES
Called to check in with patient today after NG tube placement yesterday. Patient indicates that tube feeding in going okay at home with syringe. He states that he does not like the tube through the nose. Discussed with patient that this is temporary until Monday when he will have the PEG placed, but it is important for him to have some way of getting nutrition in. Patient verbalized understanding.     Called and left message for wife as she was not home when writer was talking with patient. Left direct line and encouraged her to return call with any concerns.       Blanka Barnett, RN, BSN

## 2021-07-01 NOTE — TELEPHONE ENCOUNTER
Spoke with patient to schedule procedure with Dr. Indio Zamora   Procedure was scheduled on 07/05 at [facility] OR  Patient will have H&P at [facility]     Patient is aware a COVID-19 test is needed before their procedure. The test should be with-in 4 days of their procedure.   Test Details: Date 07/02 Location [facility]    Patient is aware a / is needed day of surgery.   Surgery letter was sent via [employer], patient has my direct contact information for any further questions.

## 2021-07-02 NOTE — TELEPHONE ENCOUNTER
Received a call from patient's wife indicating that on mychart  It appears that procedure on Monday also include port placement. Advised wife that patient is not having port placed, patient already has port in place. Writer spoke with surgery scheduler who will call OR to take this off case. Wife verbalized understanding and was encouraged to call with further questions or concerns.     Blanka Barnett, RN, BSN

## 2021-07-03 NOTE — ANESTHESIA PREPROCEDURE EVALUATION
Anesthesia Pre-Procedure Evaluation    Patient: Reese Oakley   MRN: [id number] : 1950        Preoperative Diagnosis: Tonsil cancer (H) [C09.9]   Procedure : Procedure(s):  LARYNGOSCOPY, WITH BIOPSY  INSERTION, PEG TUBE  ENDOBRONCHIAL ULTRASOUND, WITH FLEXIBLE BRONCHOSCOPY     Past Medical History:   Diagnosis Date     Adenocarcinoma (H)     large instestine      Cancer of appendix (H)      Diabetes (H)      Gastro-oesophageal reflux disease      History of radiation therapy      Hoarseness      Hypertension       Past Surgical History:   Procedure Laterality Date     APPENDECTOMY       COLECTOMY      Right     DISSECTION RADICAL NECK MODIFIED Right 2020    Procedure: Modified neck dissection;  Surgeon: Jose Antonio Mckenna MD;  Location: [facility] OR     EXCISE LESION INTRAORAL Right 2020    Procedure: Wide Local Excision of Right Tongue Lesion;  Surgeon: Jose Antonio Mckenna MD;  Location: [facility] OR     INSERT PORT VASCULAR ACCESS Right 1/15/2021    Procedure: CHEST INSERTION, VASCULAR ACCESS PORT @0900;  Surgeon: Tony Hopkins MD;  Location: [facility] OR     IR CHEST PORT PLACEMENT > 5 YRS OF AGE  1/15/2021     LASER CO2 LARYNGOSCOPY N/A 10/6/2014    Procedure: LASER CO2 LARYNGOSCOPY;  Surgeon: Jose Antonio Mckenna MD;  Location: [facility] OR     LASER CO2 LESION ORAL N/A 2021    Procedure: Excision of tongue ulcer with omniguide laser;  Surgeon: Jose Antonio Mckenna MD;  Location: [facility] OR      No Known Allergies   Social History     Tobacco Use     Smoking status: Never Smoker     Smokeless tobacco: Never Used   Substance Use Topics     Alcohol use: Not Currently     Alcohol/week: 0.0 standard drinks     Comment: none for 20 years      Wt Readings from Last 1 Encounters:   21 71.2 kg (157 lb)        Anesthesia Evaluation            ROS/MED HX  ENT/Pulmonary:       Neurologic:     (+) peripheral neuropathy,     Cardiovascular:     (+) Dyslipidemia hypertension-----Previous cardiac testing    Echo: Date: Results:    Stress Test: Date: Results:    ECG Reviewed: Date: 5/24/21 Results:  SB, PACs  Cath: Date: Results:      METS/Exercise Tolerance:     Hematologic:       Musculoskeletal:       GI/Hepatic:     (+) GERD,     Renal/Genitourinary:       Endo:     (+) type II DM,     Psychiatric/Substance Use:       Infectious Disease:       Malignancy: Comment: History of Tonsil cancer, appendix cancer, colon cancer, squamous cell cancer of neck  (+) Malignancy,     Other:            Physical Exam    Airway        Mallampati: IV   TM distance: > 3 FB   Neck ROM: full   Mouth opening: < 3 cm    Respiratory Devices and Support         Dental    unable to assess        Cardiovascular   cardiovascular exam normal          Pulmonary   pulmonary exam normal                OUTSIDE LABS:  CBC:   Lab Results   Component Value Date    WBC 12.9 (H) 06/27/2021    WBC 13.1 (H) 06/26/2021    HGB 12.2 (L) 06/27/2021    HGB 11.4 (L) 06/26/2021    HCT 37.8 (L) 06/27/2021    HCT 35.5 (L) 06/26/2021     06/27/2021     06/26/2021     BMP:   Lab Results   Component Value Date     (L) 06/27/2021     06/26/2021    POTASSIUM 4.9 06/27/2021    POTASSIUM 4.4 06/26/2021    CHLORIDE 99 06/27/2021    CHLORIDE 100 06/26/2021    CO2 24 06/27/2021    CO2 28 06/26/2021    BUN 23 06/27/2021    BUN 14 06/26/2021    CR 1.16 06/27/2021    CR 1.23 06/26/2021     (H) 06/27/2021    GLC 80 06/26/2021     COAGS:   Lab Results   Component Value Date    INR 1.08 01/15/2021     POC:   Lab Results   Component Value Date     (H) 06/27/2021     HEPATIC:   Lab Results   Component Value Date    ALBUMIN 3.2 (L) 03/01/2021    PROTTOTAL 7.7 03/01/2021    ALT 24 03/01/2021    AST 18 03/01/2021    ALKPHOS 89 03/01/2021    BILITOTAL 0.5 03/01/2021     OTHER:   Lab Results   Component Value Date    A1C 7.8 (H) 06/26/2021    ELEUTERIO 9.5 06/27/2021    MAG 1.6 02/01/2021       Anesthesia Plan    ASA Status:  3      Anesthesia Type:  General.     - Airway: ETT   Induction: Intravenous.   Maintenance: Inhalation.   Techniques and Equipment:     - Airway: Video-Laryngoscope         Consents    Anesthesia Plan(s) and associated risks, benefits, and realistic alternatives discussed. Questions answered and patient/representative(s) expressed understanding.     - Discussed with:  Patient         Postoperative Care    Pain management: IV analgesics.   PONV prophylaxis: Ondansetron (or other 5HT-3)     Comments:                ROMARIO ALVAREZ MD

## 2021-07-05 NOTE — ANESTHESIA CARE TRANSFER NOTE
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    Anesthesia Type:   General     Note:    Oropharynx: oropharynx clear of all foreign objects and spontaneously breathing  Level of Consciousness: awake  Oxygen Supplementation: face mask  Level of Supplemental Oxygen (L/min / FiO2): 6  Independent Airway: airway patency satisfactory and stable  Dentition: dentition unchanged  Vital Signs Stable: post-procedure vital signs reviewed and stable  Report to RN Given: handoff report given  Patient transferred to: PACU    Handoff Report: Identifed the Patient, Identified the Reponsible Provider, Reviewed the pertinent medical history, Discussed the surgical course, Reviewed Intra-OP anesthesia mangement and issues during anesthesia, Set expectations for post-procedure period and Allowed opportunity for questions and acknowledgement of understanding      Vitals: (Last set prior to Anesthesia Care Transfer)  CRNA VITALS  7/5/2021 1421 - 7/5/2021 1454      7/5/2021             NIBP:  124/64    NIBP Mean:  75    Ht Rate:  54    SpO2:  100 %        Electronically Signed By: KARINA Murillo CRNA  July 5, 2021  2:54 PM

## 2021-07-05 NOTE — ANESTHESIA PROCEDURE NOTES
Airway       Patient location during procedure: OR  Staff -        CRNA: Alise Solomon APRN CRNA       Performed By: CRNA  Consent for Airway        Urgency: elective  Indications and Patient Condition       Indications for airway management: chuck-procedural       Induction type:intravenous       Mask difficulty assessment: 2 - vent by mask + OA or adjuvant +/- NMBA    Final Airway Details       Final airway type: endotracheal airway       Successful airway: ETT - single  Endotracheal Airway Details        ETT size (mm): 8.5       Cuffed: yes       Successful intubation technique: video laryngoscopy       VL Blade Size: MAC D Blade       Grade View of Cords: 1 (Glottic opening deviated to left)       Adjucts: stylet (Glidescope stylet)       Position: Left       Measured from: lips       Secured at (cm): 23       Bite block used: None    Post intubation assessment        Placement verified by: capnometry, equal breath sounds and chest rise        Number of attempts at approach: 1       Secured with: pink tape       Ease of procedure: easy       Dentition: Intact and Unchanged    Medication(s) Administered   Medication Administration Time: 7/5/2021 12:51 PM

## 2021-07-05 NOTE — OR NURSING
"Dr Shine paged at 1531:  \"PACU pt Kenrick Oakley needs discharge order. please let me know if there are any questions or clarifications, Abbie KLINE *[id number]\"    Dr Cabral called at 1553 for verbal sign out, reports he will put in. Pt remains resting comfortably in bed in no acute distress, able to make needs known.  "

## 2021-07-05 NOTE — BRIEF OP NOTE
[facility]    Brief Operative Note    Pre-operative diagnosis: Tonsil cancer (H) [C09.9]  Post-operative diagnosis Same as pre-operative diagnosis    Procedure: Procedure(s):  LARYNGOSCOPY, WITH BIOPSY  INSERTION, PEG TUBE  ENDOBRONCHIAL ULTRASOUND, WITH FLEXIBLE BRONCHOSCOPY  Surgeon: Surgeon(s) and Role:  Panel 1:     * Jose Antonio Mckenna MD - Primary  Panel 2:     * Indio Zamora MD - Primary  Anesthesia: General   Estimated blood loss: Minimal   Drains: None  Specimens:   ID Type Source Tests Collected by Time Destination   A : right vallecula Tissue Other SURGICAL PATHOLOGY EXAM Jose Antonio Mckenna MD 7/5/2021  1:07 PM    B : 11L, Level 7 Lymph Node Tissue Other FINE NEEDLE ASPIRATION Indio Zamora MD 7/5/2021  1:50 PM      Findings:   None.  Complications: None.  Implants: * No implants in log *

## 2021-07-05 NOTE — OR NURSING
Discharge instructions provided and reviewed with Meghan (spouse), designated caregiver. Printed after visit summary sent home with patient. Understanding verbalized.    No new scripts today. Ok per Dr Vivas to give tylenol, oxy and magic mouthwash (patient's home pain regimen) as they have 1+ hr drive home and pt is having chronic pain in mouth in phase II.    PEG to gravity drainage for today w clear liquid diet per discharge orders.    60+ minutes spent educating patient and wife Meghan on how to clamp/unclamp/connect syringe/disconnect gravity drainage bag to PEG. Meghan reported getting home supplies in the form of syringes (enfit 60 ml) and tube feeding but did not get preop education about how to connect enfit syringe to PEG tubing. She has a phone # for home care and has ENT and thoracic clinic phone numbers on AVS and I encouraged her to call with any questions or concerns.

## 2021-07-05 NOTE — ANESTHESIA POSTPROCEDURE EVALUATION
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    Anesthesia Type:  General    Note:  Disposition: Outpatient   Postop Pain Control: Uneventful            Sign Out: Well controlled pain   PONV: No   Neuro/Psych: Uneventful            Sign Out: Acceptable/Baseline neuro status   Airway/Respiratory: Uneventful            Sign Out: Acceptable/Baseline resp. status   CV/Hemodynamics: Uneventful            Sign Out: Acceptable CV status; No obvious hypovolemia; No obvious fluid overload   Other NRE: NONE   DID A NON-ROUTINE EVENT OCCUR? No           Last vitals:  Vitals:    07/05/21 1500 07/05/21 1515 07/05/21 1530   BP: 124/64 116/52 114/54   Pulse: 55 50 51   Resp: 15     Temp: 36.3  C (97.3  F)  36.8  C (98.2  F)   SpO2: 100% 99% 98%       Last vitals prior to Anesthesia Care Transfer:  CRNA VITALS  7/5/2021 1421 - 7/5/2021 1521      7/5/2021             NIBP:  124/64    NIBP Mean:  75    Ht Rate:  54    SpO2:  100 %          Electronically Signed By: Ken Cabral DO  July 5, 2021  3:39 PM

## 2021-07-08 NOTE — TELEPHONE ENCOUNTER
Called patient's wife to review the following pathology results:    SPECIMEN(S):   Right vallecula     FINAL DIAGNOSIS:   RIGHT VALLECULA, BIOPSY:   - Necroinflammatory debris and fragments of squamous mucosa with acute   inflammation and ulceration, negative   for dysplasia or malignancy.   - GMS special stain highlights presence of fungal hyphae.     Patient will continue with plan to follow-up with Dr. Mckenna on Tuesday as scheduled.    Refill on liquid tylenol ordered per request of wife.     Blanka Barnett, RN, BSN

## 2021-07-08 NOTE — PROGRESS NOTES
This is a recent snapshot of the patient's [facility] medical record.  For current drug dose and complete information and questions, call [phone] or In Basket pool, [facility] home infusion ([phone])  CSN Number:  [id number]

## 2021-07-13 NOTE — PROGRESS NOTES
"  Otolaryngology Clinic      Name: Reese Oakley  MRN: [id number]  Age: 71 year old  : 2021      Chief Complaint:   RECHECK     History of Present Illness:   Reese Oakley is a 71 year old male with a history of squamous carcinoma of neck who presents for follow up 8 days s/p laryngoscopy with biopsy. Since surgery, the patient reports that he is generally doing well. His swallowing and spitting up are both improved.    Today, the patient reports pain in his right jaw when sleeping on his right side. He was using Trental as directed, but stopped due to new onset of dysgeusia.     The patient also endorses a tingling sensation on the tip of his tongue that has not improved with magic mouthwash and Gabapentin.     He also reports constipation and uses Miralax without relief.     Review of Systems:   Pertinent items are noted in HPI or as in patient entered ROS below, remainder of complete ROS is negative.    ENT ROS 2020   Ears, Nose, Throat Trouble swallowing   Endocrine -      Physical Exam:   Ht 1.702 m (5' 7\")   Wt 69.4 kg (153 lb)   BMI 23.96 kg/m       PHYSICAL EXAMINATION:    Constitutional:  The patient was accompanied by his wife, well-groomed, and in no acute distress.    Skin:  Warm and pink.    Neurologic:  Alert and oriented x 3.  CN's III-XII within normal limits.  Voice normal.   Psychiatric:  The patient's affect was calm, cooperative, and appropriate. Respiratory:  Breathing comfortably without stridor or exertion of accessory muscles.    Eyes: Extraocular movement intact.    Head:  Normocephalic and atraumatic.  No lesions or scars.    Nose:  Sinuses were non-tender.  Anterior rhinoscopy revealed midline septum and absence of purulence or polyps.    OC/OP:  Normal tongue, floor of mouth, buccal mucosa, and palate.  No lesions or masses on inspection or palpation.  No abnormal lymph tissue in the oropharynx.  The pterygoid region is non-tender.    Neck:  Supple " with normal laryngeal and tracheal landmarks.  The parotid beds were without masses.  No palpable thyroid.  Lymphatic:  There is no palpable lymphadenopathy in the neck.      Assessment and Plan:  Oral pain  - oxyCODONE (ROXICODONE) 5 MG/5ML solution  Dispense: 500 mL; Refill: 0  - Vitamin E 15 UNIT/0.3ML SOLN  Dispense: 1200 mL; Refill: 3    Actinomycosis  - gabapentin (NEURONTIN) 250 MG/5ML solution  Dispense: 300 mL; Refill: 1    Reese Oakley is a 71 year old male with a history of squamous carcinoma of neck who presents for follow up 8 days s/p laryngoscopy with biopsy. 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   We discussed possibility of recurrent surgeries to clean out this hyoid bone area. I'm going to refill Diflucan, oxycodone and vitamin E. SLP encouraged eventually trying to get him back to swallowing medications again. I encouraged milk of magnesia for constipation. We'll also put him on Gabapentin for his tongue. I would like to see him again in about 3 weeks to follow up on all of his symptoms.     Follow-up: in about 3 weeks       Scribe Disclosure:  I, Mary Peters, am serving as a scribe to document services personally performed by Jose Antonio Mckenna MD at this visit, based upon the provider's statements to me. All documentation has been reviewed by the aforementioned provider prior to being entered into the official medical record.

## 2021-07-13 NOTE — LETTER
"2021       RE: Reese Oakley  [address] [address]     Dear Colleague,    Thank you for referring your patient, Reese Oakley, to the [facility] [place] [facility]. Please see a copy of my visit note below.      Otolaryngology Clinic      Name: Reese Oakley  MRN: [id number]  Age: 71 year old  : 2022      Chief Complaint:   RECHECK     History of Present Illness:   Reese Oakley is a 71 year old male with a history of squamous carcinoma of neck who presents for follow up 8 days s/p laryngoscopy with biopsy. Since surgery, the patient reports that he is generally doing well. His swallowing and spitting up are both improved.    Today, the patient reports pain in his right jaw when sleeping on his right side. He was using Trental as directed, but stopped due to new onset of dysgeusia.     The patient also endorses a tingling sensation on the tip of his tongue that has not improved with magic mouthwash and Gabapentin.     He also reports constipation and uses Miralax without relief.     Review of Systems:   Pertinent items are noted in HPI or as in patient entered ROS below, remainder of complete ROS is negative.    ENT ROS 2020   Ears, Nose, Throat Trouble swallowing   Endocrine -      Physical Exam:   Ht 1.702 m (5' 7\")   Wt 69.4 kg (153 lb)   BMI 23.96 kg/m       PHYSICAL EXAMINATION:    Constitutional:  The patient was accompanied by his wife, well-groomed, and in no acute distress.    Skin:  Warm and pink.    Neurologic:  Alert and oriented x 3.  CN's III-XII within normal limits.  Voice normal.   Psychiatric:  The patient's affect was calm, cooperative, and appropriate. Respiratory:  Breathing comfortably without stridor or exertion of accessory muscles.    Eyes: Extraocular movement intact.    Head:  Normocephalic and atraumatic.  No lesions or " scars.    Nose:  Sinuses were non-tender.  Anterior rhinoscopy revealed midline septum and absence of purulence or polyps.    OC/OP:  Normal tongue, floor of mouth, buccal mucosa, and palate.  No lesions or masses on inspection or palpation.  No abnormal lymph tissue in the oropharynx.  The pterygoid region is non-tender.    Neck:  Supple with normal laryngeal and tracheal landmarks.  The parotid beds were without masses.  No palpable thyroid.  Lymphatic:  There is no palpable lymphadenopathy in the neck.      Assessment and Plan:  Oral pain  - oxyCODONE (ROXICODONE) 5 MG/5ML solution  Dispense: 500 mL; Refill: 0  - Vitamin E 15 UNIT/0.3ML SOLN  Dispense: 1200 mL; Refill: 3    Actinomycosis  - gabapentin (NEURONTIN) 250 MG/5ML solution  Dispense: 300 mL; Refill: 1    Reese Oakley is a 71 year old male with a history of squamous carcinoma of neck who presents for follow up 8 days s/p laryngoscopy with biopsy. 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   We discussed possibility of recurrent surgeries to clean out this hyoid bone area. I'm going to refill Diflucan, oxycodone and vitamin E. SLP encouraged eventually trying to get him back to swallowing medications again. I encouraged milk of magnesia for constipation. We'll also put him on Gabapentin for his tongue. I would like to see him again in about 3 weeks to follow up on all of his symptoms.     Follow-up: in about 3 weeks       Scribe Disclosure:  I, Mary Peters, am serving as a scribe to document services personally performed by Jose Antonio Mckenna MD at this visit, based upon the provider's statements to me. All documentation has been reviewed by the aforementioned provider prior to being entered into the official medical record.         Again, thank you for allowing me to participate in the care of your patient.      Sincerely,    Jose Antonio Mckenna MD

## 2021-07-13 NOTE — OP NOTE
Preoperative diagnosis:                         Tonsil cancer  Postoperative diagnosis:                      Tonsil cancer    Procedure:   1. EGD  2. PEG insertion  3. Bronchoscopy with endobronchial ultrasound-guided biopsy    Anesthesia: General   Surgeon: Indio Zamora   EBL:  Less than 5 cc    Complications: none immediate     Description of procedure  The patient was brought to the room and placed supine upon the table.  After confirming the patient's identity and the consent, appropriate monitoring devices were placed.  General anesthesia was administered and the patient was intubated.   The endoscope was passed into the posterior pharynx and into the esophagus without difficulty.   The GE junction was located at 40 cm from the incisors and there was no evidence of a hernia.  The stomach was easily entered.  The stomach was insufflated and the proposed PEG site showed 1 to 1 transmission of pressure.  The finder needle entered the stomach easily and a snare was used to pull the guide wire out of the mouth.  A 20 Fr PEG tube was then passed through the esophagus into the stomach and the PEG was secured with a bumperat the skin.  The PEG appeared appropriately placed against the stomach wall.  The air was aspirated and the endoscope was withdrawn.  A sterile dressing was placed.  The catheter was placed to gravity drainage.     The EBUS bronchoscope was passed into the tracheobronchial tree and into the left upper lobe. A lymph node was identified and several passes were made. Biopsies were also taken from level 7. The airway was inspected and there was no significant bleeding.

## 2021-07-14 NOTE — LETTER
"    7/14/2021         RE: Reese Oakley  [address] [facility] [address]        Dear Colleague,    Thank you for referring your patient, Reese Oakley, to the [facility]. Please see a copy of my visit note below.    Kenrick is a 71 year old who is being evaluated via a billable telephone visit.      What phone number would you like to be contacted at? [phone]  How would you like to obtain your AVS? [employer]     Phone call duration: 30 minutes    Abibe Collins MA      Telephone follow-up visit    REASON FOR VISIT:  PEG follow-up    PROCEDURES PERFORMED:  EGD, PEG placement, bronchoscopy    Assessment:  \"I'm doing well, have had a PEG tube in the past so I feel comfortable with it\".   Kenrick reports that his PEG site has minimal drainage and is working well.   I reviewed with him the importance of keeping the site clean and skin protection with a moisture barrier cream, then gauze dressing.          Overall he is doing well and following up with local providers.   He will call as needed for any concerns or questions.    Plan:  Return PRN    Total time:  30 minutes    KARINA Vergara, CNS            Again, thank you for allowing me to participate in the care of your patient.        Sincerely,        KARINA Todd CNS    "

## 2021-07-14 NOTE — PROGRESS NOTES
"Kenrick is a 71 year old who is being evaluated via a billable telephone visit.      What phone number would you like to be contacted at? [phone]  How would you like to obtain your AVS? [place]     Phone call duration: 30 minutes    Abbie Collins MA      Telephone follow-up visit    REASON FOR VISIT:  PEG follow-up    PROCEDURES PERFORMED:  EGD, PEG placement, bronchoscopy    Assessment:  \"I'm doing well, have had a PEG tube in the past so I feel comfortable with it\".   Kenrick reports that his PEG site has minimal drainage and is working well.   I reviewed with him the importance of keeping the site clean and skin protection with a moisture barrier cream, then gauze dressing.          Overall he is doing well and following up with local providers.   He will call as needed for any concerns or questions.    Plan:  Return PRN    Total time:  30 minutes    KARINA Vergara, CNS        "

## 2021-07-15 NOTE — TELEPHONE ENCOUNTER
Received call from patient's wife indicating that vitamin E Liquid is too expenssive. Advised wife that we can prescribe the pill form but she has to copeland the capsule to get the fluid out and place that through the G tube. She verbalized understanding and would prefer to do this. Prescription for Vitamin E capsules sent to pharmacy.       Blanka Barnett, RN, BSN

## 2021-07-27 NOTE — TELEPHONE ENCOUNTER
oxyCODONE (ROXICODONE) 5 MG/5ML solution      Last Written Prescription Date:  7/13/21  Last Fill Quantity: 500 ml,   # refills: 0  Last Office Visit : 7/13/21  Future Office visit:  8/3/21    Routing refill request to provider for review/approval because:  Drug controlled substance

## 2021-07-27 NOTE — TELEPHONE ENCOUNTER
[facility] Call Center    Phone Message    May a detailed message be left on voicemail: yes     Reason for Call: Medication Refill Request    Has the patient contacted the pharmacy for the refill? Yes   Name of medication being requested: Roxicodone  Provider who prescribed the medication: Dr. Mckenna  Pharmacy: [facility] [address] [place] [address] [address]  Date medication is needed: 07/28/21       Action Taken: Message routed to:  [facility] ENT    Travel Screening: Not Applicable

## 2021-07-29 NOTE — OP NOTE
Procedure Date: 2021    PREOPERATIVE DIAGNOSIS:  Oral cavity  cacer history  POSTOPERATIVE DIAGNOSIS:  Oral cavity cancer history   PROCEDURE:  Direct laryngoscopy and biopsy.    SURGEONS:  Jose Antonio Mckenna MD and Isak Carrera MD    INDICATION FOR SURGERY:  Reese Chan is a gentleman who has got a history of an oropharyngeal malignancy.  He is having a fair amount of pain in his throat.  He is here now to have this area examined and biopsied.    DESCRIPTION OF PROCEDURE:  After informed consent was obtained from the patient, he was brought to the operating room.  General endotracheal anesthesia was established.  The area was examined with a Dedo microscope.  His oral cavity and oropharynx were clear.  However, when we got into the hypopharynx on the right side, there was a fair amount of necrotic-appearing tissue that was associated with the hyoid bone as well.  We went ahead, and we were actually able to remove this necrotic tissue with a series of biopsies, and we were able to actually see portions of the hyoid bone poking its way through the pharynx.  This area looks like there may be some devitalization and radiation damage of this area.  We irrigated things out at the end of this.  We did not see anything that grossly looked like tumor, and we concluded the case and turned it over to the Thoracic Surgery gentleman.    Jose Antonio Mckenna MD        D: 2021   T: 2021   MT: ALINA    Name:     REESE CHAN  MRN:      [id number]        Account:        [id number]   :      1950           Procedure Date: 2021     Document: [id number]

## 2021-08-03 NOTE — PROGRESS NOTES
"  Otolaryngology Clinic    Name: Reese Oakley  MRN: [id number]  Age: 71 year old  : 2021      Chief Complaint:   Follow up     History of Present Illness:   Reese Oakley is a 71 year old male with a history of squamous carcinoma of neck who presents for follow up 3 weeks status post laryngoscopy with biopsy. The patient was last seen on  for a 1 week recheck. He was started on a Gabapentin oral solution at that time for tongue tingling.     Today, the patient endorses a productive cough with occasional blood noted. He states he thinks he may have a cold with associated rhinorrhea as well. He also endorses some stinging pain in his tongue along with sharp jaw pain. He states he has not been able to sleep and only sleeps about 1 hour per night. The patient notes he has been getting nutrition through a PEG tube. He states he has been taking 7.5 mL of Oxycodone at night for pain.    Surgical Pathology Exam 21  Necroinflammatory debris and fragments of squamous mucosa with acute inflammation and ulceration, negative for dysplasia or malignancy. GMS special stain highlights presence of fungal hyphae.     Review of Systems:   Pertinent items are noted in HPI or as in patient entered ROS below, remainder of complete ROS is negative.    ENT ROS 2020   Ears, Nose, Throat Trouble swallowing   Endocrine -        Physical Exam:   Pulse 70   Temp 96.8  F (36  C) (Temporal)   Ht 1.702 m (5' 7\")   Wt 71.3 kg (157 lb 3 oz)   SpO2 99%   BMI 24.62 kg/m       PHYSICAL EXAMINATION:    Constitutional:  The patient was accompanied by his wife, well-groomed, and in no acute distress.    Skin:  Warm and pink.    Neurologic:  Alert and oriented x 3.  CN's III-XII within normal limits.  Voice normal.   Psychiatric:  The patient's affect was calm, cooperative, and appropriate.   Respiratory:  Breathing comfortably without stridor or exertion of accessory muscles.    Eyes: Extraocular " movement intact.    Head:  Normocephalic and atraumatic.  No lesions or scars.      OC/OP:  Normal tongue, floor of mouth, buccal mucosa, and palate.  No lesions or masses on inspection or palpation.  No abnormal lymph tissue in the oropharynx.  The pterygoid region is non-tender.  Gingiva lingual sulcus on the right side is now healed. Exam is consistent with previous findings, but on flexible direct laryngoscopy there is a pooling of mucoid secretions in the right piriform sinus.   Neck:  Supple with normal laryngeal and tracheal landmarks.  The parotid beds were without masses.  No palpable thyroid.  Lymphatic:  There is no palpable lymphadenopathy in the neck.     Assessment and Plan:  No diagnosis found.  Reese Oakley is a 71 year old male with a history of squamous carcinoma of neck who presents for follow up 3 weeks status post laryngoscopy with biopsy. I discussed with him that he could try eating something like liquids or cream of wheat. We discussed the anatomy of the hyoid bone along with how devitalization will affect healing and pain levels. I talked with him about the possibility of surgical intervention to look at the area along with options for this given that he is slowly improving. We discussed that interventions will be based on his symptoms. I talked to him about trying Melatonin to help him sleep. We will give him a call in 2 weeks to see how he is doing.     Follow-up: Return in about 2 weeks (around 8/17/2021) for using a phone visit.         Scribe Disclosure:  I, Sofia Boo, am serving as a scribe to document services personally performed by Jose Antonio Mckenna MD at this visit, based upon the provider's statements to me. All documentation has been reviewed by the aforementioned provider prior to being entered into the official medical record.

## 2021-08-03 NOTE — LETTER
"    8/3/2021         RE: Reese Oakley  [address] [facility] [address]        Dear Colleague,    Thank you for referring your patient, Reese Oakley, to the [facility]. Please see a copy of my visit note below.    Kenrick is a 71 year old who is being evaluated via a billable video visit.      How would you like to obtain your AVS? MyChart     If the video visit is dropped, the invitation should be resent by: Text to cell phone: [phone]     Will anyone else be joining your video visit? No          Vitals - Patient Reported  Weight (Patient Reported): 69.4 kg (153 lb)  Height (Patient Reported): 170.2 cm (5' 7.01\")  BMI (Based on Pt Reported Ht/Wt): 23.96  Pain Score: Moderate Pain (4)  Pain Loc:  (MOUTH)      Video-Visit Details    Type of service:  Video Visit, total time spent 30 minutes.     Originating Location (pt. Location): Home    Distant Location (provider location):  [facility]     Platform used for Video Visit: [employer]    REASON FOR VISIT:    CANCER STAGE: Cancer Staging  No matching staging information was found for the patient.      HISTORY OF PRESENT ILLNESS:  Reese Oakley is a 70 year old male with PMH HTN, hyperlipidemia, DM2 with recurrent SCC. 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  More recently he was noted on follow up scans to have enlarged R sided lymph nodes and biopsy proven recurrence in the R neck.  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  There was one additional focus of squamous cell carcinoma identified in an additional lymph node without MEL.  His case was discussed at multidisciplinary tumor board and he was recommended to have " chemoradiation - due to MEL in the submandibular gland.     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 He has previously been treated with a DL with Omni CO2 laser excision by Dr. Mckenna on 10/6/2014. This was followed by radiation therapy at [facility]. He recently subsequently diagnosed with a pT2 Nx SCC of the right lateral tongue, treated with a partial glossectomy by Dr. Mckenna on 2/19/2020. He subsequently had recurrence near the right submandibular gland, s/p right supraomohyoid neck dissection on 12/9/2020. His case was discussed at multidisciplinary tumor board with the recommendation for chemoradiation due to MEL in the submandibular gland. Started weekly cisplatin with limited field radiation 1/25/21. Due to worsening renal function after first treatment switched to carboplatin/taxol for week 2. Finished treatment 3/5/21. Since then he has had persistent right tongue pain.  A biopsy of this was taken in clinic at the beginning of May which grew actinomyces and he was treated with penicillin.  He also had a persistent ulcer of the right posterior tongue that was resected with the Omni laser on 5/24/2021.  He has recently been seen in clinic by Dr. Nielsen and Dr. Mark.      A surveillance PET/CT was obtained 6/21/2021.  This revealed concerning lymph nodes in the chest, with low concern for recurrence in the head and neck. He did poorly after the treatment with malnutrition and he had EGD, PEG insertion and bronchoscopy with endobronchial ultrasound-guided biopsy on 7/5/21 and the 11L LN was negative for malignancy and Lymph node, level 7, contained nondiagnostic specimen. RIGHT VALLECULA biopsy on 7/5/21 showed necroinflammatory debris and fragments of squamous mucosa with acute inflammation and ulceration, negative for dysplasia or malignancy. GMS special stain highlights presence of fungal hyphae.     He was followed by  ENT today.    I am seeing Kenrick in follow up by video visit. His wife was present.   Pain- kenrick still has lots of pain and his pain is managed by Dr. Mckenna now and most of the time his pain is tolerable.   He gained 6lbs since tube feeding starts.   He continues to have a lot of mucus secretions and need to spit up mucus. So he had to wake up every hour at night. He has no constipation.  He caught a cold recently.     Review Of Systems  10-point review of systems were negative except as noted in HPI.        EXAM:  There were no vitals taken for this visit.  GEN: alert and oriented x 3, nad  Head: no trauma  Neck: no swelling  Pulm: breathing comfortably on room air    Current Outpatient Medications   Medication Sig Dispense Refill     acetaminophen (TYLENOL) 160 MG/5ML suspension Take 20.5 mLs (650 mg) by mouth every 6 hours as needed for fever or mild pain 300 mL 3     acetaminophen (TYLENOL) 325 MG tablet Take 2 tablets (650 mg) by mouth every 4 hours as needed for other (multimodal surgical pain management along with NSAIDS and opioid medication as indicated based on pain control and physical function.) 60 tablet 0     aspirin (ASA) 81 MG chewable tablet Take 81 mg by mouth daily       fluconazole (DIFLUCAN) 100 MG tablet 2 tabs PO qday x 1 day, then 1 tab PO qday x 13 days for a total of 14 day course. 15 tablet 0     gabapentin (NEURONTIN) 250 MG/5ML solution Take 5 mLs (250 mg) by mouth 3 times daily 300 mL 1     guaiFENesin (ROBITUSSIN) 100 MG/5ML liquid 10 mLs (200 mg) by Oral or PEG tube route every 4 hours as needed for other (mucous production and/or cough) 473 mL 5     insulin aspart (NOVOLOG PEN) 100 UNIT/ML pen 15 Units        insulin glargine (LANTUS VIAL) 100 UNIT/ML vial Inject 25 Units Subcutaneous daily (with breakfast) PATIENT REPORTS 25 UNIT DAILY 02/16/2021       lidocaine (XYLOCAINE) 2 % solution Swish and spit 15 mLs in mouth every 3 hours as needed for moderate pain ; Max 8 doses/24 hour  period. 100 mL 3     lisinopril (ZESTRIL) 5 MG tablet Take 5 mg by mouth daily       magic mouthwash (ENTER INGREDIENTS IN COMMENTS) suspension Take 5 mLs by mouth every 4 hours as needed (pain) 100 mL 4     magic mouthwash (ENTER INGREDIENTS IN COMMENTS) suspension Swish and spit 5 ml every 8 hours for 14 days 1000 mL 1     magic mouthwash (ENTER INGREDIENTS IN COMMENTS) suspension Swish, gargle, and spit one to two teaspoonfuls every six hours as needed. May be swallowed if esophageal involvement. 480 mL 3     methylPREDNISolone (MEDROL DOSEPAK) 4 MG tablet therapy pack Follow Package Directions 21 tablet 0     oxyCODONE (OXY-IR) 5 MG capsule Take 5 mg by mouth every 4 hours as needed for severe pain       oxyCODONE (ROXICODONE) 5 MG/5ML solution Take 7.5 mLs (7.5 mg) by mouth every 6 hours as needed for pain (oral pain) 500 mL 0     pentoxifylline (TRENTAL) 50 mg/ml suspension Take 8 mLs (400 mg) by mouth 3 times daily 500 mL 3     pentoxifylline ER (TRENTAL) 400 MG CR tablet Take 1 tablet (400 mg) by mouth 3 times daily (with meals) 270 tablet 1     pilocarpine (SALAGEN) 7.5 MG tablet Take 7.5 mg by mouth 2 times daily       senna-docusate (SENOKOT-S/PERICOLACE) 8.6-50 MG tablet Take 1 tablet by mouth 2 times daily as needed for constipation 20 tablet 0     simvastatin (ZOCOR) 40 MG tablet Take 40 mg by mouth At Bedtime       sucralfate (CARAFATE) 1 GM tablet Take 1 tablet (1 g) by mouth 2 times daily 42 tablet 1     vitamin E (TOCOPHEROL) 1000 units (450 mg) capsule 1 capsule (1,000 Units) by Per Feeding Tube route daily 60 capsule 1     Vitamin E 15 UNIT/0.3ML SOLN 20 mLs (1,000 Units) by PEG Tube route daily 1200 mL 3     gabapentin (NEURONTIN) 250 MG/5ML solution Take 2 mLs (100 mg) by mouth 3 times daily for 14 days 84 mL 0           Recent labs reviewed.         No results found for this or any previous visit (from the past 744 hour(s)).        Assessment/Plan  Recurrent head and neck larynx cancer - He is  now roughly 5 months post reirradiation. His PET scan on 6/21/21 is concerning for some new L hilar and mediastinal adenopathy that are hypermetabolic. He had EBUS on 7/5/21 with no malignancy on 11L but  level 7 did not have enough specimen. His right vallecula biopsy on 7/5/21 showed necroinflammatory debris and fragments of squamous mucosa with acute inflammation and ulceration, negative for dysplasia or malignancy. GMS special stain highlights presence of fungal hyphae. He has been treated with Gabapentin and he is doing better with PEG feeding.    - since there is no biopsy proven thioacetic LN, we will continue close monitor with imaging. Since he has a cold now we would choose to check CT C/A/P in 2 weeks and see him afterwards.     Pain - Dr. Mckenna is in charge of his pain meds now, still likely due to radiation therapy. If pain remain a problem we could consider palliative care consult. Currently his pain is tolerable.     Nutrition - he gained 5-6lbs since the tube feeding started. Continue tube feeding for now.     Increased oral secretion: we will try Robinol 1mg tid and he was told to stop the medication if he develops constipation or if the medication does not work. He is instructed to contact us with any questions.    Patient is discussed with Dr. Mark.    Roque Saba MD, PhD  Hematology/Oncology   Pager: [phone]        Attestation signed by Toni Mark DO at 8/6/2021 10:41 AM:  I participated in video visit with Dr. Saba and agree with her note.  Pt is overall doing ok.  He continues to have pain and has ORN of his hyoid bone.  This is continuing to cause pain. He is not having shortness of breath or any new pulmonary symptoms.  He continues to have a lot of mucus production that he is spitting up.  He is gaining weight since he has started tube feeds.  He otherwise is doing well.  We will plan to repeat scan in 6 weeks to evaluate the L hilar/mediastinal nodes which were not  adequately sampled on EBUS last month.     Toni Mark  Associate Professor of Medicine  Division of Hematology, Oncology, and Transplantation        Again, thank you for allowing me to participate in the care of your patient.        Sincerely,        Toni Mark, DO

## 2021-08-03 NOTE — LETTER
"8/3/2021       RE: Reese Oakley  [address] [facility] [address]     Dear Colleague,    Thank you for referring your patient, Reese Oakley, to the [facility] [place] at [facility]. Please see a copy of my visit note below.      Otolaryngology Clinic    Name: Reese Oakley  MRN: [id number]  Age: 71 year old  : 2021      Chief Complaint:   Follow up     History of Present Illness:   Reese Oakley is a 71 year old male with a history of squamous carcinoma of neck who presents for follow up 3 weeks status post laryngoscopy with biopsy. The patient was last seen on  for a 1 week recheck. He was started on a Gabapentin oral solution at that time for tongue tingling.     Today, the patient endorses a productive cough with occasional blood noted. He states he thinks he may have a cold with associated rhinorrhea as well. He also endorses some stinging pain in his tongue along with sharp jaw pain. He states he has not been able to sleep and only sleeps about 1 hour per night. The patient notes he has been getting nutrition through a PEG tube. He states he has been taking 7.5 mL of Oxycodone at night for pain.    Surgical Pathology Exam 21  Necroinflammatory debris and fragments of squamous mucosa with acute inflammation and ulceration, negative for dysplasia or malignancy. GMS special stain highlights presence of fungal hyphae.     Review of Systems:   Pertinent items are noted in HPI or as in patient entered ROS below, remainder of complete ROS is negative.    ENT ROS 2020   Ears, Nose, Throat Trouble swallowing   Endocrine -        Physical Exam:   Pulse 70   Temp 96.8  F (36  C) (Temporal)   Ht 1.702 m (5' 7\")   Wt 71.3 kg (157 lb 3 oz)   SpO2 99%   BMI 24.62 kg/m       PHYSICAL EXAMINATION:    Constitutional:  The patient was accompanied by his wife, " well-groomed, and in no acute distress.    Skin:  Warm and pink.    Neurologic:  Alert and oriented x 3.  CN's III-XII within normal limits.  Voice normal.   Psychiatric:  The patient's affect was calm, cooperative, and appropriate.   Respiratory:  Breathing comfortably without stridor or exertion of accessory muscles.    Eyes: Extraocular movement intact.    Head:  Normocephalic and atraumatic.  No lesions or scars.      OC/OP:  Normal tongue, floor of mouth, buccal mucosa, and palate.  No lesions or masses on inspection or palpation.  No abnormal lymph tissue in the oropharynx.  The pterygoid region is non-tender.  Gingiva lingual sulcus on the right side is now healed. Exam is consistent with previous findings, but on flexible direct laryngoscopy there is a pooling of mucoid secretions in the right piriform sinus.   Neck:  Supple with normal laryngeal and tracheal landmarks.  The parotid beds were without masses.  No palpable thyroid.  Lymphatic:  There is no palpable lymphadenopathy in the neck.     Assessment and Plan:  No diagnosis found.  Reese Oakley is a 71 year old male with a history of squamous carcinoma of neck who presents for follow up 3 weeks status post laryngoscopy with biopsy. I discussed with him that he could try eating something like liquids or cream of wheat. We discussed the anatomy of the hyoid bone along with how devitalization will affect healing and pain levels. I talked with him about the possibility of surgical intervention to look at the area along with options for this given that he is slowly improving. We discussed that interventions will be based on his symptoms. I talked to him about trying Melatonin to help him sleep. We will give him a call in 2 weeks to see how he is doing.     Follow-up: Return in about 2 weeks (around 8/17/2021) for using a phone visit.         Scribe Disclosure:  I, Sofia Boo, am serving as a scribe to document services personally performed by  Jose Antonio Mckenna MD at this visit, based upon the provider's statements to me. All documentation has been reviewed by the aforementioned provider prior to being entered into the official medical record.     Again, thank you for allowing me to participate in the care of your patient.      Sincerely,    Jose Antonio Mckenna MD

## 2021-08-03 NOTE — PROGRESS NOTES
"Kenrick is a 71 year old who is being evaluated via a billable video visit.      How would you like to obtain your AVS? MyChart     If the video visit is dropped, the invitation should be resent by: Text to cell phone: [phone]     Will anyone else be joining your video visit? No          Vitals - Patient Reported  Weight (Patient Reported): 69.4 kg (153 lb)  Height (Patient Reported): 170.2 cm (5' 7.01\")  BMI (Based on Pt Reported Ht/Wt): 23.96  Pain Score: Moderate Pain (4)  Pain Loc:  (MOUTH)      Video-Visit Details    Type of service:  Video Visit, total time spent 30 minutes.     Originating Location (pt. Location): Home    Distant Location (provider location):  [facility] CANC[facility]     Platform used for Video Visit: [employer]    REASON FOR VISIT:    CANCER STAGE: Cancer Staging  No matching staging information was found for the patient.      HISTORY OF PRESENT ILLNESS:  Reese Oakley is a 70 year old male with PMH HTN, hyperlipidemia, DM2 with recurrent SCC. 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  More recently he was noted on follow up scans to have enlarged R sided lymph nodes and biopsy proven recurrence in the R neck.  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  There was one additional focus of squamous cell carcinoma identified in an additional lymph node without MEL.  His case was discussed at multidisciplinary tumor board and he was recommended to have chemoradiation - due to MEL in the submandibular gland.     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 He has previously been treated with a DL with Omni CO2 laser excision by Dr. Mckenna on 10/6/2014. " This was followed by radiation therapy at [facility]. He recently subsequently diagnosed with a pT2 Nx SCC of the right lateral tongue, treated with a partial glossectomy by Dr. Mckenna on 2/19/2020. He subsequently had recurrence near the right submandibular gland, s/p right supraomohyoid neck dissection on 12/9/2020. His case was discussed at multidisciplinary tumor board with the recommendation for chemoradiation due to MEL in the submandibular gland. Started weekly cisplatin with limited field radiation 1/25/21. Due to worsening renal function after first treatment switched to carboplatin/taxol for week 2. Finished treatment 3/5/21. Since then he has had persistent right tongue pain.  A biopsy of this was taken in clinic at the beginning of May which grew actinomyces and he was treated with penicillin.  He also had a persistent ulcer of the right posterior tongue that was resected with the Omni laser on 5/24/2021.  He has recently been seen in clinic by Dr. Nielsen and Dr. Mark.      A surveillance PET/CT was obtained 6/21/2021.  This revealed concerning lymph nodes in the chest, with low concern for recurrence in the head and neck. He did poorly after the treatment with malnutrition and he had EGD, PEG insertion and bronchoscopy with endobronchial ultrasound-guided biopsy on 7/5/21 and the 11L LN was negative for malignancy and Lymph node, level 7, contained nondiagnostic specimen. RIGHT VALLECULA biopsy on 7/5/21 showed necroinflammatory debris and fragments of squamous mucosa with acute inflammation and ulceration, negative for dysplasia or malignancy. GMS special stain highlights presence of fungal hyphae.     He was followed by ENT today.    I am seeing Kenrick in follow up by video visit. His wife was present.   Pain- kenrick still has lots of pain and his pain is managed by Dr. Mckenna now and most of the time his pain is tolerable.   He gained 6lbs since tube feeding starts.   He continues to  have a lot of mucus secretions and need to spit up mucus. So he had to wake up every hour at night. He has no constipation.  He caught a cold recently.     Review Of Systems  10-point review of systems were negative except as noted in HPI.        EXAM:  There were no vitals taken for this visit.  GEN: alert and oriented x 3, nad  Head: no trauma  Neck: no swelling  Pulm: breathing comfortably on room air    Current Outpatient Medications   Medication Sig Dispense Refill     acetaminophen (TYLENOL) 160 MG/5ML suspension Take 20.5 mLs (650 mg) by mouth every 6 hours as needed for fever or mild pain 300 mL 3     acetaminophen (TYLENOL) 325 MG tablet Take 2 tablets (650 mg) by mouth every 4 hours as needed for other (multimodal surgical pain management along with NSAIDS and opioid medication as indicated based on pain control and physical function.) 60 tablet 0     aspirin (ASA) 81 MG chewable tablet Take 81 mg by mouth daily       fluconazole (DIFLUCAN) 100 MG tablet 2 tabs PO qday x 1 day, then 1 tab PO qday x 13 days for a total of 14 day course. 15 tablet 0     gabapentin (NEURONTIN) 250 MG/5ML solution Take 5 mLs (250 mg) by mouth 3 times daily 300 mL 1     guaiFENesin (ROBITUSSIN) 100 MG/5ML liquid 10 mLs (200 mg) by Oral or PEG tube route every 4 hours as needed for other (mucous production and/or cough) 473 mL 5     insulin aspart (NOVOLOG PEN) 100 UNIT/ML pen 15 Units        insulin glargine (LANTUS VIAL) 100 UNIT/ML vial Inject 25 Units Subcutaneous daily (with breakfast) PATIENT REPORTS 25 UNIT DAILY 02/16/2021       lidocaine (XYLOCAINE) 2 % solution Swish and spit 15 mLs in mouth every 3 hours as needed for moderate pain ; Max 8 doses/24 hour period. 100 mL 3     lisinopril (ZESTRIL) 5 MG tablet Take 5 mg by mouth daily       magic mouthwash (ENTER INGREDIENTS IN COMMENTS) suspension Take 5 mLs by mouth every 4 hours as needed (pain) 100 mL 4     magic mouthwash (ENTER INGREDIENTS IN COMMENTS) suspension  Swish and spit 5 ml every 8 hours for 14 days 1000 mL 1     magic mouthwash (ENTER INGREDIENTS IN COMMENTS) suspension Swish, gargle, and spit one to two teaspoonfuls every six hours as needed. May be swallowed if esophageal involvement. 480 mL 3     methylPREDNISolone (MEDROL DOSEPAK) 4 MG tablet therapy pack Follow Package Directions 21 tablet 0     oxyCODONE (OXY-IR) 5 MG capsule Take 5 mg by mouth every 4 hours as needed for severe pain       oxyCODONE (ROXICODONE) 5 MG/5ML solution Take 7.5 mLs (7.5 mg) by mouth every 6 hours as needed for pain (oral pain) 500 mL 0     pentoxifylline (TRENTAL) 50 mg/ml suspension Take 8 mLs (400 mg) by mouth 3 times daily 500 mL 3     pentoxifylline ER (TRENTAL) 400 MG CR tablet Take 1 tablet (400 mg) by mouth 3 times daily (with meals) 270 tablet 1     pilocarpine (SALAGEN) 7.5 MG tablet Take 7.5 mg by mouth 2 times daily       senna-docusate (SENOKOT-S/PERICOLACE) 8.6-50 MG tablet Take 1 tablet by mouth 2 times daily as needed for constipation 20 tablet 0     simvastatin (ZOCOR) 40 MG tablet Take 40 mg by mouth At Bedtime       sucralfate (CARAFATE) 1 GM tablet Take 1 tablet (1 g) by mouth 2 times daily 42 tablet 1     vitamin E (TOCOPHEROL) 1000 units (450 mg) capsule 1 capsule (1,000 Units) by Per Feeding Tube route daily 60 capsule 1     Vitamin E 15 UNIT/0.3ML SOLN 20 mLs (1,000 Units) by PEG Tube route daily 1200 mL 3     gabapentin (NEURONTIN) 250 MG/5ML solution Take 2 mLs (100 mg) by mouth 3 times daily for 14 days 84 mL 0           Recent labs reviewed.         No results found for this or any previous visit (from the past 744 hour(s)).        Assessment/Plan  Recurrent head and neck larynx cancer - He is now roughly 5 months post reirradiation. His PET scan on 6/21/21 is concerning for some new L hilar and mediastinal adenopathy that are hypermetabolic. He had EBUS on 7/5/21 with no malignancy on 11L but  level 7 did not have enough specimen. His right vallecula  biopsy on 7/5/21 showed necroinflammatory debris and fragments of squamous mucosa with acute inflammation and ulceration, negative for dysplasia or malignancy. GMS special stain highlights presence of fungal hyphae. He has been treated with Gabapentin and he is doing better with PEG feeding.    - since there is no biopsy proven thioacetic LN, we will continue close monitor with imaging. Since he has a cold now we would choose to check CT C/A/P in 2 weeks and see him afterwards.     Pain - Dr. Mckenna is in charge of his pain meds now, still likely due to radiation therapy. If pain remain a problem we could consider palliative care consult. Currently his pain is tolerable.     Nutrition - he gained 5-6lbs since the tube feeding started. Continue tube feeding for now.     Increased oral secretion: we will try Robinol 1mg tid and he was told to stop the medication if he develops constipation or if the medication does not work. He is instructed to contact us with any questions.    Patient is discussed with Dr. Mark.    Roque Saba MD, PhD  Hematology/Oncology   Pager: [phone]

## 2021-08-11 NOTE — TELEPHONE ENCOUNTER
RN Care Coordination Note  Incoming Call:   Patient's wife called letting us know he passed out and was taken to [facility]. He had some scans. Patient was given antibiotics for pneumonia and declined any further testing/treatment at [facility] because he wants to see Dr Jas leroy. I requested to have scans pushed to PACs and sent a message to Dr. Mark.     Leslie Sanchez RN, BSN  Care Coordinator   [facility]

## 2021-08-12 NOTE — PROGRESS NOTES
This is a recent snapshot of the patient's [facility] medical record.  For current drug dose and complete information and questions, call [phone] or In Basket pool, [facility] home infusion ([id number])  CSN Number:  [id number]

## 2021-08-16 NOTE — PROGRESS NOTES
Received call from patient's wife indicating pharmacy did not receive oxycodone refill on Friday.     Writer called and spoke with [facility] pharmacy as the oxycodone prescription was e-perscribed on Friday. Pharmacy indicates the did receive prescription but it was canceled. They were unable to figure out why this was canceled and requested new refill be sent.     Dr. Erickson signed for new prescription and sent to pharmacy.    Updated patient's wife and advised her to call with any further questions or concerns.       Blanka Barnett, RN, BSN

## 2021-08-16 NOTE — TELEPHONE ENCOUNTER
OXYCODONE HCL 5MG/5ML SOLN  oxyCODONE (ROXICODONE) 5 MG/5ML solution 500 mL 0 8/13/2021  No   Sig - Route: Take 7.5 mLs (7.5 mg) by mouth every 6 hours as needed for pain (oral pain) - Oral   Sent to pharmacy as: oxyCODONE HCl 5 MG/5ML Oral Solution (ROXICODONE)   Class: E-Prescribe   Earliest Fill Date: 8/13/2021   Order: [id number]   E-Prescribing Status: Receipt confirmed by pharmacy (8/16/2021  8:23 AM CDT)   Prior authorization: Closed   Outpatient Morphine Equivalent Daily Dose (MEDD)    8/13/21 and after  45 mg MEDD  Order Name Dose Route Frequency Maximum MEDD    oxyCODONE (ROXICODONE) 5 MG/5ML solution 7.5 mg Oral EVERY 6 HOURS PRN 45 mg MEDD   Total Potential Daily Morphine Equivalence 45 mg MEDD   Calculation Information              Printout Tracking    External Result Report   Pharmacy    [facility] - [place] - [address]           Rossy Maldonado RN  Central Triage Red Flags/Med Refills

## 2021-08-17 NOTE — PROGRESS NOTES
I spent 15-20 minutes with Mr. Oakley and his wife on the phone today.  Basically, he is slowly getting better.  He is not having much coughing, not having as much productive cough.  He is swallowing certain things and he is in a lot less pain than he was a month ago.  He has a little bit less pain than when I saw him the last time a couple of weeks ago.  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  I was actually going to take him back to the operating room to debride his throat to see if there was anything about his hyoid bone that we wanted to take out but in judging the improvement that he is undergoing, we decided that we would follow up again in about one month with a phone call to see how he is doing at that point in time.  This seems to be agreeable to him and to us and we will see him again at that point in time.

## 2021-08-17 NOTE — LETTER
8/17/2021       RE: Reese Oakley  [address] [facility] [address]     Dear Colleague,    Thank you for referring your patient, Reese Oakley, to the [facility] [place] at [facility]. Please see a copy of my visit note below.    Kenrick is a 71 year old who is being evaluated via a billable telephone visit.      What phone number would you like to be contacted at? [phone]  How would you like to obtain your AVS? [employer]  Phone call duration:20 minutes    Refill sent yesterday did not get to pharmacy. They are having a difficult time with e-prescribe. Dr. Mckenna signed written prescription and we sent via Fed[employer]Ex for overnight delivery.       Blanka Barnett, RN, BSN      I spent 15-20 minutes with Mr. Oakley and his wife on the phone today.  Basically, he is slowly getting better.  He is not having much coughing, not having as much productive cough.  He is swallowing certain things and he is in a lot less pain than he was a month ago.  He has a little bit less pain than when I saw him the last time a couple of weeks ago.  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  I was actually going to take him back to the operating room to debride his throat to see if there was anything about his hyoid bone that we wanted to take out but in judging the improvement that he is undergoing, we decided that we would follow up again in about one month with a phone call to see how he is doing at that point in time.  This seems to be agreeable to him and to us and we will see him again at that point in time.    Sincerely,    Jose Antonio Mckenna MD

## 2021-08-17 NOTE — PROGRESS NOTES
Refill sent yesterday did not get to pharmacy. They are having a difficult time with e-prescribe. Dr. Mckenna signed written prescription and we sent via Fed-[employer] for overnight delivery.       Blanka Barnett, RN, BSN

## 2021-08-17 NOTE — NURSING NOTE
"Chief Complaint   Patient presents with     RECHECK     2 week follow up       Height 1.702 m (5' 7\"), weight 69.4 kg (153 lb).    Mary Pascal, EMT    "

## 2021-08-17 NOTE — PROGRESS NOTES
Kenrick is a 71 year old who is being evaluated via a billable telephone visit.      What phone number would you like to be contacted at? [phone]  How would you like to obtain your AVS? [place]  Phone call duration:20 minutes

## 2021-08-20 PROBLEM — C44.42 SQUAMOUS CELL CARCINOMA OF NECK: Status: ACTIVE | Noted: 2020-01-01

## 2021-08-20 NOTE — H&P
Pre-Operative H & P     CC:  Preoperative exam to assess for increased cardiopulmonary risk while undergoing surgery and anesthesia.    Date of Encounter: 8/20/2021  Primary Care Physician:  Clark Marie     Reason for visit:   Encounter Diagnoses   Name Primary?     Pre-op examination Yes     Squamous cell carcinoma of neck        HPI  Reese Oakley is a 71 year old male who presents for pre-operative H & P in preparation for BRONCHOSCOPY, flexible, endobronchial ultrasound with transbronchial biopsies with Dr. Bush on 8/26/21 at [facility].     The patient is a 71-year-old man with a past medical history significant for hypertension hyperlipidemia, anemia, type 2 diabetes on insulin, GERD, history of colon cancer, history of appendiceal cancer, history of throat cancer treated with laser resection and adjuvant radiotherapy in 2014 followed by tongue cancer diagnosed in February 2020 treated with wide local excision of the right tongue on 2/19/2020 who was found to have biopsy-proven recurrence in his right neck and underwent modified neck dissection on 12/9/2020.  He had adjuvant radiation and chemotherapy and has been followed by oncology and ENT.  The patient underwent an endobronchial ultrasound on 7/5/2021 which ultimately was nondiagnostic for an upper lobe mass.  He did have a PEG tube placed his inability to eat due to pain and most recently he was admitted at an outside facility and treated for hypoxia, hypotension diagnosed with pneumonia and a new T6 compression fracture after a fall.  Imaging done during that admission did show that the lung mass had grown it was concerning for malignancy.  He was discussed at the tumor conference earlier today and recommended to undergo the procedure as above.    History is obtained from the patient, patient's wife and chart review      Hx of abnormal bleeding or anti-platelet use: none    Menstrual history: No  LMP for male patient.:     Prior to Admission Medications  Current Outpatient Medications   Medication Sig Dispense Refill     acetaminophen (TYLENOL) 160 MG/5ML suspension Take 20.5 mLs (650 mg) by mouth every 6 hours as needed for fever or mild pain 300 mL 3     diphenhydrAMINE (BENADRYL) 25 MG capsule Take 25 mg by mouth nightly as needed for itching or allergies       gabapentin (NEURONTIN) 250 MG/5ML solution Take 5 mLs (250 mg) by mouth 3 times daily 300 mL 1     glycopyrrolate (ROBINUL) 1 MG tablet Take 1 tablet (1 mg) by mouth 3 times daily (Patient taking differently: Take 1 mg by mouth every evening ) 90 tablet 0     guaiFENesin (ROBITUSSIN) 100 MG/5ML liquid 10 mLs (200 mg) by Oral or PEG tube route every 4 hours as needed for other (mucous production and/or cough) 473 mL 5     insulin aspart (NOVOLOG PEN) 100 UNIT/ML pen 15 Units        insulin glargine (LANTUS VIAL) 100 UNIT/ML vial Inject 25 Units Subcutaneous daily (with breakfast) PATIENT REPORTS 25 UNIT DAILY 02/16/2021       lidocaine (XYLOCAINE) 2 % solution Swish and spit 15 mLs in mouth every 3 hours as needed for moderate pain ; Max 8 doses/24 hour period. 100 mL 3     oxyCODONE (OXY-IR) 5 MG capsule Take 5 mg by mouth every 4 hours as needed for severe pain       pentoxifylline (TRENTAL) 50 mg/ml suspension Take 8 mLs (400 mg) by mouth 3 times daily 500 mL 3     Vitamin E 15 UNIT/0.3ML SOLN 20 mLs (1,000 Units) by PEG Tube route daily 1200 mL 3     acetaminophen (TYLENOL) 325 MG tablet Take 2 tablets (650 mg) by mouth every 4 hours as needed for other (multimodal surgical pain management along with NSAIDS and opioid medication as indicated based on pain control and physical function.) 60 tablet 0     aspirin (ASA) 81 MG chewable tablet Take 81 mg by mouth daily (Patient not taking: Reported on 8/20/2021)       fluconazole (DIFLUCAN) 100 MG tablet 2 tabs PO qday x 1 day, then 1 tab PO qday x 13 days for a total of 14 day course. (Patient not  taking: Reported on 8/20/2021) 15 tablet 0     gabapentin (NEURONTIN) 250 MG/5ML solution Take 2 mLs (100 mg) by mouth 3 times daily for 14 days 84 mL 0     lisinopril (ZESTRIL) 5 MG tablet Take 5 mg by mouth daily (Patient not taking: Reported on 8/20/2021)       magic mouthwash (ENTER INGREDIENTS IN COMMENTS) suspension Take 5 mLs by mouth every 4 hours as needed (pain) 100 mL 4     magic mouthwash (ENTER INGREDIENTS IN COMMENTS) suspension Swish and spit 5 ml every 8 hours for 14 days 1000 mL 1     magic mouthwash (ENTER INGREDIENTS IN COMMENTS) suspension Swish, gargle, and spit one to two teaspoonfuls every six hours as needed. May be swallowed if esophageal involvement. 480 mL 3     methylPREDNISolone (MEDROL DOSEPAK) 4 MG tablet therapy pack Follow Package Directions (Patient not taking: Reported on 8/20/2021) 21 tablet 0     oxyCODONE (ROXICODONE) 5 MG/5ML solution Take 7.5 mLs (7.5 mg) by mouth every 6 hours as needed for pain (oral pain) 500 mL 0     pentoxifylline ER (TRENTAL) 400 MG CR tablet Take 1 tablet (400 mg) by mouth 3 times daily (with meals) 270 tablet 1     pilocarpine (SALAGEN) 7.5 MG tablet Take 7.5 mg by mouth 2 times daily (Patient not taking: Reported on 8/20/2021)       senna-docusate (SENOKOT-S/PERICOLACE) 8.6-50 MG tablet Take 1 tablet by mouth 2 times daily as needed for constipation (Patient not taking: Reported on 8/20/2021) 20 tablet 0     simvastatin (ZOCOR) 40 MG tablet Take 40 mg by mouth At Bedtime (Patient not taking: Reported on 8/20/2021)       sucralfate (CARAFATE) 1 GM tablet Take 1 tablet (1 g) by mouth 2 times daily (Patient not taking: Reported on 8/20/2021) 42 tablet 1     vitamin E (TOCOPHEROL) 1000 units (450 mg) capsule 1 capsule (1,000 Units) by Per Feeding Tube route daily (Patient not taking: Reported on 8/20/2021) 60 capsule 1       Family History  Family History   Problem Relation Age of Onset     Diabetes Mother      Heart Disease Father      Diabetes Brother       Anesthesia Reaction No family hx of      Bleeding Disorder No family hx of      Clotting Disorder No family hx of        The complete review of systems is negative other than noted in the HPI or here.   Preprocedure Note     Last edited 21 9840 by Adwoa Bingham PA-C  Date of Service 21  Creation Time 21  Status: Addendum             Anesthesia Pre-Procedure Evaluation    Patient: Reese Oakley   MRN: [id number] : 1950        Preoperative Diagnosis: Squamous cell carcinoma of neck [C44.42]   Procedure : Procedure(s):  BRONCHOSCOPY, flexible, endobronchial ultrasound with transbronchial biopsies     Past Medical History:   Diagnosis Date     Adenocarcinoma (H)     large instestine      Cancer of appendix (H)      Diabetes (H)      Gastro-oesophageal reflux disease      History of radiation therapy      Hoarseness      Hypertension       Past Surgical History:   Procedure Laterality Date     APPENDECTOMY       COLECTOMY      Right     DISSECTION RADICAL NECK MODIFIED Right 2020    Procedure: Modified neck dissection;  Surgeon: Jose Antonio Mckenna MD;  Location: [facility] OR     ENDOBRONCHIAL ULTRASOUND FLEXIBLE N/A 2021    Procedure: ENDOBRONCHIAL ULTRASOUND, WITH FLEXIBLE BRONCHOSCOPY;  Surgeon: Indio Zamora MD;  Location: [facility] OR     ENDOSCOPIC INSERTION TUBE GASTROSTOMY N/A 2021    Procedure: INSERTION, PEG TUBE;  Surgeon: Indio Zamora MD;  Location: [facility] OR     EXCISE LESION INTRAORAL Right 2020    Procedure: Wide Local Excision of Right Tongue Lesion;  Surgeon: Jose Antonio Mceknna MD;  Location: [facility] OR     INSERT PORT VASCULAR ACCESS Right 1/15/2021    Procedure: CHEST INSERTION, VASCULAR ACCESS PORT @0900;  Surgeon: Tony Hopkins MD;  Location: [facility]     IR CHEST PORT PLACEMENT > 5 YRS OF AGE  1/15/2021     LARYNGOSCOPY WITH BIOPSY(IES) N/A 2021    Procedure: LARYNGOSCOPY, WITH BIOPSY;  Surgeon: Jose Antonio Mckenna MD;  Location:  [facility] OR     LASER CO2 LARYNGOSCOPY N/A 10/6/2014    Procedure: LASER CO2 LARYNGOSCOPY;  Surgeon: Jose Antonio Mckenna MD;  Location: [facility] OR     LASER CO2 LESION ORAL N/A 5/24/2021    Procedure: Excision of tongue ulcer with omniguide laser;  Surgeon: Jose Antonio Mckenna MD;  Location: [facility] OR      No Known Allergies   Social History     Tobacco Use     Smoking status: Never Smoker     Smokeless tobacco: Never Used   Substance Use Topics     Alcohol use: Not Currently     Alcohol/week: 0.0 standard drinks     Comment: none for 20 years      Wt Readings from Last 1 Encounters:   08/17/21 69.4 kg (153 lb)        Anesthesia Evaluation   Pt has had prior anesthetic. Type: General.    No history of anesthetic complications       ROS/MED HX  ENT/Pulmonary: Comment: Lung mass   (-) tobacco use   Neurologic:     (+) peripheral neuropathy, - tongue.  (-) no seizures, no CVA and no TIA   Cardiovascular:     (+) Dyslipidemia hypertension-----dysrhythmias, a-fib, Previous cardiac testing   Echo: Date: 8/6/21 Results:    Stress Test: Date: Results:    ECG Reviewed: Date: 8/6/21 Results:  Sinus rhythm with sinus arrhythmia   ST & T wave abnormality, consider anterolateral ischemia   Abnormal ECG     Cath: Date: Results:      METS/Exercise Tolerance: 4 - Raking leaves, gardening    Hematologic:     (+) anemia,  (-) history of blood clots and history of blood transfusion   Musculoskeletal: Comment: T6 compression fracture      GI/Hepatic: Comment: PEG in place    (+) GERD (eating through PEG so no current symptoms. ), Other,     Renal/Genitourinary:  - neg Renal ROS     Endo:     (+) type II DM, Last HgA1c: 7.8, date: 6/2021, Using insulin, - not using insulin pump.     Psychiatric/Substance Use:  - neg psychiatric ROS     Infectious Disease: Comment: Treated for actinomycosis       Malignancy: Comment: History of SCC of throat, SCC of tongue, appendix cancer, colon cancer, squamous cell cancer of neck  (+) Malignancy, History of  Other.    Other:  - neg other ROS          Virtual visit -  No vitals were obtained       Physical Exam  Constitutional: Awake, alert, cooperative, no apparent distress, and appears stated age.  Eyes: Pupils equal  HENT: Normocephalic  Respiratory: Non labored breathing   Neurologic: Awake, alert, oriented to name, place and time.   Neuropsychiatric: Calm, cooperative. Normal affect.     8/6/21  Sinus rhythm with sinus arrhythmia   ST & T wave abnormality, consider anterolateral ischemia   Abnormal ECG     Echo 8/6/21  CONCLUSIONS   Left ventricular ejection fraction is visually estimated at 65%.   No significant valvular abnormalities were identified.   Left atrial volume index is 40 mL/m^2. (Mildly abnormal 35-41mL/m^2).   No previous study available for comparison.     CT Angio 8/5/21  IMPRESSION:   1. No pulmonary embolus.   2. Large spiculated left hilar mass measuring 9.3 x 3.8 cm which encases and severely narrows the left upper lobe and lingular main stem bronchi, as well as the left upper lobe segmental pulmonary arteries. Appearance is highly worrisome for malignancy. Left upper lobe patchy groundglass opacities and interlobular septal thickening could represent post-obstructive pneumonia or lymphangitic carcinomatosis.   3. New 8 mm cavitary right upper lobe cavitary nodule is worrisome for a squamous cell carcinoma metastasis given the patient's history of throat squamous cell carcinoma. Additional new small left lower lobe pulmonary nodules may also represent metastatic disease.   4. Bandlike consolidation versus scarring in the right upper lobe.   5. Small layering left pleural effusion.   6. Prominent right paraesophageal lymph node and other scattered smaller mediastinal lymph nodes.   7. Acute appearing mild T6 superior endplate compression fracture without retropulsion.   8. Two small hypoattenuating areas in the left posterior ninth rib are of uncertain significance. Metastatic disease not  "excluded.   9. Circumferential thickening of the distal esophagus has increased from 02/27/2004. Appearance can be seen with esophagitis or malignancy.    Thoracic MR 8/7/21  IMPRESSION:     1. Mild acute T6 compression fracture. Bone marrow edema extends to the posterior elements. Imaging features of this fracture suggests benign compression fracture.   2. Increased small to moderate left pleural effusion.       Labs 8/8/21  Basic Metabolic Panel  Specimen:  Blood   Ref Range & Units 12 d ago Comments   Sodium 136 - 145 mmol/L 139          Potassium 3.5 - 5.1 mmol/L 3.8          Chloride 98 - 109 mmol/L 105          CO2 20 - 29 mmol/L 24          Anion Gap 7 - 16 mmol/L 10          Calcium 8.4 - 10.4 mg/dL 8.8          BUN 7 - 26 mg/dL 13          Creatinine 0.73 - 1.18 mg/dL 0.75          GFR, Estimated >60 mL/min/1.73m2 >60          Glucose 70 - 100 mg/dL 127High   The given reference range is for the fasting state. Non-fasting reference range for glucose is 70 - 180 mg/dL.     Contains abnormal data Complete Blood Count-No Diff  Specimen:  Blood   Ref Range & Units 12 d ago   WBC 3.5 - 10.5 x10(9)/L 11.7High         RBC 4.32 - 5.72 x10(12)/L 2.91Low         Hemoglobin 13.5 - 17.5 g/dL 8.8Low         HCT 38.8 - 50.0 % 28.0Low         MCV 80.0 - 100.0 fL 96.2        MCH 27.6 - 33.3 pg 30.2        MCHC 31.5 - 35.2 g/dL 31.4Low         RDW 11.9 - 15.5 % 14.4        Platelets 150 - 450 x10(9)/L 280        Automated NRBC <=0 /100 WBC 0    Results for TEOFILO CHAN (MRN [id number]) as of 8/20/2021 15:24   Ref. Range 6/26/2021 17:20   Hemoglobin A1C Latest Ref Range: 0 - 5.6 % 7.8 (H)         The patient's records and results personally reviewed by this provider.     Outside records reviewed from: care everywhere    ASSESSMENT and PLAN  Reese, \"Teofilo\" is a 71 year old man who is scheduled for BRONCHOSCOPY, flexible, endobronchial ultrasound with transbronchial biopsies on 8/26/21 by Dr. Bush in treatment of " Squamous cell carcinoma of neck.  PAC referral for risk assessment and optimization for anesthesia with comorbid conditions of hypertension hyperlipidemia, anemia, type 2 diabetes on insulin, GERD, history of colon cancer, history of appendiceal cancer, history of SCC of the throat treated with laser resection and adjuvant radiotherapy in 2014 followed by tongue cancer diagnosed in February 2020 treated with wide local excision of the right tongue on 2/19/2020 who was found to have biopsy-proven recurrence in his right neck and underwent modified neck dissection on 12/9/2020:      Pre-operative considerations:   1.  Cardiac:  Functional status- METS 4, the patient is doing yard work and just finished mowing the lawn earlier today.  Low risk surgery with 0.9% (RCRI #) risk of major adverse cardiac event. He denies any cardiac symptoms.   ~ History of HTN/HLD - not currently on medications  ~ During the patient's recent hospitalization he had a fib which on follow up EKG had resolved. Cardiology was consulted and though that his a fib was secondary to dehydration, pneumonia and cancer. CHADSVASC = 3. Given his need for further work up of his enlarging lung mass anticoagulation was not initiated for him.       2.  Pulm:  Airway feasible.  RAMESH risk: Low  ~ Lung mass - procedure as above    3. Heme:   Anemia - hgb 8.8 on 8/8/21. Low bleeding risk procedure    4. ENT: The patient has a history of SCC of the throat, tongue and neck. Most recently completed chemotherapy in 3/2021. 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   The patient continues to have ongoing pain and underwent PEG placement in 7/2021. He most recently followed up with Dr. Mckenna via telephone call on 8/17/21 and his pain was slowly improving. He will continue lidocaine, magic mouth wash, pentoxiflline and oxycodone.     5. Endo: Type 2 diabetes - on insulin.  Last A1c was 10.6 on 3/9/21. He will hold short acting and take 80% long acting insulin.     6. GI:  Risk of PONV score = 2.  If > 2, anti-emetic intervention recommended.   ~ history of GERD/ s/p PEG placement. Patient does tube feeds x4 per day.   ~ History of colon cancer s/p right colectomy and history of appendiceal cancer s/p appendectomy     7. ID: The patient was recently treated for pneumonia on 8/5/21 causing hypoxia and hypotension. Off antibiotics and reports no symptoms. Would normally recommend waiting 30 days post pneumonia but given time sensitivity of procedure and diagnosis and patient reports resolution of symptoms okay to proceed.     8. Musculoskeletal:  The patient had a fall resulting in a T6 compression fracture and has mild neuropathy in his feet from DM and neuropathy in his tongue from prior surgeries. Consideration for careful positioning to minimize discomfort. He will continue his pain medication for his compression fracture and pain from his cancer. Tylenol, lidocaine, Neurontin and oxycodone.     9. Anesthesia: The patient did have direct scope by Dr. Mckenna on 6/29/21  PROCEDURE NOTE:  I did a flexible direct scope on him transnasally as well after topical anesthesia.  The nasopharynx, oropharynx, hypopharynx all look excellent.  He has this area of mucus that is pooled in the vallecula on the right side.  I tried to have him cough this out.  The entire surrounding mucosa in all of these areas is pink.  There are no signs of fungal infection or anything else of this nature.  It looks quite well.  Neck exam is as described.    His last general anesthesia was on 7/5/21 and easy:   Placement Date: 07/05/21; Placement Time: 1251 (created via procedure documentation); Mask Ventilation: 2; Induction Type: Intravenous; Ease of Intubation: Easy; Technique: Video laryngoscopy; ETT Type: Single; Tube Size: 8.5 mm; VL Blade Size: D Blade; Grade View: 1 (Glottic opening deviated to left);  Adjucts: Stylet (Glidescope stylet); Placement Person: CRNA; Attempts: 1; Depth: 23 cm    He did have several attempts at one prior:   Placement Date: 05/24/21; Placement Time: 0758 (created via procedure documentation); Mask Ventilation: 1; Induction Type: Intravenous; Ease of Intubation: Easy; Technique: Video laryngoscopy; ETT Type: Laser tube, Single; Tube Size: 6 mm; VL Blade Size: D Blade; Grade View: 2; Adjucts: Stylet; Placement Person: CRNA; Attempts: 2 (first attempt with DL mac 4- could not visualize cords (radiation to neck), second look with dblade cmac was easy g2v)    Recommend to have D blade and CMAC available.     VTE risk: 3%       Patient was discussed with Dr Zelaya.      The patient is optimized for their procedure. AVS with information on surgery time/arrival time, meds and NPO status given by nursing staff.        **Please refer to the physical examination documented by the anesthesiologist in the anesthesia record on the day of surgery**        Video-Visit Details    Type of service:  Video Visit    Patient verbally consented to video service today: YES      Video Start Time: 3:18  Video End Time (time video stopped): 3:29    Originating Location (pt. Location): Home    Distant Location (provider location):  [facility]     Mode of Communication:  Video Conference via [email or web address]      On the day of service:     Prep time: 22 minutes  Visit time: 11 minutes  Documentation time: 17 minutes  ------------------------------------------  Total time: 50 minutes      Adwoa Bingham PA-C  Preoperative Assessment Center  [facility]  Phone: [phone]  Fax: [phone]

## 2021-08-20 NOTE — PROGRESS NOTES
Called and spoke with patient's wife indicating that outside CT chest scans were reviewed at thoracic conference today. Thoracic is concerned about the findings seen on outside CT scan.  They recommend patient proceed with an EBUS with washings as well as biopsy of LIZZY mass.    Advised that he will need PAC Appointment which is scheduled for today at 3:15pm and procedure scheduled with thoracic on 8//26.     Patient and wife verbalized understanding. They would like to proceed as planned.   They were encouraged to call with further questions or concerns.       Blanka Barnett, RN, BSN

## 2021-08-20 NOTE — PROGRESS NOTES
Kenrick is a 71 year old who is being evaluated via a billable video visit.      How would you like to obtain your AVS? MyChart  If the video visit is dropped, the invitation should be resent by: Text to cell phone: [phone]    HPI         Review of Systems         Objective    Vitals - Patient Reported  Pain Score: Moderate Pain (5)        Physical Exam

## 2021-08-20 NOTE — ANESTHESIA PREPROCEDURE EVALUATION
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]   Procedure : Procedure(s):  BRONCHOSCOPY, flexible, endobronchial ultrasound with transbronchial biopsies     Past Medical History:   Diagnosis Date     Adenocarcinoma (H)     large instestine      Cancer of appendix (H)      Diabetes (H)      Gastro-oesophageal reflux disease      History of radiation therapy      Hoarseness      Hypertension       Past Surgical History:   Procedure Laterality Date     APPENDECTOMY       COLECTOMY      Right     DISSECTION RADICAL NECK MODIFIED Right 2020    Procedure: Modified neck dissection;  Surgeon: Jose Antonio Mckenna MD;  Location: [facility] OR     ENDOBRONCHIAL ULTRASOUND FLEXIBLE N/A 2021    Procedure: ENDOBRONCHIAL ULTRASOUND, WITH FLEXIBLE BRONCHOSCOPY;  Surgeon: Indio Zamora MD;  Location: [facility]     ENDOSCOPIC INSERTION TUBE GASTROSTOMY N/A 2021    Procedure: INSERTION, PEG TUBE;  Surgeon: Indio Zamora MD;  Location: [facility]     EXCISE LESION INTRAORAL Right 2020    Procedure: Wide Local Excision of Right Tongue Lesion;  Surgeon: Jose Antonio Mckenna MD;  Location: [facility]     INSERT PORT VASCULAR ACCESS Right 1/15/2021    Procedure: CHEST INSERTION, VASCULAR ACCESS PORT @0900;  Surgeon: Tony Hopkins MD;  Location: [facility] OR     IR CHEST PORT PLACEMENT > 5 YRS OF AGE  1/15/2021     LARYNGOSCOPY WITH BIOPSY(IES) N/A 2021    Procedure: LARYNGOSCOPY, WITH BIOPSY;  Surgeon: Jose Antonio Mckenna MD;  Location: [facility]     LASER CO2 LARYNGOSCOPY N/A 10/6/2014    Procedure: LASER CO2 LARYNGOSCOPY;  Surgeon: Jose Antonio Mckenna MD;  Location: [facility]     LASER CO2 LESION ORAL N/A 2021    Procedure: Excision of tongue ulcer with omniguide laser;  Surgeon: Jose Antonio Mckenna MD;  Location: [facility]      No Known Allergies   Social History     Tobacco Use     Smoking status: Never  Smoker     Smokeless tobacco: Never Used   Substance Use Topics     Alcohol use: Not Currently     Alcohol/week: 0.0 standard drinks     Comment: none for 20 years      Wt Readings from Last 1 Encounters:   08/17/21 69.4 kg (153 lb)        Anesthesia Evaluation   Pt has had prior anesthetic. Type: General.    No history of anesthetic complications       ROS/MED HX  ENT/Pulmonary: Comment: Lung mass   (-) tobacco use   Neurologic:     (+) peripheral neuropathy, - tongue.  (-) no seizures, no CVA and no TIA   Cardiovascular:     (+) Dyslipidemia hypertension-----dysrhythmias, a-fib, Previous cardiac testing   Echo: Date: 8/6/21 Results:    Stress Test: Date: Results:    ECG Reviewed: Date: 8/6/21 Results:  Sinus rhythm with sinus arrhythmia   ST & T wave abnormality, consider anterolateral ischemia   Abnormal ECG     Cath: Date: Results:      METS/Exercise Tolerance: 4 - Raking leaves, gardening    Hematologic:     (+) anemia,  (-) history of blood clots and history of blood transfusion   Musculoskeletal: Comment: T6 compression fracture      GI/Hepatic: Comment: PEG in place    (+) GERD (eating through PEG so no current symptoms. ), Other,     Renal/Genitourinary:  - neg Renal ROS     Endo:     (+) type II DM, Last HgA1c: 7.8, date: 6/26/21, Using insulin, - not using insulin pump.     Psychiatric/Substance Use:  - neg psychiatric ROS     Infectious Disease: Comment: Treated for actinomycosis       Malignancy: Comment: History of SCC of throat, SCC of tongue, appendix cancer, colon cancer, squamous cell cancer of neck  (+) Malignancy, History of Other.    Other:  - neg other ROS          Physical Exam    Airway        Mallampati: II   TM distance: > 3 FB   Neck ROM: limited   Mouth opening: > 3 cm    Respiratory Devices and Support         Dental           Cardiovascular   cardiovascular exam normal          Pulmonary   pulmonary exam normal                OUTSIDE LABS:  CBC:   Lab Results   Component Value Date     WBC 12.9 (H) 06/27/2021    WBC 13.1 (H) 06/26/2021    HGB 12.2 (L) 06/27/2021    HGB 11.4 (L) 06/26/2021    HCT 37.8 (L) 06/27/2021    HCT 35.5 (L) 06/26/2021     06/27/2021     06/26/2021     BMP:   Lab Results   Component Value Date     (L) 06/27/2021     06/26/2021    POTASSIUM 4.9 06/27/2021    POTASSIUM 4.4 06/26/2021    CHLORIDE 99 06/27/2021    CHLORIDE 100 06/26/2021    CO2 24 06/27/2021    CO2 28 06/26/2021    BUN 23 06/27/2021    BUN 14 06/26/2021    CR 1.16 06/27/2021    CR 1.23 06/26/2021     (H) 06/27/2021    GLC 80 06/26/2021     COAGS:   Lab Results   Component Value Date    INR 1.08 01/15/2021     POC:   Lab Results   Component Value Date    BGM 86 07/05/2021     HEPATIC:   Lab Results   Component Value Date    ALBUMIN 3.2 (L) 03/01/2021    PROTTOTAL 7.7 03/01/2021    ALT 24 03/01/2021    AST 18 03/01/2021    ALKPHOS 89 03/01/2021    BILITOTAL 0.5 03/01/2021     OTHER:   Lab Results   Component Value Date    A1C 7.8 (H) 06/26/2021    ELEUTERIO 9.5 06/27/2021    MAG 1.6 02/01/2021       Anesthesia Plan    ASA Status:  3      Anesthesia Type: General.     - Airway: ETT   Induction: Intravenous.   Maintenance: TIVA.   Techniques and Equipment:     - Airway: Video-Laryngoscope         Consents    Anesthesia Plan(s) and associated risks, benefits, and realistic alternatives discussed. Questions answered and patient/representative(s) expressed understanding.     - Discussed with:  Patient      - Extended Intubation/Ventilatory Support Discussed: No.      - Patient is DNR/DNI Status: No    Use of blood products discussed: No .     Postoperative Care    Pain management: IV analgesics.   PONV prophylaxis: Ondansetron (or other 5HT-3), Dexamethasone or Solumedrol     Comments:              PAC Discussion and Assessment    ASA Classification: 3  Case is suitable for: [place]  Anesthetic techniques and relevant risks discussed: GA                  PAC Resident/NP Anesthesia  "Assessment: Reese, \"Kenrick\" is a 71 year old man who is scheduled for BRONCHOSCOPY, flexible, endobronchial ultrasound with transbronchial biopsies on 8/26/21 by Dr. Bush in treatment of Squamous cell carcinoma of neck.  PAC referral for risk assessment and optimization for anesthesia with comorbid conditions of hypertension hyperlipidemia, anemia, type 2 diabetes on insulin, GERD, history of colon cancer, history of appendiceal cancer, history of SCC of the throat treated with laser resection and adjuvant radiotherapy in 2014 followed by tongue cancer diagnosed in February 2020 treated with wide local excision of the right tongue on 2/19/2020 who was found to have biopsy-proven recurrence in his right neck and underwent modified neck dissection on 12/9/2020:      Pre-operative considerations:   1.  Cardiac:  Functional status- METS 4, the patient is doing yard work and just finished mowing the lawn earlier today.  Low risk surgery with 0.9% (RCRI #) risk of major adverse cardiac event. He denies any cardiac symptoms.   ~ History of HTN/HLD - not currently on medications  ~ During the patient's recent hospitalization he had a fib which on follow up EKG had resolved. Cardiology was consulted and though that his a fib was secondary to dehydration, pneumonia and cancer. CHADSVASC = 3. Given his need for further work up of his enlarging lung mass anticoagulation was not initiated for him.       2.  Pulm:  Airway feasible.  RAMESH risk: Low  ~ Lung mass - procedure as above    3. Heme:   Anemia - hgb 8.8 on 8/8/21. Low bleeding risk procedure    4. ENT: The patient has a history of SCC of the throat, tongue and neck. Most recently completed chemotherapy in 3/2021. 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   The patient continues to have ongoing pain and underwent PEG placement in 7/2021. He most recently followed up with Dr. Mckenna " via telephone call on 8/17/21 and his pain was slowly improving. He will continue lidocaine, magic mouth wash, pentoxiflline and oxycodone.     5. Endo: Type 2 diabetes - on insulin. Last A1c was 10.6 on 3/9/21. He will hold short acting and take 80% long acting insulin.     6. GI:  Risk of PONV score = 2.  If > 2, anti-emetic intervention recommended.   ~ history of GERD/ s/p PEG placement. Patient does tube feeds x4 per day.   ~ History of colon cancer s/p right colectomy and history of appendiceal cancer s/p appendectomy     7. ID: The patient was recently treated for pneumonia on 8/5/21 causing hypoxia and hypotension. Off antibiotics and reports no symptoms. Would normally recommend waiting 30 days post pneumonia but given time sensitivity of procedure and diagnosis and patient reports resolution of symptoms okay to proceed.     8. Musculoskeletal:  The patient had a fall resulting in a T6 compression fracture and has mild neuropathy in his feet from DM and neuropathy in his tongue from prior surgeries. Consideration for careful positioning to minimize discomfort. He will continue his pain medication for his compression fracture and pain from his cancer. Tylenol, lidocaine, Neurontin and oxycodone.     9. Anesthesia: The patient did have direct scope by Dr. Mckenna on 6/29/21  PROCEDURE NOTE:  I did a flexible direct scope on him transnasally as well after topical anesthesia.  The nasopharynx, oropharynx, hypopharynx all look excellent.  He has this area of mucus that is pooled in the vallecula on the right side.  I tried to have him cough this out.  The entire surrounding mucosa in all of these areas is pink.  There are no signs of fungal infection or anything else of this nature.  It looks quite well.  Neck exam is as described.    His last general anesthesia was on 7/5/21 and easy:   Placement Date: 07/05/21; Placement Time: 1251 (created via procedure documentation); Mask Ventilation: 2; Induction Type:  Intravenous; Ease of Intubation: Easy; Technique: Video laryngoscopy; ETT Type: Single; Tube Size: 8.5 mm; VL Blade Size: D Blade; Grade View: 1 (Glottic opening deviated to left); Adjucts: Stylet (Glidescope stylet); Placement Person: CRNA; Attempts: 1; Depth: 23 cm    He did have several attempts at one prior:   Placement Date: 05/24/21; Placement Time: 0758 (created via procedure documentation); Mask Ventilation: 1; Induction Type: Intravenous; Ease of Intubation: Easy; Technique: Video laryngoscopy; ETT Type: Laser tube, Single; Tube Size: 6 mm; VL Blade Size: D Blade; Grade View: 2; Adjucts: Stylet; Placement Person: CRNA; Attempts: 2 (first attempt with DL mac 4- could not visualize cords (radiation to neck), second look with dblade cmac was easy g2v)    Recommend to have D blade and CMAC available.     VTE risk: 3%     **Please refer to the physical examination documented by the anesthesiologist in the anesthesia record on the day of surgery**     Patient is optimized and is acceptable candidate for the proposed procedure.  No further diagnostic evaluation is needed.      Patient discussed with Dr. Zelaya.       For further details of assessment, testing, and physical exam please see H and P completed on same date     Adwoa Bingham PA-C         Mid-Level Provider/Resident: Adwoa Bingham PA-C  Date: 8/20/21      Attending Anesthesiologist Anesthesia Assessment:      Intubation: GETA for laryngosopy  7/5/21:Mask vent 2, easy intubation with videoscope- blade D, 8.5 ett, glottis dev left,  vc grade 1 view                       5/24/21 GETA for tongue ulcer excision- attempt intubation with MAC 3 unsuccessful- cords not seen; to video scope-D blade  easy intubation, 6.0 ett  Reviewed and Signed by PAC Anesthesiologist  Anesthesiologist: Garcia  Date: 8/20/21                     Calixto Juarez MD

## 2021-08-20 NOTE — PATIENT INSTRUCTIONS
Preparing for Your Surgery      Name:  Reese Oakley   MRN:  [id number]   :  1950   Today's Date:  2021       Arriving for surgery:  Surgery date:  21  Arrival time:  7:15 am    Restrictions due to COVID 19:       One visitor is allowed in the Pre Op area. When you go into surgery, one visitor is allowed to wait in the Surgery Waiting Room       (provided there is enough space to social distance).   After surgery- Two visitors are allowed at a time if you have a private room and one visitor is allowed for those in a semi-private room.   Every 4 days the visitor(s) can rotate. During the 4 day period, the visitor(s) must be consistent. No visitors under the age of 18 years old.   Visiting Hours: 8 am - 8:30 pm   No ill visitors.   All visitors must wear face mask.    [employer] parking is available for anyone with mobility limitations or disabilities.  ([place]  24 hours/ 7 days a week; [facility]  7 am- 3:30 pm, Mon- Fri)    Please come to:     [facility] [address]  [address] [place] [address]     -    On arrival to hospital, you will be asked some screening questions and directed to Patient Registration. Patient Registration will then give you further instructions. [phone]?     - ?If you are in need of directions, wheelchair or escort please stop at the Information Desk in the lobby.  Inform the information person that you are here for surgery; a wheelchair and escort to [address] will be provided.?     What can I eat or drink?  -  You may eat and drink normally for up to 8 hours before your surgery. (Until 1:15 am)  -  Stop tube feedings 8 hours prior to surgery. (Until 1:15 am)  -  You may have clear liquids until 2 hours before surgery. (Until 7:15 am)    Examples of clear liquids:  Water  Clear broth  Juices (apple, white grape, white cranberry  and cider) without pulp  Noncarbonated, powder based beverages  (lemonade  and Jamarcus-Aid)  Sodas (Sprite, 7-Up, ginger ale and seltzer)  Coffee or tea (without milk or cream)  Gatorade    -  No Alcohol for at least 24 hours before surgery     Which medicines can I take?  Hold Aspirin for 7 days before surgery. Pt is not currently taking Aspirin.  Hold Multivitamins for 7 days before surgery.  * Hold Supplements for 7 days before surgery. Hold Vitamin E starting now, if you have not already.  Hold Ibuprofen (Advil, Motrin) for 1 day before surgery--unless otherwise directed by surgeon.  Hold Naproxen (Aleve) for 4 days before surgery.    -  DO NOT take these medications the day of surgery:  Aspart insulin    -  PLEASE TAKE these medications the day of surgery:  Gabapentin,   Acetaminophen (Tylenol) if needed  Guaifenesin (Robitussin) if needed  Oxycodone if needed    The morning of surgery, decrease the Glargine (Lantus) insulin to 20 units.    How do I prepare myself?  - Please take 2 showers before surgery using Scrubcare or Hibiclens soap.    Use this soap only from the neck to your toes.     Leave the soap on your skin for one minute--then rinse thoroughly.      You may use your own shampoo and conditioner; no other hair products.   - Please remove all jewelry and body piercings.  - No lotions, deodorants or fragrance.  - Bring your ID and insurance card.    -If you have a Deep Brain Stimulator, Spinal Cord Stimulator or any neuro stimulator device---you must bring the remote control to the hospital     - All patients are required to have a Covid-19 test within 4 days of surgery/procedure.      -Patients will be contacted by the [facility] scheduling team within 1 week of surgery to make an appointment.      - Patients may call the Scheduling team at [phone] if they have not been scheduled within 4 days of  surgery.      ALL PATIENTS GOING HOME THE SAME DAY OF SURGERY ARE REQUIRED TO HAVE A RESPONSIBLE ADULT TO DRIVE AND BE IN ATTENDANCE WITH THEM FOR 24 HOURS FOLLOWING  SURGERY.      Questions or Concerns:    - For any questions regarding the day of surgery or your hospital stay, please contact the Pre Admission Nursing Office at [phone].       - If you have health changes between today and your surgery please call your surgeon.       For questions after surgery please call your surgeons office.

## 2021-08-31 PROBLEM — R91.8 HILAR MASS: Status: ACTIVE | Noted: 2021-01-01

## 2021-09-02 NOTE — TELEPHONE ENCOUNTER
Spoke with wife to schedule procedure with Dr. Alber Bush   Procedure was scheduled on 09/15 at [facility] OR  Patient will have H&P with  Completed with PAC and recent ED visit    Patient is aware a COVID-19 test is needed before their procedure. The test should be with-in 4 days of their procedure.   Test Details: Wife will schedule at [facility],     Patient is aware a / is needed day of surgery.   Surgery letter was sent via [employer], patient has my direct contact information for any further questions.

## 2021-09-03 NOTE — PROGRESS NOTES
Received a call from patient's wife requesting refill on oxycodone. Dr. Mckenna approved refill and writer asked Dena Nguyen NP as Dr. Mckenna is not in clinic today to prescribe.     Wife also requested refill on liquid omeprazole. Refill sent to [facility].       Blanka Branett, RN, BSN

## 2021-09-15 NOTE — ANESTHESIA PREPROCEDURE EVALUATION
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]   Procedure : Procedure(s):  BRONCHOSCOPY, flexible, endobronchial ultrasound with transbronchial biopsies     Past Medical History:   Diagnosis Date     Adenocarcinoma (H)     large instestine      Anemia      Cancer of appendix (H)      Closed wedge compression fracture of T6 vertebra (H)      Diabetes (H)      Gastro-oesophageal reflux disease      History of radiation therapy      HLD (hyperlipidemia)      Hoarseness      HTN (hypertension)      Hypertension      Paroxysmal atrial fibrillation (H)      Primary squamous cell carcinoma of throat (H)      Squamous cell cancer of tongue (H)      Squamous cell carcinoma of neck       Past Surgical History:   Procedure Laterality Date     APPENDECTOMY       COLECTOMY      Right     DISSECTION RADICAL NECK MODIFIED Right 2020    Procedure: Modified neck dissection;  Surgeon: Jose Antonio Mckenna MD;  Location: [facility] OR     ENDOBRONCHIAL ULTRASOUND FLEXIBLE N/A 2021    Procedure: ENDOBRONCHIAL ULTRASOUND, WITH FLEXIBLE BRONCHOSCOPY;  Surgeon: Indio Zamora MD;  Location: [facility]     ENDOSCOPIC INSERTION TUBE GASTROSTOMY N/A 2021    Procedure: INSERTION, PEG TUBE;  Surgeon: Indio Zamora MD;  Location: [facility]     EXCISE LESION INTRAORAL Right 2020    Procedure: Wide Local Excision of Right Tongue Lesion;  Surgeon: Jose Antonio Mckenna MD;  Location: [facility]     INSERT PORT VASCULAR ACCESS Right 1/15/2021    Procedure: CHEST INSERTION, VASCULAR ACCESS PORT @0900;  Surgeon: Tony Hopkins MD;  Location: [facility] OR     IR CHEST PORT PLACEMENT > 5 YRS OF AGE  1/15/2021     LARYNGOSCOPY WITH BIOPSY(IES) N/A 2021    Procedure: LARYNGOSCOPY, WITH BIOPSY;  Surgeon: Jose Antonio Mckenna MD;  Location: [facility]     LASER CO2 LARYNGOSCOPY N/A 10/6/2014    Procedure: LASER CO2 LARYNGOSCOPY;   Surgeon: Jose Antonio Mckenna MD;  Location: [facility]     LASER CO2 LESION ORAL N/A 5/24/2021    Procedure: Excision of tongue ulcer with omniguide laser;  Surgeon: Jose Antonio Mckenna MD;  Location: [facility]      No Known Allergies   Social History     Tobacco Use     Smoking status: Never Smoker     Smokeless tobacco: Never Used   Substance Use Topics     Alcohol use: Not Currently     Alcohol/week: 0.0 standard drinks     Comment: none for 20 years      Wt Readings from Last 1 Encounters:   08/17/21 69.4 kg (153 lb)        Anesthesia Evaluation   Pt has had prior anesthetic. Type: General.    No history of anesthetic complications       ROS/MED HX  ENT/Pulmonary: Comment: Lung mass   (-) tobacco use   Neurologic:     (+) peripheral neuropathy, - tongue.  (-) no seizures, no CVA and no TIA   Cardiovascular:     (+) Dyslipidemia hypertension-----dysrhythmias, a-fib, Previous cardiac testing   Echo: Date: 8/6/21 Results:    Stress Test: Date: Results:    ECG Reviewed: Date: 8/6/21 Results:  Sinus rhythm with sinus arrhythmia   ST & T wave abnormality, consider anterolateral ischemia   Abnormal ECG     Cath: Date: Results:      METS/Exercise Tolerance: 4 - Raking leaves, gardening    Hematologic:     (+) anemia,  (-) history of blood clots and history of blood transfusion   Musculoskeletal: Comment: T6 compression fracture      GI/Hepatic: Comment: PEG in place    (+) GERD (eating through PEG so no current symptoms. ), Other,     Renal/Genitourinary:  - neg Renal ROS     Endo:     (+) type II DM, Last HgA1c: 7.8, date: 6/26/21, Using insulin, - not using insulin pump.     Psychiatric/Substance Use:  - neg psychiatric ROS     Infectious Disease: Comment: Treated for actinomycosis       Malignancy: Comment: History of SCC of throat, SCC of tongue, appendix cancer, colon cancer, squamous cell cancer of neck  (+) Malignancy, History of Other.    Other:  - neg other ROS          Physical Exam    Airway        Mallampati: II    TM distance: > 3 FB   Neck ROM: limited   Mouth opening: > 3 cm    Respiratory Devices and Support         Dental           Cardiovascular   cardiovascular exam normal          Pulmonary   pulmonary exam normal                OUTSIDE LABS:  CBC:   Lab Results   Component Value Date    WBC 12.9 (H) 06/27/2021    WBC 13.1 (H) 06/26/2021    HGB 12.2 (L) 06/27/2021    HGB 11.4 (L) 06/26/2021    HCT 37.8 (L) 06/27/2021    HCT 35.5 (L) 06/26/2021     06/27/2021     06/26/2021     BMP:   Lab Results   Component Value Date     (L) 06/27/2021     06/26/2021    POTASSIUM 4.9 06/27/2021    POTASSIUM 4.4 06/26/2021    CHLORIDE 99 06/27/2021    CHLORIDE 100 06/26/2021    CO2 24 06/27/2021    CO2 28 06/26/2021    BUN 23 06/27/2021    BUN 14 06/26/2021    CR 1.16 06/27/2021    CR 1.23 06/26/2021     (H) 06/27/2021    GLC 80 06/26/2021     COAGS:   Lab Results   Component Value Date    INR 1.08 01/15/2021     POC:   Lab Results   Component Value Date    BGM 86 07/05/2021     HEPATIC:   Lab Results   Component Value Date    ALBUMIN 3.2 (L) 03/01/2021    PROTTOTAL 7.7 03/01/2021    ALT 24 03/01/2021    AST 18 03/01/2021    ALKPHOS 89 03/01/2021    BILITOTAL 0.5 03/01/2021     OTHER:   Lab Results   Component Value Date    A1C 7.8 (H) 06/26/2021    ELEUTERIO 9.5 06/27/2021    MAG 1.6 02/01/2021       Anesthesia Plan    ASA Status:  3      Anesthesia Type: General.     - Airway: ETT   Induction: Intravenous.   Maintenance: TIVA.   Techniques and Equipment:     - Airway: Video-Laryngoscope         Consents    Anesthesia Plan(s) and associated risks, benefits, and realistic alternatives discussed. Questions answered and patient/representative(s) expressed understanding.     - Discussed with:  Patient      - Extended Intubation/Ventilatory Support Discussed: No.      - Patient is DNR/DNI Status: No    Use of blood products discussed: No .     Postoperative Care    Pain management: IV analgesics.   PONV  "prophylaxis: Ondansetron (or other 5HT-3), Dexamethasone or Solumedrol     Comments:                PAC Discussion and Assessment    ASA Classification: 3  Case is suitable for: [place]  Anesthetic techniques and relevant risks discussed: GA                  PAC Resident/NP Anesthesia Assessment: Reese \"Nannette" is a 71 year old man who is scheduled for BRONCHOSCOPY, flexible, endobronchial ultrasound with transbronchial biopsies on 8/26/21 by Dr. Bush in treatment of Squamous cell carcinoma of neck.  PAC referral for risk assessment and optimization for anesthesia with comorbid conditions of hypertension hyperlipidemia, anemia, type 2 diabetes on insulin, GERD, history of colon cancer, history of appendiceal cancer, history of SCC of the throat treated with laser resection and adjuvant radiotherapy in 2014 followed by tongue cancer diagnosed in February 2020 treated with wide local excision of the right tongue on 2/19/2020 who was found to have biopsy-proven recurrence in his right neck and underwent modified neck dissection on 12/9/2020:      Pre-operative considerations:   1.  Cardiac:  Functional status- METS 4, the patient is doing yard work and just finished mowing the lawn earlier today.  Low risk surgery with 0.9% (RCRI #) risk of major adverse cardiac event. He denies any cardiac symptoms.   ~ History of HTN/HLD - not currently on medications  ~ During the patient's recent hospitalization he had a fib which on follow up EKG had resolved. Cardiology was consulted and though that his a fib was secondary to dehydration, pneumonia and cancer. CHADSVASC = 3. Given his need for further work up of his enlarging lung mass anticoagulation was not initiated for him.       2.  Pulm:  Airway feasible.  RAMESH risk: Low  ~ Lung mass - procedure as above    3. Heme:   Anemia - hgb 8.8 on 8/8/21. Low bleeding risk procedure    4. ENT: The patient has a history of SCC of the throat, tongue and neck. Most recently completed " chemotherapy in 3/2021. 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   The patient continues to have ongoing pain and underwent PEG placement in 7/2021. He most recently followed up with Dr. Mckenna via telephone call on 8/17/21 and his pain was slowly improving. He will continue lidocaine, magic mouth wash, pentoxiflline and oxycodone.     5. Endo: Type 2 diabetes - on insulin. Last A1c was 10.6 on 3/9/21. He will hold short acting and take 80% long acting insulin.     6. GI:  Risk of PONV score = 2.  If > 2, anti-emetic intervention recommended.   ~ history of GERD/ s/p PEG placement. Patient does tube feeds x4 per day.   ~ History of colon cancer s/p right colectomy and history of appendiceal cancer s/p appendectomy     7. ID: The patient was recently treated for pneumonia on 8/5/21 causing hypoxia and hypotension. Off antibiotics and reports no symptoms. Would normally recommend waiting 30 days post pneumonia but given time sensitivity of procedure and diagnosis and patient reports resolution of symptoms okay to proceed.     8. Musculoskeletal:  The patient had a fall resulting in a T6 compression fracture and has mild neuropathy in his feet from DM and neuropathy in his tongue from prior surgeries. Consideration for careful positioning to minimize discomfort. He will continue his pain medication for his compression fracture and pain from his cancer. Tylenol, lidocaine, Neurontin and oxycodone.     9. Anesthesia: The patient did have direct scope by Dr. Mckenna on 6/29/21  PROCEDURE NOTE:  I did a flexible direct scope on him transnasally as well after topical anesthesia.  The nasopharynx, oropharynx, hypopharynx all look excellent.  He has this area of mucus that is pooled in the vallecula on the right side.  I tried to have him cough this out.  The entire surrounding mucosa in all of these areas is pink.  There are no  signs of fungal infection or anything else of this nature.  It looks quite well.  Neck exam is as described.    His last general anesthesia was on 7/5/21 and easy:   Placement Date: 07/05/21; Placement Time: 1251 (created via procedure documentation); Mask Ventilation: 2; Induction Type: Intravenous; Ease of Intubation: Easy; Technique: Video laryngoscopy; ETT Type: Single; Tube Size: 8.5 mm; VL Blade Size: D Blade; Grade View: 1 (Glottic opening deviated to left); Adjucts: Stylet (Glidescope stylet); Placement Person: CRNA; Attempts: 1; Depth: 23 cm    He did have several attempts at one prior:   Placement Date: 05/24/21; Placement Time: 0758 (created via procedure documentation); Mask Ventilation: 1; Induction Type: Intravenous; Ease of Intubation: Easy; Technique: Video laryngoscopy; ETT Type: Laser tube, Single; Tube Size: 6 mm; VL Blade Size: D Blade; Grade View: 2; Adjucts: Stylet; Placement Person: CRNA; Attempts: 2 (first attempt with DL mac 4- could not visualize cords (radiation to neck), second look with dblade cmac was easy g2v)    Recommend to have D blade and CMAC available.     VTE risk: 3%     **Please refer to the physical examination documented by the anesthesiologist in the anesthesia record on the day of surgery**     Patient is optimized and is acceptable candidate for the proposed procedure.  No further diagnostic evaluation is needed.      Patient discussed with Dr. Zelaya.       For further details of assessment, testing, and physical exam please see H and P completed on same date     Adwoa Bingham PA-C         Mid-Level Provider/Resident: Adwoa Bingham PA-C  Date: 8/20/21      Attending Anesthesiologist Anesthesia Assessment:      Intubation: GETA for laryngosopy  7/5/21:Mask vent 2, easy intubation with videoscope- blade D, 8.5 ett, glottis dev left,  vc grade 1 view                       5/24/21 GETA for tongue ulcer excision- attempt intubation with MAC 3 unsuccessful- cords not  seen; to video scope-D blade  easy intubation, 6.0 ett  Reviewed and Signed by PAC Anesthesiologist  Anesthesiologist: Garcia  Date: 8/20/21                     Adolfo Dunn MD

## 2021-09-15 NOTE — ANESTHESIA CARE TRANSFER NOTE
Patient: Reese Oakley    Procedure(s):  ENDOBRONCHIAL ULTRASOUND, WITH FLEXIBLE BRONCHOSCOPY,  transbronchial and endobronchial biopsies, BAL    Diagnosis: Hilar mass [R91.8]  Diagnosis Additional Information: No value filed.    Anesthesia Type:   General     Note:    Oropharynx: oropharynx clear of all foreign objects and spontaneously breathing  Level of Consciousness: drowsy  Oxygen Supplementation: face mask  Level of Supplemental Oxygen (L/min / FiO2): 6  Independent Airway: airway patency satisfactory and stable  Dentition: dentition unchanged  Vital Signs Stable: post-procedure vital signs reviewed and stable  Report to RN Given: handoff report given  Patient transferred to: PACU    Handoff Report: Identifed the Patient, Identified the Reponsible Provider, Reviewed the pertinent medical history, Discussed the surgical course, Reviewed Intra-OP anesthesia mangement and issues during anesthesia, Set expectations for post-procedure period and Allowed opportunity for questions and acknowledgement of understanding      Vitals:  Vitals Value Taken Time   /77 09/15/21 1244   Temp     Pulse 47 09/15/21 1248   Resp 13 09/15/21 1248   SpO2 99 % 09/15/21 1248   Vitals shown include unvalidated device data.    Electronically Signed By: KARINA Alaniz CRNA  September 15, 2021  12:49 PM

## 2021-09-15 NOTE — DISCHARGE INSTRUCTIONS
[facility]  Same-Day Surgery   Adult Discharge Orders & Instructions     For 24 hours after surgery    1. Get plenty of rest.  A responsible adult must stay with you for at least 24 hours after you leave the hospital.   2. Do not drive or use heavy equipment.  If you have weakness or tingling, don't drive or use heavy equipment until this feeling goes away.  3. Do not drink alcohol.  4. Avoid strenuous or risky activities.  Ask for help when climbing stairs.   5. You may feel lightheaded.  IF so, sit for a few minutes before standing.  Have someone help you get up.   6. If you have nausea (feel sick to your stomach): Drink only clear liquids such as apple juice, ginger ale, broth or 7-Up.  Rest may also help.  Be sure to drink enough fluids.  Move to a regular diet as you feel able.  7. You may have a slight fever. Call the doctor if your fever is over 100 F (37.7 C) (taken under the tongue) or lasts longer than 24 hours.  8. You may have a dry mouth, a sore throat, muscle aches or trouble sleeping.  These should go away after 24 hours.  9. Do not make important or legal decisions.   Call your doctor for any of the followin.  Signs of infection (fever, growing tenderness at the surgery site, a large amount of drainage or bleeding, severe pain, foul-smelling drainage, redness, swelling).    2. It has been over 8 to 10 hours since surgery and you are still not able to urinate (pass water).    3.  Headache for over 24 hours.    To contact Dr Bush's clinic call [phone]. If after hours call:        [phone] and ask for the resident on call for PULMONARY (answered 24 hours a day)      Emergency Department:  [facility]: [phone]         1.  antibiotics at  discharge pharmacy on [address] prior to going home

## 2021-09-15 NOTE — ANESTHESIA PROCEDURE NOTES
Airway       Patient location during procedure: OR  Staff -        CRNA: Tiffany Arana APRN CRNA       Performed By: CRNA  Consent for Airway        Urgency: elective  Indications and Patient Condition       Indications for airway management: chuck-procedural       Induction type:intravenous       Mask difficulty assessment: 1 - vent by mask    Final Airway Details       Final airway type: endotracheal airway       Successful airway: ETT - single  Endotracheal Airway Details        ETT size (mm): 8.5       Successful intubation technique: video laryngoscopy (limited mouth opening, slight L deviation of glottic opening)       VL Blade Size: MAC D Blade       Grade View of Cords: 1       Adjucts: stylet (Glidescope stylet with curve)       Position: Right       Measured from: lips       Secured at (cm): 22       Bite block used: None    Post intubation assessment        Placement verified by: capnometry and chest rise        Number of attempts at approach: 1       Secured with: pink tape       Ease of procedure: easy       Dentition: Intact and Unchanged

## 2021-09-15 NOTE — ANESTHESIA POSTPROCEDURE EVALUATION
Patient: Reese Oakley    Procedure(s):  ENDOBRONCHIAL ULTRASOUND, WITH FLEXIBLE BRONCHOSCOPY,  transbronchial and endobronchial biopsies, BAL    Diagnosis:Hilar mass [R91.8]  Diagnosis Additional Information: No value filed.    Anesthesia Type:  General    Note:  Disposition: Outpatient   Postop Pain Control: Uneventful            Sign Out: Well controlled pain   PONV: No   Neuro/Psych: Uneventful            Sign Out: Acceptable/Baseline neuro status   Airway/Respiratory: Uneventful            Sign Out: Acceptable/Baseline resp. status   CV/Hemodynamics: Uneventful            Sign Out: Acceptable CV status; No obvious hypovolemia; No obvious fluid overload   Other NRE: NONE   DID A NON-ROUTINE EVENT OCCUR? No           Last vitals:  Vitals Value Taken Time   /64 09/15/21 1350   Temp 36.8  C (98.2  F) 09/15/21 1330   Pulse 54 09/15/21 1356   Resp 22 09/15/21 1356   SpO2 92 % 09/15/21 1356   Vitals shown include unvalidated device data.    Electronically Signed By: Adolfo Dunn MD  September 15, 2021  1:57 PM

## 2021-09-24 PROBLEM — R06.00 DYSPNEA, UNSPECIFIED TYPE: Status: ACTIVE | Noted: 2021-01-01

## 2021-09-24 NOTE — PROGRESS NOTES
[facility]    Progress Note - Sandra 4 Service        Date of Admission:  9/24/2021    Assessment & Plan           Reese Oakley is a 71 year old male with PMHx HTN, HLD, anemia, DM2, GERD, hx colon cancer, appendiceal cancer, throat cancer s/p laser resection/adjuvant radiotherapy (2014), tongue cancer (2020) s/p wide local excision c/b biopsy-proven recurrence in R neck, who presents to ED with cough and shortness of breath, admitted for dyspnea in setting of large left pleural effusion.  Workup concerning for significant SCC metastasis.    Shortness of breath  Productive cough  Presented with ~1 week of shortness of breath that significantly worsened a few days PTA. Etiology includes dyspnea 2/2 pleural effusion vs pulmonary vein thrombus vs infection. CT chest PE with significant L pleural effusion, concern for malignant effusion, but cytology and additional fluid analysis pending. Some relief after thoracentesis, but still has some shortness of breath with movement. Also consider pulmonary vein thrombus as a possible cause, given findings on chest CT A/P -- concern for possible tumor thrombus. Infxn remains a possibility, given recent worsening of dyspnea with new cough productive of yellow sputum per pt, though procalcitonin was wnl.   - Start ceftriaxone and azithro for respiratory infection coverage   - Sputum culture pending   - Diagnostic and therapeutic thoracentesis --> Some relief since procedure    - Exudative by 2 criteria (fluid/serum LDH >0.6, fluid/serum protein >0.5)   - Additional analysis pending   - Eval thrombus type as this will guide treatment options    - Started on high intensity heparin gtt    - Discussed with IR --> Possible to remove pulmonary v thrombus if not malignant thrombus     Large L pleural effusion   Concern for malignant effusion given h/o cancer and imaging findings consistent with new mets. Post-obstructive pneumonia also  possible, given new cough productive of thick sputum per pt.  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 On presentation, HDS, satting appropriately on RA. Leukocytosis stable since June. CXR notable for large L pleural effusion, concern for further metastasis to pleural space. Post-obstructive PNA also possible given productive, thick yellow sputum. CT PE negative for PE but showed para-mediastinal consolidation, mod-large L pleural effusion, new nodules in R lung (9mm, concern for mets), increased size of mediastinal and hilar irvin mets, scattered lytic spine mets. CT neck with ill-defined enhancement of R side of tongue concerning for recurrence.  - Plan for possible ENT consult on Monday  - Palliative care consulted   - CT A/P w/ contrast ordered for staging -- most concerning for L pulmonary vein thrombus   - Started on high intensity heparin gtt   - CAPs team consulted for therapeutic/diagnostic thora --> Some relief since procedure   - Exudative by 2 criteria (fluid/serum LDH >0.6, fluid/serum protein >0.5)    - Additional analysis pending    - Had intermittent R-sided ab pain under ribs --> Repeat CXR: Decreased L pleural effusion, relatively clear R lung/pleura  - Oncology consulted; appreciate recs  - PTA glycopyrrolate for secretions  - Stopped pilocarpine (listed as home med) and carafate as pt not taking PTA  - PTA lidocaine for oral pain     Mild Chronic Hyponatremia  Presents with sodium of 132, baseline over last 6 months 131-134.  - CTM     Mild Hypercalcemia  Calcium on presentation 10.7, no albumin measured on presentation.  Possible this is PTHrP related process, given metastatic SCC.  Patient asymptomatic at this time. 1 L  LR was given in the ED. 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  - 1L LR given  - PTHrP pending  - Ionized Ca elevated to 5.6, though albumin was low at 2.4     Hyperglycemia  DM2  History of DM2, insulin dependent.  Presented with glucose of 340, though POC readings between 81-99.  Possible this is an inaccurate reading - will repeat reading.  No acidosis seen on labs.  - 15U Novolog TID given with TFs   - 10 U Lantus qAM  - MDSSI  - q4 glucose checks      Chronic medical problems  #Chronic Nomocytic Anemia - Baseline 11-12  #G-tube - nutrition consulted for tube feed initiation  #HTN - lisinopril  #HLD - simvastatin  #CAD - ASA  #GERD - omeprazole        Diet: NPO for Medical/Clinical Reasons Except for: NPO but receiving Tube Feeding  Adult Formula Bolus Feeding: TID Jevity 1.5; Route: Gastrostomy; 6; Can(s); Medication - Feeding Tube Flush Frequency: At least 15-30 mL water before and after medication administration and with tube clogging; Amount to Send (Nutrition use): 6 can...    DVT Prophylaxis: Enoxaparin (Lovenox) SQ  Lerma Catheter: Not present  Fluids: 1L given in ED  Central Lines: None  Code Status: Full Code      Disposition Plan   Expected discharge: 2-3 days pending symptomatic relief of dyspnea.  Has outpatient oncology appointment on Tuesday of next week.       The patient's care was discussed with the Attending Physician, Dr. Sanchez.    Amaya Bishop  Medical Student  [address]  Securely message with the Vocera Web Console (learn more here)  Text page via [facility] Paging/Directory  Please see sign in/sign out for up to date coverage information    Clinically Significant Risk Factors Present on Admission         # Hyponatremia: Na = 132 mmol/L (Ref range: 133 - 144 mmol/L) on admission, will monitor as appropriate  #  Hypercalcemia: Ca = 10.7 mg/dL (Ref range: 8.5 - 10.1 mg/dL) and/or iCa = N/A on admission, will monitor as appropriate     # Platelet Defect: home medication list includes an antiplatelet medication  # Severe Malnutrition, POA: based on Subcutaneous fat loss;Muscle loss (09/24/21 1000)     Resident/Fellow Attestation   I, Kevin Saeed, was present with the medical/[employer]IM[employer] student who participated in the service and in the documentation of the note.  I have verified the history and personally performed the physical exam and medical decision making.  I agree with the assessment and plan of care as documented in the note.  Minimal changes were made to the note for the sake of accuracy and clarity.       Kevin Saeed DO, MPH  Med-Peds PGY-2  [facility] 4 Service  Date of Service (when I saw the patient): 09/24/21  ______________________________________________________________________    Interval History   - No chest pain or shortness of breath at rest   - Coughing, mildly productive  - No abdominal pain or nausea  - No LE pain or swelling   - Reports intermittent left sided abdominal pain just under ribs since ace     Discussed imaging findings with the pt at bedside. He would like to receive updates and information as available and is open to speaking to Palliative team about goals of care.     The 4 point ROS was negative except as noted above.     Data reviewed today: I reviewed all medications, new labs and imaging results over the last 24 hours.    Physical Exam   Vital Signs: Temp: 97.5  F (36.4  C) Temp src: Oral BP: (!) 157/80 Pulse: 66   Resp: 18 SpO2: 99 % O2 Device: Nasal cannula Oxygen Delivery: 3 LPM (placed on 3L for comfort after CT completed)  Weight: 156 lbs 0 oz  Constitutional: Pleasant, cooperative. Sitting up in bed without difficulty.   HEENT: Sclera anicteric, Oropharynx notable for ulceration on R buccal surface.  Tongue abnormal - partially removed 2/2 previous SCC.  Hard, immobile mass on R  submandibular area.   CV: RRR, no murmurs, gallops or rubs.   Respiratory: Absent breath sounds in lower L lung field. Otherwise, CTAB. Breathing non-labored.  Abd: Soft, NT/ND, no HSM. G-tube in place.  Skin: No lesions or rashes over exposed areas, normal color, texture and turgor  Neuro: AAO x 3     Data   Recent Labs   Lab 09/24/21  0944 09/24/21  0935 09/24/21  0209   WBC  --   --  11.6*   HGB  --   --  11.2*   MCV  --   --  92   PLT  --   --  320   NA  --   --  132*   POTASSIUM  --   --  4.5   CHLORIDE  --   --  96   CO2  --   --  31   BUN  --   --  26   CR 0.86  --  0.90   ANIONGAP  --   --  5   ELEUTERIO  --   --  10.7*   GLC  --  206* 340*   ALBUMIN 2.4*  --   --    PROTTOTAL 7.4  --   --      Recent Results (from the past 24 hour(s))   XR Chest Port 1 View    Narrative    EXAM: XR CHEST PORT 1 VIEW  LOCATION: [facility]  DATE/TIME: 9/24/2021 2:07 AM    INDICATION: SOB, cough  COMPARISON: 09/15/2021.      Impression    IMPRESSION: Right-sided Port-A-Cath tip over the SVC. Heart size and pulmonary vascularity appear within normal limits. Large left pleural effusion is either new or significantly increased in size from the prior study. Prominent left perihilar infiltrate   probably not significantly changed. Right lung is clear. No significant bony abnormalities.   CT Chest Pulmonary Embolism w Contrast    Narrative    EXAM: CT CHEST PULMONARY EMBOLISM W CONTRAST  LOCATION: [facility]  DATE/TIME: 9/24/2021 5:20 AM    INDICATION: Shortness of breath.  COMPARISON: CT chest from 08/05/2021.  TECHNIQUE: CT chest pulmonary angiogram during arterial phase injection of IV contrast. Multiplanar reformats and MIP reconstructions were performed. Dose reduction techniques were used.   CONTRAST: 74 ml isovue 370     FINDINGS:  ANGIOGRAM CHEST: Pulmonary arteries are normal caliber and negative for pulmonary emboli. Thoracic aorta is  negative for dissection. No CT evidence of right heart strain.    LUNGS AND PLEURA: Areas of consolidation and fibrosis in the paramediastinal regions bilaterally. There is abnormal soft tissue density encasing the distal left mainstem bronchus and proximal left upper and lower lobar bronchi (image 109 of series 8) as   well as encasing the distal left main pulmonary artery (image 37 series 4). There is a moderate to large left pleural effusion with areas of atelectasis throughout the left upper and lower lobes. There are a few nodular opacities in the right lung which   are new. For instance, in the superior segment of the right lower lobe there is a 9 mm nodule on image 75 of series 8 scattered areas of atelectasis throughout the right upper, middle, and lower lobes.    MEDIASTINUM/AXILLAE: Heart size is normal. No pericardial effusion. Compared to 08/05/2021, there is increasing mediastinal adenopathy. For instance, there is confluent adenopathy in the high aorticopulmonary window which measures about 3.9 x 2.9 cm on   image 89 of series 9, previously measuring about 2.7 x 2.1 cm by my measurement. There is a subcarinal lymph node which measures 1.8 cm short axis on image 106, previously 1.3 cm by my measurement. Right hilar lymph node measures 2.1 x 1.4 cm on image   101, previously 1.3 x 0.8 cm. A second right hilar node measures 1.9 x 1.1 cm on image 120, previously 11 x 8 mm. Right paratracheal node measuring 1.6 x 1.4 cm on image 82 previously measured up to 8 mm.    CORONARY ARTERY CALCIFICATION: Moderate.    UPPER ABDOMEN: Percutaneous gastrostomy tube in place.    MUSCULOSKELETAL: Interval sclerotic change with increasing superior endplate compression deformity at T6. There are scattered osteolytic lesions in the visualized spine. For instance, there is an 13 mm lytic lesion in the L1 vertebral body on image 100   of series 4 which is new. There are likely lytic lesions within multiple ribs as well such  as the right lateral fourth rib and the left posterior eighth rib. There is a healing left 10th posterior rib fracture.      Impression    IMPRESSION:  1.  Negative for pulmonary embolism.    2.  Paramediastinal consolidations possibly reflecting posttreatment changes, though abnormal soft tissue density encasing the left distal pulmonary artery and proximal left upper and lower lobar bronchi concerning for metastatic disease.    3.  Moderate to large left pleural effusion with areas of atelectasis in the left upper and lower lobes.    4.  New nodules in the right lung measuring up to 9 mm concerning for metastases.    5.  Interval increase in mediastinal and hilar irvin metastases.    6.  Scattered new small lytic lesions in the visualized spine consistent with metastases.   CT Abdomen Pelvis w Contrast    Narrative    EXAMINATION: CT ABDOMEN PELVIS W CONTRAST, 9/24/2021 9:02 AM    TECHNIQUE:  Helical CT images from the lung bases through the  symphysis pubis were obtained without and with IV contrast.     Contrast dose: 74 mL of Isovue 370     COMPARISON: 6/21/2021 and 11/24/2020 whole body PET/CT     HISTORY:  staging - likely stage 4 SCC      FINDINGS:    Abdomen and pelvis: G-tube within the gastric lumen. Remaining stomach  and duodenum are unremarkable. No focal hepatic lesions. There may be  some minor focal fatty change along the falciform ligament.  Gallbladder is unremarkable. The vasculature is patent. Spleen,  adrenal glands, kidneys are unremarkable. Ureteral course is normal.  Bladder is contrast opacified and unremarkable. Small large bowel are  normal caliber. Posterior changes of right hemicolectomy. The appendix  is surgically absent. No free fluid in the abdomen or pelvis. No  pathologically enlarged inguinal pelvic retroperitoneal lymph nodes.  The abdominal aorta and its proximal bifurcations are widely patent.    Lung bases/lower chest:  Large left-sided pleural fusion with  pleural  thickening/nodularity/enhancement surrounding the inferior left lower  lobe and anterior aspect of the right oblique fissure. Incomplete  visualization of a heterogeneously enhancing 7.0 x 3.0 cm soft tissue  mass encompassing the proximal left upper lobe bronchus with  obstructive atelectasis of the left upper lobe. Multiple peripherally  enhancing centrally necrotic mediastinal lymph nodes. No significant  right-sided pleural effusion. No pneumothorax. Heart size is  borderline enlarged. Aortic root and proximal pulmonary artery normal  caliber. Pulmonary veins in the region of the tumor appeared to be  involved with possible tumor extension into the vein versus bland  thrombus noted on series 4 image 8 and coronal series 3 image 44. This  is not as readily appreciated on the same-day pulmonary embolism study  due to timing of contrast bolus. Venous catheter terminates at the  cavoatrial junction.    Bones and soft tissues: More pronounced endplate deformities of the L2  vertebral body with new lytic lesions in the L4 and S1 vertebral  bodies.       Impression    IMPRESSION:     1. Numerous findings within the left lung and mediastinum are  concerning for new/progression of metastatic disease.   2. Soft tissue lesion obstructing the proximal left upper lobe  bronchus with lobar atelectasis of the downstream left upper lobe  partially visualized. Please see same day separate CT chest dictation.  3. Filling defect involving superior left pulmonary veins at the site  of tumor concerning for tumor versus bland thrombus. This is new from  prior CT.  4. Lytic lesions within the L2, L4 and S1 vertebral body are  concerning for metastatic disease    These findings were communicated to Dr. Mike Sanchez at 1123 AM  on 9/24/2021 by Tremayne Grimes over the phone.     I have personally reviewed the examination and initial interpretation  and I agree with the findings.    CARLOS GALVEZ MD         SYSTEM ID:   [id number]

## 2021-09-24 NOTE — CONSULTS
Procedure Note    Attending: Devendra Jiménez MD  Resident: David Coleman MD  Procedure: Left thoracentesis  Indication: Moderate/large pleural effusion  Risk Assessment: Average  Pre-procedure diagnosis: Moderate/large pleural effusion  Post-procedure diagnosis: Moderate/large pleural effusion status-post diagnostic/therapeutic thoracentesis    The risks and benefits of the procedure were explained to the patient who expressed understanding and opted to proceed.  Consent was obtained and placed in the chart and the patient was placed on pulse oximetry.  A time out was performed.    An ultrasound probe was used to identify an area of pleural fluid in the left hemithorax.  This area was prepped and draped in the usual sterile fashion.  5 mL of 1% lidocaine was instilled and fluid located using ultrasound guidance.  A small incision was made with an 11 blade.  The thoracentesis catheter and needle were inserted above the rib until fluid obtained then the needle removed.  Using a one-way valve, 1,500 mL of yellow-colored fluid was removed. A specimen was sent for analysis.  The catheter was removed with the patient exhaling and an occlusive dressing placed.       Ultrasound revealed normal lung sliding after the procedure and a chest radiograph is pending.    The patient tolerated the procedure well.  Please contact the Consult and Procedure Service if any concerns or complications arise.     David Coleman MD  PGY-4 Internal Medicine/Pediatrics  Pager [phone]  Friday 09/24/2021    Physician Attestation   I spent a total of 40 minutes with the patient, personally observing as the resident/fellow performed left thoracentesis.     Key findings: Successful left thoracentesis of cloudy yellow fluid    Devendra Jiménez  Date of Service (when I saw the patient): 9/24/21      Procedure supervised by the attending physician, Dr. Jiménez.    DOS: 09/24/2021

## 2021-09-24 NOTE — ED PROVIDER NOTES
ED Provider Note  [facility]      History     Chief Complaint   Patient presents with     Shortness of Breath     HPI  Reese Oakley is a 71 year old male who with a past medical history significant for hypertension hyperlipidemia, anemia, type 2 diabetes on insulin, GERD, history of colon cancer, history of appendiceal cancer, history of throat cancer treated with laser resection and adjuvant radiotherapy in 2014 followed by tongue cancer diagnosed in February 2020 treated with wide local excision of the right tongue on 2/19/2020 who was found to have biopsy-proven recurrence in his right neck and underwent modified neck dissection on 12/9/2020 with subsequet adjuvant radiation and chemotherapy and has been followed by oncology and ENT, presents to the ED tonight with cough and shortness of breath.  He was recently found to have a perihilar mass.  He had a bronchoscopy on 915.  He states that he is gradually had increased secretions that have made him cough more to try to get them up.  He feels like he cannot get them up.  When he does get anything up sometimes it is white, sometimes yellow, sometimes green.  He has felt increasingly shortness of breath, particularly over the last couple of days.  He does not use home oxygen.  He denies any fever, stuffy nose.  He has a little bit of a sore throat which he attributes to the cough.  No abdominal pain, vomiting, diarrhea.  He reports some bilateral rib pain which has been ongoing since a fall about a month ago.  He has not yet received the results from his bronchoscopy.    Past Medical History  Past Medical History:   Diagnosis Date     Adenocarcinoma (H)     large instestine      Anemia      Cancer of appendix (H)      Closed wedge compression fracture of T6 vertebra (H)      Diabetes (H)      Gastro-oesophageal reflux disease      History of radiation therapy      HLD (hyperlipidemia)      Hoarseness      HTN (hypertension)       Hypertension      Paroxysmal atrial fibrillation (H)      Primary squamous cell carcinoma of throat (H)      Squamous cell cancer of tongue (H)      Squamous cell carcinoma of neck      Past Surgical History:   Procedure Laterality Date     APPENDECTOMY       COLECTOMY      Right     DISSECTION RADICAL NECK MODIFIED Right 12/9/2020    Procedure: Modified neck dissection;  Surgeon: Jose Antonio Mckenna MD;  Location: [facility] OR     ENDOBRONCHIAL ULTRASOUND FLEXIBLE N/A 7/5/2021    Procedure: ENDOBRONCHIAL ULTRASOUND, WITH FLEXIBLE BRONCHOSCOPY;  Surgeon: Indio Zamora MD;  Location: [facility] OR     ENDOBRONCHIAL ULTRASOUND FLEXIBLE N/A 9/15/2021    Procedure: ENDOBRONCHIAL ULTRASOUND, WITH FLEXIBLE BRONCHOSCOPY,;  Surgeon: Alber Bush MD;  Location: [facility] OR     ENDOSCOPIC INSERTION TUBE GASTROSTOMY N/A 7/5/2021    Procedure: INSERTION, PEG TUBE;  Surgeon: Indio Zamora MD;  Location: [facility] OR     EXCISE LESION INTRAORAL Right 2/19/2020    Procedure: Wide Local Excision of Right Tongue Lesion;  Surgeon: Jose Antonio Mckenna MD;  Location: [facility] OR     INSERT PORT VASCULAR ACCESS Right 1/15/2021    Procedure: CHEST INSERTION, VASCULAR ACCESS PORT @0900;  Surgeon: Tony Hopkins MD;  Location: [facility] OR     IR CHEST PORT PLACEMENT > 5 YRS OF AGE  1/15/2021     LARYNGOSCOPY WITH BIOPSY(IES) N/A 7/5/2021    Procedure: LARYNGOSCOPY, WITH BIOPSY;  Surgeon: Jose Antonio Mckenna MD;  Location: [facility] OR     LASER CO2 LARYNGOSCOPY N/A 10/6/2014    Procedure: LASER CO2 LARYNGOSCOPY;  Surgeon: Jose Antonio Mckenna MD;  Location: [facility] OR     LASER CO2 LESION ORAL N/A 5/24/2021    Procedure: Excision of tongue ulcer with omniguide laser;  Surgeon: Jose Antonio Mckenna MD;  Location: [facility] OR     acetaminophen (TYLENOL) 160 MG/5ML suspension  acetaminophen (TYLENOL) 325 MG tablet  aspirin (ASA) 81 MG chewable tablet  diphenhydrAMINE (BENADRYL) 25 MG capsule  fluconazole (DIFLUCAN) 100 MG tablet  gabapentin (NEURONTIN) 250 MG/5ML  solution  gabapentin (NEURONTIN) 250 MG/5ML solution  glycopyrrolate (ROBINUL) 1 MG tablet  guaiFENesin (ROBITUSSIN) 100 MG/5ML liquid  insulin aspart (NOVOLOG PEN) 100 UNIT/ML pen  insulin glargine (LANTUS VIAL) 100 UNIT/ML vial  levofloxacin (LEVAQUIN) 25 MG/ML solution  levofloxacin (LEVAQUIN) 500 MG tablet  lidocaine (XYLOCAINE) 2 % solution  lisinopril (ZESTRIL) 5 MG tablet  magic mouthwash (ENTER INGREDIENTS IN COMMENTS) suspension  magic mouthwash (ENTER INGREDIENTS IN COMMENTS) suspension  magic mouthwash (ENTER INGREDIENTS IN COMMENTS) suspension  methylPREDNISolone (MEDROL DOSEPAK) 4 MG tablet therapy pack  omeprazole (PRILOSEC) 2 mg/mL suspension  oxyCODONE (OXY-IR) 5 MG capsule  oxyCODONE (ROXICODONE) 5 MG/5ML solution  pentoxifylline (TRENTAL) 50 mg/ml suspension  pentoxifylline ER (TRENTAL) 400 MG CR tablet  pilocarpine (SALAGEN) 7.5 MG tablet  senna-docusate (SENOKOT-S/PERICOLACE) 8.6-50 MG tablet  simvastatin (ZOCOR) 40 MG tablet  sucralfate (CARAFATE) 1 GM tablet  vitamin E (TOCOPHEROL) 1000 units (450 mg) capsule      No Known Allergies  Family History  Family History   Problem Relation Age of Onset     Diabetes Mother      Heart Disease Father      Diabetes Brother      Anesthesia Reaction No family hx of      Bleeding Disorder No family hx of      Clotting Disorder No family hx of      Social History   Social History     Tobacco Use     Smoking status: Never Smoker     Smokeless tobacco: Never Used   Substance Use Topics     Alcohol use: Not Currently     Alcohol/week: 0.0 standard drinks     Comment: none for 20 years     Drug use: No      Past medical history, past surgical history, medications, allergies, family history, and social history were reviewed with the patient. No additional pertinent items.       Review of Systems  A complete review of systems was performed with pertinent positives and negatives noted in the HPI, and all other systems negative.    Physical Exam   BP: (!)  "153/66  Pulse: 71  Temp: 97.5  F (36.4  C)  Resp: 18  Height: 170.2 cm (5' 7\")  Weight: 70.8 kg (156 lb)  SpO2: 98 %  Physical Exam  Constitutional:       General: He is not in acute distress.     Appearance: He is not diaphoretic.   HENT:      Head: Atraumatic.   Eyes:      General: No scleral icterus.  Cardiovascular:      Heart sounds: Normal heart sounds.   Pulmonary:      Effort: Respiratory distress (mild tachypnea) present.      Comments: Decreased breath sounds left base  Abdominal:      Palpations: Abdomen is soft.      Tenderness: There is no abdominal tenderness.   Musculoskeletal:         General: No tenderness.   Skin:     General: Skin is warm.      Findings: No rash.         ED Course      Procedures              Results for orders placed or performed during the hospital encounter of 09/24/21   XR Chest Port 1 View     Status: None    Narrative    EXAM: XR CHEST PORT 1 VIEW  LOCATION: [facility]  DATE/TIME: 9/24/2021 2:07 AM    INDICATION: SOB, cough  COMPARISON: 09/15/2021.      Impression    IMPRESSION: Right-sided Port-A-Cath tip over the SVC. Heart size and pulmonary vascularity appear within normal limits. Large left pleural effusion is either new or significantly increased in size from the prior study. Prominent left perihilar infiltrate   probably not significantly changed. Right lung is clear. No significant bony abnormalities.   CT Chest Pulmonary Embolism w Contrast     Status: None    Narrative    EXAM: CT CHEST PULMONARY EMBOLISM W CONTRAST  LOCATION: [facility]  DATE/TIME: 9/24/2021 5:20 AM    INDICATION: Shortness of breath.  COMPARISON: CT chest from 08/05/2021.  TECHNIQUE: CT chest pulmonary angiogram during arterial phase injection of IV contrast. Multiplanar reformats and MIP reconstructions were performed. Dose reduction techniques were used.   CONTRAST: 74 ml isovue 370 "     FINDINGS:  ANGIOGRAM CHEST: Pulmonary arteries are normal caliber and negative for pulmonary emboli. Thoracic aorta is negative for dissection. No CT evidence of right heart strain.    LUNGS AND PLEURA: Areas of consolidation and fibrosis in the paramediastinal regions bilaterally. There is abnormal soft tissue density encasing the distal left mainstem bronchus and proximal left upper and lower lobar bronchi (image 109 of series 8) as   well as encasing the distal left main pulmonary artery (image 37 series 4). There is a moderate to large left pleural effusion with areas of atelectasis throughout the left upper and lower lobes. There are a few nodular opacities in the right lung which   are new. For instance, in the superior segment of the right lower lobe there is a 9 mm nodule on image 75 of series 8 scattered areas of atelectasis throughout the right upper, middle, and lower lobes.    MEDIASTINUM/AXILLAE: Heart size is normal. No pericardial effusion. Compared to 08/05/2021, there is increasing mediastinal adenopathy. For instance, there is confluent adenopathy in the high aorticopulmonary window which measures about 3.9 x 2.9 cm on   image 89 of series 9, previously measuring about 2.7 x 2.1 cm by my measurement. There is a subcarinal lymph node which measures 1.8 cm short axis on image 106, previously 1.3 cm by my measurement. Right hilar lymph node measures 2.1 x 1.4 cm on image   101, previously 1.3 x 0.8 cm. A second right hilar node measures 1.9 x 1.1 cm on image 120, previously 11 x 8 mm. Right paratracheal node measuring 1.6 x 1.4 cm on image 82 previously measured up to 8 mm.    CORONARY ARTERY CALCIFICATION: Moderate.    UPPER ABDOMEN: Percutaneous gastrostomy tube in place.    MUSCULOSKELETAL: Interval sclerotic change with increasing superior endplate compression deformity at T6. There are scattered osteolytic lesions in the visualized spine. For instance, there is an 13 mm lytic lesion in the L1  vertebral body on image 100   of series 4 which is new. There are likely lytic lesions within multiple ribs as well such as the right lateral fourth rib and the left posterior eighth rib. There is a healing left 10th posterior rib fracture.      Impression    IMPRESSION:  1.  Negative for pulmonary embolism.    2.  Paramediastinal consolidations possibly reflecting posttreatment changes, though abnormal soft tissue density encasing the left distal pulmonary artery and proximal left upper and lower lobar bronchi concerning for metastatic disease.    3.  Moderate to large left pleural effusion with areas of atelectasis in the left upper and lower lobes.    4.  New nodules in the right lung measuring up to 9 mm concerning for metastases.    5.  Interval increase in mediastinal and hilar irvin metastases.    6.  Scattered new small lytic lesions in the visualized spine consistent with metastases.   Symptomatic COVID-19 Virus (Coronavirus) by PCR Nasopharyngeal     Status: Normal    Specimen: Nasopharyngeal; Swab   Result Value Ref Range    SARS CoV2 PCR Negative Negative    Narrative    Testing was performed using the Xpert Xpress SARS-CoV-2 Assay on the  Cepheid Gene-Xpert Instrument Systems. Additional information about  this Emergency Use Authorization (EUA) assay can be found via the Lab  Guide. This test should be ordered for the detection of SARS-CoV-2 in  individuals who meet SARS-CoV-2 clinical and/or epidemiological  criteria. Test performance is unknown in asymptomatic patients. This  test is for in vitro diagnostic use under the FDA EUA for  laboratories certified under CLIA to perform high complexity testing.  This test has not been FDA cleared or approved. A negative result  does not rule out the presence of PCR inhibitors in the specimen or  target RNA in concentration below the limit of detection for the  assay. The possibility of a false negative should be considered if  the patient's recent exposure or  clinical presentation suggests  COVID-19. This test was validated by the [facility]. This laboratory is certified under  the Clinical Laboratory Improvement Amendments of 1988 (CLIA-88) as  qualified to perform high complexity laboratory testing.     Basic metabolic panel     Status: Abnormal   Result Value Ref Range    Sodium 132 (L) 133 - 144 mmol/L    Potassium 4.5 3.4 - 5.3 mmol/L    Chloride 96 94 - 109 mmol/L    Carbon Dioxide (CO2) 31 20 - 32 mmol/L    Anion Gap 5 3 - 14 mmol/L    Urea Nitrogen 26 7 - 30 mg/dL    Creatinine 0.90 0.66 - 1.25 mg/dL    Calcium 10.7 (H) 8.5 - 10.1 mg/dL    Glucose 340 (H) 70 - 99 mg/dL    GFR Estimate 85 >60 mL/min/1.73m2   CBC with platelets and differential     Status: Abnormal   Result Value Ref Range    WBC Count 11.6 (H) 4.0 - 11.0 10e3/uL    RBC Count 3.72 (L) 4.40 - 5.90 10e6/uL    Hemoglobin 11.2 (L) 13.3 - 17.7 g/dL    Hematocrit 34.2 (L) 40.0 - 53.0 %    MCV 92 78 - 100 fL    MCH 30.1 26.5 - 33.0 pg    MCHC 32.7 31.5 - 36.5 g/dL    RDW 12.8 10.0 - 15.0 %    Platelet Count 320 150 - 450 10e3/uL    % Neutrophils 76 %    % Lymphocytes 11 %    % Monocytes 12 %    % Eosinophils 1 %    % Basophils 0 %    % Immature Granulocytes 0 %    NRBCs per 100 WBC 0 <1 /100    Absolute Neutrophils 8.7 (H) 1.6 - 8.3 10e3/uL    Absolute Lymphocytes 1.3 0.8 - 5.3 10e3/uL    Absolute Monocytes 1.4 (H) 0.0 - 1.3 10e3/uL    Absolute Eosinophils 0.1 0.0 - 0.7 10e3/uL    Absolute Basophils 0.0 0.0 - 0.2 10e3/uL    Absolute Immature Granulocytes 0.1 (H) <=0.0 10e3/uL    Absolute NRBCs 0.0 10e3/uL   Procalcitonin     Status: Normal   Result Value Ref Range    Procalcitonin 0.12 <5.00 ng/mL   CBC with platelets differential     Status: Abnormal    Narrative    The following orders were created for panel order CBC with platelets differential.  Procedure                               Abnormality         Status                     ---------                                -----------         ------                     CBC with platelets and d...[[id number]]  Abnormal            Final result                 Please view results for these tests on the individual orders.     Medications   acetaminophen (TYLENOL) solution 650 mg (has no administration in time range)   aspirin (ASA) chewable tablet 81 mg (has no administration in time range)   guaiFENesin (ROBITUSSIN) 20 mg/mL solution 200 mg (has no administration in time range)   gabapentin (NEURONTIN) solution 250 mg (has no administration in time range)   glycopyrrolate (ROBINUL) tablet 1 mg (has no administration in time range)   insulin glargine (LANTUS PEN) injection 10 Units (has no administration in time range)   lidocaine (XYLOCAINE) 2 % solution 15 mL (15 mLs Swish & Spit Given 9/24/21 0631)   lisinopril (ZESTRIL) tablet 5 mg (has no administration in time range)   omeprazole (priLOSEC) suspension 20 mg (has no administration in time range)   oxyCODONE (ROXICODONE) solution 7.5 mg (has no administration in time range)   senna-docusate (SENOKOT-S/PERICOLACE) 8.6-50 MG per tablet 1 tablet (has no administration in time range)   simvastatin (ZOCOR) tablet 40 mg (has no administration in time range)   sucralfate (CARAFATE) tablet 1 g (has no administration in time range)   pilocarpine (SALAGEN) tablet 7.5 mg (has no administration in time range)   lactated ringers BOLUS 1,000 mL (1,000 mLs Intravenous New Bag 9/24/21 0626)   iopamidol (ISOVUE-370) solution 74 mL (74 mLs Intravenous Given 9/24/21 0532)   sodium chloride 0.9 % bag 500mL for CT scan flush use (88 mLs Intravenous Given 9/24/21 0533)        Assessments & Plan (with Medical Decision Making)   Patient's Covid was negative.  Imaging revealed moderate to large left-sided pleural effusion.  No sign of PE.  There is concerning evidence for metastasis on his CT.  No clear evidence for pneumonia, though he did have yeast seen on bronc samples.  Patient did feel  significantly short of breath tonight, given the above, will admit for further management.    Dictation Disclaimer: Some of this Note has been completed with voice-recognition dictation software. Although errors are generally corrected real-time, there is the potential for a rare error to be present in the completed chart.      I have reviewed the nursing notes. I have reviewed the findings, diagnosis, plan and need for follow up with the patient.    New Prescriptions    No medications on file       Final diagnoses:   Dyspnea, unspecified type       --  Fatimah Tanner  [facility] EMERGENCY DEPARTMENT  9/24/2021     Fatimah Tanner MD  09/24/21 0749

## 2021-09-24 NOTE — PLAN OF CARE
Admitted/transferred from:   2 RN full   skin assessment completed by Sejal Wolf RN and Elisabet WYNN RN.  Skin assessment finding: skin intact, no problems, patient has port and PEG tube  Interventions/actions: skin interventions , none     Will continue to monitor.

## 2021-09-24 NOTE — PHARMACY-CONSULT NOTE
Pharmacy Tube Feeding Consult    Medication reviewed for administration by feeding tube and for potential food/drug interactions.    Recommendation:  Hold sucralfate while patient is receiving tube feeds, is already taking pantoprazole liquid.    Pharmacy will continue to follow as new medications are ordered.    Lily Villanueva, PharmD

## 2021-09-24 NOTE — PROGRESS NOTES
CLINICAL NUTRITION SERVICES - ASSESSMENT NOTE     Nutrition Prescription    RECOMMENDATIONS FOR MDs/PROVIDERS TO ORDER:  Current TFs + free water meeting 100% estimated minimum fluid needs. Total daily fluids/adjustments per MD.     Malnutrition Status:    Severe malnutrition in the context of chronic illness    Recommendations already ordered by Registered Dietitian (RD):  Jevity 1.5- 2 cartons TID at 9am, 1:30pm, and 6:30pm  Provides: 1422 mL, 2133 kcal (30 kcal/kg), 91 g PRO (1.3 g/kg), 307 g carbs, and 1081 mL free water.   Free water:   - 30 mL before and after each feed  - 290 mL at 2am, 5am, and 8am  - Total free water 2131 mL per day    Future/Additional Recommendations:  Monitor tolerance of EN     REASON FOR ASSESSMENT  Reese Oakley is a/an 71 year old male assessed by the dietitian for Provider Order - Registered Dietitian to Assess and Order TF per Medical Nutrition Therapy Protocol    Clinical History: HTN, HLD, anemia, DM2, GERD, hx colon cancer, appendiceal cancer, throat cancer s/p laser resection/adjuvant radiotherapy (2014), tongue cancer (2020) s/p wide local excision c/b biopsy-proven recurrence in R neck, who presents to ED with cough and shortness of breath, admitted for concern for SCC metastasis.    NUTRITION HISTORY  Per chart review of care everywhere, pt had a PEG placed on 7/5/21 and has been receiving Isosource 1.5.     Home regimen per pt report: Isosource 1.5- 2 cans TID at 9am, 1:30pm, and 6:30pm. Pt reports that he's been tolerating his TFs well and last received them at 7pm last night.   Home free water: 16 oz (480 mL) at 2am, 5am, and 8am. Pt also has water flushes before and after feeds.   TF provides: 1500 mL formula, 2250 kcal (32 kcal/kg), 102 g PRO (1.4 g/kg), 264 g carbs, and 1140 free water.   Additional water flushes provide: 1440 mL (total free water 2580 mL)   No PO intake at baseline.     CURRENT NUTRITION ORDERS  Diet: NPO  Intake/Tolerance: Pt last received TF on  " at 7pm.     LABS  Labs reviewed  Na 132 (L)  K+ 4.5 (WNL)  B (H)    MEDICATIONS  Medications reviewed  Novolog  Lantus    ANTHROPOMETRICS  Height: 170.2 cm (5' 7\")  Most Recent Weight: 70.8 kg (156 lb)    IBW: 67.3 kg (105%)   BMI: Normal BMI  Weight History: Weight stable  Wt Readings from Last 10 Encounters:   21 70.8 kg (156 lb)   09/15/21 71.4 kg (157 lb 6.5 oz)   21 69.4 kg (153 lb)   21 71.3 kg (157 lb 3 oz)   21 69.4 kg (153 lb)   21 69.7 kg (153 lb 10.6 oz)   21 71.2 kg (157 lb)   21 70.3 kg (155 lb)   21 72 kg (158 lb 11.7 oz)   21 73.9 kg (163 lb)     Dosing Weight: 71 kg (admit weight)     ASSESSED NUTRITION NEEDS  Estimated Energy Needs: 6260-1755 kcals/day (25 - 30 kcals/kg)  Justification: Maintenance  Estimated Protein Needs: 70-85 grams protein/day (1 - 1.2 grams of pro/kg)  Justification: Preservation of LBM  Estimated Fluid Needs: 3341-7677 mL/day (1 mL/kcal)   Justification: Maintenance    PHYSICAL FINDINGS  See malnutrition section below.    MALNUTRITION  % Intake: Decreased intake does not meet criteria- EN  % Weight Loss: None noted  Subcutaneous Fat Loss: Facial region, Upper arm and Lower arm: Moderate  Muscle Loss: Temporal, Facial & jaw region, Thoracic region (clavicle, acromium bone, deltoid, trapezius, pectoral), Upper arm (bicep, tricep), Lower arm  (forearm), Dorsal hand, Upper leg (quadricep, hamstring) and Posterior calf: Severe   Fluid Accumulation/Edema: None noted  Malnutrition Diagnosis: Severe malnutrition in the context of chronic illness    NUTRITION DIAGNOSIS  Inadequate oral intake related to NPO status as evidenced by reliant on EN to meet 100% nutrition needs at baseline       INTERVENTIONS  Implementation  Nutrition Education: Discussed current EN regimen and free water. Discussed that hospital will substitute Jevity 1.5 for Isosource 1.5.    Enteral Nutrition - Initiate     Goals  Total avg nutritional " intake to meet a minimum of 25 kcal/kg and 1 g PRO/kg daily (per dosing wt 71 kg).     Monitoring/Evaluation  Progress toward goals will be monitored and evaluated per protocol.    Michaela Anderson RD, LD  6D RD pager [phone]

## 2021-09-24 NOTE — PHARMACY-ADMISSION MEDICATION HISTORY
Admission Medication History Completed by Pharmacy    See [facility] Admission Navigator for allergy information, preferred outpatient pharmacy, prior to admission medications and immunization status.     Medication History Sources:     Patient via telephone, Surescripts    Changes made to PTA medication list (reason):    Added: Diphenhydramine liquid    Deleted: Glycopyrrolate, lisinopril, duplicate magic mouthwash, oxycodone tabs, pilocarpine, senna s, simvasttin, sucralfate    Changed: None    Additional Information:    Patient takes all medications via g tube    Prior to Admission medications    Medication Sig Last Dose Taking? Auth Provider   acetaminophen (TYLENOL) 160 MG/5ML suspension Take 20.5 mLs (650 mg) by mouth every 6 hours as needed for fever or mild pain Past Week at Unknown time Yes Jose Antonio Mckenna MD   aspirin (ASA) 81 MG chewable tablet Take 81 mg by mouth daily  9/23/2021 at Unknown time Yes Reported, Patient   diphenhydrAMINE (BENADRYL) 12.5 MG/5ML solution Take 25 mg by mouth nightly as needed for itching, allergies or sleep 9/23/2021 at Unknown time Yes Unknown, Entered By History   gabapentin (NEURONTIN) 250 MG/5ML solution Take 5 mLs (250 mg) by mouth 3 times daily 9/23/2021 at Unknown time Yes Jose Antonio Mckenna MD   guaiFENesin (ROBITUSSIN) 100 MG/5ML liquid 10 mLs (200 mg) by Oral or PEG tube route every 4 hours as needed for other (mucous production and/or cough) Past Month at Unknown time Yes Jennifer Herrera MD   insulin aspart (NOVOLOG PEN) 100 UNIT/ML pen Inject 15 Units Subcutaneous 3 times daily (with meals)  9/23/2021 at Unknown time Yes Reported, Patient   insulin glargine (LANTUS VIAL) 100 UNIT/ML vial Inject 25 Units Subcutaneous daily (with breakfast) PATIENT REPORTS 25 UNIT DAILY 02/16/2021 9/23/2021 at Unknown time Yes Reported, Patient   lidocaine (XYLOCAINE) 2 % solution Swish and spit 15 mLs in mouth every 3 hours as needed for moderate pain ; Max 8 doses/24  hour period. 9/23/2021 at Unknown time Yes Jose Antonio Mckenna MD   omeprazole (PRILOSEC) 2 mg/mL suspension Take 10 mLs (20 mg) by mouth every morning (before breakfast) 9/23/2021 at Unknown time Yes Jose Antonio Mckenna MD   oxyCODONE (ROXICODONE) 5 MG/5ML solution Take 7.5 mLs (7.5 mg) by mouth every 6 hours as needed for pain (oral pain) 9/23/2021 at Unknown time Yes Bhavna Nguyen NP   pentoxifylline (TRENTAL) 50 mg/ml suspension Take 8 mLs (400 mg) by mouth 3 times daily 9/23/2021 at Unknown time Yes Jose Antonio Mckenna MD   vitamin E (TOCOPHEROL) 1000 units (450 mg) capsule 1 capsule (1,000 Units) by Per Feeding Tube route daily 9/23/2021 at Unknown time Yes Jose Antonio Mckenna MD   magic mouthwash (ENTER INGREDIENTS IN COMMENTS) suspension Swish, gargle, and spit one to two teaspoonfuls every six hours as needed. May be swallowed if esophageal involvement.   Gits, Errol Nieves MD       Date completed: 09/24/21    Medication history completed by: Lily Villanueva [facility]

## 2021-09-24 NOTE — LETTER
[facility] [address] [address]  Phone: [phone]    September 25, 2021                                                                                              Dear Sung [place] manager,    I'm the attending physician caring for Meghan Oakley's family at the [facility]. As the medical team, we determined on Friday 9/24 that Meghan's family member would likely have a stay in the hospital over the weekend and involved  in this decision in her role as the primary caregiver.     Happily, her family member made a speedy recovery, and thus is being discharged from the hospital today on 9/25/21.     I'm submitting this letter in support of 's request to you.     Please contact me with any questions or concerns at [phone]/ [phone]      Sincerely,        Mike Sanchez MD, MPH

## 2021-09-24 NOTE — CONSULTS
Oncology  Consult Note   Date of Service: 09/24/2021    Patient: Reese Oakley  MRN: [id number]  Admission Date: 9/24/2021  Hospital Day # 0  Cancer Diagnosis: Recurrent SCC   Primary Outpatient Oncologist: Dr. Mark   Current Treatment Plan: Pending     Reason for Consult: Pt w/ hx of colon cancer, oropharyngeal cancer s/p chemorads, now with SCC + FNA from bronch and possible malignancy pleural effusion.     Assessment & Plan:   Reese Oakley is a 71 year old male past medical history of HTN, HLD, anemia, T2DM, h/o appendiceal carcinoma s/p surgical resection and adjuvant FOLFOX, and recurrent SCC. Admitted for cough and SOB.     Recommendations:   - Agree with CAPs consult for thoracentesis; please send for cytology.   - Agree with completing staging with CT Abd/pelivs (last imaged June 2021); would recommend CT neck w/wo contrast  - Ok to keep on heparin drip; ongoing conversation regarding long term AC   - Has follow up with primary oncologist Dr. Mark 9/28/21        # Recurrent SCC   # Presumed malignant left pleural effusion   # SOB   # New tumor vs bland thrombus of pulmonary vein   Patient was initially diagnosed with T1N0Mx SCC of the R oropharynx in 2014 and was treated with laser resection (w/ Dr. Mckenna) followed by adjuvant radiotherapy. He was then subsequently found to have a pT2Nx SCC of the right lateral tongue in Feb 2020 s/p partial glossectomy w/ Dr. Mckenna. He was monitored closely w/ repeat imagine. He was then noted to have enlarged R sided LN on follow up scans. Biopsy proven recurrence of SCC in the right neck. In Dec 2020 he underwent R omohyoid neck dissection that showed recurrent SCC with involvement of right submandibular gland with 2.5 cm tumor. SCC was identified in one additional LN w/o MEL. He then completed chemoradiation. Received Cisplatin x 1 dose (discontinued d/t worsening renal function), then received Carbo/Taxol x 5 doses; he finished in 3/5/21. He underwent  bronch with biopsy of concern L mediastinal LN concerning for malignancy in July 2021 with Dr. Zamora, one sample neg malignancy and other non-diagnostic. He had a CT at OSH and was noted to have a large spiculated L hilar mass measuring 9.3x 3.8 cm. He underwent bronch with FNA biopsy of LN; pathology showing recurrent SCC.       - CT PE 9/24/21 - neg PE, para mediastinal consolidations possibly reflecting posttreatment changes, though abnormal soft tissue density encasing the left distal pulmonary artery and proximal left upper and lower lobe bronchi concerning for metastatic disease. Mod to large left pleural effusion. New nodules in the right lung measuring up to 9 mm concerning for metastases. Scattered new small lytic lesions visualized in spine consistent with mets.   - CT Abd/Pelvis 9/4/21 - Final read pending      # H/o Stage IIB Appendiceal Carcinoma   Per Care Everywhere chart review. In 2004 patient was admitted for RUQ pain and found to have phlegmonous appendix. He underwent surgical removal of the appendix and was found to have appendiceal carcinoma occupying the entire appendix. It penetrated through the wall of the appendix and was on the serosal surface. He then underwent re-resection and a right hemicolectomy. There was residual cancer in the cecum, but all LN were negative. He received adjuvant FOLFOX x 6 months.     # Hypercalcemia   Calcium elevated at 10.7 on admission. Unable to correct as albumin not checked.  - Management per primary team     Patient was seen and plan of care was discussed with attending physician Dr. Romario Patel.      Thank you for the opportunity to partake in this patients plan of care. Please do not hesitate to page with questions. We will continue to follow.      Macy Bravo PA-C   Hematology/Oncology  Pager #[phone]    ___________________________________________________________________     Subjective & Interval History:    Kenrick reports that he has been  "experiencing SOB for the last several days. He has difficulty laying flat and talking d/t SOB. He previously was fairly active (mowing the lawn, golfing 18 holes; riding not walking), but now he can barely go for walks. Reviewed the CT Chest and left pleural effusion. He states that someone told him the result from the LN biopsy showing recurrence of his SCC. He does not take any PO intake other than liquid oxycodone. Discussed our recommendation to get CT neck to complete staging and hopefully discharge him soon so he can go and see Dr. Mark outpatient to discuss next steps for treatment. All questions answered at this time.     Physical Exam:    Blood pressure (!) 157/80, pulse 66, temperature 97.5  F (36.4  C), temperature source Oral, resp. rate 18, height 1.702 m (5' 7\"), weight 70.8 kg (156 lb), SpO2 95 %.  General: alert and cooperative, lying in bed, no acute distress  HEENT: sclera anicteric, EOMI, MMM  Neck: supple, normal ROM  CV: RRR, no murmurs  Resp: CTAB, normal respiratory effort on ambient air  GI: soft, non-tender, non-distended, bowel sounds present and normoactive  MSK: warm and well-perfused, normal tone  Skin: no rashes on limited exam, no jaundice  Neuro: Alert and interactive, moves all extremities equally, no focal deficits    Past Medical History:  Past Medical History:   Diagnosis Date     Adenocarcinoma (H)     large instestine      Anemia      Cancer of appendix (H)      Closed wedge compression fracture of T6 vertebra (H)      Diabetes (H)      Gastro-oesophageal reflux disease      History of radiation therapy      HLD (hyperlipidemia)      Hoarseness      HTN (hypertension)      Hypertension      Paroxysmal atrial fibrillation (H)      Primary squamous cell carcinoma of throat (H)      Squamous cell cancer of tongue (H)      Squamous cell carcinoma of neck        Past Surgical History:  Past Surgical History:   Procedure Laterality Date     APPENDECTOMY       COLECTOMY      Right     " DISSECTION RADICAL NECK MODIFIED Right 12/9/2020    Procedure: Modified neck dissection;  Surgeon: Jose Antonio Mckenna MD;  Location: [facility] OR     ENDOBRONCHIAL ULTRASOUND FLEXIBLE N/A 7/5/2021    Procedure: ENDOBRONCHIAL ULTRASOUND, WITH FLEXIBLE BRONCHOSCOPY;  Surgeon: Indio Zamora MD;  Location: [facility] OR     ENDOBRONCHIAL ULTRASOUND FLEXIBLE N/A 9/15/2021    Procedure: ENDOBRONCHIAL ULTRASOUND, WITH FLEXIBLE BRONCHOSCOPY,;  Surgeon: Alber Bush MD;  Location: [facility] OR     ENDOSCOPIC INSERTION TUBE GASTROSTOMY N/A 7/5/2021    Procedure: INSERTION, PEG TUBE;  Surgeon: Indio Zamora MD;  Location: [facility] OR     EXCISE LESION INTRAORAL Right 2/19/2020    Procedure: Wide Local Excision of Right Tongue Lesion;  Surgeon: Jose Antonio Mckenna MD;  Location: [facility] OR     INSERT PORT VASCULAR ACCESS Right 1/15/2021    Procedure: CHEST INSERTION, VASCULAR ACCESS PORT @0900;  Surgeon: Tony Hopkins MD;  Location: [facility] OR     IR CHEST PORT PLACEMENT > 5 YRS OF AGE  1/15/2021     LARYNGOSCOPY WITH BIOPSY(IES) N/A 7/5/2021    Procedure: LARYNGOSCOPY, WITH BIOPSY;  Surgeon: Jose Antonio Mckenna MD;  Location: [facility] OR     LASER CO2 LARYNGOSCOPY N/A 10/6/2014    Procedure: LASER CO2 LARYNGOSCOPY;  Surgeon: Jose Antonio Mckenna MD;  Location: [facility] OR     LASER CO2 LESION ORAL N/A 5/24/2021    Procedure: Excision of tongue ulcer with omniguide laser;  Surgeon: Jose Antonio Mckenna MD;  Location: [facility] OR       Social History:  Social History     Socioeconomic History     Marital status:      Spouse name: Not on file     Number of children: Not on file     Years of education: Not on file     Highest education level: Not on file   Occupational History     Not on file   Tobacco Use     Smoking status: Never Smoker     Smokeless tobacco: Never Used   Substance and Sexual Activity     Alcohol use: Not Currently     Alcohol/week: 0.0 standard drinks     Comment: none for 20 years     Drug use: No     Sexual activity: Never   Other  Topics Concern     Not on file   Social History Narrative     Not on file     Social Determinants of Health     Financial Resource Strain:      Difficulty of Paying Living Expenses:    Food Insecurity:      Worried About Running Out of Food in the Last Year:      Ran Out of Food in the Last Year:    Transportation Needs:      Lack of Transportation (Medical):      Lack of Transportation (Non-Medical):    Physical Activity:      Days of Exercise per Week:      Minutes of Exercise per Session:    Stress:      Feeling of Stress :    Social Connections:      Frequency of Communication with Friends and Family:      Frequency of Social Gatherings with Friends and Family:      Attends [employer] Services:      Active Member of Clubs or Organizations:      Attends Club or Organization Meetings:      Marital Status:    Intimate Partner Violence: Not At Risk     Fear of Current or Ex-Partner: No     Emotionally Abused: No     Physically Abused: No     Sexually Abused: No        Family History  Family History   Problem Relation Age of Onset     Diabetes Mother      Heart Disease Father      Diabetes Brother      Anesthesia Reaction No family hx of      Bleeding Disorder No family hx of      Clotting Disorder No family hx of        Outpatient Medications:  lidocaine (XYLOCAINE) 2 % external gel 10 mL    acetaminophen (TYLENOL) 160 MG/5ML suspension, Take 20.5 mLs (650 mg) by mouth every 6 hours as needed for fever or mild pain  acetaminophen (TYLENOL) 325 MG tablet, Take 2 tablets (650 mg) by mouth every 4 hours as needed for other (multimodal surgical pain management along with NSAIDS and opioid medication as indicated based on pain control and physical function.)  aspirin (ASA) 81 MG chewable tablet, Take 81 mg by mouth daily   diphenhydrAMINE (BENADRYL) 25 MG capsule, Take 25 mg by mouth nightly as needed for itching or allergies  fluconazole (DIFLUCAN) 100 MG tablet, 2 tabs PO qday x 1 day, then 1 tab PO qday x 13 days for a  total of 14 day course. (Patient not taking: Reported on 8/20/2021)  gabapentin (NEURONTIN) 250 MG/5ML solution, Take 5 mLs (250 mg) by mouth 3 times daily  gabapentin (NEURONTIN) 250 MG/5ML solution, Take 2 mLs (100 mg) by mouth 3 times daily for 14 days  glycopyrrolate (ROBINUL) 1 MG tablet, Take 1 tablet (1 mg) by mouth 3 times daily (Patient taking differently: Take 1 mg by mouth every evening )  guaiFENesin (ROBITUSSIN) 100 MG/5ML liquid, 10 mLs (200 mg) by Oral or PEG tube route every 4 hours as needed for other (mucous production and/or cough)  insulin aspart (NOVOLOG PEN) 100 UNIT/ML pen, 15 Units   insulin glargine (LANTUS VIAL) 100 UNIT/ML vial, Inject 25 Units Subcutaneous daily (with breakfast) PATIENT REPORTS 25 UNIT DAILY 02/16/2021  levofloxacin (LEVAQUIN) 25 MG/ML solution, Take 20 mLs (500 mg) by mouth daily  levofloxacin (LEVAQUIN) 500 MG tablet, Take 1 tablet (500 mg) by mouth daily  lidocaine (XYLOCAINE) 2 % solution, Swish and spit 15 mLs in mouth every 3 hours as needed for moderate pain ; Max 8 doses/24 hour period.  lisinopril (ZESTRIL) 5 MG tablet, Take 5 mg by mouth daily   magic mouthwash (ENTER INGREDIENTS IN COMMENTS) suspension, Take 5 mLs by mouth every 4 hours as needed (pain)  magic mouthwash (ENTER INGREDIENTS IN COMMENTS) suspension, Swish and spit 5 ml every 8 hours for 14 days  magic mouthwash (ENTER INGREDIENTS IN COMMENTS) suspension, Swish, gargle, and spit one to two teaspoonfuls every six hours as needed. May be swallowed if esophageal involvement.  methylPREDNISolone (MEDROL DOSEPAK) 4 MG tablet therapy pack, Follow Package Directions  omeprazole (PRILOSEC) 2 mg/mL suspension, Take 10 mLs (20 mg) by mouth every morning (before breakfast)  oxyCODONE (OXY-IR) 5 MG capsule, Take 5 mg by mouth every 4 hours as needed for severe pain  oxyCODONE (ROXICODONE) 5 MG/5ML solution, Take 7.5 mLs (7.5 mg) by mouth every 6 hours as needed for pain (oral pain)  pentoxifylline (TRENTAL) 50  mg/ml suspension, Take 8 mLs (400 mg) by mouth 3 times daily  pentoxifylline ER (TRENTAL) 400 MG CR tablet, Take 1 tablet (400 mg) by mouth 3 times daily (with meals)  pilocarpine (SALAGEN) 7.5 MG tablet, Take 7.5 mg by mouth 2 times daily   senna-docusate (SENOKOT-S/PERICOLACE) 8.6-50 MG tablet, Take 1 tablet by mouth 2 times daily as needed for constipation  simvastatin (ZOCOR) 40 MG tablet, Take 40 mg by mouth At Bedtime   sucralfate (CARAFATE) 1 GM tablet, Take 1 tablet (1 g) by mouth 2 times daily  vitamin E (TOCOPHEROL) 1000 units (450 mg) capsule, 1 capsule (1,000 Units) by Per Feeding Tube route daily           Labs & Studies: I personally reviewed the following studies:  ROUTINE LABS (Last four results):  CMP  Recent Labs   Lab 09/24/21  0209   *   POTASSIUM 4.5   CHLORIDE 96   CO2 31   ANIONGAP 5   *   BUN 26   CR 0.90   GFRESTIMATED 85   ELEUTERIO 10.7*     CBC  Recent Labs   Lab 09/24/21  0209   WBC 11.6*   RBC 3.72*   HGB 11.2*   HCT 34.2*   MCV 92   MCH 30.1   MCHC 32.7   RDW 12.8        INRNo lab results found in last 7 days.    IMAGING:

## 2021-09-24 NOTE — ED TRIAGE NOTES
Presents with worsening shortness of breath/secretions over the past week. Has history of throat/tongue cancer with radiation ( 3/2021) and feels like when he lays down to sleep he can't catch his breath due to secretions & cough. Has noticed increasing drooling/pooling of secretions as well. Had lung biopsy 1 week ago. Respirations even/nonlabored.

## 2021-09-24 NOTE — H&P
[facility]    History and Physical - [facility] Service        Date of Admission:  9/24/2021    Assessment & Plan      Reese Oakley is a 71 year old male with PMHx HTN, HLD, anemia, DM2, GERD, hx colon cancer, appendiceal cancer, throat cancer s/p laser resection/adjuvant radiotherapy (2014), tongue cancer (2020) s/p wide local excision c/b biopsy-proven recurrence in R neck, who presents to ED with cough and shortness of breath, admitted for concern for SCC metastasis.    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  Patient presents with complaint of worsening dyspnea over last 3 days with associated productive cough.  Recent bronchoscopy with FNA of perihilar mass shows evidence of SCC, likely from tongue or esophageal primary - patient was a non-smoker, so lung-primary less likely.  On presentation, HDS, satting appropriately on RA.  Leukocytosis stable since June.  CXR notable for large L pleural effusion, concern for further metastasis to pleural space.  Post-obstructive PNA also possible given productive, thick yellow sputum.  - CT PE ordered  - CT A/P w/ contrast ordered for staging  - CAPs team consulted for therapeutic/diagnostic thora  - Oncology consulted  - Procalcitonin, sputum cx ordered  - PTA glycopyrrolate, pilocarpine for secretions  - PTA lidocaine for oral pain    Mild Chronic Hyponatremia  Presents with sodium of 132, baseline over last 6 months 131-134.  - CTM    Mild Hypercalcemia  Calcium on presentation 10.7, no albumin measured on presentation.  Possible this is PTHrP related process, given metastatic SCC.  Patient asymptomatic at this time  - 1L LR given  - PTH ordered  - PTHrP ordered    Hyperglycemia  DM2  History of DM2, insulin dependent.  Presented with glucose of 340, though POC readings between 81-99.  Possible this is an  inaccurate reading - will repeat reading.  No acidosis seen on labs.  - PTA glargine 25U.  Ordered 10U while patient is NPO (nutrition consulted for tube feed initiation)  - q4h glucose checks  - MDSS    Chronic medical problems  #Chronic Nomocytic Anemia - Baseline 11-12  #G-tube - nutrition consulted for tube feed initiation  #HTN - lisinopril  #HLD - simvastatin  #CAD - ASA  #GERD - omeprazole     Diet:  NPO - nutrition to initiate tube feeds  DVT Prophylaxis: Enoxaparin (Lovenox) SQ  Lerma Catheter: Not present  Fluids: 1L given in ED  Central Lines: None  Code Status:  FULL    Clinically Significant Risk Factors Present on Admission         # Hyponatremia: Na = 132 mmol/L (Ref range: 133 - 144 mmol/L) on admission, will monitor as appropriate  # Hypercalcemia: Ca = 10.7 mg/dL (Ref range: 8.5 - 10.1 mg/dL) and/or iCa = N/A on admission, will monitor as appropriate     # Platelet Defect: home medication list includes an antiplatelet medication       Patient to be formally staffed in AM.    Monty Kwon MD  [facility]  Securely message with the Vocera Web Console (learn more here)  Text page via [facility] Paging/Directory  Please see sign in/sign out for up to date coverage information  ______________________________________________________________________    Chief Complaint   Cough, SOB    History is obtained from the patient    History of Present Illness   Reese Oakley is a 71 year old male with PMHx HTN, HLD, anemia, DM2, GERD, hx colon cancer, appendiceal cancer, throat cancer s/p laser resection/adjuvant radiotherapy (2014), tongue cancer (2020) s/p wide local excision c/b biopsy-proven recurrence in R neck, who presents to ED with cough and shortness of breath.    Has extensive history of cancer - history of colon cancer, history of appendiceal cancer, history of throat cancer treated with laser resection and adjuvant radiotherapy in  2014 followed by tongue cancer diagnosed in February 2020 treated with wide local excision of the right tongue on 2/19/2020 who was found to have biopsy-proven recurrence in his right neck and underwent modified neck dissection on 12/9/2020 with subsequet adjuvant radiation and chemotherapy.  Followed by oncology and ENT.     He states that he was recently found to have a perihilar mass - bronchoscopy done about a week ago.   FNA from bronchoscopy found to be SCC.  Since the biopsy, patient with increased dyspnea with exertion, however, over last two days, starting to have dyspnea at rest, with associated productive cough with thick yellow sputum.  Denies f/c, n/v/d, night sweats.  Denies chest pain.  Feels he is having increased oral secretions as well, which is affecting his breathing.    Review of Systems    The 10 point Review of Systems is negative other than noted in the HPI or here.     Past Medical History    I have reviewed this patient's medical history and updated it with pertinent information if needed.   Past Medical History:   Diagnosis Date     Adenocarcinoma (H)     large instestine      Anemia      Cancer of appendix (H)      Closed wedge compression fracture of T6 vertebra (H)      Diabetes (H)      Gastro-oesophageal reflux disease      History of radiation therapy      HLD (hyperlipidemia)      Hoarseness      HTN (hypertension)      Hypertension      Paroxysmal atrial fibrillation (H)      Primary squamous cell carcinoma of throat (H)      Squamous cell cancer of tongue (H)      Squamous cell carcinoma of neck         Past Surgical History   I have reviewed this patient's surgical history and updated it with pertinent information if needed.  Past Surgical History:   Procedure Laterality Date     APPENDECTOMY       COLECTOMY      Right     DISSECTION RADICAL NECK MODIFIED Right 12/9/2020    Procedure: Modified neck dissection;  Surgeon: Jose Antonio Mckenna MD;  Location: [facility] OR     ENDOBRONCHIAL  ULTRASOUND FLEXIBLE N/A 7/5/2021    Procedure: ENDOBRONCHIAL ULTRASOUND, WITH FLEXIBLE BRONCHOSCOPY;  Surgeon: Indio Zamora MD;  Location: [facility] OR     ENDOBRONCHIAL ULTRASOUND FLEXIBLE N/A 9/15/2021    Procedure: ENDOBRONCHIAL ULTRASOUND, WITH FLEXIBLE BRONCHOSCOPY,;  Surgeon: Alber Bush MD;  Location: [facility] OR     ENDOSCOPIC INSERTION TUBE GASTROSTOMY N/A 7/5/2021    Procedure: INSERTION, PEG TUBE;  Surgeon: Indio Zamora MD;  Location: [facility] OR     EXCISE LESION INTRAORAL Right 2/19/2020    Procedure: Wide Local Excision of Right Tongue Lesion;  Surgeon: Jose Antonio Mckenna MD;  Location: [facility] OR     INSERT PORT VASCULAR ACCESS Right 1/15/2021    Procedure: CHEST INSERTION, VASCULAR ACCESS PORT @0900;  Surgeon: Tony Hopkins MD;  Location: [facility] OR     IR CHEST PORT PLACEMENT > 5 YRS OF AGE  1/15/2021     LARYNGOSCOPY WITH BIOPSY(IES) N/A 7/5/2021    Procedure: LARYNGOSCOPY, WITH BIOPSY;  Surgeon: Jose Antonio Mckenna MD;  Location: [facility] OR     LASER CO2 LARYNGOSCOPY N/A 10/6/2014    Procedure: LASER CO2 LARYNGOSCOPY;  Surgeon: Jose Antonio Mckenna MD;  Location: [facility] OR     LASER CO2 LESION ORAL N/A 5/24/2021    Procedure: Excision of tongue ulcer with omniguide laser;  Surgeon: Jose Antonio Mckenna MD;  Location: [facility] OR        Social History   I have reviewed this patient's social history and updated it with pertinent information if needed. Reese Oakley  reports that he has never smoked. He has never used smokeless tobacco. He reports previous alcohol use. He reports that he does not use drugs.    Family History   I have reviewed this patient's family history and updated it with pertinent information if needed.  Family History   Problem Relation Age of Onset     Diabetes Mother      Heart Disease Father      Diabetes Brother      Anesthesia Reaction No family hx of      Bleeding Disorder No family hx of      Clotting Disorder No family hx of        Prior to Admission Medications   Prior to Admission  Medications   Prescriptions Last Dose Informant Patient Reported? Taking?   acetaminophen (TYLENOL) 160 MG/5ML suspension   No No   Sig: Take 20.5 mLs (650 mg) by mouth every 6 hours as needed for fever or mild pain   acetaminophen (TYLENOL) 325 MG tablet   No No   Sig: Take 2 tablets (650 mg) by mouth every 4 hours as needed for other (multimodal surgical pain management along with NSAIDS and opioid medication as indicated based on pain control and physical function.)   aspirin (ASA) 81 MG chewable tablet   Yes No   Sig: Take 81 mg by mouth daily    diphenhydrAMINE (BENADRYL) 25 MG capsule   Yes No   Sig: Take 25 mg by mouth nightly as needed for itching or allergies   fluconazole (DIFLUCAN) 100 MG tablet   No No   Si tabs PO qday x 1 day, then 1 tab PO qday x 13 days for a total of 14 day course.   Patient not taking: Reported on 2021   gabapentin (NEURONTIN) 250 MG/5ML solution   No No   Sig: Take 2 mLs (100 mg) by mouth 3 times daily for 14 days   gabapentin (NEURONTIN) 250 MG/5ML solution   No No   Sig: Take 5 mLs (250 mg) by mouth 3 times daily   glycopyrrolate (ROBINUL) 1 MG tablet   No No   Sig: Take 1 tablet (1 mg) by mouth 3 times daily   Patient taking differently: Take 1 mg by mouth every evening    guaiFENesin (ROBITUSSIN) 100 MG/5ML liquid   No No   Sig: 10 mLs (200 mg) by Oral or PEG tube route every 4 hours as needed for other (mucous production and/or cough)   insulin aspart (NOVOLOG PEN) 100 UNIT/ML pen   Yes No   Sig: 15 Units    insulin glargine (LANTUS VIAL) 100 UNIT/ML vial   Yes No   Sig: Inject 25 Units Subcutaneous daily (with breakfast) PATIENT REPORTS 25 UNIT DAILY 2021   levofloxacin (LEVAQUIN) 25 MG/ML solution   No No   Sig: Take 20 mLs (500 mg) by mouth daily   levofloxacin (LEVAQUIN) 500 MG tablet   No No   Sig: Take 1 tablet (500 mg) by mouth daily   lidocaine (XYLOCAINE) 2 % solution   No No   Sig: Swish and spit 15 mLs in mouth every 3 hours as needed for moderate  pain ; Max 8 doses/24 hour period.   lisinopril (ZESTRIL) 5 MG tablet   Yes No   Sig: Take 5 mg by mouth daily    magic mouthwash (ENTER INGREDIENTS IN COMMENTS) suspension   No No   Sig: Swish, gargle, and spit one to two teaspoonfuls every six hours as needed. May be swallowed if esophageal involvement.   magic mouthwash (ENTER INGREDIENTS IN COMMENTS) suspension   No No   Sig: Swish and spit 5 ml every 8 hours for 14 days   magic mouthwash (ENTER INGREDIENTS IN COMMENTS) suspension   No No   Sig: Take 5 mLs by mouth every 4 hours as needed (pain)   methylPREDNISolone (MEDROL DOSEPAK) 4 MG tablet therapy pack   No No   Sig: Follow Package Directions   omeprazole (PRILOSEC) 2 mg/mL suspension   No No   Sig: Take 10 mLs (20 mg) by mouth every morning (before breakfast)   oxyCODONE (OXY-IR) 5 MG capsule   Yes No   Sig: Take 5 mg by mouth every 4 hours as needed for severe pain   oxyCODONE (ROXICODONE) 5 MG/5ML solution   No No   Sig: Take 7.5 mLs (7.5 mg) by mouth every 6 hours as needed for pain (oral pain)   pentoxifylline (TRENTAL) 50 mg/ml suspension   No No   Sig: Take 8 mLs (400 mg) by mouth 3 times daily   pentoxifylline ER (TRENTAL) 400 MG CR tablet   No No   Sig: Take 1 tablet (400 mg) by mouth 3 times daily (with meals)   pilocarpine (SALAGEN) 7.5 MG tablet   Yes No   Sig: Take 7.5 mg by mouth 2 times daily    senna-docusate (SENOKOT-S/PERICOLACE) 8.6-50 MG tablet   No No   Sig: Take 1 tablet by mouth 2 times daily as needed for constipation   simvastatin (ZOCOR) 40 MG tablet   Yes No   Sig: Take 40 mg by mouth At Bedtime    sucralfate (CARAFATE) 1 GM tablet   No No   Sig: Take 1 tablet (1 g) by mouth 2 times daily   vitamin E (TOCOPHEROL) 1000 units (450 mg) capsule   No No   Si capsule (1,000 Units) by Per Feeding Tube route daily      Facility-Administered Medications: None     Allergies   No Known Allergies    Physical Exam   Vital Signs: Temp: 97.5  F (36.4  C) Temp src: Oral BP: (!) 153/66 Pulse:  71   Resp: 18 SpO2: 98 % O2 Device: None (Room air)    Weight: 156 lbs 0 oz    Constitutional: Pleasant, cooperative.  In mild respiratory distress - unable to speak in complete sentences, but breathing comfortably when not speaking.  Sitting up in bed without difficulty.  Productive   HEENT: Sclera anicteric, Oropharynx notable for ulceration on R buccal surface.  Tongue abnormal - partially removed 2/2 previous SCC.  Hard, immobile mass on R submandibular area.   CV: RRR, no murmurs, gallops or rubs. JVD normal   Respiratory: Absent breath sounds in lower L lung field.  Otherwise, CTAB.    Abd: Soft, NT/ND, +bs, no HSM.  G-tube in place.  Skin: No lesions or rashes over exposed areas, normal color, texture and turgor  Neuro: AAO x 3       Data   Data reviewed today: I reviewed all medications, new labs and imaging results over the last 24 hours.    Recent Labs   Lab 09/24/21  0209   WBC 11.6*   HGB 11.2*   MCV 92      *   POTASSIUM 4.5   CHLORIDE 96   CO2 31   BUN 26   CR 0.90   ANIONGAP 5   ELEUTERIO 10.7*   *

## 2021-09-25 NOTE — PLAN OF CARE
Vitals stable on room air. Up ad lesly. NPO, pt received scheduled bolus tube feeds and flushes through PEG tube. Denies nausea. Tongue burning, magic mouthwash used with good relief. PEG tube site, clean/dry/intact. Heparin drip stopped. Eliquis given. Port heparin locked and de-accessed. Pt resting between cares. Wife at bedside and supportive with cares. All education given. Pt discharged home with belongings and medications. Sent down to lobby. Wife will be bringing him home.

## 2021-09-25 NOTE — PROGRESS NOTES
Hematology / Oncology  Daily Progress Note   Date of Service: 09/25/2021  Patient: Reese Oakley  MRN: [id number]  Admission Date: 9/24/2021  Hospital Day # 1  Cancer Diagnosis: Recurrent SCC   Primary Outpatient Oncologist: Dr. Mark  Current Treatment Plan: TBD    Assessment & Plan:   Reese Oakley is a 71 year old male past medical history of HTN, HLD, anemia, T2DM, h/o appendiceal carcinoma s/p surgical resection and adjuvant FOLFOX, and recurrent SCC. Admitted for cough and SOB.      Recommendations:   - Will follow up cytology outpatient.   - Can transition to DOAC, preferably apixaban but will be dependent on insurance coverage and administration through G tube  - No further indications for hospital admission from oncology perspective; discharge planning per primary team  - Has follow up with primary oncologist Dr. Mark 9/28/21         # Recurrent SCC   # Presumed malignant left pleural effusion   # SOB   # New tumor vs bland thrombus of pulmonary vein   Patient was initially diagnosed with T1N0Mx SCC of the R oropharynx in 2014 and was treated with laser resection (w/ Dr. Mckenna) followed by adjuvant radiotherapy. He was then subsequently found to have a pT2Nx SCC of the right lateral tongue in Feb 2020 s/p partial glossectomy w/ Dr. Mckenna. He was monitored closely w/ repeat imagine. He was then noted to have enlarged R sided LN on follow up scans. Biopsy proven recurrence of SCC in the right neck. In Dec 2020 he underwent R omohyoid neck dissection that showed recurrent SCC with involvement of right submandibular gland with 2.5 cm tumor. SCC was identified in one additional LN w/o MEL. He then completed chemoradiation. Received Cisplatin x 1 dose (discontinued d/t worsening renal function), then received Carbo/Taxol x 5 doses; he finished in 3/5/21. He underwent bronch with biopsy of concern L mediastinal LN concerning for malignancy in July 2021 with Dr. Zamora, one sample neg  malignancy and other non-diagnostic. He had a CT at OSH and was noted to have a large spiculated L hilar mass measuring 9.3x 3.8 cm. He underwent bronch with FNA biopsy of LN; pathology showing recurrent SCC.       - CT PE 9/24/21 - neg PE, para mediastinal consolidations possibly reflecting posttreatment changes, though abnormal soft tissue density encasing the left distal pulmonary artery and proximal left upper and lower lobe bronchi concerning for metastatic disease. Mod to large left pleural effusion. New nodules in the right lung measuring up to 9 mm concerning for metastases. Scattered new small lytic lesions visualized in spine consistent with mets.   - CT Abd/Pelvis 9/4/21 - Filling defect involving superior left pulmonary veins at the site  of tumor concerning for tumor versus bland thrombus which is new from prior CT. Lytic lesions within L2, L4, and S1 vertebral body are concerning for metastatic disease  - After reviewing the images of the CT abd/pelvis, we agree with continuing anticoagulation for new pulmonary embolism. It is reasonable to transition him to DOAC in anticipation of discharge. Anticoagulation will likely be at least three months, but more than likely, he will need longer AC given cancer history. He will follow up with Dr. Mark in regards to AC duration. As for choice of DOAC, it will be dependent on insurance coverage and administration through G tube. At this time, patient is hemodynamically stable and clot burden does not seem significant enough to require thrombectomy or other interventions via IR.     # H/o Stage IIB Appendiceal Carcinoma   Per Care Everywhere chart review. In 2004 patient was admitted for RUQ pain and found to have phlegmonous appendix. He underwent surgical removal of the appendix and was found to have appendiceal carcinoma occupying the entire appendix. It penetrated through the wall of the appendix and was on the serosal surface. He then underwent re-resection  "and a right hemicolectomy. There was residual cancer in the cecum, but all LN were negative. He received adjuvant FOLFOX x 6 months.      # Hypercalcemia   Calcium elevated on admission. Continues to remain elevated at 11 (corrected for albumin is 12.5).   - Management per primary team      Patient was seen and plan of care was discussed with attending physician Dr. Romario Patel.      Thank you for the opportunity to partake in this patients plan of care. Please do not hesitate to page with questions. We will continue to follow.      Maximiliano Palmer MD   Heme/Onc/Transplant Fellow  Pgr #[phone]  ___________________________________________________________________    Subjective & Interval History:    No acute events noted overnight. He is doing better in terms of respiratory issues after having fluid removed from his lung. He reports no new symptoms.    Physical Exam:    Blood pressure 137/56, pulse 64, temperature 98.5  F (36.9  C), temperature source Temporal, resp. rate 17, height 1.702 m (5' 7\"), weight 72 kg (158 lb 11.7 oz), SpO2 93 %.    General: alert and cooperative, lying in bed, no acute distress  HEENT: sclera anicteric, EOMI, MMM, partial resection of tongue  Neck: supple, normal ROM  CV: RRR, no murmurs  Resp: CTAB, normal respiratory effort on ambient air  GI: soft, non-tender, non-distended, bowel sounds present and normoactive, G tube in place  MSK: warm and well-perfused, normal tone  Skin: no rashes on limited exam, no jaundice  Neuro: Alert and interactive, moves all extremities equally, no focal deficits    Labs & Studies: I personally reviewed the following studies:  ROUTINE LABS (Last four results):  CMP  Recent Labs   Lab 09/25/21  1018 09/25/21  0824 09/25/21  0305 09/24/21  2201 09/24/21  1226 09/24/21  0944 09/24/21  0935 09/24/21  0209   NA  --  136  --   --   --   --   --  132*   POTASSIUM  --  3.9  --   --   --   --   --  4.5   CHLORIDE  --  98  --   --   --   --   --  96   CO2  --  " 32  --   --   --   --   --  31   ANIONGAP  --  6  --   --   --   --   --  5   * 194* 176* 285*   < >  --    < > 340*   BUN  --  19  --   --   --   --   --  26   CR  --  1.07  --   --   --  0.86  --  0.90   GFRESTIMATED  --  69  --   --   --  87  --  85   ELEUTERIO  --  11.0*  --   --   --   --   --  10.7*   PROTTOTAL  --  7.0  --   --   --  7.4  --   --    ALBUMIN  --  2.1*  --   --   --  2.4*  --   --    BILITOTAL  --  0.3  --   --   --   --   --   --    ALKPHOS  --  142  --   --   --   --   --   --    AST  --  26  --   --   --   --   --   --    ALT  --  26  --   --   --   --   --   --     < > = values in this interval not displayed.     CBC  Recent Labs   Lab 09/25/21  0824 09/24/21  1242 09/24/21  0209   WBC 12.9* 14.2* 11.6*   RBC 3.80* 3.88* 3.72*   HGB 11.5* 11.9* 11.2*   HCT 35.8* 36.8* 34.2*   MCV 94 95 92   MCH 30.3 30.7 30.1   MCHC 32.1 32.3 32.7   RDW 12.9 12.8 12.8    344 320     INRNo lab results found in last 7 days.    Medications list for reference:  Current Facility-Administered Medications   Medication     acetaminophen (TYLENOL) solution 650 mg     aspirin (ASA) chewable tablet 81 mg     azithromycin (ZITHROMAX) tablet 250 mg     cefTRIAXone (ROCEPHIN) 1 g vial to attach to  mL bag for ADULTS or NS 50 mL bag for PEDS     dextrose 10% infusion     glucose gel 15-30 g    Or     dextrose 50 % injection 25-50 mL    Or     glucagon injection 1 mg     gabapentin (NEURONTIN) solution 250 mg     glycopyrrolate (ROBINUL) tablet 1 mg     guaiFENesin (ROBITUSSIN) 20 mg/mL solution 200 mg     heparin 25,000 units in 0.45% NaCl 250 mL ANTICOAGULANT infusion     insulin aspart (NovoLOG) injection (RAPID ACTING)     insulin aspart (NovoLOG) injection (RAPID ACTING)     insulin aspart (NovoLOG) injection (RAPID ACTING)     insulin aspart (NovoLOG) injection (RAPID ACTING)     insulin aspart (NovoLOG) injection (RAPID ACTING)     insulin glargine (LANTUS PEN) injection 10 Units     lidocaine (LMX4)  cream     lidocaine 1 % 0.1-1 mL     lisinopril (ZESTRIL) tablet 5 mg     magic mouthwash suspension (diphenhydramine, lidocaine, aluminum-magnesium & simethicone)     melatonin tablet 1 mg     multivitamins w/minerals liquid 15 mL     ondansetron (ZOFRAN-ODT) ODT tab 4 mg    Or     ondansetron (ZOFRAN) injection 4 mg     oxyCODONE (ROXICODONE) solution 7.5 mg     pantoprazole (PROTONIX) 2 mg/mL suspension 40 mg     senna-docusate (SENOKOT-S/PERICOLACE) 8.6-50 MG per tablet 1 tablet     simvastatin (ZOCOR) tablet 40 mg     sodium chloride (PF) 0.9% PF flush 3 mL     sodium chloride (PF) 0.9% PF flush 3 mL

## 2021-09-25 NOTE — PLAN OF CARE
Assumed care for patient: [id number]    Vital signs stable.   Pain: describes discomfort as tingling in oral cavity. Managed with PRN oxycodone and lidocaine. Obtained Magic mouthwash per pt request.   Skin intact, gtube site CDI.   Activity: Up ad lesly  Lungs clear, diminished in bases bilaterally  : Voiding spontaneously   GI: + Flatus.  BM x1   Remains NPO, denies nausea with TF and H2O flushes.

## 2021-09-25 NOTE — CONSULTS
"[facility]  Palliative Care Consultation Note    Patient: Reese Oakley  Date of Admission:  9/24/2021    Requesting Clinician / Team: Kevin Saeed MD, Les Flores 4  Reason for consult: Goals of care  Decisional support  Patient and family support    Recommendations:    Discussed with patient and he uses the magic mouthwash quite frequently at home. He has been told it is related to nerve pain from his surgery. Discussed with primary team and they plan to increase his frequency that it is available. If has open mucosal lesions could consider doxepin swish and spit in the future.       Recommend increasing dose of nighttime Gabapentin to 500 mg. Continue the other scheduled doses at 250 mg.       Agree needs further discussion about goals of care moving forward. He has a realistic view of his diagnosis. He has not been made aware that his bronchoscopy showed squamous cell carcinoma and we will discuss that today. It is difficult to have further goals conversations until we know further what his options will be going forward as he would be open to more chemotherapy if Dr. Mark thought there was benefit in prolonging his life. He wants to spend more time with his grandchildren but is also realizes he is dying and says he had \"a great life\" and is comfortable with the thought of the end of his life being near. I would plan       We will discuss further code status.       He seems to have a good support system around him and has been coping well with all his changes.     Rolly Sinha MD  Palliative Care Fellow    Patient seen and evaluated with Dr. Sinha.   Agree with assessment and recommendations.  On my follow up visit with Mr Oakley he was preparing for discharge. We reviewed medications and pain. He is not sure how much the oxycodone 7.5mg (he uses QID on a schedule for his chronic tongue pain that seems to be getting worse) is very helpful but continues it " "nonetheless and doesn't feel interested in changing it. Instead we decided to start with increasing the nighttime gabapentin which can be further increased in the weeks to come as he tolerates it. He was already past due for refill on his outpatient oxycodone and so I sent refill script for the oxycodone to discharge pharmacy  We did talk a little more about his diagnosis wife was in room during my visit. They are aware that the cancer is metastatic and recognize that this is the first time he is dealing with a cancer that has spread like this. We talked about how there role of treatments at this time is not curative but rather to control the cancer and the symptoms and to give more time. I offered to talk about wishes and preferences around end of life including code status but he and his wife say \"we are not there yet\" and did not want to discuss further. I shared why in can be helpful to plan ahead and expressed that our team can talk about this if/when they are ready. For now he remains full code and is understandably focused on finding out more from oncology about options for treatment.     Katharina Millan  Palliative Care   Pager [phone]      These recommendations have been discussed with the primary team.      Thank you for the opportunity to participate in the care of this patient and family. Our team: will continue to follow.     During regular M-F work hours -- if you are not sure who specifically to contact -- please contact us by sending a text page to our team consult pager at [phone].    After regular work hours and on weekends/holidays, you can call our answering service at [phone]. Also, who's on call for us is available in [employer].       Assessments:  Reese Oakley is a 71 year old male with with a past medical history of colon CA, appendiceal CA, and SCC of the neck s/p chemoradiation therapy, now with bronchoscopy positive FNA on 9/15/21 admitted to the hospital from the " "ER on 9/25/21 with increased SOB and cough and new pleural effusion concerning for malignant pleural effusion.     Today, the patient was seen for:  Metastatic SCC  Pleural effusion  Tongue pain  Goals of care discussion  Decisional support  Patient support    Prognosis, Goals, & Planning:      Functional Status just prior to hospitalization: 1 (Restricted in physically strenuous activity but ambulatory and able to carry out work of a light or sedentary nature)      Prognosis, Goals, and/or Advance Care Planning were addressed today: Yes        Summary/Comments: The patient states he knows he is dying. He says he was told that he likely has metastatic cancer but has not met with his oncologist (has follow up scheduled 9/28/21). He says he has not discussed with anyone what his options would be if he were found to have metastatic cancer but would potentially be willing to undergo more chemotherapy but is more hesitant to undergo more radiation as he had a bad experience with it when he had it previously. He has goals to have as much quality time with his family (especially his grandchildren) as is possible.       Patient's decision making preferences: shared with support from loved ones          Patient has decision-making capacity today for complex decisions: Yes            I have concerns about the patient/family's health literacy today: No           Patient has a completed Health Care Directive: Yes, and on file.      Code status: Full Code    Coping, Meaning, & Spirituality:   Mood, coping, and/or meaning in the context of serious illness were addressed today: Yes  Summary/Comments: He states he has come to peace with his diagnosis. HE has lead a good life and is happy. He is not ready to give up but \"know that I am dying\".     Social:     Living situation: Lucio live in a home with his Wife.    Key family / caregivers: He has two children (one step daughter and one biologic child with his wife).     History " "of Present Illness:  History gathered today from: patient, medical chart, medical team members    Reese Oakley is a 71 year old male with with a past medical history of colon CA, appendiceal CA, and SCC of the neck s/p chemoradiation therapy, now with bronchoscopy positive FNA on 9/15/21 admitted to the hospital from the ER on 9/25/21 with increased SOB and cough and new pleural effusion concerning for malignant pleural effusion.     He has had several days of progressive SOB that started with exertion and have progressed to the point he could not sleep at night which prompted his presentation to the ER. He has had a chronic cough productive of thick yellowish sputum which is unchanged per the patient. He has not had any fever. Denies any ongoing N/V/D.    He has also had persistent right sided tongue and mouth pain which has been bothersome to the patient. He has been using Magic Mouthwash quite frequently throughout the day (\"every time I get up\") and finds it helpful.     He otherwise denies significant pain issues at this time and uses Oxycodone 4 times daily at baseline. He does have some right sided rib pain which has been present since a fall about one month ago.     Key Palliative Symptom Data:  # Pain severity the last 12 hours: moderate  # Dyspnea severity the last 12 hours: low  # Nausea severity the last 12 hours: none    Patient is on opioids: assessed and bowels ok/no needed changes to plan of care today.    ROS:  Comprehensive ROS is reviewed and is negative except as here & per HPI: all other systems are negative.      Past Medical History:  Past Medical History:   Diagnosis Date     Adenocarcinoma (H)     large instestine      Anemia      Cancer of appendix (H)      Closed wedge compression fracture of T6 vertebra (H)      Diabetes (H)      Gastro-oesophageal reflux disease      History of radiation therapy      HLD (hyperlipidemia)      Hoarseness      HTN (hypertension)      Hypertension  "     Paroxysmal atrial fibrillation (H)      Primary squamous cell carcinoma of throat (H)      Squamous cell cancer of tongue (H)      Squamous cell carcinoma of neck         Past Surgical History:  Past Surgical History:   Procedure Laterality Date     APPENDECTOMY       COLECTOMY      Right     DISSECTION RADICAL NECK MODIFIED Right 12/9/2020    Procedure: Modified neck dissection;  Surgeon: Jose Antonio Mckenna MD;  Location: [facility] OR     ENDOBRONCHIAL ULTRASOUND FLEXIBLE N/A 7/5/2021    Procedure: ENDOBRONCHIAL ULTRASOUND, WITH FLEXIBLE BRONCHOSCOPY;  Surgeon: Indio Zamora MD;  Location: [facility] OR     ENDOBRONCHIAL ULTRASOUND FLEXIBLE N/A 9/15/2021    Procedure: ENDOBRONCHIAL ULTRASOUND, WITH FLEXIBLE BRONCHOSCOPY,;  Surgeon: Alber Bush MD;  Location: [facility] OR     ENDOSCOPIC INSERTION TUBE GASTROSTOMY N/A 7/5/2021    Procedure: INSERTION, PEG TUBE;  Surgeon: Indio Zamora MD;  Location: [facility] OR     EXCISE LESION INTRAORAL Right 2/19/2020    Procedure: Wide Local Excision of Right Tongue Lesion;  Surgeon: Jose Antonio Mckenna MD;  Location: [facility] OR     INSERT PORT VASCULAR ACCESS Right 1/15/2021    Procedure: CHEST INSERTION, VASCULAR ACCESS PORT @0900;  Surgeon: Tony Hopkins MD;  Location: [facility] OR     IR CHEST PORT PLACEMENT > 5 YRS OF AGE  1/15/2021     LARYNGOSCOPY WITH BIOPSY(IES) N/A 7/5/2021    Procedure: LARYNGOSCOPY, WITH BIOPSY;  Surgeon: Jose Antonio Mckenna MD;  Location: [facility] OR     LASER CO2 LARYNGOSCOPY N/A 10/6/2014    Procedure: LASER CO2 LARYNGOSCOPY;  Surgeon: Jose Antonio Mckenna MD;  Location: [facility] OR     LASER CO2 LESION ORAL N/A 5/24/2021    Procedure: Excision of tongue ulcer with omniguide laser;  Surgeon: Jose Antonio Mckenna MD;  Location: [facility] OR         Family History:  Family History   Problem Relation Age of Onset     Diabetes Mother      Heart Disease Father      Diabetes Brother      Anesthesia Reaction No family hx of      Bleeding Disorder No family hx of      Clotting  Disorder No family hx of          Allergies:  No Known Allergies     Medications:  I have reviewed this patient's medication profile and medications from this hospitalization.   Noted:  Acetaminophen 650mg every 6 hours prn  Gabapentin 250mg 3 times per day  Glycopyrrolate tablet 1 mg every evening  Guaifenesin 200 mg every 4 hours prn  Magic Mouthwash 10 ml swish and swallow every 6 hours prn  Oxycodone 7.5 mg every 6 hours prn      Physical Exam:  Vital Signs: Temp: 98.5  F (36.9  C) Temp src: Temporal BP: 137/56 Pulse: 64   Resp: 17 SpO2: 93 % O2 Device: None (Room air) Oxygen Delivery: 2 LPM  Weight: 158 lbs 11.7 oz   General- awake and alert, lying in bed no distress  HEENT- no scleral icterus, right sided neck swelling; limited ability to open his mouth (half way) - no visible lesions on font half of tongue that is visible during my exam  Pulm- no increased WOB, speaking in full sentences  abd- g tube in place, non tender  Skin- no rashes on exposed surfaces  Neuro/psych- normal affect, good eye contact, VIGIL spont.     Data reviewed:  Recent imaging reviewed, my comments on pertinents:   CT Chest PE 9/24/21  IMPRESSION:  1.  Negative for pulmonary embolism.     2.  Paramediastinal consolidations possibly reflecting posttreatment changes, though abnormal soft tissue density encasing the left distal pulmonary artery and proximal left upper and lower lobar bronchi concerning for metastatic disease.     3.  Moderate to large left pleural effusion with areas of atelectasis in the left upper and lower lobes.     4.  New nodules in the right lung measuring up to 9 mm concerning for metastases.     5.  Interval increase in mediastinal and hilar irvin metastases.     6.  Scattered new small lytic lesions in the visualized spine consistent with metastases.    CT abd/Pelvis 9/24/21  IMPRESSION:      1. Numerous findings within the left lung and mediastinum are  concerning for new/progression of metastatic disease.   2.  Soft tissue lesion obstructing the proximal left upper lobe  bronchus with lobar atelectasis of the downstream left upper lobe  partially visualized. Please see same day separate CT chest dictation.  3. Filling defect involving superior left pulmonary veins at the site  of tumor concerning for tumor versus bland thrombus. This is new from  prior CT.  4. Lytic lesions within the L2, L4 and S1 vertebral body are  concerning for metastatic disease    CT Soft Tissue Neck 9/24/21  Impression:  1. Ill-defined enhancement within right side of the tongue with  effacement of adjacent fat plane, which is concerning for recurrence.  Correlation with clinical examination is recommended.   2. No cervical lymphadenopathy.   3. For pulmonary findings please see dedicated chest CT dictation from  the same day.    XR Chest 9/24/21  IMPRESSION: Decreased left pleural effusion without appreciable  pneumothorax, compared to earlier chest x-ray 9/24/2021. Right lung  and pleura are relatively clear.    Recent lab data reviewed, my comments on pertinents:   Creatinine 1.07 WBC 12.9, Hgb 11.5

## 2021-09-25 NOTE — PLAN OF CARE
Patient is alert and oriented x 4. VS WDL on RA. Denies any nausea. Pain noted in his tongue, described as a tingling sensation that he has at baseline. Pain managed with prn Oxy x 1 and magic mouthwash x 1. Intermittent productive cough, yet to get a sputum culture due to small amounts of sputum at a time. Up adlib. Using bedside urinal, adequate urine output. No BM overnight, +BS. PIV saline locked. R Chest Port infusing Heparin gtt at 950 units/hr. NPO status. PEG tube clamped, patent, site within normal limits. Q4hr BG.

## 2021-09-27 NOTE — PROGRESS NOTES
"Clinic Care Coordination Contact  [facility]: Post-Discharge Note  SITUATION                                                      Admission:    Admission Date: 09/24/21   Reason for Admission: Shortness of Breath  Discharge:   Discharge Date: 09/25/21  Discharge Diagnosis: Shortness of Breath    BACKGROUND                                                      Reese Oakley is a 71 year old male with PMHx HTN, HLD, anemia, DM2, GERD, hx colon cancer, appendiceal cancer, throat cancer s/p laser resection/adjuvant radiotherapy (2014), tongue cancer (2020) s/p wide local excision c/b biopsy-proven recurrence in R neck, who presents to ED with cough and shortness of breath.     Has extensive history of cancer - history of colon cancer, history of appendiceal cancer, history of throat cancer treated with laser resection and adjuvant radiotherapy in 2014 followed by tongue cancer diagnosed in February 2020 treated with wide local excision of the right tongue on 2/19/2020 who was found to have biopsy-proven recurrence in his right neck and underwent modified neck dissection on 12/9/2020 with subsequet adjuvant radiation and chemotherapy.  Followed by oncology and ENT.      He states that he was recently found to have a perihilar mass - bronchoscopy done about a week ago.   FNA from bronchoscopy found to be SCC.  Since the biopsy, patient with increased dyspnea with exertion, however, over last two days, starting to have dyspnea at rest, with associated productive cough with thick yellow sputum.  Denies f/c, n/v/d, night sweats.  Denies chest pain.  Feels he is having increased oral secretions as well, which is affecting his breathing.    ASSESSMENT           Discharge Assessment  How are you doing now that you are home?: \"Patient stating that he is experiencing a lot of back pain\"  How are your symptoms? (Red Flag symptoms escalate to triage hotline per guidelines): Worsening  Do you feel your condition is stable " enough to be safe at home until your provider visit?: Yes  Does the patient have their discharge instructions? : Yes  Does the patient have questions regarding their discharge instructions? : No  Were you started on any new medications or were there changes to any of your previous medications? : Yes  Does the patient have all of their medications?: Yes  Do you have questions regarding any of your medications? : No  Do you have all of your needed medical supplies or equipment (DME)?  (i.e. oxygen tank, CPAP, cane, etc.): Yes  Discharge follow-up appointment scheduled within 14 calendar days? : Yes  Discharge Follow Up Appointment Date: 09/28/21  Discharge Follow Up Appointment Scheduled with?: Specialty Care Provider    Post-op ([facility] Only)  If the patient had a surgery or procedure, do they have any questions for a nurse?: No      PLAN                                                      Outpatient Plan:    Follow-up with your oncologist Dr. Mark on 6/21.  Appointments on [place] and/or [facility] (with [facility] or  [facility] provider or service). Call [phone] if you haven't heard  regarding these appointments within 7 days of discharge.    Future Appointments   Date Time Provider Department Center   9/28/2021  9:50 AM Jose Antonio Mckenna MD [facility]   9/28/2021  1:30 PM Toni Mark DO [facility]         For any urgent concerns, please contact our 24 hour nurse triage line: [phone] ([phone])         MADHU Edmond  [phone]  [facility]

## 2021-09-28 PROBLEM — E83.52 HYPERCALCEMIA: Chronic | Status: ACTIVE | Noted: 2021-01-01

## 2021-09-28 NOTE — PROGRESS NOTES
Kenrick is a 71 year old who is being evaluated via a billable video visit.      How would you like to obtain your AVS? MyChart  If the video visit is dropped, the invitation should be resent by: Send to e-mail at: [email or web address]  Will anyone else be joining your video visit? No      Video Start Time: 1:35  Video-Visit Details    Type of service:  Video Visit    Video End Time:2:40    Originating Location (pt. Location): Home    Distant Location (provider location):  [facility]     Platform used for Video Visit: [email or web address]

## 2021-09-28 NOTE — UTILIZATION REVIEW
Admission Status; Secondary Review Determination    No action to be taken. Please contact me on my Email : [email or web address] if you have any questions.    As part of the [facility] Utilization review plan, a self-audit is done on Medicare inpatient admission with less than 2 midnights stay. The 2014 IPPS Final Rule allows outpatient billing in the event that a hospital determines that an inpatient admission was not medically necessary under utilization review process.     (x) Outpatient status would be Appropriate- Short Stay- Post discharge review.    RATIONALE FOR DETERMINATION  Presented with ~1 week of shortness of breath that significantly worsened a few days PTA. Etiology includes dyspnea 2/2 pleural effusion vs pulmonary vein thrombus vs infection. CT chest PE with significant L pleural effusion, concern for malignant effusion  Thoracentesis on 9/24 with subsequent relief although still sensation of shortness of breath with some movements.  Following thoracentesis, patient without increased work of breathing, with SpO2 within goal on RA.  Walk test around hospital unit with SpO2 maintained within goal with activity prior to discharge.          Patient was admitted and discharge after one night stay. Record was sent by  for a PA review. Based on the  severity of illness, intensity of service provided, expected LOS and risk for adverse outcome make the care appropriate for further outpatient/observation; however, doesn't meet criteria for hospital inpatient admission.       The information on this document is developed by the utilization review team in order for the business office to ensure compliance.  This only denotes the appropriateness of proper admission status and does not reflect the quality of care rendered.       The definitions of Inpatient Status and Observation Status used in making the determination above are those provided in the CMS Coverage Manual, Chapter 1 and Chapter 6, section 70.4.      Please cont me if you want to discuss further about this admission episode.      Lidia Elmore MD, FACP, HAKEEM  Medical Director - Utilization management  Staff Hospitalist  [facility]    Pager: [phone]

## 2021-09-28 NOTE — LETTER
"2021       RE: Reese Oakley  [address] [address]     Dear Colleague,    Thank you for referring your patient, Reese Oakley, to the [facility] [place] [facility]. Please see a copy of my visit note below.      Otolaryngology Clinic    Name: Reese Oakley  MRN: [id number]  Age: 71 year old  : 2021      Chief Complaint:   Follow up     History of Present Illness:   Reese Oakley is a 71 year old male with a history of squamous carcinoma of neck who presents for follow up. He was last seen in clinic on 2021 and endorsed a productive cough with occasional blood noted. The patient also endorsed some stinging pain in his tongue along with sharp jaw pain. He was seen virtually on 2021 and was doing better with an improved cough and less pain. The patient had a bronchoscopy on 09/15/2021 that was positive for carcinoma. He was hospitalized from 2021-2021 for pneumonia and dyspnea in setting of presumed malignant left pleural effusion. CT soft tissue neck with contrast on 2021 was concerning for recurrence.     Today, the patient states he has been coughing up blood. He notes he has been sleeping better but has to numb his tongue before he sleeps. The patient endorses back pain as well. He notes he has an appointment with Dr. Mark later today and it has been suggested to him that he may be prescribed Gemcitabine.      Review of Systems:   Pertinent items are noted in HPI or as in patient entered ROS below, remainder of complete ROS is negative.    ENT ROS 2021   Constitutional Problems with sleep   Ears, Nose, Throat Hoarseness   Cardiopulmonary Breathing problems   Musculoskeletal Back pain   Endocrine -        Physical Exam:   Ht 1.702 m (5' 7\")   Wt 73 kg (160 lb 15 oz)   BMI 25.21 kg/m       PHYSICAL EXAMINATION:  "   Constitutional:  The patient was accompanied by his wife, well-groomed, and in no acute distress.    Skin:  Warm and pink.    Neurologic:  Alert and oriented x 3.  CN's III-XII within normal limits.  Voice normal.   Psychiatric:  The patient's affect was calm, cooperative, and appropriate.    Respiratory:  Breathing comfortably without stridor or exertion of accessory muscles.    Eyes: Extraocular movement intact.    Head:  Normocephalic and atraumatic.  No lesions or scars.    Ears:  Pinnae and tragus non-tender.  EAC's and TM's were clear.    Nose:  Sinuses were non-tender.  Anterior rhinoscopy revealed midline septum and absence of purulence or polyps.    OC/OP:  Normal floor of mouth, buccal mucosa, and palate.  No lesions or masses on inspection or palpation.  No abnormal lymph tissue in the oropharynx.  The pterygoid region is non-tender. Slightly tender area that is hard at the right posterior lateral tongue. Mucous that was present in the right piriform sinus is still there.     Neck:  Supple with normal laryngeal and tracheal landmarks.  The parotid beds were without masses.  No palpable thyroid.  Lymphatic:  There is no palpable lymphadenopathy in the neck.     Assessment and Plan:  No diagnosis found.  We discussed that Dr. Mark will likely discuss different treatment options with him. We discussed that I do think he has a suspicious area in the back of his tongue. I want him to follow-up over a video visit in 6 weeks and I will discuss his case with Dr. Mark.     Follow-up: Return in about 6 weeks (around 11/9/2021) for using a video visit, using a phone visit.         Scribe Disclosure:  I, Sofia Boo, am serving as a scribe to document services personally performed by Jose Antonio Mckenna MD at this visit, based upon the provider's statements to me. All documentation has been reviewed by the aforementioned provider prior to being entered into the official medical record.       Again, thank you  for allowing me to participate in the care of your patient.      Sincerely,    Jose Antonio Mckenna MD

## 2021-09-28 NOTE — LETTER
"    9/28/2021         RE: Reese Oakley  [address] [facility] [id number]        Dear Colleague,    Thank you for referring your patient, Reese Oakley, to the [facility]. Please see a copy of my visit note below.    Kenrick is a 71 year old who is being evaluated via a billable video visit.      How would you like to obtain your AVS? MyChart     If the video visit is dropped, the invitation should be resent by: Text to cell phone: [phone]     Will anyone else be joining your video visit? No          Vitals - Patient Reported  Weight (Patient Reported): 72.6 kg (160 lb)  Height (Patient Reported): 170.2 cm (5' 7\")  BMI (Based on Pt Reported Ht/Wt): 25.06  Pain Score: Mild Pain (3)  Pain Loc: Other - see comment (low back and mouth)      Video-Visit Details    Type of service:  Video Visit, total time spent 30 minutes.     Originating Location (pt. Location): Home    Distant Location (provider location):  [facility]     Platform used for Video Visit: MobPanel    REASON FOR VISIT:    CANCER STAGE: Cancer Staging  No matching staging information was found for the patient.      HISTORY OF PRESENT ILLNESS:  Reese Oakley is a 70 year old male with PMH HTN, hyperlipidemia, DM2 with recurrent SCC. 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  More recently he was noted on follow up scans to have enlarged R sided lymph nodes and biopsy proven recurrence in the R neck.  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  There was one additional focus of squamous cell carcinoma identified in an additional lymph node without MEL.  His case was discussed at multidisciplinary tumor board and he " was recommended to have chemoradiation - due to MEL in the submandibular gland.     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 He has previously been treated with a DL with Omni CO2 laser excision by Dr. Mckenna on 10/6/2014. This was followed by radiation therapy at [facility]. He recently subsequently diagnosed with a pT2 Nx SCC of the right lateral tongue, treated with a partial glossectomy by Dr. Mckenna on 2/19/2020. He subsequently had recurrence near the right submandibular gland, s/p right supraomohyoid neck dissection on 12/9/2020. His case was discussed at multidisciplinary tumor board with the recommendation for chemoradiation due to MEL in the submandibular gland. Started weekly cisplatin with limited field radiation 1/25/21. Due to worsening renal function after first treatment switched to carboplatin/taxol for week 2. Finished treatment 3/5/21. Since then he has had persistent right tongue pain.  A biopsy of this was taken in clinic at the beginning of May which grew actinomyces and he was treated with penicillin.  He also had a persistent ulcer of the right posterior tongue that was resected with the Omni laser on 5/24/2021.  He has recently been seen in clinic by Dr. Nielsen and Dr. Mark.      A surveillance PET/CT was obtained 6/21/2021.  This revealed concerning lymph nodes in the chest, with low concern for recurrence in the head and neck. He did poorly after the treatment with malnutrition and he had EGD, PEG insertion and bronchoscopy with endobronchial ultrasound-guided biopsy on 7/5/21 and the 11L LN was negative for malignancy and Lymph node, level 7, contained nondiagnostic specimen. RIGHT VALLECULA biopsy on 7/5/21 showed necroinflammatory debris and fragments of squamous mucosa with acute inflammation and ulceration, negative for dysplasia or malignancy. GMS special stain highlights presence of fungal hyphae.  Level 7 LN was later deemed to be malignancy.     He was recently hospitalized and was discharged yesterday for malignant pleural effusion, had left thoracentesis done.  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     He saw Dr. Burgos this morning and was told there is a suspicion lesion that need to be discussed with Dr. Mark.     Patient is seen with his wife around.  Kenrick feels that his shortness of breath is slightly better.  He is still gaining weight.  He is still playing golf and doing things around the house.  His back pain is slightly better.  His oral secretion is slightly better.  His gabapentin was just recently increased to 10 mg at night so he will know the effect soon.  He still could not sleep well due to pain and oral secretion.         Review Of Systems  10-point review of systems were negative except as noted in HPI.        EXAM:  There were no vitals taken for this visit.  GEN: alert and oriented x 3, nad  Head: no trauma  Neck: no swelling  Pulm: breathing comfortably on room air    Current Outpatient Medications   Medication Sig Dispense Refill     acetaminophen (TYLENOL) 160 MG/5ML suspension Take 20.5 mLs (650 mg) by mouth every 6 hours as needed for fever or mild pain 300 mL 3     Apixaban Starter Pack (ELIQUIS DVT/PE STARTER PACK) 5 MG TBPK Take 10 mg by mouth 2 times daily for 7 days, THEN 5 mg 2 times daily for 23 days. 74 each 0     diphenhydrAMINE (BENADRYL) 12.5 MG/5ML solution Take 25 mg by mouth nightly as needed for itching, allergies or sleep       gabapentin (NEURONTIN) 250 MG/5ML solution Give three times per day: give 250 mg in morning, 250mg midday, and 500mg in evening. 300 mL 1     guaiFENesin (ROBITUSSIN) 100 MG/5ML liquid 10 mLs (200 mg) by Oral or PEG tube route every 4 hours as needed for other (mucous production and/or cough) 473 mL 5     insulin aspart (NOVOLOG PEN) 100 UNIT/ML pen Inject 15 Units Subcutaneous 3 times daily  (with meals)        insulin glargine (LANTUS VIAL) 100 UNIT/ML vial Inject 25 Units Subcutaneous daily (with breakfast) PATIENT REPORTS 25 UNIT DAILY 02/16/2021       levofloxacin (LEVAQUIN) 25 MG/ML solution Take 30 mLs (750 mg) by mouth daily for 5 days 150 mL 0     lidocaine (XYLOCAINE) 2 % solution Swish and spit 15 mLs in mouth every 3 hours as needed for moderate pain ; Max 8 doses/24 hour period. 100 mL 3     magic mouthwash (ENTER INGREDIENTS IN COMMENTS) suspension Swish, gargle, and spit one to two teaspoonfuls every six hours as needed. May be swallowed if esophageal involvement. 480 mL 3     omeprazole (PRILOSEC) 2 mg/mL suspension Take 10 mLs (20 mg) by mouth every morning (before breakfast) 300 mL 1     oxyCODONE (ROXICODONE) 5 MG/5ML solution Take 7.5 mLs (7.5 mg) by mouth every 6 hours as needed for pain (oral pain) 500 mL 0     pentoxifylline (TRENTAL) 50 mg/ml suspension Take 8 mLs (400 mg) by mouth 3 times daily 500 mL 3     vitamin E (TOCOPHEROL) 1000 units (450 mg) capsule 1 capsule (1,000 Units) by Per Feeding Tube route daily 60 capsule 1           Recent labs reviewed.         Recent Results (from the past 744 hour(s))   XR Chest Port 1 View    Narrative    EXAM: XR CHEST PORT 1 VIEW  9/15/2021 1:54 PM      HISTORY: left lung bx    COMPARISON: PET/CT 6/21/2021, CT 8/5/2021    FINDINGS: Portable semiupright AP radiograph of the chest. Right chest  wall Port-A-Cath tip projects over the mid SVC. The trachea is  midline. The cardiac silhouette is partially obscured. No definite  pneumothorax. Left perihilar and basilar opacities. The visualized  upper abdomen appears unremarkable      Impression    IMPRESSION:   1. No definite pneumothorax status post biopsy.  2. Left perihilar and basilar opacities, likely known masslike  consolidation, however superimposed hemorrhage cannot be excluded.  Recommend upright PA and lateral examination when clinical condition  permits.    I have personally  reviewed the examination and initial interpretation  and I agree with the findings.    ALTON WRIGHT MD         SYSTEM ID:  [id number]   XR Chest Port 1 View    Narrative    EXAM: XR CHEST PORT 1 VIEW  LOCATION: [facility]  DATE/TIME: 9/24/2021 2:07 AM    INDICATION: SOB, cough  COMPARISON: 09/15/2021.      Impression    IMPRESSION: Right-sided Port-A-Cath tip over the SVC. Heart size and pulmonary vascularity appear within normal limits. Large left pleural effusion is either new or significantly increased in size from the prior study. Prominent left perihilar infiltrate   probably not significantly changed. Right lung is clear. No significant bony abnormalities.   CT Chest Pulmonary Embolism w Contrast    Narrative    EXAM: CT CHEST PULMONARY EMBOLISM W CONTRAST  LOCATION: [facility]  DATE/TIME: 9/24/2021 5:20 AM    INDICATION: Shortness of breath.  COMPARISON: CT chest from 08/05/2021.  TECHNIQUE: CT chest pulmonary angiogram during arterial phase injection of IV contrast. Multiplanar reformats and MIP reconstructions were performed. Dose reduction techniques were used.   CONTRAST: 74 ml isovue 370     FINDINGS:  ANGIOGRAM CHEST: Pulmonary arteries are normal caliber and negative for pulmonary emboli. Thoracic aorta is negative for dissection. No CT evidence of right heart strain.    LUNGS AND PLEURA: Areas of consolidation and fibrosis in the paramediastinal regions bilaterally. There is abnormal soft tissue density encasing the distal left mainstem bronchus and proximal left upper and lower lobar bronchi (image 109 of series 8) as   well as encasing the distal left main pulmonary artery (image 37 series 4). There is a moderate to large left pleural effusion with areas of atelectasis throughout the left upper and lower lobes. There are a few nodular opacities in the right lung which   are new. For instance, in the  superior segment of the right lower lobe there is a 9 mm nodule on image 75 of series 8 scattered areas of atelectasis throughout the right upper, middle, and lower lobes.    MEDIASTINUM/AXILLAE: Heart size is normal. No pericardial effusion. Compared to 08/05/2021, there is increasing mediastinal adenopathy. For instance, there is confluent adenopathy in the high aorticopulmonary window which measures about 3.9 x 2.9 cm on   image 89 of series 9, previously measuring about 2.7 x 2.1 cm by my measurement. There is a subcarinal lymph node which measures 1.8 cm short axis on image 106, previously 1.3 cm by my measurement. Right hilar lymph node measures 2.1 x 1.4 cm on image   101, previously 1.3 x 0.8 cm. A second right hilar node measures 1.9 x 1.1 cm on image 120, previously 11 x 8 mm. Right paratracheal node measuring 1.6 x 1.4 cm on image 82 previously measured up to 8 mm.    CORONARY ARTERY CALCIFICATION: Moderate.    UPPER ABDOMEN: Percutaneous gastrostomy tube in place.    MUSCULOSKELETAL: Interval sclerotic change with increasing superior endplate compression deformity at T6. There are scattered osteolytic lesions in the visualized spine. For instance, there is an 13 mm lytic lesion in the L1 vertebral body on image 100   of series 4 which is new. There are likely lytic lesions within multiple ribs as well such as the right lateral fourth rib and the left posterior eighth rib. There is a healing left 10th posterior rib fracture.      Impression    IMPRESSION:  1.  Negative for pulmonary embolism.    2.  Paramediastinal consolidations possibly reflecting posttreatment changes, though abnormal soft tissue density encasing the left distal pulmonary artery and proximal left upper and lower lobar bronchi concerning for metastatic disease.    3.  Moderate to large left pleural effusion with areas of atelectasis in the left upper and lower lobes.    4.  New nodules in the right lung measuring up to 9 mm concerning  for metastases.    5.  Interval increase in mediastinal and hilar irvin metastases.    6.  Scattered new small lytic lesions in the visualized spine consistent with metastases.   CT Abdomen Pelvis w Contrast    Narrative    EXAMINATION: CT ABDOMEN PELVIS W CONTRAST, 9/24/2021 9:02 AM    TECHNIQUE:  Helical CT images from the lung bases through the  symphysis pubis were obtained without and with IV contrast.     Contrast dose: 74 mL of Isovue 370     COMPARISON: 6/21/2021 and 11/24/2020 whole body PET/CT     HISTORY:  staging - likely stage 4 SCC      FINDINGS:    Abdomen and pelvis: G-tube within the gastric lumen. Remaining stomach  and duodenum are unremarkable. No focal hepatic lesions. There may be  some minor focal fatty change along the falciform ligament.  Gallbladder is unremarkable. The vasculature is patent. Spleen,  adrenal glands, kidneys are unremarkable. Ureteral course is normal.  Bladder is contrast opacified and unremarkable. Small large bowel are  normal caliber. Posterior changes of right hemicolectomy. The appendix  is surgically absent. No free fluid in the abdomen or pelvis. No  pathologically enlarged inguinal pelvic retroperitoneal lymph nodes.  The abdominal aorta and its proximal bifurcations are widely patent.    Lung bases/lower chest:  Large left-sided pleural fusion with pleural  thickening/nodularity/enhancement surrounding the inferior left lower  lobe and anterior aspect of the right oblique fissure. Incomplete  visualization of a heterogeneously enhancing 7.0 x 3.0 cm soft tissue  mass encompassing the proximal left upper lobe bronchus with  obstructive atelectasis of the left upper lobe. Multiple peripherally  enhancing centrally necrotic mediastinal lymph nodes. No significant  right-sided pleural effusion. No pneumothorax. Heart size is  borderline enlarged. Aortic root and proximal pulmonary artery normal  caliber. Pulmonary veins in the region of the tumor appeared to  be  involved with possible tumor extension into the vein versus bland  thrombus noted on series 4 image 8 and coronal series 3 image 44. This  is not as readily appreciated on the same-day pulmonary embolism study  due to timing of contrast bolus. Venous catheter terminates at the  cavoatrial junction.    Bones and soft tissues: More pronounced endplate deformities of the L2  vertebral body with new lytic lesions in the L4 and S1 vertebral  bodies.       Impression    IMPRESSION:     1. Numerous findings within the left lung and mediastinum are  concerning for new/progression of metastatic disease.   2. Soft tissue lesion obstructing the proximal left upper lobe  bronchus with lobar atelectasis of the downstream left upper lobe  partially visualized. Please see same day separate CT chest dictation.  3. Filling defect involving superior left pulmonary veins at the site  of tumor concerning for tumor versus bland thrombus. This is new from  prior CT.  4. Lytic lesions within the L2, L4 and S1 vertebral body are  concerning for metastatic disease    These findings were communicated to Dr. Mike Sanchez at 1123 AM  on 9/24/2021 by Tremayne Grimes over the phone.     I have personally reviewed the examination and initial interpretation  and I agree with the findings.    CARLOS GALVEZ MD         SYSTEM ID:  [id number]   CT Soft Tissue Neck w Contrast    Narrative    CT SOFT TISSUE NECK W CONTRAST 9/24/2021 1:32 PM    History:  Advanced head/neck cancer, follow up.    Per EPIC: 71 year old male who?with a past medical history significant  for hypertension hyperlipidemia, anemia, type 2 diabetes on insulin,  GERD,?history of colon cancer, history of appendiceal cancer,?history  of?throat?cancer treated with laser resection and adjuvant  radiotherapy in 2014 followed by tongue cancer diagnosed in February 2020 treated with wide local excision of the right tongue on 2/19/2020  who was found to have biopsy-proven  recurrence in his right neck and  underwent modified neck dissection on 12/9/2020?with  subsequent?adjuvant radiation and chemotherapy and has been followed  by oncology and ENT,?presents to the ED tonight with cough and  shortness of breath.   ?              Right vallecula biopsy on 7/5/2021 was reported as  negative for malignancy.    Comparison:  PET CT from 11/24/2020 and 5/19/2020.     Technique: Following intravenous administration of nonionic iodinated  contrast medium, thin section helical CT images were obtained from the  skull base down to the level of the aortic arch.  Axial, coronal and  sagittal reformations were performed with 2-3 mm slice thickness  reconstruction. Images were reviewed in soft tissue, lung and bone  windows.    Contrast: iopamidol (ISOVUE-370) solution 100 mL    Findings:   Ill-defined enhancement within right side of the tongue, grossly  measuring 2.5 x 1.9 cm (series 2 image 37) with effacement of adjacent  fat plane (series 3 images 74-78), which is concerning for recurrence.    Postsurgical changes of right partial glossectomy and right neck  dissection with surgical clips. Right submandibular gland is  surgically resected. Postradiation changes of the neck, including  subcutaneous edema within the anterior neck, thickening of the right  and right posterolateral oropharyngeal walls, and effacement of right  deep neck spaces. No imaging findings of osteoradionecrosis of the  hyoid bone, mandible or thyroid cartilage. Fatty atrophy of the  bilateral parotid glands. Left submandibular gland appears normal.    Effacement of right piriform sinus. Thyroid gland atrophy.    There is no evident cervical lymphadenopathy. The major vascular  structures in the neck appear unremarkable.    Evaluation of the osseous structures demonstrate no worrisome lytic or  sclerotic lesion. Multilevel cervical spondylosis, most pronounced at  C5-6. The visualized paranasal sinuses are clear. The mastoid  air  cells are clear. Bilateral loops in the pocket.    For pulmonary findings please see dedicated chest CT dictation from  the same day.      Impression    Impression:  1. Ill-defined enhancement within right side of the tongue with  effacement of adjacent fat plane, which is concerning for recurrence.  Correlation with clinical examination is recommended.   2. No cervical lymphadenopathy.   3. For pulmonary findings please see dedicated chest CT dictation from  the same day.    I have personally reviewed the examination and initial interpretation  and I agree with the findings.    ISSA PACHECO MD         SYSTEM ID:  [id number]   XR Chest Port 1 View    Narrative    EXAM: XR CHEST 1 VW 9/24/2021      HISTORY: Post thoracentesis, r/o pneumothorax.    COMPARISON: Same day    TECHNIQUE: Frontal upright view of the chest.    FINDINGS: Mild to moderate residual left pleural effusion without  appreciable pneumothorax. Right chest wall Port-A-Cath tip projects  just superior cavoatrial junction. Right lung and pleura are clear.  Cardiomediastinal silhouette is obscured. No acute abnormality of the  osseous thorax or upper abdomen.      Impression    IMPRESSION: Decreased left pleural effusion without appreciable  pneumothorax, compared to earlier chest x-ray 9/24/2021. Right lung  and pleura are relatively clear.    I have personally reviewed the examination and initial interpretation  and I agree with the findings.    CHAO LINDO MD         SYSTEM ID:  [id number]           Assessment/Plan    # Recurrent SCC of Larynx with mets to left pleural space, left lung and mediastinum and lytic lesions within the L2, L4 and S1 vertebral body. He also has  ll-defined enhancement within right side of the tongue with effacement of adjacent fat plane, which is concerning for recurrence. Patient has a functioning status PS of 1.  He is willing to continue treatment.   - His PET scan on 6/21/21 is concerning for some new L hilar and  mediastinal adenopathy that are hypermetabolic. He had EBUS on 7/5/21 with no malignancy on 11L but  level 7 did not have enough specimen (which later deemed to be malignancy). His right vallecula biopsy on 7/5/21 showed necroinflammatory debris and fragments of squamous mucosa with acute inflammation and ulceration, negative for dysplasia or malignancy. GMS special stain highlights presence of fungal hyphae. Since there is no biopsy proven thioacetic LN, we decided to have repeat imaging during the August visit. He unfortunately developed SOB need admission from 9/24-9/26 with above CT findings. Pleural fluid is positive for SCC.   - his PDL1 score is 2, low expression based on Lymph node station 7 FNA/squamous cell carcinoma from 9/15/21.   - We would send out for NGS to [place].   - In the past he could not tolerate cisplatin due to renal toxicity.    - For his metastatic disease surgery is not an option.    - As to systemic therapy we talked about immunotherapy alone with pembrolizumab versus pembrolizumab/carboplatin/5-FU and he is interested in treatment. Based on his quick progression and low PDL1 level, the combination treatment will be our first choice. He is explained the risk and benefits including immune checkpoint inhibitor associated rash, pneumonitis, joint pain, fatigue, liver enzyme elevation, nephrotoxicity, diarrhea, etc. He is also informed the risk of bone marrow suppression, nephrotoxicity, and cardiac risk, oral mucositis (which he did not experience in the past) and diarrhea. We will plan for rescan after 2 cycles.  If he tolerates the treatment well we will plan for 6 cycles of chemotherapy with Keytruda and continue Keytruda for 2 years.  If there is intolerance or disease progression we will talk about next line of therapy.  - wife would like to be called in advance beside mychart reminder since he lives 65 miles away. We talked about doing chemo locally but patient would like to f/u here.    - as to the bone lesion he has no pain so we can monitor and send to radiation if indicated.     # Hypercalcemia: patient is asymptomatic.   - will plan for Zometa ASAP and encourage water intake    # Pain - Dr. Mckenna is in charge of his pain meds now, still likely due to radiation therapy and improving.     # Nutrition - he continues to gain weight on tube feeding. Continue tube feeding for now.     # New tumor vs. [place] thrombus of pulmonary vein: recent CT abd/pelvis w/ contrast remarkable for left pulmonary vein thrombus.    - patient and wife were explained that it is hard to differentiate tumor thrombus versus bland thrombosis.  We do not have a better test that can differentiated that.  With his active cancer it would be reasonable to continue anticoagulation.  We will continue to address this question in the next visit.       Patient is discussed with Dr. Mark.    Roque Saba MD, PhD  Hematology/Oncology   Pager: [phone]    I participated in video visit with Dr. Saba and agree with her note. We reviewed the pathology showing squamous cell ca in the lung and cytology from pleural fluid also being positive for metastatic disease. Thus, he has now incurable disease unfortunately.  We did discuss that the goal of treatment going forward would be palliation primarily, though if it were effective, we would hope that it would also extend his life to some degree.  We discussed that standard of care would include immune therapy with keytruda.  There is currently controversy in the field as to whether or not to include chemotherapy with immunotherapy.  Unfortunately,  did not compare the keytruda arm to the chemo+keytruda arm.  Given his bulk of disease, and rapid progression, I think he would likely benefit.  We discussed the toxicity of this regimen including myelosupression, fatigue, mucositis, and immune related toxicities that are possible with keytruda.   For his hypercalcemia, it has not really  been treated - we will plan to do zometa ASAP - this week if possible.  If we can do chemo at the same time that is fine, but I would rather not delay treating his hypercalcemia.  We discussed potential effects of untreated hypercalcemia.     Pt is agreeable to the plan.  He would like to be called with appointments as they live 65 miles away and need to plan for trips into the cities.  He and his wife also expressed interest in getting some of his treatments in [place].  We discussed that this is possible, but he would need to establish with a provider close to home as I don't have treatment privileges there.  We will plan to start here at the [facility] and then work on getting him established elsewhere.     Toni Mark  Associate Professor of Medicine  Division of Hematology, Oncology, and Transplantation      Kenrick is a 71 year old who is being evaluated via a billable video visit.      How would you like to obtain your AVS? MyChart  If the video visit is dropped, the invitation should be resent by: Send to e-mail at: [email or web address]  Will anyone else be joining your video visit? No      Video Start Time: 1:35  Video-Visit Details    Type of service:  Video Visit    Video End Time:2:40    Originating Location (pt. Location): Home    Distant Location (provider location):  [facility]     Platform used for Video Visit: Renetta           Again, thank you for allowing me to participate in the care of your patient.        Sincerely,        Toni Mark, DO

## 2021-09-28 NOTE — PROGRESS NOTES
"  Otolaryngology Clinic    Name: Reese Oakley  MRN: [id number]  Age: 71 year old  : 2021      Chief Complaint:   Follow up     History of Present Illness:   Reese Oakley is a 71 year old male with a history of squamous carcinoma of neck who presents for follow up. He was last seen in clinic on 2021 and endorsed a productive cough with occasional blood noted. The patient also endorsed some stinging pain in his tongue along with sharp jaw pain. He was seen virtually on 2021 and was doing better with an improved cough and less pain. The patient had a bronchoscopy on 09/15/2021 that was positive for carcinoma. He was hospitalized from 2021-2021 for pneumonia and dyspnea in setting of presumed malignant left pleural effusion. CT soft tissue neck with contrast on 2021 was concerning for recurrence.     Today, the patient states he has been coughing up blood. He notes he has been sleeping better but has to numb his tongue before he sleeps. The patient endorses back pain as well. He notes he has an appointment with Dr. Mark later today and it has been suggested to him that he may be prescribed Gemcitabine.      Review of Systems:   Pertinent items are noted in HPI or as in patient entered ROS below, remainder of complete ROS is negative.    ENT ROS 2021   Constitutional Problems with sleep   Ears, Nose, Throat Hoarseness   Cardiopulmonary Breathing problems   Musculoskeletal Back pain   Endocrine -        Physical Exam:   Ht 1.702 m (5' 7\")   Wt 73 kg (160 lb 15 oz)   BMI 25.21 kg/m       PHYSICAL EXAMINATION:    Constitutional:  The patient was accompanied by his wife, well-groomed, and in no acute distress.    Skin:  Warm and pink.    Neurologic:  Alert and oriented x 3.  CN's III-XII within normal limits.  Voice normal.   Psychiatric:  The patient's affect was calm, cooperative, and appropriate.    Respiratory:  Breathing comfortably without stridor " or exertion of accessory muscles.    Eyes: Extraocular movement intact.    Head:  Normocephalic and atraumatic.  No lesions or scars.    Ears:  Pinnae and tragus non-tender.  EAC's and TM's were clear.    Nose:  Sinuses were non-tender.  Anterior rhinoscopy revealed midline septum and absence of purulence or polyps.    OC/OP:  Normal floor of mouth, buccal mucosa, and palate.  No lesions or masses on inspection or palpation.  No abnormal lymph tissue in the oropharynx.  The pterygoid region is non-tender. Slightly tender area that is hard at the right posterior lateral tongue. Mucous that was present in the right piriform sinus is still there.     Neck:  Supple with normal laryngeal and tracheal landmarks.  The parotid beds were without masses.  No palpable thyroid.  Lymphatic:  There is no palpable lymphadenopathy in the neck.     Assessment and Plan:  No diagnosis found.  We discussed that Dr. Mark will likely discuss different treatment options with him. We discussed that I do think he has a suspicious area in the back of his tongue. I want him to follow-up over a video visit in 6 weeks and I will discuss his case with Dr. Mark.     Follow-up: Return in about 6 weeks (around 11/9/2021) for using a video visit, using a phone visit.         Scribe Disclosure:  I, Sofia Boo, am serving as a scribe to document services personally performed by Jose Antonio Mckenna MD at this visit, based upon the provider's statements to me. All documentation has been reviewed by the aforementioned provider prior to being entered into the official medical record.

## 2021-09-28 NOTE — DISCHARGE SUMMARY
[facility]  Discharge Summary - Medicine & Pediatrics       Date of Admission:  9/24/2021  Date of Discharge:  09/25/2021  Discharging Provider: Dr. 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 [place] thrombus of pulmonary vein  Recurrent SCC  Possible post-obstructive pneumonia  Hypercalcemia    Follow-ups Needed After Discharge   Follow-up Appointments     Adult [facility] Follow-up and recommended labs and tests      Follow-up with your oncologist Dr. Mark on 6/21.      Appointments on [place] and/or [facility] (with [facility] or [facility]   provider or service). Call [phone] if you haven't heard regarding   these appointments within 7 days of discharge.            See bolded follow-up tasks below.    Unresulted Labs Ordered in the Past 30 Days of this Admission     Date and Time Order Name Status Description    9/24/2021  9:28 AM Fungus Culture, non-blood Preliminary     9/24/2021  9:28 AM Anaerobic bacterial culture Preliminary     9/24/2021  9:28 AM Acid-Fast Bacilli Culture and Stain In process     9/24/2021  9:28 AM Cytology non gyn In process     9/24/2021  9:28 AM Pleural fluid Aerobic Bacterial Culture Routine Preliminary     9/24/2021  7:53 AM PTH Related Peptide Test In process     9/15/2021 12:21 PM Fungal or Yeast Culture Routine Preliminary     9/15/2021 12:21 PM Nocardia species culture Preliminary     9/15/2021 12:21 PM Acid-Fast Bacilli Culture and Stain In process     9/15/2021 12:10 PM Fungal or Yeast Culture Routine Preliminary       These results will be followed up by Outpatient Oncologist and PCP.     Discharge Disposition   Discharged to home  Condition at discharge: Stable    Hospital Course   Reese Oakley is a 71 year old male with PMHx HTN, HLD, anemia, DM2, GERD, hx colon cancer, appendiceal cancer, throat cancer s/p laser  resection/adjuvant radiotherapy (2014), tongue cancer (2020) s/p wide local excision c/b biopsy-proven recurrence in R neck was admitted on 9/24/2021 for dyspnea found to have large left pleural effusion.  The following problems were addressed during his hospitalization:    # Dyspnea in setting of Presumed Malignant Left Pleural Effusion  Presented with ~1 week of shortness of breath that significantly worsened a few days PTA. Etiology includes dyspnea 2/2 pleural effusion vs pulmonary vein thrombus vs infection. CT chest PE with significant L pleural effusion, concern for malignant effusion. Exudative by 2 criteria (fluid/serum LDH >0.6, fluid/serum protein >0.5) - diagnostic thoracentesis studies pending at time of discharge.  Thoracentesis on 9/24 with subsequent relief although still sensation of shortness of breath with some movements.  Following thoracentesis, patient without increased work of breathing, with SpO2 within goal on RA.  Walk test around hospital unit with SpO2 maintained within goal with activity prior to discharge.       # New tumor vs. [place] thrombus of pulmonary vein  CT abd/pelvis w/ contrast remarkable for left pulmonary vein thrombus.  Hematology team consulted.  He was started on high-intensity heparin infusion and transitioned to Apixaban at time of discharge.  Bleeding risks of anticoagulation were discussed with patient and his wife; he verbalized understanding of benefits and risks of anticoagulation treatment.  Outpatient oncologist/hematologist to determine duration of anticoagulation therapy.      # Recurrent SCC  Recent bronchoscopy prior to admission with FNA biopsy of LN with pathology consistent with recurrent SCC.  CT-PE during ED evaluation on 9/24 remarkable for soft tissue density encasing left distal pulmonary artery and proximal left upper and lower lobe bronchi concerning for metastatic disease.  New nodules in right lung measuring up to 9mm concerning for metastasis.  Scattered new small lytic lesions in spine consistent with mets.  CT abd/pelvis on 9/24 with lytic lesions within L2, L4, S1 vertebral body concerning for metastatic disease.  CT soft tissue neck remarkable for ill-defined enhancement within right side of the tongue with effacement of adjacent fat plan concerning for recurrence. Findings shared with both patient and his wife during the hospitalization.  Palliative care team consulted during hospitalization with plan to see patient in clinic two weeks after discharge. Patient with outpatient oncology appointment on 9/28 to discuss future treatment options.      # Possible post-obstructive PNA  Patient reporting new cough productive of yellow sputum with dyspnea. Infection possible etiology of dyspnea.    Prescribed five days of Levoquin at time of discharge.  Qtc 401 at time of discharge.      #Hypercalcemia  Calcium elevated 10.7 on presentation.  1L LR given in ED. Remained elevated at 11 on recheck (corrected for albumin to 12.5).  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  PTHrP pending at time of discharge.  No clear symptoms at time of discharge.      Consultations This Hospital Stay   NUTRITION SERVICES ADULT IP CONSULT  INTERNAL MEDICINE PROCEDURE TEAM ADULT IP CONSULT [place] - THORACENTESIS  ONCOLOGY ADULT IP CONSULT  PHARMACY IP CONSULT  PHARMACY IP CONSULT  PHARMACY IP CONSULT  MEDICATION HISTORY IP PHARMACY CONSULT  PALLIATIVE CARE ADULT IP CONSULT  PHYSICAL THERAPY ADULT IP CONSULT  OCCUPATIONAL THERAPY ADULT IP CONSULT  INTERVENTIONAL RADIOLOGY ADULT/PEDS IP CONSULT    Code Status   Prior     The patient was discussed with attending Dr. Sanchez,    Kevin Saeed DO, MPH  [address] [address]  Phone:  [phone]  ______________________________________________________________________    Physical Exam   Vital Signs:                    Weight: 158 lbs 11.7 oz  Constitutional: Pleasant, cooperative. Sitting up in bed without difficulty.   HEENT: Sclera anicteric, Tongue abnormal - partially removed 2/2 previous SCC.    CV: RRR, no murmurs, gallops or rubs.   Respiratory: CTAB. Diminished breath sounds in left lung base.  Breathing non-labored.  Abd: Soft, NT/ND. G-tube in place.  Skin: No lesions or rashes over exposed areas, normal color, texture and turgor  Neuro: AAO x 3       Primary Care Physician   Clark Marie    Discharge Orders      Palliative Care Referral      Reason for your hospital stay    You were in the hospital for shortness of breath.  The fluid around your lung was drained leading to improvement in your breathing.     Activity    Your activity upon discharge: activity as tolerated     Adult [facility] [place]/[facility] Follow-up and recommended labs and tests    Follow-up with your oncologist Dr. Mark on 6/21.      Appointments on [place] and/or [facility] (with [facility] [place] or [facility] provider or service). Call [phone] if you haven't heard regarding these appointments within 7 days of discharge.     Diet    Follow this diet upon discharge: Tube feeding       Significant Results and Procedures   Results for orders placed or performed during the hospital encounter of 09/24/21   XR Chest Port 1 View    Narrative    EXAM: XR CHEST PORT 1 VIEW  LOCATION: [facility]  DATE/TIME: 9/24/2021 2:07 AM    INDICATION: SOB, cough  COMPARISON: 09/15/2021.      Impression    IMPRESSION: Right-sided Port-A-Cath tip over the SVC. Heart size and pulmonary vascularity appear within normal limits. Large left pleural effusion is either new or significantly increased in size from the prior study. Prominent left perihilar infiltrate   probably not significantly changed. Right lung is  clear. No significant bony abnormalities.   CT Chest Pulmonary Embolism w Contrast    Narrative    EXAM: CT CHEST PULMONARY EMBOLISM W CONTRAST  LOCATION: [facility]  DATE/TIME: 9/24/2021 5:20 AM    INDICATION: Shortness of breath.  COMPARISON: CT chest from 08/05/2021.  TECHNIQUE: CT chest pulmonary angiogram during arterial phase injection of IV contrast. Multiplanar reformats and MIP reconstructions were performed. Dose reduction techniques were used.   CONTRAST: 74 ml isovue 370     FINDINGS:  ANGIOGRAM CHEST: Pulmonary arteries are normal caliber and negative for pulmonary emboli. Thoracic aorta is negative for dissection. No CT evidence of right heart strain.    LUNGS AND PLEURA: Areas of consolidation and fibrosis in the paramediastinal regions bilaterally. There is abnormal soft tissue density encasing the distal left mainstem bronchus and proximal left upper and lower lobar bronchi (image 109 of series 8) as   well as encasing the distal left main pulmonary artery (image 37 series 4). There is a moderate to large left pleural effusion with areas of atelectasis throughout the left upper and lower lobes. There are a few nodular opacities in the right lung which   are new. For instance, in the superior segment of the right lower lobe there is a 9 mm nodule on image 75 of series 8 scattered areas of atelectasis throughout the right upper, middle, and lower lobes.    MEDIASTINUM/AXILLAE: Heart size is normal. No pericardial effusion. Compared to 08/05/2021, there is increasing mediastinal adenopathy. For instance, there is confluent adenopathy in the high aorticopulmonary window which measures about 3.9 x 2.9 cm on   image 89 of series 9, previously measuring about 2.7 x 2.1 cm by my measurement. There is a subcarinal lymph node which measures 1.8 cm short axis on image 106, previously 1.3 cm by my measurement. Right hilar lymph node measures 2.1 x 1.4 cm on image    101, previously 1.3 x 0.8 cm. A second right hilar node measures 1.9 x 1.1 cm on image 120, previously 11 x 8 mm. Right paratracheal node measuring 1.6 x 1.4 cm on image 82 previously measured up to 8 mm.    CORONARY ARTERY CALCIFICATION: Moderate.    UPPER ABDOMEN: Percutaneous gastrostomy tube in place.    MUSCULOSKELETAL: Interval sclerotic change with increasing superior endplate compression deformity at T6. There are scattered osteolytic lesions in the visualized spine. For instance, there is an 13 mm lytic lesion in the L1 vertebral body on image 100   of series 4 which is new. There are likely lytic lesions within multiple ribs as well such as the right lateral fourth rib and the left posterior eighth rib. There is a healing left 10th posterior rib fracture.      Impression    IMPRESSION:  1.  Negative for pulmonary embolism.    2.  Paramediastinal consolidations possibly reflecting posttreatment changes, though abnormal soft tissue density encasing the left distal pulmonary artery and proximal left upper and lower lobar bronchi concerning for metastatic disease.    3.  Moderate to large left pleural effusion with areas of atelectasis in the left upper and lower lobes.    4.  New nodules in the right lung measuring up to 9 mm concerning for metastases.    5.  Interval increase in mediastinal and hilar irvin metastases.    6.  Scattered new small lytic lesions in the visualized spine consistent with metastases.   CT Abdomen Pelvis w Contrast    Narrative    EXAMINATION: CT ABDOMEN PELVIS W CONTRAST, 9/24/2021 9:02 AM    TECHNIQUE:  Helical CT images from the lung bases through the  symphysis pubis were obtained without and with IV contrast.     Contrast dose: 74 mL of Isovue 370     COMPARISON: 6/21/2021 and 11/24/2020 whole body PET/CT     HISTORY:  staging - likely stage 4 SCC      FINDINGS:    Abdomen and pelvis: G-tube within the gastric lumen. Remaining stomach  and duodenum are unremarkable. No focal  hepatic lesions. There may be  some minor focal fatty change along the falciform ligament.  Gallbladder is unremarkable. The vasculature is patent. Spleen,  adrenal glands, kidneys are unremarkable. Ureteral course is normal.  Bladder is contrast opacified and unremarkable. Small large bowel are  normal caliber. Posterior changes of right hemicolectomy. The appendix  is surgically absent. No free fluid in the abdomen or pelvis. No  pathologically enlarged inguinal pelvic retroperitoneal lymph nodes.  The abdominal aorta and its proximal bifurcations are widely patent.    Lung bases/lower chest:  Large left-sided pleural fusion with pleural  thickening/nodularity/enhancement surrounding the inferior left lower  lobe and anterior aspect of the right oblique fissure. Incomplete  visualization of a heterogeneously enhancing 7.0 x 3.0 cm soft tissue  mass encompassing the proximal left upper lobe bronchus with  obstructive atelectasis of the left upper lobe. Multiple peripherally  enhancing centrally necrotic mediastinal lymph nodes. No significant  right-sided pleural effusion. No pneumothorax. Heart size is  borderline enlarged. Aortic root and proximal pulmonary artery normal  caliber. Pulmonary veins in the region of the tumor appeared to be  involved with possible tumor extension into the vein versus bland  thrombus noted on series 4 image 8 and coronal series 3 image 44. This  is not as readily appreciated on the same-day pulmonary embolism study  due to timing of contrast bolus. Venous catheter terminates at the  cavoatrial junction.    Bones and soft tissues: More pronounced endplate deformities of the L2  vertebral body with new lytic lesions in the L4 and S1 vertebral  bodies.       Impression    IMPRESSION:     1. Numerous findings within the left lung and mediastinum are  concerning for new/progression of metastatic disease.   2. Soft tissue lesion obstructing the proximal left upper lobe  bronchus with lobar  atelectasis of the downstream left upper lobe  partially visualized. Please see same day separate CT chest dictation.  3. Filling defect involving superior left pulmonary veins at the site  of tumor concerning for tumor versus bland thrombus. This is new from  prior CT.  4. Lytic lesions within the L2, L4 and S1 vertebral body are  concerning for metastatic disease    These findings were communicated to Dr. Mike Sanchez at 1123 AM  on 9/24/2021 by Tremayne Grimes over the phone.     I have personally reviewed the examination and initial interpretation  and I agree with the findings.    CARLOS GALVEZ MD         SYSTEM ID:  [id number]   XR Chest Port 1 View    Narrative    EXAM: XR CHEST 1 VW 9/24/2021      HISTORY: Post thoracentesis, r/o pneumothorax.    COMPARISON: Same day    TECHNIQUE: Frontal upright view of the chest.    FINDINGS: Mild to moderate residual left pleural effusion without  appreciable pneumothorax. Right chest wall Port-A-Cath tip projects  just superior cavoatrial junction. Right lung and pleura are clear.  Cardiomediastinal silhouette is obscured. No acute abnormality of the  osseous thorax or upper abdomen.      Impression    IMPRESSION: Decreased left pleural effusion without appreciable  pneumothorax, compared to earlier chest x-ray 9/24/2021. Right lung  and pleura are relatively clear.    I have personally reviewed the examination and initial interpretation  and I agree with the findings.    CHAO LINDO MD         SYSTEM ID:  [id number]   CT Soft Tissue Neck w Contrast    Narrative    CT SOFT TISSUE NECK W CONTRAST 9/24/2021 1:32 PM    History:  Advanced head/neck cancer, follow up.    Per EPIC: 71 year old male who?with a past medical history significant  for hypertension hyperlipidemia, anemia, type 2 diabetes on insulin,  GERD,?history of colon cancer, history of appendiceal cancer,?history  of?throat?cancer treated with laser resection and adjuvant  radiotherapy in 2014 followed  by tongue cancer diagnosed in February 2020 treated with wide local excision of the right tongue on 2/19/2020  who was found to have biopsy-proven recurrence in his right neck and  underwent modified neck dissection on 12/9/2020?with  subsequent?adjuvant radiation and chemotherapy and has been followed  by oncology and ENT,?presents to the ED tonight with cough and  shortness of breath.   ?              Right vallecula biopsy on 7/5/2021 was reported as  negative for malignancy.    Comparison:  PET CT from 11/24/2020 and 5/19/2020.     Technique: Following intravenous administration of nonionic iodinated  contrast medium, thin section helical CT images were obtained from the  skull base down to the level of the aortic arch.  Axial, coronal and  sagittal reformations were performed with 2-3 mm slice thickness  reconstruction. Images were reviewed in soft tissue, lung and bone  windows.    Contrast: iopamidol (ISOVUE-370) solution 100 mL    Findings:   Ill-defined enhancement within right side of the tongue, grossly  measuring 2.5 x 1.9 cm (series 2 image 37) with effacement of adjacent  fat plane (series 3 images 74-78), which is concerning for recurrence.    Postsurgical changes of right partial glossectomy and right neck  dissection with surgical clips. Right submandibular gland is  surgically resected. Postradiation changes of the neck, including  subcutaneous edema within the anterior neck, thickening of the right  and right posterolateral oropharyngeal walls, and effacement of right  deep neck spaces. No imaging findings of osteoradionecrosis of the  hyoid bone, mandible or thyroid cartilage. Fatty atrophy of the  bilateral parotid glands. Left submandibular gland appears normal.    Effacement of right piriform sinus. Thyroid gland atrophy.    There is no evident cervical lymphadenopathy. The major vascular  structures in the neck appear unremarkable.    Evaluation of the osseous structures demonstrate no  worrisome lytic or  sclerotic lesion. Multilevel cervical spondylosis, most pronounced at  C5-6. The visualized paranasal sinuses are clear. The mastoid air  cells are clear. Bilateral loops in the pocket.    For pulmonary findings please see dedicated chest CT dictation from  the same day.      Impression    Impression:  1. Ill-defined enhancement within right side of the tongue with  effacement of adjacent fat plane, which is concerning for recurrence.  Correlation with clinical examination is recommended.   2. No cervical lymphadenopathy.   3. For pulmonary findings please see dedicated chest CT dictation from  the same day.    I have personally reviewed the examination and initial interpretation  and I agree with the findings.    ISSA PACHECO MD         SYSTEM ID:  [id number]       Discharge Medications   Discharge Medication List as of 9/25/2021  4:45 PM      START taking these medications    Details   levofloxacin (LEVAQUIN) 25 MG/ML solution Take 30 mLs (750 mg) by mouth daily for 5 days, Disp-150 mL, R-0, E-Prescribe         CONTINUE these medications which have CHANGED    Details   Apixaban Starter Pack (ELIQUIS DVT/PE STARTER PACK) 5 MG TBPK Take 10 mg by mouth 2 times daily for 7 days, THEN 5 mg 2 times daily for 23 days., Disp-74 each, R-0, E-PrescribeDO NOT STOP this medication without talking to your oncologist      gabapentin (NEURONTIN) 250 MG/5ML solution Give three times per day: give 250 mg in morning, 250mg midday, and 500mg in evening., Disp-300 mL, R-1, E-PrescribeGive three times per day: give 250 mg in morning, 250mg midday, and 500mg in evening.      oxyCODONE (ROXICODONE) 5 MG/5ML solution Take 7.5 mLs (7.5 mg) by mouth every 6 hours as needed for pain (oral pain), Disp-500 mL, R-0, E-Prescribe         CONTINUE these medications which have NOT CHANGED    Details   acetaminophen (TYLENOL) 160 MG/5ML suspension Take 20.5 mLs (650 mg) by mouth every 6 hours as needed for fever or mild pain,  Disp-300 mL, R-3, E-Prescribe      diphenhydrAMINE (BENADRYL) 12.5 MG/5ML solution Take 25 mg by mouth nightly as needed for itching, allergies or sleep, Historical      guaiFENesin (ROBITUSSIN) 100 MG/5ML liquid 10 mLs (200 mg) by Oral or PEG tube route every 4 hours as needed for other (mucous production and/or cough), Disp-473 mL, R-5, E-Prescribe      insulin aspart (NOVOLOG PEN) 100 UNIT/ML pen Inject 15 Units Subcutaneous 3 times daily (with meals) , Historical      insulin glargine (LANTUS VIAL) 100 UNIT/ML vial Inject 25 Units Subcutaneous daily (with breakfast) PATIENT REPORTS 25 UNIT DAILY 02/16/2021, Historical      lidocaine (XYLOCAINE) 2 % solution Swish and spit 15 mLs in mouth every 3 hours as needed for moderate pain ; Max 8 doses/24 hour period., Disp-100 mL, R-3, E-Prescribe      omeprazole (PRILOSEC) 2 mg/mL suspension Take 10 mLs (20 mg) by mouth every morning (before breakfast), Disp-300 mL, R-1, E-Prescribe      pentoxifylline (TRENTAL) 50 mg/ml suspension Take 8 mLs (400 mg) by mouth 3 times daily, Disp-500 mL, R-3, Local Print      vitamin E (TOCOPHEROL) 1000 units (450 mg) capsule 1 capsule (1,000 Units) by Per Feeding Tube route daily, Disp-60 capsule, R-1, E-Prescribe      magic mouthwash (ENTER INGREDIENTS IN COMMENTS) suspension Swish, gargle, and spit one to two teaspoonfuls every six hours as needed. May be swallowed if esophageal involvement., Disp-480 mL, R-3, [id number] ml viscous lidocaine 2%, 80 ml Mylanta, 80 ml diphenhydramine 12.5 mg per 5 ml elixir, 80 ml nystatin  100,000U suspension, 80 ml prednisolone 15mg per 5ml solution, 80 ml distilled water         STOP taking these medications       aspirin (ASA) 81 MG chewable tablet Comments:   Reason for Stopping:         azithromycin (ZITHROMAX) 250 MG tablet Comments:   Reason for Stopping:         glycopyrrolate (ROBINUL) 1 MG tablet Comments:   Reason for Stopping:         lisinopril (ZESTRIL) 5 MG tablet Comments:   Reason  for Stopping:         senna-docusate (SENOKOT-S/PERICOLACE) 8.6-50 MG tablet Comments:   Reason for Stopping:         simvastatin (ZOCOR) 40 MG tablet Comments:   Reason for Stopping:             Allergies   No Known Allergies

## 2021-09-28 NOTE — PROGRESS NOTES
"Kenrick is a 71 year old who is being evaluated via a billable video visit.      How would you like to obtain your AVS? MyChart     If the video visit is dropped, the invitation should be resent by: Text to cell phone: [phone]     Will anyone else be joining your video visit? No          Vitals - Patient Reported  Weight (Patient Reported): 72.6 kg (160 lb)  Height (Patient Reported): 170.2 cm (5' 7\")  BMI (Based on Pt Reported Ht/Wt): 25.06  Pain Score: Mild Pain (3)  Pain Loc: Other - see comment (low back and mouth)      Video-Visit Details    Type of service:  Video Visit, total time spent 30 minutes.     Originating Location (pt. Location): Home    Distant Location (provider location):  [facility]     Platform used for Video Visit: BidKind    REASON FOR VISIT:    CANCER STAGE: Cancer Staging  No matching staging information was found for the patient.      HISTORY OF PRESENT ILLNESS:  Reese Oakley is a 70 year old male with PMH HTN, hyperlipidemia, DM2 with recurrent SCC. 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  More recently he was noted on follow up scans to have enlarged R sided lymph nodes and biopsy proven recurrence in the R neck.  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  There was one additional focus of squamous cell carcinoma identified in an additional lymph node without MEL.  His case was discussed at multidisciplinary tumor board and he was recommended to have chemoradiation - due to MEL in the submandibular gland.     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 He has previously been treated with a DL with Omni CO2 laser excision by Dr." Bala on 10/6/2014. This was followed by radiation therapy at [facility]. He recently subsequently diagnosed with a pT2 Nx SCC of the right lateral tongue, treated with a partial glossectomy by Dr. Mckenna on 2/19/2020. He subsequently had recurrence near the right submandibular gland, s/p right supraomohyoid neck dissection on 12/9/2020. His case was discussed at multidisciplinary tumor board with the recommendation for chemoradiation due to MEL in the submandibular gland. Started weekly cisplatin with limited field radiation 1/25/21. Due to worsening renal function after first treatment switched to carboplatin/taxol for week 2. Finished treatment 3/5/21. Since then he has had persistent right tongue pain.  A biopsy of this was taken in clinic at the beginning of May which grew actinomyces and he was treated with penicillin.  He also had a persistent ulcer of the right posterior tongue that was resected with the Omni laser on 5/24/2021.  He has recently been seen in clinic by Dr. Nielsen and Dr. Mark.      A surveillance PET/CT was obtained 6/21/2021.  This revealed concerning lymph nodes in the chest, with low concern for recurrence in the head and neck. He did poorly after the treatment with malnutrition and he had EGD, PEG insertion and bronchoscopy with endobronchial ultrasound-guided biopsy on 7/5/21 and the 11L LN was negative for malignancy and Lymph node, level 7, contained nondiagnostic specimen. RIGHT VALLECULA biopsy on 7/5/21 showed necroinflammatory debris and fragments of squamous mucosa with acute inflammation and ulceration, negative for dysplasia or malignancy. GMS special stain highlights presence of fungal hyphae. Level 7 LN was later deemed to be malignancy.     He was recently hospitalized and was discharged yesterday for malignant pleural effusion, had left thoracentesis done.  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     He saw  Dr. Burgos this morning and was told there is a suspicion lesion that need to be discussed with Dr. Mark.     Patient is seen with his wife around.  Kenrick feels that his shortness of breath is slightly better.  He is still gaining weight.  He is still playing golf and doing things around the house.  His back pain is slightly better.  His oral secretion is slightly better.  His gabapentin was just recently increased to 10 mg at night so he will know the effect soon.  He still could not sleep well due to pain and oral secretion.         Review Of Systems  10-point review of systems were negative except as noted in HPI.        EXAM:  There were no vitals taken for this visit.  GEN: alert and oriented x 3, nad  Head: no trauma  Neck: no swelling  Pulm: breathing comfortably on room air    Current Outpatient Medications   Medication Sig Dispense Refill     acetaminophen (TYLENOL) 160 MG/5ML suspension Take 20.5 mLs (650 mg) by mouth every 6 hours as needed for fever or mild pain 300 mL 3     Apixaban Starter Pack (ELIQUIS DVT/PE STARTER PACK) 5 MG TBPK Take 10 mg by mouth 2 times daily for 7 days, THEN 5 mg 2 times daily for 23 days. 74 each 0     diphenhydrAMINE (BENADRYL) 12.5 MG/5ML solution Take 25 mg by mouth nightly as needed for itching, allergies or sleep       gabapentin (NEURONTIN) 250 MG/5ML solution Give three times per day: give 250 mg in morning, 250mg midday, and 500mg in evening. 300 mL 1     guaiFENesin (ROBITUSSIN) 100 MG/5ML liquid 10 mLs (200 mg) by Oral or PEG tube route every 4 hours as needed for other (mucous production and/or cough) 473 mL 5     insulin aspart (NOVOLOG PEN) 100 UNIT/ML pen Inject 15 Units Subcutaneous 3 times daily (with meals)        insulin glargine (LANTUS VIAL) 100 UNIT/ML vial Inject 25 Units Subcutaneous daily (with breakfast) PATIENT REPORTS 25 UNIT DAILY 02/16/2021       levofloxacin (LEVAQUIN) 25 MG/ML solution Take 30 mLs (750 mg) by mouth daily for 5 days 150 mL 0      lidocaine (XYLOCAINE) 2 % solution Swish and spit 15 mLs in mouth every 3 hours as needed for moderate pain ; Max 8 doses/24 hour period. 100 mL 3     magic mouthwash (ENTER INGREDIENTS IN COMMENTS) suspension Swish, gargle, and spit one to two teaspoonfuls every six hours as needed. May be swallowed if esophageal involvement. 480 mL 3     omeprazole (PRILOSEC) 2 mg/mL suspension Take 10 mLs (20 mg) by mouth every morning (before breakfast) 300 mL 1     oxyCODONE (ROXICODONE) 5 MG/5ML solution Take 7.5 mLs (7.5 mg) by mouth every 6 hours as needed for pain (oral pain) 500 mL 0     pentoxifylline (TRENTAL) 50 mg/ml suspension Take 8 mLs (400 mg) by mouth 3 times daily 500 mL 3     vitamin E (TOCOPHEROL) 1000 units (450 mg) capsule 1 capsule (1,000 Units) by Per Feeding Tube route daily 60 capsule 1           Recent labs reviewed.         Recent Results (from the past 744 hour(s))   XR Chest Port 1 View    Narrative    EXAM: XR CHEST PORT 1 VIEW  9/15/2021 1:54 PM      HISTORY: left lung bx    COMPARISON: PET/CT 6/21/2021, CT 8/5/2021    FINDINGS: Portable semiupright AP radiograph of the chest. Right chest  wall Port-A-Cath tip projects over the mid SVC. The trachea is  midline. The cardiac silhouette is partially obscured. No definite  pneumothorax. Left perihilar and basilar opacities. The visualized  upper abdomen appears unremarkable      Impression    IMPRESSION:   1. No definite pneumothorax status post biopsy.  2. Left perihilar and basilar opacities, likely known masslike  consolidation, however superimposed hemorrhage cannot be excluded.  Recommend upright PA and lateral examination when clinical condition  permits.    I have personally reviewed the examination and initial interpretation  and I agree with the findings.    ALTON WRIGHT MD         SYSTEM ID:  [id number]   XR Chest Port 1 View    Narrative    EXAM: XR CHEST PORT 1 VIEW  LOCATION: [facility]  CENTER  DATE/TIME: 9/24/2021 2:07 AM    INDICATION: SOB, cough  COMPARISON: 09/15/2021.      Impression    IMPRESSION: Right-sided Port-A-Cath tip over the SVC. Heart size and pulmonary vascularity appear within normal limits. Large left pleural effusion is either new or significantly increased in size from the prior study. Prominent left perihilar infiltrate   probably not significantly changed. Right lung is clear. No significant bony abnormalities.   CT Chest Pulmonary Embolism w Contrast    Narrative    EXAM: CT CHEST PULMONARY EMBOLISM W CONTRAST  LOCATION: [facility]  DATE/TIME: 9/24/2021 5:20 AM    INDICATION: Shortness of breath.  COMPARISON: CT chest from 08/05/2021.  TECHNIQUE: CT chest pulmonary angiogram during arterial phase injection of IV contrast. Multiplanar reformats and MIP reconstructions were performed. Dose reduction techniques were used.   CONTRAST: 74 ml isovue 370     FINDINGS:  ANGIOGRAM CHEST: Pulmonary arteries are normal caliber and negative for pulmonary emboli. Thoracic aorta is negative for dissection. No CT evidence of right heart strain.    LUNGS AND PLEURA: Areas of consolidation and fibrosis in the paramediastinal regions bilaterally. There is abnormal soft tissue density encasing the distal left mainstem bronchus and proximal left upper and lower lobar bronchi (image 109 of series 8) as   well as encasing the distal left main pulmonary artery (image 37 series 4). There is a moderate to large left pleural effusion with areas of atelectasis throughout the left upper and lower lobes. There are a few nodular opacities in the right lung which   are new. For instance, in the superior segment of the right lower lobe there is a 9 mm nodule on image 75 of series 8 scattered areas of atelectasis throughout the right upper, middle, and lower lobes.    MEDIASTINUM/AXILLAE: Heart size is normal. No pericardial effusion. Compared to 08/05/2021,  there is increasing mediastinal adenopathy. For instance, there is confluent adenopathy in the high aorticopulmonary window which measures about 3.9 x 2.9 cm on   image 89 of series 9, previously measuring about 2.7 x 2.1 cm by my measurement. There is a subcarinal lymph node which measures 1.8 cm short axis on image 106, previously 1.3 cm by my measurement. Right hilar lymph node measures 2.1 x 1.4 cm on image   101, previously 1.3 x 0.8 cm. A second right hilar node measures 1.9 x 1.1 cm on image 120, previously 11 x 8 mm. Right paratracheal node measuring 1.6 x 1.4 cm on image 82 previously measured up to 8 mm.    CORONARY ARTERY CALCIFICATION: Moderate.    UPPER ABDOMEN: Percutaneous gastrostomy tube in place.    MUSCULOSKELETAL: Interval sclerotic change with increasing superior endplate compression deformity at T6. There are scattered osteolytic lesions in the visualized spine. For instance, there is an 13 mm lytic lesion in the L1 vertebral body on image 100   of series 4 which is new. There are likely lytic lesions within multiple ribs as well such as the right lateral fourth rib and the left posterior eighth rib. There is a healing left 10th posterior rib fracture.      Impression    IMPRESSION:  1.  Negative for pulmonary embolism.    2.  Paramediastinal consolidations possibly reflecting posttreatment changes, though abnormal soft tissue density encasing the left distal pulmonary artery and proximal left upper and lower lobar bronchi concerning for metastatic disease.    3.  Moderate to large left pleural effusion with areas of atelectasis in the left upper and lower lobes.    4.  New nodules in the right lung measuring up to 9 mm concerning for metastases.    5.  Interval increase in mediastinal and hilar irvin metastases.    6.  Scattered new small lytic lesions in the visualized spine consistent with metastases.   CT Abdomen Pelvis w Contrast    Narrative    EXAMINATION: CT ABDOMEN PELVIS W CONTRAST,  9/24/2021 9:02 AM    TECHNIQUE:  Helical CT images from the lung bases through the  symphysis pubis were obtained without and with IV contrast.     Contrast dose: 74 mL of Isovue 370     COMPARISON: 6/21/2021 and 11/24/2020 whole body PET/CT     HISTORY:  staging - likely stage 4 SCC      FINDINGS:    Abdomen and pelvis: G-tube within the gastric lumen. Remaining stomach  and duodenum are unremarkable. No focal hepatic lesions. There may be  some minor focal fatty change along the falciform ligament.  Gallbladder is unremarkable. The vasculature is patent. Spleen,  adrenal glands, kidneys are unremarkable. Ureteral course is normal.  Bladder is contrast opacified and unremarkable. Small large bowel are  normal caliber. Posterior changes of right hemicolectomy. The appendix  is surgically absent. No free fluid in the abdomen or pelvis. No  pathologically enlarged inguinal pelvic retroperitoneal lymph nodes.  The abdominal aorta and its proximal bifurcations are widely patent.    Lung bases/lower chest:  Large left-sided pleural fusion with pleural  thickening/nodularity/enhancement surrounding the inferior left lower  lobe and anterior aspect of the right oblique fissure. Incomplete  visualization of a heterogeneously enhancing 7.0 x 3.0 cm soft tissue  mass encompassing the proximal left upper lobe bronchus with  obstructive atelectasis of the left upper lobe. Multiple peripherally  enhancing centrally necrotic mediastinal lymph nodes. No significant  right-sided pleural effusion. No pneumothorax. Heart size is  borderline enlarged. Aortic root and proximal pulmonary artery normal  caliber. Pulmonary veins in the region of the tumor appeared to be  involved with possible tumor extension into the vein versus bland  thrombus noted on series 4 image 8 and coronal series 3 image 44. This  is not as readily appreciated on the same-day pulmonary embolism study  due to timing of contrast bolus. Venous catheter terminates  at the  cavoatrial junction.    Bones and soft tissues: More pronounced endplate deformities of the L2  vertebral body with new lytic lesions in the L4 and S1 vertebral  bodies.       Impression    IMPRESSION:     1. Numerous findings within the left lung and mediastinum are  concerning for new/progression of metastatic disease.   2. Soft tissue lesion obstructing the proximal left upper lobe  bronchus with lobar atelectasis of the downstream left upper lobe  partially visualized. Please see same day separate CT chest dictation.  3. Filling defect involving superior left pulmonary veins at the site  of tumor concerning for tumor versus bland thrombus. This is new from  prior CT.  4. Lytic lesions within the L2, L4 and S1 vertebral body are  concerning for metastatic disease    These findings were communicated to Dr. Mike Sanchez at 1123 AM  on 9/24/2021 by Tremayne Grimes over the phone.     I have personally reviewed the examination and initial interpretation  and I agree with the findings.    CARLOS GALVEZ MD         SYSTEM ID:  [id number]   CT Soft Tissue Neck w Contrast    Narrative    CT SOFT TISSUE NECK W CONTRAST 9/24/2021 1:32 PM    History:  Advanced head/neck cancer, follow up.    Per EPIC: 71 year old male who?with a past medical history significant  for hypertension hyperlipidemia, anemia, type 2 diabetes on insulin,  GERD,?history of colon cancer, history of appendiceal cancer,?history  of?throat?cancer treated with laser resection and adjuvant  radiotherapy in 2014 followed by tongue cancer diagnosed in February 2020 treated with wide local excision of the right tongue on 2/19/2020  who was found to have biopsy-proven recurrence in his right neck and  underwent modified neck dissection on 12/9/2020?with  subsequent?adjuvant radiation and chemotherapy and has been followed  by oncology and ENT,?presents to the ED tonight with cough and  shortness of breath.   ?              Right vallecula biopsy  on 7/5/2021 was reported as  negative for malignancy.    Comparison:  PET CT from 11/24/2020 and 5/19/2020.     Technique: Following intravenous administration of nonionic iodinated  contrast medium, thin section helical CT images were obtained from the  skull base down to the level of the aortic arch.  Axial, coronal and  sagittal reformations were performed with 2-3 mm slice thickness  reconstruction. Images were reviewed in soft tissue, lung and bone  windows.    Contrast: iopamidol (ISOVUE-370) solution 100 mL    Findings:   Ill-defined enhancement within right side of the tongue, grossly  measuring 2.5 x 1.9 cm (series 2 image 37) with effacement of adjacent  fat plane (series 3 images 74-78), which is concerning for recurrence.    Postsurgical changes of right partial glossectomy and right neck  dissection with surgical clips. Right submandibular gland is  surgically resected. Postradiation changes of the neck, including  subcutaneous edema within the anterior neck, thickening of the right  and right posterolateral oropharyngeal walls, and effacement of right  deep neck spaces. No imaging findings of osteoradionecrosis of the  hyoid bone, mandible or thyroid cartilage. Fatty atrophy of the  bilateral parotid glands. Left submandibular gland appears normal.    Effacement of right piriform sinus. Thyroid gland atrophy.    There is no evident cervical lymphadenopathy. The major vascular  structures in the neck appear unremarkable.    Evaluation of the osseous structures demonstrate no worrisome lytic or  sclerotic lesion. Multilevel cervical spondylosis, most pronounced at  C5-6. The visualized paranasal sinuses are clear. The mastoid air  cells are clear. Bilateral loops in the pocket.    For pulmonary findings please see dedicated chest CT dictation from  the same day.      Impression    Impression:  1. Ill-defined enhancement within right side of the tongue with  effacement of adjacent fat plane, which is  concerning for recurrence.  Correlation with clinical examination is recommended.   2. No cervical lymphadenopathy.   3. For pulmonary findings please see dedicated chest CT dictation from  the same day.    I have personally reviewed the examination and initial interpretation  and I agree with the findings.    ISSA PACHECO MD         SYSTEM ID:  [id number]   XR Chest Port 1 View    Narrative    EXAM: XR CHEST 1 VW 9/24/2021      HISTORY: Post thoracentesis, r/o pneumothorax.    COMPARISON: Same day    TECHNIQUE: Frontal upright view of the chest.    FINDINGS: Mild to moderate residual left pleural effusion without  appreciable pneumothorax. Right chest wall Port-A-Cath tip projects  just superior cavoatrial junction. Right lung and pleura are clear.  Cardiomediastinal silhouette is obscured. No acute abnormality of the  osseous thorax or upper abdomen.      Impression    IMPRESSION: Decreased left pleural effusion without appreciable  pneumothorax, compared to earlier chest x-ray 9/24/2021. Right lung  and pleura are relatively clear.    I have personally reviewed the examination and initial interpretation  and I agree with the findings.    CHAO LINDO MD         SYSTEM ID:  [id number]           Assessment/Plan    # Recurrent SCC of Larynx with mets to left pleural space, left lung and mediastinum and lytic lesions within the L2, L4 and S1 vertebral body. He also has  ll-defined enhancement within right side of the tongue with effacement of adjacent fat plane, which is concerning for recurrence. Patient has a functioning status PS of 1.  He is willing to continue treatment.   - His PET scan on 6/21/21 is concerning for some new L hilar and mediastinal adenopathy that are hypermetabolic. He had EBUS on 7/5/21 with no malignancy on 11L but  level 7 did not have enough specimen (which later deemed to be malignancy). His right vallecula biopsy on 7/5/21 showed necroinflammatory debris and fragments of squamous mucosa  with acute inflammation and ulceration, negative for dysplasia or malignancy. GMS special stain highlights presence of fungal hyphae. Since there is no biopsy proven thioacetic LN, we decided to have repeat imaging during the August visit. He unfortunately developed SOB need admission from 9/24-9/26 with above CT findings. Pleural fluid is positive for SCC.   - his PDL1 score is 2, low expression based on Lymph node station 7 FNA/squamous cell carcinoma from 9/15/21.   - We would send out for NGS to [place].   - In the past he could not tolerate cisplatin due to renal toxicity.    - For his metastatic disease surgery is not an option.    - As to systemic therapy we talked about immunotherapy alone with pembrolizumab versus pembrolizumab/carboplatin/5-FU and he is interested in treatment. Based on his quick progression and low PDL1 level, the combination treatment will be our first choice. He is explained the risk and benefits including immune checkpoint inhibitor associated rash, pneumonitis, joint pain, fatigue, liver enzyme elevation, nephrotoxicity, diarrhea, etc. He is also informed the risk of bone marrow suppression, nephrotoxicity, and cardiac risk, oral mucositis (which he did not experience in the past) and diarrhea. We will plan for rescan after 2 cycles.  If he tolerates the treatment well we will plan for 6 cycles of chemotherapy with Keytruda and continue Keytruda for 2 years.  If there is intolerance or disease progression we will talk about next line of therapy.  - wife would like to be called in advance beside mychart reminder since he lives 65 miles away. We talked about doing chemo locally but patient would like to f/u here.   - as to the bone lesion he has no pain so we can monitor and send to radiation if indicated.     # Hypercalcemia: patient is asymptomatic.   - will plan for Zometa ASAP and encourage water intake    # Pain - Dr. Mckenna is in charge of his pain meds now, still likely due to  radiation therapy and improving.     # Nutrition - he continues to gain weight on tube feeding. Continue tube feeding for now.     # New tumor vs. [place] thrombus of pulmonary vein: recent CT abd/pelvis w/ contrast remarkable for left pulmonary vein thrombus.    - patient and wife were explained that it is hard to differentiate tumor thrombus versus bland thrombosis.  We do not have a better test that can differentiated that.  With his active cancer it would be reasonable to continue anticoagulation.  We will continue to address this question in the next visit.       Patient is discussed with Dr. Mark.    Roque Saba MD, PhD  Hematology/Oncology   Pager: [phone]    I participated in video visit with Dr. Saba and agree with her note. We reviewed the pathology showing squamous cell ca in the lung and cytology from pleural fluid also being positive for metastatic disease. Thus, he has now incurable disease unfortunately.  We did discuss that the goal of treatment going forward would be palliation primarily, though if it were effective, we would hope that it would also extend his life to some degree.  We discussed that standard of care would include immune therapy with keytruda.  There is currently controversy in the field as to whether or not to include chemotherapy with immunotherapy.  Unfortunately,  did not compare the keytruda arm to the chemo+keytruda arm.  Given his bulk of disease, and rapid progression, I think he would likely benefit.  We discussed the toxicity of this regimen including myelosupression, fatigue, mucositis, and immune related toxicities that are possible with keytruda.   For his hypercalcemia, it has not really been treated - we will plan to do zometa ASAP - this week if possible.  If we can do chemo at the same time that is fine, but I would rather not delay treating his hypercalcemia.  We discussed potential effects of untreated hypercalcemia.     Pt is agreeable to the plan.  He  would like to be called with appointments as they live 65 miles away and need to plan for trips into the cities.  He and his wife also expressed interest in getting some of his treatments in [place].  We discussed that this is possible, but he would need to establish with a provider close to home as I don't have treatment privileges there.  [place] will [place] [facility] [place] getting [place] established elsewhere.     Toni Mark  Associate Professor of Medicine  Division of Hematology, Oncology, and Transplantation

## 2021-10-01 NOTE — PROGRESS NOTES
This is a recent snapshot of the patient's [facility] medical record.  For current drug dose and complete information and questions, call [phone] or In Basket pool, [facility] home infusion ([id number])  CSN Number:  [id number]

## 2021-10-08 NOTE — TELEPHONE ENCOUNTER
"Met with patient's wife via telephone call to discuss treatment and resources available at the [facility]. Provided pt with [facility] handouts on Pembrolizumab / CARBOplatin / Fluorouracil, as well as the [employer] handout \"When to Call For Help, Side Effects of Chemotherapy via My Chart message. Reviewed administration, side effects and ongoing symptom support management by providers/APPs. Highlighted steps to expect while on treatment (check in, labs, pre meds, infusion). Discussed that chemo may be delayed due to blood counts, infusion schedule or patient s need to modify. Reviewed most concerning symptoms that warrant an immediate call to the clinic nurse line. Provided phone numbers for triage and after hours care.     Leslie Sanchez, RN, BSN  Oncology Care Coordinator  [facility]    "

## 2021-10-11 PROBLEM — R09.02 HYPOXIA: Status: ACTIVE | Noted: 2021-01-01

## 2021-10-11 NOTE — NURSING NOTE
Chief Complaint   Patient presents with     Port Draw     Labs drawn via port by RN in lab. VS taken.     Port accessed with 20g flat needle by RN, labs collected, line flushed with saline and heparin.  Vitals taken. Pt checked in for appointment(s).    Alessia NGO RN PHN BSN  BMT/Oncology Lab

## 2021-10-11 NOTE — PROGRESS NOTES
October 11, 2021    Reason for Visit: Follow up of recurrent SCC of the R neck       History of Present Illness: Reese Oakley is a 71 year old male with PMH HTN, hyperlipidemia, DM2 with recurrent SCC. 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  More recently he was noted on follow up scans to have enlarged R sided lymph nodes and biopsy proven recurrence in the R neck.  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  There was one additional focus of squamous cell carcinoma identified in an additional lymph node without MEL.  His case was discussed at multidisciplinary tumor board and he was recommended to have chemoradiation - due to MEL in the submandibular gland.        Started weekly cisplatin with limited field radiation 1/25/21. Due to worsening renal function after first treatment switched to carboplatin/taxol for week 2. Finished treatment 3/5/21. Since then he has had persistent right tongue pain.  A biopsy of this was taken in clinic at the beginning of May which grew actinomyces and he was treated with penicillin.  He also had a persistent ulcer of the right posterior tongue that was resected with the Omni laser on 5/24/2021.     A surveillance PET/CT was obtained 6/21/2021.  This revealed concerning lymph nodes in the chest. He did poorly after the treatment with malnutrition and he had EGD, PEG insertion and bronchoscopy with endobronchial ultrasound-guided biopsy on 7/5/21 and the 11L LN was negative for malignancy and Lymph node, level 7, contained nondiagnostic specimen. RIGHT VALLECULA biopsy on 7/5/21 showed necroinflammatory debris and fragments of squamous mucosa with acute inflammation and ulceration, negative for dysplasia or  malignancy. GMS special stain highlights presence of fungal hyphae. Level 7 LN was later deemed to be malignancy.     He was admitted 8/5/21 with A fib, post obstructive pneumonia, fall with T6 compression fracture. Imaging noted a left hilar mass measuring 9.3x3.8 cm concerning for malignancy. He underwent bronchoscopy and biopsy of mass 9/15/21. Biopsy of 3 LN 11L, left hilar mass, and Station 7 LN all positive for malignancy SCC, low PD-L1 expression.     He was admitted 9/24/21 for dyspnea found to have new malignant pleural effusion s/p thoracentesis and new tumor vs bland thrombus of pulmonary vein, initially on heparin then transitioned to Apixaban. Pleural fluid concerning for malignancy. Also has bone lesion but asymptomatic. Noted to have hypercalcemia, plan for Zometa.     He met with Dr. Mark and recommended chemoimmunotherapy with Pembrolizumab, Carboplatin, and 5FU.     He was briefly admitted locally for hypoxia 2/2 recurrent effusion, s/p thoracentesis.     He was seen today for oncology follow-up in the clinic.    Interval History:   Kenrick is fatigued today and in pain. Has ongoing thick secretions from prior treatment. Meghan observes he is sleepy often at home but does not feel like he is more dyspneic at home. They do have an O2 monitor at home and he was 93% this morning prior to leaving. On the ride here and upon arrival to [facility], she felt he was hallucinating and somewhat confused. He still gets up with a cane to use the bathroom independently. He is tolerating TF. Has nausea related to thick secretions. Has pain lingering from prior treatment.     Review Of Systems  10-point review of systems were negative except as noted in HPI.    EXAM:  Blood pressure (!) 166/66, pulse 59, temperature 97.4  F (36.3  C), temperature source Tympanic, resp. rate 16, weight 76.1 kg (167 lb 11.2 oz).  GEN: alert and oriented x 3, slow to respond, drifts in and out of sleep  Head: no trauma  Neck: no  swelling  Pulm: breathing comfortably on 3L O2. LLB diminished, remainder CTA  Msk: 2+ ankle edema    Current Outpatient Medications   Medication Sig Dispense Refill     acetaminophen (TYLENOL) 160 MG/5ML suspension Take 20.5 mLs (650 mg) by mouth every 6 hours as needed for fever or mild pain 300 mL 3     Apixaban Starter Pack (ELIQUIS DVT/PE STARTER PACK) 5 MG TBPK Take 10 mg by mouth 2 times daily for 7 days, THEN 5 mg 2 times daily for 23 days. 74 each 0     dexamethasone (DECADRON) 4 MG tablet Take 2 tablets (8 mg) by mouth daily Start the day after carboplatin. 6 tablet 5     diphenhydrAMINE (BENADRYL) 12.5 MG/5ML solution Take 25 mg by mouth nightly as needed for itching, allergies or sleep       gabapentin (NEURONTIN) 250 MG/5ML solution Give three times per day: give 250 mg in morning, 250mg midday, and 500mg in evening. 300 mL 1     guaiFENesin (ROBITUSSIN) 100 MG/5ML liquid 10 mLs (200 mg) by Oral or PEG tube route every 4 hours as needed for other (mucous production and/or cough) 473 mL 5     insulin aspart (NOVOLOG PEN) 100 UNIT/ML pen Inject 15 Units Subcutaneous 3 times daily (with meals)        insulin glargine (LANTUS VIAL) 100 UNIT/ML vial Inject 25 Units Subcutaneous daily (with breakfast) PATIENT REPORTS 25 UNIT DAILY 02/16/2021       lidocaine (XYLOCAINE) 2 % solution Swish and spit 15 mLs in mouth every 3 hours as needed for moderate pain ; Max 8 doses/24 hour period. 100 mL 3     LORazepam (ATIVAN) 0.5 MG tablet Take 1 tablet (0.5 mg) by mouth every 4 hours as needed (Anxiety, Nausea/Vomiting or Sleep) 30 tablet 5     magic mouthwash (ENTER INGREDIENTS IN COMMENTS) suspension Swish, gargle, and spit one to two teaspoonfuls every six hours as needed. May be swallowed if esophageal involvement. 480 mL 3     omeprazole (PRILOSEC) 2 mg/mL suspension Take 10 mLs (20 mg) by mouth every morning (before breakfast) 300 mL 1     oxyCODONE (ROXICODONE) 5 MG/5ML solution Take 7.5 mLs (7.5 mg) by mouth  every 6 hours as needed for pain (oral pain) 500 mL 0     pentoxifylline (TRENTAL) 50 mg/ml suspension Take 8 mLs (400 mg) by mouth 3 times daily 500 mL 3     prochlorperazine (COMPAZINE) 10 MG tablet Take 1 tablet (10 mg) by mouth every 6 hours as needed (Nausea/Vomiting) 30 tablet 5     vitamin E (TOCOPHEROL) 1000 units (450 mg) capsule 1 capsule (1,000 Units) by Per Feeding Tube route daily 60 capsule 1           Recent labs reviewed.         Recent Results (from the past 744 hour(s))   XR Chest Port 1 View    Narrative    EXAM: XR CHEST PORT 1 VIEW  9/15/2021 1:54 PM      HISTORY: left lung bx    COMPARISON: PET/CT 6/21/2021, CT 8/5/2021    FINDINGS: Portable semiupright AP radiograph of the chest. Right chest  wall Port-A-Cath tip projects over the mid SVC. The trachea is  midline. The cardiac silhouette is partially obscured. No definite  pneumothorax. Left perihilar and basilar opacities. The visualized  upper abdomen appears unremarkable      Impression    IMPRESSION:   1. No definite pneumothorax status post biopsy.  2. Left perihilar and basilar opacities, likely known masslike  consolidation, however superimposed hemorrhage cannot be excluded.  Recommend upright PA and lateral examination when clinical condition  permits.    I have personally reviewed the examination and initial interpretation  and I agree with the findings.    ALTON WRIGHT MD         SYSTEM ID:  [id number]   XR Chest Port 1 View    Narrative    EXAM: XR CHEST PORT 1 VIEW  LOCATION: [facility]  DATE/TIME: 9/24/2021 2:07 AM    INDICATION: SOB, cough  COMPARISON: 09/15/2021.      Impression    IMPRESSION: Right-sided Port-A-Cath tip over the SVC. Heart size and pulmonary vascularity appear within normal limits. Large left pleural effusion is either new or significantly increased in size from the prior study. Prominent left perihilar infiltrate   probably not significantly changed. Right  lung is clear. No significant bony abnormalities.   CT Chest Pulmonary Embolism w Contrast    Narrative    EXAM: CT CHEST PULMONARY EMBOLISM W CONTRAST  LOCATION: [facility]  DATE/TIME: 9/24/2021 5:20 AM    INDICATION: Shortness of breath.  COMPARISON: CT chest from 08/05/2021.  TECHNIQUE: CT chest pulmonary angiogram during arterial phase injection of IV contrast. Multiplanar reformats and MIP reconstructions were performed. Dose reduction techniques were used.   CONTRAST: 74 ml isovue 370     FINDINGS:  ANGIOGRAM CHEST: Pulmonary arteries are normal caliber and negative for pulmonary emboli. Thoracic aorta is negative for dissection. No CT evidence of right heart strain.    LUNGS AND PLEURA: Areas of consolidation and fibrosis in the paramediastinal regions bilaterally. There is abnormal soft tissue density encasing the distal left mainstem bronchus and proximal left upper and lower lobar bronchi (image 109 of series 8) as   well as encasing the distal left main pulmonary artery (image 37 series 4). There is a moderate to large left pleural effusion with areas of atelectasis throughout the left upper and lower lobes. There are a few nodular opacities in the right lung which   are new. For instance, in the superior segment of the right lower lobe there is a 9 mm nodule on image 75 of series 8 scattered areas of atelectasis throughout the right upper, middle, and lower lobes.    MEDIASTINUM/AXILLAE: Heart size is normal. No pericardial effusion. Compared to 08/05/2021, there is increasing mediastinal adenopathy. For instance, there is confluent adenopathy in the high aorticopulmonary window which measures about 3.9 x 2.9 cm on   image 89 of series 9, previously measuring about 2.7 x 2.1 cm by my measurement. There is a subcarinal lymph node which measures 1.8 cm short axis on image 106, previously 1.3 cm by my measurement. Right hilar lymph node measures 2.1 x 1.4 cm on  image   101, previously 1.3 x 0.8 cm. A second right hilar node measures 1.9 x 1.1 cm on image 120, previously 11 x 8 mm. Right paratracheal node measuring 1.6 x 1.4 cm on image 82 previously measured up to 8 mm.    CORONARY ARTERY CALCIFICATION: Moderate.    UPPER ABDOMEN: Percutaneous gastrostomy tube in place.    MUSCULOSKELETAL: Interval sclerotic change with increasing superior endplate compression deformity at T6. There are scattered osteolytic lesions in the visualized spine. For instance, there is an 13 mm lytic lesion in the L1 vertebral body on image 100   of series 4 which is new. There are likely lytic lesions within multiple ribs as well such as the right lateral fourth rib and the left posterior eighth rib. There is a healing left 10th posterior rib fracture.      Impression    IMPRESSION:  1.  Negative for pulmonary embolism.    2.  Paramediastinal consolidations possibly reflecting posttreatment changes, though abnormal soft tissue density encasing the left distal pulmonary artery and proximal left upper and lower lobar bronchi concerning for metastatic disease.    3.  Moderate to large left pleural effusion with areas of atelectasis in the left upper and lower lobes.    4.  New nodules in the right lung measuring up to 9 mm concerning for metastases.    5.  Interval increase in mediastinal and hilar irvin metastases.    6.  Scattered new small lytic lesions in the visualized spine consistent with metastases.   CT Abdomen Pelvis w Contrast    Narrative    EXAMINATION: CT ABDOMEN PELVIS W CONTRAST, 9/24/2021 9:02 AM    TECHNIQUE:  Helical CT images from the lung bases through the  symphysis pubis were obtained without and with IV contrast.     Contrast dose: 74 mL of Isovue 370     COMPARISON: 6/21/2021 and 11/24/2020 whole body PET/CT     HISTORY:  staging - likely stage 4 SCC      FINDINGS:    Abdomen and pelvis: G-tube within the gastric lumen. Remaining stomach  and duodenum are unremarkable. No  focal hepatic lesions. There may be  some minor focal fatty change along the falciform ligament.  Gallbladder is unremarkable. The vasculature is patent. Spleen,  adrenal glands, kidneys are unremarkable. Ureteral course is normal.  Bladder is contrast opacified and unremarkable. Small large bowel are  normal caliber. Posterior changes of right hemicolectomy. The appendix  is surgically absent. No free fluid in the abdomen or pelvis. No  pathologically enlarged inguinal pelvic retroperitoneal lymph nodes.  The abdominal aorta and its proximal bifurcations are widely patent.    Lung bases/lower chest:  Large left-sided pleural fusion with pleural  thickening/nodularity/enhancement surrounding the inferior left lower  lobe and anterior aspect of the right oblique fissure. Incomplete  visualization of a heterogeneously enhancing 7.0 x 3.0 cm soft tissue  mass encompassing the proximal left upper lobe bronchus with  obstructive atelectasis of the left upper lobe. Multiple peripherally  enhancing centrally necrotic mediastinal lymph nodes. No significant  right-sided pleural effusion. No pneumothorax. Heart size is  borderline enlarged. Aortic root and proximal pulmonary artery normal  caliber. Pulmonary veins in the region of the tumor appeared to be  involved with possible tumor extension into the vein versus bland  thrombus noted on series 4 image 8 and coronal series 3 image 44. This  is not as readily appreciated on the same-day pulmonary embolism study  due to timing of contrast bolus. Venous catheter terminates at the  cavoatrial junction.    Bones and soft tissues: More pronounced endplate deformities of the L2  vertebral body with new lytic lesions in the L4 and S1 vertebral  bodies.       Impression    IMPRESSION:     1. Numerous findings within the left lung and mediastinum are  concerning for new/progression of metastatic disease.   2. Soft tissue lesion obstructing the proximal left upper lobe  bronchus with  lobar atelectasis of the downstream left upper lobe  partially visualized. Please see same day separate CT chest dictation.  3. Filling defect involving superior left pulmonary veins at the site  of tumor concerning for tumor versus bland thrombus. This is new from  prior CT.  4. Lytic lesions within the L2, L4 and S1 vertebral body are  concerning for metastatic disease    These findings were communicated to Dr. Mike Sanchez at 1123 AM  on 9/24/2021 by Tremayne Grimes over the phone.     I have personally reviewed the examination and initial interpretation  and I agree with the findings.    CARLOS GALVEZ MD         SYSTEM ID:  [id number]   CT Soft Tissue Neck w Contrast    Narrative    CT SOFT TISSUE NECK W CONTRAST 9/24/2021 1:32 PM    History:  Advanced head/neck cancer, follow up.    Per EPIC: 71 year old male who?with a past medical history significant  for hypertension hyperlipidemia, anemia, type 2 diabetes on insulin,  GERD,?history of colon cancer, history of appendiceal cancer,?history  of?throat?cancer treated with laser resection and adjuvant  radiotherapy in 2014 followed by tongue cancer diagnosed in February 2020 treated with wide local excision of the right tongue on 2/19/2020  who was found to have biopsy-proven recurrence in his right neck and  underwent modified neck dissection on 12/9/2020?with  subsequent?adjuvant radiation and chemotherapy and has been followed  by oncology and ENT,?presents to the ED tonight with cough and  shortness of breath.   ?              Right vallecula biopsy on 7/5/2021 was reported as  negative for malignancy.    Comparison:  PET CT from 11/24/2020 and 5/19/2020.     Technique: Following intravenous administration of nonionic iodinated  contrast medium, thin section helical CT images were obtained from the  skull base down to the level of the aortic arch.  Axial, coronal and  sagittal reformations were performed with 2-3 mm slice thickness  reconstruction.  Images were reviewed in soft tissue, lung and bone  windows.    Contrast: iopamidol (ISOVUE-370) solution 100 mL    Findings:   Ill-defined enhancement within right side of the tongue, grossly  measuring 2.5 x 1.9 cm (series 2 image 37) with effacement of adjacent  fat plane (series 3 images 74-78), which is concerning for recurrence.    Postsurgical changes of right partial glossectomy and right neck  dissection with surgical clips. Right submandibular gland is  surgically resected. Postradiation changes of the neck, including  subcutaneous edema within the anterior neck, thickening of the right  and right posterolateral oropharyngeal walls, and effacement of right  deep neck spaces. No imaging findings of osteoradionecrosis of the  hyoid bone, mandible or thyroid cartilage. Fatty atrophy of the  bilateral parotid glands. Left submandibular gland appears normal.    Effacement of right piriform sinus. Thyroid gland atrophy.    There is no evident cervical lymphadenopathy. The major vascular  structures in the neck appear unremarkable.    Evaluation of the osseous structures demonstrate no worrisome lytic or  sclerotic lesion. Multilevel cervical spondylosis, most pronounced at  C5-6. The visualized paranasal sinuses are clear. The mastoid air  cells are clear. Bilateral loops in the pocket.    For pulmonary findings please see dedicated chest CT dictation from  the same day.      Impression    Impression:  1. Ill-defined enhancement within right side of the tongue with  effacement of adjacent fat plane, which is concerning for recurrence.  Correlation with clinical examination is recommended.   2. No cervical lymphadenopathy.   3. For pulmonary findings please see dedicated chest CT dictation from  the same day.    I have personally reviewed the examination and initial interpretation  and I agree with the findings.    ISSA PACHECO MD         SYSTEM ID:  [id number]   XR Chest Port 1 View    Narrative    EXAM: XR  CHEST 1 VW 9/24/2021      HISTORY: Post thoracentesis, r/o pneumothorax.    COMPARISON: Same day    TECHNIQUE: Frontal upright view of the chest.    FINDINGS: Mild to moderate residual left pleural effusion without  appreciable pneumothorax. Right chest wall Port-A-Cath tip projects  just superior cavoatrial junction. Right lung and pleura are clear.  Cardiomediastinal silhouette is obscured. No acute abnormality of the  osseous thorax or upper abdomen.      Impression    IMPRESSION: Decreased left pleural effusion without appreciable  pneumothorax, compared to earlier chest x-ray 9/24/2021. Right lung  and pleura are relatively clear.    I have personally reviewed the examination and initial interpretation  and I agree with the findings.    CHAO LINDO MD         SYSTEM ID:  [id number]   XR Chest 2 Views    Narrative    EXAM: XR CHEST 2 VW 10/11/2021 2:24 PM    HISTORY: Tonsil cancer (H); Pleural effusion, malignant.    COMPARISON: Radiograph of the chest dated 9/24/2021.    TECHNIQUE: Upright frontal and lateral views of the chest.    FINDINGS: Right IJ portacatheter with possible kink in the catheter at  the right internal jugular venotomy site and tip terminating in the  low SVC. Trachea is midline. Cardiac mediastinal silhouette is stable.  Bilateral perihilar streaky opacities. Trace right pleural effusion.  Small left pleural effusion. No pneumothorax. Partially visualized  gastrostomy tube. Lytic lesion in posterior left eighth rib, as seen  on prior CT 9/24/2021. Prominent gastric air opacity. Low lung volume.  Compression deformity of mid thoracic vertebral body, better seen on  prior CT  9/24/21.      Impression    IMPRESSION:     1. Persistent bilateral perihilar opacities, more conspicuous compared  to prior x-ray 9/24/2021 with trace right and small left pleural  effusions.  2. Port-A-Cath in right chest wall has possible kink in the catheter  at the right internal jugular vein venotomy site. May  consider IR  consult if clinically desired.    I have personally reviewed the examination and initial interpretation  and I agree with the findings.    CHAO LINDO MD         SYSTEM ID:  [id number]       Assessment/Plan    # Recurrent SCC of Larynx with mets to left pleural space, left lung and mediastinum and lytic lesions within the L2, L4 and S1 vertebral body. He also has  ll-defined enhancement within right side of the tongue with effacement of adjacent fat plane, which is concerning for recurrence. Patient has a functioning status PS of 1.  He is willing to continue treatment.   - His PET scan on 6/21/21 is concerning for some new L hilar and mediastinal adenopathy that are hypermetabolic. He had EBUS on 7/5/21 with no malignancy on 11L but  level 7 did not have enough specimen (which later deemed to be malignancy). His right vallecula biopsy on 7/5/21 showed necroinflammatory debris and fragments of squamous mucosa with acute inflammation and ulceration, negative for dysplasia or malignancy. GMS special stain highlights presence of fungal hyphae. Since there is no biopsy proven thioacetic LN, we decided to have repeat imaging during the August visit. He unfortunately developed SOB need admission from 9/24-9/26 with above CT findings. Pleural fluid is positive for SCC.   - his PDL1 score is 2, low expression based on Lymph node station 7 FNA/squamous cell carcinoma from 9/15/21.  NGS pending.     Presents today to begin palliative intent pembrolizumab/carboplatin/5-Fu. Unfortunately with multiple lab abnormalities, severe hypoxia on RA, and ECOG of 2-3, I recommended deferring chemoimmunotherapy and instead pursue direct admission to the hospital for monitoring.  I did review with Dr. Mark who elected against pembrolizumab alone today in clinic, and instead try and give along with carboplatin and 5-FU in the near future, pending hospital course.      Kenrick Christianson and I also discussed best supportive cares  "instead of pursuing active treatment which I do feel could be appropriate at this time given his rapid progression and poorer performance status. Meghan notes that she does not want Kenrick to spend his \"end\" in the hospital. We discussed that even if he was to pursue hospice, I would recommend he has a Pleurx drain placed prior to enrolling which today cannot be done safely or in a timely manner, in the OP setting.  Ultimately we agreed on hospital admission to attempt to fix acute abnormalities, optimize respiratory status, and try to pursue treatment in the near future outpatient if possible.    I called and spoke to the accepting medical physician. Infusion RN to give report to 7D RN.     #hypoxia: 70's on RA, improved to 93% with 3L. Did require O2 during prior admission but was not discharged with. Recommend evaluation for discharge with home O2.     #Malignant pleural effusion: Had previously discussed having a Pleurx drain placed but has not yet been done. Despite only small re-accumulation since last tap 10/5,  I strongly recommend one is placed during hospitalization so they can therapeutically drain at home.     #conspicuous opacities seen on XR: no s/s infection today. Start antibiotics inpatient pending course    # Hypercalcemia: 2/2 malignancy.  -corrected 15.4; give Zometa and IVF today  -monitor trend inpatient    #LUCIANA: possibly 2/2 recent lasix, combined with hypovolemia.  Historically has been challenging to manage volume status with Cr, edema, and effusion  -2L IVF in ninc, follow trend. I do suspect pre-renal    # Pain - specific source not dicussed in detail today though would agree with palliative radiation to local bone mets if painful. He does have lingering pain from prior treatment that Dr. Mckenna has been managing.     # Nutrition - he continues to have stable weight on tube feeding.     #possible kink in the catheter at the right internal jugular vein venotomy site?  -incidentally seen on " XR. Port working here in clinic. Consider IR eval while inpatient    # New tumor vs. Maria Stein thrombus of pulmonary vein:   - With his active cancer it would be reasonable to continue anticoagulation.      70 minutes spent on the date of the encounter doing chart review, interpretation of tests, patient visit, documentation, discussion with other provider(s) and discussion with family     Deedee De Leon CNP on 10/11/2021 at 3:20 PM

## 2021-10-11 NOTE — PROGRESS NOTES
[facility]  Transfer Triage Note    Date of call: 10/11/21  Time of call: 12:35 PM    Current Patient Location: [facility] [place]  Current Level of Care: Med Surg  Vitals:                   97.4  59 166/66  93% on 3L (70s on RA)    at    Diagnosis: dyspnea, hypoxia  Is COVID-19 a concern? No  Reason for requested transfer: Patient has established care here   Isolation Needs: None    Outside Records: Available  Additional records may be faxed to [phone].    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     Chinedu White MD

## 2021-10-11 NOTE — PROGRESS NOTES
Nursing Focus: Admission  D: Arrived at [facility]D [facility] from clinic via EMS. Patient accompanied by wife, Meghan.     I: Admission process began.  Patient oriented to room, enviroment, call light.  Md. notified of patients arrival on unit.     A: Vital signs stable, afebrile.  Patient stable at this time.  Complaining of back pain.     P: Implement plan of care when available. Continue to monitor patient. Nursing interventions as appropriate. Notify md with changes in pt status.

## 2021-10-11 NOTE — PROGRESS NOTES
Infusion Nursing Note:  Reese Oakley presents today for IVF-Antiemetics-Zometa.    Patient seen by provider today: Yes: Deedee De Leon NP   present during visit today: Not Applicable.    Note: Pt saw provider in the infusion room.  Lab called infusion prior to appt and said pt had emesis x 1 in lab and was not feeling well.  Upon arrival to infusion pt O2 sats 72% on RA.  3L via NC applied and O2 remained mid 90's throughout the day.  Antiemetics and IVF started prior to Deedee appt.    TORB 10/11/21@1130 Deedee De Leon, BEAU -Purnima Arndt RN  --Zometa  --Hold chemotherapy  --Admit to hospital    Intravenous Access:  Implanted Port.    Treatment Conditions:  Lab Results   Component Value Date    HGB 11.0 (L) 10/11/2021    WBC 15.5 (H) 10/11/2021    ANEU 11.4 (H) 06/26/2021    ANEUTAUTO 12.0 (H) 10/11/2021     10/11/2021      Lab Results   Component Value Date     (L) 10/11/2021    POTASSIUM 4.7 10/11/2021    MAG 1.6 02/01/2021    CR 2.03 (H) 10/11/2021    ELEUTERIO 14.0 (H) 10/11/2021    BILITOTAL 0.3 10/11/2021    ALBUMIN 2.2 (L) 10/11/2021    ALT 20 10/11/2021    AST 22 10/11/2021         Post Infusion Assessment:  Patient tolerated infusion without incident.  Blood return noted pre and post infusion.  Site patent and intact, free from redness, edema or discomfort.  No evidence of extravasations.       Discharge Plan:   Patient discharged in stable condition accompanied by: [facility] to 7D.  Departure Mode: Cart.  FACE TO FACE:10 minutes    Purnima Arndt RN

## 2021-10-11 NOTE — Clinical Note
10/11/2021         RE: Reese Oakley  [address] [facility] [id number]        Dear Colleague,    Thank you for referring your patient, Reese Oakley, to the [facility]. Please see a copy of my visit note below.    October 11, 2021    Reason for Visit: Follow up of recurrent SCC of the R neck       History of Present Illness: Reese Oakley is a 71 year old male with PMH HTN, hyperlipidemia, DM2 with recurrent SCC. 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  More recently he was noted on follow up scans to have enlarged R sided lymph nodes and biopsy proven recurrence in the R neck.  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  There was one additional focus of squamous cell carcinoma identified in an additional lymph node without MEL.  His case was discussed at multidisciplinary tumor board and he was recommended to have chemoradiation - due to MEL in the submandibular gland.        Started weekly cisplatin with limited field radiation 1/25/21. Due to worsening renal function after first treatment switched to carboplatin/taxol for week 2. Finished treatment 3/5/21. Since then he has had persistent right tongue pain.  A biopsy of this was taken in clinic at the beginning of May which grew actinomyces and he was treated with penicillin.  He also had a persistent ulcer of the right posterior tongue that was resected with the Omni laser on 5/24/2021.     A surveillance PET/CT was obtained 6/21/2021.  This revealed concerning lymph nodes in the chest. He did poorly after the treatment with malnutrition and he had EGD, PEG insertion and bronchoscopy with endobronchial ultrasound-guided biopsy on 7/5/21 and  the 11L LN was negative for malignancy and Lymph node, level 7, contained nondiagnostic specimen. RIGHT VALLECULA biopsy on 7/5/21 showed necroinflammatory debris and fragments of squamous mucosa with acute inflammation and ulceration, negative for dysplasia or malignancy. GMS special stain highlights presence of fungal hyphae. Level 7 LN was later deemed to be malignancy.     He was admitted 8/5/21 with A fib, post obstructive pneumonia, fall with T6 compression fracture. Imaging noted a left hilar mass measuring 9.3x3.8 cm concerning for malignancy. He underwent bronchoscopy and biopsy of mass 9/15/21. Biopsy of 3 LN 11L, left hilar mass, and Station 7 LN all positive for malignancy SCC, low PD-L1 expression.     He was admitted 9/24/21 for dyspnea found to have new malignant pleural effusion s/p thoracentesis and new tumor vs bland thrombus of pulmonary vein, initially on heparin then transitioned to Apixaban. Pleural fluid concerning for malignancy. Also has bone lesion but asymptomatic. Noted to have hypercalcemia, plan for Zometa.     He met with Dr. Mark and recommended chemoimmunotherapy with Pembrolizumab, Carboplatin, and 5FU.     He was briefly admitted locally for hypoxia 2/2 recurrent effusion, s/p thoracentesis.     He was seen today for oncology follow-up in the clinic.    Interval History:   Kenrick is fatigued today and in pain. Meghan observes he is sleepy often at home but does not feel like he is more dyspneic at home. He still gets up with a cane to use the bathroom independently. He is tolerating TF. Has nausea related to thick secretions. Has pain       Review Of Systems  10-point review of systems were negative except as noted in HPI.    EXAM:  Blood pressure (!) 166/66, pulse 59, temperature 97.4  F (36.3  C), temperature source Tympanic, resp. rate 16, weight 76.1 kg (167 lb 11.2 oz).  GEN: alert and oriented x 3, slow to respond, drifts in and out of sleep  Head: no trauma  Neck: no  swelling  Pulm: breathing comfortably on 3L O2. LLB diminished, remainder CTA  Msk: 2+ ankle edema    Current Outpatient Medications   Medication Sig Dispense Refill     acetaminophen (TYLENOL) 160 MG/5ML suspension Take 20.5 mLs (650 mg) by mouth every 6 hours as needed for fever or mild pain 300 mL 3     Apixaban Starter Pack (ELIQUIS DVT/PE STARTER PACK) 5 MG TBPK Take 10 mg by mouth 2 times daily for 7 days, THEN 5 mg 2 times daily for 23 days. 74 each 0     dexamethasone (DECADRON) 4 MG tablet Take 2 tablets (8 mg) by mouth daily Start the day after carboplatin. 6 tablet 5     diphenhydrAMINE (BENADRYL) 12.5 MG/5ML solution Take 25 mg by mouth nightly as needed for itching, allergies or sleep       gabapentin (NEURONTIN) 250 MG/5ML solution Give three times per day: give 250 mg in morning, 250mg midday, and 500mg in evening. 300 mL 1     guaiFENesin (ROBITUSSIN) 100 MG/5ML liquid 10 mLs (200 mg) by Oral or PEG tube route every 4 hours as needed for other (mucous production and/or cough) 473 mL 5     insulin aspart (NOVOLOG PEN) 100 UNIT/ML pen Inject 15 Units Subcutaneous 3 times daily (with meals)        insulin glargine (LANTUS VIAL) 100 UNIT/ML vial Inject 25 Units Subcutaneous daily (with breakfast) PATIENT REPORTS 25 UNIT DAILY 02/16/2021       lidocaine (XYLOCAINE) 2 % solution Swish and spit 15 mLs in mouth every 3 hours as needed for moderate pain ; Max 8 doses/24 hour period. 100 mL 3     LORazepam (ATIVAN) 0.5 MG tablet Take 1 tablet (0.5 mg) by mouth every 4 hours as needed (Anxiety, Nausea/Vomiting or Sleep) 30 tablet 5     magic mouthwash (ENTER INGREDIENTS IN COMMENTS) suspension Swish, gargle, and spit one to two teaspoonfuls every six hours as needed. May be swallowed if esophageal involvement. 480 mL 3     omeprazole (PRILOSEC) 2 mg/mL suspension Take 10 mLs (20 mg) by mouth every morning (before breakfast) 300 mL 1     oxyCODONE (ROXICODONE) 5 MG/5ML solution Take 7.5 mLs (7.5 mg) by mouth  every 6 hours as needed for pain (oral pain) 500 mL 0     pentoxifylline (TRENTAL) 50 mg/ml suspension Take 8 mLs (400 mg) by mouth 3 times daily 500 mL 3     prochlorperazine (COMPAZINE) 10 MG tablet Take 1 tablet (10 mg) by mouth every 6 hours as needed (Nausea/Vomiting) 30 tablet 5     vitamin E (TOCOPHEROL) 1000 units (450 mg) capsule 1 capsule (1,000 Units) by Per Feeding Tube route daily 60 capsule 1           Recent labs reviewed.         Recent Results (from the past 744 hour(s))   XR Chest Port 1 View    Narrative    EXAM: XR CHEST PORT 1 VIEW  9/15/2021 1:54 PM      HISTORY: left lung bx    COMPARISON: PET/CT 6/21/2021, CT 8/5/2021    FINDINGS: Portable semiupright AP radiograph of the chest. Right chest  wall Port-A-Cath tip projects over the mid SVC. The trachea is  midline. The cardiac silhouette is partially obscured. No definite  pneumothorax. Left perihilar and basilar opacities. The visualized  upper abdomen appears unremarkable      Impression    IMPRESSION:   1. No definite pneumothorax status post biopsy.  2. Left perihilar and basilar opacities, likely known masslike  consolidation, however superimposed hemorrhage cannot be excluded.  Recommend upright PA and lateral examination when clinical condition  permits.    I have personally reviewed the examination and initial interpretation  and I agree with the findings.    ALTON WRIGHT MD         SYSTEM ID:  [id number]   XR Chest Port 1 View    Narrative    EXAM: XR CHEST PORT 1 VIEW  LOCATION: [facility]  DATE/TIME: 9/24/2021 2:07 AM    INDICATION: SOB, cough  COMPARISON: 09/15/2021.      Impression    IMPRESSION: Right-sided Port-A-Cath tip over the SVC. Heart size and pulmonary vascularity appear within normal limits. Large left pleural effusion is either new or significantly increased in size from the prior study. Prominent left perihilar infiltrate   probably not significantly changed. Right  lung is clear. No significant bony abnormalities.   CT Chest Pulmonary Embolism w Contrast    Narrative    EXAM: CT CHEST PULMONARY EMBOLISM W CONTRAST  LOCATION: [facility]  DATE/TIME: 9/24/2021 5:20 AM    INDICATION: Shortness of breath.  COMPARISON: CT chest from 08/05/2021.  TECHNIQUE: CT chest pulmonary angiogram during arterial phase injection of IV contrast. Multiplanar reformats and MIP reconstructions were performed. Dose reduction techniques were used.   CONTRAST: 74 ml isovue 370     FINDINGS:  ANGIOGRAM CHEST: Pulmonary arteries are normal caliber and negative for pulmonary emboli. Thoracic aorta is negative for dissection. No CT evidence of right heart strain.    LUNGS AND PLEURA: Areas of consolidation and fibrosis in the paramediastinal regions bilaterally. There is abnormal soft tissue density encasing the distal left mainstem bronchus and proximal left upper and lower lobar bronchi (image 109 of series 8) as   well as encasing the distal left main pulmonary artery (image 37 series 4). There is a moderate to large left pleural effusion with areas of atelectasis throughout the left upper and lower lobes. There are a few nodular opacities in the right lung which   are new. For instance, in the superior segment of the right lower lobe there is a 9 mm nodule on image 75 of series 8 scattered areas of atelectasis throughout the right upper, middle, and lower lobes.    MEDIASTINUM/AXILLAE: Heart size is normal. No pericardial effusion. Compared to 08/05/2021, there is increasing mediastinal adenopathy. For instance, there is confluent adenopathy in the high aorticopulmonary window which measures about 3.9 x 2.9 cm on   image 89 of series 9, previously measuring about 2.7 x 2.1 cm by my measurement. There is a subcarinal lymph node which measures 1.8 cm short axis on image 106, previously 1.3 cm by my measurement. Right hilar lymph node measures 2.1 x 1.4 cm on  image   101, previously 1.3 x 0.8 cm. A second right hilar node measures 1.9 x 1.1 cm on image 120, previously 11 x 8 mm. Right paratracheal node measuring 1.6 x 1.4 cm on image 82 previously measured up to 8 mm.    CORONARY ARTERY CALCIFICATION: Moderate.    UPPER ABDOMEN: Percutaneous gastrostomy tube in place.    MUSCULOSKELETAL: Interval sclerotic change with increasing superior endplate compression deformity at T6. There are scattered osteolytic lesions in the visualized spine. For instance, there is an 13 mm lytic lesion in the L1 vertebral body on image 100   of series 4 which is new. There are likely lytic lesions within multiple ribs as well such as the right lateral fourth rib and the left posterior eighth rib. There is a healing left 10th posterior rib fracture.      Impression    IMPRESSION:  1.  Negative for pulmonary embolism.    2.  Paramediastinal consolidations possibly reflecting posttreatment changes, though abnormal soft tissue density encasing the left distal pulmonary artery and proximal left upper and lower lobar bronchi concerning for metastatic disease.    3.  Moderate to large left pleural effusion with areas of atelectasis in the left upper and lower lobes.    4.  New nodules in the right lung measuring up to 9 mm concerning for metastases.    5.  Interval increase in mediastinal and hilar irvin metastases.    6.  Scattered new small lytic lesions in the visualized spine consistent with metastases.   CT Abdomen Pelvis w Contrast    Narrative    EXAMINATION: CT ABDOMEN PELVIS W CONTRAST, 9/24/2021 9:02 AM    TECHNIQUE:  Helical CT images from the lung bases through the  symphysis pubis were obtained without and with IV contrast.     Contrast dose: 74 mL of Isovue 370     COMPARISON: 6/21/2021 and 11/24/2020 whole body PET/CT     HISTORY:  staging - likely stage 4 SCC      FINDINGS:    Abdomen and pelvis: G-tube within the gastric lumen. Remaining stomach  and duodenum are unremarkable. No  focal hepatic lesions. There may be  some minor focal fatty change along the falciform ligament.  Gallbladder is unremarkable. The vasculature is patent. Spleen,  adrenal glands, kidneys are unremarkable. Ureteral course is normal.  Bladder is contrast opacified and unremarkable. Small large bowel are  normal caliber. Posterior changes of right hemicolectomy. The appendix  is surgically absent. No free fluid in the abdomen or pelvis. No  pathologically enlarged inguinal pelvic retroperitoneal lymph nodes.  The abdominal aorta and its proximal bifurcations are widely patent.    Lung bases/lower chest:  Large left-sided pleural fusion with pleural  thickening/nodularity/enhancement surrounding the inferior left lower  lobe and anterior aspect of the right oblique fissure. Incomplete  visualization of a heterogeneously enhancing 7.0 x 3.0 cm soft tissue  mass encompassing the proximal left upper lobe bronchus with  obstructive atelectasis of the left upper lobe. Multiple peripherally  enhancing centrally necrotic mediastinal lymph nodes. No significant  right-sided pleural effusion. No pneumothorax. Heart size is  borderline enlarged. Aortic root and proximal pulmonary artery normal  caliber. Pulmonary veins in the region of the tumor appeared to be  involved with possible tumor extension into the vein versus bland  thrombus noted on series 4 image 8 and coronal series 3 image 44. This  is not as readily appreciated on the same-day pulmonary embolism study  due to timing of contrast bolus. Venous catheter terminates at the  cavoatrial junction.    Bones and soft tissues: More pronounced endplate deformities of the L2  vertebral body with new lytic lesions in the L4 and S1 vertebral  bodies.       Impression    IMPRESSION:     1. Numerous findings within the left lung and mediastinum are  concerning for new/progression of metastatic disease.   2. Soft tissue lesion obstructing the proximal left upper lobe  bronchus with  lobar atelectasis of the downstream left upper lobe  partially visualized. Please see same day separate CT chest dictation.  3. Filling defect involving superior left pulmonary veins at the site  of tumor concerning for tumor versus bland thrombus. This is new from  prior CT.  4. Lytic lesions within the L2, L4 and S1 vertebral body are  concerning for metastatic disease    These findings were communicated to Dr. Mike Sanchez at 1123 AM  on 9/24/2021 by Tremayne Grimes over the phone.     I have personally reviewed the examination and initial interpretation  and I agree with the findings.    CARLOS GALVEZ MD         SYSTEM ID:  [id number]   CT Soft Tissue Neck w Contrast    Narrative    CT SOFT TISSUE NECK W CONTRAST 9/24/2021 1:32 PM    History:  Advanced head/neck cancer, follow up.    Per EPIC: 71 year old male who?with a past medical history significant  for hypertension hyperlipidemia, anemia, type 2 diabetes on insulin,  GERD,?history of colon cancer, history of appendiceal cancer,?history  of?throat?cancer treated with laser resection and adjuvant  radiotherapy in 2014 followed by tongue cancer diagnosed in February 2020 treated with wide local excision of the right tongue on 2/19/2020  who was found to have biopsy-proven recurrence in his right neck and  underwent modified neck dissection on 12/9/2020?with  subsequent?adjuvant radiation and chemotherapy and has been followed  by oncology and ENT,?presents to the ED tonight with cough and  shortness of breath.   ?              Right vallecula biopsy on 7/5/2021 was reported as  negative for malignancy.    Comparison:  PET CT from 11/24/2020 and 5/19/2020.     Technique: Following intravenous administration of nonionic iodinated  contrast medium, thin section helical CT images were obtained from the  skull base down to the level of the aortic arch.  Axial, coronal and  sagittal reformations were performed with 2-3 mm slice thickness  reconstruction.  Images were reviewed in soft tissue, lung and bone  windows.    Contrast: iopamidol (ISOVUE-370) solution 100 mL    Findings:   Ill-defined enhancement within right side of the tongue, grossly  measuring 2.5 x 1.9 cm (series 2 image 37) with effacement of adjacent  fat plane (series 3 images 74-78), which is concerning for recurrence.    Postsurgical changes of right partial glossectomy and right neck  dissection with surgical clips. Right submandibular gland is  surgically resected. Postradiation changes of the neck, including  subcutaneous edema within the anterior neck, thickening of the right  and right posterolateral oropharyngeal walls, and effacement of right  deep neck spaces. No imaging findings of osteoradionecrosis of the  hyoid bone, mandible or thyroid cartilage. Fatty atrophy of the  bilateral parotid glands. Left submandibular gland appears normal.    Effacement of right piriform sinus. Thyroid gland atrophy.    There is no evident cervical lymphadenopathy. The major vascular  structures in the neck appear unremarkable.    Evaluation of the osseous structures demonstrate no worrisome lytic or  sclerotic lesion. Multilevel cervical spondylosis, most pronounced at  C5-6. The visualized paranasal sinuses are clear. The mastoid air  cells are clear. Bilateral loops in the pocket.    For pulmonary findings please see dedicated chest CT dictation from  the same day.      Impression    Impression:  1. Ill-defined enhancement within right side of the tongue with  effacement of adjacent fat plane, which is concerning for recurrence.  Correlation with clinical examination is recommended.   2. No cervical lymphadenopathy.   3. For pulmonary findings please see dedicated chest CT dictation from  the same day.    I have personally reviewed the examination and initial interpretation  and I agree with the findings.    ISSA PACHECO MD         SYSTEM ID:  [id number]   XR Chest Port 1 View    Narrative    EXAM: XR  CHEST 1 VW 9/24/2021      HISTORY: Post thoracentesis, r/o pneumothorax.    COMPARISON: Same day    TECHNIQUE: Frontal upright view of the chest.    FINDINGS: Mild to moderate residual left pleural effusion without  appreciable pneumothorax. Right chest wall Port-A-Cath tip projects  just superior cavoatrial junction. Right lung and pleura are clear.  Cardiomediastinal silhouette is obscured. No acute abnormality of the  osseous thorax or upper abdomen.      Impression    IMPRESSION: Decreased left pleural effusion without appreciable  pneumothorax, compared to earlier chest x-ray 9/24/2021. Right lung  and pleura are relatively clear.    I have personally reviewed the examination and initial interpretation  and I agree with the findings.    CHAO LINDO MD         SYSTEM ID:  [id number]         Assessment/Plan    # Recurrent SCC of Larynx with mets to left pleural space, left lung and mediastinum and lytic lesions within the L2, L4 and S1 vertebral body. He also has  ll-defined enhancement within right side of the tongue with effacement of adjacent fat plane, which is concerning for recurrence. Patient has a functioning status PS of 1.  He is willing to continue treatment.   - His PET scan on 6/21/21 is concerning for some new L hilar and mediastinal adenopathy that are hypermetabolic. He had EBUS on 7/5/21 with no malignancy on 11L but  level 7 did not have enough specimen (which later deemed to be malignancy). His right vallecula biopsy on 7/5/21 showed necroinflammatory debris and fragments of squamous mucosa with acute inflammation and ulceration, negative for dysplasia or malignancy. GMS special stain highlights presence of fungal hyphae. Since there is no biopsy proven thioacetic LN, we decided to have repeat imaging during the August visit. He unfortunately developed SOB need admission from 9/24-9/26 with above CT findings. Pleural fluid is positive for SCC.   - his PDL1 score is 2, low expression based on  Lymph node station 7 FNA/squamous cell carcinoma from 9/15/21.   - We would send out for NGS to [place].   - In the past he could not tolerate cisplatin due to renal toxicity.    - For his metastatic disease surgery is not an option.    - As to systemic therapy we talked about immunotherapy alone with pembrolizumab versus pembrolizumab/carboplatin/5-FU and he is interested in treatment. Based on his quick progression and low PDL1 level, the combination treatment will be our first choice. He is explained the risk and benefits including immune checkpoint inhibitor associated rash, pneumonitis, joint pain, fatigue, liver enzyme elevation, nephrotoxicity, diarrhea, etc. He is also informed the risk of bone marrow suppression, nephrotoxicity, and cardiac risk, oral mucositis (which he did not experience in the past) and diarrhea.     We will plan for rescan after 2 cycles.  If he tolerates the treatment well we will plan for 6 cycles of chemotherapy with Keytruda and continue Keytruda for 2 years.  If there is intolerance or disease progression we will talk about next line of therapy.  - wife would like to be called in advance beside [facility] reminder since he lives 65 miles away. We talked about doing chemo locally but patient would like to f/u here.   - as to the bone lesion he has no pain so we can monitor and send to radiation if indicated.     # Hypercalcemia: 2/2 malignancy.  -corrected 15.4; give Zometa today    # Pain - Dr. Mckenna is in charge of his pain meds now, still likely due to radiation therapy and improving.     # Nutrition - he continues to have stable weight on tube feeding. Continue tube feeding for now.     # New tumor vs. [place] thrombus of pulmonary vein:   - With his active cancer it would be reasonable to continue anticoagulation.            Again, thank you for allowing me to participate in the care of your patient.        Sincerely,        Deedee De Leon, CNP

## 2021-10-12 PROBLEM — C02.1 SQUAMOUS CELL CARCINOMA OF LATERAL TONGUE (H): Status: ACTIVE | Noted: 2020-01-01

## 2021-10-12 PROBLEM — E11.9 DIABETES MELLITUS, TYPE 2 (H): Status: ACTIVE | Noted: 2020-01-01

## 2021-10-12 NOTE — CONSULTS
Oncology - Inpatient Consult Service  Consult Note  Date of Service: 10/12/2021    Patient: Reese Oakley  MRN: [id number]  Admission Date: 10/11/2021  Hospital Day # 1    Consult: Metastatic squamous cell carcinoma with recurrent pleural effusions, unable to start chemo due to LUCIANA, hypercalcemia and hypoxia.    Assessment & Recommendations:   Reese Oakley is a 71 year old male with pmh of HTN, HLD, T2DM, CKD 3, paroxysmal A.fib, GERD, laryngeal cancer s/p laser resection and adjuvant radiotherapy (2014), tongue cancer (2020) s/p wide excision c/b biopsy proven recurrent SCC recently complicated by pleural effusions admitted from oncology clinic on 10/11 for acute hypoxic respiratory failure, LUCIANA and hypercalcemia.     Patient appears clinically and vitally stable when seen. Plan for discharge home once stable per primary team assessment with close outpatient oncology follow-up.     # Acute hypoxic respiratory failure  # Small left pleural effusion, hx of recurrent effusions  Patient admitted from oncology clinic with hypoxia to 70s on room air, improved >90% on 3L. Admission CXR notable for persistent bilateral perihilar opacities with small left pleural effusion. Previously required 2 thoracentesis within past month for loculated L pleural effusion. 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 He was due to start chemoimmunotherapy with Dr. Mark, which has been held while admitted. Plan for close outpatient oncology follow-up following discharge - tentative plan to schedule outpatient chemotherapy for 10/18.  - Pain control    - Oxycodone 7.5mg TID PRN and 15 PRN at bed    - Gabapentin 250 BID and 500 at bed    # LUCIANA in setting of CKD 3, pre-renal likely due to dehydration  Admission Cr of 2.03 with baseline closer to 1.1.  - Continue strict I/Os  - Continue to monitor renal function with daily CMP    # Hypercalcemia  Recurrent problem for patient. Calcium 14 (corrected to 15.4 for hypoalbuminemia) on admission, with down trend to 11.4 (corrected to 13.1) on 10/12 following 600 units of calcitonin. Of note, 9/24 PTHrP elevated to 16.2 from prior admission, so elevated calcium likely secondary to malignancy. Patient recently started Zometa  on 10/11, received only 1 dose immediately prior to admission.   - Continue Calcitonin 300 units BID  - Continue to monitor ionized calcium on daily labs    # Leukocytosis  WBC elevated to 21.4 with possible pulmonary source of infection. Fungal and yeast culture of pleural fluid during 9/24 admission negative. Vitals are otherwise reassuring, UA was without significant findings, and patient does not appear sick.   - Empiric vancomycin and cefepime started on 10/11  - Recommend blood cultures if clinically worsening    # Paroxysmal A. Fib  Seen by cardiology on 9/27, started on amiodarone taper and on apixaban  - Continue apixaban AC while inpatient    # Chronic normocytic anemia  Baseline around 11-12, currently near baseline  - Continue to monitor    # Hypothyroid  TSH of 23.34 and free T4 of 0.63 on admission labs.   - Management per primary team    # Port-a-catheter kink  Admission CXR with port-a-catheter kink at right internal jugular venotomy site,  was previously working when seen at clinic.   - IR aware, no need for intervention unless confirmed port-a-catheter failure    # Type 2 Diabetes  - Management per primary team    # Nutrition  Patient gaining weight with good nutrition prior to admission. PEG tube bolus feeding started on 10/12.  - Management per primary team     Lines: PEG tube in place, Chronic right chest port-a-cath in place  Nutrition: PEG bolus feeding, NPO  Antibiotics: Vancomycin (10/11), Cefepime (10/11)  AC: PTA Apixaban  Code: Full Code    We will continue to follow this patient while hospitalized. Please do not hesitate to page with any questions or concerns. Patient was seen and plan of care was discussed with attending physician Dr. Mark.    David Boyle MD  PGY-1  Pager: [phone]  =================================================================================    Admission History:    Reese Oakley is a 71 year old male with pmh of HTN, HLD, T2DM, GERD, paroxysmal A.fib on apixaban, colon cancer s/p right hemicolectomy (2004), appendiceal cancer s/p resection (2004), SCC of the right oropharynx s/p laser resection and adjuvant radiotherapy (2014), tongue cancer (2020) s/p partial glossectomy c/b biopsy proven recurrent SCC recently complicated by pleural effusions admitted from oncology clinic on 10/11 for acute hypoxic respiratory failure, LUCIANA and hypercalcemia.    Prior to admission, patient was at routine oncology clinic visit where he had 1 episode of emesis and was found to be hypoxic to 70s on room air, which improved to >90% on 3L and NS maintenance.    Of note, he started weekly cisplatin with limited field radiation on 1/25/21, switched to carboplatin/taxol for week 2 due to worsening renal function and finished treatment on 3/5/21. Surveillance PET/CT scan obtained on 6/21/21 showed thoracic lymphadenopathy and follow-up ultrasound-guided biopsy on 7/5 had an 11L LN negative for malignancy and 7L LN positive for  "malignancy.     On admission in 8/2021 for A fib, post obstructive pneumonia and fall with T6 compression fracture, a left hilar mass (9.3x3.8) was found on imaging with a 9/15/21 biopsy of 3LN 11L positive for SCC, low PD-L1 expression.     He was also recently admitted on 9/24-9/25/21 for dyspnea 2/2 new pleural effusion s/p thoracentesis suspicious for metastatic SCC    He met with Dr. Mark in clinic in 9/2021, who recommended starting chemoimmunotherapy with Pembrolizumab, Carboplatin and 5FU. Plan to start this following this admission.    Interval History    No acute events overnight. On 3L NC with notable shortness of breath on exertion but comfortably breathing at rest when seen. PEG tube in place, and tube feeds ongoing when seen. Some chronic low back and hip pain noted this morning and this afternoon.     Review of systems negative except as above.     Vitals overnight: 96.6 Tmax, 57-60HR, 16 RR, 129-160 systolic, >94% on 3L,   2.5 L urine out, 1.8 L In (180mL from PEG)    Physical Exam:    Blood pressure 129/49, pulse 59, temperature (!) 95.5  F (35.3  C), temperature source Oral, resp. rate 16, height 1.702 m (5' 7\"), weight 75 kg (165 lb 6.4 oz), SpO2 94 %.  General: No acute distress, Awake, alert and oriented x3  HEENT: No pallor or icterus  Cardiac: Regular rate and rhythm, no murmurs appreciated on exam  Respiratory: Breathing comfortably on 3L, no cough, no audible wheezes  Abdomen: Soft, non-tender, non-distended, PEG site clean, dry and without erythema or drainage  MSK/Skin: No peripheral edema, no petechiae or bruising noted on exam    Labs:     Labs reviewed    Chemistry notable for Cr 1.91 (2.03 on admission, baseline of 1.3), serum calcium of 11.4 (14 on admission)    Alk phos elevated 175, ALT and AST wnl (17 and 13 respectively)    TSH of 23.34 and free T4 of 0.63.    CBC notable for WBC of 21.4, Hgb 10.8 (baseline between 11 and 12), platelets around 391, ANC 19.4 (12 on " admission)    No findings on admission UA    No pending microbiology for this admission  - Prior admission fungal and yeast cultures from pleural fluid, NGTD    Admission 2 view CXR  - bilateral perihilar opacities and small left pleural effusion   - patient's right chest wall port-a-cath has possible kink    CMP  Recent Labs   Lab 10/12/21  0714 10/12/21  0248 10/11/21  2027 10/11/21  1039     --   --  132*   POTASSIUM 5.0  --   --  4.7   CHLORIDE 102  --   --  93*   CO2 29  --   --  32   ANIONGAP 3  --   --  7   * 397*  --  243*   BUN 55*  --   --  52*   CR 1.91*  --   --  2.03*   GFRESTIMATED 34*  --   --  32*   ELEUTERIO 11.4*  --  13.0* 14.0*   MAG 2.1  --   --   --    PHOS 4.2  --   --   --    PROTTOTAL 6.8  --   --  7.3   ALBUMIN 1.9*  --   --  2.2*   BILITOTAL 0.2  --   --  0.3   ALKPHOS 175*  --   --  202*   AST 13  --   --  22   ALT 17  --   --  20     CBC  Recent Labs   Lab 10/12/21  0714 10/11/21  1039   WBC 21.4* 15.5*   RBC 3.64* 3.77*   HGB 10.8* 11.0*   HCT 34.3* 34.3*   MCV 94 91   MCH 29.7 29.2   MCHC 31.5 32.1   RDW 12.8 12.8    421     INRNo lab results found in last 7 days.    Inpatient medication list:  Current Facility-Administered Medications   Medication     amiodarone (CORDARONE) suspension 200 mg     apixaban ANTICOAGULANT (ELIQUIS) tablet 5 mg     benzocaine 20% (HURRICAINE/TOPEX) 20 % spray 0.5-1 mL     calcitonin (MIACALCIN) injection 300 Units     ceFEPIme (MAXIPIME) 2 g vial to attach to  ml bag for ADULTS or 50 ml bag for PEDS     glucose gel 15-30 g    Or     dextrose 50 % injection 25-50 mL    Or     glucagon injection 1 mg     diphenhydrAMINE (BENADRYL) solution 25 mg     gabapentin (NEURONTIN) solution 250 mg     gabapentin (NEURONTIN) solution 500 mg     insulin aspart (NovoLOG) injection (RAPID ACTING)     insulin glargine (LANTUS PEN) injection 25 Units     lidocaine (LMX4) cream     lidocaine 1 % 0.1-1 mL     magic mouthwash suspension (diphenhydramine,  lidocaine, aluminum-magnesium & simethicone)     melatonin tablet 1 mg     naloxone (NARCAN) injection 0.2 mg    Or     naloxone (NARCAN) injection 0.4 mg    Or     naloxone (NARCAN) injection 0.2 mg    Or     naloxone (NARCAN) injection 0.4 mg     omeprazole (priLOSEC) suspension 20 mg     ondansetron (ZOFRAN-ODT) ODT tab 4 mg    Or     ondansetron (ZOFRAN) injection 4 mg     oxyCODONE (ROXICODONE) solution 15 mg     oxyCODONE (ROXICODONE) solution 7.5 mg     Patient is already receiving anticoagulation with heparin, enoxaparin (LOVENOX), warfarin (COUMADIN)  or other anticoagulant medication     pentoxifylline (TRENtal) suspension 400 mg     sodium chloride (PF) 0.9% PF flush 3 mL     sodium chloride (PF) 0.9% PF flush 3 mL     sodium chloride 0.9% infusion     vancomycin (VANCOCIN) 750 mg in sodium chloride 0.9 % 250 mL intermittent infusion     vitamin E (TOCOPHEROL) 1000 units (450 mg) capsule 1,000 Units

## 2021-10-12 NOTE — PLAN OF CARE
A&Ox4. Low /39. HR in 50s throughout the day. Provider notified. Sating >94% on 3 L. OVSS. Denies SOB and N/V. C/o mouth pain and back pain. Magic mouthwash used x2 and 7.5 mg Oxycodone given x2 per feeding tube. 2 bolus feed being completed. Wife at bedside assisting with cares.  and 281. Q4hr blood sugars with sliding scale insulin given. Continue with POC.

## 2021-10-12 NOTE — PLAN OF CARE
Alert and oriented x 4. Complained of pain in right side of mouth and back. Oxycodone 15 mg given. G-tube clamped . Did not want feeding during this shift. Up to the BR with SBA and voiding. NS infusing at 150 ml/hr. Novolog 3 units given for . Shortness of breath on exertion. On 3 liters of oxygen nasal canula.

## 2021-10-12 NOTE — PLAN OF CARE
[id number]    A&O x4. VSS on 3LPM NC, afebrile. C/o 3/10 back pain and 5/10 R jaw pain/mouth pain. MD paged for order of oxycodone at bedtime, awaiting pharmacy approval. Denies N/V and SOB. Pt has PEG tube and would like to wait until tomorrow to start TID tube feedings. Wife at bedside today upon admission. Up with assist of 1 to bathroom. Continue with POC.

## 2021-10-12 NOTE — PROGRESS NOTES
[facility]    Medicine Progress Note - Hospitalist Service, Gold 11       Date of Admission:  10/11/2021    Assessment & Plan          Reese Oakley is a 71 year old male with past medical history significant for HTN, HLD, type 2 DM, paroxysmal atrial fib, GERD, colon cancer, appendiceal cancer, laryngeal cancer s/p laser resection/adjuvant radiotherapy (2014), tongue cancer (2020) s/p wide local excision c/b biopsy-proven recurrent SCC recently c/b recurrent presumed malignant pleural effusions admitted from his oncology clinic today for acute hypoxic respiratory failure, LUCIANA, and hypercalcemia.      Today's plan; discussed with wife, oncology  - noted helpful palliative recs-changes made as recommended/advised, monitor pain control  - Trend Ca labs, and IVF     # Acute hypoxic respiratory failure   # Recurrent left sided pleural effusions   Presented to oncology clinic today and found to be hypoxic in 70s on room air, improved to 93% on 3L.  CXR on admission with persistent bilateral perihilar opacities, with trace right and small left pleural effusions.  Has required 2 thoracenteses in the past 2 weeks for loculated L pleural effusion.  Not responsive to IV Lasix and has had gradually worsening Cr.  Leukocytosis to 15.5.  Afebrile.     - Start Cefepime 1g Q12H and Vancomycin for now, reassess in AM   - Per chart review, Pleurex catheter has been discussed w/ patient & wife at OSH on previous admission; would revisit this during admission by CXR in 1-2 days  - Continue supplemental O2 to keep sats > 90%   - Continuous pulse ox      # Hypercalcemia of Malignancy - Corrected calcium 15.4 from clinic labs.  Received 2L fluid resuscitation and Zometa x 1 dose prior to leaving clinic.  Recurrent issue for patient.  Appears to have been on calcitonin nasal spray in the past.    - NS 0.9% at 150cc/hr for now   - Checking Ca daily   - Start Calcitonin 300 units Q12H for  now until Ca normalized     # LUCIANA in setting of stage III CKD - Cr elevated to 2.03 today.  BL closer 0.8-1.1.  Likely pre-renal in setting of recent diuresis vs hypercalcemia vs dehydration.  BUN 52.    - Fluid resuscitation as above   - Intake/output   - Trend CMP to monitor renal function   - Avoid/minimize nephrotoxic agents      # Paroxysmal atrial fib   # Episodic presyncope/syncope   Recently found to be in atrial fib w/ RVR following 1-2 episodes of syncope vs near syncope.  Has undergone Ziopatch monitoring with 33% atrial fib burden.  Seen by Cardiology on 9/27, started on amiodarone taper. On Eliquis 5mg BID PTA.     - Pharmacy consult placed to verify Amiodarone taper, appears that he may now be at 200mg daily dose (ordered)  -- Amio dose now corrected per Pharmacy  - Continue Eliquis 5mg BID      # Possible kink in Port-A-Cath at R internal jugular site - CXR with port-a-cath w/ possible kink at R internal jugular venotomy site.  Port appears to be working at time of transfer from clinic.    -  IR aware, port functions without issue, no intervention at this time      # Recurrent SCC with metastatic disease (left lung, lytic lesions to L2, L4, S1, and possible R side tongue)   # Tongue cancer (2/2020) s/p resection   # Larynx cancer (2014) s/p laser resection and radiotherapy   See Oncology clinic note from today, 10/11 for extensive history. Was due to start chemoimmunotherapy with Dr. Mark today but is now admitted to the hospital.  Had been prescribed dexamethasone to start after receiving carboplatin, has not taken this yet.      - Oncology consult   - 10/12: STOPPTA Oxycodone 7.5mg TID (daytime) and 15mg at HS PRN for pain   - 10/12: Hold daytime PTA Gabapentin 250mg/250mg, continue only at bedtime 500mg   - Palliative Consult: Start PO liquid Dilaudid 2 mg q4 prn     # Type 2 DM - Last Hgb A1c 7.8% in 6/2021.  On Lantus 25 units every morning, and fixed Novolog 15 units TID w/ bolus feeds.    -  Continue PTA Lantus and fixed Novolog dosing   - BG checks ac/hs   - Hypoglycemia protocol in place  - If changes to TFs, consider Endocrine consult for assistance w/ insulin adjustment      # Malnutrition - S/p PEG in 7/2021.  On Isosource 1.5 TID prior to admission.      - Nutrition consult to manage needs while inpatient   - Ordered Jevity 1.5 for tonight (c/w Isosource per chart review)     - Continue Vitamin E supplementation   - Check CMP, Mag, Phos     # Mild hyponatremia - Na 132.  Likely 2/2 recent diuresis.  BL appears to be in 130s.  Getting IV fluid resuscitation w/ NS as above.   - Trend BMP daily      # Dispo - Per chart review, during pt's last admission at [facility] 10/4-10/6, pt had verbalized desire to be DNR/DNI.  Has been seen by Palliative Care during admission here in 9/2021 given prognosis of metastatic disease but had planned to start chemo this week.  I discussed with pt's wife code status, and she explained that he would not want intubation unless he would have meaningful recovery.  I explained that we could keep as full code for now and address further pending clinical course if patient were to decompensate.         Diet: Adult Formula Bolus Feeding: TID Jevity 1.5; Route: Gastrostomy; 6; Can(s); Medication - Feeding Tube Flush Frequency: At least 15-30 mL water before and after medication administration and with tube clogging  NPO for Medical/Clinical Reasons Except for: NPO but receiving Tube Feeding    DVT Prophylaxis: Apixaban  Lerma Catheter: Not present  Central Lines: None  Code Status: Full Code      Disposition Plan   Expected discharge: 10/14/2021   recommended to prior living arrangement once adequate pain management/ tolerating PO medications.     The patient's care was discussed with the Bedside Nurse, Patient, Patient's Family and Oncology Consultant.    Devendra Crow MD  Hospitalist Service, [address]  Securely  message with the [employer] Web Console (learn more here)  Text page via [employer] Paging/Directory  Please see sign in/sign out for up to date coverage information    Clinically Significant Risk Factors Present on Admission          # Hypercalcemia: Ca = 11.4 mg/dL (Ref range: 8.5 - 10.1 mg/dL) and/or iCa = N/A on admission, will monitor as appropriate    # Coagulation Defect: home medication list includes an anticoagulant medication   # Non-Severe Malnutrition, POA: based on Subcutaneous fat loss;Muscle loss (10/12/21 1420)  ______________________________________________________________________    Interval History   Seen today for follow up: neck, back, body pain, deconditioned, hypercalcemia    No acute overnight events per patient or patient's family.    Feels breathing is not too difficult  Jokes with wife around.      As of today/at time of my visit:  Patient denies any headache, sore throat  Patient denies any sleeping disturbance  Patient denies any nausea or vomiting  Patient reports no abdominal pain  Patient denies any shortness of breath   Patient denies any chest pain  Patient reports no arm or leg edema      Data reviewed today: I reviewed all medications, new labs and imaging results over the last 24 hours. I personally reviewed no images or EKG's today.    Physical Exam   Vital Signs: Temp: 96.9  F (36.1  C) Temp src: Oral BP: 117/40 Pulse: 56   Resp: 16 SpO2: 96 % O2 Device: Nasal cannula Oxygen Delivery: 3 LPM  Weight: 163 lbs 8 oz    General: Alert awake and oriented, no distress, conversational  Eyes: Normal pink mucosa with no signs of pallor, no scleral icterus.  Neck: No significant lymphadenopathy, no palpable LN, No JVD  Heart: Regular rate and rhythm, normal S1, normal S2, no murmurs rubs or gallops, PMI is nondisplaced   Peripherally there is no edema  Lungs: No increased work of breathing, good effort, lungs are clear to auscultation bilaterally   No wheezing or crackles   Breathing on room  air  Abdomen: Nontender, nondistended, normal active bowel sounds, no palpable masses  Extremities: Normal capillary refill, no edema, no clubbing, no cyanosis  Neuro: Alert and oriented to person place location and situation   Grossly arms and legs are without any focal motor or sensory deficits   Gait was not assessed   Cranial nerves II through XII are grossly intact  Psych: No acute psychosis, good mood, good spirits      Data   Recent Labs   Lab 10/12/21  1403 10/12/21  0930 10/12/21  0714 10/12/21  0248 10/11/21  2027 10/11/21  1039   0000   WBC  --   --  21.4*  --   --  15.5*  --    HGB  --   --  10.8*  --   --  11.0*  --    MCV  --   --  94  --   --  91  --    PLT  --   --  391  --   --  421  --    NA  --   --  134  --   --  132*  --    POTASSIUM  --   --  5.0  --   --  4.7  --    CHLORIDE  --   --  102  --   --  93*  --    CO2  --   --  29  --   --  32  --    BUN  --   --  55*  --   --  52*  --    CR  --   --  1.91*  --   --  2.03*  --    ANIONGAP  --   --  3  --   --  7  --    ELEUTERIO  --   --  11.4*  --  13.0* 14.0*  --    * 360* 369*   < >  --  243*   < >   ALBUMIN  --   --  1.9*  --   --  2.2*  --    PROTTOTAL  --   --  6.8  --   --  7.3  --    BILITOTAL  --   --  0.2  --   --  0.3  --    ALKPHOS  --   --  175*  --   --  202*  --    ALT  --   --  17  --   --  20  --    AST  --   --  13  --   --  22  --     < > = values in this interval not displayed.     No results found for this or any previous visit (from the past 24 hour(s)).  Medications     - MEDICATION INSTRUCTIONS -       sodium chloride 150 mL/hr at 10/12/21 1343       amiodarone  400 mg Oral BID    Followed by     [START ON 10/16/2021] amiodarone  200 mg Oral BID    Followed by     [START ON 10/23/2021] amiodarone  200 mg Oral Daily     apixaban ANTICOAGULANT  5 mg Per Feeding Tube BID     ceFEPIme (MAXIPIME) IV  2 g Intravenous Q12H     gabapentin  500 mg Per Feeding Tube At Bedtime     insulin aspart  1-12 Units Subcutaneous Q4H      insulin aspart  15 Units Subcutaneous TID w/meals     insulin glargine  25 Units Subcutaneous QAM AC     omeprazole  20 mg Per Feeding Tube QAM AC     pentoxifylline  400 mg Per Feeding Tube BID     sodium chloride (PF)  3 mL Intracatheter Q8H     vancomycin  750 mg Intravenous Q24H     vitamin E  1,000 Units Per Feeding Tube Daily

## 2021-10-12 NOTE — CONSULTS
Care Management Initial Consult    General Information  Assessment completed with: Care Team MemberFAVIOLA-chart review   Type of CM/SW Visit: Initial Assessment    Primary Care Provider verified and updated as needed: Yes   Readmission within the last 30 days: Previous discharge plan unsuccessful      Reason for Consult: Discharge Planning  Advance Care Planning: Reviewed: Present on chart       Communication Assessment  Patient's communication style: Spoken language (English or Bilingual)  Hearing Difficulty or Deaf: no   Wear Glasses or Blind: yes    Cognitive  Cognitive/Neuro/Behavioral: WDL  Level of Consciousness: alert  Arousal Level: opens eyes spontaneously  Orientation: oriented x 4  Mood/Behavior: calm, cooperative  Best Language: 0 - No aphasia  Speech: clear    Living Environment:   People in home: Spouse     Current living Arrangements: House      Able to return to prior arrangements: Yes     Family/Social Support:  Care provided by: Self, spouse/significant other  Provides care for: No one          Description of Support System: Supportive, Involved      Current Resources:   Patient receiving home care services: No  Community Resources: Home Infusion, [facility]  Equipment currently used at home: Cane, straight, glucometer  Supplies currently used at home: Enteral Nutrition & Supplies, Pulse oximeter    Employment/Financial:  Employment Status: Retired        Financial Concerns: No concerns identified     Lifestyle & Psychosocial Needs:  Social Determinants of Health     Tobacco Use: Low Risk      Smoking Tobacco Use: Never Smoker     Smokeless Tobacco Use: Never Used   Alcohol Use:      Frequency of Alcohol Consumption:      Average Number of Drinks:      Frequency of Binge Drinking:    Financial Resource Strain:      Difficulty of Paying Living Expenses:    Food Insecurity:      Worried About Running Out of Food in the Last Year:      Ran Out of Food in the Last Year:    Transportation Needs:       Lack of Transportation (Medical):      Lack of Transportation (Non-Medical):    Physical Activity:      Days of Exercise per Week:      Minutes of Exercise per Session:    Stress:      Feeling of Stress :    Social Connections:      Frequency of Communication with Friends and Family:      Frequency of Social Gatherings with Friends and Family:      Attends Druze Services:      Active Member of Clubs or Organizations:      Attends Club or Organization Meetings:      Marital Status:    Intimate Partner Violence: Not At Risk     Fear of Current or Ex-Partner: No     Emotionally Abused: No     Physically Abused: No     Sexually Abused: No   Depression: Not at risk     PHQ-2 Score: 0   Housing Stability:      Unable to Pay for Housing in the Last Year:      Number of Places Lived in the Last Year:      Unstable Housing in the Last Year:        Functional Status:  Prior to admission patient needed assistance:   Dependent ADLs: Ambulation-cane  Dependent IADLs: Shopping  Assesssment of Functional Status: Not at functional baseline    Mental Health Status:  Mental Health Status: No Current Concerns       Chemical Dependency Status:  Chemical Dependency Status: No Current Concerns       Values/Beliefs:  Spiritual, Cultural Beliefs, Druze Practices, Values that affect care: No               Additional Information:    Patient is a 71 year old male with pmh of HTN, HLD, T2DM, CKD 3, paroxysmal A.fib, GERD, laryngeal cancer s/p laser resection and adjuvant radiotherapy (2014), tongue cancer (2020) s/p wide excision c/b biopsy proven recurrent SCC, recently complicated by pleural effusions. Patient was admitted from oncology clinic on 10/11 for acute hypoxic respiratory failure, LUCIANA and hypercalcemia.     Writer received notification from [facility] liaison that patient is currently open with them for enteral nutrition (isosource 1.5, 2 cartons, 3x/day). Patient was also previously open to [facility].      Writer spoke with Yady at [facility] (Phn: [phone]). Per Yady, patient is not currently open with them, however they did receive a referral on 10/8 for chemo connects/disconnects. [facility] will update [facility] once discharge plans are more clear.    Care Management will continue to follow and assist with discharge planning as needed.     Melba Leonard RN, BSN, PHN  Care Coordinator   P: [phone], [facility]

## 2021-10-12 NOTE — PHARMACY-ADMISSION MEDICATION HISTORY
Admission Medication History Completed by Pharmacy    See [facility] Admission Navigator for allergy information, preferred outpatient pharmacy, prior to admission medications and immunization status.     Medication History Sources:     Patient and wife    Changes made to PTA medication list (reason):    Added: Amiodarone 200 mg tabs: Take 2 pills twice a day for a week then 1 pill twice a day for 2 weeks then 1 pill daily.     Deleted: Compazine, Guaifenesin    Changed: None    Additional Information:    Patient and wife good historian. Per patient/wife, has 3 days left on 200 mg BID (ending 10/15) then beginning one pill daily    Prior to Admission medications    Medication Sig Last Dose Taking? Auth Provider   acetaminophen (TYLENOL) 160 MG/5ML suspension Take 20.5 mLs (650 mg) by mouth every 6 hours as needed for fever or mild pain Past Month at Unknown time Yes Jose Antonio Mckenna MD   amiodarone (PACERONE) 200 MG tablet Take 200 mg by mouth 2 times daily Take 2 pills twice a day for a week then 1 pill twice a day for 2 weeks then 1 pill daily. 10/11/2021 at Unknown time Yes Unknown, Entered By History   Apixaban Starter Pack (ELIQUIS DVT/PE STARTER PACK) 5 MG TBPK Take 10 mg by mouth 2 times daily for 7 days, THEN 5 mg 2 times daily for 23 days. 10/12/2021 at Unknown time Yes Mike Sanchez MD   dexamethasone (DECADRON) 4 MG tablet Take 2 tablets (8 mg) by mouth daily Start the day after carboplatin. Unknown at Unknown time Yes Toni Mark,    diphenhydrAMINE (BENADRYL) 12.5 MG/5ML solution Take 25 mg by mouth nightly as needed for itching, allergies or sleep Past Month at Unknown time Yes Unknown, Entered By History   gabapentin (NEURONTIN) 250 MG/5ML solution Give three times per day: give 250 mg in morning, 250mg midday, and 500mg in evening. 10/11/2021 at Unknown time Yes Indu Millan MD   insulin aspart (NOVOLOG PEN) 100 UNIT/ML pen Inject 15 Units Subcutaneous 3 times  daily (with meals)  10/11/2021 at Unknown time Yes Reported, Patient   insulin glargine (LANTUS VIAL) 100 UNIT/ML vial Inject 25 Units Subcutaneous daily (with breakfast) PATIENT REPORTS 25 UNIT DAILY 02/16/2021 10/11/2021 at Unknown time Yes Reported, Patient   lidocaine (XYLOCAINE) 2 % solution Swish and spit 15 mLs in mouth every 3 hours as needed for moderate pain ; Max 8 doses/24 hour period. Past Week at Unknown time Yes Jose Antonio Mckenna MD   LORazepam (ATIVAN) 0.5 MG tablet Take 1 tablet (0.5 mg) by mouth every 4 hours as needed (Anxiety, Nausea/Vomiting or Sleep) 10/12/2021 at Unknown time Yes Toni Mark,    magic mouthwash (ENTER INGREDIENTS IN COMMENTS) suspension Swish, gargle, and spit one to two teaspoonfuls every six hours as needed. May be swallowed if esophageal involvement. 10/11/2021 at Unknown time Yes GitErrol carmen MD   omeprazole (PRILOSEC) 2 mg/mL suspension Take 10 mLs (20 mg) by mouth every morning (before breakfast) 10/11/2021 at Unknown time Yes Jose Antonio Mckenna MD   pentoxifylline (TRENTAL) 50 mg/ml suspension Take 8 mLs (400 mg) by mouth 3 times daily 10/11/2021 at Unknown time Yes Jose Antonio Mckenna MD   vitamin E (TOCOPHEROL) 1000 units (450 mg) capsule 1 capsule (1,000 Units) by Per Feeding Tube route daily 10/11/2021 at Unknown time Yes Jose Antonio Mcknena MD   oxyCODONE (ROXICODONE) 5 MG/5ML solution Take 7.5 mLs (7.5 mg) by mouth every 6 hours as needed for pain (oral pain)   Indu Millan MD       Date completed: 10/12/21    Medication history completed by: Gema Gallo [facility]

## 2021-10-12 NOTE — PROGRESS NOTES
CLINICAL NUTRITION SERVICES - ASSESSMENT NOTE     Nutrition Prescription    RECOMMENDATIONS FOR MDs/PROVIDERS TO ORDER:  Recommend resume home regimen of EN and H2O flushes when patient discharges    Malnutrition Status:    Non-severe malnutrition in the context of chronic illness    Recommendations already ordered by Registered Dietitian (RD):  Order: 60 ml H2O flush before and after each feeding.     Future/Additional Recommendations:  -- monitor K+ level and possible need for renal formula. If renal formula needed recommend: Nepro 5 cans daily (2 cans BID and 1 can once daily) this will provide (1185 ml): 2133 kcals (28 kcals/kg), 96 g protein (1.3 g/kg), 190 g CHO, 850 ml free H2O, 15 g Fiber      REASON FOR ASSESSMENT  Reese Oakley is a/an 71 year old male assessed by the dietitian for Admission Nutrition Risk Screen for positive and Provider Order - Registered Dietitian to Assess and Order TF per Medical Nutrition Therapy Protocol    Per chart review patient with a past medical history significant for HTN, HLD, type 2 DM, paroxysmal atrial fib, GERD, colon cancer, appendiceal cancer, laryngeal cancer s/p laser resection/adjuvant radiotherapy (2014), tongue cancer (2020) s/p wide local excision c/b biopsy-proven recurrent SCC recently c/b recurrent presumed malignant pleural effusions admitted from his oncology clinic today for acute hypoxic respiratory failure, LUCIANA, and hypercalcemia    NUTRITION HISTORY  Per chart review patient had PEG placed 7/5/21 and has been receiving Isosource 1.5 6 cans daily     Home regimen per pt report: Isosource 1.5- 2 cans TID at 9am, 1:30pm, and 6:30pm. Pt reports that he's been tolerating his TFs well and last received them at 7pm last night.   Home free water: 16 oz (480 mL) at 2am, 5am, and 8am. Pt also has water flushes before and after feeds.   TF provides: 1500 mL formula, 2250 kcal (32 kcal/kg), 102 g PRO (1.4 g/kg), 264 g carbs, and 1140 free water.   Additional  "water flushes provide: 1440 mL (total free water 2580 mL)   No PO intake at baseline.   Patient/Patient spouse reports he flushes his tube with 60 ml H2O flush before and after each feeding. He reports he tolerates Isosource well and has gained some weight. Kenrick reports the Jevity 1.5 causes diarrhea however, this AM his farhana was more formed.     CURRENT NUTRITION ORDERS  Diet: NPO  EN regimen: Jevity 1.5 2 cans TID (total 6 cans daily) this provides (1422 ml) 2133 kcals (28 kcals/kg), 91 gram protein (1.2 g/kg), 307 gram CHO, 30 gram Fiber, 1081 ml Free H2O     LABS  Labs reviewed  (10/12): BUN 55 mg/dL (H), Cr 1.91 mg/dL (H), Ca ++ 11.4 mg/dL (H), Glucose 369 mg/dL (H), k+ 5 mmol/L      MEDICATIONS  Medications reviewed  Novolog insulin  Lantus PEN  Vitamin E    ANTHROPOMETRICS  Height: 170.2 cm (5' 7\")  Most Recent Weight: 75 kg (165 lb 6.4 oz)    IBW: 66.8 kg  BMI: Overweight BMI 25-29.9  Weight History:   Wt Readings from Last 10 Encounters:   10/11/21 75 kg (165 lb 6.4 oz)   10/11/21 76.1 kg (167 lb 11.2 oz)   09/28/21 73 kg (160 lb 15 oz)   09/24/21 72 kg (158 lb 11.7 oz)   09/15/21 71.4 kg (157 lb 6.5 oz)   08/17/21 69.4 kg (153 lb)   08/03/21 71.3 kg (157 lb 3 oz)   07/13/21 69.4 kg (153 lb)   07/05/21 69.7 kg (153 lb 10.6 oz)   06/29/21 71.2 kg (157 lb)   Dosing Weight: 75 kg (admit weight)     ASSESSED NUTRITION NEEDS  Estimated Energy Needs: 9422-9689 kcals/day (25 - 30 kcals/kg)  Justification: Maintenance  Estimated Protein Needs: 75-90 grams protein/day (1 - 1.2 grams of pro/kg)  Justification: Maintenance  Estimated Fluid Needs: 1 mL/kcal   Justification: Maintenance    PHYSICAL FINDINGS  See malnutrition section below.     MALNUTRITION  % Intake: Decreased intake does not meet criteria  % Weight Loss: None noted  Subcutaneous Fat Loss: Facial region:  mild and Lower arm:  mild  Muscle Loss: Temporal:  moderate, Thoracic region (clavicle, acromium bone, deltoid, trapezius, pectoral):  moderate and " Lower arm  (forearm): moderate  Fluid Accumulation/Edema: None noted  Malnutrition Diagnosis: Non-severe malnutrition in the context of chronic illness    NUTRITION DIAGNOSIS  Inadequate oral intake related to swallowing difficulty as evidenced by patient NPO and the need for nutrition support to meet 100% of estimated nutritional needs       INTERVENTIONS  Implementation  Nutrition Education: See education flowsheet   Collaboration with other providers - discussed with nurse/pharmacy   Feeding tube flush - added 60 ml H2O flush before and after each feeding     Goals  Total avg nutritional intake to meet a minimum of 25 kcal/kg and 1 g PRO/kg daily (per dosing wt 75 kg).     Monitoring/Evaluation  Progress toward goals will be monitored and evaluated per protocol.    Ami Villalobos, MS/RD/LD/CNSC  6A/7D RD Pager: [phone]

## 2021-10-12 NOTE — CONSULTS
"Interventional Radiology Inpatient Consult Service Note      Reason for Consult Please address CXR with \"Portacath line kink\" in it. Does this need intervention?     Interventional Radiology Adult/Peds IP Consult: Patient to be seen: Routine within 24 hours; Call back #: gold [phone]; Please address CXR with \"Portacath line kink\" in it. Does this need intervention?; Requesting provider? Hospitalist (if different fr... [[phone]]    Electronically signed by: Devendra Crow MD on 10/12/21 0900  Call back # [phone]     Is the patient on an anticoagulant ? Yes   Specify apixaban   Is the patient currently NPO ? No     Hx - port placed 1/15/2021          CXRs show intermittent supraclavicular catheter overlay(bend) in PA/AP projections. No report of port dysfunction. Catheter mobility may be due to patient tissue movement. If port is functional, no intervention/revision is indicated.              "

## 2021-10-12 NOTE — PHARMACY-VANCOMYCIN DOSING SERVICE
Pharmacy Vancomycin Initial Note  Date of Service 2021  Patient's  1950  71 year old, male    Indication: Community Acquired Pneumonia    Current estimated CrCl = Estimated Creatinine Clearance: 34.9 mL/min (A) (based on SCr of 2.03 mg/dL (H)).    Creatinine for last 3 days  10/11/2021: 10:39 AM Creatinine 2.03 mg/dL    Recent Vancomycin Level(s) for last 3 days  No results found for requested labs within last 72 hours.      Vancomycin IV Administrations (past 72 hours)      No vancomycin orders with administrations in past 72 hours.                Nephrotoxins and other renal medications (From now, onward)    Start     Dose/Rate Route Frequency Ordered Stop    10/12/21 1600  vancomycin (VANCOCIN) 750 mg in sodium chloride 0.9 % 250 mL intermittent infusion      750 mg  over 90 Minutes Intravenous EVERY 24 HOURS 10/11/21 1931      10/11/21 2000  vancomycin 1500 mg in 0.9% NaCl 250 ml intermittent infusion 1,500 mg      1,500 mg  over 90 Minutes Intravenous ONCE 10/11/21 192            Contrast Orders - past 72 hours (72h ago, onward)    None          Loading dose: 1500 mg at 20:00 10/11/2021.  Regimen: 750 mg IV every 24 hours.  Start time: 20:00 on 10/11/2021  Exposure target: AUC24 (range)400-600 mg/L.hr   AUC24,ss: 436 mg/L.hr  Probability of AUC24 > 400: 60 %  Ctrough,ss: 14.4 mg/L  Probability of Ctrough,ss > 20: 20 %  Probability of nephrotoxicity (Lodise MEMO ): 10 %          Plan:  1. Start vancomycin  1500 mg IV x1, then 750 mg IV q24h.  2. Vancomycin monitoring method: AUC  3. Vancomycin therapeutic monitoring goal: 400-600 mg*h/L  4. Pharmacy will check vancomycin levels as appropriate in 1-3 Days.    5. Serum creatinine levels will be ordered a minimum of twice weekly.      Yady Davis, KatiaD

## 2021-10-12 NOTE — CONSULTS
[facility]  Palliative Care Consultation Note    Patient: Reese Oakley  Date of Admission:  10/11/2021    Requesting Clinician / Team: [facility]Oncology  Reason for consult: Goals of care  Patient and family support  Symptom management.    Recommendations/discussion   Pt seen and examined with spouse Meghan present for interview and exam.  Reviewed with bedside nurse, & oncology service including primary oncologist Dr. Mark.  His primary complaint is oral/tongue pain which sometimes awakens him at night, and low back pain which seems central and nonradiating in nature.      Current dosing of oxycodone provides only partial relief.  Consider transition to hydromorphone suspension 2 mg via G-tube every 4 hours as needed pain.  Will reassess as inpatient.  Discontinue oxycodone if this is started.    He uses Magic mouthwash as directed for oral pain. Some relief. In addition, consider doxepin rinse (25 mg tablet dissolved in 10 mls of water- swish- for a full minute and spit every 4 hours as needed).    He is unclear if Gabapentin is helpful- Consider trial dose reduction to only 500 mg at hs via tube and discontinue daytime dosing. Assess for sx management. Topical interventions to low back such as voltaren gel- and/or Lidoderm patches may be used as directed.     Briefly discussed goals of care, will pursue with follow-up visits.  His primary motivation is extra quality time with his family.  If he wishes to pursue additional cancer directed treatments this precludes hospice care for now.  Palliative care clinic follow-up would likely be helpful in terms of symptom management and ongoing discussion.    FULL CODE for now: needs ongoing review and evaluation.    These recommendations have been discussed with pt and primary service.      Thank you for the opportunity to participate in the care of this patient and family. Our team: will continue to follow.      During regular M-F work hours -- if you are not sure who specifically to contact -- please contact us by sending a text page to our team consult pager at [phone].    After regular work hours and on weekends/holidays, you can call our answering service at [phone]. Also, who's on call for us is available in [employer].       Kike COLLINS NP ACHPN  Nurse Practitioner- Lead Advanced Practice Provider  [facility] Palliative Medicine Consult Service   [phone]    TT spent: 45 minutes of which 35 minutes were spent in direct face to face contact with patient/family. Patient teaching done regarding role of PC Consultation Service. Greater than 50% of time spent counseling and/or coordinating care.     Assessments:  Reese Oakley is a 71 year old male ( known to PC from 9/25 consult)  with past medical history significant for HTN, HLD, type 2 DM, paroxysmal atrial fib, GERD, colon cancer, appendiceal cancer, laryngeal cancer s/p laser resection/adjuvant radiotherapy (2014), tongue cancer (2020) s/p wide local excision c/b biopsy-proven recurrent SCC recently c/b recurrent presumed malignant pleural effusions. [facility] admission from his oncology clinic 10/11/21 for acute hypoxic respiratory failure, LUCIANA, and hypercalcemia.    Today, the patient was seen for PC consultation for AHRF and underlying complex cancer history.     Prognosis, Goals, & Planning:      Functional Status just prior to hospitalization: 1 (Restricted in physically strenuous activity but ambulatory and able to carry out work of a light or sedentary nature)      Prognosis, Goals, and/or Advance Care Planning were addressed today: Yes        Summary/Comments: He indicates having many conversations. More quality time with family is of utmost importance.  Open to more chemo intervention.       Patient's decision making preferences: independently          Patient has decision-making capacity today for complex  "decisions: Yes            I have concerns about the patient/family's health literacy today: No           Patient has a completed Health Care Directive: Yes, and on file.      Code status: Full Code    Coping, Meaning, & Spirituality:   Mood, coping, and/or meaning in the context of serious illness were addressed today: Yes  Summary/Comments: He states he has come to peace with his diagnosis. He repeats he has lead a good life and is happy- \"denies worries about anything.\" He is not ready to give up but \"know that I am dying\". [employer] christine- didn't strongly indicate need for  support.     Social: Living situation: Currently lives in a home with his wife Meghan in [facility].    Key family / caregivers: Spouse is his primary caregiver.  He has two children (one step daughter and one biologic child with his wife)- one lives in [facility] and another in [place] [facility] [facility]. 6 grandchildren-some adult age and some as young as 7 and 9 years old.   Leizure: Golf- played a couple times this summer. Time with family is of most importance.  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        History of Present Illness:  History gathered today from: patient, family/loved ones, medical chart, medical team members, unit team members  Dr. Mark (primary oncologist)  elected against pembrolizumab alone in clinic, and instead to try and give along with carboplatin and 5-FU in the near future, pending hospital course. There have been preliminary discussions of hospice and supportive measures such as Pleurx catheter for pleural effusion management. His functional status may influence course of therapies.     Key Palliative Symptom Data:  # Pain severity the last 12 hours: moderate  # Nausea severity the last 12 hours: low  # Anxiety severity the last 12 hours: none    Patient is on opioids: assessed and bowels ok/no needed changes to plan of care today.    ROS:  Comprehensive ROS is " reviewed and is negative except as here & per HPI     Past Medical History:  Past Medical History:   Diagnosis Date     Adenocarcinoma (H)     large instestine      Anemia      Cancer of appendix (H)      Closed wedge compression fracture of T6 vertebra (H)      Diabetes (H)      Gastro-oesophageal reflux disease      History of radiation therapy      HLD (hyperlipidemia)      Hoarseness      HTN (hypertension)      Hypertension      Paroxysmal atrial fibrillation (H)      Primary squamous cell carcinoma of throat (H)      Squamous cell cancer of tongue (H)      Squamous cell carcinoma of neck         Past Surgical History:  Past Surgical History:   Procedure Laterality Date     APPENDECTOMY       COLECTOMY      Right     DISSECTION RADICAL NECK MODIFIED Right 12/9/2020    Procedure: Modified neck dissection;  Surgeon: Jose Antonio Mckenna MD;  Location: [facility] OR     ENDOBRONCHIAL ULTRASOUND FLEXIBLE N/A 7/5/2021    Procedure: ENDOBRONCHIAL ULTRASOUND, WITH FLEXIBLE BRONCHOSCOPY;  Surgeon: Indio Zamora MD;  Location: [facility] OR     ENDOBRONCHIAL ULTRASOUND FLEXIBLE N/A 9/15/2021    Procedure: ENDOBRONCHIAL ULTRASOUND, WITH FLEXIBLE BRONCHOSCOPY,;  Surgeon: Alber Bush MD;  Location: [facility] OR     ENDOSCOPIC INSERTION TUBE GASTROSTOMY N/A 7/5/2021    Procedure: INSERTION, PEG TUBE;  Surgeon: Indio Zamora MD;  Location: [facility] OR     EXCISE LESION INTRAORAL Right 2/19/2020    Procedure: Wide Local Excision of Right Tongue Lesion;  Surgeon: Jose Antonio Mckenna MD;  Location: [facility]     INSERT PORT VASCULAR ACCESS Right 1/15/2021    Procedure: CHEST INSERTION, VASCULAR ACCESS PORT @0900;  Surgeon: Tony Hopkins MD;  Location: [facility] OR     IR CHEST PORT PLACEMENT > 5 YRS OF AGE  1/15/2021     LARYNGOSCOPY WITH BIOPSY(IES) N/A 7/5/2021    Procedure: LARYNGOSCOPY, WITH BIOPSY;  Surgeon: Jose Antonio Mckenna MD;  Location: [facility] OR     LASER CO2 LARYNGOSCOPY N/A 10/6/2014    Procedure: LASER CO2 LARYNGOSCOPY;  Surgeon:  Jose Antonio Mckenna MD;  Location: [facility]     LASER CO2 LESION ORAL N/A 5/24/2021    Procedure: Excision of tongue ulcer with omniguide laser;  Surgeon: Jose Antonio Mckenna MD;  Location: [facility] OR         Family History:  Family History   Problem Relation Age of Onset     Diabetes Mother      Heart Disease Father      Diabetes Brother      Anesthesia Reaction No family hx of      Bleeding Disorder No family hx of      Clotting Disorder No family hx of         Allergies:  No Known Allergies     Medications:  I have reviewed this patient's medication profile and medications from this hospitalization.     Physical Exam:  Vital Signs: Temp: 96.9  F (36.1  C) Temp src: Oral BP: 117/40 Pulse: 56   Resp: 16 SpO2: 96 % O2 Device: Nasal cannula Oxygen Delivery: 3 LPM  Weight: 163 lbs 8 oz  General: No acute distress, lying in bed. Awake, alert and oriented x4. Spouse also present.   HEENT: Symmetric features, NC/AT. Tongue reddened and partially coated. No pallor or icterus  Cardiac: Regular rate and rhythm, no murmurs appreciated on exam. HR in 50's at rest.   Respiratory: Breathing comfortably on 2L, intermittent productive mild cough, no audible wheezes  Abdomen: Soft, non-tender, non-distended, PEG site clean, dry and without erythema or drainage  MSK/Skin: 1+ sae LE peripheral edema, no petechiae or bruising noted on exam  Neuro: no tremor. Moves all 4's. Not observed walking or transferring.  Data reviewed:  ROUTINE LABS (Last four results)  BMPRecent Labs   Lab 10/12/21  0930 10/12/21  0714 10/12/21  0248 10/11/21  2027 10/11/21  1039   NA  --  134  --   --  132*   POTASSIUM  --  5.0  --   --  4.7   CHLORIDE  --  102  --   --  93*   ELEUTERIO  --  11.4*  --  13.0* 14.0*   CO2  --  29  --   --  32   BUN  --  55*  --   --  52*   CR  --  1.91*  --   --  2.03*   * 369* 397*  --  243*     Liver Function Studies - Recent Labs   Lab Test 10/12/21  0714   PROTTOTAL 6.8   ALBUMIN 1.9*   BILITOTAL 0.2   ALKPHOS 175*   AST 13    ALT 17     CBC  Recent Labs   Lab 10/12/21  0714 10/11/21  1039   WBC 21.4* 15.5*   RBC 3.64* 3.77*   HGB 10.8* 11.0*   HCT 34.3* 34.3*   MCV 94 91   MCH 29.7 29.2   MCHC 31.5 32.1   RDW 12.8 12.8    421     INRNo lab results found in last 7 days.

## 2021-10-13 NOTE — CONSULTS
BRIEF ONCOLOGY NOTE      This note is to resolve consult order. Please see original consult note placed on 10/12/21.    Please do not hesitate to reach out if there are questions/concerns in the mean time.     Macy Bravo PA-C  Hematology/Oncology  Pager: [phone]

## 2021-10-13 NOTE — PLAN OF CARE
A&Ox4. VSS. Sating >94% on 3L NC. Denies SOB and N/V. C/o constant mouth main. MD and palliative saw pt to organize pain managment plan. Alternating doxipen and magic mouthwash Q2hrs. Dilaudid now scheduled Q4hrs. Continue with pain regimen. 2/3 bolus tube feeds completed today. NPO, all meds through G tube. IV fluids decreased from 150mL/hr to 75 mL/hr. Upper and lower BLE edema noted. Plans to sleep in recliner. Promote sleep by clustering cares and decreasing light from laurent in pt room at night. Continue to monitor.

## 2021-10-13 NOTE — PLAN OF CARE
[id number]:    A/Ox4. Afebrile. OVSS on 3L O2 via NC. Magic mouthwash, doxepin and dilaudid given x1 for oral pain with some relief. Denies SOB and N/V. NPO status maintained. Bolus TF given by wife at beside.  and 209, insulin given per parameters. Pt voiding spontaneously with adequate UOP. R-Port infusing N.S. at 150ml/hr. Up with Ax1 + walker. Continue with POC.

## 2021-10-13 NOTE — PROGRESS NOTES
"[facility]  Palliative Care Daily Progress Note       Recommendations & Counseling     Pt seen and examined in follow up with spouse Meghan present for interview and exam. Reviewed with bedside nurse, oncology service, primary hospital medicine team.  His oral/tongue pain responds to magic mouthwash and recent addition to doxepin rinse. Will ask pharmacy about spacing these interventions.        His transition to hydromorphone suspension 2 mg via G-tube every 4 hours has not helped as hoped -under dosed from his perspective. \"didn't know I had to ask for it.\"  Encourage scheduled 2mg every 4 hour dosing ATC for next 24 hours to determine benefit. May receive as needed single 2 mg prn dosing pain once daily.   Continue to hold home oxycodone.     He is unclear if Gabapentin helpful- Continue trial dose reduction to only 500 mg at hs via tube and discontinue daytime dosing. Assess for sx management.   Topical interventions to low back such as voltaren gel- and/or Lidoderm patches may be used as directed.  Hot or Cold packs to painful areas as directed.      Briefly discussed goals of care, but he was focused on poor sx control at this time. Will pursue with follow-up visits. His primary motivation is extra quality time with his family. If he wishes to pursue additional cancer directed treatments it precludes hospice enrollment for now.   Palliative care clinic follow-up would likely be helpful in terms of symptom management and ongoing discussion.        Assessments          Reese Oakley is a 71 year old male (known to PC from 9/25 consult) with past medical history significant for HTN, HLD, type 2 DM, paroxysmal atrial fib, GERD, colon cancer, appendiceal cancer, laryngeal cancer s/p laser resection/adjuvant radiotherapy (2014), tongue cancer (2020) s/p wide local excision c/b biopsy-proven recurrent SCC recently c/b recurrent presumed malignant pleural effusions. " [facility] admission from his oncology clinic 10/11/21 for acute hypoxic respiratory failure, LUCIANA, and hypercalcemia.    Today, the patient was seen for PC follow up d/t AHRF and underlying complex cancer history.     Prognosis, Goals, or Advance Care Planning was addressed today with: Yes.  Mood, coping, and/or meaning in the context of serious illness were addressed today: No.  Summary/Comments: see consult note 10/12            Interval History:     Chart review/discussion with unit or clinical team members:   Continue to wean oxygen as tolerated, PT and OT evals. Will need to have pain plan in place prior to discharge.    Key Palliative Symptoms:  # Pain severity the last 12 hours: moderate  # Dyspnea severity the last 12 hours: none  # Nausea severity the last 12 hours: none    Patient is on opioids: assessed and bowels ok/no needed changes to plan of care today.           Review of Systems:       5 system ROS reviewed and is unremarkable except as noted.          Medications:     I have reviewed this patient's medication profile and medications during this hospitalization.             Physical Exam:   Vitals were reviewed  Temp: (!) 96.6  F (35.9  C) Temp src: Oral BP: 130/49 Pulse: 51   Resp: 16 SpO2: 96 % O2 Device: Nasal cannula Oxygen Delivery: 3 LPM  General: No acute distress, sitting in bedside chair. Awake, alert and oriented x4. Spouse again present.   HEENT: Symmetric features, NC/AT. Tongue remains reddened and partially coated. No pallor or icterus  Cardiac: Regular rate and rhythm, no murmurs appreciated on exam. HR in 50's at rest.   Respiratory: Breathing comfortably on 2l NC, intermittent productive mild cough, no audible wheezes  Abdomen: sitting in bedside chair. Deferred.  MSK/Skin: 1-2+ sae LE peripheral edema, no petechiae or bruising noted on exam  Neuro: no tremor. Moves all 4's. Not observed walking or transferring             Data Reviewed:     ROUTINE LABS (Last four  results)  BMPRecent Labs   Lab 10/13/21  1303 10/13/21  0935 10/13/21  0808 10/13/21  0711 10/12/21  0930 10/12/21  0714 10/12/21  0248 10/11/21  2027 10/11/21  1039   0000   NA  --   --   --  139  --  134  --   --  132*  --    POTASSIUM  --   --   --  5.0  --  5.0  --   --  4.7  --    CHLORIDE  --   --   --  108  --  102  --   --  93*  --    ELEUTERIO  --   --   --  9.6  --  11.4*  --  13.0* 14.0*  --    CO2  --   --   --  29  --  29  --   --  32  --    BUN  --   --   --  52*  --  55*  --   --  52*  --    CR  --   --   --  1.68*  --  1.91*  --   --  2.03*  --    * 232* 205* 240*   < > 369*   < >  --  243*   < >    < > = values in this interval not displayed.     CBC  Recent Labs   Lab 10/13/21  0711 10/12/21  0714 10/11/21  1039   WBC 28.5* 21.4* 15.5*   RBC 3.40* 3.64* 3.77*   HGB 10.1* 10.8* 11.0*   HCT 32.8* 34.3* 34.3*   MCV 97 94 91   MCH 29.7 29.7 29.2   MCHC 30.8* 31.5 32.1   RDW 13.2 12.8 12.8    391 421     INRNo lab results found in last 7 days.

## 2021-10-13 NOTE — PROGRESS NOTES
[facility]    Medicine Progress Note - Hospitalist Service, Gold 11       Date of Admission:  10/11/2021    Assessment & Plan          Reese Oakley is a 71 year old male with past medical history significant for HTN, HLD, type 2 DM, paroxysmal atrial fib, GERD, colon cancer, appendiceal cancer, laryngeal cancer s/p laser resection/adjuvant radiotherapy (2014), tongue cancer (2020) s/p wide local excision c/b biopsy-proven recurrent SCC recently c/b recurrent presumed malignant pleural effusions admitted from his oncology clinic today for acute hypoxic respiratory failure, LUCIANA, and hypercalcemia.      Today's plan; discussed with wife, oncology  - noted helpful palliative recs-changes made as recommended/advised, monitor pain control  - Trend Ca labs, and IVF  - afternoon, pa/lat CXR, continue IV Abx, as no clear sign infection, but rising WBC noted  - Attempt to provide pt/RN pre-bed time planning for better sleeping in chair accomodations  -->  With pa/lat CXR: consider IR for L pleurex catheter, will discuss with Onc and pt/family 10/14     # Acute hypoxic respiratory failure   # Recurrent left sided pleural effusions   Presented to oncology clinic today and found to be hypoxic in 70s on room air, improved to 93% on 3L.  CXR on admission with persistent bilateral perihilar opacities, with trace right and small left pleural effusions.  Has required 2 thoracenteses in the past 2 weeks for loculated L pleural effusion.  Not responsive to IV Lasix and has had gradually worsening Cr.  Leukocytosis to 15.5.  Afebrile.     - Start Cefepime 1g Q12H and Vancomycin for now, reassess in AM   - Per chart review, Pleurex catheter has been discussed w/ patient & wife at OSH on previous admission; would revisit this during admission by CXR in 1-2 days  - Continue supplemental O2 to keep sats > 90%   - Continuous pulse ox      # Hypercalcemia of Malignancy - Corrected calcium  15.4 from clinic labs.  Received 2L fluid resuscitation and Zometa x 1 dose prior to leaving clinic.  Recurrent issue for patient.  Appears to have been on calcitonin nasal spray in the past.    - NS 0.9% at 150cc/hr for now   - Checking Ca daily   - Completed Calcitonin 300 units Q12H     # LUCIANA in setting of stage III CKD - Cr elevated to 2.03 today.  BL closer 0.8-1.1.  Likely pre-renal in setting of recent diuresis vs hypercalcemia vs dehydration.  BUN 52.    - Fluid resuscitation as above   - Intake/output   - Trend CMP to monitor renal function   - Avoid/minimize nephrotoxic agents      # Paroxysmal atrial fib   # Episodic presyncope/syncope   Recently found to be in atrial fib w/ RVR following 1-2 episodes of syncope vs near syncope.  Has undergone Ziopatch monitoring with 33% atrial fib burden.  Seen by Cardiology on 9/27, started on amiodarone taper. On Eliquis 5mg BID PTA.     - Pharmacy consult placed to verify Amiodarone taper, appears that he may now be at 200mg daily dose (ordered)  -- Amio dose now corrected per Pharmacy  - Continue Eliquis 5mg BID      # Possible kink in Port-A-Cath at R internal jugular site - CXR with port-a-cath w/ possible kink at R internal jugular venotomy site.  Port appears to be working at time of transfer from clinic.    -  IR aware, port functions without issue, no intervention at this time      # Recurrent SCC with metastatic disease (left lung, lytic lesions to L2, L4, S1, and possible R side tongue)   # Tongue cancer (2/2020) s/p resection   # Larynx cancer (2014) s/p laser resection and radiotherapy   See Oncology clinic note from today, 10/11 for extensive history. Was due to start chemoimmunotherapy with Dr. Mark today but is now admitted to the hospital.  Had been prescribed dexamethasone to start after receiving carboplatin, has not taken this yet.      - Oncology consult   - 10/12: STOPPTA Oxycodone 7.5mg TID (daytime) and 15mg at HS PRN for pain   - 10/12: Hold  daytime PTA Gabapentin 250mg/250mg, continue only at bedtime 500mg   - Palliative Consult: Start PO liquid Dilaudid 2 mg q4 prn     # Type 2 DM - Last Hgb A1c 7.8% in 6/2021.  On Lantus 25 units every morning, and fixed Novolog 15 units TID w/ bolus feeds.    - Continue PTA Lantus and fixed Novolog dosing   - BG checks ac/hs   - Hypoglycemia protocol in place  - If changes to TFs, consider Endocrine consult for assistance w/ insulin adjustment      # Malnutrition - S/p PEG in 7/2021.  On Isosource 1.5 TID prior to admission.      - Nutrition consult to manage needs while inpatient   - Ordered Jevity 1.5 for tonight (c/w Isosource per chart review)     - Continue Vitamin E supplementation      # Mild hyponatremia - Na 132.  Likely 2/2 recent diuresis.  BL appears to be in 130s.  Getting IV fluid resuscitation w/ NS as above.      # Dispo - Per chart review, during pt's last admission at [facility] 10/4-10/6, pt had verbalized desire to be DNR/DNI.  Has been seen by Palliative Care during admission here in 9/2021 given prognosis of metastatic disease but had planned to start chemo this week.  I discussed with pt's wife code status, and she explained that he would not want intubation unless he would have meaningful recovery.  I explained that we could keep as full code for now and address further pending clinical course if patient were to decompensate.         Diet: Adult Formula Bolus Feeding: TID Jevity 1.5; Route: Gastrostomy; 6; Can(s); Medication - Feeding Tube Flush Frequency: At least 15-30 mL water before and after medication administration and with tube clogging  NPO for Medical/Clinical Reasons Except for: NPO but receiving Tube Feeding    DVT Prophylaxis: Apixaban  Lerma Catheter: Not present  Central Lines: None  Code Status: Full Code      Disposition Plan   Expected discharge: 10/14/2021   recommended to prior living arrangement once adequate pain management/ tolerating PO medications.     The  patient's care was discussed with the Bedside Nurse, Patient, Patient's Family and Oncology Consultant.    Devendra Crow MD  Hospitalist Service, [address]  Securely message with the Vocera Web Console (learn more here)  Text page via [facility] Paging/Directory  Please see sign in/sign out for up to date coverage information    Clinically Significant Risk Factors Present on Admission            # Non-Severe Malnutrition, POA: based on Subcutaneous fat loss;Muscle loss (10/12/21 1420)  ______________________________________________________________________    Interval History   Seen today for follow up: neck, back, body pain, deconditioned, hypercalcemia    No acute overnight events per patient or patient's family.    Pt reports slept poorly, anxious and agitated briefly at time of MD arrival, calm mood by end of MD visit with pt and wife. RN aware of today's plans and status.    As of today/at time of my visit:  Patient denies any headache, sore throat  Patient reports poor sleeping arrangements-major issue of patient  Patient denies any nausea or vomiting  Patient reports no abdominal pain  Patient denies any shortness of breath   Patient denies any chest pain  Patient reports no arm or leg edema      Data reviewed today: I reviewed all medications, new labs and imaging results over the last 24 hours. I personally reviewed no images or EKG's today.    Physical Exam   Vital Signs: Temp: (!) 96.6  F (35.9  C) Temp src: Oral BP: 130/49 Pulse: 51   Resp: 16 SpO2: 96 % O2 Device: Nasal cannula Oxygen Delivery: 3 LPM  Weight: 170 lbs 0 oz    General: Alert awake and oriented, no distress, conversational  Eyes: Normal pink mucosa with no signs of pallor, no scleral icterus.  Neck: No significant lymphadenopathy, no palpable LN, No JVD  Heart: Regular rate and rhythm, normal S1, normal S2, no murmurs rubs or gallops, PMI is nondisplaced   Peripherally there is no  edema  Lungs: No increased work of breathing, good effort, lungs are clear to auscultation bilaterally   No wheezing or crackles   Breathing on room air  Abdomen: Nontender, nondistended, normal active bowel sounds, no palpable masses  Extremities: Normal capillary refill, no edema, no clubbing, no cyanosis  Neuro: Alert and oriented to person place location and situation   Grossly arms and legs are without any focal motor or sensory deficits   Gait was not assessed   Cranial nerves II through XII are grossly intact  Psych: No acute psychosis, mildly agitated      Data   Recent Labs   Lab 10/13/21  1303 10/13/21  0935 10/13/21  0808 10/13/21  0711 10/13/21  0629 10/12/21  0930 10/12/21  0714 10/12/21  0248 10/11/21  2027 10/11/21  1039 10/11/21  1039   WBC  --   --   --  28.5*  --   --  21.4*  --   --   --  15.5*   HGB  --   --   --  10.1*  --   --  10.8*  --   --   --  11.0*   MCV  --   --   --  97  --   --  94  --   --   --  91   PLT  --   --   --  394  --   --  391  --   --   --  421   NA  --   --   --  139  --   --  134  --   --   --  132*   POTASSIUM  --   --   --  5.0  --   --  5.0  --   --   --  4.7   CHLORIDE  --   --   --  108  --   --  102  --   --   --  93*   CO2  --   --   --  29  --   --  29  --   --   --  32   BUN  --   --   --  52*  --   --  55*  --   --   --  52*   CR  --   --   --  1.68*  --   --  1.91*  --   --   --  2.03*   ANIONGAP  --   --   --  2*  --   --  3  --   --   --  7   ELEUTERIO  --   --   --  9.6  --   --  11.4*  --  13.0*   < > 14.0*   * 232* 205* 240*   < >   < > 369*   < >  --    < > 243*   ALBUMIN  --   --   --  1.9*  --   --  1.9*  --   --    < > 2.2*   PROTTOTAL  --   --   --  6.8  --   --  6.8  --   --    < > 7.3   BILITOTAL  --   --   --  0.2  --   --  0.2  --   --    < > 0.3   ALKPHOS  --   --   --  166*  --   --  175*  --   --    < > 202*   ALT  --   --   --  20  --   --  17  --   --    < > 20   AST  --   --   --  14  --   --  13  --   --    < > 22    < > = values in this  interval not displayed.     No results found for this or any previous visit (from the past 24 hour(s)).  Medications     - MEDICATION INSTRUCTIONS -       sodium chloride 75 mL/hr at 10/13/21 0934       amiodarone  200 mg Oral BID     apixaban ANTICOAGULANT  5 mg Per Feeding Tube BID     ceFEPIme (MAXIPIME) IV  2 g Intravenous Q12H     doxepin  25 mg Swish & Spit Q4H While awake     gabapentin  500 mg Per Feeding Tube At Bedtime     insulin aspart  1-12 Units Subcutaneous Q4H     insulin aspart  15 Units Subcutaneous TID w/meals     insulin glargine  25 Units Subcutaneous QAM AC     lidocaine visc 2% & maalox/mylanta w/simethicone & diphenhydramine  10 mL Swish & Swallow Q4H While awake     omeprazole  20 mg Per Feeding Tube QAM AC     pentoxifylline  400 mg Per Feeding Tube BID     sodium chloride (PF)  3 mL Intracatheter Q8H     vancomycin  750 mg Intravenous Q24H     vitamin E  1,000 Units Per Feeding Tube Daily

## 2021-10-13 NOTE — PLAN OF CARE
[id number]:     Pt is alert and oriented times 4. VSS, on 3L of 02 via NC. Afebrile. Pt denies nausea, and shortness of breath. MMW, doxepin for oral pain with some relief. Writer give pt 2200 neurontin at 0216. Pt bolus TF will be given by wife this morning. BG was 178 and 207, insulin given per order. Pt voiding spontaneously. R-Port infusing N.S. at 150ml/hr. Up with Ax1 with walker. Continue with POC.

## 2021-10-13 NOTE — PROGRESS NOTES
Oncology - Inpatient Consult Service  Progress Note  Date of Service: 10/13/2021    Patient: Reese Oakley  MRN: [id number]  Admission Date: 10/11/2021  Hospital Day # 2    Consult: Metastatic squamous cell carcinoma with recurrent pleural effusions, unable to start chemo due to LUCIANA, hypercalcemia and hypoxia.    Assessment & Recommendations:     Patient appears clinically and vitally stable. Plan for discharge home once stable per primary team assessment with close outpatient oncology follow-up.     Updates today  - Continue to wean oxygen as tolerated    # Acute hypoxic respiratory failure  # Small left pleural effusion, hx of recurrent effusions  Patient admitted from oncology clinic with hypoxia to 70s on room air, improved >90% on 3L. Admission CXR notable for persistent bilateral perihilar opacities with small left pleural effusion. Previously required 2 thoracentesis within past month for loculated L pleural effusion. 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 He was due to start chemoimmunotherapy with Dr. Mark, which has been held while admitted. Plan for close outpatient oncology follow-up following discharge - tentative plan to schedule outpatient chemotherapy for 10/18.  - Pain  control    - D/c Oxycodone 7.5mg TID PRN and 15 PRN at bed    - Started Dilaudid 2mg q4h PRN on 10/12 per palliative    - Gabapentin 500 at bed    # Hypercalcemia 2/2 Malignancy  Recurrent problem for patient. Calcium 14 (corrected to 15.4 for hypoalbuminemia) on admission, with down trend to 11.4 (corrected to 13.1) on 10/12 following 600 units of calcitonin. Of note, 9/24 PTHrP elevated to 16.2 from prior admission, so elevated calcium likely secondary to malignancy. Patient recently started Zometa  on 10/11, received only 1 dose immediately prior to admission.   - Calcium continues to down trend, 9.6 on 10/13  - Continue to monitor ionized calcium on daily labs    # Port-a-catheter kink  Admission CXR with port-a-catheter kink at right internal jugular venotomy site, was previously working when seen at clinic.   - IR aware, no need for intervention unless confirmed port-a-catheter failure    Lines: PEG tube in place, Chronic right chest port-a-cath in place  Nutrition: PEG bolus feeding  Antibiotics: Vancomycin (10/11), Cefepime (10/11)  AC: PTA Apixaban  Code: Full Code    We will continue to follow this patient while hospitalized. Please do not hesitate to page with any questions or concerns. Patient was seen and plan of care was discussed with attending physician Dr. Taylor.    David Boyle MD  PGY-1  Pager: [phone]  =================================================================================    Admission History:    Reese Oakley is a 71 year old male with pmh of HTN, HLD, T2DM, CKD 3, paroxysmal A.fib, GERD, laryngeal cancer s/p laser resection and adjuvant radiotherapy (2014), tongue cancer (2020) s/p wide excision c/b biopsy proven recurrent SCC recently complicated by pleural effusions admitted from oncology clinic on 10/11 for acute hypoxic respiratory failure, LUCIANA and hypercalcemia.     Interval History    No acute events overnight. Patient notes that breathing has worsened since yesterday and  "pain was poorly controlled overnight. Per nursing, patient does not regularly ask for PRN Dilaudid. Discussed with patient and wife at bedside and encouraged asking for PRNs regularly. Of note, patient voice sounded softer and more labored than when seen yesterday.     Review of systems negative except as above.     Physical Exam:    Blood pressure 129/46, pulse 58, temperature (!) 96.6  F (35.9  C), temperature source Oral, resp. rate 16, height 1.702 m (5' 7\"), weight 74.2 kg (163 lb 8 oz), SpO2 96 %.  General: No acute distress, Awake, alert and oriented x3  HEENT: No pallor or icterus  Cardiac: Regular rate and rhythm, no murmurs appreciated on exam  Respiratory: Breathing comfortably on 2L when seen, no cough, no audible wheezes  Abdomen: Soft, non-tender, non-distended, PEG site clean, dry and without erythema or drainage  MSK/Skin: No peripheral edema, no petechiae or bruising noted on exam    Labs:     Labs reviewed    Chemistry notable fpr Cr 1.68 (1.91 previously), calcium of 9.6 (11.4)    CBC notable for WBC 28.5 (21.4)    No pending microbiology for this admission  - Prior admission fungal and yeast cultures from pleural fluid, NGTD    No new imaging    CMP  Recent Labs   Lab 10/13/21  0935 10/13/21  0808 10/13/21  0711 10/13/21  0629 10/12/21  0930 10/12/21  0714 10/12/21  0248 10/11/21  2027 10/11/21  1039   0000   NA  --   --  139  --   --  134  --   --  132*  --    POTASSIUM  --   --  5.0  --   --  5.0  --   --  4.7  --    CHLORIDE  --   --  108  --   --  102  --   --  93*  --    CO2  --   --  29  --   --  29  --   --  32  --    ANIONGAP  --   --  2*  --   --  3  --   --  7  --    * 205* 240* 207*   < > 369*   < >  --  243*   < >   BUN  --   --  52*  --   --  55*  --   --  52*  --    CR  --   --  1.68*  --   --  1.91*  --   --  2.03*  --    GFRESTIMATED  --   --  40*  --   --  34*  --   --  32*  --    ELEUTERIO  --   --  9.6  --   --  11.4*  --  13.0* 14.0*  --    MAG  --   --  2.1  --   --  2.1  -- "   --   --   --    PHOS  --   --  2.8  --   --  4.2  --   --   --   --    PROTTOTAL  --   --  6.8  --   --  6.8  --   --  7.3  --    ALBUMIN  --   --  1.9*  --   --  1.9*  --   --  2.2*  --    BILITOTAL  --   --  0.2  --   --  0.2  --   --  0.3  --    ALKPHOS  --   --  166*  --   --  175*  --   --  202*  --    AST  --   --  14  --   --  13  --   --  22  --    ALT  --   --  20  --   --  17  --   --  20  --     < > = values in this interval not displayed.     CBC  Recent Labs   Lab 10/13/21  0711 10/12/21  0714 10/11/21  1039   WBC 28.5* 21.4* 15.5*   RBC 3.40* 3.64* 3.77*   HGB 10.1* 10.8* 11.0*   HCT 32.8* 34.3* 34.3*   MCV 97 94 91   MCH 29.7 29.7 29.2   MCHC 30.8* 31.5 32.1   RDW 13.2 12.8 12.8    391 421     INRNo lab results found in last 7 days.    Inpatient medication list:  Current Facility-Administered Medications   Medication     amiodarone (CORDARONE) suspension 200 mg     apixaban ANTICOAGULANT (ELIQUIS) tablet 5 mg     benzocaine 20% (HURRICAINE/TOPEX) 20 % spray 0.5-1 mL     ceFEPIme (MAXIPIME) 2 g vial to attach to  ml bag for ADULTS or 50 ml bag for PEDS     glucose gel 15-30 g    Or     dextrose 50 % injection 25-50 mL    Or     glucagon injection 1 mg     glucose gel 15-30 g    Or     dextrose 50 % injection 25-50 mL    Or     glucagon injection 1 mg     diphenhydrAMINE (BENADRYL) solution 25 mg     doxepin (SINEquan) 10 MG/ML (HIGH CONC) solution 25 mg     gabapentin (NEURONTIN) solution 500 mg     HYDROmorphone (STANDARD CONC) (DILAUDID) liquid 2 mg     insulin aspart (NovoLOG) injection (RAPID ACTING)     insulin aspart (NovoLOG) injection (RAPID ACTING)     insulin glargine (LANTUS PEN) injection 25 Units     lidocaine (LMX4) cream     lidocaine 1 % 0.1-1 mL     magic mouthwash suspension (diphenhydramine, lidocaine, aluminum-magnesium & simethicone)     melatonin tablet 1 mg     naloxone (NARCAN) injection 0.2 mg    Or     naloxone (NARCAN) injection 0.4 mg    Or     naloxone  (NARCAN) injection 0.2 mg    Or     naloxone (NARCAN) injection 0.4 mg     omeprazole (priLOSEC) suspension 20 mg     ondansetron (ZOFRAN-ODT) ODT tab 4 mg    Or     ondansetron (ZOFRAN) injection 4 mg     Patient is already receiving anticoagulation with heparin, enoxaparin (LOVENOX), warfarin (COUMADIN)  or other anticoagulant medication     pentoxifylline (TRENtal) suspension 400 mg     sodium chloride (PF) 0.9% PF flush 3 mL     sodium chloride (PF) 0.9% PF flush 3 mL     sodium chloride 0.9% infusion     vancomycin (VANCOCIN) 750 mg in sodium chloride 0.9 % 250 mL intermittent infusion     vitamin E (TOCOPHEROL) 1000 units (450 mg) capsule 1,000 Units           ATTENDING PHYSICIAN ADDENDUM AND NOTE:  I evaluated this patient s case separately and also with resident Dr. David Boyle. The note above reflects my assessment and plan. I have personally reviewed lab results, and vital and radiology results. >50% of time was spent in assessment and coordination of care; >=35 minutes.  Mr. Kenrick Oakley is a 70 yo gentleman with a metastatic squamous cell carcinoma of the head and neck.  He has had recurrent pleural effusions, and was admitted with hypoxia and hypercalcemia. Today the uncorrected calcium value is 9.6 in context of low albumin 1.9; the uncorrected calcium was 14.0 as recently as 10/11/21.  His wife is at his bedside at the time of evaluation.  He is having dyspnea at rest, worse when laying down, thus he is sitting up in a sharp angle of the time of our encounter.  He is wife state he had some pain overnight; we clarified with the nursing team that the patient has oxycodone listed as needed, but he did not request it so we will mediate patient education and agree with the Palliative Care team s further optimization of his pain management regimen prior to discharge, including instituting hydromorphone in place of oxycodone.   Nadir Taylor MD, PhD  Associate Professor of Medicine, Hematology, Oncology and  Transplantation

## 2021-10-14 NOTE — PLAN OF CARE
1341-2523:     A/Ox4. Afebrile. Intermittently hypertensive. OVSS on 3L O2 via NC. Oral and back pain managed with scheduled magic mouthwash, doxepin and dilaudid with some relief. Denies SOB and N/V. NPO status maintained. Bolus TF given by wife at bedside.  and 209, insulin given per parameters. Pt voiding spontaneously with adequate UOP. Bmx1. R-Port infusing N.S. at 75ml/hr. Up with Ax1 + walker. Continue with POC.

## 2021-10-14 NOTE — PLAN OF CARE
Pt is alert and oriented x4, on 4 L of oxygen and on continous pulse oxy meter ; pt has SOB, hypertensive 166/75 recheck was 161/50 does not meet parameter of notification. OVSS. Denies nausea and vomiting. BLE edema.  On NS saline 75ml/hr. Last bm was yesterday 10/14/2021.  Pt is NPO with adequate output. Peg tube is currently clamped. Will continue to monitor

## 2021-10-14 NOTE — CONSULTS
INTERVENTIONAL PULMONOLOGY       Reese Oakley   MRN# [id number]   Age: 71 year old YOB: 1950     Date of Admission:  10/11/2021  Reason for Consultation:     Tunneled pleural catheter (TPC) placement  Requesting Physician:         Deedee De Leon, CNP  [address] [facility]  [place] [address]       Assessment and Plan:    1. Recurrent left malignant pleural effusion. Discussed role for TPC placement with him and his wife today at bedside. They are agreeable. We will first need to hold eliquis for >24hrs. Additionally, will need to see if his insurance covers this procedure and bottle delivery. Tentatively can place it tomorrow pending above issues.     Addendum: insurance approval has been difficult to obtain. May need a few more business days to address. Unfortunately, cannot proceed with placement tomorrow. Will plan to follow-up as an outpatient.    The patient was seen and examined with the resident/fellow physician.  We have discussed the patient in detail and I agree with the findings, assessment, and plan as documented when this note was cosigned on October 14, 2021. I have evaluated all laboratory values and imaging studies for the past 24 hours. I have reviewed all the consults that have been ordered and are active for this patient.      Billing: The patient was seen and examined by me and the findings, assessment, and plan as documented was explained to the patient/family who expressed understand.     Alber Bush MD   of Medicine  Interventional Pulmonary  Department of Pulmonary, Allergy, Critical Care and Sleep Medicine   [facility] [employer]  Pager: [phone]                     HPI/Interval history     Reese Oakley is a 71M with h/o HTN, HLD, type 2 DM, paroxysmal atrial fib, GERD, colon cancer, appendiceal cancer, laryngeal cancer s/p laser resection/adjuvant radiotherapy (2014), tongue cancer (2020) s/p wide local excision c/b  biopsy-proven recurrent SCC recently c/b recurrent malignant pleural effusions and we are consulted for TPC placement.     - Admitted 10/12 for hypoxia, LUCIANA and recurrent pleural effusions  - Had thoracentesis on 9/24 c/w malignant effusion  - Ongoing dyspnea on exertion and we are consulted for possible left TPC placement.     Other active medical problems include:   - has PAF. Rate controlled and on eliquis.    - has DM2. Stable.    - Has recurrent SCC with metastatic disease (left lung, lytic lesions to L2, L4, S1, and possible R side tongue)   - Has tongue cancer (2/2020) s/p resection   - Has laryngyeal cancer (2014) s/p laser resection and radiotherapy            Past Medical History:      Past Medical History:   Diagnosis Date     Adenocarcinoma (H)     large instestine      Anemia      Cancer of appendix (H)      Closed wedge compression fracture of T6 vertebra (H)      Diabetes (H)      Gastro-oesophageal reflux disease      History of radiation therapy      HLD (hyperlipidemia)      Hoarseness      HTN (hypertension)      Hypertension      Paroxysmal atrial fibrillation (H)      Primary squamous cell carcinoma of throat (H)      Squamous cell cancer of tongue (H)      Squamous cell carcinoma of neck            Past Surgical History:      Past Surgical History:   Procedure Laterality Date     APPENDECTOMY       COLECTOMY      Right     DISSECTION RADICAL NECK MODIFIED Right 12/9/2020    Procedure: Modified neck dissection;  Surgeon: Jose Antonio Mckenna MD;  Location: [facility] OR     ENDOBRONCHIAL ULTRASOUND FLEXIBLE N/A 7/5/2021    Procedure: ENDOBRONCHIAL ULTRASOUND, WITH FLEXIBLE BRONCHOSCOPY;  Surgeon: Indio Zamora MD;  Location: [facility] OR     ENDOBRONCHIAL ULTRASOUND FLEXIBLE N/A 9/15/2021    Procedure: ENDOBRONCHIAL ULTRASOUND, WITH FLEXIBLE BRONCHOSCOPY,;  Surgeon: Alber Bush MD;  Location: [facility]     ENDOSCOPIC INSERTION TUBE GASTROSTOMY N/A 7/5/2021    Procedure: INSERTION, PEG TUBE;  Surgeon:  Indio Zamora MD;  Location: [facility] OR     EXCISE LESION INTRAORAL Right 2/19/2020    Procedure: Wide Local Excision of Right Tongue Lesion;  Surgeon: Jose Antonio Mckenna MD;  Location: [facility]     INSERT PORT VASCULAR ACCESS Right 1/15/2021    Procedure: CHEST INSERTION, VASCULAR ACCESS PORT @0900;  Surgeon: Tony Hopkins MD;  Location: [facility] OR     IR CHEST PORT PLACEMENT > 5 YRS OF AGE  1/15/2021     LARYNGOSCOPY WITH BIOPSY(IES) N/A 7/5/2021    Procedure: LARYNGOSCOPY, WITH BIOPSY;  Surgeon: Jose Antonio Mckenna MD;  Location: [facility]     LASER CO2 LARYNGOSCOPY N/A 10/6/2014    Procedure: LASER CO2 LARYNGOSCOPY;  Surgeon: Jose Antonio Mckenna MD;  Location: [facility]     LASER CO2 LESION ORAL N/A 5/24/2021    Procedure: Excision of tongue ulcer with omniguide laser;  Surgeon: Jose Antonio Mckenna MD;  Location: [facility] OR          Social History:     Social History     Tobacco Use     Smoking status: Never Smoker     Smokeless tobacco: Never Used   Substance Use Topics     Alcohol use: Not Currently     Alcohol/week: 0.0 standard drinks     Comment: none for 20 years          Family History:     Family History   Problem Relation Age of Onset     Diabetes Mother      Heart Disease Father      Diabetes Brother      Anesthesia Reaction No family hx of      Bleeding Disorder No family hx of      Clotting Disorder No family hx of            Allergies:    No Known Allergies       Medications:     Current Facility-Administered Medications   Medication     amiodarone (CORDARONE) suspension 200 mg     [Held by provider] apixaban ANTICOAGULANT (ELIQUIS) tablet 5 mg     benzocaine 20% (HURRICAINE/TOPEX) 20 % spray 0.5-1 mL     ceFEPIme (MAXIPIME) 2 g vial to attach to  ml bag for ADULTS or 50 ml bag for PEDS     glucose gel 15-30 g    Or     dextrose 50 % injection 25-50 mL    Or     glucagon injection 1 mg     glucose gel 15-30 g    Or     dextrose 50 % injection 25-50 mL    Or     glucagon injection 1 mg      diphenhydrAMINE (BENADRYL) solution 25 mg     doxepin (SINEquan) 10 MG/ML (HIGH CONC) solution 25 mg     gabapentin (NEURONTIN) solution 500 mg     HYDROmorphone (STANDARD CONC) (DILAUDID) liquid 2 mg     insulin aspart (NovoLOG) injection (RAPID ACTING)     insulin glargine (LANTUS PEN) injection 18 Units     lidocaine (LMX4) cream     lidocaine 1 % 0.1-1 mL     magic mouthwash suspension (diphenhydramine, lidocaine, aluminum-magnesium & simethicone)     melatonin tablet 1 mg     naloxone (NARCAN) injection 0.2 mg    Or     naloxone (NARCAN) injection 0.4 mg    Or     naloxone (NARCAN) injection 0.2 mg    Or     naloxone (NARCAN) injection 0.4 mg     omeprazole (priLOSEC) suspension 20 mg     ondansetron (ZOFRAN-ODT) ODT tab 4 mg    Or     ondansetron (ZOFRAN) injection 4 mg     Patient is already receiving anticoagulation with heparin, enoxaparin (LOVENOX), warfarin (COUMADIN)  or other anticoagulant medication     pentoxifylline (TRENtal) suspension 400 mg     sodium chloride (PF) 0.9% PF flush 3 mL     sodium chloride (PF) 0.9% PF flush 3 mL     vancomycin (VANCOCIN) 750 mg in sodium chloride 0.9 % 250 mL intermittent infusion     vitamin E (TOCOPHEROL) 1000 units (450 mg) capsule 1,000 Units          Review of Systems:     CONSTITUTIONAL: negative for fever, chills, change in weight  INTEGUMENTARY/SKIN: no rash or obvious new lesions  ENT/MOUTH: no sore throat, new sinus pain or nasal drainage  RESP: see interval history  CV: negative for chest pain, palpitations or peripheral edema  GI: no nausea, vomiting, change in stools  : no dysuria  MUSCULOSKELETAL: no myalgias, arthralgias  ENDOCRINE: blood sugars with adequate control  PSYCHIATRIC: mood stable  LYMPHATIC: no new lymphadenopathy  HEME: no bleeding or easy bruisability  NEURO: no numbness, weakness, headaches         Physical Exam:     Temp:  [96.6  F (35.9  C)-98.5  F (36.9  C)] 98.1  F (36.7  C)  Pulse:  [55-71] 71  Resp:  [16-18] 18  BP:  (132-166)/(50-75) 150/57  SpO2:  [94 %-98 %] 96 %  Wt Readings from Last 4 Encounters:   10/13/21 77.1 kg (170 lb)   10/11/21 76.1 kg (167 lb 11.2 oz)   09/28/21 73 kg (160 lb 15 oz)   09/24/21 72 kg (158 lb 11.7 oz)     Constitutional:   Awake, alert and in no apparent distress     Eyes:   Nonicteric, GUALBERTO     ENT:    Trachea is midline. No gross neck abnormalities      Neck:   Supple without supraclavicular or cervical lymphadenopathy     Lungs:   Good air flow.  No crackles. No rhonchi.  No wheezes.     Cardiovascular:   Normal S1 and S2.  RRR.  No murmur, gallop or rub.  Radial, DP and PT pulses normal and symmetric     Abdomen:   NABS, soft, nontender, nondistended.  No HSM.     Musculoskeletal:   No edema.      Neurologic:   Alert and conversant. Cranial nerves  intact.       Skin:   Warm, dry.  No rash on limited exam.           Current Laboratory Data:   All laboratory and imaging data reviewed.    Results for orders placed or performed during the hospital encounter of 10/11/21 (from the past 24 hour(s))   Glucose by meter   Result Value Ref Range    GLUCOSE BY METER POCT 169 (H) 70 - 99 mg/dL   XR Chest 2 Views    Narrative    EXAM: XR CHEST 2 VW  10/13/2021 3:15 PM     HISTORY:  assess for low lung volume vs effusion of clinical  significance, leukocytosis       COMPARISON:  Chest radiograph, 10/11/2021.    FINDINGS:   PA and lateral views of the chest. Right IJ Port-A-Cath terminates in  the mid SVC.     Trachea is midline. Heart size is normal. Prominent and indistinct  pulmonary vasculature with increased perihilar streaky opacities.  Persistent hypoinflated lungs. Small left greater than right pleural  effusion. No appreciable pneumothorax.    Healed mildly displaced posterior fracture of the right sixth rib  there is unchanged. Unchanged lytic lesions of the posterior left  eighth rib. Unchanged wedge deformity of the T6 vertebral body.  Gastrostomy tube and balloon project over the stomach.  Multiple  nonspecific air-fluid levels in the bowel loops.          Impression    IMPRESSION:  Prominent and indistinct pulmonary vasculature with increased  perihilar streaky opacities. Left greater than right pleural effusion.  Findings are consistent with pulmonary edema, infection, and/or  atelectasis. Displaced fracture, right sixth rib. Known lytic lesion  less conspicuous in the left eighth rib when compared with CT scan  from 9/24/2021.    I have personally reviewed the examination and initial interpretation  and I agree with the findings.    ALTON WRIGHT MD         SYSTEM ID:  [id number]   Glucose by meter   Result Value Ref Range    GLUCOSE BY METER POCT 141 (H) 70 - 99 mg/dL   Glucose by meter   Result Value Ref Range    GLUCOSE BY METER POCT 209 (H) 70 - 99 mg/dL   Glucose by meter   Result Value Ref Range    GLUCOSE BY METER POCT 151 (H) 70 - 99 mg/dL   Glucose by meter   Result Value Ref Range    GLUCOSE BY METER POCT 92 70 - 99 mg/dL   Glucose by meter   Result Value Ref Range    GLUCOSE BY METER POCT 71 70 - 99 mg/dL   Glucose by meter   Result Value Ref Range    GLUCOSE BY METER POCT 54 (L) 70 - 99 mg/dL   CBC with platelets   Result Value Ref Range    WBC Count 23.0 (H) 4.0 - 11.0 10e3/uL    RBC Count 3.43 (L) 4.40 - 5.90 10e6/uL    Hemoglobin 10.2 (L) 13.3 - 17.7 g/dL    Hematocrit 33.5 (L) 40.0 - 53.0 %    MCV 98 78 - 100 fL    MCH 29.7 26.5 - 33.0 pg    MCHC 30.4 (L) 31.5 - 36.5 g/dL    RDW 13.3 10.0 - 15.0 %    Platelet Count 356 150 - 450 10e3/uL   Comprehensive metabolic panel   Result Value Ref Range    Sodium 142 133 - 144 mmol/L    Potassium 4.9 3.4 - 5.3 mmol/L    Chloride 112 (H) 94 - 109 mmol/L    Carbon Dioxide (CO2) 30 20 - 32 mmol/L    Anion Gap <1 (L) 3 - 14 mmol/L    Urea Nitrogen 44 (H) 7 - 30 mg/dL    Creatinine 1.41 (H) 0.66 - 1.25 mg/dL    Calcium 8.9 8.5 - 10.1 mg/dL    Glucose 68 (L) 70 - 99 mg/dL    Alkaline Phosphatase 182 (H) 40 - 150 U/L    AST 22 0 - 45 U/L    ALT  29 0 - 70 U/L    Protein Total 6.9 6.8 - 8.8 g/dL    Albumin 2.0 (L) 3.4 - 5.0 g/dL    Bilirubin Total 0.2 0.2 - 1.3 mg/dL    GFR Estimate 49 (L) >60 mL/min/1.73m2   INR   Result Value Ref Range    INR 1.48 (H) 0.85 - 1.15   Glucose by meter   Result Value Ref Range    GLUCOSE BY METER POCT 146 (H) 70 - 99 mg/dL   Glucose by meter   Result Value Ref Range    GLUCOSE BY METER POCT 245 (H) 70 - 99 mg/dL

## 2021-10-14 NOTE — PROGRESS NOTES
A&O x 4, hypertensive, 150s/70s. other VSS on 4L O2 via NC.Pt's BG dropped to 54 @ 0859. Provider notified, treated with bolus of tube feeding, BG up to 146 after feeds. Insulin admin changed to strictly with meals, Novolog decreased to 10 units, Lantus decreased to 18 units. Denies SOB and N/V. C/O constant back pain, treated with 2g oral Dilaudid via PEG tube. Lower BLE edema noted, treated with 20mg IV Lasix. IV fluids discontinued. Proceeding with pleurex drain placement. holding Eliquis. Pt reports frustration with situation & hospital experience.

## 2021-10-14 NOTE — PROGRESS NOTES
[facility]  Palliative Care Daily Progress Note       Recommendations/discussion & Counseling     Pt seen and examined in follow up with spouse Meghan present for interview and exam. Reviewed with bedside nurse, oncology service, primary hospital medicine team.  His oral/tongue pain minimally esponds to magic mouthwash and recent addition of doxepin rinse. Query if thrush playing a role- ? mycelex troches for relief?       Continue hydromorphone suspension 2 mg via G-tube every 4 hours dosing ATC for ongoing assessment of benefit. May receive as needed single 2 mg prn dosing pain once daily.   Continue to hold home oxycodone.     He was unclear if Gabapentin helpful- Continue trial dose reduction to only 500 mg at hs via tube and discontinue daytime dosing. Assess for sx management.   Topical interventions to low back such as voltaren gel- and/or Lidoderm patches may be used as directed. Hot or Cold packs to painful areas as directed.      Briefly discussed goals of care, the ongoing burdens of his chronic illnesses. His primary motivation is extra quality time with his family as prior.   If he continues to pursue additional cancer directed treatments (chemo) it precludes hospice enrollment for now. Consideration being given to pleurx cath for pleural effusion palliation at home.     Palliative care clinic follow-up would likely be helpful in terms of symptom management and ongoing discussion.     .Kike COLLINS NP  Nurse Practitioner-  Advanced Practice Provider  [facility] Palliative Medicine Consult Service   [phone]  TT spent: 36 minutes of which 22 minutes were spent in direct face to face contact with patient/family. Greater than 50% of time spent counseling and/or coordinating care.    Assessments          Reese Oakley is a 71 year old male (known to  from 9/25 consult) with past medical history significant for HTN, HLD, type 2 DM, paroxysmal  atrial fib, GERD, colon cancer, appendiceal cancer, laryngeal cancer s/p laser resection/adjuvant radiotherapy (2014), tongue cancer (2020) s/p wide local excision c/b biopsy-proven recurrent SCC recently c/b recurrent presumed malignant pleural effusions. [facility] admission from his oncology clinic 10/11/21 for acute hypoxic respiratory failure, LUCIANA, and hypercalcemia.    Today, 10/14 the patient was seen for PC follow up d/t AHRF and underlying complex cancer history.     Prognosis, Goals, or Advance Care Planning was addressed today with: Yes.  Mood, coping, and/or meaning in the context of serious illness were addressed today: No.  Summary/Comments: see consult note 10/12            Interval History:     Chart review/discussion with unit or clinical team members:   Continue to wean oxygen as tolerated, PT and OT evals. Will need to have pain plan in place prior to discharge.    Key Palliative Symptoms:  # Pain severity the last 12 hours: moderate  # Dyspnea severity the last 12 hours: low  # Nausea severity the last 12 hours: low    Patient is on opioids: assessed and bowels ok/no needed changes to plan of care today.           Review of Systems:       6 system ROS reviewed and is unremarkable except as noted.          Medications:     I have reviewed this patient's medication profile and medications during this hospitalization.             Physical Exam:   Vitals were reviewed  Temp: 98.5  F (36.9  C) Temp src: Oral BP: (!) 151/71 Pulse: 70   Resp: 18 SpO2: 98 % O2 Device: Nasal cannula Oxygen Delivery: 4 LPM  General: mild distress, sitting in bedside chair. Awake, alert and oriented x4. Spouse again present.   HEENT: Symmetric features, NC/AT. Tongue remains reddened and partially coated. No pallor or icterus  Cardiac: Regular rate and rhythm, no murmurs appreciated on exam. HR in 70's at rest.   Respiratory: Breathing somewhat uncomfortably on 2l NC, speaking in phrases,  intermittent productive  weakened cough, no audible wheezes  Abdomen: sitting in bedside chair. Deferred.  MSK/Skin: 1-2+ sae LE peripheral edema, no petechiae or bruising noted on exam  Neuro: no tremor. Moves all 4's. Not observed walking or transferring             Data Reviewed:     ROUTINE LABS (Last four results)  BMP  Recent Labs   Lab 10/14/21  0859 10/14/21  0759 10/14/21  0431 10/14/21  0056 10/13/21  0808 10/13/21  0711 10/12/21  0930 10/12/21  0714 10/12/21  0248 10/11/21  2027 10/11/21  1039   0000   NA  --   --   --   --   --  139  --  134  --   --  132*  --    POTASSIUM  --   --   --   --   --  5.0  --  5.0  --   --  4.7  --    CHLORIDE  --   --   --   --   --  108  --  102  --   --  93*  --    ELEUTERIO  --   --   --   --   --  9.6  --  11.4*  --  13.0* 14.0*  --    CO2  --   --   --   --   --  29  --  29  --   --  32  --    BUN  --   --   --   --   --  52*  --  55*  --   --  52*  --    CR  --   --   --   --   --  1.68*  --  1.91*  --   --  2.03*  --    GLC 54* 71 92 151*   < > 240*   < > 369*   < >  --  243*   < >    < > = values in this interval not displayed.     CBC  Recent Labs   Lab 10/13/21  0711 10/12/21  0714 10/11/21  1039   WBC 28.5* 21.4* 15.5*   RBC 3.40* 3.64* 3.77*   HGB 10.1* 10.8* 11.0*   HCT 32.8* 34.3* 34.3*   MCV 97 94 91   MCH 29.7 29.7 29.2   MCHC 30.8* 31.5 32.1   RDW 13.2 12.8 12.8    391 421     INRNo lab results found in last 7 days.

## 2021-10-14 NOTE — PROGRESS NOTES
[facility]    Medicine Progress Note - Hospitalist Service, Gold 11       Date of Admission:  10/11/2021    Assessment & Plan          Reese Oakley is a 71 year old male with past medical history significant for HTN, HLD, type 2 DM, paroxysmal atrial fib, GERD, colon cancer, appendiceal cancer, laryngeal cancer s/p laser resection/adjuvant radiotherapy (2014), tongue cancer (2020) s/p wide local excision c/b biopsy-proven recurrent SCC recently c/b recurrent presumed malignant pleural effusions admitted from his oncology clinic today for acute hypoxic respiratory failure, LUCIANA, and hypercalcemia.      Today's plan; discussed with wife, oncology  - Continue IV Abx  - Hold AC  - IP consult for L Thora/Pleurex-consult recs pending--pt and wife agreeable to L Pleurex   - Oncology aware  - s/p IV lasix in the AM, monitor UOP and O2 req  - Changed Dilaudid to PRN  - Decreased dose Insulin due to AM hypoglycemia noted     # Acute hypoxic respiratory failure   # Recurrent left sided pleural effusions   Presented to oncology clinic today and found to be hypoxic in 70s on room air, improved to 93% on 3L.  CXR on admission with persistent bilateral perihilar opacities, with trace right and small left pleural effusions.  Has required 2 thoracenteses in the past 2 weeks for loculated L pleural effusion.  Not responsive to IV Lasix and has had gradually worsening Cr.  Leukocytosis to 15.5.  Afebrile.     - Continues Cefepime 1g Q12H and Vancomycin for now  - Per chart review, Pleurex catheter has been discussed w/ patient & wife at OSH on previous admission; would revisit this during admission by CXR in 1-2 days  - Continue supplemental O2 to keep sats > 90%   - Continuous pulse ox      # Hypercalcemia of Malignancy - Corrected calcium 15.4 from clinic labs.  Received 2L fluid resuscitation and Zometa x 1 dose prior to leaving clinic.  Recurrent issue for patient.  Appears to  have been on calcitonin nasal spray in the past.    - Checking Ca daily   - Completed Calcitonin 300 units Q12H     # LUCIANA in setting of stage III CKD - Cr elevated to 2.03 today.  BL closer 0.8-1.1.  Likely pre-renal in setting of recent diuresis vs hypercalcemia vs dehydration.  BUN 52.    - Fluid resuscitation as above   - Intake/output   - Trend CMP to monitor renal function   - Avoid/minimize nephrotoxic agents      # Paroxysmal atrial fib   # Episodic presyncope/syncope   Recently found to be in atrial fib w/ RVR following 1-2 episodes of syncope vs near syncope.  Has undergone Ziopatch monitoring with 33% atrial fib burden.  Seen by Cardiology on 9/27, started on amiodarone taper. On Eliquis 5mg BID PTA.     - Pharmacy consult placed to verify Amiodarone taper, appears that he may now be at 200mg daily dose (ordered)  -- Amio dose now corrected per Pharmacy  - Continue Eliquis 5mg BID      # Possible kink in Port-A-Cath at R internal jugular site - CXR with port-a-cath w/ possible kink at R internal jugular venotomy site.  Port appears to be working at time of transfer from clinic.    -  IR aware, port functions without issue, no intervention at this time      # Recurrent SCC with metastatic disease (left lung, lytic lesions to L2, L4, S1, and possible R side tongue)   # Tongue cancer (2/2020) s/p resection   # Larynx cancer (2014) s/p laser resection and radiotherapy   See Oncology clinic note from today, 10/11 for extensive history. Was due to start chemoimmunotherapy with Dr. Mark today but is now admitted to the hospital.  Had been prescribed dexamethasone to start after receiving carboplatin, has not taken this yet.      - Oncology consult   - 10/12: STOP PTA Oxycodone 7.5mg TID (daytime) and 15mg at HS PRN for pain   - 10/12: Hold daytime PTA Gabapentin 250mg/250mg, continue only at bedtime 500mg   - Palliative Consult: Start PO liquid Dilaudid 2 mg q4 prn     # Type 2 DM - Last Hgb A1c 7.8% in 6/2021.   On Lantus 25 units every morning, and fixed Novolog 15 units TID w/ bolus feeds.    - Continue PTA Lantus and fixed Novolog dosing   - BG checks ac/hs   - Hypoglycemia protocol in place--req intervention on 10/14  - If changes to TFs, consider Endocrine consult for assistance w/ insulin adjustment      # Malnutrition - S/p PEG in 7/2021.  On Isosource 1.5 TID prior to admission.      - Nutrition consult to manage needs while inpatient   - Ordered Jevity 1.5 for tonight (c/w Isosource per chart review)     - Continue Vitamin E supplementation      # Mild hyponatremia - Na 132.  Likely 2/2 recent diuresis.  BL appears to be in 130s.  Getting IV fluid resuscitation w/ NS as above.      # Dispo - Per chart review, during pt's last admission at [facility] 10/4-10/6, pt had verbalized desire to be DNR/DNI.  Has been seen by Palliative Care during admission here in 9/2021 given prognosis of metastatic disease but had planned to start chemo this week.  I discussed with pt's wife code status, and she explained that he would not want intubation unless he would have meaningful recovery.  I explained that we could keep as full code for now and address further pending clinical course if patient were to decompensate.         Diet: Adult Formula Bolus Feeding: TID Jevity 1.5; Route: Gastrostomy; 6; Can(s); Medication - Feeding Tube Flush Frequency: At least 15-30 mL water before and after medication administration and with tube clogging  NPO for Medical/Clinical Reasons Except for: NPO but receiving Tube Feeding    DVT Prophylaxis: Apixaban  Lerma Catheter: Not present  Central Lines: None  Code Status: Full Code      Disposition Plan   Expected discharge: 10/19/2021   recommended to prior living arrangement once adequate pain management/ tolerating PO medications.     The patient's care was discussed with the Bedside Nurse, Patient, Patient's Family and Oncology Consultant.    Devendra Crow MD  Hospitalist Service, [facility]  11  [facility]  Securely message with the Vocera Web Console (learn more here)  Text page via [facility] Paging/Directory  Please see sign in/sign out for up to date coverage information    Clinically Significant Risk Factors Present on Admission            # Non-Severe Malnutrition, POA: based on Subcutaneous fat loss;Muscle loss (10/12/21 1420)  ______________________________________________________________________    Interval History   Seen today for follow up: neck, back, body pain, deconditioned, hypercalcemia    No acute overnight events per patient or patient's family.    Mild shortness of breath now reported, on 3-4L oxygen.  Still poor sleep  Agreeable for thora and/or pleurex, consult placed  Aware of hypoglycemia event  Wants to go home, but understands needs inpt stabilizing measures completed first    As of today/at time of my visit:  Patient denies any headache, sore throat  Patient reports poor sleeping arrangements-major issue of patient  Patient denies any nausea or vomiting  Patient reports no abdominal pain  Patient denies any chest pain  Patient reports no arm or leg edema      Data reviewed today: I reviewed all medications, new labs and imaging results over the last 24 hours. I personally reviewed no images or EKG's today.    Physical Exam   Vital Signs: Temp: 98.1  F (36.7  C) Temp src: Oral BP: (!) 150/57 Pulse: 71   Resp: 18 SpO2: 96 % O2 Device: Nasal cannula Oxygen Delivery: 4 LPM  Weight: 170 lbs 0 oz    General: Alert awake and oriented, no distress, conversational  Eyes: Normal pink mucosa with no signs of pallor, no scleral icterus.  Neck: No significant lymphadenopathy, no palpable LN, No JVD  Heart: Regular rate and rhythm, normal S1, normal S2, no murmurs rubs or gallops, PMI is nondisplaced   Peripherally there is no edema  Lungs: Mild increased work of breathing, fair effort, lungs are reduced L > R base   No wheezing or  crackles   Breathing on 3-4 L oxygen  Abdomen: Nontender, nondistended, normal active bowel sounds, no palpable masses  Extremities: Normal capillary refill, no edema, no clubbing, no cyanosis  Neuro: Alert and oriented to person place location and situation   Grossly arms and legs are without any focal motor or sensory deficits   Gait was not assessed   Cranial nerves II through XII are grossly intact  Psych: No acute psychosis, mildly agitated      Data   Recent Labs   Lab 10/14/21  1142 10/14/21  0940 10/14/21  0915 10/13/21  0808 10/13/21  0711 10/12/21  0930 10/12/21  0714   WBC  --   --  23.0*  --  28.5*  --  21.4*   HGB  --   --  10.2*  --  10.1*  --  10.8*   MCV  --   --  98  --  97  --  94   PLT  --   --  356  --  394  --  391   INR  --   --  1.48*  --   --   --   --    NA  --   --  142  --  139  --  134   POTASSIUM  --   --  4.9  --  5.0  --  5.0   CHLORIDE  --   --  112*  --  108  --  102   CO2  --   --  30  --  29  --  29   BUN  --   --  44*  --  52*  --  55*   CR  --   --  1.41*  --  1.68*  --  1.91*   ANIONGAP  --   --  <1*  --  2*  --  3   ELEUTERIO  --   --  8.9  --  9.6  --  11.4*   * 146* 68*   < > 240*   < > 369*   ALBUMIN  --   --  2.0*  --  1.9*   < > 1.9*   PROTTOTAL  --   --  6.9  --  6.8   < > 6.8   BILITOTAL  --   --  0.2  --  0.2   < > 0.2   ALKPHOS  --   --  182*  --  166*   < > 175*   ALT  --   --  29  --  20   < > 17   AST  --   --  22  --  14   < > 13    < > = values in this interval not displayed.     Recent Results (from the past 24 hour(s))   XR Chest 2 Views    Narrative    EXAM: XR CHEST 2 VW  10/13/2021 3:15 PM     HISTORY:  assess for low lung volume vs effusion of clinical  significance, leukocytosis       COMPARISON:  Chest radiograph, 10/11/2021.    FINDINGS:   PA and lateral views of the chest. Right IJ Port-A-Cath terminates in  the mid SVC.     Trachea is midline. Heart size is normal. Prominent and indistinct  pulmonary vasculature with increased perihilar streaky  opacities.  Persistent hypoinflated lungs. Small left greater than right pleural  effusion. No appreciable pneumothorax.    Healed mildly displaced posterior fracture of the right sixth rib  there is unchanged. Unchanged lytic lesions of the posterior left  eighth rib. Unchanged wedge deformity of the T6 vertebral body.  Gastrostomy tube and balloon project over the stomach. Multiple  nonspecific air-fluid levels in the bowel loops.          Impression    IMPRESSION:  Prominent and indistinct pulmonary vasculature with increased  perihilar streaky opacities. Left greater than right pleural effusion.  Findings are consistent with pulmonary edema, infection, and/or  atelectasis. Displaced fracture, right sixth rib. Known lytic lesion  less conspicuous in the left eighth rib when compared with CT scan  from 9/24/2021.    I have personally reviewed the examination and initial interpretation  and I agree with the findings.    ALTON WRIGHT MD         SYSTEM ID:  [id number]     Medications     - MEDICATION INSTRUCTIONS -         amiodarone  200 mg Oral BID     [Held by provider] apixaban ANTICOAGULANT  5 mg Per Feeding Tube BID     ceFEPIme (MAXIPIME) IV  2 g Intravenous Q12H     doxepin  25 mg Swish & Spit Q4H While awake     gabapentin  500 mg Per Feeding Tube At Bedtime     HYDROmorphone (STANDARD CONC)  2 mg Oral Q4H     insulin aspart  10 Units Subcutaneous TID w/meals     insulin glargine  18 Units Subcutaneous QAM AC     lidocaine visc 2% & maalox/mylanta w/simethicone & diphenhydramine  10 mL Swish & Swallow Q4H While awake     omeprazole  20 mg Per Feeding Tube QAM AC     pentoxifylline  400 mg Per Feeding Tube BID     sodium chloride (PF)  3 mL Intracatheter Q8H     vancomycin  750 mg Intravenous Q24H     vitamin E  1,000 Units Per Feeding Tube Daily

## 2021-10-14 NOTE — PROGRESS NOTES
Antimicrobial Stewardship Team Note    Antimicrobial Stewardship Program - A joint venture between [facility] and  Physicians to optimize antibiotic management.  NOT a formal consult - Restricted Antimicrobial Review     Patient: Reese Oakley  MRN: [id number]  Allergies: Patient has no known allergies.    Brief History: Reese Oakley is a 71 year old male with a PMH of HTN, HLD, T2DM, CKD 3, paroxysmal A.fib, GERD, laryngeal cancer s/p laser resection and adjuvant radiotherapy (2014), tongue cancer (2020) s/p wide excision c/b biopsy proven recurrent SCC recently complicated by pleural effusions admitted from oncology clinic on 10/11 for acute hypoxic respiratory failure, LUCIANA and hypercalcemia.     History of Present Illness: The patient was recently admitted from 9/24-9/25 for dyspnea 2/2 new pleural effusion s/p thoracentesis suspicious for metastatic SCC. He was discharged with plans to begin chemotherapy. The patient presented to the oncology clinic on 10/11 to start his therapy. Upon arrival at the clinic, he was found to be hypoxic in the 70s on room air and was placed on 3L NC in which he improved to 93%. Chemotherapy was deferred and the patient was admitted with hypoxia in the setting of known malignant pleural effusion and ongoing metastatic SCC management.     On admission, he was hypercalcemic (corrected calcium 15.4), had leukocytosis to 15.5, and had an LUCIANA with a SCr of 2.03 (baseline around 0.8-1.1). Vancomycin and cefepime were empirically started due to his hypoxia. Of note, the patient was vitally stable on 3L NC and was afebrile. A CXR was performed and was notable for persistent bilateral perihilar opacities, with trace right and small left pleural effusions. A urinalysis was also carried out and had no infectious findings. Throughout his stay, the patient has had continued oxygen requirements which have increased to 4L NC most recently. A repeat chest x-ray was  obtained on 10/13 notable for prominent and indistinct pulmonary vasculature with increased perihilar streaky opacities, left greater than right pleural effusion. Per notes, there are considerations for a left pleurex catheter that will be further discussed today, 10/14. At the current time, the patient remains on vancomycin and cefepime, and no microbiological tests have been ordered. His WBC count has trended upwards until 10/13 up to 28.5 but has since decreased to 23.0 today, 10/14. The patient has remained afebrile during the admission. Of note, he also has pleural effusion, bilateral hand edema, and is being diuresed with Lasix.          Active Anti-infective Medications   (From admission, onward)                 Start     Stop    10/12/21 1600  vancomycin (VANCOCIN) injection  750 mg,   Intravenous,   EVERY 24 HOURS     Community Acquired Pneumonia        --    10/11/21 1930  ceFEPIme  2 g,   Intravenous,   EVERY 12 HOURS     Community Acquired Pneumonia        --                  Assessment: Acute hypoxic respiratory failure w/ recurrent L sided pleural effusions in the setting of biopsy-proven recurrent SCC  The patient presented for hypoxic respiratory failure and was empirically started on vancomycin and cefepime. His vitals have been otherwise reassuring. He has also remained afebrile throughout his admission.  He does have leukocytosis but his WBC has decreased as of today and he does appear to be leukocytic at baseline. His UA is without significant findings and a chest x-ray on 10/11 was more conspicuous compared to prior x-ray 9/24/2021 with trace right and small left pleural effusions. His most recent chest x-ray on 10/13 was notable for bilateral pleural effusions (L>R) with increased perihilar streaking compared to the aforementioned chest x-ray on 10/11. From an infectious standpoint, there are no clear signs of infection warranting treatment and microbiologic tests have not been ordered. The  patient has an extensive cancer history with SCC with mets to the left pleural space and left lung which appear to be a better explanation for his acute hypoxic respiratory failure. Based on this information and in the absence of symptoms, we recommend discontinuing vancomycin and cefepime. If there are still concerns for infection, we recommend discontinuing vancomycin and continuing cefepime for the interim while obtaining microbiologic workup with a diagnostic thoracentesis and obtaining cultures.    Recommendations:  1. Recommend discontinuing vancomycin and cefepime  2. If still concerned for infection, we recommend discontinuing vancomycin and continuing cefepime for the interim while microbiologic workup is performed (i.e. diagnostic thoracentesis, including cultures)     Discussed with ID Staff: MAYA Vasquez, and Vandana Zarate, KatiaD, BCIDP   Pager: [phone]  Houston Bravo PharmD candidate 2022    =======================================================================    Vital Signs/Clinical Features:  Vitals       10/12 0700  -  10/13 0659 10/13 0700  -  10/14 0659 10/14 0700  -  10/14 1516   Most Recent    Temp ( F) 95.5 -  96.9    96.6 -  97.9    98.1 -  98.5     98.1 (36.7)    Pulse 53 -  58    51 -  66    70 -  71     71    Resp   16      16      18     18    /40 -  129/46    130/49 -  166/75    150/57 -  151/71     150/57    SpO2 (%) 94 -  96    94 -  96    96 -  98     96          Labs  Estimated Creatinine Clearance: 51.6 mL/min (A) (based on SCr of 1.41 mg/dL (H)).  Recent Labs   Lab Test 09/24/21  0944 09/25/21  0824 10/11/21  1039 10/12/21  0714 10/13/21  0711 10/14/21  0915   CR 0.86 1.07 2.03* 1.91* 1.68* 1.41*       Recent Labs   Lab Test 02/01/21  0825 02/01/21  0825 02/09/21  0935 02/09/21  0935 02/16/21  0732 02/16/21  0732 02/23/21  0826 02/23/21  0826 03/01/21  0952 03/01/21  0952 06/26/21  1720 06/27/21  0531 09/24/21  1242 09/24/21  1242 09/25/21  0824  09/25/21  0824 10/11/21  1039 10/12/21  0714 10/13/21  0711 10/14/21  0915   WBC 8.0   < > 6.1   < > 4.0   < > 4.1   < > 3.4*   < > 13.1*   < > 14.2*  --  12.9*  --  15.5* 21.4* 28.5* 23.0*   ANEU 4.6  --  4.1  --  2.0  --  2.6  --  2.1  --  11.4*  --   --   --   --   --   --   --   --   --    ALYM 2.1  --  1.4  --  1.4  --  1.0  --  0.9  --  1.3  --   --   --   --   --   --   --   --   --    TRISTA 1.0  --  0.5  --  0.6  --  0.4  --  0.4  --  0.3  --   --   --   --   --   --   --   --   --    AEOS 0.2  --  0.1  --  0.1  --  0.0  --  0.0  --  0.0  --   --   --   --   --   --   --   --   --    HGB 13.3   < > 12.9*   < > 12.6*   < > 11.9*   < > 11.1*   < > 11.4*   < > 11.9*  --  11.5*  --  11.0* 10.8* 10.1* 10.2*   HCT 39.9*   < > 38.9*   < > 36.6*   < > 35.4*   < > 32.3*   < > 35.5*   < > 36.8*  --  35.8*  --  34.3* 34.3* 32.8* 33.5*   MCV 91   < > 92   < > 89   < > 91   < > 90   < > 92   < > 95  --  94  --  91 94 97 98      < > 280   < > 243   < > 168   < > 189   < > 379   < > 344   < > 357   < > 421 391 394 356    < > = values in this interval not displayed.       Recent Labs   Lab Test 03/01/21  0952 09/24/21  0944 09/25/21  0824 10/11/21  1039 10/12/21  0714 10/13/21  0711 10/14/21  0915   BILITOTAL 0.5  --  0.3 0.3 0.2 0.2 0.2   ALKPHOS 89  --  142 202* 175* 166* 182*   ALBUMIN 3.2* 2.4* 2.1* 2.2* 1.9* 1.9* 2.0*   AST 18  --  26 22 13 14 22   ALT 24  --  26 20 17 20 29       Recent Labs   Lab Test 09/24/21  0209 10/11/21  2027   PCAL 0.12 0.23*             Culture Results:  7-Day Micro Results     ** No results found for the last 168 hours. **          Recent Labs   Lab Test 10/11/21  2304   URINEPH 6.5   NITRITE Negative   LEUKEST Negative   WBCU 3                         Imaging: XR Chest 2 Views    Result Date: 10/13/2021  EXAM: XR CHEST 2 VW  10/13/2021 3:15 PM HISTORY:  assess for low lung volume vs effusion of clinical significance, leukocytosis   COMPARISON:  Chest radiograph, 10/11/2021. FINDINGS:  PA and lateral views of the chest. Right IJ Port-A-Cath terminates in the mid SVC. Trachea is midline. Heart size is normal. Prominent and indistinct pulmonary vasculature with increased perihilar streaky opacities. Persistent hypoinflated lungs. Small left greater than right pleural effusion. No appreciable pneumothorax. Healed mildly displaced posterior fracture of the right sixth rib there is unchanged. Unchanged lytic lesions of the posterior left eighth rib. Unchanged wedge deformity of the T6 vertebral body. Gastrostomy tube and balloon project over the stomach. Multiple nonspecific air-fluid levels in the bowel loops.       IMPRESSION: Prominent and indistinct pulmonary vasculature with increased perihilar streaky opacities. Left greater than right pleural effusion. Findings are consistent with pulmonary edema, infection, and/or atelectasis. Displaced fracture, right sixth rib. Known lytic lesion less conspicuous in the left eighth rib when compared with CT scan from 9/24/2021. I have personally reviewed the examination and initial interpretation and I agree with the findings. ALTON WRIGHT MD   SYSTEM ID:  [id number]    XR Chest 2 Views    Result Date: 10/11/2021  EXAM: XR CHEST 2 VW 10/11/2021 2:24 PM HISTORY: Tonsil cancer (H); Pleural effusion, malignant. COMPARISON: Radiograph of the chest dated 9/24/2021. TECHNIQUE: Upright frontal and lateral views of the chest. FINDINGS: Right IJ portacatheter with possible kink in the catheter at the right internal jugular venotomy site and tip terminating in the low SVC. Trachea is midline. Cardiac mediastinal silhouette is stable. Bilateral perihilar streaky opacities. Trace right pleural effusion. Small left pleural effusion. No pneumothorax. Partially visualized gastrostomy tube. Lytic lesion in posterior left eighth rib, as seen on prior CT 9/24/2021. Prominent gastric air opacity. Low lung volume. Compression deformity of mid thoracic vertebral body, better  seen on prior CT 9/24/21.     IMPRESSION: 1. Persistent bilateral perihilar opacities, more conspicuous compared to prior x-ray 9/24/2021 with trace right and small left pleural effusions. 2. Port-A-Cath in right chest wall has possible kink in the catheter at the right internal jugular vein venotomy site. May consider IR consult if clinically desired. I have personally reviewed the examination and initial interpretation and I agree with the findings. CHAO LINDO MD   SYSTEM ID:  [id number]

## 2021-10-14 NOTE — PROGRESS NOTES
Medicine  Cross cover    Called that the patient's O2 requirement has  Increased to 4L, and he is unable to speak in full sentences. Has been receiving IVF for hypercalcemia. Also has pleural effusion.     - ordered lasix 20mg IV Once  - Ca downtrending, will reassess and determine the ongoing need of IVF.    On exam, he is sitting in bed with the head of the bed elevated to almost upright, using accessory muscle to breathe.  Lung sounds diminished at bases, no wheezing.  Bilateral  Hands are edematous.  He states that he received medication and now feels little better (although unclear whether it was lasix).    - need close monitoring during the day.

## 2021-10-14 NOTE — PROGRESS NOTES
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    Assessment & Recommendations:     Pulmonary status worsened overnight with repeat CXR notable for increased pleural effusion (L>R). Patient and patient wife seen today and amenable to pleurex catheter, so plan for IR/interventional pulm placement. He is able to discharge once stable with outpatient oncology follow-up on 10/20.    # Acute hypoxic respiratory failure  # Bilateral pleural effusion, hx of recurrent malignant pleural effusions  Patient admitted from oncology clinic with hypoxia to 70s on room air, improved >90% on 3L. Admission CXR notable for persistent bilateral perihilar opacities with small left pleural effusion. Previously required 2 thoracentesis within past month for loculated L pleural effusion, confirmed to be secondary to malignancy  - 10/13 CXR notable for bilateral pleural effusions (L>R) with increased chuck-hilar streaking compared to 10/11 CXR  - 10/11 CXR notable for small left pleural effusion and chuck-hilar opacities similar to previous CXR  - Infectious workup    - Empiric cefepime and vancomycin started on 10/11    - 10/11 UA without infectious findings    - Blood cultures if clinically worsening  - Continuous oxygen to maintain sats >90%  - Discontinue maintenance fluids, diurese and hold AC for possible pleurex placement by IR/Interventional Pulm    # 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 He was due to start chemoimmunotherapy with Dr. Mark, which has been  held while admitted. Plan for close outpatient oncology follow-up following discharge - tentative plan to schedule outpatient chemotherapy for 10/18.  - Pain control    - D/c Oxycodone 7.5mg TID PRN and 15 PRN at bed    - Dilaudid 2mg q4h from PRN    - Gabapentin 500 at bed    # Hypercalcemia 2/2 Malignancy  Recurrent problem for patient. Calcium 14 (corrected to 15.4 for hypoalbuminemia) on admission, with down trend to 11.4 (corrected to 13.1) on 10/12 following 600 units of calcitonin. Of note, 9/24 PTHrP elevated to 16.2 from prior admission, so elevated calcium likely secondary to malignancy. Patient recently started Zometa  on 10/11, received only 1 dose immediately prior to admission.   - Calcium continues to down trend, 8.9 on 10/13  - Continue to monitor ionized calcium on daily labs    # Port-a-catheter kink  Admission CXR with port-a-catheter kink at right internal jugular venotomy site, was previously working when seen at clinic.   - IR aware, no need for intervention unless confirmed port-a-catheter failure    Lines: PEG tube in place, Chronic right chest port-a-cath in place  Nutrition: PEG bolus feeding  Antibiotics: Vancomycin (10/11), Cefepime (10/11)  AC: PTA Apixaban held for possible pleurex placement  Code: Full Code    We will continue to follow this patient while hospitalized. Please do not hesitate to page with any questions or concerns. Patient was seen and plan of care was discussed with attending physician Dr. Taylor.    David Boyle MD  PGY-1  Pager: [phone]  =================================================================================    Admission History:    Reese Oakley is a 71 year old male with pmh of HTN, HLD, T2DM, CKD 3, paroxysmal A.fib, GERD, laryngeal cancer s/p laser resection and adjuvant radiotherapy (2014), tongue cancer (2020) s/p wide excision c/b biopsy proven recurrent SCC recently complicated by pleural effusions admitted from oncology clinic on 10/11 for acute  "hypoxic respiratory failure, LUCIANA and hypercalcemia. Found to have recurrence of bilateral pleural effusions on 10/13 CXR.    Interval History    Overnight, patient oxygen requirement increased to 4L, unable to speak in full sentences and used accessory muscles to breath. 10/13 PM chest xray confirmed increased pleural effusions bilaterally.     Of note, nursing also held dilaudid this morning due to concerns of opiate associated hypoglycemia - discussed with nursing and agreed to restart scheduled dilaudid.    When seen, Kenrick noted that breathing had worsened overnight alongside intermittent chest heaviness. He and his wife were present for interview and were amenable to pleurex catheter placement. Pain was well-controlled on Dilaudid overnight.    Review of systems negative except as above.     Physical Exam:    Blood pressure (!) 151/60, pulse 70, temperature 98.5  F (36.9  C), temperature source Oral, resp. rate 18, height 1.702 m (5' 7\"), weight 77.1 kg (170 lb), SpO2 98 %.  General: Responsive to interview but voice notable softer with short responses  HEENT: No pallor or icterus  Cardiac: Regular rate and rhythm, no murmurs appreciated on exam  Respiratory: Increased work of breathing on 4L compared to exam yesterday, decreased lung sounds at base  Abdomen: Soft, non-tender, non-distended, PEG site clean, dry and without erythema or drainage  MSK/Skin: No peripheral edema, no petechiae or bruising noted on exam    Labs:     Labs reviewed    Chemistry notable fpr Cr 1.41 (1.68 previously), calcium of 8.9 (11.4)    CBC notable for WBC 23 (28.5)    No pending microbiology for this admission  - Prior admission fungal and yeast cultures from pleural fluid, NGTD    Imaging:    10/13 CXR, 2 view    - L>R pleural effusion increased from 10/11 imaging  - Increased perihilar opacities.   - Displaced fracture of the right sixth rib, known lytic lesion in the left eighth rib      ATTENDING PHYSICIAN ADDENDUM AND NOTE:  I " evaluated this patient's case separately and also with resident Dr. David Boyle. The note above reflects my assessment and plan. I have personally reviewed lab results, and vital and radiology results. >50% of time was spent in assessment and coordination of care; >=25 minutes.  Mr. Kenrick Oakley is a 70 yo gentleman with a metastatic squamous cell carcinoma of the head and neck.  He has had recurrent pleural effusions, and was admitted with hypoxia and hypercalcemia.  Hypercalcemia continues to improve, is having less pain with the help from pain management from the palliative care team.  Change in regimen includes institution of standing hydromorphone.  Pleurex catheter placement was discussed for palliative intent drainage of pleural effusion for patient symptomatic comfort.  Nadir Taylor MD, PhD  Associate Professor of Medicine, Hematology, Oncology and Transplantation

## 2021-10-15 NOTE — PROGRESS NOTES
Patient has been assessed for Home Oxygen needs. Oxygen readings:    *Pulse oximetry (SpO2) = 93% on room air at rest while awake.    *SpO2 = 89% on room air during activity/with exercise.    *SpO2 improved to 93 - 94% on 2liters/minute during activity/with exercise.

## 2021-10-15 NOTE — DISCHARGE SUMMARY
Discharge  D: Orders for discharge and outpatient medications written.  I: Home medications and return to clinic schedule reviewed with patient. Discharge instructions and parameters for calling Health Care Provider reviewed. Patient left at 3:15pm accompanied by wife, Zahira.   A: Patient/family verbalized understanding and was ready for discharge.   P: Patient instructed to  medications in Pharmacy. Follow up as scheduled outpatient.

## 2021-10-15 NOTE — DISCHARGE SUMMARY
[facility]  Hospitalist Discharge Summary      Date of Admission:  10/11/2021  Date of Discharge:  10/15/2021  Discharging Provider: Devendra Crow MD  Discharge Team: Hospitalist Service, Gold 11    Discharge Diagnoses     # Acute hypoxic respiratory failure   # Recurrent complex, non-infected recurrent left sided pleural effusion  # Hypercalcemia of Malignancy    # LUCIANA in setting of stage III CKD    # Paroxysmal atrial fib   # Episodic presyncope/syncope   # Possible kink in Port-A-Cath at R internal jugular site  # Recurrent SCC with metastatic disease (left lung, lytic lesions to L2, L4, S1, and possible R side tongue)   # Tongue cancer (2/2020) s/p resection   # Larynx cancer (2014) s/p laser resection and radiotherapy   # Type 2 DM   # Severe PC Malnutrition - S/p PEG in 7/2021     # Mild hyponatremia    Home Oxygen:    Oxygen Documentation:   I certify that this patient, Reese Oakley has been under my care (or a nurse practitioner or physican's assistant working with me). This is the face-to-face encounter for oxygen medical necessity.      Reese Oakley is now in a chronic stable state and continues to require supplemental oxygen. Patient has continued oxygen desaturation due to Chronic Left pleural effusion.    Alternative treatment(s) tried or considered and deemed clinically infective for treatment of recurrent thoracentesis, include nebulizers, inhalers, steroids, pulmonary toileting, and antibiotics.  If portability is ordered, is the patient mobile within the home? yes      - Pt's wife would like prescription sent and consider the pay out of pocket, if not covered.          Follow-ups Needed After Discharge   Follow-up Appointments     Adult [facility] Follow-up and recommended labs and tests      Follow up with primary care provider, Clark Marie, within 7 days   for hospital follow- up.  No follow up labs or test are needed.    Re-do  outpatient Home oxygen, at home/local agency    Outpatient follow up with Interventional Pulmonary team: consult placed   for dr Alber Bush    Continue with care coordinated with outpatient Oncology team    outpatient oncology follow-up on 10/20.    Appointments on [place] and/or [facility] (with [facility] or [facility]   provider or service). Call [phone] if you haven't heard regarding   these appointments within 7 days of discharge.         {Additional follow-up instructions/to-do's for PCP:   10/20 Medical Oncology appointment.    Unresulted Labs Ordered in the Past 30 Days of this Admission     Date and Time Order Name Status Description    9/24/2021  9:28 AM Fungus Culture, non-blood Preliminary     9/24/2021  9:28 AM Acid-Fast Bacilli Culture and Stain In process     9/15/2021 12:21 PM Acid-Fast Bacilli Culture and Stain In process       These results will be followed up by Onc, Interventional Pulmonary team    Discharge Disposition   Discharged to home  Condition at discharge: Stable    Did not qualify for home oxygen based on 10/15 walk test.        Hospital Course      Reese Oakley is a 71 year old male with past medical history significant for HTN, HLD, type 2 DM, paroxysmal atrial fib, GERD, colon cancer, appendiceal cancer, laryngeal cancer s/p laser resection/adjuvant radiotherapy (2014), tongue cancer (2020) s/p wide local excision c/b biopsy-proven recurrent SCC recently c/b recurrent presumed malignant pleural effusions admitted from his oncology clinic today for acute hypoxic respiratory failure, LUCIANA, and hypercalcemia.       # Acute hypoxic respiratory failure   # Recurrent left sided pleural effusions   # Leukocytosis, likely due to LUCIANA, hypercalcemia, and malignancy, not due to acute infection  Presented to oncology clinic today and found to be hypoxic in 70s on room air, improved to 93% on 3L.  CXR on admission with persistent bilateral perihilar opacities, with trace right and small left  pleural effusions.  Has required 2 thoracenteses in the past 2 weeks for loculated L pleural effusion.  Not responsive to IV Lasix and has had gradually worsening Cr.  Leukocytosis to 15.5.  Afebrile.     - Continues Cefepime 1g Q12H and Vancomycin for now  - Per chart review, Pleurex catheter has been discussed w/ patient & wife at OSH on previous admission; would revisit this during admission by CXR in 1-2 days  - 10/15: unsuccessful pleurex, successful thora but fluid collection was/is very thick, and did not aspirate. No pleurex placed. IP will arrange outpatient follow up for patient.  - Pt/wife and Oncology aware of no thora and no pleurex drainage on 10/15 prior to discharge  - Home O2 walk test did NOT qualify pt for home oxygen, pt / wife to pursue at/near home locally  - Does not discharge home on antibiotics    # Hypercalcemia of Malignancy - Corrected calcium 15.4 from clinic labs.  Received 2L fluid resuscitation and Zometa x 1 dose prior to leaving clinic.  Recurrent issue for patient.  Appears to have been on calcitonin nasal spray in the past.    - Checking Ca daily   - Completed Calcitonin 300 units Q12H  - Ca resolved to wnl at time of discharge     # LUCIANA in setting of stage III CKD - Cr elevated to 2.03 today.  BL closer 0.8-1.1.  Likely pre-renal in setting of recent diuresis vs hypercalcemia vs dehydration.  BUN 52.    - Fluid resuscitation as above   - Intake/output   - Trend CMP to monitor renal function   - Avoid/minimize nephrotoxic agents   - Cr trend improving, and Cr 1.35 at time of discharge     # Paroxysmal atrial fib   # Episodic presyncope/syncope   Recently found to be in atrial fib w/ RVR following 1-2 episodes of syncope vs near syncope.  Has undergone Ziopatch monitoring with 33% atrial fib burden.  Seen by Cardiology on 9/27, started on amiodarone taper. On Eliquis 5mg BID PTA.     - Pharmacy consult placed to verify Amiodarone taper, appears that he may now be at 200mg daily  dose (ordered)  -- Amio dose now corrected per Pharmacy  - Continue Eliquis 5mg BID      # Possible kink in Port-A-Cath at R internal jugular site - CXR with port-a-cath w/ possible kink at R internal jugular venotomy site.  Port appears to be working at time of transfer from clinic.    -  IR aware, port functions without issue, no intervention at this time      # Recurrent SCC with metastatic disease (left lung, lytic lesions to L2, L4, S1, and possible R side tongue)   # Tongue cancer (2/2020) s/p resection   # Larynx cancer (2014) s/p laser resection and radiotherapy   See Oncology clinic note from today, 10/11 for extensive history. Was due to start chemoimmunotherapy with Dr. Mark today but is now admitted to the hospital.  Had been prescribed dexamethasone to start after receiving carboplatin, has not taken this yet.      - Oncology consult   - 10/12: STOP PTA Oxycodone 7.5mg TID (daytime) and 15mg at HS PRN for pain   - 10/12: Hold daytime PTA Gabapentin 250mg/250mg, continue only at bedtime 500mg   - Palliative Consult: Start PO liquid Dilaudid 2 mg q4 prn  - Will discharge home and stop oxycodone, and use liquid dilaudid 1-2 mg q4 prn       # Type 2 DM - Last Hgb A1c 7.8% in 6/2021.  On Lantus 25 units every morning, and fixed Novolog 15 units TID w/ bolus feeds.    - Continue PTA Lantus and fixed Novolog dosing   - BG checks ac/hs      # Severe PC Malnutrition - S/p PEG in 7/2021.  On Isosource 1.5 TID prior to admission.      - Nutrition consult to manage needs while inpatient   - Ordered Jevity 1.5 for tonight (c/w Isosource per chart review)     - Continue Vitamin E supplementation      # Mild hyponatremia - Na 132.  Likely 2/2 recent diuresis.  BL appears to be in 130s.  Getting IV fluid resuscitation ; now off IVF and Na 144; stable.     # Dispo -At time of discharge, remained full code.      Consultations This Hospital Stay   PHARMACY TO DOSE VANCO  NUTRITION SERVICES ADULT IP CONSULT  MEDICATION  HISTORY IP PHARMACY CONSULT  ONCOLOGY ADULT IP CONSULT  INTERVENTIONAL RADIOLOGY ADULT/PEDS IP CONSULT  CARE MANAGEMENT / SOCIAL WORK IP CONSULT  PHARMACY IP CONSULT  PALLIATIVE CARE ADULT IP CONSULT  PHYSICAL THERAPY ADULT IP CONSULT  OCCUPATIONAL THERAPY ADULT IP CONSULT  INTERVENTIONAL RADIOLOGY ADULT/PEDS IP CONSULT  INTERVENTIONAL PULMONARY ADULT IP CONSULT    Code Status   Full Code    Time Spent on this Encounter   I, Devendra Crow MD, personally saw the patient today and spent greater than 30 minutes discharging this patient.       Devendra Crow MD  [facility] [address] [place] [id number]  Phone: [phone]  ______________________________________________________________________    Physical Exam   Vital Signs: Temp: (!) 95.9  F (35.5  C) Temp src: Oral BP: 139/52 Pulse: 70   Resp: 16 SpO2: 96 % O2 Device: None (Room air) Oxygen Delivery: 3 LPM  Weight: 167 lbs 1.6 oz    Alert, awake, no distress  Reduced L base breath sounds,   No peripheral edema  Well alert, awake, no confusion       Primary Care Physician   Clark Marie    Discharge Orders      Home infusion referral      Pulmonary Medicine Referral      Reason for your hospital stay    Acute hypoxemic respiratory failure  Leukocytosis  Recurrent left complex pleural effusion  Alexander  Hypercalcemia of malignancy    Met Tongue SCC     Activity    Your activity upon discharge: activity as tolerated     Adult [facility] Follow-up and recommended labs and tests    Follow up with primary care provider, Clark Marie, within 7 days for hospital follow- up.  No follow up labs or test are needed.    Re-do outpatient Home oxygen, at home/local agency    Outpatient follow up with Interventional Pulmonary team: consult placed for dr Alber Bush    Continue with care coordinated with outpatient Oncology team    outpatient oncology follow-up on 10/20.    Appointments on [place] and/or [facility] (with [facility] or [facility]  provider or service). Call [phone] if you haven't heard regarding these appointments within 7 days of discharge.     Diet    Follow this diet upon discharge: Orders Placed This Encounter      Adult Formula Bolus Feeding: TID Jevity 1.5; Route: Gastrostomy; 6; Can(s); Medication - Feeding Tube Flush Frequency: At least 15-30 mL water before and after medication administration and with tube clogging      NPO for Medical/Clinical Reasons Except for: NPO but receiving Tube Feeding       Significant Results and Procedures   Results for orders placed or performed during the hospital encounter of 10/11/21   XR Chest 2 Views    Narrative    EXAM: XR CHEST 2 VW  10/13/2021 3:15 PM     HISTORY:  assess for low lung volume vs effusion of clinical  significance, leukocytosis       COMPARISON:  Chest radiograph, 10/11/2021.    FINDINGS:   PA and lateral views of the chest. Right IJ Port-A-Cath terminates in  the mid SVC.     Trachea is midline. Heart size is normal. Prominent and indistinct  pulmonary vasculature with increased perihilar streaky opacities.  Persistent hypoinflated lungs. Small left greater than right pleural  effusion. No appreciable pneumothorax.    Healed mildly displaced posterior fracture of the right sixth rib  there is unchanged. Unchanged lytic lesions of the posterior left  eighth rib. Unchanged wedge deformity of the T6 vertebral body.  Gastrostomy tube and balloon project over the stomach. Multiple  nonspecific air-fluid levels in the bowel loops.          Impression    IMPRESSION:  Prominent and indistinct pulmonary vasculature with increased  perihilar streaky opacities. Left greater than right pleural effusion.  Findings are consistent with pulmonary edema, infection, and/or  atelectasis. Displaced fracture, right sixth rib. Known lytic lesion  less conspicuous in the left eighth rib when compared with CT scan  from 9/24/2021.    I have personally reviewed the examination and initial  interpretation  and I agree with the findings.    ALTON WRIGHT MD         SYSTEM ID:  [id number]       Discharge Medications   Current Discharge Medication List      START taking these medications    Details   HYDROmorphone, STANDARD CONC, (DILAUDID) 1 MG/ML oral solution Take 1-2 mLs (1-2 mg) by mouth every 4 hours as needed for breakthrough pain  Qty: 473 mL, Refills: 0    Associated Diagnoses: Squamous cell cancer of tongue (H)         CONTINUE these medications which have CHANGED    Details   gabapentin (NEURONTIN) 250 MG/5ML solution 10 mLs (500 mg) by Per Feeding Tube route At Bedtime  Qty: 300 mL, Refills: 0    Associated Diagnoses: Squamous cell cancer of tongue (H)         CONTINUE these medications which have NOT CHANGED    Details   acetaminophen (TYLENOL) 160 MG/5ML suspension Take 20.5 mLs (650 mg) by mouth every 6 hours as needed for fever or mild pain  Qty: 300 mL, Refills: 3    Associated Diagnoses: Tongue ulcer      amiodarone (PACERONE) 200 MG tablet Take 200 mg by mouth 2 times daily Take 2 pills twice a day for a week then 1 pill twice a day for 2 weeks then 1 pill daily.      Apixaban Starter Pack (ELIQUIS DVT/PE STARTER PACK) 5 MG TBPK Take 10 mg by mouth 2 times daily for 7 days, THEN 5 mg 2 times daily for 23 days.  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  Qty: 6 tablet, Refills: 5    Associated Diagnoses: Squamous cell carcinoma of lateral tongue (H)      diphenhydrAMINE (BENADRYL) 12.5 MG/5ML solution Take 25 mg by mouth nightly as needed for itching, allergies or sleep      insulin aspart (NOVOLOG PEN) 100 UNIT/ML pen Inject 15 Units Subcutaneous 3 times daily (with meals)       insulin glargine (LANTUS VIAL) 100 UNIT/ML vial Inject 25 Units Subcutaneous daily  (with breakfast) PATIENT REPORTS 25 UNIT DAILY 02/16/2021      lidocaine (XYLOCAINE) 2 % solution Swish and spit 15 mLs in mouth every 3 hours as needed for moderate pain ; Max 8 doses/24 hour period.  Qty: 100 mL, Refills: 3    Associated Diagnoses: Tonsil cancer (H); Cancer related pain      LORazepam (ATIVAN) 0.5 MG tablet Take 1 tablet (0.5 mg) by mouth every 4 hours as needed (Anxiety, Nausea/Vomiting or Sleep)  Qty: 30 tablet, Refills: 5    Associated Diagnoses: Squamous cell carcinoma of lateral tongue (H)      magic mouthwash (ENTER INGREDIENTS IN COMMENTS) suspension Swish, gargle, and spit one to two teaspoonfuls every six hours as needed. May be swallowed if esophageal involvement.  Qty: 480 mL, Refills: 3    Comments: 80 ml viscous lidocaine 2%, 80 ml Mylanta, 80 ml diphenhydramine 12.5 mg per 5 ml elixir, 80 ml nystatin 100,000U suspension, 80 ml prednisolone 15mg per 5ml solution, 80 ml distilled water  Associated Diagnoses: Squamous cell cancer of tongue (H); Cancer related pain      omeprazole (PRILOSEC) 2 mg/mL suspension Take 10 mLs (20 mg) by mouth every morning (before breakfast)  Qty: 300 mL, Refills: 1    Associated Diagnoses: Tonsil cancer (H)      pentoxifylline (TRENTAL) 50 mg/ml suspension Take 8 mLs (400 mg) by mouth 3 times daily  Qty: 500 mL, Refills: 3    Associated Diagnoses: Osteoradionecrosis (H)      vitamin E (TOCOPHEROL) 1000 units (450 mg) capsule 1 capsule (1,000 Units) by Per Feeding Tube route daily  Qty: 60 capsule, Refills: 1    Associated Diagnoses: Osteoradionecrosis (H)         STOP taking these medications       oxyCODONE (ROXICODONE) 5 MG/5ML solution Comments:   Reason for Stopping:             Allergies   No Known Allergies

## 2021-10-15 NOTE — PLAN OF CARE
[id number]:     A/Ox4. Afebrile. Intermittently hypertensive. OVSS on 3L O2 via NC. Sepsis triggered, lactic 1.9 - no intervention. Back and oral pain managed with scheduled magic mouthwash, doxepin and dilaudid with effect. Denies SOB at rest, MCNEIL noted. Denies N/V. NPO status maintained. Bolus TF given by wife at bedside.  and 297, insulin given with TF only. Pt voiding spontaneously with adequate UOP. Up with Ax1 + walker. Continue with POC.

## 2021-10-15 NOTE — PLAN OF CARE
Pt is alert and oriented X4, on 3L of oxgen sat at 96 percent on continuous pulse oxymeter, pt continues with SOB on exertion. with OVSS  prn hydromorphone, doxepin and magic mouth wash given for mouth pain. Pt had adequate output and currently NPO. Urinal at bedside last bm was on 10/15,2921Peg tube clamped at this time. BG around 2am was 205. Up with assit of 1. Will continue to monitor

## 2021-10-15 NOTE — OR NURSING
Pt tolerated thoracentesis on left, very well. Md was unable to remove fluid r/t the viscosity being very thick. Report was called to pts floor nurse. Return pt to PCU

## 2021-10-15 NOTE — PROCEDURES
INTERVENTIONAL PULMONOLOGY       Procedure(s):    Chest ultrasound and interpretation (left chest)  Thoracentesis (left chest)    Indication:  Pleural effusion    Attending of Record:  Alber Bush MD     Interventional Pulmonary Fellow   None    Trainees Present:   None     Medications:    General Anesthesia - See anesthesia flowsheet for details    Sedation Time:   Per Anesthesia Care Provider    Time Out:  Performed    The patient's medical record has been reviewed.  The indication for the procedure was reviewed.  The necessary history and physical examination was performed and reviewed.  The risks, benefits and alternatives of the procedure were discussed with the the patient in detail and he had the opportunity to ask questions.  I discussed in particular the potential complications including risks of minor or life-threatening bleeding and/or infection, respiratory failure, vocal cord trauma / paralysis, pneumothorax, and discomfort. Sedation risks were also discussed including abnormal heart rhythms, low blood pressure, and respiratory failure. All questions were answered to the best of my ability.  Verbal and written informed consent was obtained.  The proposed procedure and the patient's identification were verified prior to the procedure by the physician and the surgical team.    The patient was assessed for the adequacy for the procedure and to receive medications.   Mental Status:  Alert and oriented x 3  Airway examination:  Class I (Complete visualization of soft palate)  Pulmonary:  Clear to ausculation bilaterally  CV:  RRR, no murmurs or gallops  ASA Grade:  (I)  Normal healthy patient    After clinical evaluation and reviewing the indication, risks, alternatives and benefits of the procedure the patient was deemed to be in satisfactory condition to undergo the procedure.      A Tuberculosis risk assessment was performed:  The patient has no known RISK of Tuberculosis    The procedure was  "performed in a negative airflow room: yes    Maneuvers / Procedure:      Chest ultrasound: The Left side was ultasounded and demonstrated pleural fluid. The diaphragm, heart and lung tissue were clearly visualized. Other findings include complex effusion.     Thoracentesis: Given the appearance of the pleural fluid space on US, 4 sites were marked for potential areas of free fluid. Using the 21G 1.5\" finder needle, each spot returned thick, clotted material. No free fluid was obtained    Any disposable equipment was visually inspected and deemed to be intact immediately post procedure.      Relevant Pictures        Recommendations:     -->  Successful chest US interpretation and thoracentesis  -->  No free fluid obtained given thick clot/material   -->  Will need outpatient evaluation for pleurx vs surgery      Alber Bush MD, MHA    of Medicine  Interventional Pulmonary  Department of Pulmonary, Allergy, Critical Care and Sleep Medicine   [facility]  Pager: [phone]   Office: [phone]  Email: [email or web address]    Tamiko MORRIS, OCN  Clinical Nurse Specialist  Department of Thoracic Surgery  Office: [phone]  Email: [email or web address]    Jayne Vaz  Interventional Pulmonology Surgery Scheduler  Office: [phone]  Email: [email or web address]          "

## 2021-10-15 NOTE — PROGRESS NOTES
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    Assessment & Recommendations:     Patient and patient wife seen today following thoracentesis attempt by interventional pulmonology. He is able to discharge today with outpatient oncology follow-up on 10/20.    # Acute hypoxic respiratory failure  # Bilateral pleural effusion, hx of recurrent malignant pleural effusions  Patient admitted from oncology clinic with hypoxia to 70s on room air, improved >90% on 3L. Admission CXR notable for persistent bilateral perihilar opacities with small left pleural effusion. Previously required 2 thoracentesis within past month for loculated L pleural effusion, confirmed to be secondary to malignancy  - 10/13 CXR notable for bilateral pleural effusions (L>R) with increased chuck-hilar streaking compared to 10/11 CXR  - 10/11 CXR notable for small left pleural effusion and chuck-hilar opacities similar to previous CXR  - Infectious workup    - Empiric cefepime and vancomycin started on 10/11    - 10/11 UA without infectious findings    - Blood cultures if clinically worsening  - Continuous oxygen to maintain sats >90%  - Interventional pulmonology attempted thoracentesis on 10/15 morning    - Unable to obtain free fluid due to thick clot/material    - Plan to follow-up as outpatient for repeat evaluation regarding pleurex    # 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 He was due to start chemoimmunotherapy with Dr. Mark, which has been held while admitted. Plan  for close outpatient oncology follow-up following discharge - outpatient chemotherapy for 10/20.  - Pain control    - D/c Oxycodone 7.5mg TID PRN and 15 PRN at bed    - Dilaudid 2mg q4h from PRN    - Gabapentin 500 at bed    # Hypercalcemia 2/2 Malignancy, improved  Recurrent problem for patient. Calcium 14 (corrected to 15.4 for hypoalbuminemia) on admission, with down trend to 11.4 (corrected to 13.1) on 10/12 following 600 units of calcitonin. Of note, 9/24 PTHrP elevated to 16.2 from prior admission, so elevated calcium likely secondary to malignancy. Patient recently started Zometa  on 10/11, received only 1 dose immediately prior to admission.   - Calcium improved from admission to 9 on 10/15 labs    # Port-a-catheter kink, resolved  Admission CXR with port-a-catheter kink at right internal jugular venotomy site, was previously working when seen at clinic.   - IR aware, no need for intervention unless confirmed port-a-catheter failure    We will continue to follow this patient while hospitalized. Please do not hesitate to page with any questions or concerns. Patient was seen and plan of care was discussed with attending physician Dr. Taylor.    David Boyle MD  PGY-1  Pager: [phone]  =================================================================================    Admission History:    Reese Oakley is a 71 year old male with pmh of HTN, HLD, T2DM, CKD 3, paroxysmal A.fib, GERD, laryngeal cancer s/p laser resection and adjuvant radiotherapy (2014), tongue cancer (2020) s/p wide excision c/b biopsy proven recurrent SCC recently complicated by pleural effusions admitted from oncology clinic on 10/11 for acute hypoxic respiratory failure, LUCIANA and hypercalcemia. Found to have recurrence of bilateral pleural effusions on 10/13 CXR.    Interval History    Overnight, patient triggered a sepsis alert but did not need further intervention. When seen this morning, he notes that his breathing has continued to  "remain unimproved from prior interviews though has not worsened further. He was speaking comfortably on 2L when seen today.     Writer, patient and patient wife discussed further care at length. Patient and patient wife were agreeable to outpatient follow-up with interventional pulmonology and oncology.    Review of systems negative except as above.     Physical Exam:    Blood pressure 101/65, pulse 65, temperature 96.9  F (36.1  C), temperature source Oral, resp. rate 10, height 1.702 m (5' 7\"), weight 75.8 kg (167 lb 1.6 oz), SpO2 99 %.  General: Responsive to interview but voice notable softer with short responses  HEENT: No pallor or icterus  Cardiac: Regular rate and rhythm, no murmurs appreciated on exam  Respiratory: Increased work of breathing on 2L compared to exam yesterday, decreased lung sounds at base  Abdomen: Soft, non-tender, non-distended, PEG site clean, dry and without erythema or drainage  MSK/Skin: No peripheral edema, no petechiae or bruising noted on exam    Labs:     Labs reviewed, Cr and WBC continue to down trend toward baseline    Imaging:    No new imaging      ATTENDING PHYSICIAN ADDENDUM AND NOTE:  I evaluated this patient's case separately and also with resident Dr. David Boyle. The note above reflects my assessment and plan. I have personally reviewed lab results, and vital and radiology results. >50% of time was spent in assessment and coordination of care; >=35 minutes.  Mr. Kenrick Oakley is a 70 yo gentleman with a metastatic squamous cell carcinoma of the head and neck.  He has had recurrent pleural effusions, and was admitted with hypoxia and hypercalcemia.  Hypercalcemia continues to improve. The IR team reports that his larger pleural effusion was chylous or otherwise viscous in nature, and thus fluid could not be drawn off via thoracentesis. He is requesting discharge to home; IR has offered outpatient follow-up for palliative management of his effusions.  Nadir Taylor MD, " PhD  Associate Professor of Medicine, Hematology, Oncology and Transplantation

## 2021-10-15 NOTE — PROGRESS NOTES
Care Management Discharge Note    Discharge Date: 10/15/21    Discharge Disposition: Home    Discharge Services: Home Infusion, Outpatient Infusion Services    Discharge DME: None    Discharge Transportation: Car, family or friend will provide    Private pay costs discussed: Not applicable    PAS Confirmation Code: N/A   Patient/family educated on Medicare website which has current facility and service quality ratings: N/A    Education Provided on the Discharge Plan: Yes  Persons Notified of Discharge Plans: Patient, bedside RN,   Patient/Family in Agreement with the Plan: Yes      Additional Information:    Per team, patient is stable to discharge home today.    Walk test completed and patient does not qualify for home oxygen.     Writer met with the patient and his spouse to discuss home oxygen. Patient's spouse upset that patient did not qualify. Writer explained that per Medicare guidelines, oxygen stats must be 88% and below. Patient's lowest score was 89%. Writer explained that if they wanted they could pay out of pocket for a month. Patient and spouse declined this.     [facility] liaison updated.     Addendum 1430: Received notification from bedside RN that patient and spouse have reconsidered and would like to pay out of pocket for home oxygen. Per Medicare.gov, [employer] (Phn: [phone], Fax: [phone]) is the only oxygen company in patient's zip code. Patient and spouse updated on home oxygen company. They would like home oxygen delivered to their home and declined needing it for transport.      Writer called [employer] to explain above. They asked that orders and supporting documentation be faxed over and they will review, then reach out to patient and spouse for delivery. Home oxygen orders and documentation faxed.     Melba Leonard, RN, BSN, PHN  Care Coordinator   P: [phone], [facility]

## 2021-10-18 NOTE — PROGRESS NOTES
This is a recent snapshot of the patient's [facility] medical record.  For current drug dose and complete information and questions, call [phone] or In Basket pool, [facility] home infusion ([id number])  CSN Number:  [id number]

## 2021-10-18 NOTE — PROGRESS NOTES
Clinic Care Coordination Contact  [facility]: Post-Discharge Note  SITUATION                                                      Admission:    Admission Date: 10/11/21   Reason for Admission: Acute hypoxic respiratory failure  Discharge:   Discharge Date: 10/15/21  Discharge Diagnosis: Acute hypoxic respiratory failure    BACKGROUND                                                         Reese Oakley is a 71 year old male with past medical history significant for HTN, HLD, type 2 DM, paroxysmal atrial fib, GERD, colon cancer, appendiceal cancer, laryngeal cancer s/p laser resection/adjuvant radiotherapy (2014), tongue cancer (2020) s/p wide local excision c/b biopsy-proven recurrent SCC recently c/b recurrent presumed malignant pleural effusions admitted from his oncology clinic today for acute hypoxic respiratory failure, LUCIANA, and hypercalcemia.         ASSESSMENT      Enrollment  Primary Care Care Coordination Status: Not a Candidate    Discharge Assessment  How are you doing now that you are home?: Spoke with pt's wife.  She reports he is about the same.  She has a call in to the oncologist about increasing pain meds and follow up appointments.  How are your symptoms? (Red Flag symptoms escalate to triage hotline per guidelines): Unchanged  Do you feel your condition is stable enough to be safe at home until your provider visit?: Yes  Does the patient have their discharge instructions? : Yes  Does the patient have questions regarding their discharge instructions? : No  Were you started on any new medications or were there changes to any of your previous medications? : Yes  Does the patient have all of their medications?: Yes  Do you have questions regarding any of your medications? : Yes (see comment) (would like to increase pain medication - has call in to the provider)  Do you have all of your needed medical supplies or equipment (DME)?  (i.e. oxygen tank, CPAP, cane, etc.): Yes  Discharge  follow-up appointment scheduled within 14 calendar days? : Yes  Discharge Follow Up Appointment Date: 10/20/21  Discharge Follow Up Appointment Scheduled with?: Specialty Care Provider    Post-op ([facility] Only)  If the patient had a surgery or procedure, do they have any questions for a nurse?: No             PLAN                                                      Outpatient Plan:     Future Appointments   Date Time Provider Department Center   10/20/2021  9:15 AM Deedee De Leon CNP [facility]   10/20/2021 12:15 PM  [facility]   10/20/2021  1:00 PM  ONC INFUSION NURSE [facility]   11/10/2021  9:20 AM [id number]   11/10/2021  9:40 AM [facility] [place] [address]   11/24/2021  2:00 PM Rolly Sinha MD [facility]         For any urgent concerns, please contact our 24 hour nurse triage line: [phone] ([phone])         CÉSAR Shen  [phone]  [facility]

## 2021-10-19 NOTE — PROGRESS NOTES
October 20, 2021    Reason for Visit: Follow up of recurrent SCC of the R neck       History of Present Illness: Reese Oakley is a 71 year old male with PMH HTN, hyperlipidemia, DM2 with recurrent SCC. 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  More recently he was noted on follow up scans to have enlarged R sided lymph nodes and biopsy proven recurrence in the R neck.  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  There was one additional focus of squamous cell carcinoma identified in an additional lymph node without MEL.  His case was discussed at multidisciplinary tumor board and he was recommended to have chemoradiation - due to MEL in the submandibular gland.        Started weekly cisplatin with limited field radiation 1/25/21. Due to worsening renal function after first treatment switched to carboplatin/taxol for week 2. Finished treatment 3/5/21. Since then he has had persistent right tongue pain.  A biopsy of this was taken in clinic at the beginning of May which grew actinomyces and he was treated with penicillin.  He also had a persistent ulcer of the right posterior tongue that was resected with the Omni laser on 5/24/2021.     A surveillance PET/CT was obtained 6/21/2021.  This revealed concerning lymph nodes in the chest. He did poorly after the treatment with malnutrition and he had EGD, PEG insertion and bronchoscopy with endobronchial ultrasound-guided biopsy on 7/5/21 and the 11L LN was negative for malignancy and Lymph node, level 7, contained nondiagnostic specimen. RIGHT VALLECULA biopsy on 7/5/21 showed necroinflammatory debris and fragments of squamous mucosa with acute inflammation and ulceration, negative for dysplasia or  "malignancy. GMS special stain highlights presence of fungal hyphae. Level 7 LN was later deemed to be malignancy.     He was admitted 8/5/21 with A fib, post obstructive pneumonia, fall with T6 compression fracture. Imaging noted a left hilar mass measuring 9.3x3.8 cm concerning for malignancy. He underwent bronchoscopy and biopsy of mass 9/15/21. Biopsy of 3 LN 11L, left hilar mass, and Station 7 LN all positive for malignancy SCC, low PD-L1 expression.     He was admitted 9/24/21 for dyspnea found to have new malignant pleural effusion s/p thoracentesis and new tumor vs bland thrombus of pulmonary vein, initially on heparin then transitioned to Apixaban. Pleural fluid concerning for malignancy. Also has bone lesion but asymptomatic. Noted to have hypercalcemia, plan for Zometa.     He met with Dr. Mark and recommended chemoimmunotherapy with Pembrolizumab, Carboplatin, and 5FU.     He was briefly admitted locally for hypoxia 2/2 recurrent effusion, s/p thoracentesis.     [facility] 10/11/21-10/15/21 for hypoxia, hypercalcemia    Interval History:   More alert since having O2 at home  Denies worsening MCNEIL  No fever  Chronic cough unchanged, feels congested at times  Energy low, spends much of day in front of his computer or watching TV  Can walk to bathroom alone  Pain in mouth, R shoulder, low back. Increase of dilaudid 2-->3-->4 mg somewhat helpful  LE swollen since getting home from hospital, not painful  Tolerating tube feedings    Review Of Systems  10-point review of systems were negative except as noted in HPI.    EXAM:  Blood pressure (!) 147/73, pulse 66, temperature 97.9  F (36.6  C), temperature source Oral, resp. rate 16, height 1.702 m (5' 7.01\"), weight 77 kg (169 lb 11.2 oz), SpO2 99 %.  GEN: alert and oriented x 3, more alert and engaged than prior visit  Head: no trauma  Neck: no swelling  Pulm: breathing comfortably on  O2. bases diminished, remainder CTA  Msk: 2+ ankle and pretibial edema    Current " Outpatient Medications   Medication Sig Dispense Refill     acetaminophen (TYLENOL) 160 MG/5ML suspension Take 20.5 mLs (650 mg) by mouth every 6 hours as needed for fever or mild pain 300 mL 3     amiodarone (PACERONE) 200 MG tablet Take 200 mg by mouth 2 times daily Take 2 pills twice a day for a week then 1 pill twice a day for 2 weeks then 1 pill daily.       Apixaban Starter Pack (ELIQUIS DVT/PE STARTER PACK) 5 MG TBPK Take 10 mg by mouth 2 times daily for 7 days, THEN 5 mg 2 times daily for 23 days. 74 each 0     dexamethasone (DECADRON) 4 MG tablet Take 2 tablets (8 mg) by mouth daily Start the day after carboplatin. 6 tablet 5     diphenhydrAMINE (BENADRYL) 12.5 MG/5ML solution Take 25 mg by mouth nightly as needed for itching, allergies or sleep       gabapentin (NEURONTIN) 250 MG/5ML solution 10 mLs (500 mg) by Per Feeding Tube route At Bedtime 300 mL 0     HYDROmorphone, STANDARD CONC, (DILAUDID) 1 MG/ML oral solution Take 1-2 mLs (1-2 mg) by mouth every 4 hours as needed for breakthrough pain 473 mL 0     insulin aspart (NOVOLOG PEN) 100 UNIT/ML pen Inject 15 Units Subcutaneous 3 times daily (with meals)        insulin glargine (LANTUS VIAL) 100 UNIT/ML vial Inject 25 Units Subcutaneous daily (with breakfast) PATIENT REPORTS 25 UNIT DAILY 02/16/2021       lidocaine (XYLOCAINE) 2 % solution Swish and spit 15 mLs in mouth every 3 hours as needed for moderate pain ; Max 8 doses/24 hour period. 100 mL 3     LORazepam (ATIVAN) 0.5 MG tablet Take 1 tablet (0.5 mg) by mouth every 4 hours as needed (Anxiety, Nausea/Vomiting or Sleep) 30 tablet 5     magic mouthwash (ENTER INGREDIENTS IN COMMENTS) suspension Swish, gargle, and spit one to two teaspoonfuls every six hours as needed. May be swallowed if esophageal involvement. 480 mL 3     omeprazole (PRILOSEC) 2 mg/mL suspension Take 10 mLs (20 mg) by mouth every morning (before breakfast) 300 mL 1     pentoxifylline (TRENTAL) 50 mg/ml suspension Take 8 mLs (400  mg) by mouth 3 times daily 500 mL 3     vitamin E (TOCOPHEROL) 1000 units (450 mg) capsule 1 capsule (1,000 Units) by Per Feeding Tube route daily 60 capsule 1       Labs:  Results for TEOFILO CHAN (MRN [id number]) as of 10/20/2021 13:17   Ref. Range 10/20/2021 09:27   Sodium Latest Ref Range: 133 - 144 mmol/L 131 (L)   Potassium Latest Ref Range: 3.4 - 5.3 mmol/L 4.0   Chloride Latest Ref Range: 94 - 109 mmol/L 97   Carbon Dioxide Latest Ref Range: 20 - 32 mmol/L 28   Urea Nitrogen Latest Ref Range: 7 - 30 mg/dL 22   Creatinine Latest Ref Range: 0.66 - 1.25 mg/dL 0.94   GFR Estimate Latest Ref Range: >60 mL/min/1.73m2 81   Calcium Latest Ref Range: 8.5 - 10.1 mg/dL 7.6 (L)   Anion Gap Latest Ref Range: 3 - 14 mmol/L 6   Albumin Latest Ref Range: 3.4 - 5.0 g/dL 1.9 (L)   Protein Total Latest Ref Range: 6.8 - 8.8 g/dL 6.7 (L)   Bilirubin Total Latest Ref Range: 0.2 - 1.3 mg/dL 0.4   Alkaline Phosphatase Latest Ref Range: 40 - 150 U/L 246 (H)   ALT Latest Ref Range: 0 - 70 U/L 39   AST Latest Ref Range: 0 - 45 U/L 32   T4 Free Latest Ref Range: 0.76 - 1.46 ng/dL 0.64 (L)   TSH Latest Ref Range: 0.40 - 4.00 mU/L 21.72 (H)   Glucose Latest Ref Range: 70 - 99 mg/dL 124 (H)   WBC Latest Ref Range: 4.0 - 11.0 10e3/uL 17.9 (H)   Hemoglobin Latest Ref Range: 13.3 - 17.7 g/dL 10.0 (L)   Hematocrit Latest Ref Range: 40.0 - 53.0 % 31.1 (L)   Platelet Count Latest Ref Range: 150 - 450 10e3/uL 298   RBC Count Latest Ref Range: 4.40 - 5.90 10e6/uL 3.48 (L)   MCV Latest Ref Range: 78 - 100 fL 89   MCH Latest Ref Range: 26.5 - 33.0 pg 28.7   MCHC Latest Ref Range: 31.5 - 36.5 g/dL 32.2   RDW Latest Ref Range: 10.0 - 15.0 % 13.3   % Neutrophils Latest Units: % 83   % Lymphocytes Latest Units: % 5   % Monocytes Latest Units: % 11   % Eosinophils Latest Units: % 0   % Basophils Latest Units: % 0   Absolute Basophils Latest Ref Range: 0.0 - 0.2 10e3/uL 0.0   Absolute Eosinophils Latest Ref Range: 0.0 - 0.7 10e3/uL 0.1   Absolute  Immature Granulocytes Latest Ref Range: <=0.0 10e3/uL 0.2 (H)   Absolute Lymphocytes Latest Ref Range: 0.8 - 5.3 10e3/uL 0.8   Absolute Monocytes Latest Ref Range: 0.0 - 1.3 10e3/uL 2.0 (H)   % Immature Granulocytes Latest Units: % 1   Absolute Neutrophils Latest Ref Range: 1.6 - 8.3 10e3/uL 14.8 (H)   Absolute NRBCs Latest Units: 10e3/uL 0.0   NRBCs per 100 WBC Latest Ref Range: <1 /100 0           Recent Results (from the past 744 hour(s))   XR Chest Port 1 View    Narrative    EXAM: XR CHEST PORT 1 VIEW  LOCATION: [facility]  DATE/TIME: 9/24/2021 2:07 AM    INDICATION: SOB, cough  COMPARISON: 09/15/2021.      Impression    IMPRESSION: Right-sided Port-A-Cath tip over the SVC. Heart size and pulmonary vascularity appear within normal limits. Large left pleural effusion is either new or significantly increased in size from the prior study. Prominent left perihilar infiltrate   probably not significantly changed. Right lung is clear. No significant bony abnormalities.   CT Chest Pulmonary Embolism w Contrast    Narrative    EXAM: CT CHEST PULMONARY EMBOLISM W CONTRAST  LOCATION: [facility]  DATE/TIME: 9/24/2021 5:20 AM    INDICATION: Shortness of breath.  COMPARISON: CT chest from 08/05/2021.  TECHNIQUE: CT chest pulmonary angiogram during arterial phase injection of IV contrast. Multiplanar reformats and MIP reconstructions were performed. Dose reduction techniques were used.   CONTRAST: 74 ml isovue 370     FINDINGS:  ANGIOGRAM CHEST: Pulmonary arteries are normal caliber and negative for pulmonary emboli. Thoracic aorta is negative for dissection. No CT evidence of right heart strain.    LUNGS AND PLEURA: Areas of consolidation and fibrosis in the paramediastinal regions bilaterally. There is abnormal soft tissue density encasing the distal left mainstem bronchus and proximal left upper and lower lobar bronchi (image 109  of series 8) as   well as encasing the distal left main pulmonary artery (image 37 series 4). There is a moderate to large left pleural effusion with areas of atelectasis throughout the left upper and lower lobes. There are a few nodular opacities in the right lung which   are new. For instance, in the superior segment of the right lower lobe there is a 9 mm nodule on image 75 of series 8 scattered areas of atelectasis throughout the right upper, middle, and lower lobes.    MEDIASTINUM/AXILLAE: Heart size is normal. No pericardial effusion. Compared to 08/05/2021, there is increasing mediastinal adenopathy. For instance, there is confluent adenopathy in the high aorticopulmonary window which measures about 3.9 x 2.9 cm on   image 89 of series 9, previously measuring about 2.7 x 2.1 cm by my measurement. There is a subcarinal lymph node which measures 1.8 cm short axis on image 106, previously 1.3 cm by my measurement. Right hilar lymph node measures 2.1 x 1.4 cm on image   101, previously 1.3 x 0.8 cm. A second right hilar node measures 1.9 x 1.1 cm on image 120, previously 11 x 8 mm. Right paratracheal node measuring 1.6 x 1.4 cm on image 82 previously measured up to 8 mm.    CORONARY ARTERY CALCIFICATION: Moderate.    UPPER ABDOMEN: Percutaneous gastrostomy tube in place.    MUSCULOSKELETAL: Interval sclerotic change with increasing superior endplate compression deformity at T6. There are scattered osteolytic lesions in the visualized spine. For instance, there is an 13 mm lytic lesion in the L1 vertebral body on image 100   of series 4 which is new. There are likely lytic lesions within multiple ribs as well such as the right lateral fourth rib and the left posterior eighth rib. There is a healing left 10th posterior rib fracture.      Impression    IMPRESSION:  1.  Negative for pulmonary embolism.    2.  Paramediastinal consolidations possibly reflecting posttreatment changes, though abnormal soft tissue density  encasing the left distal pulmonary artery and proximal left upper and lower lobar bronchi concerning for metastatic disease.    3.  Moderate to large left pleural effusion with areas of atelectasis in the left upper and lower lobes.    4.  New nodules in the right lung measuring up to 9 mm concerning for metastases.    5.  Interval increase in mediastinal and hilar irvin metastases.    6.  Scattered new small lytic lesions in the visualized spine consistent with metastases.   CT Abdomen Pelvis w Contrast    Narrative    EXAMINATION: CT ABDOMEN PELVIS W CONTRAST, 9/24/2021 9:02 AM    TECHNIQUE:  Helical CT images from the lung bases through the  symphysis pubis were obtained without and with IV contrast.     Contrast dose: 74 mL of Isovue 370     COMPARISON: 6/21/2021 and 11/24/2020 whole body PET/CT     HISTORY:  staging - likely stage 4 SCC      FINDINGS:    Abdomen and pelvis: G-tube within the gastric lumen. Remaining stomach  and duodenum are unremarkable. No focal hepatic lesions. There may be  some minor focal fatty change along the falciform ligament.  Gallbladder is unremarkable. The vasculature is patent. Spleen,  adrenal glands, kidneys are unremarkable. Ureteral course is normal.  Bladder is contrast opacified and unremarkable. Small large bowel are  normal caliber. Posterior changes of right hemicolectomy. The appendix  is surgically absent. No free fluid in the abdomen or pelvis. No  pathologically enlarged inguinal pelvic retroperitoneal lymph nodes.  The abdominal aorta and its proximal bifurcations are widely patent.    Lung bases/lower chest:  Large left-sided pleural fusion with pleural  thickening/nodularity/enhancement surrounding the inferior left lower  lobe and anterior aspect of the right oblique fissure. Incomplete  visualization of a heterogeneously enhancing 7.0 x 3.0 cm soft tissue  mass encompassing the proximal left upper lobe bronchus with  obstructive atelectasis of the left upper  lobe. Multiple peripherally  enhancing centrally necrotic mediastinal lymph nodes. No significant  right-sided pleural effusion. No pneumothorax. Heart size is  borderline enlarged. Aortic root and proximal pulmonary artery normal  caliber. Pulmonary veins in the region of the tumor appeared to be  involved with possible tumor extension into the vein versus bland  thrombus noted on series 4 image 8 and coronal series 3 image 44. This  is not as readily appreciated on the same-day pulmonary embolism study  due to timing of contrast bolus. Venous catheter terminates at the  cavoatrial junction.    Bones and soft tissues: More pronounced endplate deformities of the L2  vertebral body with new lytic lesions in the L4 and S1 vertebral  bodies.       Impression    IMPRESSION:     1. Numerous findings within the left lung and mediastinum are  concerning for new/progression of metastatic disease.   2. Soft tissue lesion obstructing the proximal left upper lobe  bronchus with lobar atelectasis of the downstream left upper lobe  partially visualized. Please see same day separate CT chest dictation.  3. Filling defect involving superior left pulmonary veins at the site  of tumor concerning for tumor versus bland thrombus. This is new from  prior CT.  4. Lytic lesions within the L2, L4 and S1 vertebral body are  concerning for metastatic disease    These findings were communicated to Dr. Mike Sanchez at 1123 AM  on 9/24/2021 by Tremayne Grimes over the phone.     I have personally reviewed the examination and initial interpretation  and I agree with the findings.    CARLOS GALVEZ MD         SYSTEM ID:  [id number]   CT Soft Tissue Neck w Contrast    Narrative    CT SOFT TISSUE NECK W CONTRAST 9/24/2021 1:32 PM    History:  Advanced head/neck cancer, follow up.    Per EPIC: 71 year old male who?with a past medical history significant  for hypertension hyperlipidemia, anemia, type 2 diabetes on insulin,  GERD,?history of  colon cancer, history of appendiceal cancer,?history  of?throat?cancer treated with laser resection and adjuvant  radiotherapy in 2014 followed by tongue cancer diagnosed in February 2020 treated with wide local excision of the right tongue on 2/19/2020  who was found to have biopsy-proven recurrence in his right neck and  underwent modified neck dissection on 12/9/2020?with  subsequent?adjuvant radiation and chemotherapy and has been followed  by oncology and ENT,?presents to the ED tonight with cough and  shortness of breath.   ?              Right vallecula biopsy on 7/5/2021 was reported as  negative for malignancy.    Comparison:  PET CT from 11/24/2020 and 5/19/2020.     Technique: Following intravenous administration of nonionic iodinated  contrast medium, thin section helical CT images were obtained from the  skull base down to the level of the aortic arch.  Axial, coronal and  sagittal reformations were performed with 2-3 mm slice thickness  reconstruction. Images were reviewed in soft tissue, lung and bone  windows.    Contrast: iopamidol (ISOVUE-370) solution 100 mL    Findings:   Ill-defined enhancement within right side of the tongue, grossly  measuring 2.5 x 1.9 cm (series 2 image 37) with effacement of adjacent  fat plane (series 3 images 74-78), which is concerning for recurrence.    Postsurgical changes of right partial glossectomy and right neck  dissection with surgical clips. Right submandibular gland is  surgically resected. Postradiation changes of the neck, including  subcutaneous edema within the anterior neck, thickening of the right  and right posterolateral oropharyngeal walls, and effacement of right  deep neck spaces. No imaging findings of osteoradionecrosis of the  hyoid bone, mandible or thyroid cartilage. Fatty atrophy of the  bilateral parotid glands. Left submandibular gland appears normal.    Effacement of right piriform sinus. Thyroid gland atrophy.    There is no evident  cervical lymphadenopathy. The major vascular  structures in the neck appear unremarkable.    Evaluation of the osseous structures demonstrate no worrisome lytic or  sclerotic lesion. Multilevel cervical spondylosis, most pronounced at  C5-6. The visualized paranasal sinuses are clear. The mastoid air  cells are clear. Bilateral loops in the pocket.    For pulmonary findings please see dedicated chest CT dictation from  the same day.      Impression    Impression:  1. Ill-defined enhancement within right side of the tongue with  effacement of adjacent fat plane, which is concerning for recurrence.  Correlation with clinical examination is recommended.   2. No cervical lymphadenopathy.   3. For pulmonary findings please see dedicated chest CT dictation from  the same day.    I have personally reviewed the examination and initial interpretation  and I agree with the findings.    ISSA PACHECO MD         SYSTEM ID:  [id number]   XR Chest Port 1 View    Narrative    EXAM: XR CHEST 1 VW 9/24/2021      HISTORY: Post thoracentesis, r/o pneumothorax.    COMPARISON: Same day    TECHNIQUE: Frontal upright view of the chest.    FINDINGS: Mild to moderate residual left pleural effusion without  appreciable pneumothorax. Right chest wall Port-A-Cath tip projects  just superior cavoatrial junction. Right lung and pleura are clear.  Cardiomediastinal silhouette is obscured. No acute abnormality of the  osseous thorax or upper abdomen.      Impression    IMPRESSION: Decreased left pleural effusion without appreciable  pneumothorax, compared to earlier chest x-ray 9/24/2021. Right lung  and pleura are relatively clear.    I have personally reviewed the examination and initial interpretation  and I agree with the findings.    CHAO LINDO MD         SYSTEM ID:  [id number]   XR Chest 2 Views    Narrative    EXAM: XR CHEST 2 VW 10/11/2021 2:24 PM    HISTORY: Tonsil cancer (H); Pleural effusion, malignant.    COMPARISON: Radiograph of  the chest dated 9/24/2021.    TECHNIQUE: Upright frontal and lateral views of the chest.    FINDINGS: Right IJ portacatheter with possible kink in the catheter at  the right internal jugular venotomy site and tip terminating in the  low SVC. Trachea is midline. Cardiac mediastinal silhouette is stable.  Bilateral perihilar streaky opacities. Trace right pleural effusion.  Small left pleural effusion. No pneumothorax. Partially visualized  gastrostomy tube. Lytic lesion in posterior left eighth rib, as seen  on prior CT 9/24/2021. Prominent gastric air opacity. Low lung volume.  Compression deformity of mid thoracic vertebral body, better seen on  prior CT  9/24/21.      Impression    IMPRESSION:     1. Persistent bilateral perihilar opacities, more conspicuous compared  to prior x-ray 9/24/2021 with trace right and small left pleural  effusions.  2. Port-A-Cath in right chest wall has possible kink in the catheter  at the right internal jugular vein venotomy site. May consider IR  consult if clinically desired.    I have personally reviewed the examination and initial interpretation  and I agree with the findings.    CHAO LINDO MD         SYSTEM ID:  [id number]   XR Chest 2 Views    Narrative    EXAM: XR CHEST 2 VW  10/13/2021 3:15 PM     HISTORY:  assess for low lung volume vs effusion of clinical  significance, leukocytosis       COMPARISON:  Chest radiograph, 10/11/2021.    FINDINGS:   PA and lateral views of the chest. Right IJ Port-A-Cath terminates in  the mid SVC.     Trachea is midline. Heart size is normal. Prominent and indistinct  pulmonary vasculature with increased perihilar streaky opacities.  Persistent hypoinflated lungs. Small left greater than right pleural  effusion. No appreciable pneumothorax.    Healed mildly displaced posterior fracture of the right sixth rib  there is unchanged. Unchanged lytic lesions of the posterior left  eighth rib. Unchanged wedge deformity of the T6 vertebral  body.  Gastrostomy tube and balloon project over the stomach. Multiple  nonspecific air-fluid levels in the bowel loops.          Impression    IMPRESSION:  Prominent and indistinct pulmonary vasculature with increased  perihilar streaky opacities. Left greater than right pleural effusion.  Findings are consistent with pulmonary edema, infection, and/or  atelectasis. Displaced fracture, right sixth rib. Known lytic lesion  less conspicuous in the left eighth rib when compared with CT scan  from 9/24/2021.    I have personally reviewed the examination and initial interpretation  and I agree with the findings.    ALTON WRIGHT MD         SYSTEM ID:  [id number]       Assessment/Plan    # Recurrent SCC of Larynx with mets to left pleural space, left lung and mediastinum and lytic lesions within the L2, L4 and S1 vertebral body. He also has  ll-defined enhancement within right side of the tongue with effacement of adjacent fat plane, which is concerning for recurrence. Patient has a functioning status PS of 1.  He is willing to continue treatment.   - His PET scan on 6/21/21 is concerning for some new L hilar and mediastinal adenopathy that are hypermetabolic. He had EBUS on 7/5/21 with no malignancy on 11L but  level 7 did not have enough specimen (which later deemed to be malignancy). His right vallecula biopsy on 7/5/21 showed necroinflammatory debris and fragments of squamous mucosa with acute inflammation and ulceration, negative for dysplasia or malignancy. GMS special stain highlights presence of fungal hyphae. Since there is no biopsy proven thioacetic LN, we decided to have repeat imaging during the August visit. He unfortunately developed SOB need admission from 9/24-9/26 with above CT findings. Pleural fluid is positive for SCC.   - his PDL1 score is 2, low expression based on Lymph node station 7 FNA/squamous cell carcinoma from 9/15/21.  NGS pending.     Presents today to begin palliative intent  pembrolizumab/carboplatin/5-Fu. Was hospitalized again after our last meeting 10/11 due to labs and clinical status. Appears better today though still borderline PS to do chemo. ECOG 2-3. Kenrick would like to try and get stronger to pursue chemo, so we decided to just give keytruda today and allow for some recovery in the coming weeks. He was willing to try physical therapy locally which I sent a referral for.   -RTC with me prior to cycle 2  -Scans and visit with Dr. Mark prior to consideration of cycle 3.     #Malignant pleural effusion: unable to place Pleurx drain inpatient. Has follow up with IP next month.  -breathing ok today, better with home O2    #leukocytosis: stable from discharge. he does not appear infectious on exam. No fever.     # Hypercalcemia: 2/2 malignancy s/p zometa 10/11  -corrected level  9.5 today    #LUCIANA: Cr. normalized today    #hyponatremia: responded to fluid inpatient thus likely from hypovolemia. No longer on lasix recommended Gatorade through tube 1 can daily to get salty intake     # Pain - mouth existing since chemo/rads and was managed by oxycodone previously. Switched to hydromorphone inpatient by palliative care. 2 mg q4 hours was not adequately controlling pain so recommended increase to 4 mg q4 hours, okay to increased to 4 mg q3. Also will try voltaren gel and lido patch to back/shoulder.   --radiation oncology referral for possible palliative radiation to lumbar spine lesions.   --palliative appointment 11/24    #LE edema- hypoalbuminemia likely contributing and decreased mobility following recent hospitalization with IVF rescesitation. Given already on eliquis, will hold off on US for now. Recommended activity and compression stockings.     #gluteal cleft pressure wound- reviewed shifting weight and barrier creams. Keep clean. Consider wound clinic consult pending course    #hypothyroid- Start levo 25 mcg daily. Reviewed administration in morning on empty stomach    #  Nutrition - He continues to have stable weight on tube feeding.     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      Transition Care Management Services  Admission Date: 10/11/21    Discharge Date: 10/15/21    Discharge Diagnosis: Acute hypoxic respiratory failure    Interactive contact date: 10/18/2021  Face-to-face visit date: 10/19/2021    Medical complexity decision making: High complexity (99044)    70 minutes spent on the date of the encounter doing chart review, review of test results, interpretation of tests, patient visit, documentation and discussion with other provider(s)       Deedee De Leon CNP on 10/20/2021 at 1:28 PM

## 2021-10-19 NOTE — TELEPHONE ENCOUNTER
RN Care Coordination Note  Incoming Call:   Patient's wife is saying she was told his chemotherapy was going to be changed because he is unable to tolerate the current plan. She is asking what the plan will be change to.    She is also concerned over his medication changes after being discharged from the hospital. He was given Dilaudid 1-2 mg every 4 hours but no relief from the pain in his mouth/tongue, ribs, and spine. His gabapentin was decreased from 5 ml BID and 10 ml HS to only 10 ml HS. Message sent to Oncology care team for advisement.  Provider Response:    Outgoing Call:       Leslie Sanchez RN, BSN  Care Coordinator   [facility]

## 2021-10-20 NOTE — PATIENT INSTRUCTIONS
Watch My Chart for future appointments.  Call the triage nurse if you don't hear anything on appointments or nothing shows up in My Chart.      [facility] Main Line: [phone]    Call triage nurse with chills and/or temperature greater than or equal to 100.4, uncontrolled nausea/vomiting, diarrhea, constipation, dizziness, shortness of breath, chest pain, bleeding, unexplained bruising, or any new/concerning symptoms, questions/concerns.   If you are having any concerning symptoms or wish to speak to a provider before your next infusion visit, please call your care coordinator or triage to notify them so we can adequately serve you.   Triage Nurse Line: [phone]    If after hours, weekends, or holidays [phone]

## 2021-10-20 NOTE — NURSING NOTE
"Chief Complaint   Patient presents with     Oncology Clinic Visit     Squamous cell carcinoma of neck     Port Draw     Labd drawn via port by RN in lab.  VS taken       Port accessed with 20 gauge 3/4\" Power needle by RN, labs collected, line flushed with saline and heparin.  Vitals taken. Pt checked in for appointment(s).    Elsy Ortiz RN      "

## 2021-10-20 NOTE — NURSING NOTE
"Oncology Rooming Note    October 20, 2021 9:33 AM   Reese Oakley is a 71 year old male who presents for:    Chief Complaint   Patient presents with     Oncology Clinic Visit     Squamous cell carcinoma of neck     Port Draw     Labd drawn via port by RN in lab.  VS taken     Initial Vitals: BP (!) 147/73   Pulse 66   Temp 97.9  F (36.6  C) (Oral)   Resp 16   Ht 1.702 m (5' 7.01\")   Wt 77 kg (169 lb 11.2 oz)   SpO2 99%   BMI 26.57 kg/m   Estimated body mass index is 26.57 kg/m  as calculated from the following:    Height as of this encounter: 1.702 m (5' 7.01\").    Weight as of this encounter: 77 kg (169 lb 11.2 oz). Body surface area is 1.91 meters squared.  Moderate Pain (4) Comment: Data Unavailable   No LMP for male patient.  Allergies reviewed: Yes  Medications reviewed: Yes    Medications: Medication refills not needed today.  Pharmacy name entered into EPIC:    [id number] [place] [facility] [address] [facility] - [facility] [address] [place] [address] - [facility] [address] [facility] [address]    Clinical concerns: Lower extremity edema in hospital m-f last week        Fermin Hunt MA            "

## 2021-10-20 NOTE — PROGRESS NOTES
Infusion Nursing Note:  Reese Oakley presents today for Cycle 1 Day 1 Keytruda (No Carbo or Fluorouracil today).  New Regimen.  Patient seen by provider today: Yes: Deedee De Leon NP   present during visit today: Not Applicable.    Note: Evaluated by Deedee De Leon NP pre infusion.  EMILY Gonzalez RN/ Deedee De Leon NP  Keytruda only today.  250 ml IV NS w/Keytruda.  No premeds w/Keytruda.    Here w/wife as she does his tube feeds.  He is over due for a feeding which she administered when he arrived to infusion chair.  Wife states they have received information on new chemo regimen.      Intravenous Access:  Implanted Port.    Treatment Conditions:  Results reviewed, labs MET treatment parameters, ok to proceed with treatment.  Results for TEOFILO OAKLEY (MRN [id number]) as of 10/20/2021 10:18   Ref. Range 10/20/2021 09:27   Sodium Latest Ref Range: 133 - 144 mmol/L 131 (L)   Potassium Latest Ref Range: 3.4 - 5.3 mmol/L 4.0   Chloride Latest Ref Range: 94 - 109 mmol/L 97   Carbon Dioxide Latest Ref Range: 20 - 32 mmol/L 28   Urea Nitrogen Latest Ref Range: 7 - 30 mg/dL 22   Creatinine Latest Ref Range: 0.66 - 1.25 mg/dL 0.94   GFR Estimate Latest Ref Range: >60 mL/min/1.73m2 81   Calcium Latest Ref Range: 8.5 - 10.1 mg/dL 7.6 (L)   Anion Gap Latest Ref Range: 3 - 14 mmol/L 6   Albumin Latest Ref Range: 3.4 - 5.0 g/dL 1.9 (L)   Protein Total Latest Ref Range: 6.8 - 8.8 g/dL 6.7 (L)   Bilirubin Total Latest Ref Range: 0.2 - 1.3 mg/dL 0.4   Alkaline Phosphatase Latest Ref Range: 40 - 150 U/L 246 (H)   ALT Latest Ref Range: 0 - 70 U/L 39   AST Latest Ref Range: 0 - 45 U/L 32   TSH Latest Ref Range: 0.40 - 4.00 mU/L 21.72 (H)   Glucose Latest Ref Range: 70 - 99 mg/dL 124 (H)   WBC Latest Ref Range: 4.0 - 11.0 10e3/uL 17.9 (H)   Hemoglobin Latest Ref Range: 13.3 - 17.7 g/dL 10.0 (L)   Hematocrit Latest Ref Range: 40.0 - 53.0 % 31.1 (L)   Platelet Count Latest Ref Range: 150 - 450 10e3/uL 298   RBC Count  Latest Ref Range: 4.40 - 5.90 10e6/uL 3.48 (L)   MCV Latest Ref Range: 78 - 100 fL 89   MCH Latest Ref Range: 26.5 - 33.0 pg 28.7   MCHC Latest Ref Range: 31.5 - 36.5 g/dL 32.2   RDW Latest Ref Range: 10.0 - 15.0 % 13.3   % Neutrophils Latest Units: % 83   % Lymphocytes Latest Units: % 5   % Monocytes Latest Units: % 11   % Eosinophils Latest Units: % 0   % Basophils Latest Units: % 0   Absolute Basophils Latest Ref Range: 0.0 - 0.2 10e3/uL 0.0   Absolute Eosinophils Latest Ref Range: 0.0 - 0.7 10e3/uL 0.1   Absolute Immature Granulocytes Latest Ref Range: <=0.0 10e3/uL 0.2 (H)   Absolute Lymphocytes Latest Ref Range: 0.8 - 5.3 10e3/uL 0.8   Absolute Monocytes Latest Ref Range: 0.0 - 1.3 10e3/uL 2.0 (H)   % Immature Granulocytes Latest Units: % 1   Absolute Neutrophils Latest Ref Range: 1.6 - 8.3 10e3/uL 14.8 (H)   Absolute NRBCs Latest Units: 10e3/uL 0.0   NRBCs per 100 WBC Latest Ref Range: <1 /100 0         Post Infusion Assessment:  Patient tolerated infusion without incident.  Blood return noted pre and post infusion.  Site patent and intact, free from redness, edema or discomfort.  No evidence of extravasations.  Access discontinued per protocol.       Discharge Plan:   Patient declined prescription refills.  Several meds sent to local pharm by Deedee gates. Compazine sent to local pharmacy for future as he has some at home currently.  Copy of AVS reviewed with patient and/or family.  Patient will return To Be Scheduled for next appointment.  Patient discharged in stable condition accompanied by: wife.  Departure Mode: Wheelchair.  Face to Face time: 0.      Sarah Gonzalez RN

## 2021-10-20 NOTE — LETTER
10/20/2021         RE: Reese Oakley  [address] [address]        Dear Colleague,    Thank you for referring your patient, Reese Oakley, to the [facility]. Please see a copy of my visit note below.    October 20, 2021    Reason for Visit: Follow up of recurrent SCC of the R neck       History of Present Illness: Reese Oakley is a 71 year old male with PMH HTN, hyperlipidemia, DM2 with recurrent SCC. 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  More recently he was noted on follow up scans to have enlarged R sided lymph nodes and biopsy proven recurrence in the R neck.  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  There was one additional focus of squamous cell carcinoma identified in an additional lymph node without MEL.  His case was discussed at multidisciplinary tumor board and he was recommended to have chemoradiation - due to MEL in the submandibular gland.        Started weekly cisplatin with limited field radiation 1/25/21. Due to worsening renal function after first treatment switched to carboplatin/taxol for week 2. Finished treatment 3/5/21. Since then he has had persistent right tongue pain.  A biopsy of this was taken in clinic at the beginning of May which grew actinomyces and he was treated with penicillin.  He also had a persistent ulcer of the right posterior tongue that was resected with the Omni laser on 5/24/2021.     A surveillance PET/CT was obtained 6/21/2021.  This revealed concerning lymph nodes in the chest. He did poorly after the treatment with malnutrition and he had EGD, PEG insertion and bronchoscopy with endobronchial ultrasound-guided biopsy on 7/5/21 and  "the 11L LN was negative for malignancy and Lymph node, level 7, contained nondiagnostic specimen. RIGHT VALLECULA biopsy on 7/5/21 showed necroinflammatory debris and fragments of squamous mucosa with acute inflammation and ulceration, negative for dysplasia or malignancy. GMS special stain highlights presence of fungal hyphae. Level 7 LN was later deemed to be malignancy.     He was admitted 8/5/21 with A fib, post obstructive pneumonia, fall with T6 compression fracture. Imaging noted a left hilar mass measuring 9.3x3.8 cm concerning for malignancy. He underwent bronchoscopy and biopsy of mass 9/15/21. Biopsy of 3 LN 11L, left hilar mass, and Station 7 LN all positive for malignancy SCC, low PD-L1 expression.     He was admitted 9/24/21 for dyspnea found to have new malignant pleural effusion s/p thoracentesis and new tumor vs bland thrombus of pulmonary vein, initially on heparin then transitioned to Apixaban. Pleural fluid concerning for malignancy. Also has bone lesion but asymptomatic. Noted to have hypercalcemia, plan for Zometa.     He met with Dr. Mark and recommended chemoimmunotherapy with Pembrolizumab, Carboplatin, and 5FU.     He was briefly admitted locally for hypoxia 2/2 recurrent effusion, s/p thoracentesis.     [facility] 10/11/21-10/15/21 for hypoxia, hypercalcemia    Interval History:   More alert since having O2 at home  Denies worsening MCNEIL  No fever  Chronic cough unchanged, feels congested at times  Energy low, spends much of day in front of his computer or watching TV  Can walk to bathroom alone  Pain in mouth, R shoulder, low back. Increase of dilaudid 2-->3-->4 mg somewhat helpful  LE swollen since getting home from hospital, not painful  Tolerating tube feedings    Review Of Systems  10-point review of systems were negative except as noted in HPI.    EXAM:  Blood pressure (!) 147/73, pulse 66, temperature 97.9  F (36.6  C), temperature source Oral, resp. rate 16, height 1.702 m (5' 7.01\"), " weight 77 kg (169 lb 11.2 oz), SpO2 99 %.  GEN: alert and oriented x 3, more alert and engaged than prior visit  Head: no trauma  Neck: no swelling  Pulm: breathing comfortably on  O2. bases diminished, remainder CTA  Msk: 2+ ankle and pretibial edema    Current Outpatient Medications   Medication Sig Dispense Refill     acetaminophen (TYLENOL) 160 MG/5ML suspension Take 20.5 mLs (650 mg) by mouth every 6 hours as needed for fever or mild pain 300 mL 3     amiodarone (PACERONE) 200 MG tablet Take 200 mg by mouth 2 times daily Take 2 pills twice a day for a week then 1 pill twice a day for 2 weeks then 1 pill daily.       Apixaban Starter Pack (ELIQUIS DVT/PE STARTER PACK) 5 MG TBPK Take 10 mg by mouth 2 times daily for 7 days, THEN 5 mg 2 times daily for 23 days. 74 each 0     dexamethasone (DECADRON) 4 MG tablet Take 2 tablets (8 mg) by mouth daily Start the day after carboplatin. 6 tablet 5     diphenhydrAMINE (BENADRYL) 12.5 MG/5ML solution Take 25 mg by mouth nightly as needed for itching, allergies or sleep       gabapentin (NEURONTIN) 250 MG/5ML solution 10 mLs (500 mg) by Per Feeding Tube route At Bedtime 300 mL 0     HYDROmorphone, STANDARD CONC, (DILAUDID) 1 MG/ML oral solution Take 1-2 mLs (1-2 mg) by mouth every 4 hours as needed for breakthrough pain 473 mL 0     insulin aspart (NOVOLOG PEN) 100 UNIT/ML pen Inject 15 Units Subcutaneous 3 times daily (with meals)        insulin glargine (LANTUS VIAL) 100 UNIT/ML vial Inject 25 Units Subcutaneous daily (with breakfast) PATIENT REPORTS 25 UNIT DAILY 02/16/2021       lidocaine (XYLOCAINE) 2 % solution Swish and spit 15 mLs in mouth every 3 hours as needed for moderate pain ; Max 8 doses/24 hour period. 100 mL 3     LORazepam (ATIVAN) 0.5 MG tablet Take 1 tablet (0.5 mg) by mouth every 4 hours as needed (Anxiety, Nausea/Vomiting or Sleep) 30 tablet 5     magic mouthwash (ENTER INGREDIENTS IN COMMENTS) suspension Swish, gargle, and spit one to two teaspoonfuls  every six hours as needed. May be swallowed if esophageal involvement. 480 mL 3     omeprazole (PRILOSEC) 2 mg/mL suspension Take 10 mLs (20 mg) by mouth every morning (before breakfast) 300 mL 1     pentoxifylline (TRENTAL) 50 mg/ml suspension Take 8 mLs (400 mg) by mouth 3 times daily 500 mL 3     vitamin E (TOCOPHEROL) 1000 units (450 mg) capsule 1 capsule (1,000 Units) by Per Feeding Tube route daily 60 capsule 1       Labs:  Results for TEOFILO CHAN (MRN [id number]) as of 10/20/2021 13:17   Ref. Range 10/20/2021 09:27   Sodium Latest Ref Range: 133 - 144 mmol/L 131 (L)   Potassium Latest Ref Range: 3.4 - 5.3 mmol/L 4.0   Chloride Latest Ref Range: 94 - 109 mmol/L 97   Carbon Dioxide Latest Ref Range: 20 - 32 mmol/L 28   Urea Nitrogen Latest Ref Range: 7 - 30 mg/dL 22   Creatinine Latest Ref Range: 0.66 - 1.25 mg/dL 0.94   GFR Estimate Latest Ref Range: >60 mL/min/1.73m2 81   Calcium Latest Ref Range: 8.5 - 10.1 mg/dL 7.6 (L)   Anion Gap Latest Ref Range: 3 - 14 mmol/L 6   Albumin Latest Ref Range: 3.4 - 5.0 g/dL 1.9 (L)   Protein Total Latest Ref Range: 6.8 - 8.8 g/dL 6.7 (L)   Bilirubin Total Latest Ref Range: 0.2 - 1.3 mg/dL 0.4   Alkaline Phosphatase Latest Ref Range: 40 - 150 U/L 246 (H)   ALT Latest Ref Range: 0 - 70 U/L 39   AST Latest Ref Range: 0 - 45 U/L 32   T4 Free Latest Ref Range: 0.76 - 1.46 ng/dL 0.64 (L)   TSH Latest Ref Range: 0.40 - 4.00 mU/L 21.72 (H)   Glucose Latest Ref Range: 70 - 99 mg/dL 124 (H)   WBC Latest Ref Range: 4.0 - 11.0 10e3/uL 17.9 (H)   Hemoglobin Latest Ref Range: 13.3 - 17.7 g/dL 10.0 (L)   Hematocrit Latest Ref Range: 40.0 - 53.0 % 31.1 (L)   Platelet Count Latest Ref Range: 150 - 450 10e3/uL 298   RBC Count Latest Ref Range: 4.40 - 5.90 10e6/uL 3.48 (L)   MCV Latest Ref Range: 78 - 100 fL 89   MCH Latest Ref Range: 26.5 - 33.0 pg 28.7   MCHC Latest Ref Range: 31.5 - 36.5 g/dL 32.2   RDW Latest Ref Range: 10.0 - 15.0 % 13.3   % Neutrophils Latest Units: % 83   %  Lymphocytes Latest Units: % 5   % Monocytes Latest Units: % 11   % Eosinophils Latest Units: % 0   % Basophils Latest Units: % 0   Absolute Basophils Latest Ref Range: 0.0 - 0.2 10e3/uL 0.0   Absolute Eosinophils Latest Ref Range: 0.0 - 0.7 10e3/uL 0.1   Absolute Immature Granulocytes Latest Ref Range: <=0.0 10e3/uL 0.2 (H)   Absolute Lymphocytes Latest Ref Range: 0.8 - 5.3 10e3/uL 0.8   Absolute Monocytes Latest Ref Range: 0.0 - 1.3 10e3/uL 2.0 (H)   % Immature Granulocytes Latest Units: % 1   Absolute Neutrophils Latest Ref Range: 1.6 - 8.3 10e3/uL 14.8 (H)   Absolute NRBCs Latest Units: 10e3/uL 0.0   NRBCs per 100 WBC Latest Ref Range: <1 /100 0           Recent Results (from the past 744 hour(s))   XR Chest Port 1 View    Narrative    EXAM: XR CHEST PORT 1 VIEW  LOCATION: [facility]  DATE/TIME: 9/24/2021 2:07 AM    INDICATION: SOB, cough  COMPARISON: 09/15/2021.      Impression    IMPRESSION: Right-sided Port-A-Cath tip over the SVC. Heart size and pulmonary vascularity appear within normal limits. Large left pleural effusion is either new or significantly increased in size from the prior study. Prominent left perihilar infiltrate   probably not significantly changed. Right lung is clear. No significant bony abnormalities.   CT Chest Pulmonary Embolism w Contrast    Narrative    EXAM: CT CHEST PULMONARY EMBOLISM W CONTRAST  LOCATION: [facility]  DATE/TIME: 9/24/2021 5:20 AM    INDICATION: Shortness of breath.  COMPARISON: CT chest from 08/05/2021.  TECHNIQUE: CT chest pulmonary angiogram during arterial phase injection of IV contrast. Multiplanar reformats and MIP reconstructions were performed. Dose reduction techniques were used.   CONTRAST: 74 ml isovue 370     FINDINGS:  ANGIOGRAM CHEST: Pulmonary arteries are normal caliber and negative for pulmonary emboli. Thoracic aorta is negative for dissection. No CT evidence  of right heart strain.    LUNGS AND PLEURA: Areas of consolidation and fibrosis in the paramediastinal regions bilaterally. There is abnormal soft tissue density encasing the distal left mainstem bronchus and proximal left upper and lower lobar bronchi (image 109 of series 8) as   well as encasing the distal left main pulmonary artery (image 37 series 4). There is a moderate to large left pleural effusion with areas of atelectasis throughout the left upper and lower lobes. There are a few nodular opacities in the right lung which   are new. For instance, in the superior segment of the right lower lobe there is a 9 mm nodule on image 75 of series 8 scattered areas of atelectasis throughout the right upper, middle, and lower lobes.    MEDIASTINUM/AXILLAE: Heart size is normal. No pericardial effusion. Compared to 08/05/2021, there is increasing mediastinal adenopathy. For instance, there is confluent adenopathy in the high aorticopulmonary window which measures about 3.9 x 2.9 cm on   image 89 of series 9, previously measuring about 2.7 x 2.1 cm by my measurement. There is a subcarinal lymph node which measures 1.8 cm short axis on image 106, previously 1.3 cm by my measurement. Right hilar lymph node measures 2.1 x 1.4 cm on image   101, previously 1.3 x 0.8 cm. A second right hilar node measures 1.9 x 1.1 cm on image 120, previously 11 x 8 mm. Right paratracheal node measuring 1.6 x 1.4 cm on image 82 previously measured up to 8 mm.    CORONARY ARTERY CALCIFICATION: Moderate.    UPPER ABDOMEN: Percutaneous gastrostomy tube in place.    MUSCULOSKELETAL: Interval sclerotic change with increasing superior endplate compression deformity at T6. There are scattered osteolytic lesions in the visualized spine. For instance, there is an 13 mm lytic lesion in the L1 vertebral body on image 100   of series 4 which is new. There are likely lytic lesions within multiple ribs as well such as the right lateral fourth rib and the  left posterior eighth rib. There is a healing left 10th posterior rib fracture.      Impression    IMPRESSION:  1.  Negative for pulmonary embolism.    2.  Paramediastinal consolidations possibly reflecting posttreatment changes, though abnormal soft tissue density encasing the left distal pulmonary artery and proximal left upper and lower lobar bronchi concerning for metastatic disease.    3.  Moderate to large left pleural effusion with areas of atelectasis in the left upper and lower lobes.    4.  New nodules in the right lung measuring up to 9 mm concerning for metastases.    5.  Interval increase in mediastinal and hilar irvin metastases.    6.  Scattered new small lytic lesions in the visualized spine consistent with metastases.   CT Abdomen Pelvis w Contrast    Narrative    EXAMINATION: CT ABDOMEN PELVIS W CONTRAST, 9/24/2021 9:02 AM    TECHNIQUE:  Helical CT images from the lung bases through the  symphysis pubis were obtained without and with IV contrast.     Contrast dose: 74 mL of Isovue 370     COMPARISON: 6/21/2021 and 11/24/2020 whole body PET/CT     HISTORY:  staging - likely stage 4 SCC      FINDINGS:    Abdomen and pelvis: G-tube within the gastric lumen. Remaining stomach  and duodenum are unremarkable. No focal hepatic lesions. There may be  some minor focal fatty change along the falciform ligament.  Gallbladder is unremarkable. The vasculature is patent. Spleen,  adrenal glands, kidneys are unremarkable. Ureteral course is normal.  Bladder is contrast opacified and unremarkable. Small large bowel are  normal caliber. Posterior changes of right hemicolectomy. The appendix  is surgically absent. No free fluid in the abdomen or pelvis. No  pathologically enlarged inguinal pelvic retroperitoneal lymph nodes.  The abdominal aorta and its proximal bifurcations are widely patent.    Lung bases/lower chest:  Large left-sided pleural fusion with pleural  thickening/nodularity/enhancement surrounding the  inferior left lower  lobe and anterior aspect of the right oblique fissure. Incomplete  visualization of a heterogeneously enhancing 7.0 x 3.0 cm soft tissue  mass encompassing the proximal left upper lobe bronchus with  obstructive atelectasis of the left upper lobe. Multiple peripherally  enhancing centrally necrotic mediastinal lymph nodes. No significant  right-sided pleural effusion. No pneumothorax. Heart size is  borderline enlarged. Aortic root and proximal pulmonary artery normal  caliber. Pulmonary veins in the region of the tumor appeared to be  involved with possible tumor extension into the vein versus bland  thrombus noted on series 4 image 8 and coronal series 3 image 44. This  is not as readily appreciated on the same-day pulmonary embolism study  due to timing of contrast bolus. Venous catheter terminates at the  cavoatrial junction.    Bones and soft tissues: More pronounced endplate deformities of the L2  vertebral body with new lytic lesions in the L4 and S1 vertebral  bodies.       Impression    IMPRESSION:     1. Numerous findings within the left lung and mediastinum are  concerning for new/progression of metastatic disease.   2. Soft tissue lesion obstructing the proximal left upper lobe  bronchus with lobar atelectasis of the downstream left upper lobe  partially visualized. Please see same day separate CT chest dictation.  3. Filling defect involving superior left pulmonary veins at the site  of tumor concerning for tumor versus bland thrombus. This is new from  prior CT.  4. Lytic lesions within the L2, L4 and S1 vertebral body are  concerning for metastatic disease    These findings were communicated to Dr. Mike Sanchez at 1123 AM  on 9/24/2021 by Trmeayne Grimes over the phone.     I have personally reviewed the examination and initial interpretation  and I agree with the findings.    CARLOS GALVEZ MD         SYSTEM ID:  [id number]   CT Soft Tissue Neck w Contrast    Narrative    CT  SOFT TISSUE NECK W CONTRAST 9/24/2021 1:32 PM    History:  Advanced head/neck cancer, follow up.    Per EPIC: 71 year old male who?with a past medical history significant  for hypertension hyperlipidemia, anemia, type 2 diabetes on insulin,  GERD,?history of colon cancer, history of appendiceal cancer,?history  of?throat?cancer treated with laser resection and adjuvant  radiotherapy in 2014 followed by tongue cancer diagnosed in February 2020 treated with wide local excision of the right tongue on 2/19/2020  who was found to have biopsy-proven recurrence in his right neck and  underwent modified neck dissection on 12/9/2020?with  subsequent?adjuvant radiation and chemotherapy and has been followed  by oncology and ENT,?presents to the ED tonight with cough and  shortness of breath.   ?              Right vallecula biopsy on 7/5/2021 was reported as  negative for malignancy.    Comparison:  PET CT from 11/24/2020 and 5/19/2020.     Technique: Following intravenous administration of nonionic iodinated  contrast medium, thin section helical CT images were obtained from the  skull base down to the level of the aortic arch.  Axial, coronal and  sagittal reformations were performed with 2-3 mm slice thickness  reconstruction. Images were reviewed in soft tissue, lung and bone  windows.    Contrast: iopamidol (ISOVUE-370) solution 100 mL    Findings:   Ill-defined enhancement within right side of the tongue, grossly  measuring 2.5 x 1.9 cm (series 2 image 37) with effacement of adjacent  fat plane (series 3 images 74-78), which is concerning for recurrence.    Postsurgical changes of right partial glossectomy and right neck  dissection with surgical clips. Right submandibular gland is  surgically resected. Postradiation changes of the neck, including  subcutaneous edema within the anterior neck, thickening of the right  and right posterolateral oropharyngeal walls, and effacement of right  deep neck spaces. No imaging  findings of osteoradionecrosis of the  hyoid bone, mandible or thyroid cartilage. Fatty atrophy of the  bilateral parotid glands. Left submandibular gland appears normal.    Effacement of right piriform sinus. Thyroid gland atrophy.    There is no evident cervical lymphadenopathy. The major vascular  structures in the neck appear unremarkable.    Evaluation of the osseous structures demonstrate no worrisome lytic or  sclerotic lesion. Multilevel cervical spondylosis, most pronounced at  C5-6. The visualized paranasal sinuses are clear. The mastoid air  cells are clear. Bilateral loops in the pocket.    For pulmonary findings please see dedicated chest CT dictation from  the same day.      Impression    Impression:  1. Ill-defined enhancement within right side of the tongue with  effacement of adjacent fat plane, which is concerning for recurrence.  Correlation with clinical examination is recommended.   2. No cervical lymphadenopathy.   3. For pulmonary findings please see dedicated chest CT dictation from  the same day.    I have personally reviewed the examination and initial interpretation  and I agree with the findings.    ISSA PACHECO MD         SYSTEM ID:  [id number]   XR Chest Port 1 View    Narrative    EXAM: XR CHEST 1 VW 9/24/2021      HISTORY: Post thoracentesis, r/o pneumothorax.    COMPARISON: Same day    TECHNIQUE: Frontal upright view of the chest.    FINDINGS: Mild to moderate residual left pleural effusion without  appreciable pneumothorax. Right chest wall Port-A-Cath tip projects  just superior cavoatrial junction. Right lung and pleura are clear.  Cardiomediastinal silhouette is obscured. No acute abnormality of the  osseous thorax or upper abdomen.      Impression    IMPRESSION: Decreased left pleural effusion without appreciable  pneumothorax, compared to earlier chest x-ray 9/24/2021. Right lung  and pleura are relatively clear.    I have personally reviewed the examination and initial  interpretation  and I agree with the findings.    CHAO LINDO MD         SYSTEM ID:  [id number]   XR Chest 2 Views    Narrative    EXAM: XR CHEST 2 VW 10/11/2021 2:24 PM    HISTORY: Tonsil cancer (H); Pleural effusion, malignant.    COMPARISON: Radiograph of the chest dated 9/24/2021.    TECHNIQUE: Upright frontal and lateral views of the chest.    FINDINGS: Right IJ portacatheter with possible kink in the catheter at  the right internal jugular venotomy site and tip terminating in the  low SVC. Trachea is midline. Cardiac mediastinal silhouette is stable.  Bilateral perihilar streaky opacities. Trace right pleural effusion.  Small left pleural effusion. No pneumothorax. Partially visualized  gastrostomy tube. Lytic lesion in posterior left eighth rib, as seen  on prior CT 9/24/2021. Prominent gastric air opacity. Low lung volume.  Compression deformity of mid thoracic vertebral body, better seen on  prior CT  9/24/21.      Impression    IMPRESSION:     1. Persistent bilateral perihilar opacities, more conspicuous compared  to prior x-ray 9/24/2021 with trace right and small left pleural  effusions.  2. Port-A-Cath in right chest wall has possible kink in the catheter  at the right internal jugular vein venotomy site. May consider IR  consult if clinically desired.    I have personally reviewed the examination and initial interpretation  and I agree with the findings.    CHAO LINDO MD         SYSTEM ID:  [id number]   XR Chest 2 Views    Narrative    EXAM: XR CHEST 2 VW  10/13/2021 3:15 PM     HISTORY:  assess for low lung volume vs effusion of clinical  significance, leukocytosis       COMPARISON:  Chest radiograph, 10/11/2021.    FINDINGS:   PA and lateral views of the chest. Right IJ Port-A-Cath terminates in  the mid SVC.     Trachea is midline. Heart size is normal. Prominent and indistinct  pulmonary vasculature with increased perihilar streaky opacities.  Persistent hypoinflated lungs. Small left  greater than right pleural  effusion. No appreciable pneumothorax.    Healed mildly displaced posterior fracture of the right sixth rib  there is unchanged. Unchanged lytic lesions of the posterior left  eighth rib. Unchanged wedge deformity of the T6 vertebral body.  Gastrostomy tube and balloon project over the stomach. Multiple  nonspecific air-fluid levels in the bowel loops.          Impression    IMPRESSION:  Prominent and indistinct pulmonary vasculature with increased  perihilar streaky opacities. Left greater than right pleural effusion.  Findings are consistent with pulmonary edema, infection, and/or  atelectasis. Displaced fracture, right sixth rib. Known lytic lesion  less conspicuous in the left eighth rib when compared with CT scan  from 9/24/2021.    I have personally reviewed the examination and initial interpretation  and I agree with the findings.    ALTON WRIGHT MD         SYSTEM ID:  [id number]       Assessment/Plan    # Recurrent SCC of Larynx with mets to left pleural space, left lung and mediastinum and lytic lesions within the L2, L4 and S1 vertebral body. He also has  ll-defined enhancement within right side of the tongue with effacement of adjacent fat plane, which is concerning for recurrence. Patient has a functioning status PS of 1.  He is willing to continue treatment.   - His PET scan on 6/21/21 is concerning for some new L hilar and mediastinal adenopathy that are hypermetabolic. He had EBUS on 7/5/21 with no malignancy on 11L but  level 7 did not have enough specimen (which later deemed to be malignancy). His right vallecula biopsy on 7/5/21 showed necroinflammatory debris and fragments of squamous mucosa with acute inflammation and ulceration, negative for dysplasia or malignancy. GMS special stain highlights presence of fungal hyphae. Since there is no biopsy proven thioacetic LN, we decided to have repeat imaging during the August visit. He unfortunately developed SOB need  admission from 9/24-9/26 with above CT findings. Pleural fluid is positive for SCC.   - his PDL1 score is 2, low expression based on Lymph node station 7 FNA/squamous cell carcinoma from 9/15/21.  NGS pending.     Presents today to begin palliative intent pembrolizumab/carboplatin/5-Fu. Was hospitalized again after our last meeting 10/11 due to labs and clinical status. Appears better today though still borderline PS to do chemo. ECOG 2-3. Kenrick would like to try and get stronger to pursue chemo, so we decided to just give keytruda today and allow for some recovery in the coming weeks. He was willing to try physical therapy locally which I sent a referral for.   -RTC with me prior to cycle 2  -Scans and visit with Dr. Mark prior to consideration of cycle 3.     #Malignant pleural effusion: unable to place Pleurx drain inpatient. Has follow up with IP next month.  -breathing ok today, better with home O2    #leukocytosis: stable from discharge. he does not appear infectious on exam. No fever.     # Hypercalcemia: 2/2 malignancy s/p zometa 10/11  -corrected level  9.5 today    #LUCIANA: Cr. normalized today    #hyponatremia: responded to fluid inpatient thus likely from hypovolemia. No longer on lasix recommended Gatorade through tube 1 can daily to get salty intake     # Pain - mouth existing since chemo/rads and was managed by oxycodone previously. Switched to hydromorphone inpatient by palliative care. 2 mg q4 hours was not adequately controlling pain so recommended increase to 4 mg q4 hours, okay to increased to 4 mg q3. Also will try voltaren gel and lido patch to back/shoulder.   --radiation oncology referral for possible palliative radiation to lumbar spine lesions.   --palliative appointment 11/24    #LE edema- hypoalbuminemia likely contributing and decreased mobility following recent hospitalization with IVF rescesitation. Given already on eliquis, will hold off on US for now. Recommended activity and  compression stockings.     #gluteal cleft pressure wound- reviewed shifting weight and barrier creams. Keep clean. Consider wound clinic consult pending course    #hypothyroid- Start levo 25 mcg daily. Reviewed administration in morning on empty stomach    # Nutrition - He continues to have stable weight on tube feeding.     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      Transition Care Management Services  Admission Date: 10/11/21    Discharge Date: 10/15/21    Discharge Diagnosis: Acute hypoxic respiratory failure    Interactive contact date: 10/18/2021  Face-to-face visit date: 10/19/2021    Medical complexity decision making: High complexity ([id number])    70 minutes spent on the date of the encounter doing chart review, review of test results, interpretation of tests, patient visit, documentation and discussion with other provider(s)       Deedee De Leon CNP on 10/20/2021 at 1:28 PM        Again, thank you for allowing me to participate in the care of your patient.        Sincerely,        Deedee De Leon CNP

## 2021-10-20 NOTE — PROGRESS NOTES
This is a recent snapshot of the patient's [facility] medical record.  For current drug dose and complete information and questions, call [phone] or In Basket pool, fv home infusion ([id number])  CSN Number:  [id number]

## 2021-10-21 NOTE — PROGRESS NOTES
This is a recent snapshot of the patient's [facility] medical record.  For current drug dose and complete information and questions, call [phone] or In Basket pool, fv home infusion ([id number])  CSN Number:  [id number]

## 2021-10-22 NOTE — TELEPHONE ENCOUNTER
RN Care Coordination Note  Incoming Call:   Patient's wife reports patient is struggling to breathe with O2 sats in the 80s on 3L O2. He has coughed up a small amount of blood tinged mucous and is very uncomfortable. Recommended patient go to the ED. His wife will take him by car, possibly to the hospital in [place]. Dr. Mark and Deedee De Leon notified.    Leslie Sanchez RN, BSN  Care Coordinator   [facility]

## 2021-10-25 NOTE — PROGRESS NOTES
This is a recent snapshot of the patient's [facility] medical record.  For current drug dose and complete information and questions, call [phone] or In Basket pool, fv home infusion ([id number])  CSN Number:  [id number]

## 2021-10-27 ENCOUNTER — PATIENT OUTREACH (OUTPATIENT)
Dept: ONCOLOGY | Facility: CLINIC | Age: 71
End: 2021-10-27

## 2021-11-10 LAB — ACID FAST STAIN (ARUP): NORMAL

## 2021-11-19 LAB — ACID FAST STAIN (ARUP): NORMAL

## 2021-12-13 NOTE — PROGRESS NOTES
This is a recent snapshot of the patient's [facility] medical record.  For current drug dose and complete information and questions, call [phone] or In Basket pool, fv home infusion ([id number])  CSN Number:  [id number]

## 2023-06-14 NOTE — PROVIDER NOTIFICATION
Pt will like magic mouthwash and lidocaine. Having mouth pain.    
"Paged team at [phone] regarding covid testing:     \"Pt's last covid on the 4th. came in with hypoxia. IP wants us to order a covid swab. no need for special precautions at this time per Roxy from IP. Thanks!\"    Awaiting response.  "
#[phone] paged in regards to BP of 117/39.    Provider called RN back and assessed pt. No further action being taken.   
#[phone] paged: Pt is here from clinic, will you be taking?  If so ready to be seen and for orders, thanks!  
#[phone] paged    Questioning if PT and OT are consulted as RN feels as pt could benefit from.   
#[phone] paged in regards to low BG 54 this AM 0900. Now up to 245 without sliding scale or insulin given at mealtime. Requesting insulin with mealtime bolus feeds be added back to pt regimen.   
Blood glucose was 395. Has not received any bolus feeding.  
Provider notified, pt receives magic mouthwash more frequently at home. Order changed from q6h to q4h.   
Provider was notified that pt has increase SOB and currently on 4L of oxygen.    Provider response was to order IV lasix.    
Ryan Spicer MD paged: Pt is here from clinic,who will be taking? ready to be seen and for orders, thanks!  
- - -

## 2023-10-21 NOTE — PLAN OF CARE
Pt alert, ambulatory. Pt tolerated popsicle.       Maria Eugenia Sánchez RN  10/20/23 6096 Vitals stable on 2L nasal cannula. Up ad lesly. NPO, patient receiving bolus tube feeds through PEG tube this evening. Missed morning and afternoon doses. BG q4hr, sliding scale insulin given and pt also receiving insulin for tube feeds. Tingling of tongue constant for patient, refusing pain medication. PEG tube site within normal limits. Port infusing heparin gtt at 1,250 units/hr. PIV- saline locked. Pt resting between cares. Continue plan of care.

## 2023-12-17 NOTE — PROGRESS NOTES
January 5, 2021    Prior Oncologic History: Reese Oakley is a 70 year old male with a history of a pT1N0 SCCa of the right oropharynx in 2014 treated with a DL with CO2 laser excision by Dr. Mckenna followed by radiation therapy at [facility] by Dr. Polo completed in January 2015.    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 Mckenna 2/19/20. Depth of invasion 7 mm. Because right neck was treated with previous radiation, it was decided to follow the neck with close observation imaging.    Patient returned in Oct 2020 with new right submandibular mass that did not improve with antibiotics. PET/CT showed FDG avid node. Now s/p right neck dissection (levels I-III) by Dr. Mckenna on 12/9/2020. Pathology reported extracapsular tumor in node anterior to right submandibular gland and microscopic disease in level IA node. Patient was presented at tumor board and recommended to have consultations with both medical oncology and radiation oncology to further discuss adjuvant therapy.    Interval History:   Patient comes in today with his wife for follow up after neck dissection. He met with Dr. Herrera this morning and is planning to start radiation in the next couple weeks. He plans to meet with Dr. Mark after this appointment.     Patient states that he is doing well from a healing standpoint. His wife is concerned that the scar of his neck dissection is lumpy. He has numbness in the right neck. No difficulty with range of motion.     Denies any dysphagia, odynophagia, otalgia, bleeding from the mouth, or neck pain.    Past Medical History:  Past Medical History:   Diagnosis Date     Adenocarcinoma (H)     large instestine      Cancer of appendix (H)      Diabetes (H)      Gastro-oesophageal reflux disease      History of radiation therapy      Hoarseness      Hypertension        Past Surgical History:  Past Surgical History:   Procedure  On call note Update  UNOS [id number]  Patient updated on case. Patient will remain back-up throughout the night due to delay in OR for primary recipient. He was informed that he will be notified if he is released from the case in the AM once the primary recipient goes to the OR and the transplant is started.  Patient verbalized understanding and appreciation for the update.  He remains without any restrictions.   Laterality Date     APPENDECTOMY       COLECTOMY      Right     DISSECTION RADICAL NECK MODIFIED Right 12/9/2020    Procedure: Modified neck dissection;  Surgeon: Jose Antonio Mckenna MD;  Location: [facility] OR     EXCISE LESION INTRAORAL Right 2/19/2020    Procedure: Wide Local Excision of Right Tongue Lesion;  Surgeon: Jose Antonio Mckenna MD;  Location: [facility] OR     LASER CO2 LARYNGOSCOPY N/A 10/6/2014    Procedure: LASER CO2 LARYNGOSCOPY;  Surgeon: Jose Antonio Mckenna MD;  Location: [facility] OR       Medications:  Current Outpatient Medications   Medication Sig Dispense Refill     pilocarpine (SALAGEN) 7.5 MG tablet Take 7.5 mg by mouth 2 times daily       acetaminophen (TYLENOL) 325 MG tablet Take 2 tablets (650 mg) by mouth every 4 hours as needed for other (multimodal surgical pain management along with NSAIDS and opioid medication as indicated based on pain control and physical function.) 60 tablet 0     HYDROmorphone (DILAUDID) 2 MG tablet Take 1 tablet (2 mg) by mouth every 4 hours as needed for moderate to severe pain 30 tablet 0     mineral oil-hydrophilic petrolatum (AQUAPHOR) external ointment Apply topically every 8 hours 420 g 0     ondansetron (ZOFRAN-ODT) 4 MG ODT tab Take 1 tablet (4 mg) by mouth every 6 hours as needed for nausea or vomiting 20 tablet 0     polyethylene glycol (MIRALAX) 17 GM/Dose powder Take 17 g by mouth daily 510 g 0     senna-docusate (SENOKOT-S/PERICOLACE) 8.6-50 MG tablet Take 1 tablet by mouth 2 times daily as needed for constipation 20 tablet 0       Allergies:  Allergies   Allergen Reactions     Percocet [Oxycodone-Acetaminophen] Nausea and Vomiting        Social History:  Social History     Tobacco Use     Smoking status: Never Smoker     Smokeless tobacco: Never Used   Substance Use Topics     Alcohol use: Not Currently     Alcohol/week: 0.0 standard drinks     Comment: none for 20 years     Drug use: No       ROS: 10 point ROS neg other than the symptoms noted above in the  HPI.    Physical Exam:    Pulse 66   Temp 97.8  F (36.6  C) (Temporal)   Wt 84.8 kg (187 lb)   SpO2 98%   BMI 29.29 kg/m    Wt Readings from Last 3 Encounters:   01/05/21 84.8 kg (187 lb)   01/05/21 83.5 kg (184 lb)   12/15/20 84.4 kg (186 lb)        Constitutional:  The patient was accompanied by wife, well-groomed, and in no acute distress.     Skin: Normal:  warm and pink without rash    Neurologic: Alert and oriented x 3.  Grade IV weakness in right marginal nerve. Grade I of all other right facial nerve branches. No other cranial nerve deficits.    Psychiatric: The patient's affect was calm, cooperative, and appropriate.     Communication:  Normal; communicates verbally, normal voice quality.    Respiratory: Breathing comfortably without stridor or exertion of accessory muscles.    Head/Face:  Normocephalic and atraumatic.  No lesions or scars.    Oral Cavity: Normal floor of mouth, buccal mucosa, and palate.  Right lateral tongue scar present. Soft to palpation. Tenderness with palpation of right lateral base of tongue beyond palatoglossal fold. No mass palpated in this area. No bleeding elicited with palpation.    Oropharynx: Normal mucosa, palate symmetric with normal elevation. No abnormal lymph tissue in the oropharynx.    Neck: Right neck dissection scar present. Multiple scar deposits palpated along scar. Nontender to touch. No erythema.  Normal range of motion.   Lymphatic: There is no palpable lymphadenopathy in the neck. Firm lymphedema in right submental/submandibular area.     Results Reviewed:    Pathology 12/9/2020:  FINAL DIAGNOSIS:   A. Right level 1A, 1B and 2:        - Invasive moderately to poorly differentiated squamous cell   carcinoma involving fibroadipose tissue and   salivary gland tissue   - Maximal tumor dimension: 2.5 cm   - Surgical resection margins free of tumor   - No malignancy identified in three lymph nodes (0/3)     B. Level 1A:   - Metastatic squamous cell carcinoma in  one of one lymph node (1/1)   - Tumor size: 0.5 cm   - Extranodal extension: Not identified     C. 2A and 3:   - No malignancy identified in eight lymph nodes (0/8)     D. Right level 3:   - No malignancy identified in three lymph nodes (0/3)     E. Right level 2A:   - No malignancy identified in one lymph node (0/1)     Assessment/Plan:  1. Squamous cell carcinoma of neck  Metastatic SCCa to the right neck s/p suprahyoid neck dissection 12/9/2020. Neck incision is well healed with scarring present along the incision. Discussed with patient that he can start to use vitamin E oil to massage into scar deposits twice daily.     Plan for adjuvant therapy given extranodal extension. Will order baseline swallow study and have patient meet with SLP to discuss swallowing therapy during radiation treatment.      Follow up with Dr. Mckenna in 5 weeks.    2. Squamous cell carcinoma of the lateral tongue  S/p partial glossectomy in Feb 2020. No evidence of recurrence at surgical site. Tenderness palpated in right base of tongue is not a new symptom per patient. No mass palpated. Upon independent review of PET/CT 11/24/2020, small focus note on right lateral tongue which seems to be more anterior to tenderness. Will consult with radiology regarding this finding.    Instructed patient to continue to monitor the area and to report any new pain, bleeding, or odynophagia.     3. Lymphedema of neck  Lymphedema of right neck. Discussed role of lymphedema therapy in the future but will hold off on ordering any therapy until completion of adjuvant treatment.    Patient will follow up in 5 weeks for continued surveillance during treatment.    Dena Nguyen DNP, APRN, CNP  Otolaryngology  Head & Neck Surgery  [phone]

## 2024-06-17 NOTE — PROGRESS NOTES
[facility]                                                                                   OUTPATIENT SPEECH LANGUAGE PATHOLOGY    PLAN OF TREATMENT FOR OUTPATIENT REHABILITATION   Patient's Last Name, First Name, Reese Deras YOB: 1950   Provider's Name   [facility]   Medical Record No.  [id number]     Onset Date: (P) 01/25/21 Start of Care Date: (P) 01/25/21     Medical Diagnosis:   SCC neck      SLP Treatment Diagnosis: Mild to moderate oropharyngeal dysphagia expected to worsen to moderately severe during XRT  Plan of Treatment  Frequency/Duration: 2x/month  / 6 month     Certification date from 04/28/21   To 07/26/21          See note for plan of treatment details and functional goals     Tamiko Leonard, SLP                         I CERTIFY THE NEED FOR THESE SERVICES FURNISHED UNDER        THIS PLAN OF TREATMENT AND WHILE UNDER MY CARE     (Physician attestation of this document indicates review and certification of the therapy plan).              Referring Provider:  Dr. Jose Antonio Mckenna    Initial Assessment  See Epic Evaluation- (P) 01/25/21 05/04/21 1115   Appointment Info   Treating Provider Tamiko Leonard MS, [facility]-SLP   SLP Tx Diagnosis Mild to moderate oropharyngeal dysphagia expected to worsen to moderately severe during XRT   Quick Adds Certification   Session Number   Session Number 4   Progress Note/Certification   Start Of Care Date 01/25/21   Onset Of Illness/injury Or Date Of Surgery 01/25/21   Therapy Frequency 2x/month   Predicted Duration 6 month   Certification date from 04/28/21   Certification date to 07/26/21   Recertification Due 08/02/21   Subjective Report   Subjective Report Reese Oakley is a 70-year-old man with a history of a pT1 N0 Mx SCC of the right oropharynx, diagnosed in 2014. He has previously been treated with a DL with Omni CO2 laser excision by Dr. Mckenna on 10/6/2014.  "This was followed by radiation therapy at [facility]. 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 Mckenna on 2/19/2020. He was subsequently found to have recurrence near the right submandibular gland and is now status post right supraomohyoid neck dissection on 12/9/2020. His case was presented at tumor board and he was recommended to undergo post-op XRT, which was completed from 1/25/21 to 3/5/21. He presents today for dysphagia follow up therapy in conjunction with ENT clinic visit. He is accompanied by his wife. She reports he has a 15lbs weight loss since the beginning of treatment and has lost 8lbs since the end of treatment.    Treatment Swallow/Oral dysfunction   Treatment of Swallowing Dysfunction &/or Oral Function for Feeding Minutes (14320) 19 Minutes   Skilled Intervention Provided written and verbal information on diet modifications.;Educated patient on swallowing strategies.;Educated patient on risks of aspiration;Cued swallowing strategies (auditory, visual, tactile);Assessed oral intake trials   Patient Response/Progress Pt verbalized understanding of all information presented   Treatment Detail 1. Kenrick reports he is \"swallowing fine\". He reports significant pain in the back of his tongue. This pain has been hanging on for the last two months. He has to put lidocaine on it after everytime he eats or drinks. He said the magic mouth wash doesn't work for longer than about 10-15 minutes. The topical lidocaine works better but again doesn't last forever. he reports the pain makes his very grumpy and prohibits his sleeping. He is eating/drinking most foods and liquids. He demonstrated several sips of water without overt clinical s/sx of aspiration/penetration however did volitionally cough after multiple swallows. He forgets to do the supraglottic swallow all the time but still tries to do it. 2. He has not been doing " "his exercises consistently because of pain. He reports he would do them more if it didn't hurt so badly. He \"swallows when he eats\" which he also includes as addressing the possible fibrosis. Encouraged pt as the pain deminishes, he should increase exercise completion.    Progress Fair   Education   Learner/Method Patient;Family   Plan   Home program Daily PO; home exercise program   Plan for next session f/u in conjunction with ENT clinic to reassess PO intake/tolerance and home exercise program completion   Swallow Goal 1   Goal Identifier Diet   Goal Description 1. Pt will tolerate regular solids and thin liquids without overt clinical s/sx of aspiration/penetration noted on 100% of the time independently.   Target Date 04/27/21   Swallow Goal 2   Goal Identifier Exercises   Goal Description 2. Pt will improve oropharyngeal and jaw strength and ROM by completing 10 repetitions of 5/5 exercises 3 times daily with minimal written or verbal cues.    Target Date 04/27/21   OTHER   SLP Swallow Goals 1;2   General Information   Medical Diagnosis SCC neck               "

## 2024-06-26 NOTE — PROGRESS NOTES
[facility]                                                                                   OUTPATIENT SPEECH LANGUAGE PATHOLOGY        PLAN OF TREATMENT FOR OUTPATIENT REHABILITATION   Patient's Last Name, First Name, Reese Deras YOB: 1950   Provider's Name   [facility]    Medical Record No.  [id number]      Onset Date: (P) 01/25/21 Start of Care Date: (P) 01/25/21      Medical Diagnosis:   SCC neck        SLP Treatment Diagnosis: Mild to moderate oropharyngeal dysphagia expected to worsen to moderately severe during XRT  Plan of Treatment  Frequency/Duration: 2x/month  / 6 month      Certification date from 07/27/21   To 10/24/21               See note for plan of treatment details and functional goals     Tamiko Leonard, SLP                         I CERTIFY THE NEED FOR THESE SERVICES FURNISHED UNDER        THIS PLAN OF TREATMENT AND WHILE UNDER MY CARE     (Physician attestation of this document indicates review and certification of the therapy plan).              Referring Provider:  Jose Antonio Mckenna    Initial Assessment  See Epic Evaluation-         08/03/21 0920   Appointment Info   Treating Provider Tamiko Leonard MS, CCC-SLP   SLP Tx Diagnosis Mild to moderate oropharyngeal dysphagia expected to worsen to moderately severe during XRT   Session Number   Session Number 7   Progress Note/Certification   Recertification Due 11/01/21   Subjective Report   Subjective Report Reese Oakley is a 71-year-old man with a history of a pT1 N0 Mx SCC of the right oropharynx, diagnosed in 2014. He has previously been treated with a DL with Omni CO2 laser excision by Dr. Mckenna on 10/6/2014. This was followed by radiation therapy at [facility]. 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 Mckenna on 2/19/2020. He was subsequently  found to have recurrence near the right submandibular gland and is now status post right supraomohyoid neck dissection on 12/9/2020. His case was presented at tumor board and he was recommended to undergo post-op XRT, which was completed from 1/25/21 to 3/5/21. He dealt with a non healing ulcer after the completion and treatment and recently underwent laser resection of this. He continued to have persistent, severe pain in his tongue and throat. Dr. Mckenna took him for DL and biopsies on 7/5/21. He had a PEG placed at this time, as well. Path negative for malignancy. He returns today in conjunction with ENT clinic visit.    Treatment Swallow/Oral dysfunction   Treatment of Swallowing Dysfunction &/or Oral Function for Feeding Minutes (82958) 17 Minutes   Skilled Intervention Provided written and verbal information on diet modifications.;Educated patient on swallowing strategies.;Educated patient on risks of aspiration   Patient Response/Progress Pt verbalized understanding of all information presented   Treatment Detail Reese Oakley reports he remains NPO with enteral support. He is feeling like he wants to start to try eating but wanted to clear it with Dr. Mckenna first. Dr. Mckenna is not concerned about food and liquid escaping around the area where the hyoid bone is sticking out a bit. Reese took several sips of water using the supraglottic swallow. He demonstrated more coughing than what might be expected of the supraglottic. His risk for aspiration is higher due to having several weeks of NPO status while he heals. Per Dr Mckenna, the bone in his mouth is also healed over which should help with the pain in the mouth. Reese reported less pain while swallowing the water today. He will initiate eating some soft foods such as cream of wheat and soups. Provided re-training on use of supraglottic swallow with each swallow. 2. Pt encouraged to complete the swallowing exercises as much as he can up to 10 reps 3  times per day. He reports continued pain during the swallow.   Progress Poor   Education   Learner/Method Patient;Family   Plan   Home program Secretion management, effortful swallows, try to increase PO intake   Plan for next session f/u in conjunction with ENT clinic visit   Swallow Goal 1   Goal Identifier Diet   Goal Description 1. Pt will tolerate regular solids and thin liquids without overt clinical s/sx of aspiration/penetration noted on 100% of the time independently.   Target Date 11/01/21   Swallow Goal 2   Goal Identifier Exercises   Goal Description 2. Pt will improve oropharyngeal and jaw strength and ROM by completing 10 repetitions of 5/5 exercises 3 times daily with minimal written or verbal cues.    Target Date 11/01/21   OTHER   SLP Swallow Goals 1;2   General Information   Medical Diagnosis SCC neck

## 2024-08-16 NOTE — PLAN OF CARE
VSS.  Reported headache and minimal neck pain;  adequately controlled with Tylenol x 1.  A&Ox  4.   R droop present.  R neck  incision intact with staples,no drainage, LILO-cares completed per orders.  JOEL x 2 to R neck.  PIV x 2 SL.  On regular diet-no nausea overnight.  Up independently.  Voiding  spontaneously. Last BM 12/8.  Home with wife at discharge. Continue with POC.    
"/61 (BP Location: Left arm)   Pulse 71   Temp 98.1  F (36.7  C) (Oral)   Resp 16   Ht 1.702 m (5' 7.01\")   Wt 84.1 kg (185 lb 4.8 oz)   SpO2 98%   BMI 29.02 kg/m      Time: 3392-2369  R. of admission/Status: POD # 2 s/p Modified neck dissection, two JOEL drain in place and incision LILO.   Neuro:  A&Ox4, right side facial droop post surgical. Calm and cooperative with care.   Activity: independent, ambulated x 2-3 in the hallway this shift.   Pain: denied   Cardiac: wnl  Respiratory: wnl  GI/: last bm today, active bowel sound. Voided in the toilet without difficult.   Diet: regular, fair appetite.   Skin: incision to right side neck, staples, intact and LILO. No other skin deficit.   LDAs: PIV saline locked. JOEL x 2 to right side neck.   Labs/Imaging: BS q 1 hour since 1700 until 2230 for Insulin gtt.   New change this shift: insulin gtt started at 1700 until 2230 following algorithm. Infusion turned off at 2230 per Algorithm and ENT team notified via pager.      Plan: will conitnue to monitor his blood glucose. Possible discharge tomorrow.     "
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 VSS on RA.  Neuros: A&Ox4. Slight R facial droop.   GI/: Tolerating regular diet. Denies nausea. LBM 12/8, scheduled Senna given. Voiding spontaneously.   IV: L PIV x2.   Activity: Up independently.   Pain: Denies.  Respiratory/Trach: No issues.   Skin: R neck JOEL x2.   Plan of Care: Anticipated discharge tomorrow. Continue to monitor and follow POC.     
6A Defer PT Consult  Per chart review, discussion with OT, and observation of gait in hallway, pt mobilizing SBA-IND without AE, steady on feet. No IP PT needs. Pt can return to prior living arrangement. PT to complete orders. Thank you for your referral.  
Occupational Therapy Discharge Summary    Reason for therapy discharge:    All goals and outcomes met, no further needs identified.    Progress towards therapy goal(s). See goals on Care Plan in [facility] electronic health record for goal details.  Goals met    Therapy recommendation(s):    Continue home exercise program.      
Patient arrived from PACU at 1500, s/p right neck dissection, neuros intact ex left facial droop, ENT assessed patient at bedside, facial droop related to surgery. Strengths 5/5. Ambulated from stretcher to bed with minimal assistance. VSS. Sats at 97% on RA. Regular diet, ice chips at bedside. Due to void, belongings at bedside with patient. PT/OT evaluations ordered. Continue to monitor and follow POC  
Pt POD#2 R neck dissection, vss, neuros include: a/o x4 and intact. PIV SL, regular diet with fair PO intake, ate well for breakfast but did not want lunch. Pt voiding spont, had BM this shift, up ad lesly. Neck JPsx2 with large volume output, pt denying pain throughout shift. Pt' BG levels high and will be started on insulin gtt. Continue to monitor per orders.   
Pt POD#3 R neck dissection, vss, neuros include: a/ox 4 and intact. PIV removed per orders, regular diet, voiding spont, no BM this shift but passing gas, up ad lesly. JOEL education provided, pt was able to demonstrate appropriate and safe technique. Pt declined to take his medications ex his Aquaphor because he has APA and stool softeners @ home. Pt's wife picked him up to bring home.   
Status: POD #3 Modified neck resection  Vitals: VSS on RA  Neuros: R facial droop post surgical. 5/5 all extremities  IV: PIV SL  Resp/trach: LS clear and equal bilaterally  Diet: Regular  Bowel status: +BS, no BM overnight  : voids spontaneously  Skin: 2 JPs and incision to R neck, LILO  Pain: given scheduled Tylenol overnight  Activity: up independently   Plan: planning on discharge today.    
Status: Pt on 6A POD #0 s/p R neck dissection for removal of squamous cell carcinoma.   Vitals: VSS on RA-1L. Pt desats intermittently when falling asleep, improved as shift went on.   Neuros: A&O x4. Denies N/T. Strengths 5/5.   IV: PIV SL x2.   Resp/trach: LS clear. Denies SOB.   Diet: Regular. Emesis x1 after walking. Declined intervention.   Bowel status: BS+. Last BM yesterday.  : Voids spontaneously without difficulty.   Skin: Intact ex L neck incision with sutures, LILO. JOEL x2 to R neck with bulb suction.  Pain: C/o incisional pain. Scheduled Tylenol and PRN oral Dilaudid given, effective. Allergic to Percocet and other oxy relatives.   Activity: SBA, steady. Walk x1 this shift.  Social: Wife updated over the phone.   Plan: Continue to monitor and follow plan of care.     
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   Vitals: VSS on RA  Neuros: A&Ox4. R. Droop. Denies N/T. Strengths 5/5.   IV: PIV SL x2.   Resp/trach: LS clear. Denies SOB.   Diet: Regular  Bowel status: No BM this shift   : Voids spontaneously without difficulty.   Skin: R. Neck incision LILO with staples. JOEL x2 to R neck with bulb suction.  Pain: No c/o pain, taking scheduled tylenol as ordered   Activity: SBA, steady  Plan: Continue to monitor and follow plan of care.      
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   Vitals: VSS on RA  Neuros: A&Ox4. R. Droop. Denies N/T. Strengths 5/5.   IV: PIV SL x2.   Resp/trach: LS clear. Denies SOB.   Diet: Regular diet - minimal intake this shift. Nausea intermittent. Given zofran x1.  Bowel status: No BM this shift   : Voids spontaneously without difficulty.   Skin: R. Neck incision LILO with staples. JOEL x2 to R neck with bulb suction.  Pain: No c/o pain, taking scheduled tylenol as ordered   Activity: Up ad lesly in room  Plan: Continue to monitor and follow plan of care  
yes

## (undated) DEVICE — LINEN TOWEL PACK X6 WHITE 5487

## (undated) DEVICE — ESU HARMONIC FOCUS SHEARS CVD 9CM HAR9F

## (undated) DEVICE — TUBING SUCTION 10'X3/16" N510

## (undated) DEVICE — ENDO BRUSH CYTOLOGY 2.0MMX10MM 115CM BRONCH BC-202D-2010

## (undated) DEVICE — SPONGE LAP 18X18" X8435

## (undated) DEVICE — GLOVE PROTEXIS POWDER FREE SMT 8.0  2D72PT80X

## (undated) DEVICE — ESU GROUND PAD ADULT W/CORD E7507

## (undated) DEVICE — SPONGE KITTNER 30-101

## (undated) DEVICE — STRAP UNIVERSAL POSITIONING 2-PIECE 4X47X76" 91-287

## (undated) DEVICE — SU VICRYL 3-0 SH 8X18" UND J864D

## (undated) DEVICE — SPECIMEN TRAP MUCOUS 40ML LUKI C30200A

## (undated) DEVICE — PREP POVIDONE IODINE SCRUB 7.5% 4OZ APL82212

## (undated) DEVICE — KIT ENDO FIRST STEP DISINFECTANT 200ML W/POUCH EP-4

## (undated) DEVICE — LINEN TOWEL PACK X30 5481

## (undated) DEVICE — SPONGE COTTONOID 1/2X3" 80-1407

## (undated) DEVICE — LINEN TOWEL PACK X5 5464

## (undated) DEVICE — ENDO ADPT BRONCH SWIVEL Y A1002

## (undated) DEVICE — TUBING SMOKE EVAC 1CMX3M SEA3710

## (undated) DEVICE — ENDO SYSTEM WATER BOTTLE & TUBING W/CO2 FILTER 00711549

## (undated) DEVICE — ADH SKIN CLOSURE PREMIERPRO EXOFIN 1.0ML 3470

## (undated) DEVICE — PACK NEURO MINOR UMMC SNE32MNMU4

## (undated) DEVICE — DRSG TELFA 3X8" 1238

## (undated) DEVICE — SOL NACL 0.9% IRRIG 1000ML BOTTLE 2F7124

## (undated) DEVICE — ENDO VALVE BX EVIS MAJ-210

## (undated) DEVICE — KIT INTRODUCER FLUENT MICRO 5FRX10CM ECHO TIP KIT-038-04

## (undated) DEVICE — ENDO VALVE SUCTION ULTRASOUND BRONCH MAJ-1414

## (undated) DEVICE — ENDO VALVE SUCTION BRONCH EVIS MAJ-209

## (undated) DEVICE — ESU ELEC BLADE 2.75" COATED/INSULATED E1455

## (undated) DEVICE — ENDO VALVE SYR NDL KIT ULTRASOUND BRONCH NA-201SX-4022-A

## (undated) DEVICE — LUBRICANT INST KIT ENDO-LUBE 220-90

## (undated) DEVICE — DRAPE SHEET REV FOLD 3/4 9349

## (undated) DEVICE — Device

## (undated) DEVICE — SU SILK 2-0 SH CR 5X18" C0125

## (undated) DEVICE — LINEN GOWN XLG 5407

## (undated) DEVICE — ESU ELEC NDL 1" COATED/INSULATED E1465

## (undated) DEVICE — PACK CENTRAL LINE INSERTION SAN32CLFCG

## (undated) DEVICE — FCP BIOPSY PULMONARY 1.2MMX100CML M00515220

## (undated) DEVICE — ESU CORD BIPOLAR GREEN 10-4000

## (undated) DEVICE — BLADE KNIFE SURG 15 371115

## (undated) DEVICE — SU SILK 2-0 TIE 12X30" A305H

## (undated) DEVICE — SU ETHILON 3-0 PS-1 18" 1663H

## (undated) DEVICE — ESU HOLSTER PLASTIC DISP E2400

## (undated) DEVICE — EYE DRSG PAD OVAL

## (undated) DEVICE — CLIP HORIZON MED BLUE 002200

## (undated) DEVICE — JELLY LUBRICATING SURGILUBE 2OZ TUBE

## (undated) DEVICE — GOWN XLG DISP 9545

## (undated) DEVICE — SUCTION MANIFOLD NEPTUNE 2 SYS 4 PORT 0702-020-000

## (undated) DEVICE — KNIFE HANDLE W/15 BLADE 371615

## (undated) DEVICE — STIMULATOR NERVE NEURO-PULSE SURGICAL LOCATOR 30968-220

## (undated) DEVICE — ANTIFOG SOLUTION W/FOAM PAD 31142527

## (undated) DEVICE — PACK ENT ENDOSCOPY UMMC

## (undated) DEVICE — SYR PISTON URETHRAL 60ML 68000

## (undated) DEVICE — SYR 30ML SLIP TIP W/O NDL 302833

## (undated) DEVICE — KIT CONNECTOR FOR OLYMPUS ENDOSCOPES DEFENDO 100310

## (undated) DEVICE — DECANTER BAG 2002S

## (undated) DEVICE — BAG URINARY DRAIN 4000ML LF 153509

## (undated) DEVICE — CLIP HORIZON SM RED WIDE SLOT 001201

## (undated) DEVICE — STPL SKIN 35W ROTATING HEAD PRW35

## (undated) DEVICE — SU SILK 4-0 TIE 12X30" A303H

## (undated) DEVICE — SUCTION MANIFOLD DORNOCH ULTRA CART UL-CL500

## (undated) DEVICE — LABEL MEDICATION SYSTEM 3303-P

## (undated) DEVICE — COVER ULTRASOUND PROBE W/GEL FLEXI-FEEL 6"X58" LF  25-FF658

## (undated) DEVICE — SU MONOCRYL 4-0 P-3 18" UND Y494G

## (undated) DEVICE — DRAIN JACKSON PRATT 10MM FLAT 4/4 PERF SU130-1311

## (undated) DEVICE — SU VICRYL 4-0 P-3 18" UND  J494H

## (undated) DEVICE — PREP POVIDONE IODINE SOLUTION 10% 4OZ

## (undated) DEVICE — SPONGE RAY-TEC 4X8" 7318

## (undated) DEVICE — SYR 50ML CATH TIP W/O NDL 309620

## (undated) DEVICE — DRAIN JACKSON PRATT RESERVOIR 100ML SU130-1305

## (undated) DEVICE — CONNECTOR MALE TO MALE LL

## (undated) DEVICE — SOL WATER IRRIG 1000ML BOTTLE 2F7114

## (undated) DEVICE — PREP SKIN SCRUB TRAY 4461A

## (undated) DEVICE — DRSG DRAIN 2X2" 7087

## (undated) DEVICE — SU SILK 3-0 TIE 12X30" A304H

## (undated) DEVICE — TUBE GASTROSTOMY PONSKY PULL DELUXE 20FR 000792

## (undated) DEVICE — ESU PENCIL SMOKE EVAC W/ROCKER SWITCH 0703-047-000

## (undated) DEVICE — RETR ELASTIC STAYS LONE STAR BLUNT SGL PK 3350-1G

## (undated) RX ORDER — FENTANYL CITRATE-0.9 % NACL/PF 10 MCG/ML
PLASTIC BAG, INJECTION (ML) INTRAVENOUS
Status: DISPENSED
Start: 2021-01-01

## (undated) RX ORDER — HYDROMORPHONE HYDROCHLORIDE 1 MG/ML
INJECTION, SOLUTION INTRAMUSCULAR; INTRAVENOUS; SUBCUTANEOUS
Status: DISPENSED
Start: 2020-01-01

## (undated) RX ORDER — FENTANYL CITRATE 50 UG/ML
INJECTION, SOLUTION INTRAMUSCULAR; INTRAVENOUS
Status: DISPENSED
Start: 2020-02-19

## (undated) RX ORDER — PROPOFOL 10 MG/ML
INJECTION, EMULSION INTRAVENOUS
Status: DISPENSED
Start: 2020-01-01

## (undated) RX ORDER — FENTANYL CITRATE 50 UG/ML
INJECTION, SOLUTION INTRAMUSCULAR; INTRAVENOUS
Status: DISPENSED
Start: 2020-01-01

## (undated) RX ORDER — SODIUM CHLORIDE, SODIUM LACTATE, POTASSIUM CHLORIDE, CALCIUM CHLORIDE 600; 310; 30; 20 MG/100ML; MG/100ML; MG/100ML; MG/100ML
INJECTION, SOLUTION INTRAVENOUS
Status: DISPENSED
Start: 2020-01-01

## (undated) RX ORDER — PROPOFOL 10 MG/ML
INJECTION, EMULSION INTRAVENOUS
Status: DISPENSED
Start: 2021-01-01

## (undated) RX ORDER — FENTANYL CITRATE-0.9 % NACL/PF 10 MCG/ML
PLASTIC BAG, INJECTION (ML) INTRAVENOUS
Status: DISPENSED
Start: 2020-01-01

## (undated) RX ORDER — FENTANYL CITRATE 50 UG/ML
INJECTION, SOLUTION INTRAMUSCULAR; INTRAVENOUS
Status: DISPENSED
Start: 2021-01-01

## (undated) RX ORDER — LIDOCAINE HYDROCHLORIDE 20 MG/ML
INJECTION, SOLUTION EPIDURAL; INFILTRATION; INTRACAUDAL; PERINEURAL
Status: DISPENSED
Start: 2021-01-01

## (undated) RX ORDER — LIDOCAINE HYDROCHLORIDE 20 MG/ML
INJECTION, SOLUTION EPIDURAL; INFILTRATION; INTRACAUDAL; PERINEURAL
Status: DISPENSED
Start: 2020-01-01

## (undated) RX ORDER — LIDOCAINE HYDROCHLORIDE AND EPINEPHRINE 10; 10 MG/ML; UG/ML
INJECTION, SOLUTION INFILTRATION; PERINEURAL
Status: DISPENSED
Start: 2020-01-21

## (undated) RX ORDER — HEPARIN SODIUM,PORCINE 10 UNIT/ML
VIAL (ML) INTRAVENOUS
Status: DISPENSED
Start: 2021-01-01

## (undated) RX ORDER — DEXAMETHASONE SODIUM PHOSPHATE 4 MG/ML
INJECTION, SOLUTION INTRA-ARTICULAR; INTRALESIONAL; INTRAMUSCULAR; INTRAVENOUS; SOFT TISSUE
Status: DISPENSED
Start: 2021-01-01

## (undated) RX ORDER — ALBUMIN, HUMAN INJ 5% 5 %
SOLUTION INTRAVENOUS
Status: DISPENSED
Start: 2021-01-01

## (undated) RX ORDER — ACETAMINOPHEN 325 MG/10.15ML
LIQUID ORAL
Status: DISPENSED
Start: 2021-01-01

## (undated) RX ORDER — KETOROLAC TROMETHAMINE 30 MG/ML
INJECTION, SOLUTION INTRAMUSCULAR; INTRAVENOUS
Status: DISPENSED
Start: 2021-01-01

## (undated) RX ORDER — EPHEDRINE SULFATE 50 MG/ML
INJECTION, SOLUTION INTRAMUSCULAR; INTRAVENOUS; SUBCUTANEOUS
Status: DISPENSED
Start: 2021-01-01

## (undated) RX ORDER — ONDANSETRON 2 MG/ML
INJECTION INTRAMUSCULAR; INTRAVENOUS
Status: DISPENSED
Start: 2020-02-19

## (undated) RX ORDER — CEFAZOLIN SODIUM 2 G/100ML
INJECTION, SOLUTION INTRAVENOUS
Status: DISPENSED
Start: 2021-01-01

## (undated) RX ORDER — LIDOCAINE HYDROCHLORIDE AND EPINEPHRINE 10; 10 MG/ML; UG/ML
INJECTION, SOLUTION INFILTRATION; PERINEURAL
Status: DISPENSED
Start: 2020-02-19

## (undated) RX ORDER — LIDOCAINE HYDROCHLORIDE 20 MG/ML
INJECTION, SOLUTION EPIDURAL; INFILTRATION; INTRACAUDAL; PERINEURAL
Status: DISPENSED
Start: 2020-02-19

## (undated) RX ORDER — CEFAZOLIN SODIUM 2 G/100ML
INJECTION, SOLUTION INTRAVENOUS
Status: DISPENSED
Start: 2020-01-01

## (undated) RX ORDER — LIDOCAINE HYDROCHLORIDE 20 MG/ML
SOLUTION OROPHARYNGEAL
Status: DISPENSED
Start: 2021-01-01

## (undated) RX ORDER — ONDANSETRON 2 MG/ML
INJECTION INTRAMUSCULAR; INTRAVENOUS
Status: DISPENSED
Start: 2021-01-01

## (undated) RX ORDER — CEFAZOLIN SODIUM 1 G/3ML
INJECTION, POWDER, FOR SOLUTION INTRAMUSCULAR; INTRAVENOUS
Status: DISPENSED
Start: 2020-01-01

## (undated) RX ORDER — HYDROMORPHONE HYDROCHLORIDE 1 MG/ML
INJECTION, SOLUTION INTRAMUSCULAR; INTRAVENOUS; SUBCUTANEOUS
Status: DISPENSED
Start: 2021-01-01

## (undated) RX ORDER — BUPIVACAINE HYDROCHLORIDE 5 MG/ML
INJECTION, SOLUTION EPIDURAL; INTRACAUDAL
Status: DISPENSED
Start: 2021-01-01

## (undated) RX ORDER — ONDANSETRON 2 MG/ML
INJECTION INTRAMUSCULAR; INTRAVENOUS
Status: DISPENSED
Start: 2020-01-01

## (undated) RX ORDER — DEXAMETHASONE SODIUM PHOSPHATE 4 MG/ML
INJECTION, SOLUTION INTRA-ARTICULAR; INTRALESIONAL; INTRAMUSCULAR; INTRAVENOUS; SOFT TISSUE
Status: DISPENSED
Start: 2020-01-01

## (undated) RX ORDER — CEFAZOLIN SODIUM 1 G/50ML
SOLUTION INTRAVENOUS
Status: DISPENSED
Start: 2020-02-19

## (undated) RX ORDER — DEXAMETHASONE SODIUM PHOSPHATE 4 MG/ML
INJECTION, SOLUTION INTRA-ARTICULAR; INTRALESIONAL; INTRAMUSCULAR; INTRAVENOUS; SOFT TISSUE
Status: DISPENSED
Start: 2020-02-19

## (undated) RX ORDER — OXYMETAZOLINE HYDROCHLORIDE 0.05 G/100ML
SPRAY NASAL
Status: DISPENSED
Start: 2021-01-01

## (undated) RX ORDER — ESMOLOL HYDROCHLORIDE 10 MG/ML
INJECTION INTRAVENOUS
Status: DISPENSED
Start: 2020-02-19

## (undated) RX ORDER — PROPOFOL 10 MG/ML
INJECTION, EMULSION INTRAVENOUS
Status: DISPENSED
Start: 2020-02-19

## (undated) RX ORDER — OXYCODONE HCL 5 MG/5 ML
SOLUTION, ORAL ORAL
Status: DISPENSED
Start: 2021-01-01

## (undated) RX ORDER — CEFAZOLIN SODIUM 1 G/3ML
INJECTION, POWDER, FOR SOLUTION INTRAMUSCULAR; INTRAVENOUS
Status: DISPENSED
Start: 2021-01-01

## (undated) RX ORDER — GLYCOPYRROLATE 0.2 MG/ML
INJECTION, SOLUTION INTRAMUSCULAR; INTRAVENOUS
Status: DISPENSED
Start: 2021-01-01

## (undated) RX ORDER — LIDOCAINE HYDROCHLORIDE AND EPINEPHRINE 10; 10 MG/ML; UG/ML
INJECTION, SOLUTION INFILTRATION; PERINEURAL
Status: DISPENSED
Start: 2021-01-01

## (undated) RX ORDER — LIDOCAINE HYDROCHLORIDE AND EPINEPHRINE 10; 10 MG/ML; UG/ML
INJECTION, SOLUTION INFILTRATION; PERINEURAL
Status: DISPENSED
Start: 2020-01-01

## (undated) RX ORDER — LIDOCAINE HYDROCHLORIDE 10 MG/ML
INJECTION, SOLUTION EPIDURAL; INFILTRATION; INTRACAUDAL; PERINEURAL
Status: DISPENSED
Start: 2021-01-01